# Patient Record
Sex: FEMALE | Race: WHITE | NOT HISPANIC OR LATINO | Employment: OTHER | URBAN - METROPOLITAN AREA
[De-identification: names, ages, dates, MRNs, and addresses within clinical notes are randomized per-mention and may not be internally consistent; named-entity substitution may affect disease eponyms.]

---

## 2017-01-16 ENCOUNTER — TRANSFERRED RECORDS (OUTPATIENT)
Dept: HEALTH INFORMATION MANAGEMENT | Facility: CLINIC | Age: 67
End: 2017-01-16

## 2017-02-10 DIAGNOSIS — N25.81 SECONDARY RENAL HYPERPARATHYROIDISM (H): ICD-10-CM

## 2017-02-10 DIAGNOSIS — E03.9 HYPOTHYROIDISM, UNSPECIFIED TYPE: ICD-10-CM

## 2017-02-10 DIAGNOSIS — D50.9 IRON DEFICIENCY ANEMIA, UNSPECIFIED IRON DEFICIENCY ANEMIA TYPE: ICD-10-CM

## 2017-02-10 LAB
DEPRECATED CALCIDIOL+CALCIFEROL SERPL-MC: 10 UG/L (ref 20–75)
IRON SATN MFR SERPL: 17 % (ref 15–46)
IRON SERPL-MCNC: 75 UG/DL (ref 35–180)
PHOSPHATE SERPL-MCNC: 2.9 MG/DL (ref 2.5–4.5)
TIBC SERPL-MCNC: 444 UG/DL (ref 240–430)
TSH SERPL DL<=0.05 MIU/L-ACNC: 2.55 MU/L (ref 0.4–4)

## 2017-02-10 PROCEDURE — 84100 ASSAY OF PHOSPHORUS: CPT | Performed by: INTERNAL MEDICINE

## 2017-02-10 PROCEDURE — 36415 COLL VENOUS BLD VENIPUNCTURE: CPT | Performed by: INTERNAL MEDICINE

## 2017-02-10 PROCEDURE — 82306 VITAMIN D 25 HYDROXY: CPT | Performed by: INTERNAL MEDICINE

## 2017-02-10 PROCEDURE — 84443 ASSAY THYROID STIM HORMONE: CPT | Performed by: INTERNAL MEDICINE

## 2017-02-10 PROCEDURE — 83550 IRON BINDING TEST: CPT | Performed by: INTERNAL MEDICINE

## 2017-02-10 PROCEDURE — 83540 ASSAY OF IRON: CPT | Performed by: INTERNAL MEDICINE

## 2017-02-16 ENCOUNTER — OFFICE VISIT (OUTPATIENT)
Dept: INTERNAL MEDICINE | Facility: CLINIC | Age: 67
End: 2017-02-16
Payer: COMMERCIAL

## 2017-02-16 VITALS
HEIGHT: 63 IN | OXYGEN SATURATION: 96 % | SYSTOLIC BLOOD PRESSURE: 136 MMHG | BODY MASS INDEX: 41.56 KG/M2 | TEMPERATURE: 98.3 F | HEART RATE: 91 BPM | DIASTOLIC BLOOD PRESSURE: 56 MMHG | WEIGHT: 234.56 LBS

## 2017-02-16 DIAGNOSIS — E55.9 VITAMIN D DEFICIENCY: ICD-10-CM

## 2017-02-16 DIAGNOSIS — E66.01 MORBID OBESITY DUE TO EXCESS CALORIES (H): ICD-10-CM

## 2017-02-16 DIAGNOSIS — N25.81 SECONDARY RENAL HYPERPARATHYROIDISM (H): ICD-10-CM

## 2017-02-16 DIAGNOSIS — E11.40 TYPE 2 DIABETES MELLITUS WITH DIABETIC NEUROPATHY, WITH LONG-TERM CURRENT USE OF INSULIN (H): Primary | ICD-10-CM

## 2017-02-16 DIAGNOSIS — Z79.4 TYPE 2 DIABETES MELLITUS WITH DIABETIC NEUROPATHY, WITH LONG-TERM CURRENT USE OF INSULIN (H): Primary | ICD-10-CM

## 2017-02-16 DIAGNOSIS — N18.30 CKD (CHRONIC KIDNEY DISEASE) STAGE 3, GFR 30-59 ML/MIN (H): ICD-10-CM

## 2017-02-16 DIAGNOSIS — D50.9 IRON DEFICIENCY ANEMIA, UNSPECIFIED IRON DEFICIENCY ANEMIA TYPE: ICD-10-CM

## 2017-02-16 PROCEDURE — 99214 OFFICE O/P EST MOD 30 MIN: CPT | Performed by: INTERNAL MEDICINE

## 2017-02-16 RX ORDER — CHOLECALCIFEROL (VITAMIN D3) 50 MCG
2000 TABLET ORAL DAILY
COMMUNITY
Start: 2017-02-16 | End: 2022-06-06

## 2017-02-16 NOTE — PROGRESS NOTES
SUBJECTIVE:                                                    Ade Wilkins is a 66 year old female who presents to clinic today for the following health issues:      Diabetes Follow-up    Patient is checking blood sugars: three times daily.   Blood sugar testing frequency justification:  Glucoses up and down during and after GI illness (see below)  Results are as follows:         am - 110-125  Lunch - 170-175  Pm - 200    Diabetic concerns: blood sugar frequently over 200     Symptoms of hypoglycemia (low blood sugar): tummy is upset     Paresthesias (numbness or burning in feet) or sores: No     Date of last diabetic eye exam: fall 2016       Amount of exercise or physical activity: None    Problems taking medications regularly: No    Medication side effects: none  Diet: diabetic needs more protein since bariatric surgery    Problem list and histories reviewed & adjusted, as indicated.  Additional history: as documented    Additional issues to address:  1.  Follow-up labs for CKD, secondary renal hyper parathyroidism  2.  Follow-up hypothyroidism.  Weight stable, no hair loss, skin changes, constipation, loose stools, etc.    3.  R 4th finger pain just proximal to MCP, finger flexes and has difficulty extending.    4.  Nasal drying and bleeding past 3 months.  Saline spray doesn't help much.   Allergist has her on flonase, which makes things bleed, so she doesn't take.   Using benadryl at bedtime.  Humidifier didn't help  5.  New ulcer ? L leg.   Very red, whenever really swollen.  - wearing non-compression support hose  - salt intake is low, not zero    ROS:  patient had explosive diarrhea last month, lasted 3 days, was in bed.  Constitutional, HEENT, cardiovascular, pulmonary, gi and gu systems are negative, except as otherwise noted.    OBJECTIVE:                                                    /56 (BP Location: Left arm, Patient Position: Chair, Cuff Size: Adult Large)  Pulse 91  Temp 98.3  F  "(36.8  C) (Oral)  Ht 5' 3.25\" (1.607 m)  Wt 234 lb 9 oz (106.4 kg)  SpO2 96%  BMI 41.22 kg/m2  Body mass index is 41.22 kg/(m^2).   No apparent distress  Mouth normal  Mild tenderness R 4th MCP area, palmar aspect only  Mild frontal tenderness  Mouth normal aside from dry appearance to post oropharynx  R shin with bright erythema, without luda cellulitis, modest edema, pitting          ASSESSMENT:                                                    DIABETES TYPE 2, insulin dependent, not controlled. Associated with the following: neuropathy  HYPOTHYROIDISM, controlled/euthyroid                               CHRONIC KIDNEY DISEASE Stage  3 ; due for recheck.   EPISODE OF BAD GI ILLNESS last month, infectious, resolved.  DRY MOUTH, NASAL SYMPTOMS.   Rule out allergic, from dryness, sinusitis.   Patient declines trial of antibiotics at this time.  VENOUS INSUFFICIENCY, not fully or effectively treated.   Problems tolerating support hose due to itching on legs.  Secondary changes anterior L shin area.   Rule out cellulitis, doubt.  PAIN IN HAND    PLAN:                                                    1.  MEDICATIONS:        - Trial of decreasing lasix to 1/2 tablet, if able to tolerate related to ankle edema       - Start over the counter vitamin D3 2000 units daily       - Increase lantus insulin to 60 units twice daily        - Continue other medications without change  2.  Try to decrease salt intake completely  3.  Resume using knee support hose/Jobst.  4.  Elevate legs daily, if possible  5.  Try using hydrocortisone cream twice daily, as needed.   Put on before support hose.  6.  Notify MD if lesion on left leg opens up.  7.  Update MD in a few weeks, or for changes in leg  8.  Check labs end of May, then see MD.    Dimitry Howard MD  [facility]     "

## 2017-02-16 NOTE — NURSING NOTE
"Chief Complaint   Patient presents with     Diabetes       Initial /56 (BP Location: Left arm, Patient Position: Chair, Cuff Size: Adult Large)  Pulse 91  Temp 98.3  F (36.8  C) (Oral)  Ht 5' 3.25\" (1.607 m)  Wt 234 lb 9 oz (106.4 kg)  SpO2 96%  BMI 41.22 kg/m2 Estimated body mass index is 41.22 kg/(m^2) as calculated from the following:    Height as of this encounter: 5' 3.25\" (1.607 m).    Weight as of this encounter: 234 lb 9 oz (106.4 kg).  Medication Reconciliation: complete   Shasta Burns, KEYSHA      "

## 2017-02-16 NOTE — MR AVS SNAPSHOT
After Visit Summary   2/16/2017    Ade Wilkins    MRN: [id number]           Patient Information     Date Of Birth          1950        Visit Information        Provider Department      2/16/2017 2:30 PM Dimitry Howard MD [facility]        Today's Diagnoses     Type 2 diabetes mellitus with diabetic neuropathy, with long-term current use of insulin (H)    -  1    CKD (chronic kidney disease) stage 3, GFR 30-59 ml/min        Secondary renal hyperparathyroidism (H)        Morbid obesity        Iron deficiency anemia, unspecified iron deficiency anemia type        Vitamin D deficiency          Care Instructions    PLAN:                                                    1.  MEDICATIONS:        - Trial of decreasing lasix to 1/2 tablet, if able to tolerate related to ankle edema       - Start over the counter vitamin D3 2000 units daily       - Increase lantus insulin to 60 units twice daily        - Continue other medications without change  2.  Try to decrease salt intake completely  3.  Resume using knee support hose/Jobst.  4.  Elevate legs daily, if possible  5.  Try using hydrocortisone cream twice daily, as needed.   Put on before support hose.  6.  Notify MD if lesion on left leg opens up.  7.  Update MD in a few weeks, or for changes in leg  8.  Check labs end of May, then see MD.        Follow-ups after your visit        Future tests that were ordered for you today     Open Future Orders        Priority Expected Expires Ordered    Vitamin D Deficiency Routine 5/25/2017 2/16/2018 2/16/2017    Comprehensive metabolic panel Routine 5/25/2017 2/16/2018 2/16/2017    Hemoglobin A1c Routine 5/25/2017 2/16/2018 2/16/2017    Lipid panel reflex to direct LDL Routine 5/25/2017 2/16/2018 2/16/2017    Albumin Random Urine Quantitative Routine 5/25/2017 2/16/2018 2/16/2017            Who to contact     If you have questions or need follow up information about today's clinic  "visit or your schedule please contact [facility] directly at [phone].  Normal or non-critical lab and imaging results will be communicated to you by MyChart, letter or phone within 4 business days after the clinic has received the results. If you do not hear from us within 7 days, please contact the clinic through [employer] or phone. If you have a critical or abnormal lab result, we will notify you by phone as soon as possible.  Submit refill requests through [employer] or call your pharmacy and they will forward the refill request to us. Please allow 3 business days for your refill to be completed.          Additional Information About Your Visit        [employer] Information     [employer] gives you secure access to your electronic health record. If you see a primary care provider, you can also send messages to your care team and make appointments. If you have questions, please call your primary care clinic.  If you do not have a primary care provider, please call [phone] and they will assist you.        Care EveryWhere ID     This is your Care EveryWhere ID. This could be used by other organizations to access your [facility] medical records  [id number]        Your Vitals Were     Pulse Temperature Height Pulse Oximetry BMI (Body Mass Index)       91 98.3  F (36.8  C) (Oral) 5' 3.25\" (1.607 m) 96% 41.22 kg/m2        Blood Pressure from Last 3 Encounters:   02/16/17 136/56   11/04/16 122/60   06/20/16 126/62    Weight from Last 3 Encounters:   02/16/17 234 lb 9 oz (106.4 kg)   11/04/16 236 lb (107 kg)   06/20/16 229 lb 9.6 oz (104.1 kg)                 Today's Medication Changes          These changes are accurate as of: 2/16/17  3:52 PM.  If you have any questions, ask your nurse or doctor.               Start taking these medicines.        Dose/Directions    vitamin D 2000 UNITS tablet   Used for:  Vitamin D deficiency   Started by:  Dimitry Howard MD        Dose:  2000 Units   Take " 2,000 Units by mouth daily   Refills:  0         These medicines have changed or have updated prescriptions.        Dose/Directions    insulin glargine 100 UNIT/ML injection   Commonly known as:  LANTUS   This may have changed:  how much to take   Used for:  Type 2 diabetes mellitus with diabetic neuropathy, with long-term current use of insulin (H)   Changed by:  Dimitry Howard MD        Dose:  60 Units   Inject 60 Units Subcutaneous 2 times daily   Quantity:  100 mL   Refills:  prn            Where to get your medicines      These medications were sent to [id number] - [place], [place] - [address]  [address] [address]     Phone:  [phone]     insulin glargine 100 UNIT/ML injection         Some of these will need a paper prescription and others can be bought over the counter.  Ask your nurse if you have questions.     You don't need a prescription for these medications     vitamin D 2000 UNITS tablet                Primary Care Provider Office Phone # Fax #    Dimitry Howard [phone]       [facility] [address] [facility] [address]        Thank you!     Thank you for choosing [facility]  for your care. Our goal is always to provide you with excellent care. Hearing back from our patients is one way we can continue to improve our services. Please take a few minutes to complete the written survey that you may receive in the mail after your visit with us. Thank you!             Your Updated Medication List - Protect others around you: Learn how to safely use, store and throw away your medicines at www.disposemymeds.org.          This list is accurate as of: 2/16/17  3:52 PM.  Always use your most recent med list.                   Brand Name Dispense Instructions for use    ALLERGY INJECTIONS          aspirin 81 MG tablet      Take  by mouth every other day.       atorvastatin 80 MG tablet    LIPITOR  "   90 tablet    Take 1 tablet (80 mg) by mouth daily       * blood glucose monitoring test strip    no brand specified    1 Box    Reason for four times daily checking is lack of diabetic control.  Patient is on multiple doses of insulin daily. Dispense what patient's plan will cover.  Dispense box of 100 strips at a time       * blood glucose monitoring test strip    MARTITA CONTOUR NEXT    1 Box    Use to test blood sugar 4 times daily or as directed.       calcium carbonate-vitamin D 600-400 MG-UNIT Chew    CALTRATE 600+D     Take 2 chew tab by mouth every evening       cetirizine 10 MG tablet    zyrTEC     Take 1 tablet (10 mg) by mouth daily       cyanocobalamin 1000 MCG tablet    vitamin  B-12     Take 1,000 mcg by mouth every other day       exenatide ER 2 MG recon vial kit susp for weekly inj    BYDUREON    1 kit    Inject 2 mg Subcutaneous every 7 days       fenofibrate 160 MG tablet     90 tablet    Take 1 tablet (160 mg) by mouth daily       ferrous fumarate 65 mg ([place]. FE)-Vitamin C 125 mg  MG Tabs tablet    VITRON-C    360 tablet    Take 2 tablets by mouth 2 times daily       fluticasone 50 MCG/ACT spray    FLONASE    16 g    Spray 2 sprays into both nostrils daily       furosemide 40 MG tablet    LASIX    90 tablet    Take 1 tablet (40 mg) by mouth every morning       hydrocortisone 0.2 % cream    WESTCORT    45 g    Apply topically 2 times daily Apply sparingly to affected area 2 times daily as needed.       insulin glargine 100 UNIT/ML injection    LANTUS    100 mL    Inject 60 Units Subcutaneous 2 times daily       insulin syringe-needle U-100 31G X 5/16\" 1 ML    BD insulin syringe ULTRAFINE    100 each    Use one syringe 3 times daily or as directed.       irbesartan 300 MG tablet    AVAPRO    90 tablet    Take 1 tablet (300 mg) by mouth daily       levothyroxine 25 MCG tablet    SYNTHROID/LEVOTHROID    90 tablet    Take 1 tablet (25 mcg) by mouth daily       Magnesium 500 MG Caps      Take 1 " capsule by mouth daily.       metFORMIN 1000 MG tablet    GLUCOPHAGE    180 tablet    Take 1 tablet (1,000 mg) by mouth 2 times daily (with meals)       MULTIVITAMIN TABS   OR      1 tab QD       NovoLOG FLEXPEN 100 UNIT/ML injection   Generic drug:  insulin aspart     15 mL    0-10 units before breakfast, 12-15 units before lunch, and 15-20 units before supper       ONE TOUCH DELICA LANCETS Misc     100 Stick    1 Device 4 times daily.       OVER-THE-COUNTER          Copper Chelate 2mg  1 daily       potassium chloride 10 MEQ tablet    K-TAB,KLOR-CON    90 tablet    Take 1 tablet (10 mEq) by mouth daily       traZODone 50 MG tablet    DESYREL    90 tablet    Take 1 tablet (50 mg) by mouth nightly as needed for sleep       verapamil 180 MG CR tablet    CALAN-SR    180 tablet    Take 1 tablet (180 mg) by mouth 2 times daily       vitamin D 2000 UNITS tablet      Take 2,000 Units by mouth daily       * Notice:  This list has 2 medication(s) that are the same as other medications prescribed for you. Read the directions carefully, and ask your doctor or other care provider to review them with you.

## 2017-02-16 NOTE — PATIENT INSTRUCTIONS
PLAN:                                                    1.  MEDICATIONS:        - Trial of decreasing lasix to 1/2 tablet, if able to tolerate related to ankle edema       - Start over the counter vitamin D3 2000 units daily       - Increase lantus insulin to 60 units twice daily        - Continue other medications without change  2.  Try to decrease salt intake completely  3.  Resume using knee support hose/Jobst.  4.  Elevate legs daily, if possible  5.  Try using hydrocortisone cream twice daily, as needed.   Put on before support hose.  6.  Notify MD if lesion on left leg opens up.  7.  Update MD in a few weeks, or for changes in leg  8.  Check labs end of May, then see MD.

## 2017-02-17 ENCOUNTER — OFFICE VISIT (OUTPATIENT)
Dept: DERMATOLOGY | Facility: CLINIC | Age: 67
End: 2017-02-17
Payer: COMMERCIAL

## 2017-02-17 VITALS — OXYGEN SATURATION: 97 % | SYSTOLIC BLOOD PRESSURE: 176 MMHG | DIASTOLIC BLOOD PRESSURE: 78 MMHG | HEART RATE: 88 BPM

## 2017-02-17 DIAGNOSIS — I87.2 VENOUS STASIS DERMATITIS OF BOTH LOWER EXTREMITIES: Primary | ICD-10-CM

## 2017-02-17 DIAGNOSIS — L81.4 LENTIGO: ICD-10-CM

## 2017-02-17 DIAGNOSIS — D18.00 ANGIOMA: ICD-10-CM

## 2017-02-17 DIAGNOSIS — D22.9 NEVUS: ICD-10-CM

## 2017-02-17 DIAGNOSIS — L82.1 SEBORRHEIC KERATOSIS: ICD-10-CM

## 2017-02-17 PROCEDURE — 99204 OFFICE O/P NEW MOD 45 MIN: CPT | Performed by: PHYSICIAN ASSISTANT

## 2017-02-17 RX ORDER — BETAMETHASONE DIPROPIONATE 0.5 MG/G
OINTMENT, AUGMENTED TOPICAL
Qty: 45 G | Refills: 0 | Status: SHIPPED | OUTPATIENT
Start: 2017-02-17 | End: 2017-05-12

## 2017-02-17 NOTE — PROGRESS NOTES
HPI:   Ade Wilkins is a 66 year old female who presents for Full skin cancer screening.  chief complaint  Last Skin Exam: years ago      1st Baseline: yes  Personal HX of Skin Cancer: none   Personal HX of Malignant Melanoma: none   Family HX of Skin Cancer / Malignant Melanoma: none  Personal HX of Atypical Moles:   none  Risk factors: sun exsposure  New / Changing lesions:R cheek  Social History: lives close, sees   On review of systems, there are no further skin complaints, patient is feeling otherwise well.  See patient intake sheet.  ROS of the following were done and are negative: Constitutional, Eyes, Ears, Nose,   Mouth, Throat, Cardiovascular, Respiratory, GI, Genitourinary, Musculoskeletal,   Psychiatric, Endocrine, Allergic/Immunologic.    PHYSICAL EXAM:   Weight:  BP:   Skin exam performed as follows: Type 2 skin. Mood appropriate  Alert and Oriented X 3. Well developed, well nourished in no distress.  General appearance: Normal  Head including face: Normal  Eyes: conjunctiva and lids: Normal  Mouth: Lips, teeth, gums: Normal  Neck: Normal  Chest-breast/axillae: Normal  Back: Normal  Spleen and liver: Normal  Cardiovascular: Exam of peripheral vascular system by observation for swelling, varicosities, edema: Normal  Genitalia: groin, buttocks: Normal  Extremities: digits/nails (clubbing): Normal  Eccrine and Apocrine glands: Normal  Right upper extremity: Normal  Left upper extremity: Normal  Right lower extremity: Normal  Left lower extremity: Normal  Skin: Scalp and body hair: See below    Pt deferred exam of breasts, groin, buttocks: No,     Other physical findings:  1. Multiple pigmented macules on extremities and trunk  2. Multiple pigmented macules on face, trunk and extremities  3. Multiple vascular papules on trunk, arms and legs  4. Multiple scattered keratotic plaques  5. Erythema on bilateral lower shins       Except as noted above, no other signs of skin cancer or melanoma.      ASSESSMENT/PLAN:   Benign Full skin cancer screening today. . Patient with history of none  Advised on monthly self exams and 1 year  Patient Education: Appropriate brochures given.    Multiple benign appearing nevi on arms, legs and trunk. Discussed ABCDEs of melanoma and sunscreen.   Multiple lentigos on arms, legs and trunk. Advised benign, no treatment needed.  Multiple scattered angiomas. Advised benign, no treatment needed.   Seborrheic keratosis on arms, legs and trunk. Advised benign, no treatment needed.  Venous stasis dermatitis on bilateral lower legs - advised on diagnosis and treatment options. The knows she needs to wear her compression stockings. Recommend ACE bandages or OTC compression hose at a minimum.  --Start betamethasone ointment BID x 2-3 weeks then PRN      Follow-up: 3-4 weeks - recheck lower legs; yearly FSE/PRN sooner    1.) Patient was asked about new and changing moles. YES  2.) Patient received a complete physical skin examination: YES  3.) Patient was counseled to perform a monthly self skin examination: YES  Scribed By: Yvonne Méndez MS, PA-C

## 2017-02-17 NOTE — MR AVS SNAPSHOT
After Visit Summary   2/17/2017    Ade Wilkins    MRN: [id number]           Patient Information     Date Of Birth          1950        Visit Information        Provider Department      2/17/2017 9:15 AM Yvonne Méndez PA-C [facility]        Today's Diagnoses     Venous stasis dermatitis of both lower extremities    -  1    Nevus        Lentigo        Angioma        Seborrheic keratosis           Follow-ups after your visit        Your next 10 appointments already scheduled     Mar 17, 2017 10:15 AM CDT   Return Visit with Yvonne Méndez PA-C   [facility] ([facility])    [address] [id number] [phone]              Future tests that were ordered for you today     Open Future Orders        Priority Expected Expires Ordered    Vitamin D Deficiency Routine 5/25/2017 2/16/2018 2/16/2017    Comprehensive metabolic panel Routine 5/25/2017 2/16/2018 2/16/2017    Hemoglobin A1c Routine 5/25/2017 2/16/2018 2/16/2017    Lipid panel reflex to direct LDL Routine 5/25/2017 2/16/2018 2/16/2017    Albumin Random Urine Quantitative Routine 5/25/2017 2/16/2018 2/16/2017            Who to contact     If you have questions or need follow up information about today's clinic visit or your schedule please contact [facility] directly at [phone].  Normal or non-critical lab and imaging results will be communicated to you by MyChart, letter or phone within 4 business days after the clinic has received the results. If you do not hear from us within 7 days, please contact the clinic through MyChart or phone. If you have a critical or abnormal lab result, we will notify you by phone as soon as possible.  Submit refill requests through [employer] or call your pharmacy and they will forward the refill request to us. Please allow 3 business days for your refill to be completed.           Additional Information About Your Visit        [employer] Information     [employer] gives you secure access to your electronic health record. If you see a primary care provider, you can also send messages to your care team and make appointments. If you have questions, please call your primary care clinic.  If you do not have a primary care provider, please call [phone] and they will assist you.        Care EveryWhere ID     This is your Care EveryWhere ID. This could be used by other organizations to access your [place] medical records  [id number]        Your Vitals Were     Pulse Pulse Oximetry                88 97%           Blood Pressure from Last 3 Encounters:   02/17/17 176/78   02/16/17 136/56   11/04/16 122/60    Weight from Last 3 Encounters:   02/16/17 106.4 kg (234 lb 9 oz)   11/04/16 107 kg (236 lb)   06/20/16 104.1 kg (229 lb 9.6 oz)              Today, you had the following     No orders found for display         Today's Medication Changes          These changes are accurate as of: 2/17/17 10:12 AM.  If you have any questions, ask your nurse or doctor.               Start taking these medicines.        Dose/Directions    augmented betamethasone dipropionate 0.05 % ointment   Commonly known as:  DIPROLENE-AF   Started by:  Yvonne Méndez, ANYA        Apply to AA BID x 3-4 weeks then PRN   Quantity:  45 g   Refills:  0            Where to get your medicines      These medications were sent to [id number] [address]  [address] [facility] [address]     Phone:  [phone]     augmented betamethasone dipropionate 0.05 % ointment                Primary Care Provider Office Phone # Fax #    Dimitrypeace Howard [phone]       [facility] [address]  [facility] [address]        Thank you!     Thank you for choosing [facility]  for your care. Our goal is always to provide you with excellent  care. Hearing back from our patients is one way we can continue to improve our services. Please take a few minutes to complete the written survey that you may receive in the mail after your visit with us. Thank you!             Your Updated Medication List - Protect others around you: Learn how to safely use, store and throw away your medicines at www.disposemymeds.org.          This list is accurate as of: 2/17/17 10:12 AM.  Always use your most recent med list.                   Brand Name Dispense Instructions for use    ALLERGY INJECTIONS          aspirin 81 MG tablet      Take  by mouth every other day.       atorvastatin 80 MG tablet    LIPITOR    90 tablet    Take 1 tablet (80 mg) by mouth daily       augmented betamethasone dipropionate 0.05 % ointment    DIPROLENE-AF    45 g    Apply to AA BID x 3-4 weeks then PRN       * blood glucose monitoring test strip    no brand specified    1 Box    Reason for four times daily checking is lack of diabetic control.  Patient is on multiple doses of insulin daily. Dispense what patient's plan will cover.  Dispense box of 100 strips at a time       * blood glucose monitoring test strip    MARTITA CONTOUR NEXT    1 Box    Use to test blood sugar 4 times daily or as directed.       calcium carbonate-vitamin D 600-400 MG-UNIT Chew    CALTRATE 600+D     Take 2 chew tab by mouth every evening       cetirizine 10 MG tablet    zyrTEC     Take 1 tablet (10 mg) by mouth daily       cyanocobalamin 1000 MCG tablet    vitamin  B-12     Take 1,000 mcg by mouth every other day       exenatide ER 2 MG recon vial kit susp for weekly inj    BYDUREON    1 kit    Inject 2 mg Subcutaneous every 7 days       fenofibrate 160 MG tablet     90 tablet    Take 1 tablet (160 mg) by mouth daily       ferrous fumarate 65 mg ([place]. FE)-Vitamin C 125 mg  MG Tabs tablet    VITRON-C    360 tablet    Take 2 tablets by mouth 2 times daily       fluticasone 50 MCG/ACT spray    FLONASE    16 g    Spray  "2 sprays into both nostrils daily       furosemide 40 MG tablet    LASIX    90 tablet    Take 1 tablet (40 mg) by mouth every morning       hydrocortisone 0.2 % cream    WESTCORT    45 g    Apply topically 2 times daily Apply sparingly to affected area 2 times daily as needed.       insulin glargine 100 UNIT/ML injection    LANTUS    100 mL    Inject 60 Units Subcutaneous 2 times daily       insulin syringe-needle U-100 31G X 5/16\" 1 ML    BD insulin syringe ULTRAFINE    100 each    Use one syringe 3 times daily or as directed.       irbesartan 300 MG tablet    AVAPRO    90 tablet    Take 1 tablet (300 mg) by mouth daily       levothyroxine 25 MCG tablet    SYNTHROID/LEVOTHROID    90 tablet    Take 1 tablet (25 mcg) by mouth daily       Magnesium 500 MG Caps      Take 1 capsule by mouth daily.       metFORMIN 1000 MG tablet    GLUCOPHAGE    180 tablet    Take 1 tablet (1,000 mg) by mouth 2 times daily (with meals)       MULTIVITAMIN TABS   OR      1 tab QD       NovoLOG FLEXPEN 100 UNIT/ML injection   Generic drug:  insulin aspart     15 mL    0-10 units before breakfast, 12-15 units before lunch, and 15-20 units before supper       ONE TOUCH DELICA LANCETS Misc     100 Stick    1 Device 4 times daily.       OVER-THE-COUNTER          Copper Chelate 2mg  1 daily       potassium chloride 10 MEQ tablet    K-TAB,KLOR-CON    90 tablet    Take 1 tablet (10 mEq) by mouth daily       traZODone 50 MG tablet    DESYREL    90 tablet    Take 1 tablet (50 mg) by mouth nightly as needed for sleep       verapamil 180 MG CR tablet    CALAN-SR    180 tablet    Take 1 tablet (180 mg) by mouth 2 times daily       vitamin D 2000 UNITS tablet      Take 2,000 Units by mouth daily       * Notice:  This list has 2 medication(s) that are the same as other medications prescribed for you. Read the directions carefully, and ask your doctor or other care provider to review them with you.      "

## 2017-02-17 NOTE — NURSING NOTE
"Initial /78  Pulse 88  SpO2 97% Estimated body mass index is 41.22 kg/(m^2) as calculated from the following:    Height as of 2/16/17: 1.607 m (5' 3.25\").    Weight as of 2/16/17: 106.4 kg (234 lb 9 oz). .      "

## 2017-03-17 ENCOUNTER — OFFICE VISIT (OUTPATIENT)
Dept: DERMATOLOGY | Facility: CLINIC | Age: 67
End: 2017-03-17
Payer: COMMERCIAL

## 2017-03-17 VITALS — OXYGEN SATURATION: 96 % | HEART RATE: 101 BPM | DIASTOLIC BLOOD PRESSURE: 86 MMHG | SYSTOLIC BLOOD PRESSURE: 185 MMHG

## 2017-03-17 DIAGNOSIS — I87.2 VENOUS STASIS DERMATITIS OF BOTH LOWER EXTREMITIES: Primary | ICD-10-CM

## 2017-03-17 PROCEDURE — 99212 OFFICE O/P EST SF 10 MIN: CPT | Performed by: PHYSICIAN ASSISTANT

## 2017-03-17 NOTE — PROGRESS NOTES
HPI:   Ade Wilkins is a 66 year old female who presents for recheck of stasis dermatitis on bilateral lower legs  chief complaint  Location: lower legs   Condition present for:  On and off for years.   Previous treatments include: betamethasone ointment and gentle skin care - she has been using this since lov.     Review Of Systems  Eyes: negative  Ears/Nose/Throat: negative  Respiratory: No shortness of breath, dyspnea on exertion, cough, or hemoptysis  Cardiovascular: negative  Gastrointestinal: negative  Genitourinary: negative  Musculoskeletal: negative  Neurologic: negative  Psychiatric: negative        PHYSICAL EXAM:      Skin exam performed as follows: Type 2 skin. Mood appropriate  Alert and Oriented X 3. Well developed, well nourished in no distress.  General appearance: Normal  Head including face: Normal  Eyes: conjunctiva and lids: Normal  Mouth: Lips, teeth, gums: Normal  Neck: Normal  Chest-breast/axillae: Normal  Back: Normal  Spleen and liver: Normal  Cardiovascular: Exam of peripheral vascular system by observation for swelling, varicosities, edema: Normal  Genitalia: groin, buttocks: Normal  Extremities: digits/nails (clubbing): Normal  Eccrine and Apocrine glands: Normal  Right upper extremity: Normal  Left upper extremity: Normal  Right lower extremity: Normal  Left lower extremity: Normal  Skin: Scalp and body hair: See below    1. Minimal erythema on bilateral anterior shins; healing well    ASSESSMENT/PLAN:     1. Venous stasis dermatitis on bilateral lower legs - much improved today. Erythema nearly resolved and she is no longer itchy or uncomfortable. Has been wearing compression hose; found ones that do not irritate her skin. She is pleased with progress. Reviewed gentle skin care and use of steroid ointment when needed.   --Continue betamethasone ointment BID x 1 more week then stop  --Continue compression stockings and gentle skin care every day along with leg elevation when possible          Follow-up: PRN/yearly FSE  CC:   Scribed By: Yvonne Méndez, MS, PA-C

## 2017-03-17 NOTE — NURSING NOTE
"Initial /86  Pulse 101  SpO2 96% Estimated body mass index is 41.22 kg/(m^2) as calculated from the following:    Height as of 2/16/17: 1.607 m (5' 3.25\").    Weight as of 2/16/17: 106.4 kg (234 lb 9 oz). .      "

## 2017-03-26 DIAGNOSIS — E11.40 TYPE 2 DIABETES MELLITUS WITH DIABETIC NEUROPATHY, WITH LONG-TERM CURRENT USE OF INSULIN (H): Primary | ICD-10-CM

## 2017-03-26 DIAGNOSIS — Z79.4 TYPE 2 DIABETES MELLITUS WITH DIABETIC NEUROPATHY, WITH LONG-TERM CURRENT USE OF INSULIN (H): Primary | ICD-10-CM

## 2017-03-27 NOTE — TELEPHONE ENCOUNTER
exenatide (BYDUREON) 2 MG SUSR       Last Written Prescription Date:  5/12/2014  Last Fill Quantity: 3 Kit,   # refills: 3  Last Office Visit with G, P or [facility] prescribing provider: 2/16/2017  Future Office visit:       Routing refill request to provider for review/approval because:  Medication is reported/discontinued

## 2017-03-28 ENCOUNTER — OFFICE VISIT (OUTPATIENT)
Dept: INTERNAL MEDICINE | Facility: CLINIC | Age: 67
End: 2017-03-28
Payer: COMMERCIAL

## 2017-03-28 VITALS
WEIGHT: 237 LBS | SYSTOLIC BLOOD PRESSURE: 146 MMHG | HEART RATE: 94 BPM | OXYGEN SATURATION: 93 % | TEMPERATURE: 97.7 F | BODY MASS INDEX: 41.65 KG/M2 | DIASTOLIC BLOOD PRESSURE: 68 MMHG

## 2017-03-28 DIAGNOSIS — E03.9 HYPOTHYROIDISM, UNSPECIFIED TYPE: ICD-10-CM

## 2017-03-28 DIAGNOSIS — E55.9 VITAMIN D DEFICIENCY: ICD-10-CM

## 2017-03-28 DIAGNOSIS — E78.5 HYPERLIPIDEMIA LDL GOAL <100: ICD-10-CM

## 2017-03-28 DIAGNOSIS — E66.01 MORBID OBESITY DUE TO EXCESS CALORIES (H): ICD-10-CM

## 2017-03-28 DIAGNOSIS — J01.90 ACUTE SINUSITIS WITH SYMPTOMS > 10 DAYS: ICD-10-CM

## 2017-03-28 DIAGNOSIS — Z79.4 TYPE 2 DIABETES MELLITUS WITH DIABETIC NEUROPATHY, WITH LONG-TERM CURRENT USE OF INSULIN (H): ICD-10-CM

## 2017-03-28 DIAGNOSIS — N18.30 CKD (CHRONIC KIDNEY DISEASE) STAGE 3, GFR 30-59 ML/MIN (H): ICD-10-CM

## 2017-03-28 DIAGNOSIS — R60.9 EDEMA, UNSPECIFIED TYPE: ICD-10-CM

## 2017-03-28 DIAGNOSIS — I10 ESSENTIAL HYPERTENSION: Primary | ICD-10-CM

## 2017-03-28 DIAGNOSIS — E11.40 TYPE 2 DIABETES MELLITUS WITH DIABETIC NEUROPATHY, WITH LONG-TERM CURRENT USE OF INSULIN (H): ICD-10-CM

## 2017-03-28 LAB
ALBUMIN SERPL-MCNC: 4.1 G/DL (ref 3.4–5)
ALP SERPL-CCNC: 69 U/L (ref 40–150)
ALT SERPL W P-5'-P-CCNC: 25 U/L (ref 0–50)
ANION GAP SERPL CALCULATED.3IONS-SCNC: 10 MMOL/L (ref 3–14)
AST SERPL W P-5'-P-CCNC: 16 U/L (ref 0–45)
BILIRUB SERPL-MCNC: 0.4 MG/DL (ref 0.2–1.3)
BUN SERPL-MCNC: 27 MG/DL (ref 7–30)
CALCIUM SERPL-MCNC: 9.5 MG/DL (ref 8.5–10.1)
CHLORIDE SERPL-SCNC: 103 MMOL/L (ref 94–109)
CHOLEST SERPL-MCNC: 185 MG/DL
CO2 SERPL-SCNC: 27 MMOL/L (ref 20–32)
CREAT SERPL-MCNC: 1.5 MG/DL (ref 0.52–1.04)
CREAT UR-MCNC: 22 MG/DL
DEPRECATED CALCIDIOL+CALCIFEROL SERPL-MC: 37 UG/L (ref 20–75)
GFR SERPL CREATININE-BSD FRML MDRD: 35 ML/MIN/1.7M2
GLUCOSE SERPL-MCNC: 85 MG/DL (ref 70–99)
HBA1C MFR BLD: 9.3 % (ref 4.3–6)
HDLC SERPL-MCNC: 35 MG/DL
LDLC SERPL CALC-MCNC: 101 MG/DL
MICROALBUMIN UR-MCNC: 46 MG/L
MICROALBUMIN/CREAT UR: 212.04 MG/G CR (ref 0–25)
NONHDLC SERPL-MCNC: 150 MG/DL
POTASSIUM SERPL-SCNC: 4.2 MMOL/L (ref 3.4–5.3)
PROT SERPL-MCNC: 7.5 G/DL (ref 6.8–8.8)
SODIUM SERPL-SCNC: 140 MMOL/L (ref 133–144)
TRIGL SERPL-MCNC: 247 MG/DL
TSH SERPL DL<=0.005 MIU/L-ACNC: 2.84 MU/L (ref 0.4–4)

## 2017-03-28 PROCEDURE — 80061 LIPID PANEL: CPT | Performed by: INTERNAL MEDICINE

## 2017-03-28 PROCEDURE — 82043 UR ALBUMIN QUANTITATIVE: CPT | Performed by: INTERNAL MEDICINE

## 2017-03-28 PROCEDURE — 83036 HEMOGLOBIN GLYCOSYLATED A1C: CPT | Performed by: INTERNAL MEDICINE

## 2017-03-28 PROCEDURE — 80053 COMPREHEN METABOLIC PANEL: CPT | Performed by: INTERNAL MEDICINE

## 2017-03-28 PROCEDURE — 84443 ASSAY THYROID STIM HORMONE: CPT | Performed by: INTERNAL MEDICINE

## 2017-03-28 PROCEDURE — 36415 COLL VENOUS BLD VENIPUNCTURE: CPT | Performed by: INTERNAL MEDICINE

## 2017-03-28 PROCEDURE — 82306 VITAMIN D 25 HYDROXY: CPT | Performed by: INTERNAL MEDICINE

## 2017-03-28 PROCEDURE — 99214 OFFICE O/P EST MOD 30 MIN: CPT | Performed by: INTERNAL MEDICINE

## 2017-03-28 RX ORDER — FUROSEMIDE 20 MG
20 TABLET ORAL DAILY
Qty: 90 TABLET | Refills: 3 | Status: SHIPPED | OUTPATIENT
Start: 2017-03-28 | End: 2018-06-27

## 2017-03-28 RX ORDER — AMOXICILLIN AND CLAVULANATE POTASSIUM 500; 125 MG/1; MG/1
1 TABLET, FILM COATED ORAL 3 TIMES DAILY
Qty: 30 TABLET | Refills: 0 | Status: SHIPPED | OUTPATIENT
Start: 2017-03-28 | End: 2017-08-25

## 2017-03-28 NOTE — PROGRESS NOTES
"  SUBJECTIVE:                                                    Ade Wilkins is a 66 year old female who presents to clinic today for the following health issues:    Chief Complaint   Patient presents with     Hypertension     Sinus Problem     sinus pressure onset x \"all winter\"- worse over past 3 weeks- sinus drip x 3 weeks, bloody mucous with blowing nose.     Hypertension Follow-up      Outpatient blood pressures are being checked at home.  Results are 195/90's.    Low Salt Diet: no added salt       Amount of exercise or physical activity: None    Problems taking medications regularly: No    Medication side effects: lightheadedness    Diet: stays away from white bread, tries to maintain a diabetic diet    Reviewed and updated as needed this visit by clinical staff:  Medications  Allergies  Soc Hx   Additional history: as documented    Additional issues to address:  1.  Sinus symptoms as above.   URI symptoms, then now sinus congestion, pressure bilat, with mild sore throat, post nasal drip, no fever/chills.  Mild cough, no shortness of breath above usual.   Some wheezing, which is not usual.  2.  Follow-up of type II diabetes mellitus.   Patient reports feeling well except for sinusitis.  On average, she checks her blood sugars 3+ times daily.  Didn't bring in meter.  Results are as follows:       am - 97 this am, 125 yest, usually 's       noon - 140       supper - 160-170       bedtime -        postprandial - after lunch ~ 210-220 one hour later  She reports the following symptoms:  No polyuria or polydipsia,  Symptoms of hypoglycemia: none      ROS:  Finger edema a bit more on lower dose lasix.   Wearing support hose.  Constitutional, HEENT, cardiovascular, pulmonary, gi and gu systems are negative, except as otherwise noted.    OBJECTIVE:                                                      /78  Pulse 94  Temp 97.7  F (36.5  C) (Oral)  Wt 237 lb (107.5 kg)  SpO2 93%  Breastfeeding? No  " BMI 41.65 kg/m2  Body mass index is 41.65 kg/(m^2).   No apparent distress  Recheck 146/68  TM normal  Some frontal and maxillary area tenderness bilat.  Throat dry, otherwise normal  No cervical adenopathy, thyroid normal  Lungs clear        ASSESSMENT:                                                      SINUSITIS  DIABETES TYPE 2, insulin dependent, not controlled. Associated with the following: neuropathy, nephropathy  HYPERLIPIDEMIA, follow-up due  HYPERTENSION, slightly worsened. Associated with the following complications: CKD and Diabetes  HYPOTHYROIDISM, follow-up due                                    PLAN:                                                      1.  MEDICATIONS:        - Start taking antibiotics, notify MD if having any problems       - Continue other medications without change  2.  Notify MD if sinus symptoms not resolved.  3.  Continue nasal saline, consider using nasal steroid spray with aqueous solution, such as nasacort AQ if insurance would allow.  4.  Check labs -->  A1c 9.3, creat stable at 1.5, high triglycerides   5.  See endocrinologist soon, as planned.  6.  Monitor blood pressure on own, consider nurse visit appointment or at pharmacy    Dimitry Howard MD  [facility]

## 2017-03-28 NOTE — MR AVS SNAPSHOT
After Visit Summary   3/28/2017    Ade Wilkins    MRN: [id number]           Patient Information     Date Of Birth          1950        Visit Information        Provider Department      3/28/2017 9:00 AM Dimitry Howard MD [facility]        Today's Diagnoses     Essential hypertension    -  1    Type 2 diabetes mellitus with diabetic neuropathy, with long-term current use of insulin (H)        Vitamin D deficiency        BMI > 40        Hypothyroidism, unspecified type        Hyperlipidemia LDL goal <100        CKD (chronic kidney disease) stage 3, GFR 30-59 ml/min        Edema, unspecified type        Acute sinusitis with symptoms > 10 days          Care Instructions    PLAN:                                                      1.  MEDICATIONS:        - Start taking antibiotics, notify MD if having any problems       - Continue other medications without change  2.  Notify MD if sinus symptoms not resolved.  3.  Continue nasal saline, consider using nasal steroid spray with aqueous solution, such as nasacort AQ if insurance would allow.  4.  Check labs  5.  See endocrinologist in May, as planned.  6.  Monitor blood pressure on own, consider nurse visit appointment or at pharmacy        Follow-ups after your visit        Your next 10 appointments already scheduled     May 24, 2017  8:45 AM CDT   (Arrive by 8:30 AM)   NEW DIABETES with Melvi Naik MD   [facility] ([facility])    [address] [place] [address] [phone]              Who to contact     If you have questions or need follow up information about today's clinic visit or your schedule please contact [facility] directly at [phone].  Normal or non-critical lab and imaging results will be communicated to you by MyChart, letter or phone within 4 business days after the clinic has received the  results. If you do not hear from us within 7 days, please contact the clinic through [employer] or phone. If you have a critical or abnormal lab result, we will notify you by phone as soon as possible.  Submit refill requests through [employer] or call your pharmacy and they will forward the refill request to us. Please allow 3 business days for your refill to be completed.          Additional Information About Your Visit        [employer] Information     [employer] gives you secure access to your electronic health record. If you see a primary care provider, you can also send messages to your care team and make appointments. If you have questions, please call your primary care clinic.  If you do not have a primary care provider, please call [phone] and they will assist you.        Care EveryWhere ID     This is your Care EveryWhere ID. This could be used by other organizations to access your [place] medical records  [id number]        Your Vitals Were     Pulse Temperature Pulse Oximetry Breastfeeding? BMI (Body Mass Index)       94 97.7  F (36.5  C) (Oral) 93% No 41.65 kg/m2        Blood Pressure from Last 3 Encounters:   03/28/17 146/68   03/17/17 185/86   02/17/17 176/78    Weight from Last 3 Encounters:   03/28/17 237 lb (107.5 kg)   02/16/17 234 lb 9 oz (106.4 kg)   11/04/16 236 lb (107 kg)              We Performed the Following     Albumin Random Urine Quantitative     Comprehensive metabolic panel     Hemoglobin A1c     Lipid panel reflex to direct LDL     TSH with free T4 reflex     Vitamin D Deficiency          Today's Medication Changes          These changes are accurate as of: 3/28/17 10:04 AM.  If you have any questions, ask your nurse or doctor.               Start taking these medicines.        Dose/Directions    amoxicillin-clavulanate 500-125 MG per tablet   Commonly known as:  AUGMENTIN   Used for:  Acute sinusitis with symptoms > 10 days   Started by:  Dimitry Howard MD        Dose:  1 tablet    Take 1 tablet by mouth 3 times daily for 10 days   Quantity:  30 tablet   Refills:  0         These medicines have changed or have updated prescriptions.        Dose/Directions    furosemide 20 MG tablet   Commonly known as:  LASIX   This may have changed:    - medication strength  - how much to take  - when to take this   Used for:  Essential hypertension   Changed by:  Dimitry Howard MD        Dose:  20 mg   Take 1 tablet (20 mg) by mouth daily   Quantity:  90 tablet   Refills:  3            Where to get your medicines      These medications were sent to [id number] - [facility] [address]  [address] [facility] [address]     Phone:  [phone]     amoxicillin-clavulanate 500-125 MG per tablet    furosemide 20 MG tablet                Primary Care Provider Office Phone # Fax #    Dimitry Howard [phone]       [facility] [address] [facility] [address]        Thank you!     Thank you for choosing [facility]  for your care. Our goal is always to provide you with excellent care. Hearing back from our patients is one way we can continue to improve our services. Please take a few minutes to complete the written survey that you may receive in the mail after your visit with us. Thank you!             Your Updated Medication List - Protect others around you: Learn how to safely use, store and throw away your medicines at www.disposemymeds.org.          This list is accurate as of: 3/28/17 10:04 AM.  Always use your most recent med list.                   Brand Name Dispense Instructions for use    ALLERGY INJECTIONS          amoxicillin-clavulanate 500-125 MG per tablet    AUGMENTIN    30 tablet    Take 1 tablet by mouth 3 times daily for 10 days       aspirin 81 MG tablet      Take  by mouth every other day.       atorvastatin 80 MG tablet    LIPITOR    90 tablet    Take 1 tablet (80 mg) by mouth daily     "   augmented betamethasone dipropionate 0.05 % ointment    DIPROLENE-AF    45 g    Apply to AA BID x 3-4 weeks then PRN       * blood glucose monitoring test strip    no brand specified    1 Box    Reason for four times daily checking is lack of diabetic control.  Patient is on multiple doses of insulin daily. Dispense what patient's plan will cover.  Dispense box of 100 strips at a time       * blood glucose monitoring test strip    MARTITA CONTOUR NEXT    1 Box    Use to test blood sugar 4 times daily or as directed.       calcium carbonate-vitamin D 600-400 MG-UNIT Chew    CALTRATE 600+D     Take 2 chew tab by mouth every evening       cetirizine 10 MG tablet    zyrTEC     Take 1 tablet (10 mg) by mouth daily       cyanocobalamin 1000 MCG tablet    vitamin  B-12     Take 1,000 mcg by mouth every other day       exenatide ER 2 MG recon vial kit susp for weekly inj    BYDUREON    2 kit    Inject 2 mg Subcutaneous every 7 days       fenofibrate 160 MG tablet     90 tablet    Take 1 tablet (160 mg) by mouth daily       ferrous fumarate 65 mg ([place]. FE)-Vitamin C 125 mg  MG Tabs tablet    VITRON-C    360 tablet    Take 2 tablets by mouth 2 times daily       fluticasone 50 MCG/ACT spray    FLONASE    16 g    Spray 2 sprays into both nostrils daily       furosemide 20 MG tablet    LASIX    90 tablet    Take 1 tablet (20 mg) by mouth daily       hydrocortisone 0.2 % cream    WESTCORT    45 g    Apply topically 2 times daily Apply sparingly to affected area 2 times daily as needed.       insulin glargine 100 UNIT/ML injection    LANTUS    100 mL    Inject 60 Units Subcutaneous 2 times daily       insulin syringe-needle U-100 31G X 5/16\" 1 ML    BD insulin syringe ULTRAFINE    100 each    Use one syringe 3 times daily or as directed.       irbesartan 300 MG tablet    AVAPRO    90 tablet    Take 1 tablet (300 mg) by mouth daily       levothyroxine 25 MCG tablet    SYNTHROID/LEVOTHROID    90 tablet    Take 1 tablet (25 " mcg) by mouth daily       Magnesium 500 MG Caps      Take 1 capsule by mouth daily.       metFORMIN 1000 MG tablet    GLUCOPHAGE    180 tablet    Take 1 tablet (1,000 mg) by mouth 2 times daily (with meals)       MULTIVITAMIN TABS   OR      1 tab QD       NovoLOG FLEXPEN 100 UNIT/ML injection   Generic drug:  insulin aspart     15 mL    0-10 units before breakfast, 12-15 units before lunch, and 15-20 units before supper       ONE TOUCH DELICA LANCETS Misc     100 Stick    1 Device 4 times daily.       OVER-THE-COUNTER          Reported on 3/28/2017       potassium chloride 10 MEQ tablet    K-TAB,KLOR-CON    90 tablet    Take 1 tablet (10 mEq) by mouth daily       traZODone 50 MG tablet    DESYREL    90 tablet    Take 1 tablet (50 mg) by mouth nightly as needed for sleep       verapamil 180 MG CR tablet    CALAN-SR    180 tablet    Take 1 tablet (180 mg) by mouth 2 times daily       vitamin D 2000 UNITS tablet      Take 2,000 Units by mouth daily       * Notice:  This list has 2 medication(s) that are the same as other medications prescribed for you. Read the directions carefully, and ask your doctor or other care provider to review them with you.

## 2017-03-28 NOTE — PATIENT INSTRUCTIONS
PLAN:                                                      1.  MEDICATIONS:        - Start taking antibiotics, notify MD if having any problems       - Continue other medications without change  2.  Notify MD if sinus symptoms not resolved.  3.  Continue nasal saline, consider using nasal steroid spray with aqueous solution, such as nasacort AQ if insurance would allow.  4.  Check labs  5.  See endocrinologist in May, as planned.  6.  Monitor blood pressure on own, consider nurse visit appointment or at pharmacy

## 2017-03-28 NOTE — NURSING NOTE
"Chief Complaint   Patient presents with     Hypertension     Sinus Problem     sinus pressure onset x \"all winter\"- worse over past 3 weeks- sinus drip x 3 weeks, bloody mucous with blowing nose.       Initial /78  Pulse 94  Temp 97.7  F (36.5  C) (Oral)  Wt 237 lb (107.5 kg)  SpO2 93%  Breastfeeding? No  BMI 41.65 kg/m2 Estimated body mass index is 41.65 kg/(m^2) as calculated from the following:    Height as of 2/16/17: 5' 3.25\" (1.607 m).    Weight as of this encounter: 237 lb (107.5 kg).  Medication Reconciliation: complete    "

## 2017-05-10 ASSESSMENT — ENCOUNTER SYMPTOMS
DYSPNEA ON EXERTION: 1
SYNCOPE: 0
SPEECH CHANGE: 0
EYE IRRITATION: 1
WEAKNESS: 0
BACK PAIN: 1
SINUS CONGESTION: 1
TINGLING: 0
SMELL DISTURBANCE: 0
LEG PAIN: 0
DOUBLE VISION: 0
LOSS OF CONSCIOUSNESS: 0
SLEEP DISTURBANCES DUE TO BREATHING: 0
ARTHRALGIAS: 1
MUSCLE CRAMPS: 0
NECK PAIN: 0
COUGH DISTURBING SLEEP: 0
POSTURAL DYSPNEA: 0
EYE PAIN: 0
STIFFNESS: 1
WHEEZING: 1
HOARSE VOICE: 0
TROUBLE SWALLOWING: 0
HYPERTENSION: 1
EXERCISE INTOLERANCE: 0
NUMBNESS: 0
EYE REDNESS: 1
DISTURBANCES IN COORDINATION: 0
LEG SWELLING: 1
HEADACHES: 1
NECK MASS: 0
DIZZINESS: 1
ORTHOPNEA: 0
SORE THROAT: 1
SEIZURES: 0
COUGH: 1
PARALYSIS: 0
HYPOTENSION: 0
JOINT SWELLING: 0
MEMORY LOSS: 0
RESPIRATORY PAIN: 0
MUSCLE WEAKNESS: 0
SHORTNESS OF BREATH: 1
TACHYCARDIA: 0
EYE WATERING: 1
MYALGIAS: 0
SPUTUM PRODUCTION: 1
TREMORS: 0
SNORES LOUDLY: 1
SINUS PAIN: 1
TASTE DISTURBANCE: 0
LIGHT-HEADEDNESS: 1
HEMOPTYSIS: 0
CLAUDICATION: 0
PALPITATIONS: 0

## 2017-05-12 ENCOUNTER — OFFICE VISIT (OUTPATIENT)
Dept: DERMATOLOGY | Facility: CLINIC | Age: 67
End: 2017-05-12
Payer: COMMERCIAL

## 2017-05-12 VITALS — HEART RATE: 95 BPM | SYSTOLIC BLOOD PRESSURE: 193 MMHG | OXYGEN SATURATION: 98 % | DIASTOLIC BLOOD PRESSURE: 83 MMHG

## 2017-05-12 DIAGNOSIS — D48.5 NEOPLASM OF UNCERTAIN BEHAVIOR OF SKIN: Primary | ICD-10-CM

## 2017-05-12 DIAGNOSIS — I87.2 VENOUS STASIS DERMATITIS OF BOTH LOWER EXTREMITIES: ICD-10-CM

## 2017-05-12 PROCEDURE — 88305 TISSUE EXAM BY PATHOLOGIST: CPT | Performed by: PHYSICIAN ASSISTANT

## 2017-05-12 PROCEDURE — 00000093 ZZHCL STATISTIC COURTESY CONSULT: Performed by: PHYSICIAN ASSISTANT

## 2017-05-12 PROCEDURE — 99213 OFFICE O/P EST LOW 20 MIN: CPT | Mod: 25 | Performed by: PHYSICIAN ASSISTANT

## 2017-05-12 PROCEDURE — 11100 HC BIOPSY SKIN/SUBQ/MUC MEM, SINGLE LESION: CPT | Performed by: PHYSICIAN ASSISTANT

## 2017-05-12 RX ORDER — BETAMETHASONE DIPROPIONATE 0.5 MG/G
OINTMENT, AUGMENTED TOPICAL
Qty: 45 G | Refills: 5 | Status: SHIPPED | OUTPATIENT
Start: 2017-05-12 | End: 2018-07-03

## 2017-05-12 NOTE — PROGRESS NOTES
HPI:   Ade Wilkins is a 66 year old female who presents for evaluation of a spot on the arm  chief complaint  Location: left forearm   Condition present for:  Awhile. Was flat and brown before; has been raised, itchy, pink for the last 2-3 weeks.   Previous treatments include: none    Review Of Systems  Eyes: negative  Ears/Nose/Throat: negative  Respiratory: No shortness of breath, dyspnea on exertion, cough, or hemoptysis  Cardiovascular: negative  Gastrointestinal: negative  Genitourinary: negative  Musculoskeletal: negative  Neurologic: negative  Psychiatric: negative        PHYSICAL EXAM:      Skin exam performed as follows: Type 2 skin. Mood appropriate  Alert and Oriented X 3. Well developed, well nourished in no distress.  General appearance: Normal  Head including face: Normal  Eyes: conjunctiva and lids: Normal  Mouth: Lips, teeth, gums: Normal  Neck: Normal  Chest-breast/axillae: Normal  Back: Normal  Spleen and liver: Normal  Cardiovascular: Exam of peripheral vascular system by observation for swelling, varicosities, edema: Normal  Genitalia: groin, buttocks: Normal  Extremities: digits/nails (clubbing): Normal  Eccrine and Apocrine glands: Normal  Right upper extremity: Normal  Left upper extremity: Normal  Right lower extremity: Normal  Left lower extremity: Normal  Skin: Scalp and body hair: See below    1. 7 mm pink macule on left forearm    ASSESSMENT/PLAN:     1. ISK r/o BCC on left forearm. Shave bx in typical fashion .  Area cleaned with betadyne and anesthetized with 1% lidocaine with epi .  Dermablade used to remove the lesion and sent to pathology. Bleeding was cauterized. Pt tolerated procedure well.  2. Venous stasis dermatitis much improved. Has been using betamethasone ointment QD-BID. Advised to take breaks from this as able; continue gentle skin care every day. Is wearing compression stockings as much as able.         Follow-up: yearly FSE/pending path/PRN  CC:   Scribed By: Yvonne  Dve MS, PARAÚL

## 2017-05-12 NOTE — MR AVS SNAPSHOT
After Visit Summary   5/12/2017    Ade Wilkins    MRN: [id number]           Patient Information     Date Of Birth          1950        Visit Information        Provider Department      5/12/2017 8:30 AM Yvonne Méndez PA-C [facility]        Today's Diagnoses     Neoplasm of uncertain behavior of skin    -  1    Venous stasis dermatitis of both lower extremities           Follow-ups after your visit        Your next 10 appointments already scheduled     May 24, 2017  8:45 AM CDT   (Arrive by 8:30 AM)   NEW DIABETES with Melvi Naik MD   [facility] Endocrinology ([facility] [place])    [address]  3rd Floor  [facility] [address] [phone]              Who to contact     If you have questions or need follow up information about today's clinic visit or your schedule please contact [facility] directly at [phone].  Normal or non-critical lab and imaging results will be communicated to you by MyChart, letter or phone within 4 business days after the clinic has received the results. If you do not hear from us within 7 days, please contact the clinic through [employer] or phone. If you have a critical or abnormal lab result, we will notify you by phone as soon as possible.  Submit refill requests through [email or web address] or call your pharmacy and they will forward the refill request to us. Please allow 3 business days for your refill to be completed.          Additional Information About Your Visit        MyChart Information     [email or web address] gives you secure access to your electronic health record. If you see a primary care provider, you can also send messages to your care team and make appointments. If you have questions, please call your primary care clinic.  If you do not have a primary care provider, please call [phone] and they will assist you.        Care EveryWhere ID     This is your Care EveryWhere  ID. This could be used by other organizations to access your [place] medical records  [id number]        Your Vitals Were     Pulse Pulse Oximetry                95 98%           Blood Pressure from Last 3 Encounters:   05/12/17 193/83   03/28/17 146/68   03/17/17 185/86    Weight from Last 3 Encounters:   03/28/17 107.5 kg (237 lb)   02/16/17 106.4 kg (234 lb 9 oz)   11/04/16 107 kg (236 lb)              We Performed the Following     BIOPSY SKIN/SUBQ/MUC MEM, SINGLE LESION     Surgical pathology exam          Where to get your medicines      These medications were [facility] to [id number] - [place] [place] [facility] [address] [facility]  [address] [facility] [address]     Phone:  [phone]     augmented betamethasone dipropionate 0.05 % ointment          Primary Care Provider Office Phone # Fax #    Dimitry Dallin Howard [phone]       [facility] [address]  [facility] [address]        Thank you!     Thank you for choosing [facility]  for your care. Our goal is always to provide you with excellent care. Hearing back from our patients is one way we can continue to improve our services. Please take a few minutes to complete the written survey that you may receive in the mail after your visit with us. Thank you!             Your Updated Medication List - Protect others around you: Learn how to safely use, store and throw away your medicines at www.disposemymeds.org.          This list is accurate as of: 5/12/17  9:42 AM.  Always use your most recent med list.                   Brand Name Dispense Instructions for use    ALLERGY INJECTIONS          aspirin 81 MG tablet      Take  by mouth every other day.       atorvastatin 80 MG tablet    LIPITOR    90 tablet    Take 1 tablet (80 mg) by mouth daily       augmented betamethasone dipropionate 0.05 % ointment    DIPROLENE-AF    45 g    Apply to AA BID x 3-4 weeks then PRN       * blood glucose  "monitoring test strip    no brand specified    1 Box    Reason for four times daily checking is lack of diabetic control.  Patient is on multiple doses of insulin daily. Dispense what patient's plan will cover.  Dispense box of 100 strips at a time       * blood glucose monitoring test strip    MARTITA CONTOUR NEXT    1 Box    Use to test blood sugar 4 times daily or as directed.       calcium carbonate-vitamin D 600-400 MG-UNIT Chew    CALTRATE 600+D     Take 2 chew tab by mouth every evening       cetirizine 10 MG tablet    zyrTEC     Take 1 tablet (10 mg) by mouth daily       cyanocobalamin 1000 MCG tablet    vitamin  B-12     Take 1,000 mcg by mouth every other day       exenatide ER 2 MG recon vial kit susp for weekly inj    BYDUREON    2 kit    Inject 2 mg Subcutaneous every 7 days       fenofibrate 160 MG tablet     90 tablet    Take 1 tablet (160 mg) by mouth daily       ferrous fumarate 65 mg ([place]. FE)-Vitamin C 125 mg  MG Tabs tablet    VITRON-C    360 tablet    Take 2 tablets by mouth 2 times daily       fluticasone 50 MCG/ACT spray    FLONASE    16 g    Spray 2 sprays into both nostrils daily       furosemide 20 MG tablet    LASIX    90 tablet    Take 1 tablet (20 mg) by mouth daily       hydrocortisone 0.2 % cream    WESTCORT    45 g    Apply topically 2 times daily Apply sparingly to affected area 2 times daily as needed.       insulin glargine 100 UNIT/ML injection    LANTUS    100 mL    Inject 60 Units Subcutaneous 2 times daily       insulin syringe-needle U-100 31G X 5/16\" 1 ML    BD insulin syringe ULTRAFINE    100 each    Use one syringe 3 times daily or as directed.       irbesartan 300 MG tablet    AVAPRO    90 tablet    Take 1 tablet (300 mg) by mouth daily       levothyroxine 25 MCG tablet    SYNTHROID/LEVOTHROID    90 tablet    Take 1 tablet (25 mcg) by mouth daily       Magnesium 500 MG Caps      Take 1 capsule by mouth daily.       metFORMIN 1000 MG tablet    GLUCOPHAGE    180 tablet "    Take 1 tablet (1,000 mg) by mouth 2 times daily (with meals)       MULTIVITAMIN TABS   OR      1 tab QD       NovoLOG FLEXPEN 100 UNIT/ML injection   Generic drug:  insulin aspart     15 mL    0-10 units before breakfast, 12-15 units before lunch, and 15-20 units before supper       ONE TOUCH DELICA LANCETS Misc     100 Stick    1 Device 4 times daily.       OVER-THE-COUNTER          Reported on 3/28/2017       potassium chloride 10 MEQ tablet    K-TAB,KLOR-CON    90 tablet    Take 1 tablet (10 mEq) by mouth daily       traZODone 50 MG tablet    DESYREL    90 tablet    Take 1 tablet (50 mg) by mouth nightly as needed for sleep       verapamil 180 MG CR tablet    CALAN-SR    180 tablet    Take 1 tablet (180 mg) by mouth 2 times daily       vitamin D 2000 UNITS tablet      Take 2,000 Units by mouth daily       * Notice:  This list has 2 medication(s) that are the same as other medications prescribed for you. Read the directions carefully, and ask your doctor or other care provider to review them with you.

## 2017-05-12 NOTE — NURSING NOTE
"Initial /83  Pulse 95  SpO2 98% Estimated body mass index is 41.65 kg/(m^2) as calculated from the following:    Height as of 2/16/17: 1.607 m (5' 3.25\").    Weight as of 3/28/17: 107.5 kg (237 lb). .      "

## 2017-05-16 ENCOUNTER — HOSPITAL PATHOLOGY (OUTPATIENT)
Dept: OTHER | Facility: CLINIC | Age: 67
End: 2017-05-16

## 2017-05-17 LAB — COPATH REPORT: NORMAL

## 2017-05-19 ENCOUNTER — TRANSFERRED RECORDS (OUTPATIENT)
Dept: HEALTH INFORMATION MANAGEMENT | Facility: CLINIC | Age: 67
End: 2017-05-19

## 2017-05-20 LAB — COPATH REPORT: NORMAL

## 2017-05-24 ENCOUNTER — OFFICE VISIT (OUTPATIENT)
Dept: ENDOCRINOLOGY | Facility: CLINIC | Age: 67
End: 2017-05-24

## 2017-05-24 VITALS
SYSTOLIC BLOOD PRESSURE: 120 MMHG | HEIGHT: 63 IN | HEART RATE: 71 BPM | BODY MASS INDEX: 41.56 KG/M2 | DIASTOLIC BLOOD PRESSURE: 66 MMHG | WEIGHT: 234.57 LBS

## 2017-05-24 DIAGNOSIS — Z79.4 TYPE 2 DIABETES MELLITUS WITH DIABETIC NEUROPATHY, WITH LONG-TERM CURRENT USE OF INSULIN (H): Primary | ICD-10-CM

## 2017-05-24 DIAGNOSIS — E11.40 TYPE 2 DIABETES MELLITUS WITH DIABETIC NEUROPATHY, WITH LONG-TERM CURRENT USE OF INSULIN (H): Primary | ICD-10-CM

## 2017-05-24 ASSESSMENT — PAIN SCALES - GENERAL: PAINLEVEL: NO PAIN (0)

## 2017-05-24 NOTE — PROGRESS NOTES
Endocrinology Clinic Visit      May 24, 2017              Chief Complaint: Consult (type 2 diabetes )       Subjective:         HPI: Ade Wilkins is a 66 year old year old female who history of hepatitis A as a teenager, non-Hodgkin's lymphoma diagnosed in 2008, iron deficiency anemia, morbid obesity, hyperlipidemia, rheumatic fever with systolic murmur, hypothyroidism and hypertension who presents today for a new consultation for type 2 diabetes mellitus.     History of Diabetes  Type 2 diagnosed in late 20s.  In the 1970s she was initially started on oral medications and was transitioned to insulin in the 80s.  She had a gastric bypass surgery done in 2004 after which she lost about 70 pounds.  Her blood sugars were better controlled for the next few years but has gradually worsened over time.  Her weight had been steady around 210-215 pounds but in the recent years as her activity was limited by knee pain, she has gained about 15-20 pounds.    She has a strong family history of type 2 diabetes mellitus with Mother and Sister with type 2 diabetes.    Complications  She has triopathy from diabetes.     1. Retinopathy: Last exam was in May 19 2017, mild NPDR    2.  Nephropathy:  Lab Results   Component Value Date    MICROL 46 03/28/2017     Creatinine   Date Value Ref Range Status   03/28/2017 1.50 (H) 0.52 - 1.04 mg/dL Final   ]    3. Neuropathy   Occasional tingling that improves with pain patch    4. Hypoglycemia   This is rare, but does happen if does not eat after taking insulin.    5. Macrovascular:   No history of cardiac events or stroke    Treatment  Diabetes Medication(s)     Biguanides Sig    metFORMIN (GLUCOPHAGE) 1000 MG tablet Take 1 tablet (1,000 mg) by mouth 2 times daily (with meals)    Insulin Sig    insulin glargine (LANTUS) 100 UNIT/ML injection Inject 60 Units Subcutaneous 2 times daily    NOVOLOG FLEXPEN 100 UNIT/ML soln 0-10 units before breakfast, 12-15 units before lunch, and 15-20 units  before supper    Incretin Mimetic Agents (GLP-1 Receptor Agonists) Sig    exenatide ER (BYDUREON) 2 MG recon vial kit susp for weekly inj Inject 2 mg Subcutaneous every 7 days        3 per carb.     Prevention  Lipid  LDL Cholesterol Calculated   Date Value Ref Range Status   03/28/2017 101 (H) <100 mg/dL Final     Comment:     Above desirable:  100-129 mg/dl   Borderline High:  130-159 mg/dL   High:             160-189 mg/dL   Very high:       >189 mg/dl     06/17/2016 78 <100 mg/dL Final     Comment:     Desirable:       <100 mg/dl       Medications     HMG CoA Reductase Inhibitors    atorvastatin (LIPITOR) 80 MG tablet    Salicylates    aspirin 81 MG tablet          Renal  Medication (Note: This includes the hypertensive combination subclass to make sure to show all ACEI/ARB's.)     Angiotensin II Receptor Antagonists Sig    irbesartan (AVAPRO) 300 MG tablet Take 1 tablet (300 mg) by mouth daily            Weight  Wt Readings from Last 4 Encounters:   03/28/17 107.5 kg (237 lb)   02/16/17 106.4 kg (234 lb 9 oz)   11/04/16 107 kg (236 lb)   06/20/16 104.1 kg (229 lb 9.6 oz)       Meter Download Summary:   She checks her blood sugars about 1-2 times a day.  The morning blood sugars are usually between 151-209.  Afternoon blood sugars are higher and mostly about 200s.  Two hours after her lunch, blood sugars are in the 300 range  She does not check her blood sugars daily.      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    Breakfast : english muffin PB, or eggs,      Snack: BG test 11 am, if low takes a snack.      Lunch : veggies,   Wed: pasta,   Chicken, veggies, potatoes or salad/    Snack : home: popcorn     Dinner : 7 -7.30 : orders out at times     Fluctuates with days in relation to bydureon.     Snack: occ toast.     Alcohol or sugared beverage    Exercise : limited     Weight trend  Wt Readings from Last 4 Encounters:   03/28/17 107.5 kg (237 lb)   02/16/17  106.4 kg (234 lb 9 oz)   11/04/16 107 kg (236 lb)   06/20/16 104.1 kg (229 lb 9.6 oz)       A1c today is   Lab Results   Component Value Date    A1C 9.3 03/28/2017    A1C 9.0 10/17/2016    A1C 8.6 06/17/2016    A1C 8.3 02/16/2016    A1C 8.6 10/05/2015       Barriers to achieve glycemic goals:      Type 2 Diabetes since her early 20s    Barriers to achieving glycemic goals  Lack of testing before meals.  Lack of insulin with snacking  Ineffective bydureon  states that bydureon works about a day after injection and does not work after three or four days.  Recent change in Lantus to vial   High Carb diet  Lack of exercise: limited by pain in the joints.   Morbid obesity       Allergies   Allergen Reactions     Clonidine      headaches     Fexofenadine Hydrochloride      Allegra--headache     Gemfibrozil      lopid--rash ??from med     Lisinopril      edema     Norvasc [Amlodipine Besylate]      Hair loss     Pioglitazone Hydrochloride Swelling     actos--ankle edema     Doxazosin Mesylate Rash     cardura--rash       Current Outpatient Prescriptions   Medication Sig Dispense Refill     augmented betamethasone dipropionate (DIPROLENE-AF) 0.05 % ointment Apply to AA BID x 3-4 weeks then PRN 45 g 5     exenatide ER (BYDUREON) 2 MG recon vial kit susp for weekly inj Inject 2 mg Subcutaneous every 7 days 2 kit 2     furosemide (LASIX) 20 MG tablet Take 1 tablet (20 mg) by mouth daily 90 tablet 3     insulin glargine (LANTUS) 100 UNIT/ML injection Inject 60 Units Subcutaneous 2 times daily 100 mL prn     Cholecalciferol (VITAMIN D) 2000 UNITS tablet Take 2,000 Units by mouth daily       potassium chloride (K-TAB,KLOR-CON) 10 MEQ tablet Take 1 tablet (10 mEq) by mouth daily 90 tablet prn     irbesartan (AVAPRO) 300 MG tablet Take 1 tablet (300 mg) by mouth daily 90 tablet prn     ferrous fumarate 65 mg, [place]. FE,-Vitamin C 125 mg (VITRON-C)  MG TABS Take 2 tablets by mouth 2 times daily 360 tablet prn     levothyroxine  "(SYNTHROID, LEVOTHROID) 25 MCG tablet Take 1 tablet (25 mcg) by mouth daily 90 tablet 3     fenofibrate 160 MG tablet Take 1 tablet (160 mg) by mouth daily 90 tablet prn     metFORMIN (GLUCOPHAGE) 1000 MG tablet Take 1 tablet (1,000 mg) by mouth 2 times daily (with meals) 180 tablet prn     traZODone (DESYREL) 50 MG tablet Take 1 tablet (50 mg) by mouth nightly as needed for sleep 90 tablet prn     NOVOLOG FLEXPEN 100 UNIT/ML soln 0-10 units before breakfast, 12-15 units before lunch, and 15-20 units before supper 15 mL prn     verapamil (CALAN-SR) 180 MG CR tablet Take 1 tablet (180 mg) by mouth 2 times daily 180 tablet prn     atorvastatin (LIPITOR) 80 MG tablet Take 1 tablet (80 mg) by mouth daily 90 tablet prn     insulin syringe-needle U-100 (BD INSULIN SYRINGE ULTRAFINE) 31G X 5/16\" 1 ML Use one syringe 3 times daily or as directed. 100 each prn     fluticasone (FLONASE) 50 MCG/ACT nasal spray Spray 2 sprays into both nostrils daily 16 g 1     blood glucose monitoring (MARTITA CONTOUR NEXT) test strip Use to test blood sugar 4 times daily or as directed. 1 Box 9     blood glucose monitoring (NO BRAND SPECIFIED) test strip Reason for four times daily checking is lack of diabetic control.  Patient is on multiple doses of insulin daily. Dispense what patient's plan will cover.  Dispense box of 100 strips at a time 1 Box 1     ALLERGY INJECTIONS        hydrocortisone (WESTCORT) 0.2 % cream Apply topically 2 times daily Apply sparingly to affected area 2 times daily as needed. 45 g 2     calcium carbonate-vitamin D (CALTRATE 600+D) 600-400 MG-UNIT CHEW Take 2 chew tab by mouth every evening       cetirizine (ZYRTEC) 10 MG tablet Take 1 tablet (10 mg) by mouth daily       aspirin 81 MG tablet Take  by mouth every other day.       Magnesium 500 MG CAPS Take 1 capsule by mouth daily.       cyanocolbalamin (VITAMIN B-12) 1000 MCG tablet Take 1,000 mcg by mouth every other day        ONE TOUCH DELICA LANCETS MISC 1 Device " 4 times daily. 100 Stick prn     OVER-THE-COUNTER Reported on 3/28/2017       MULTIVITAMIN TABS   OR 1 tab QD         Review of Systems     Constitutional: Normal appetite, feels well no polyuria or polydipsia.   Head: no headache   Eye: no vision change/ loss of peripheral vision.   ENT: No throat congestion.   Respiratory: no cough   Cardiovascular: Has no known murmur, no chest pain.  Has some shortness of breath on exertion.  Gastrointestinal: Negative for vomiting, abdominal pain and diarrhea.  Genitourinary: No bladder problems.  Musculoskeletal: Negative for myalgias. No weakness.  Neurological: Negative for seizures and headaches.  Psychiatric/Behavioral: Negative for behavioral problem and dysphoric mood.    Past Medical History:   Diagnosis Date     Allergic rhinitis, cause unspecified      Hepatitis, unspecified 1968     Iron Deficiency Anemia 3/09     Lymphoma (H) 12/08     Nonsenile cataract      Obesity, unspecified      Other and unspecified hyperlipidemia      Rheumatic fever without mention of heart involvement infant     Type II or unspecified type diabetes mellitus without mention of complication, not stated as uncontrolled 1992     Unspecified essential hypertension      Unspecified hypothyroidism      Vitamin B12 deficiency 3/09       Past Surgical History:   Procedure Laterality Date     C APPENDECTOMY  1958     C NONSPECIFIC PROCEDURE  1976    (leg) cystectomy     CATARACT IOL, RT/LT Left 12/21/2000    left     CATARACT IOL, RT/LT Right 01/06/2000    right     GASTRIC BYPASS  2/04    Dr. Hebert     LYMPH NODE BIOPSY  12/08    right groin, Dr. Licona     TONSILLECTOMY & ADENOIDECTOMY  1968       Family History   Problem Relation Age of Onset     Cardiovascular Father      AAA     DIABETES Mother      C.A.D. Mother      52     Eye Disorder Mother      glaucoma     Glaucoma Mother      Cardiovascular Brother      AAA, PVD     CANCER Brother      bladder     Cardiovascular Brother      AAA, valvular  "heart disease     DIABETES Brother      age 53     CANCER Brother      liver cancer     Glaucoma Other      MGGF     Macular Degeneration No family hx of        Social History     Social History     Marital status:      Spouse name: N/A     Number of children: 0     Years of education: N/A     Occupational History      [employer]     Social History Main Topics     Smoking status: Never Smoker     Smokeless tobacco: Never Used     Alcohol use No     Drug use: No     Sexual activity: No     Other Topics Concern     Caffeine Concern Yes     20 oz daily     Exercise No     Seat Belt Yes     Self-Exams Yes     Social History Narrative    Living arrangements - the patient lives alone.       Objective:   /66 (BP Location: Right arm, Patient Position: Chair, Cuff Size: Adult Large)  Pulse 71  Ht 1.588 m (5' 2.5\")  Wt 106.4 kg (234 lb 9.1 oz)  BMI 42.22 kg/m2    Constitutional: Obese female in no acute distress cooperative and comfortable  EYES: anicteric, normal extra-ocular movements, no lid lag or retraction   HEENT: Mouth/Throat: Mucous membrane is moist. Oropharynx is clear. No adenopathy. No large goiters, difficult to palpate due to short neck  Cardiovascular: RRR, S1, S2 normal.  Aortic area systolic murmur  Pulmonary/Chest: CTAB. No wheezing or rales   Abdominal: +BS. Nontender to palpation.  Abdomen is distended, small bruises around her injection site.  No lipo hypertrophy.  White striae.  Neurological: Alert. Normal affect. CNII-XII intact. Muscle strength 5/5. Sensory is intact.  Extremities: No clubbing, cyanosis or inflammation   Bilateral lower extremity edema left worse than the right.  Skin: normal texture, color  Feet: Bilateral loss of sensation in both feet  Lymphatic: no cervical lymphadenopathy.  Psychological: appropriate mood     In House Labs:   Lab Results   Component Value Date    A1C 9.3 03/28/2017    A1C 9.0 10/17/2016    A1C 8.6 06/17/2016    " A1C 8.3 02/16/2016    A1C 8.6 10/05/2015       TSH   Date Value Ref Range Status   03/28/2017 2.84 0.40 - 4.00 mU/L Final   02/10/2017 2.55 0.40 - 4.00 mU/L Final   10/17/2016 4.39 (H) 0.40 - 4.00 mU/L Final   10/05/2015 3.71 0.40 - 4.00 mU/L Final   11/28/2014 3.89 0.40 - 4.00 mU/L Final     Comment:     Effective 7/30/2014, the reference range for this assay has changed to reflect   new instrumentation/methodology.       T4 Free   Date Value Ref Range Status   10/17/2016 1.17 0.76 - 1.46 ng/dL Final   05/05/2010 1.05 0.70 - 1.85 ng/dL Final   09/13/2007 1.00 0.70 - 1.85 ng/dL Final       Creatinine   Date Value Ref Range Status   03/28/2017 1.50 (H) 0.52 - 1.04 mg/dL Final   ]    Recent Labs   Lab Test  03/28/17   1014  06/17/16   0750  05/22/15   0848   05/03/14   1018   CHOL  185  166  175   < >  161   HDL  35*  34*  32*   < >  39*   LDL  101*  78  76   < >  76   TRIG  247*  271*  334*   < >  231*   CHOLHDLRATIO   --    --   5.5*   --   4.2    < > = values in this interval not displayed.       No results found for: [id number], [id number], [id number]      Assessment/Treatment Plan:      Ade Wilkins is a 66 year old year old female with history morbid obesity S/P Gastric bypass surgery, hypothyroidism who presents today for further management of type 2 diabetes.  Her diabetes mellitus is complicated by diabetic neuropathy, mild nonproliferative retinopathy and nephropathy.    1. Type 2 Diabetes since her early 20s    Barriers to achieving glycemic goals  Lack of testing before meals.  She was advised to test before each meal and at bedtime.  Keep a record of meals that she consumes and use correction insulin    Lack of insulin with snacking  Patient was advised to use low carbohydrate foods for snacks    Ineffective bydureon  states that bydureon works about a day after injection and does not work after three or four days.  We will change this to Dulaglutide, if the insurance covers this medication.     Recent  change in Lantus to vial   I have transitioned her to Tresiba, that is covered by insurance.   Start 110 units (she was asked to hold the evening dose of the lantus to be safe and use Tresiba next morning)     High Carb diet  Advised on volumetric eating plan  Foods such as: Bread, Pizza, Pasta, Rice, Flour based products, and potatoes have high carb in them - recommend reducing the portion of these foods and supplementing with vegetables and lean proteins - chicken, turkey, eggs.     Lack of exercise: limited by pain in the joints.   Increasing regular exercise will help your weight loss effort.     Anemia:   Hemoglobin A1c may be falsely lower than her actual value    I will contact the patient with the test results.  Return to clinic in 3 months with labs.     Hypothyroidism on levothyroxine 25  g daily  This has been followed by primary care physician  Last TSH was normal    Morbid obesity  We discussed about diet and exercise  She will probably benefit from weight loss medications.  We will discuss this further in subsequent visits.    Melvi Naik MD  [id number]  Endocrinology [facility]    Orders Placed This Encounter   Procedures     Albumin Random Urine Quantitative     Renal panel     Hemoglobin     ALT     CK total     Patient Instructions   Foods such as: Bread, Pizza, Pasta, Rice, Flour based products, and potatoes have high carb in them - recommend reducing the portion of these foods and supplementing with vegetables and lean proteins - chicken, turkey, eggs.     Increasing regular exercise will help blood sugars and weight.     Novolog for Correction:  (add this to the novolog that you calculate for the carbs)     < 150 : No extra Novolog  150 - 200 -- add 2 units  201-250 -- add 4 units  251- 300 -- add 6 units   301-350 -- add 8 units.   > 350 add 10 units    Test Blood glucose at meals and at bed time.   At bed time   Novolog 2 units for BG  250-300, 4 units for -350 and 6 units for > 350.      Labs a week before  Your next visit.

## 2017-05-24 NOTE — LETTER
5/24/2017     RE: Ade Wilkins  [address] [facility] [address]     Dear Colleague,    Thank you for referring your patient, Ade Wilkins, to the [facility] ENDOCRINOLOGY at [facility]. Please see a copy of my visit note below.    Endocrinology Clinic Visit    May 24, 2017    Chief Complaint: Consult (type 2 diabetes )       Subjective:         HPI: Ade Wilkins is a 66 year old year old female who history of hepatitis A as a teenager, non-Hodgkin's lymphoma diagnosed in 2008, iron deficiency anemia, morbid obesity, hyperlipidemia, rheumatic fever with systolic murmur, hypothyroidism and hypertension who presents today for a new consultation for type 2 diabetes mellitus.     History of Diabetes  Type 2 diagnosed in late 20s.  In the 1970s she was initially started on oral medications and was transitioned to insulin in the 80s.  She had a gastric bypass surgery done in 2004 after which she lost about 70 pounds.  Her blood sugars were better controlled for the next few years but has gradually worsened over time.  Her weight had been steady around 210-215 pounds but in the recent years as her activity was limited by knee pain, she has gained about 15-20 pounds.    She has a strong family history of type 2 diabetes mellitus with Mother and Sister with type 2 diabetes.      Complications  She has triopathy from diabetes.     1. Retinopathy: Last exam was in May 19 2017, mild NPDR    2.  Nephropathy:  Lab Results   Component Value Date    MICROL 46 03/28/2017     Creatinine   Date Value Ref Range Status   03/28/2017 1.50 (H) 0.52 - 1.04 mg/dL Final   ]    3. Neuropathy   Occasional tingling that improves with pain patch    4. Hypoglycemia   This is rare, but does happen if does not eat after taking insulin.    5. Macrovascular:   No history of cardiac events or stroke    Treatment  Diabetes Medication(s)     Biguanides Sig    metFORMIN (GLUCOPHAGE) 1000 MG tablet  Take 1 tablet (1,000 mg) by mouth 2 times daily (with meals)    Insulin Sig    insulin glargine (LANTUS) 100 UNIT/ML injection Inject 60 Units Subcutaneous 2 times daily    NOVOLOG FLEXPEN 100 UNIT/ML soln 0-10 units before breakfast, 12-15 units before lunch, and 15-20 units before supper    Incretin Mimetic Agents (GLP-1 Receptor Agonists) Sig    exenatide ER (BYDUREON) 2 MG recon vial kit susp for weekly inj Inject 2 mg Subcutaneous every 7 days        3 per carb.     Prevention  Lipid  LDL Cholesterol Calculated   Date Value Ref Range Status   03/28/2017 101 (H) <100 mg/dL Final     Comment:     Above desirable:  100-129 mg/dl   Borderline High:  130-159 mg/dL   High:             160-189 mg/dL   Very high:       >189 mg/dl     06/17/2016 78 <100 mg/dL Final     Comment:     Desirable:       <100 mg/dl       Medications     HMG CoA Reductase Inhibitors    atorvastatin (LIPITOR) 80 MG tablet    Salicylates    aspirin 81 MG tablet          Renal  Medication (Note: This includes the hypertensive combination subclass to make sure to show all ACEI/ARB's.)     Angiotensin II Receptor Antagonists Sig    irbesartan (AVAPRO) 300 MG tablet Take 1 tablet (300 mg) by mouth daily            Weight  Wt Readings from Last 4 Encounters:   03/28/17 107.5 kg (237 lb)   02/16/17 106.4 kg (234 lb 9 oz)   11/04/16 107 kg (236 lb)   06/20/16 104.1 kg (229 lb 9.6 oz)       Meter Download Summary:   She checks her blood sugars about 1-2 times a day.  The morning blood sugars are usually between 151-209.  Afternoon blood sugars are higher and mostly about 200s.  Two hours after her lunch, blood sugars are in the 300 range  She does not check her blood sugars daily.      Diet: She works at [employer], 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    Breakfast : english muffin PB, or eggs,      Snack: BG test 11 am, if low takes a snack.      Lunch : veggies,   Wed: pasta,   Chicken, veggies, potatoes or salad/    Snack :  home: popcorn     Dinner : 7 -7.30 : orders out at times     Fluctuates with days in relation to bydureon.     Snack: occ toast.     Alcohol or sugared beverage    Exercise : limited     Weight trend  Wt Readings from Last 4 Encounters:   03/28/17 107.5 kg (237 lb)   02/16/17 106.4 kg (234 lb 9 oz)   11/04/16 107 kg (236 lb)   06/20/16 104.1 kg (229 lb 9.6 oz)       A1c today is   Lab Results   Component Value Date    A1C 9.3 03/28/2017    A1C 9.0 10/17/2016    A1C 8.6 06/17/2016    A1C 8.3 02/16/2016    A1C 8.6 10/05/2015       Barriers to achieve glycemic goals:      Type 2 Diabetes since her early 20s    Barriers to achieving glycemic goals  Lack of testing before meals.  Lack of insulin with snacking  Ineffective bydureon  states that bydureon works about a day after injection and does not work after three or four days.  Recent change in Lantus to vial   High Carb diet  Lack of exercise: limited by pain in the joints.   Morbid obesity       Allergies   Allergen Reactions     Clonidine      headaches     Fexofenadine Hydrochloride      Allegra--headache     Gemfibrozil      lopid--rash ??from med     Lisinopril      edema     Norvasc [Amlodipine Besylate]      Hair loss     Pioglitazone Hydrochloride Swelling     actos--ankle edema     Doxazosin Mesylate Rash     cardura--rash       Current Outpatient Prescriptions   Medication Sig Dispense Refill     augmented betamethasone dipropionate (DIPROLENE-AF) 0.05 % ointment Apply to AA BID x 3-4 weeks then PRN 45 g 5     exenatide ER (BYDUREON) 2 MG recon vial kit susp for weekly inj Inject 2 mg Subcutaneous every 7 days 2 kit 2     furosemide (LASIX) 20 MG tablet Take 1 tablet (20 mg) by mouth daily 90 tablet 3     insulin glargine (LANTUS) 100 UNIT/ML injection Inject 60 Units Subcutaneous 2 times daily 100 mL prn     Cholecalciferol (VITAMIN D) 2000 UNITS tablet Take 2,000 Units by mouth daily       potassium chloride (K-TAB,KLOR-CON) 10 MEQ tablet Take 1 tablet (10  "mEq) by mouth daily 90 tablet prn     irbesartan (AVAPRO) 300 MG tablet Take 1 tablet (300 mg) by mouth daily 90 tablet prn     ferrous fumarate 65 mg, [place]. FE,-Vitamin C 125 mg (VITRON-C)  MG TABS Take 2 tablets by mouth 2 times daily 360 tablet prn     levothyroxine (SYNTHROID, LEVOTHROID) 25 MCG tablet Take 1 tablet (25 mcg) by mouth daily 90 tablet 3     fenofibrate 160 MG tablet Take 1 tablet (160 mg) by mouth daily 90 tablet prn     metFORMIN (GLUCOPHAGE) 1000 MG tablet Take 1 tablet (1,000 mg) by mouth 2 times daily (with meals) 180 tablet prn     traZODone (DESYREL) 50 MG tablet Take 1 tablet (50 mg) by mouth nightly as needed for sleep 90 tablet prn     NOVOLOG FLEXPEN 100 UNIT/ML soln 0-10 units before breakfast, 12-15 units before lunch, and 15-20 units before supper 15 mL prn     verapamil (CALAN-SR) 180 MG CR tablet Take 1 tablet (180 mg) by mouth 2 times daily 180 tablet prn     atorvastatin (LIPITOR) 80 MG tablet Take 1 tablet (80 mg) by mouth daily 90 tablet prn     insulin syringe-needle U-100 (BD INSULIN SYRINGE ULTRAFINE) 31G X 5/16\" 1 ML Use one syringe 3 times daily or as directed. 100 each prn     fluticasone (FLONASE) 50 MCG/ACT nasal spray Spray 2 sprays into both nostrils daily 16 g 1     blood glucose monitoring (MARTITA CONTOUR NEXT) test strip Use to test blood sugar 4 times daily or as directed. 1 Box 9     blood glucose monitoring (NO BRAND SPECIFIED) test strip Reason for four times daily checking is lack of diabetic control.  Patient is on multiple doses of insulin daily. Dispense what patient's plan will cover.  Dispense box of 100 strips at a time 1 Box 1     ALLERGY INJECTIONS        hydrocortisone (WESTCORT) 0.2 % cream Apply topically 2 times daily Apply sparingly to affected area 2 times daily as needed. 45 g 2     calcium carbonate-vitamin D (CALTRATE 600+D) 600-400 MG-UNIT CHEW Take 2 chew tab by mouth every evening       cetirizine (ZYRTEC) 10 MG tablet Take 1 tablet (10 " mg) by mouth daily       aspirin 81 MG tablet Take  by mouth every other day.       Magnesium 500 MG CAPS Take 1 capsule by mouth daily.       cyanocolbalamin (VITAMIN B-12) 1000 MCG tablet Take 1,000 mcg by mouth every other day        ONE TOUCH DELICA LANCETS MISC 1 Device 4 times daily. 100 Stick prn     OVER-THE-COUNTER Reported on 3/28/2017       MULTIVITAMIN TABS   OR 1 tab QD       Review of Systems     Constitutional: Normal appetite, feels well no polyuria or polydipsia.   Head: no headache   Eye: no vision change/ loss of peripheral vision.   ENT: No throat congestion.   Respiratory: no cough   Cardiovascular: Has no known murmur, no chest pain.  Has some shortness of breath on exertion.  Gastrointestinal: Negative for vomiting, abdominal pain and diarrhea.  Genitourinary: No bladder problems.  Musculoskeletal: Negative for myalgias. No weakness.  Neurological: Negative for seizures and headaches.  Psychiatric/Behavioral: Negative for behavioral problem and dysphoric mood.    Past Medical History:   Diagnosis Date     Allergic rhinitis, cause unspecified      Hepatitis, unspecified 1968     Iron Deficiency Anemia 3/09     Lymphoma (H) 12/08     Nonsenile cataract      Obesity, unspecified      Other and unspecified hyperlipidemia      Rheumatic fever without mention of heart involvement infant     Type II or unspecified type diabetes mellitus without mention of complication, not stated as uncontrolled 1992     Unspecified essential hypertension      Unspecified hypothyroidism      Vitamin B12 deficiency 3/09       Past Surgical History:   Procedure Laterality Date     C APPENDECTOMY  1958     C NONSPECIFIC PROCEDURE  1976    (leg) cystectomy     CATARACT IOL, RT/LT Left 12/21/2000    left     CATARACT IOL, RT/LT Right 01/06/2000    right     GASTRIC BYPASS  2/04    Dr. Hebert     LYMPH NODE BIOPSY  12/08    right groin, Dr. Licona     TONSILLECTOMY & ADENOIDECTOMY  1968       Family History   Problem Relation  "Age of Onset     Cardiovascular Father      AAA     DIABETES Mother      C.A.D. Mother      52     Eye Disorder Mother      glaucoma     Glaucoma Mother      Cardiovascular Brother      AAA, PVD     CANCER Brother      bladder     Cardiovascular Brother      AAA, valvular heart disease     DIABETES Brother      age 53     CANCER Brother      liver cancer     Glaucoma Other      MGGF     Macular Degeneration No family hx of      Social History     Social History     Marital status:      Spouse name: N/A     Number of children: 0     Years of education: N/A     Occupational History      [employer] [employer]     Social History Main Topics     Smoking status: Never Smoker     Smokeless tobacco: Never Used     Alcohol use No     Drug use: No     Sexual activity: No     Other Topics Concern     Caffeine Concern Yes     20 oz daily     Exercise No     Seat Belt Yes     Self-Exams Yes     Social History Narrative    Living arrangements - the patient lives alone.     Objective:   /66 (BP Location: Right arm, Patient Position: Chair, Cuff Size: Adult Large)  Pulse 71  Ht 1.588 m (5' 2.5\")  Wt 106.4 kg (234 lb 9.1 oz)  BMI 42.22 kg/m2    Constitutional: Obese female in no acute distress cooperative and comfortable  EYES: anicteric, normal extra-ocular movements, no lid lag or retraction   HEENT: Mouth/Throat: Mucous membrane is moist. Oropharynx is clear. No adenopathy. No large goiters, difficult to palpate due to short neck  Cardiovascular: RRR, S1, S2 normal.  Aortic area systolic murmur  Pulmonary/Chest: CTAB. No wheezing or rales   Abdominal: +BS. Nontender to palpation.  Abdomen is distended, small bruises around her injection site.  No lipo hypertrophy.  White striae.  Neurological: Alert. Normal affect. CNII-XII intact. Muscle strength 5/5. Sensory is intact.  Extremities: No clubbing, cyanosis or inflammation   Bilateral lower extremity edema left worse than the right.  Skin: " normal texture, color  Feet: Bilateral loss of sensation in both feet  Lymphatic: no cervical lymphadenopathy.  Psychological: appropriate mood     In House Labs:   Lab Results   Component Value Date    A1C 9.3 03/28/2017    A1C 9.0 10/17/2016    A1C 8.6 06/17/2016    A1C 8.3 02/16/2016    A1C 8.6 10/05/2015     TSH   Date Value Ref Range Status   03/28/2017 2.84 0.40 - 4.00 mU/L Final   02/10/2017 2.55 0.40 - 4.00 mU/L Final   10/17/2016 4.39 (H) 0.40 - 4.00 mU/L Final   10/05/2015 3.71 0.40 - 4.00 mU/L Final   11/28/2014 3.89 0.40 - 4.00 mU/L Final     Comment:     Effective 7/30/2014, the reference range for this assay has changed to reflect   new instrumentation/methodology.     T4 Free   Date Value Ref Range Status   10/17/2016 1.17 0.76 - 1.46 ng/dL Final   05/05/2010 1.05 0.70 - 1.85 ng/dL Final   09/13/2007 1.00 0.70 - 1.85 ng/dL Final     Creatinine   Date Value Ref Range Status   03/28/2017 1.50 (H) 0.52 - 1.04 mg/dL Final     Recent Labs   Lab Test  03/28/17   1014  06/17/16   0750  05/22/15   0848   05/03/14   1018   CHOL  185  166  175   < >  161   HDL  35*  34*  32*   < >  39*   LDL  101*  78  76   < >  76   TRIG  247*  271*  334*   < >  231*   CHOLHDLRATIO   --    --   5.5*   --   4.2    < > = values in this interval not displayed.     No results found for: [id number], [id number], [id number]    Assessment/Treatment Plan:      Ade Wilkins is a 66 year old year old female with history morbid obesity S/P Gastric bypass surgery, hypothyroidism who presents today for further management of type 2 diabetes.  Her diabetes mellitus is complicated by diabetic neuropathy, mild nonproliferative retinopathy and nephropathy.    1. Type 2 Diabetes since her early 20s    Barriers to achieving glycemic goals  Lack of testing before meals.  She was advised to test before each meal and at bedtime.  Keep a record of meals that she consumes and use correction insulin    Lack of insulin with snacking  Patient was  advised to use low carbohydrate foods for snacks    Ineffective bydureon  states that bydureon works about a day after injection and does not work after three or four days.  We will change this to Dulaglutide, if the insurance covers this medication.     Recent change in Lantus to vial   I have transitioned her to Tresiba, that is covered by insurance.   Start 110 units (she was asked to hold the evening dose of the lantus to be safe and use Tresiba next morning)     High Carb diet  Advised on volumetric eating plan  Foods such as: Bread, Pizza, Pasta, Rice, Flour based products, and potatoes have high carb in them - recommend reducing the portion of these foods and supplementing with vegetables and lean proteins - chicken, turkey, eggs.     Lack of exercise: limited by pain in the joints.   Increasing regular exercise will help your weight loss effort.     Anemia:   Hemoglobin A1c may be falsely lower than her actual value    I will contact the patient with the test results.  Return to clinic in 3 months with labs.     Hypothyroidism on levothyroxine 25  g daily  This has been followed by primary care physician  Last TSH was normal    Morbid obesity  We discussed about diet and exercise  She will probably benefit from weight loss medications.  We will discuss this further in subsequent visits.    Melvi Naik MD  [id number]  Endocrinology Service    Orders Placed This Encounter   Procedures     Albumin Random Urine Quantitative     Renal panel     Hemoglobin     ALT     CK total     Patient Instructions   Foods such as: Bread, Pizza, Pasta, Rice, Flour based products, and potatoes have high carb in them - recommend reducing the portion of these foods and supplementing with vegetables and lean proteins - chicken, turkey, eggs.     Increasing regular exercise will help blood sugars and weight.     Novolog for Correction:  (add this to the novolog that you calculate for the carbs)     < 150 : No extra Novolog  150 -  200 -- add 2 units  201-250 -- add 4 units  251- 300 -- add 6 units   301-350 -- add 8 units.   > 350 add 10 units    Test Blood glucose at meals and at bed time.   At bed time   Novolog 2 units for BG  250-300, 4 units for -350 and 6 units for > 350.     Labs a week before  Your next visit.     Again, thank you for allowing me to participate in the care of your patient.      Sincerely,    Melvi Naik MD

## 2017-05-24 NOTE — NURSING NOTE
"Chief Complaint   Patient presents with     Consult     DIABETES TYPE 2 CONSULTATION        Initial /66 (BP Location: Right arm, Patient Position: Chair, Cuff Size: Adult Large)  Pulse 71  Ht 1.588 m (5' 2.5\")  Wt 106.4 kg (234 lb 9.1 oz)  BMI 42.22 kg/m2 Estimated body mass index is 42.22 kg/(m^2) as calculated from the following:    Height as of this encounter: 1.588 m (5' 2.5\").    Weight as of this encounter: 106.4 kg (234 lb 9.1 oz).  Medication Reconciliation: complete    "

## 2017-05-24 NOTE — MR AVS SNAPSHOT
After Visit Summary   5/24/2017    Ade Wilkins    MRN: [id number]           Patient Information     Date Of Birth          1950        Visit Information        Provider Department      5/24/2017 8:45 AM Melvi Naik MD [facility]        Today's Diagnoses     Type 2 diabetes mellitus with diabetic neuropathy, with long-term current use of insulin (H)    -  1      Care Instructions    Foods such as: Bread, Pizza, Pasta, Rice, Flour based products, and potatoes have high carb in them - recommend reducing the portion of these foods and supplementing with vegetables and lean proteins - chicken, turkey, eggs.     Increasing regular exercise will help blood sugars and weight.     Novolog for Correction:  (add this to the novolog that you calculate for the carbs)     < 150 : No extra Novolog  150 - 200 -- add 2 units  201-250 -- add 4 units  251- 300 -- add 6 units   301-350 -- add 8 units.   > 350 add 10 units    Test Blood glucose at meals and at bed time.   At bed time   Novolog 2 units for BG  250-300, 4 units for -350 and 6 units for > 350.     Labs a week before  Your next visit.                     Follow-ups after your visit        Follow-up notes from your care team     Return in about 3 months (around 8/24/2017).      Your next 10 appointments already scheduled     Aug 30, 2017  3:30 PM CDT   (Arrive by 3:15 PM)   RETURN DIABETES with Melvi Naik MD   [facility] ([facility])    [address] [place] [id number] [phone]              Future tests that were ordered for you today     Open Future Orders        Priority Expected Expires Ordered    Albumin Random Urine Quantitative Routine 8/24/2017 5/24/2018 5/24/2017    Renal panel Routine 8/24/2017 5/24/2018 5/24/2017    Hemoglobin Routine 8/24/2017 5/24/2018 5/24/2017    ALT Routine 8/24/2017 5/24/2018 5/24/2017    CK total  "Routine 8/24/2017 5/24/2018 5/24/2017            Who to contact     Please call your clinic at [phone] to:    Ask questions about your health    Make or cancel appointments    Discuss your medicines    Learn about your test results    Speak to your doctor   If you have compliments or concerns about an experience at your clinic, or if you wish to file a complaint, please contact [facility] Patient Relations at [phone] or email us at [email or web address]         Additional Information About Your Visit        EmgoharLionical Information     GruvIt gives you secure access to your electronic health record. If you see a primary care provider, you can also send messages to your care team and make appointments. If you have questions, please call your primary care clinic.  If you do not have a primary care provider, please call [phone] and they will assist you.      Gru[employer]It is an electronic gateway that provides easy, online access to your medical records. With [employer], you can request a clinic appointment, read your test results, renew a prescription or communicate with your care team.     To access your existing account, please contact your [facility] or call [phone] for assistance.        Care EveryWhere ID     This is your Care EveryWhere ID. This could be used by other organizations to access your [place] medical records  [id number]        Your Vitals Were     Pulse Height BMI (Body Mass Index)             71 1.588 m (5' 2.5\") 42.22 kg/m2          Blood Pressure from Last 3 Encounters:   05/24/17 120/66   05/12/17 193/83   03/28/17 146/68    Weight from Last 3 Encounters:   05/24/17 106.4 kg (234 lb 9.1 oz)   03/28/17 107.5 kg (237 lb)   02/16/17 106.4 kg (234 lb 9 oz)                 Today's Medication Changes          These changes are accurate as of: 5/24/17  9:57 AM.  If you have any questions, ask your nurse or doctor.             "   Start taking these medicines.        Dose/Directions    dulaglutide 1.5 MG/0.5ML pen   Commonly known as:  TRULICITY   Used for:  Type 2 diabetes mellitus with diabetic neuropathy, with long-term current use of insulin (H)        Dose:  1.5 mg   Inject 1.5 mg Subcutaneous every 7 days   Quantity:  2 mL   Refills:  3       insulin degludec 200 UNIT/ML pen   Commonly known as:  TRESIBA   Used for:  Type 2 diabetes mellitus with diabetic neuropathy, with long-term current use of insulin (H)        Dose:  110 Units   Inject 110 Units Subcutaneous daily   Quantity:  21 mL   Refills:  3            Where to get your medicines      These medications were sent to [id number] - [place], [place] - [address]  [address] [address]     Phone:  [phone]     dulaglutide 1.5 MG/0.5ML pen    insulin degludec 200 UNIT/ML pen                Primary Care Provider Office Phone # Fax #    Dimitry Howard [phone]       [facility] [address] [facility] [address]        Thank you!     Thank you for choosing [facility]  for your care. Our goal is always to provide you with excellent care. Hearing back from our patients is one way we can continue to improve our services. Please take a few minutes to complete the written survey that you may receive in the mail after your visit with us. Thank you!             Your Updated Medication List - Protect others around you: Learn how to safely use, store and throw away your medicines at www.disposemymeds.org.          This list is accurate as of: 5/24/17  9:57 AM.  Always use your most recent med list.                   Brand Name Dispense Instructions for use    ALLERGY INJECTIONS          aspirin 81 MG tablet      Take  by mouth every other day.       atorvastatin 80 MG tablet    LIPITOR    90 tablet    Take 1 tablet (80 mg) by mouth daily       augmented betamethasone dipropionate 0.05 % ointment  "   DIPROLENE-AF    45 g    Apply to AA BID x 3-4 weeks then PRN       * blood glucose monitoring test strip    no brand specified    1 Box    Reason for four times daily checking is lack of diabetic control.  Patient is on multiple doses of insulin daily. Dispense what patient's plan will cover.  Dispense box of 100 strips at a time       * blood glucose monitoring test strip    MARTITA CONTOUR NEXT    1 Box    Use to test blood sugar 4 times daily or as directed.       calcium carbonate-vitamin D 600-400 MG-UNIT Chew    CALTRATE 600+D     Take 2 chew tab by mouth every evening       cetirizine 10 MG tablet    zyrTEC     Take 1 tablet (10 mg) by mouth daily       cyanocobalamin 1000 MCG tablet    vitamin  B-12     Take 1,000 mcg by mouth every other day       dulaglutide 1.5 MG/0.5ML pen    TRULICITY    2 mL    Inject 1.5 mg Subcutaneous every 7 days       exenatide ER 2 MG recon vial kit susp for weekly inj    BYDUREON    2 kit    Inject 2 mg Subcutaneous every 7 days       fenofibrate 160 MG tablet     90 tablet    Take 1 tablet (160 mg) by mouth daily       ferrous fumarate 65 mg ([place]. FE)-Vitamin C 125 mg  MG Tabs tablet    VITRON-C    360 tablet    Take 2 tablets by mouth 2 times daily       fluticasone 50 MCG/ACT spray    FLONASE    16 g    Spray 2 sprays into both nostrils daily       furosemide 20 MG tablet    LASIX    90 tablet    Take 1 tablet (20 mg) by mouth daily       hydrocortisone 0.2 % cream    WESTCORT    45 g    Apply topically 2 times daily Apply sparingly to affected area 2 times daily as needed.       insulin degludec 200 UNIT/ML pen    TRESIBA    21 mL    Inject 110 Units Subcutaneous daily       insulin glargine 100 UNIT/ML injection    LANTUS    100 mL    Inject 60 Units Subcutaneous 2 times daily       insulin syringe-needle U-100 31G X 5/16\" 1 ML    BD insulin syringe ULTRAFINE    100 each    Use one syringe 3 times daily or as directed.       irbesartan 300 MG tablet    AVAPRO    90 " tablet    Take 1 tablet (300 mg) by mouth daily       levothyroxine 25 MCG tablet    SYNTHROID/LEVOTHROID    90 tablet    Take 1 tablet (25 mcg) by mouth daily       Magnesium 500 MG Caps      Take 1 capsule by mouth daily.       metFORMIN 1000 MG tablet    GLUCOPHAGE    180 tablet    Take 1 tablet (1,000 mg) by mouth 2 times daily (with meals)       MULTIVITAMIN TABS   OR      1 tab QD       NAPROXEN PO      Take 220 mg by mouth as needed for moderate pain       NovoLOG FLEXPEN 100 UNIT/ML injection   Generic drug:  insulin aspart     15 mL    0-10 units before breakfast, 12-15 units before lunch, and 15-20 units before supper       ONE TOUCH DELICA LANCETS Misc     100 Stick    1 Device 4 times daily.       OVER-THE-COUNTER          Reported on 3/28/2017       potassium chloride 10 MEQ tablet    K-TAB,KLOR-CON    90 tablet    Take 1 tablet (10 mEq) by mouth daily       traZODone 50 MG tablet    DESYREL    90 tablet    Take 1 tablet (50 mg) by mouth nightly as needed for sleep       verapamil 180 MG CR tablet    CALAN-SR    180 tablet    Take 1 tablet (180 mg) by mouth 2 times daily       vitamin D 2000 UNITS tablet      Take 2,000 Units by mouth daily       * Notice:  This list has 2 medication(s) that are the same as other medications prescribed for you. Read the directions carefully, and ask your doctor or other care provider to review them with you.

## 2017-05-24 NOTE — PATIENT INSTRUCTIONS
Foods such as: Bread, Pizza, Pasta, Rice, Flour based products, and potatoes have high carb in them - recommend reducing the portion of these foods and supplementing with vegetables and lean proteins - chicken, turkey, eggs.     Increasing regular exercise will help blood sugars and weight.     Novolog for Correction:  (add this to the novolog that you calculate for the carbs)     < 150 : No extra Novolog  150 - 200 -- add 2 units  201-250 -- add 4 units  251- 300 -- add 6 units   301-350 -- add 8 units.   > 350 add 10 units    Test Blood glucose at meals and at bed time.   At bed time   Novolog 2 units for BG  250-300, 4 units for -350 and 6 units for > 350.     Labs a week before  Your next visit.

## 2017-05-26 DIAGNOSIS — Z79.4 TYPE 2 DIABETES MELLITUS WITH DIABETIC NEUROPATHY, WITH LONG-TERM CURRENT USE OF INSULIN (H): Primary | ICD-10-CM

## 2017-05-26 DIAGNOSIS — E11.40 TYPE 2 DIABETES MELLITUS WITH DIABETIC NEUROPATHY, WITH LONG-TERM CURRENT USE OF INSULIN (H): Primary | ICD-10-CM

## 2017-07-14 ENCOUNTER — TRANSFERRED RECORDS (OUTPATIENT)
Dept: HEALTH INFORMATION MANAGEMENT | Facility: CLINIC | Age: 67
End: 2017-07-14

## 2017-07-17 DIAGNOSIS — I10 ESSENTIAL HYPERTENSION WITH GOAL BLOOD PRESSURE LESS THAN 140/90: ICD-10-CM

## 2017-07-18 RX ORDER — VERAPAMIL HYDROCHLORIDE 180 MG/1
TABLET, EXTENDED RELEASE ORAL
Qty: 180 TABLET | Refills: 2 | Status: SHIPPED | OUTPATIENT
Start: 2017-07-18 | End: 2018-01-18

## 2017-07-18 NOTE — TELEPHONE ENCOUNTER
verapamil (CALAN-SR) 180 MG CR tablet      Last Written Prescription Date: 06/26/2016  Last Fill Quantity: 180, # refills: prn  Last Office Visit with G, P or [facility] prescribing provider: 03/28/2017       Potassium   Date Value Ref Range Status   03/28/2017 4.2 3.4 - 5.3 mmol/L Final     Creatinine   Date Value Ref Range Status   03/28/2017 1.50 (H) 0.52 - 1.04 mg/dL Final     BP Readings from Last 3 Encounters:   05/24/17 120/66   05/12/17 193/83   03/28/17 146/68

## 2017-07-23 DIAGNOSIS — E11.40 TYPE 2 DIABETES MELLITUS WITH DIABETIC NEUROPATHY (H): ICD-10-CM

## 2017-07-24 NOTE — TELEPHONE ENCOUNTER
NOVOLOG FLEXPEN 100 UNIT/ML soln         Last Written Prescription Date: 06/26/2016  Last Fill Quantity: 15ML, # refills: PRN  Last Office Visit with G, P or [facility] prescribing provider:  03/28/2017        BP Readings from Last 3 Encounters:   05/24/17 120/66   05/12/17 193/83   03/28/17 146/68     Lab Results   Component Value Date    MICROL 46 03/28/2017     Lab Results   Component Value Date    UMALCR 212.04 03/28/2017     Creatinine   Date Value Ref Range Status   03/28/2017 1.50 (H) 0.52 - 1.04 mg/dL Final   ]  GFR Estimate   Date Value Ref Range Status   03/28/2017 35 (L) >60 mL/min/1.7m2 Final     Comment:     Non  GFR Calc   06/17/2016 34 (L) >60 mL/min/1.7m2 Final     Comment:     Non  GFR Calc   02/16/2016 39 (L) >60 mL/min/1.7m2 Final     Comment:     Non  GFR Calc     GFR Estimate If Black   Date Value Ref Range Status   03/28/2017 42 (L) >60 mL/min/1.7m2 Final     Comment:      GFR Calc   06/17/2016 41 (L) >60 mL/min/1.7m2 Final     Comment:      GFR Calc   02/16/2016 48 (L) >60 mL/min/1.7m2 Final     Comment:      GFR Calc     Lab Results   Component Value Date    CHOL 185 03/28/2017     Lab Results   Component Value Date    HDL 35 03/28/2017     Lab Results   Component Value Date     03/28/2017     Lab Results   Component Value Date    TRIG 247 03/28/2017     Lab Results   Component Value Date    CHOLHDLRATIO 5.5 05/22/2015     Lab Results   Component Value Date    AST 16 03/28/2017     Lab Results   Component Value Date    ALT 25 03/28/2017     Lab Results   Component Value Date    A1C 9.3 03/28/2017    A1C 9.0 10/17/2016    A1C 8.6 06/17/2016    A1C 8.3 02/16/2016    A1C 8.6 10/05/2015     Potassium   Date Value Ref Range Status   03/28/2017 4.2 3.4 - 5.3 mmol/L Final

## 2017-07-25 RX ORDER — INSULIN ASPART 100 [IU]/ML
INJECTION, SOLUTION INTRAVENOUS; SUBCUTANEOUS
Qty: 15 ML | Refills: 1 | Status: SHIPPED | OUTPATIENT
Start: 2017-07-25 | End: 2018-04-20

## 2017-08-01 ENCOUNTER — MYC MEDICAL ADVICE (OUTPATIENT)
Dept: INTERNAL MEDICINE | Facility: CLINIC | Age: 67
End: 2017-08-01

## 2017-08-01 NOTE — TELEPHONE ENCOUNTER
Called patient, no answer.  Left voice message for patient to call back and sent patient MyChart message.

## 2017-08-02 NOTE — TELEPHONE ENCOUNTER
If headaches and arm tingling are new since running out of Verapamil, then yes - ER evaluation is advisable.

## 2017-08-02 NOTE — TELEPHONE ENCOUNTER
Patient calling back.  Reports she ran out Verapamil last Monday, pharmacy told her it was too early for her next refill.  Patient has been taking an extra dose of Avapro in the morning since being out of Verapamil.  Last known BP Sunday evening reading 200/84.  Has not taken BP since Sunday, patient currently at work and unable to check BP now.  Has been taking experiencing headaches intermittently.  Reports bilateral arm tingling, R) arm tingling present now.  Reports nausea and light-headedness.  Denies vision changes, CP, or breathing difficulties.      Allergies:   Allergies   Allergen Reactions     Clonidine      headaches     Fexofenadine Hydrochloride      Allegra--headache     Gemfibrozil      lopid--rash ??from med     Lisinopril      edema     Norvasc [Amlodipine Besylate]      Hair loss     Pioglitazone Hydrochloride Swelling     actos--ankle edema     Doxazosin Mesylate Rash     cardura--rash       MEDICATIONS:   Taking medication(s) as prescribed? No - ran out of Verapamil   Taking over the counter medication(s?) No  Any medication side effects? No significant side effects    Any barriers to taking medication(s) as prescribed?  No  Medication(s) improving/managing symptoms?  No  Medication reconciliation completed: Yes      NURSING PLAN: Nursing advice to patient to seek emergency care.    RECOMMENDED DISPOSITION:  To ED, another person to drive - Patient stated she can not go to an ER right now.  Informed patient of possible risks, patient continued to decline advisement.  Patient stated she will go into urgent care after work.  Any further recommendations? Please advise.    Will comply with recommendation: No- Barriers to comply with plan of care see note above..  If further questions/concerns or if symptoms do not improve, worsen or new symptoms develop, call your PCP or [facility] as soon as possible.    After speaking with patient writer also called [facility] pharmacy.  The pharmacy at  first stated they did not have a prescription to refill, writer informed we sent a refill over on 7/18/17.  Pharmacy found prescription and is filling prescription for patient now.  Attempted to inform patient via phone, no answer.  Sent Maiyett message to patient.        Guideline used:  Telephone Triage Protocols for Nurses, Fifth Edition, America Gaona RN

## 2017-08-03 NOTE — TELEPHONE ENCOUNTER
Please contact patient and check on status Thursday morning.   If blood pressure still real high or having ongiong symptoms, needs to be seen and evaluated ASAP.

## 2017-08-03 NOTE — TELEPHONE ENCOUNTER
TI-Spoke with patient and she states her blood pressure is back down to normal 140 /70's and she is felling better . Pt will continue to monitor and will call if there are changes .

## 2017-08-11 DIAGNOSIS — Z98.84 BARIATRIC SURGERY STATUS: ICD-10-CM

## 2017-08-14 RX ORDER — TRAZODONE HYDROCHLORIDE 50 MG/1
TABLET, FILM COATED ORAL
Qty: 90 TABLET | Status: SHIPPED | OUTPATIENT
Start: 2017-08-14 | End: 2017-08-21

## 2017-08-14 NOTE — TELEPHONE ENCOUNTER
Trazodone 50mg       Last Written Prescription Date: 6/26/16  Last Fill Quantity: 90; # refills: 0  Last Office Visit with [facility], [facility] or [facility] prescribing provider:  3/28/17        Last PHQ-9 score on record= No flowsheet data found.    Lab Results   Component Value Date    AST 16 03/28/2017     Lab Results   Component Value Date    ALT 25 03/28/03/28/2017

## 2017-08-16 ASSESSMENT — ENCOUNTER SYMPTOMS
PARALYSIS: 0
NUMBNESS: 1
DISTURBANCES IN COORDINATION: 0
MUSCLE CRAMPS: 1
MYALGIAS: 0
MEMORY LOSS: 0
DYSPNEA ON EXERTION: 1
LOSS OF CONSCIOUSNESS: 0
COUGH: 0
MUSCLE WEAKNESS: 0
POSTURAL DYSPNEA: 1
SHORTNESS OF BREATH: 1
SPUTUM PRODUCTION: 1
TREMORS: 0
SNORES LOUDLY: 1
SEIZURES: 0
EYE WATERING: 1
HEADACHES: 1
ARTHRALGIAS: 1
HEMOPTYSIS: 0
BACK PAIN: 1
WHEEZING: 1
RESPIRATORY PAIN: 0
JOINT SWELLING: 0
NECK PAIN: 1
WEAKNESS: 0
SPEECH CHANGE: 0
COUGH DISTURBING SLEEP: 0
STIFFNESS: 1
DIZZINESS: 1
TINGLING: 1

## 2017-08-17 DIAGNOSIS — E78.5 HYPERLIPIDEMIA LDL GOAL <100: ICD-10-CM

## 2017-08-17 DIAGNOSIS — E11.40 TYPE 2 DIABETES MELLITUS WITH DIABETIC NEUROPATHY (H): ICD-10-CM

## 2017-08-17 RX ORDER — ATORVASTATIN CALCIUM 80 MG/1
TABLET, FILM COATED ORAL
Qty: 90 TABLET | Refills: 1 | Status: SHIPPED | OUTPATIENT
Start: 2017-08-17 | End: 2018-08-07

## 2017-08-21 ENCOUNTER — TELEPHONE (OUTPATIENT)
Dept: INTERNAL MEDICINE | Facility: CLINIC | Age: 67
End: 2017-08-21

## 2017-08-21 DIAGNOSIS — Z98.84 BARIATRIC SURGERY STATUS: ICD-10-CM

## 2017-08-21 RX ORDER — TRAZODONE HYDROCHLORIDE 50 MG/1
50-100 TABLET, FILM COATED ORAL
Qty: 180 TABLET | Status: SHIPPED | OUTPATIENT
Start: 2017-08-21 | End: 2018-09-07

## 2017-08-21 NOTE — TELEPHONE ENCOUNTER
Pt is staing you approved to increase her Trazadone to 2- 50 mg pills nightly . Do not have note of this change verbally . Please approve if ok for patient to increase Trazadone .Leeann Emery RN  Then call or fax new rx to [phone].Leeann Emery RN

## 2017-08-22 DIAGNOSIS — E78.5 HYPERLIPIDEMIA LDL GOAL <100: ICD-10-CM

## 2017-08-22 RX ORDER — FENOFIBRATE 160 MG/1
TABLET ORAL
Qty: 90 TABLET | Refills: 1 | Status: SHIPPED | OUTPATIENT
Start: 2017-08-22 | End: 2018-04-25

## 2017-08-25 ENCOUNTER — OFFICE VISIT (OUTPATIENT)
Dept: INTERNAL MEDICINE | Facility: CLINIC | Age: 67
End: 2017-08-25
Payer: COMMERCIAL

## 2017-08-25 VITALS
BODY MASS INDEX: 41.43 KG/M2 | DIASTOLIC BLOOD PRESSURE: 56 MMHG | SYSTOLIC BLOOD PRESSURE: 138 MMHG | TEMPERATURE: 98.2 F | OXYGEN SATURATION: 96 % | WEIGHT: 233.8 LBS | HEART RATE: 69 BPM | HEIGHT: 63 IN

## 2017-08-25 DIAGNOSIS — Z79.4 TYPE 2 DIABETES MELLITUS WITH DIABETIC NEUROPATHY, WITH LONG-TERM CURRENT USE OF INSULIN (H): ICD-10-CM

## 2017-08-25 DIAGNOSIS — E11.40 TYPE 2 DIABETES MELLITUS WITH DIABETIC NEUROPATHY, WITH LONG-TERM CURRENT USE OF INSULIN (H): ICD-10-CM

## 2017-08-25 DIAGNOSIS — Z23 NEED FOR VACCINATION: ICD-10-CM

## 2017-08-25 DIAGNOSIS — J01.90 ACUTE SINUSITIS WITH SYMPTOMS > 10 DAYS: Primary | ICD-10-CM

## 2017-08-25 LAB
ALBUMIN SERPL-MCNC: 4 G/DL (ref 3.4–5)
ALT SERPL W P-5'-P-CCNC: 33 U/L (ref 0–50)
ANION GAP SERPL CALCULATED.3IONS-SCNC: 4 MMOL/L (ref 3–14)
BUN SERPL-MCNC: 24 MG/DL (ref 7–30)
CALCIUM SERPL-MCNC: 9.7 MG/DL (ref 8.5–10.1)
CHLORIDE SERPL-SCNC: 104 MMOL/L (ref 94–109)
CK SERPL-CCNC: 64 U/L (ref 30–225)
CO2 SERPL-SCNC: 32 MMOL/L (ref 20–32)
CREAT SERPL-MCNC: 1.51 MG/DL (ref 0.52–1.04)
CREAT UR-MCNC: 21 MG/DL
GFR SERPL CREATININE-BSD FRML MDRD: 34 ML/MIN/1.7M2
GLUCOSE SERPL-MCNC: 76 MG/DL (ref 70–99)
HGB BLD-MCNC: 11.1 G/DL (ref 11.7–15.7)
MICROALBUMIN UR-MCNC: 37 MG/L
MICROALBUMIN/CREAT UR: 177.14 MG/G CR (ref 0–25)
PHOSPHATE SERPL-MCNC: 3.1 MG/DL (ref 2.5–4.5)
POTASSIUM SERPL-SCNC: 4.1 MMOL/L (ref 3.4–5.3)
SODIUM SERPL-SCNC: 140 MMOL/L (ref 133–144)

## 2017-08-25 PROCEDURE — 82550 ASSAY OF CK (CPK): CPT | Performed by: INTERNAL MEDICINE

## 2017-08-25 PROCEDURE — 90471 IMMUNIZATION ADMIN: CPT | Performed by: INTERNAL MEDICINE

## 2017-08-25 PROCEDURE — 84460 ALANINE AMINO (ALT) (SGPT): CPT | Performed by: INTERNAL MEDICINE

## 2017-08-25 PROCEDURE — 99213 OFFICE O/P EST LOW 20 MIN: CPT | Mod: 25 | Performed by: INTERNAL MEDICINE

## 2017-08-25 PROCEDURE — 85018 HEMOGLOBIN: CPT | Performed by: INTERNAL MEDICINE

## 2017-08-25 PROCEDURE — 82043 UR ALBUMIN QUANTITATIVE: CPT | Performed by: INTERNAL MEDICINE

## 2017-08-25 PROCEDURE — 80069 RENAL FUNCTION PANEL: CPT | Performed by: INTERNAL MEDICINE

## 2017-08-25 PROCEDURE — 90732 PPSV23 VACC 2 YRS+ SUBQ/IM: CPT | Performed by: INTERNAL MEDICINE

## 2017-08-25 PROCEDURE — 36415 COLL VENOUS BLD VENIPUNCTURE: CPT | Performed by: INTERNAL MEDICINE

## 2017-08-25 RX ORDER — AMOXICILLIN AND CLAVULANATE POTASSIUM 500; 125 MG/1; MG/1
1 TABLET, FILM COATED ORAL 3 TIMES DAILY
Qty: 30 TABLET | Refills: 0 | Status: SHIPPED | OUTPATIENT
Start: 2017-08-25 | End: 2018-01-18

## 2017-08-25 NOTE — MR AVS SNAPSHOT
After Visit Summary   8/25/2017    Ade Wilkins    MRN: [id number]           Patient Information     Date Of Birth          1950        Visit Information        Provider Department      8/25/2017 11:00 AM Dimitry Howard MD [facility]        Today's Diagnoses     Type 2 diabetes mellitus with diabetic neuropathy, with long-term current use of insulin (H)    -  1    Acute sinusitis with symptoms > 10 days          Care Instructions    PLAN:                                                      1.  MEDICATIONS:        - Start taking antibiotics and consider nasal steroid spray       - consider switching Tresiba to morning dose, to see if this would help prevent low glucoses in the morning       - Continue other medications without change  2.  Follow-up with endocrinoligist, as planned.  3.  Rest and Fluids           Follow-ups after your visit        Your next 10 appointments already scheduled     Aug 30, 2017  3:30 PM CDT   (Arrive by 3:15 PM)   RETURN DIABETES with Melvi Naik MD   [facility] ([facility])    [address] [place] [id number] [phone]              Who to contact     If you have questions or need follow up information about today's clinic visit or your schedule please contact [facility] directly at [phone].  Normal or non-critical lab and imaging results will be communicated to you by MyChart, letter or phone within 4 business days after the clinic has received the results. If you do not hear from us within 7 days, please contact the clinic through MyChart or phone. If you have a critical or abnormal lab result, we will notify you by phone as soon as possible.  Submit refill requests through [facility] or call your pharmacy and they will forward the refill request to us. Please allow 3 business days for your refill to be completed.           "Additional Information About Your Visit        MyChart Information     [employer] gives you secure access to your electronic health record. If you see a primary care provider, you can also send messages to your care team and make appointments. If you have questions, please call your primary care clinic.  If you do not have a primary care provider, please call [phone] and they will assist you.        Care EveryWhere ID     This is your Care EveryWhere ID. This could be used by other organizations to access your [facility] medical records  [id number]        Your Vitals Were     Pulse Temperature Height Pulse Oximetry BMI (Body Mass Index)       69 98.2  F (36.8  C) (Oral) 5' 2.5\" (1.588 m) 96% 42.08 kg/m2        Blood Pressure from Last 3 Encounters:   08/25/17 138/56   05/24/17 120/66   05/12/17 193/83    Weight from Last 3 Encounters:   08/25/17 233 lb 12.8 oz (106.1 kg)   05/24/17 234 lb 9.1 oz (106.4 kg)   03/28/17 237 lb (107.5 kg)              Today, you had the following     No orders found for display         Today's Medication Changes          These changes are accurate as of: 8/25/17 12:13 PM.  If you have any questions, ask your nurse or doctor.               Start taking these medicines.        Dose/Directions    amoxicillin-clavulanate 500-125 MG per tablet   Commonly known as:  AUGMENTIN   Used for:  Acute sinusitis with symptoms > 10 days   Started by:  Dimitry Howard MD        Dose:  1 tablet   Take 1 tablet by mouth 3 times daily   Quantity:  30 tablet   Refills:  0         Stop taking these medicines if you haven't already. Please contact your care team if you have questions.     insulin glargine 100 UNIT/ML injection   Commonly known as:  LANTUS   Stopped by:  Dimitry Howard MD           insulin syringe-needle U-100 31G X 5/16\" 1 ML   Commonly known as:  BD insulin syringe ULTRAFINE   Stopped by:  Dimitry Howard MD                Where to get your medicines      These medications were " sent to [place], [place] - [address]  [address] [facility] [address]     Phone:  [phone]     amoxicillin-clavulanate 500-125 MG per tablet                Primary Care Provider Office Phone # Fax #    Dimitry Howard [phone]       [address]  [facility] [address]        Equal Access to Services     PAOLO ALANIZ : Hadii aad ku hadasho Soomaali, waaxda luqadaha, qaybta kaalmada adeegyada, waxay idiin hayaan adeeg kharash laamarilys . So [facility] [phone].    ATENCIÓN: Si habla español, tiene a mccormick disposición servicios gratuitos de asistencia lingüística. [facility] [phone].    We comply with applicable federal civil rights laws and [place] laws. We do not discriminate on the basis of race, color, national origin, age, disability sex, sexual orientation or gender identity.            Thank you!     Thank you for choosing [facility]  for your care. Our goal is always to provide you with excellent care. Hearing back from our patients is one way we can continue to improve our services. Please take a few minutes to complete the written survey that you may receive in the mail after your visit with us. Thank you!             Your Updated Medication List - Protect others around you: Learn how to safely use, store and throw away your medicines at www.disposemymeds.org.          This list is accurate as of: 8/25/17 12:13 PM.  Always use your most recent med list.                   Brand Name Dispense Instructions for use Diagnosis    ALLERGY INJECTIONS           amoxicillin-clavulanate 500-125 MG per tablet    AUGMENTIN    30 tablet    Take 1 tablet by mouth 3 times daily    Acute sinusitis with symptoms > 10 days       aspirin 81 MG tablet      Take  by mouth every other day.        atorvastatin 80 MG tablet    LIPITOR    90 tablet    TAKE ONE TABLET BY MOUTH ONE TIME DAILY    Hyperlipidemia LDL goal <100       augmented  betamethasone dipropionate 0.05 % ointment    DIPROLENE-AF    45 g    Apply to AA BID x 3-4 weeks then PRN    Venous stasis dermatitis of both lower extremities       * blood glucose monitoring test strip    no brand specified    1 Box    Reason for four times daily checking is lack of diabetic control.  Patient is on multiple doses of insulin daily. Dispense what patient's plan will cover.  Dispense box of 100 strips at a time    Type 2 diabetes mellitus with diabetic neuropathy (H)       * blood glucose monitoring test strip    MARTITA CONTOUR NEXT    1 Box    Use to test blood sugar 4 times daily or as directed.    Type 2 diabetes mellitus with diabetic neuropathy (H)       * MARTITA CONTOUR NEXT test strip   Generic drug:  blood glucose monitoring     100 each    USE TO TEST BLOOD SUGAR 4 TIMES DAILY OR AS DIRECTED.    Type 2 diabetes mellitus with diabetic neuropathy, with long-term current use of insulin (H)       calcium carbonate-vitamin D 600-400 MG-UNIT Chew    CALTRATE 600+D     Take 2 chew tab by mouth every evening    Cramp of limb       cetirizine 10 MG tablet    zyrTEC     Take 1 tablet (10 mg) by mouth daily    Allergic rhinitis, cause unspecified       cyanocobalamin 1000 MCG tablet    vitamin  B-12     Take 1,000 mcg by mouth every other day        dulaglutide 1.5 MG/0.5ML pen    TRULICITY    2 mL    Inject 1.5 mg Subcutaneous every 7 days    Type 2 diabetes mellitus with diabetic neuropathy, with long-term current use of insulin (H)       exenatide ER 2 MG recon vial kit susp for weekly inj    BYDUREON    2 kit    Inject 2 mg Subcutaneous every 7 days    Type 2 diabetes mellitus with diabetic neuropathy, with long-term current use of insulin (H)       fenofibrate 160 MG tablet     90 tablet    TAKE ONE TABLET BY MOUTH ONE TIME DAILY    Hyperlipidemia LDL goal <100       ferrous fumarate 65 mg ([place]. FE)-Vitamin C 125 mg  MG Tabs tablet    VITRON-C    360 tablet    Take 2 tablets by mouth 2 times  daily    Iron deficiency anemia, unspecified iron deficiency anemia type       fluticasone 50 MCG/ACT spray    FLONASE    16 g    Spray 2 sprays into both nostrils daily    Sinusitis, acute       furosemide 20 MG tablet    LASIX    90 tablet    Take 1 tablet (20 mg) by mouth daily    Essential hypertension       hydrocortisone 0.2 % cream    WESTCORT    45 g    Apply topically 2 times daily Apply sparingly to affected area 2 times daily as needed.    Dermatitis       insulin degludec 200 UNIT/ML pen    TRESIBA    21 mL    Inject 110 Units Subcutaneous daily    Type 2 diabetes mellitus with diabetic neuropathy, with long-term current use of insulin (H)       irbesartan 300 MG tablet    AVAPRO    90 tablet    Take 1 tablet (300 mg) by mouth daily    Essential hypertension with goal blood pressure less than 140/90       levothyroxine 25 MCG tablet    SYNTHROID/LEVOTHROID    90 tablet    Take 1 tablet (25 mcg) by mouth daily    Hypothyroidism, unspecified type       Magnesium 500 MG Caps      Take 1 capsule by mouth daily.        metFORMIN 1000 MG tablet    GLUCOPHAGE    180 tablet    TAKE 1 TABLET BY MOUTH TWICE DAILY WITH MEALS    Type 2 diabetes mellitus with diabetic neuropathy (H)       MULTIVITAMIN TABS   OR      1 tab QD        NAPROXEN PO      Take 220 mg by mouth as needed for moderate pain        NovoLOG FLEXPEN 100 UNIT/ML injection   Generic drug:  insulin aspart     15 mL    INJECT 0-10 UNITS SUBCUTANEOUSLY BEFORE BREAKFAST, 12-15 UNITS BEFORE LUNCH, AND 15-20 UNITS BEFORE SUPPER    Type 2 diabetes mellitus with diabetic neuropathy (H)       ONE TOUCH DELICA LANCETS Misc     100 Stick    1 Device 4 times daily.    Type 2 diabetes, HbA1C goal < 8% (H)       OVER-THE-COUNTER          Reported on 3/28/2017        potassium chloride 10 MEQ tablet    K-TAB,KLOR-CON    90 tablet    Take 1 tablet (10 mEq) by mouth daily    Muscle cramps       traZODone 50 MG tablet    DESYREL    180 tablet    Take 1-2 tablets  ( mg) by mouth nightly as needed    Bariatric surgery status       verapamil 180 MG CR tablet    CALAN-SR    180 tablet    TAKE 1 TABLET BY MOUTH TWICE DAILY    Essential hypertension with goal blood pressure less than 140/90       vitamin D 2000 UNITS tablet      Take 2,000 Units by mouth daily    Vitamin D deficiency       * Notice:  This list has 3 medication(s) that are the same as other medications prescribed for you. Read the directions carefully, and ask your doctor or other care provider to review them with you.

## 2017-08-25 NOTE — PATIENT INSTRUCTIONS
PLAN:                                                      1.  MEDICATIONS:        - Start taking antibiotics and consider nasal steroid spray       - consider switching Tresiba to morning dose, to see if this would help prevent low glucoses in the morning       - Continue other medications without change  2.  Follow-up with endocrinoligist, as planned.  3.  Rest and Fluids

## 2017-08-25 NOTE — PROGRESS NOTES
"  SUBJECTIVE:   Ade Wilkins is a 66 year old female who presents to clinic today for the following health issues:      Acute Illness   Acute illness concerns: sinus infection  Onset: 1 week     Fever: no     Chills/Sweats: no     Headache (location?): bilat temples, below eyes    Sinus Pressure:YES    Conjunctivitis:  no    Ear Pain: YES: right    Rhinorrhea: no     Congestion: YES    Sore Throat: YES     Cough: no    Wheeze: YES    Decreased Appetite: no     Nausea: no     Vomiting: no     Diarrhea:  no     Dysuria/Freq.: no     Fatigue/Achiness: YES    Sick/Strep Exposure: no      Therapies Tried and outcome:  Certrizine       Patient was feeling very good after treatments for a month.   Then symptoms past just recently, similar symptoms as before.   R ear pain is new.   New fatigue.       Reviewed and updated as needed this visit by clinical staff:  Medications  Allergies  Soc Hx   Additional history: as documented    Additional issues to address:  1.  Follow-up endo for DM.  Appointment in May, then next week.  - now on Tresiba Insulin, Trulicity  - morning glucoses 60-80, some reactions, wake her up  - lunch 110-120, also before dinner  - bedtime are 120     ROS:    Constitutional, HEENT, cardiovascular, pulmonary, gi and gu systems are negative, except as otherwise noted.      OBJECTIVE:                                                    /56  Pulse 69  Temp 98.2  F (36.8  C) (Oral)  Ht 5' 2.5\" (1.588 m)  Wt 233 lb 12.8 oz (106.1 kg)  SpO2 96%  BMI 42.08 kg/m2  Body mass index is 42.08 kg/(m^2).   No apparent distress  HENT: ear canals and TM's normal and nose and mouth without ulcers or lesions  Tenderness over frontal and maxillary sinuses  NECK: no adenopathy, no asymmetry, masses, or scars and thyroid normal to palpation  RESP: lungs clear to auscultation - no rales, rhonchi or wheezes        ASSESSMENT:                                                      Acute sinusitis  DM, some morning " hypoglycemia    PLAN:                                                      1.  MEDICATIONS:        - Start taking antibiotics and consider nasal steroid spray       - consider switching Tresiba to morning dose, to see if this would help prevent low glucoses in the morning       - Continue other medications without change  2.  Follow-up with endocrinoligist, as planned.  3.  Rest and Fluids     Dimitry Howard MD  [facility]

## 2017-08-25 NOTE — NURSING NOTE
"Chief Complaint   Patient presents with     Sinus Problem       Initial /56  Pulse 69  Temp 98.2  F (36.8  C) (Oral)  Ht 5' 2.5\" (1.588 m)  Wt 233 lb 12.8 oz (106.1 kg)  SpO2 96%  BMI 42.08 kg/m2 Estimated body mass index is 42.08 kg/(m^2) as calculated from the following:    Height as of this encounter: 5' 2.5\" (1.588 m).    Weight as of this encounter: 233 lb 12.8 oz (106.1 kg).  Medication Reconciliation: complete   Onelia Rubio MA   "

## 2017-08-25 NOTE — NURSING NOTE
Screening Questionnaire for Adult Immunization    Are you sick today?   No   Do you have allergies to medications, food, a vaccine component or latex?   Yes   Have you ever had a serious reaction after receiving a vaccination?   Yes   Do you have a long-term health problem with heart disease, lung disease, asthma, kidney disease, metabolic disease (e.g. diabetes), anemia, or other blood disorder?   No   Do you have cancer, leukemia, HIV/AIDS, or any other immune system problem?   No   In the past 3 months, have you taken medications that affect  your immune system, such as prednisone, other steroids, or anticancer drugs; drugs for the treatment of rheumatoid arthritis, Crohn s disease, or psoriasis; or have you had radiation treatments?   No   Have you had a seizure, or a brain or other nervous system problem?   No   During the past year, have you received a transfusion of blood or blood     products, or been given immune (gamma) globulin or antiviral drug?   No   For women: Are you pregnant or is there a chance you could become        pregnant during the next month?   No   Have you received any vaccinations in the past 4 weeks?   No     Immunization questionnaire answers were all negative.        Per orders of Dr. Howard, injection of Pneumovax given by Onelia Rubio. Patient instructed to remain in clinic for 15 minutes afterwards, and to report any adverse reaction to me immediately.       Screening performed by Onelia Rubio on 8/25/2017 at 12:22 PM.

## 2017-08-30 ENCOUNTER — OFFICE VISIT (OUTPATIENT)
Dept: ENDOCRINOLOGY | Facility: CLINIC | Age: 67
End: 2017-08-30

## 2017-08-30 VITALS
WEIGHT: 232.4 LBS | HEIGHT: 63 IN | BODY MASS INDEX: 41.18 KG/M2 | HEART RATE: 88 BPM | SYSTOLIC BLOOD PRESSURE: 147 MMHG | DIASTOLIC BLOOD PRESSURE: 80 MMHG

## 2017-08-30 DIAGNOSIS — E11.40 TYPE 2 DIABETES MELLITUS WITH DIABETIC NEUROPATHY, WITH LONG-TERM CURRENT USE OF INSULIN (H): Primary | ICD-10-CM

## 2017-08-30 DIAGNOSIS — Z79.4 TYPE 2 DIABETES MELLITUS WITH DIABETIC NEUROPATHY, WITH LONG-TERM CURRENT USE OF INSULIN (H): Primary | ICD-10-CM

## 2017-08-30 LAB — HBA1C MFR BLD: 8.1 % (ref 4.3–6)

## 2017-08-30 ASSESSMENT — PAIN SCALES - GENERAL: PAINLEVEL: NO PAIN (0)

## 2017-08-30 NOTE — LETTER
8/30/2017       RE: Ade Wilkins  [address] [facility] [address]     Dear Colleague,    Thank you for referring your patient, Ade Wilkins, to the [facility] ENDOCRINOLOGY at [facility]. Please see a copy of my visit note below.    Endocrinology Clinic Visit    Chief Complaint: RECHECK (return diabetes )       Subjective:         HPI: Ade Wilkins is a 66 year old year old female who history of hepatitis A as a teenager, non-Hodgkin's lymphoma diagnosed in 2008, iron deficiency anemia, morbid obesity, hyperlipidemia, rheumatic fever with systolic murmur, hypothyroidism and hypertension who presents today for a follow up for type 2 diabetes mellitus.       History of Diabetes  Type 2 diagnosed in late 20s.  In the 1970s she was initially started on oral medications and was transitioned to insulin in the 80s.  She had a gastric bypass surgery done in 2004 after which she lost about 70 pounds.  Her blood sugars were better controlled for the next few years but has gradually worsened over time.  Her weight had been steady around 210-215 pounds but in the recent years as her activity was limited by knee pain, she has gained about 15-20 pounds.    She has a strong family history of type 2 diabetes mellitus with Mother and Sister with type 2 diabetes.    Interval history  During the last visit, she was started on Trulicity.  She had significant improvement in her blood sugars.  Her appetite reduced, she started running low.  She feels happier about her blood sugars.    Major data shows 4-5 episodes of low blood sugars during the day.  Both fasting and prandial hypoglycemia is noted.  Following correction, blood sugars have increased as high as 337.  Almost all values were below 200    Complications  She has triopathy from diabetes.     1. Retinopathy: Last exam was in May 19 2017, mild NPDR    2.  Nephropathy:  Lab Results   Component Value Date    MICROL 46  03/28/2017     Creatinine   Date Value Ref Range Status   08/25/2017 1.51 (H) 0.52 - 1.04 mg/dL Final   ]    3. Neuropathy   Occasional tingling that improves with pain patch    4. Hypoglycemia   This is rare, but does happen if does not eat after taking insulin.    5. Macrovascular:   No history of cardiac events or stroke        Treatment  Diabetes Medication(s)     Biguanides Sig    metFORMIN (GLUCOPHAGE) 1000 MG tablet TAKE 1 TABLET BY MOUTH TWICE DAILY WITH MEALS    Insulin Sig    NOVOLOG FLEXPEN 100 UNIT/ML soln INJECT 0-10 UNITS SUBCUTANEOUSLY BEFORE BREAKFAST, 12-15 UNITS BEFORE LUNCH, AND 15-20 UNITS BEFORE SUPPER    insulin degludec (TRESIBA) 200 UNIT/ML pen Inject 110 Units Subcutaneous daily    Incretin Mimetic Agents (GLP-1 Receptor Agonists) Sig    dulaglutide (TRULICITY) 1.5 MG/0.5ML pen Inject 1.5 mg Subcutaneous every 7 days    exenatide ER (BYDUREON) 2 MG recon vial kit susp for weekly inj Inject 2 mg Subcutaneous every 7 days        3 per carb.     Prevention  Lipid  LDL Cholesterol Calculated   Date Value Ref Range Status   03/28/2017 101 (H) <100 mg/dL Final     Comment:     Above desirable:  100-129 mg/dl   Borderline High:  130-159 mg/dL   High:             160-189 mg/dL   Very high:       >189 mg/dl     06/17/2016 78 <100 mg/dL Final     Comment:     Desirable:       <100 mg/dl       Medications     HMG CoA Reductase Inhibitors    atorvastatin (LIPITOR) 80 MG tablet    Salicylates    aspirin 81 MG tablet          Renal  Medication (Note: This includes the hypertensive combination subclass to make sure to show all ACEI/ARB's.)     Angiotensin II Receptor Antagonists Sig    irbesartan (AVAPRO) 300 MG tablet Take 1 tablet (300 mg) by mouth daily            Weight  Wt Readings from Last 4 Encounters:   08/30/17 105.4 kg (232 lb 6.4 oz)   08/25/17 106.1 kg (233 lb 12.8 oz)   05/24/17 106.4 kg (234 lb 9.1 oz)   03/28/17 107.5 kg (237 lb)         Diet: 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    Breakfast : english muffin PB, or eggs,      Snack: BG test 11 am, if low takes a snack.      Lunch : veggies,   Wed: pasta,   Chicken, veggies, potatoes or salad/    Snack : home: popcorn     Dinner : 7 -7.30 : orders out at times     Fluctuates with days in relation to bydureon.     Snack: occ toast.     Alcohol or sugared beverage    Exercise : limited     Weight trend  Wt Readings from Last 4 Encounters:   08/30/17 105.4 kg (232 lb 6.4 oz)   08/25/17 106.1 kg (233 lb 12.8 oz)   05/24/17 106.4 kg (234 lb 9.1 oz)   03/28/17 107.5 kg (237 lb)       A1c today is 8.1  Lab Results   Component Value Date    A1C 9.3 03/28/2017    A1C 9.0 10/17/2016    A1C 8.6 06/17/2016    A1C 8.3 02/16/2016    A1C 8.6 10/05/2015       Barriers to achieve glycemic goals:      Type 2 Diabetes since her early 20s    Barriers to achieving glycemic goals  Lack of testing before meals: Although she has days when she does not check, she has increased her frequency of testing    Lack of insulin with snacking / Ineffective bydureon  Trulicity has helped with prandial BG    Recent change in Lantus to vial : Tresiba with good effects    High Carb diet: Diet improved with Trulicity.     Lack of exercise: limited by pain in the joints.   Morbid obesity       Allergies   Allergen Reactions     Clonidine      headaches     Fexofenadine Hydrochloride      Allegra--headache     Gemfibrozil      lopid--rash ??from med     Lisinopril      edema     Norvasc [Amlodipine Besylate]      Hair loss     Pioglitazone Hydrochloride Swelling     actos--ankle edema     Doxazosin Mesylate Rash     cardura--rash       Current Outpatient Prescriptions   Medication Sig Dispense Refill     amoxicillin-clavulanate (AUGMENTIN) 500-125 MG per tablet Take 1 tablet by mouth 3 times daily 30 tablet 0     fenofibrate 160 MG tablet TAKE ONE TABLET BY MOUTH ONE TIME DAILY  90 tablet 1     traZODone (DESYREL) 50 MG tablet Take 1-2 tablets  ( mg) by mouth nightly as needed 180 tablet PRN     metFORMIN (GLUCOPHAGE) 1000 MG tablet TAKE 1 TABLET BY MOUTH TWICE DAILY WITH MEALS 180 tablet PRN     atorvastatin (LIPITOR) 80 MG tablet TAKE ONE TABLET BY MOUTH ONE TIME DAILY  90 tablet 1     NOVOLOG FLEXPEN 100 UNIT/ML soln INJECT 0-10 UNITS SUBCUTANEOUSLY BEFORE BREAKFAST, 12-15 UNITS BEFORE LUNCH, AND 15-20 UNITS BEFORE SUPPER 15 mL 1     verapamil (CALAN-SR) 180 MG CR tablet TAKE 1 TABLET BY MOUTH TWICE DAILY  180 tablet 2     MARTITA CONTOUR NEXT test strip USE TO TEST BLOOD SUGAR 4 TIMES DAILY OR AS DIRECTED. 100 each 3     NAPROXEN PO Take 220 mg by mouth as needed for moderate pain       insulin degludec (TRESIBA) 200 UNIT/ML pen Inject 110 Units Subcutaneous daily 21 mL 3     dulaglutide (TRULICITY) 1.5 MG/0.5ML pen Inject 1.5 mg Subcutaneous every 7 days 2 mL 3     augmented betamethasone dipropionate (DIPROLENE-AF) 0.05 % ointment Apply to AA BID x 3-4 weeks then PRN 45 g 5     exenatide ER (BYDUREON) 2 MG recon vial kit susp for weekly inj Inject 2 mg Subcutaneous every 7 days 2 kit 2     furosemide (LASIX) 20 MG tablet Take 1 tablet (20 mg) by mouth daily 90 tablet 3     Cholecalciferol (VITAMIN D) 2000 UNITS tablet Take 2,000 Units by mouth daily       potassium chloride (K-TAB,KLOR-CON) 10 MEQ tablet Take 1 tablet (10 mEq) by mouth daily 90 tablet prn     irbesartan (AVAPRO) 300 MG tablet Take 1 tablet (300 mg) by mouth daily 90 tablet prn     ferrous fumarate 65 mg, [place]. FE,-Vitamin C 125 mg (VITRON-C)  MG TABS Take 2 tablets by mouth 2 times daily 360 tablet prn     levothyroxine (SYNTHROID, LEVOTHROID) 25 MCG tablet Take 1 tablet (25 mcg) by mouth daily 90 tablet 3     fluticasone (FLONASE) 50 MCG/ACT nasal spray Spray 2 sprays into both nostrils daily 16 g 1     blood glucose monitoring (MARTITA CONTOUR NEXT) test strip Use to test blood sugar 4 times daily or as directed. 1 Box 9     blood glucose monitoring (NO BRAND SPECIFIED)  test strip Reason for four times daily checking is lack of diabetic control.  Patient is on multiple doses of insulin daily. Dispense what patient's plan will cover.  Dispense box of 100 strips at a time 1 Box 1     ALLERGY INJECTIONS        hydrocortisone (WESTCORT) 0.2 % cream Apply topically 2 times daily Apply sparingly to affected area 2 times daily as needed. 45 g 2     calcium carbonate-vitamin D (CALTRATE 600+D) 600-400 MG-UNIT CHEW Take 2 chew tab by mouth every evening       cetirizine (ZYRTEC) 10 MG tablet Take 1 tablet (10 mg) by mouth daily       aspirin 81 MG tablet Take  by mouth every other day.       Magnesium 500 MG CAPS Take 1 capsule by mouth daily.       cyanocolbalamin (VITAMIN B-12) 1000 MCG tablet Take 1,000 mcg by mouth every other day        ONE TOUCH DELICA LANCETS MISC 1 Device 4 times daily. 100 Stick prn     OVER-THE-COUNTER Reported on 3/28/2017       MULTIVITAMIN TABS   OR 1 tab QD         Review of Systems     Constitutional: Normal appetite, feels well no polyuria or polydipsia.   Head: no headache   Eye: no vision change/ loss of peripheral vision.   ENT: No throat congestion.   Respiratory: no cough   Cardiovascular: Has no known murmur, no chest pain.  Has some shortness of breath on exertion.  Gastrointestinal: Negative for vomiting, abdominal pain and diarrhea.  Genitourinary: No bladder problems.  Musculoskeletal: Negative for myalgias. No weakness.  Neurological: Negative for seizures and headaches.  Psychiatric/Behavioral: Negative for behavioral problem and dysphoric mood.    Past Medical History:   Diagnosis Date     Allergic rhinitis, cause unspecified      Hepatitis, unspecified 1968     Iron Deficiency Anemia 3/09     Lymphoma (H) 12/08     Nonsenile cataract      Obesity, unspecified      Other and unspecified hyperlipidemia      Rheumatic fever without mention of heart involvement infant     Type II or unspecified type diabetes mellitus without mention of  "complication, not stated as uncontrolled 1992     Unspecified essential hypertension      Unspecified hypothyroidism      Vitamin B12 deficiency 3/09       Past Surgical History:   Procedure Laterality Date     C APPENDECTOMY  1958     C NONSPECIFIC PROCEDURE  1976    (leg) cystectomy     CATARACT IOL, RT/LT Left 12/21/2000    left     CATARACT IOL, RT/LT Right 01/06/2000    right     GASTRIC BYPASS  2/04    Dr. Hebert     LYMPH NODE BIOPSY  12/08    right groin, Dr. Licona     TONSILLECTOMY & ADENOIDECTOMY  1968       Family History   Problem Relation Age of Onset     Cardiovascular Father      AAA     DIABETES Mother      C.A.D. Mother      52     Eye Disorder Mother      glaucoma     Glaucoma Mother      Cardiovascular Brother      AAA, PVD     CANCER Brother      bladder     Cardiovascular Brother      AAA, valvular heart disease     DIABETES Brother      age 53     CANCER Brother      liver cancer     Glaucoma Other      MGGF     Macular Degeneration No family hx of        Social History     Social History     Marital status:      Spouse name: N/A     Number of children: 0     Years of education: N/A     Occupational History      [employer] [employer]     Social History Main Topics     Smoking status: Never Smoker     Smokeless tobacco: Never Used     Alcohol use No     Drug use: No     Sexual activity: No     Other Topics Concern     Caffeine Concern Yes     20 oz daily     Exercise No     Seat Belt Yes     Self-Exams Yes     Social History Narrative    Living arrangements - the patient lives alone.       Objective:   /80  Pulse 88  Ht 1.588 m (5' 2.5\")  Wt 105.4 kg (232 lb 6.4 oz)  BMI 41.83 kg/m2    Constitutional: Obese female in no acute distress cooperative and comfortable  EYES: anicteric, normal extra-ocular movements, no lid lag or retraction   HEENT: Mouth/Throat: Mucous membrane is moist. Oropharynx is clear. No adenopathy. No large goiters, difficult to palpate " due to short neck  Cardiovascular: RRR, S1, S2 normal.  Aortic area systolic murmur  Pulmonary/Chest: CTAB. No wheezing or rales   Abdominal: +BS. Nontender to palpation.  Abdomen is distended, small bruises around her injection site.  No lipo hypertrophy.  White striae.  Neurological: Alert. Normal affect. CNII-XII intact. Muscle strength 5/5. Sensory is intact.  Extremities: No clubbing, cyanosis or inflammation   Bilateral lower extremity edema left worse than the right.  Skin: normal texture, color  Feet: Bilateral loss of sensation in both feet  Lymphatic: no cervical lymphadenopathy.  Psychological: appropriate mood     In House Labs:   Lab Results   Component Value Date    A1C 9.3 03/28/2017    A1C 9.0 10/17/2016    A1C 8.6 06/17/2016    A1C 8.3 02/16/2016    A1C 8.6 10/05/2015       TSH   Date Value Ref Range Status   03/28/2017 2.84 0.40 - 4.00 mU/L Final   02/10/2017 2.55 0.40 - 4.00 mU/L Final   10/17/2016 4.39 (H) 0.40 - 4.00 mU/L Final   10/05/2015 3.71 0.40 - 4.00 mU/L Final   11/28/2014 3.89 0.40 - 4.00 mU/L Final     Comment:     Effective 7/30/2014, the reference range for this assay has changed to reflect   new instrumentation/methodology.       T4 Free   Date Value Ref Range Status   10/17/2016 1.17 0.76 - 1.46 ng/dL Final   05/05/2010 1.05 0.70 - 1.85 ng/dL Final   09/13/2007 1.00 0.70 - 1.85 ng/dL Final       Creatinine   Date Value Ref Range Status   08/25/2017 1.51 (H) 0.52 - 1.04 mg/dL Final   ]    Recent Labs   Lab Test  03/28/17   1014  06/17/16   0750  05/22/15   0848   05/03/14   1018   CHOL  185  166  175   < >  161   HDL  35*  34*  32*   < >  39*   LDL  101*  78  76   < >  76   TRIG  247*  271*  334*   < >  231*   CHOLHDLRATIO   --    --   5.5*   --   4.2    < > = values in this interval not displayed.       No results found for: [id number], [id number], [id number]      Assessment/Treatment Plan:      Ade STONE Claudette is a 66 year old year old female with history morbid obesity S/P  Gastric bypass surgery, hypothyroidism who presents today for FU management of type 2 diabetes.  Her diabetes mellitus is complicated by diabetic neuropathy, mild nonproliferative retinopathy and nephropathy.    1. Type 2 Diabetes since her early 20s    Barriers to achieving glycemic goals  Lack of testing before meals.  She was advised to test before each meal and at bedtime.  Keep a record of meals that she consumes and use correction insulin    Lack of insulin with snacking  Patient was advised to use low carbohydrate foods for snacks  Continue  Dulaglutide    Hypoglycemia fasting and prandial  Reduce Tresiba to 104 units with goal of keeping AM Bg > 100    High Carb diet  Diet has improved per patient report.     Lack of exercise: limited by pain in the joints.       Anemia:   Hemoglobin A1c may be falsely lower than her actual value    I will contact the patient with the test results.  Return to clinic in 3 months with labs.     Hypothyroidism on levothyroxine 25  g daily  This has been followed by primary care physician  Last TSH was normal    Morbid obesity  We discussed about diet and exercise  She will probably benefit from weight loss medications.  We will discuss this further in subsequent visits.  Weight stable on Dulaglutide.     Melvi Naik MD  [id number]  Endocrinology Service

## 2017-08-30 NOTE — PROGRESS NOTES
Endocrinology Clinic Visit    Chief Complaint: RECHECK (return diabetes )       Subjective:         HPI: Ade Wilkins is a 66 year old year old female who history of hepatitis A as a teenager, non-Hodgkin's lymphoma diagnosed in 2008, iron deficiency anemia, morbid obesity, hyperlipidemia, rheumatic fever with systolic murmur, hypothyroidism and hypertension who presents today for a follow up for type 2 diabetes mellitus.       History of Diabetes  Type 2 diagnosed in late 20s.  In the 1970s she was initially started on oral medications and was transitioned to insulin in the 80s.  She had a gastric bypass surgery done in 2004 after which she lost about 70 pounds.  Her blood sugars were better controlled for the next few years but has gradually worsened over time.  Her weight had been steady around 210-215 pounds but in the recent years as her activity was limited by knee pain, she has gained about 15-20 pounds.    She has a strong family history of type 2 diabetes mellitus with Mother and Sister with type 2 diabetes.    Interval history  During the last visit, she was started on Trulicity.  She had significant improvement in her blood sugars.  Her appetite reduced, she started running low.  She feels happier about her blood sugars.    Major data shows 4-5 episodes of low blood sugars during the day.  Both fasting and prandial hypoglycemia is noted.  Following correction, blood sugars have increased as high as 337.  Almost all values were below 200    Complications  She has triopathy from diabetes.     1. Retinopathy: Last exam was in May 19 2017, mild NPDR    2.  Nephropathy:  Lab Results   Component Value Date    MICROL 46 03/28/2017     Creatinine   Date Value Ref Range Status   08/25/2017 1.51 (H) 0.52 - 1.04 mg/dL Final   ]    3. Neuropathy   Occasional tingling that improves with pain patch    4. Hypoglycemia   This is rare, but does happen if does not eat after taking insulin.    5. Macrovascular:   No  history of cardiac events or stroke        Treatment  Diabetes Medication(s)     Biguanides Sig    metFORMIN (GLUCOPHAGE) 1000 MG tablet TAKE 1 TABLET BY MOUTH TWICE DAILY WITH MEALS    Insulin Sig    NOVOLOG FLEXPEN 100 UNIT/ML soln INJECT 0-10 UNITS SUBCUTANEOUSLY BEFORE BREAKFAST, 12-15 UNITS BEFORE LUNCH, AND 15-20 UNITS BEFORE SUPPER    insulin degludec (TRESIBA) 200 UNIT/ML pen Inject 110 Units Subcutaneous daily    Incretin Mimetic Agents (GLP-1 Receptor Agonists) Sig    dulaglutide (TRULICITY) 1.5 MG/0.5ML pen Inject 1.5 mg Subcutaneous every 7 days    exenatide ER (BYDUREON) 2 MG recon vial kit susp for weekly inj Inject 2 mg Subcutaneous every 7 days        3 per carb.     Prevention  Lipid  LDL Cholesterol Calculated   Date Value Ref Range Status   03/28/2017 101 (H) <100 mg/dL Final     Comment:     Above desirable:  100-129 mg/dl   Borderline High:  130-159 mg/dL   High:             160-189 mg/dL   Very high:       >189 mg/dl     06/17/2016 78 <100 mg/dL Final     Comment:     Desirable:       <100 mg/dl       Medications     HMG CoA Reductase Inhibitors    atorvastatin (LIPITOR) 80 MG tablet    Salicylates    aspirin 81 MG tablet          Renal  Medication (Note: This includes the hypertensive combination subclass to make sure to show all ACEI/ARB's.)     Angiotensin II Receptor Antagonists Sig    irbesartan (AVAPRO) 300 MG tablet Take 1 tablet (300 mg) by mouth daily            Weight  Wt Readings from Last 4 Encounters:   08/30/17 105.4 kg (232 lb 6.4 oz)   08/25/17 106.1 kg (233 lb 12.8 oz)   05/24/17 106.4 kg (234 lb 9.1 oz)   03/28/17 107.5 kg (237 lb)         Diet: 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    Breakfast : english muffin PB, or eggs,      Snack: BG test 11 am, if low takes a snack.      Lunch : veggies,   Wed: pasta,   Chicken, veggies, potatoes or salad/    Snack : home: popcorn     Dinner : 7 -7.30 : orders out at times     Fluctuates  with days in relation to bydureon.     Snack: occ toast.     Alcohol or sugared beverage    Exercise : limited     Weight trend  Wt Readings from Last 4 Encounters:   08/30/17 105.4 kg (232 lb 6.4 oz)   08/25/17 106.1 kg (233 lb 12.8 oz)   05/24/17 106.4 kg (234 lb 9.1 oz)   03/28/17 107.5 kg (237 lb)       A1c today is 8.1  Lab Results   Component Value Date    A1C 9.3 03/28/2017    A1C 9.0 10/17/2016    A1C 8.6 06/17/2016    A1C 8.3 02/16/2016    A1C 8.6 10/05/2015       Barriers to achieve glycemic goals:      Type 2 Diabetes since her early 20s    Barriers to achieving glycemic goals  Lack of testing before meals: Although she has days when she does not check, she has increased her frequency of testing    Lack of insulin with snacking / Ineffective bydureon  Trulicity has helped with prandial BG    Recent change in Lantus to vial : Tresiba with good effects    High Carb diet: Diet improved with Trulicity.     Lack of exercise: limited by pain in the joints.   Morbid obesity       Allergies   Allergen Reactions     Clonidine      headaches     Fexofenadine Hydrochloride      Allegra--headache     Gemfibrozil      lopid--rash ??from med     Lisinopril      edema     Norvasc [Amlodipine Besylate]      Hair loss     Pioglitazone Hydrochloride Swelling     actos--ankle edema     Doxazosin Mesylate Rash     cardura--rash       Current Outpatient Prescriptions   Medication Sig Dispense Refill     amoxicillin-clavulanate (AUGMENTIN) 500-125 MG per tablet Take 1 tablet by mouth 3 times daily 30 tablet 0     fenofibrate 160 MG tablet TAKE ONE TABLET BY MOUTH ONE TIME DAILY  90 tablet 1     traZODone (DESYREL) 50 MG tablet Take 1-2 tablets ( mg) by mouth nightly as needed 180 tablet PRN     metFORMIN (GLUCOPHAGE) 1000 MG tablet TAKE 1 TABLET BY MOUTH TWICE DAILY WITH MEALS 180 tablet PRN     atorvastatin (LIPITOR) 80 MG tablet TAKE ONE TABLET BY MOUTH ONE TIME DAILY  90 tablet 1     NOVOLOG FLEXPEN 100 UNIT/ML soln  INJECT 0-10 UNITS SUBCUTANEOUSLY BEFORE BREAKFAST, 12-15 UNITS BEFORE LUNCH, AND 15-20 UNITS BEFORE SUPPER 15 mL 1     verapamil (CALAN-SR) 180 MG CR tablet TAKE 1 TABLET BY MOUTH TWICE DAILY  180 tablet 2     MARTITA CONTOUR NEXT test strip USE TO TEST BLOOD SUGAR 4 TIMES DAILY OR AS DIRECTED. 100 each 3     NAPROXEN PO Take 220 mg by mouth as needed for moderate pain       insulin degludec (TRESIBA) 200 UNIT/ML pen Inject 110 Units Subcutaneous daily 21 mL 3     dulaglutide (TRULICITY) 1.5 MG/0.5ML pen Inject 1.5 mg Subcutaneous every 7 days 2 mL 3     augmented betamethasone dipropionate (DIPROLENE-AF) 0.05 % ointment Apply to AA BID x 3-4 weeks then PRN 45 g 5     exenatide ER (BYDUREON) 2 MG recon vial kit susp for weekly inj Inject 2 mg Subcutaneous every 7 days 2 kit 2     furosemide (LASIX) 20 MG tablet Take 1 tablet (20 mg) by mouth daily 90 tablet 3     Cholecalciferol (VITAMIN D) 2000 UNITS tablet Take 2,000 Units by mouth daily       potassium chloride (K-TAB,KLOR-CON) 10 MEQ tablet Take 1 tablet (10 mEq) by mouth daily 90 tablet prn     irbesartan (AVAPRO) 300 MG tablet Take 1 tablet (300 mg) by mouth daily 90 tablet prn     ferrous fumarate 65 mg, [place]. FE,-Vitamin C 125 mg (VITRON-C)  MG TABS Take 2 tablets by mouth 2 times daily 360 tablet prn     levothyroxine (SYNTHROID, LEVOTHROID) 25 MCG tablet Take 1 tablet (25 mcg) by mouth daily 90 tablet 3     fluticasone (FLONASE) 50 MCG/ACT nasal spray Spray 2 sprays into both nostrils daily 16 g 1     blood glucose monitoring (MARTITA CONTOUR NEXT) test strip Use to test blood sugar 4 times daily or as directed. 1 Box 9     blood glucose monitoring (NO BRAND SPECIFIED) test strip Reason for four times daily checking is lack of diabetic control.  Patient is on multiple doses of insulin daily. Dispense what patient's plan will cover.  Dispense box of 100 strips at a time 1 Box 1     ALLERGY INJECTIONS        hydrocortisone (WESTCORT) 0.2 % cream Apply  topically 2 times daily Apply sparingly to affected area 2 times daily as needed. 45 g 2     calcium carbonate-vitamin D (CALTRATE 600+D) 600-400 MG-UNIT CHEW Take 2 chew tab by mouth every evening       cetirizine (ZYRTEC) 10 MG tablet Take 1 tablet (10 mg) by mouth daily       aspirin 81 MG tablet Take  by mouth every other day.       Magnesium 500 MG CAPS Take 1 capsule by mouth daily.       cyanocolbalamin (VITAMIN B-12) 1000 MCG tablet Take 1,000 mcg by mouth every other day        ONE TOUCH DELICA LANCETS MISC 1 Device 4 times daily. 100 Stick prn     OVER-THE-COUNTER Reported on 3/28/2017       MULTIVITAMIN TABS   OR 1 tab QD         Review of Systems     Constitutional: Normal appetite, feels well no polyuria or polydipsia.   Head: no headache   Eye: no vision change/ loss of peripheral vision.   ENT: No throat congestion.   Respiratory: no cough   Cardiovascular: Has no known murmur, no chest pain.  Has some shortness of breath on exertion.  Gastrointestinal: Negative for vomiting, abdominal pain and diarrhea.  Genitourinary: No bladder problems.  Musculoskeletal: Negative for myalgias. No weakness.  Neurological: Negative for seizures and headaches.  Psychiatric/Behavioral: Negative for behavioral problem and dysphoric mood.    Past Medical History:   Diagnosis Date     Allergic rhinitis, cause unspecified      Hepatitis, unspecified 1968     Iron Deficiency Anemia 3/09     Lymphoma (H) 12/08     Nonsenile cataract      Obesity, unspecified      Other and unspecified hyperlipidemia      Rheumatic fever without mention of heart involvement infant     Type II or unspecified type diabetes mellitus without mention of complication, not stated as uncontrolled 1992     Unspecified essential hypertension      Unspecified hypothyroidism      Vitamin B12 deficiency 3/09       Past Surgical History:   Procedure Laterality Date     C APPENDECTOMY  1958     C NONSPECIFIC PROCEDURE  1976    (leg) cystectomy     CATARACT  "IOL, RT/LT Left 12/21/2000    left     CATARACT IOL, RT/LT Right 01/06/2000    right     GASTRIC BYPASS  2/04    Dr. Hebert     LYMPH NODE BIOPSY  12/08    right groin, Dr. Licona     TONSILLECTOMY & ADENOIDECTOMY  1968       Family History   Problem Relation Age of Onset     Cardiovascular Father      AAA     DIABETES Mother      C.A.D. Mother      52     Eye Disorder Mother      glaucoma     Glaucoma Mother      Cardiovascular Brother      AAA, PVD     CANCER Brother      bladder     Cardiovascular Brother      AAA, valvular heart disease     DIABETES Brother      age 53     CANCER Brother      liver cancer     Glaucoma Other      MGGF     Macular Degeneration No family hx of        Social History     Social History     Marital status:      Spouse name: N/A     Number of children: 0     Years of education: N/A     Occupational History      "[employer]     Social History Main Topics     Smoking status: Never Smoker     Smokeless tobacco: Never Used     Alcohol use No     Drug use: No     Sexual activity: No     Other Topics Concern     Caffeine Concern Yes     20 oz daily     Exercise No     Seat Belt Yes     Self-Exams Yes     Social History Narrative    Living arrangements - the patient lives alone.       Objective:   /80  Pulse 88  Ht 1.588 m (5' 2.5\")  Wt 105.4 kg (232 lb 6.4 oz)  BMI 41.83 kg/m2    Constitutional: Obese female in no acute distress cooperative and comfortable  EYES: anicteric, normal extra-ocular movements, no lid lag or retraction   HEENT: Mouth/Throat: Mucous membrane is moist. Oropharynx is clear. No adenopathy. No large goiters, difficult to palpate due to short neck  Cardiovascular: RRR, S1, S2 normal.  Aortic area systolic murmur  Pulmonary/Chest: CTAB. No wheezing or rales   Abdominal: +BS. Nontender to palpation.  Abdomen is distended, small bruises around her injection site.  No lipo hypertrophy.  White striae.  Neurological: Alert. Normal " affect. CNII-XII intact. Muscle strength 5/5. Sensory is intact.  Extremities: No clubbing, cyanosis or inflammation   Bilateral lower extremity edema left worse than the right.  Skin: normal texture, color  Feet: Bilateral loss of sensation in both feet  Lymphatic: no cervical lymphadenopathy.  Psychological: appropriate mood     In House Labs:   Lab Results   Component Value Date    A1C 9.3 03/28/2017    A1C 9.0 10/17/2016    A1C 8.6 06/17/2016    A1C 8.3 02/16/2016    A1C 8.6 10/05/2015       TSH   Date Value Ref Range Status   03/28/2017 2.84 0.40 - 4.00 mU/L Final   02/10/2017 2.55 0.40 - 4.00 mU/L Final   10/17/2016 4.39 (H) 0.40 - 4.00 mU/L Final   10/05/2015 3.71 0.40 - 4.00 mU/L Final   11/28/2014 3.89 0.40 - 4.00 mU/L Final     Comment:     Effective 7/30/2014, the reference range for this assay has changed to reflect   new instrumentation/methodology.       T4 Free   Date Value Ref Range Status   10/17/2016 1.17 0.76 - 1.46 ng/dL Final   05/05/2010 1.05 0.70 - 1.85 ng/dL Final   09/13/2007 1.00 0.70 - 1.85 ng/dL Final       Creatinine   Date Value Ref Range Status   08/25/2017 1.51 (H) 0.52 - 1.04 mg/dL Final   ]    Recent Labs   Lab Test  03/28/17   1014  06/17/16   0750  05/22/15   0848   05/03/14   1018   CHOL  185  166  175   < >  161   HDL  35*  34*  32*   < >  39*   LDL  101*  78  76   < >  76   TRIG  247*  271*  334*   < >  231*   CHOLHDLRATIO   --    --   5.5*   --   4.2    < > = values in this interval not displayed.       No results found for: [id number], [id number], [id number]      Assessment/Treatment Plan:      Ade Wilkins is a 66 year old year old female with history morbid obesity S/P Gastric bypass surgery, hypothyroidism who presents today for FU management of type 2 diabetes.  Her diabetes mellitus is complicated by diabetic neuropathy, mild nonproliferative retinopathy and nephropathy.    1. Type 2 Diabetes since her early 20s    Barriers to achieving glycemic goals  Lack of testing  before meals.  She was advised to test before each meal and at bedtime.  Keep a record of meals that she consumes and use correction insulin    Lack of insulin with snacking  Patient was advised to use low carbohydrate foods for snacks  Continue  Dulaglutide    Hypoglycemia fasting and prandial  Reduce Tresiba to 104 units with goal of keeping AM Bg > 100    High Carb diet  Diet has improved per patient report.     Lack of exercise: limited by pain in the joints.       Anemia:   Hemoglobin A1c may be falsely lower than her actual value    I will contact the patient with the test results.  Return to clinic in 3 months with labs.     Hypothyroidism on levothyroxine 25  g daily  This has been followed by primary care physician  Last TSH was normal    Morbid obesity  We discussed about diet and exercise  She will probably benefit from weight loss medications.  We will discuss this further in subsequent visits.  Weight stable on Dulaglutide.     Melvi Naik MD  [id number]  Endocrinology Service

## 2017-08-30 NOTE — PATIENT INSTRUCTIONS
Reduce Tresiba to 104 units daily. If fasting blood sugars are less than 100 for next few days, reduce it further to 100 units daily.    Goal is to keep fasting blood sugars > 100.

## 2017-09-01 DIAGNOSIS — Z79.4 TYPE 2 DIABETES MELLITUS WITH DIABETIC NEUROPATHY, WITH LONG-TERM CURRENT USE OF INSULIN (H): ICD-10-CM

## 2017-09-01 DIAGNOSIS — E11.40 TYPE 2 DIABETES MELLITUS WITH DIABETIC NEUROPATHY, WITH LONG-TERM CURRENT USE OF INSULIN (H): ICD-10-CM

## 2017-09-05 NOTE — PROGRESS NOTES
Dear Ade,     Kidney function is stable, mild decrease in albumin in urine , overall stable.    Electrolytes, liver enzyme ALT, muscle enzyme CPK are normal.     Anemia - stable.     Best Regards    Melvi Naik MD  [id number]  Endocrinology Service

## 2017-09-07 ENCOUNTER — TRANSFERRED RECORDS (OUTPATIENT)
Dept: HEALTH INFORMATION MANAGEMENT | Facility: CLINIC | Age: 67
End: 2017-09-07

## 2017-09-18 DIAGNOSIS — E11.40 TYPE 2 DIABETES MELLITUS WITH DIABETIC NEUROPATHY, WITH LONG-TERM CURRENT USE OF INSULIN (H): ICD-10-CM

## 2017-09-18 DIAGNOSIS — Z79.4 TYPE 2 DIABETES MELLITUS WITH DIABETIC NEUROPATHY, WITH LONG-TERM CURRENT USE OF INSULIN (H): ICD-10-CM

## 2017-09-18 NOTE — TELEPHONE ENCOUNTER
dulaglutide (TRULICITY) 1.5 MG/0.5ML pen      Last Written Prescription Date:  5/24/2017  Last Fill Quantity: 2 ml,   # refills: 3  Last Office Visit : 8/30/2017  Future Office visit:  11/1/2017

## 2017-10-31 DIAGNOSIS — E11.40 TYPE 2 DIABETES MELLITUS WITH DIABETIC NEUROPATHY, WITH LONG-TERM CURRENT USE OF INSULIN (H): Primary | ICD-10-CM

## 2017-10-31 DIAGNOSIS — Z79.4 TYPE 2 DIABETES MELLITUS WITH DIABETIC NEUROPATHY, WITH LONG-TERM CURRENT USE OF INSULIN (H): Primary | ICD-10-CM

## 2017-10-31 RX ORDER — PEN NEEDLE, DIABETIC 32GX 5/32"
NEEDLE, DISPOSABLE MISCELLANEOUS
Qty: 400 EACH | Refills: 1 | Status: SHIPPED | OUTPATIENT
Start: 2017-10-31 | End: 2020-02-19

## 2017-11-24 DIAGNOSIS — R25.2 MUSCLE CRAMPS: ICD-10-CM

## 2017-11-24 DIAGNOSIS — I10 ESSENTIAL HYPERTENSION WITH GOAL BLOOD PRESSURE LESS THAN 140/90: ICD-10-CM

## 2017-11-24 RX ORDER — POTASSIUM CHLORIDE 750 MG/1
TABLET, EXTENDED RELEASE ORAL
Qty: 90 TABLET | Refills: 2 | Status: SHIPPED | OUTPATIENT
Start: 2017-11-24 | End: 2018-11-22

## 2017-11-24 RX ORDER — IRBESARTAN 300 MG/1
TABLET ORAL
Qty: 30 TABLET | Refills: 0 | Status: SHIPPED | OUTPATIENT
Start: 2017-11-24 | End: 2018-01-18

## 2017-11-24 NOTE — LETTER
[facility]  [address] [id number] [phone]            Ade Wilkins  [address] [place] [facility] [id number]        November 24, 2017    Dear Ade,    While refilling your prescription today, we noticed that you are due for an appointment with your provider.  We will refill your prescription for 30 days, but a follow-up appointment must be made before any additional refills can be approved.     Taking care of your health is important to us and we look forward to seeing you in the near future.  Please call us at [phone] or [phone] (or use [employer]) to schedule an appointment.     Please disregard this notice if you have already made an appointment.    Sincerely,        [facility]

## 2017-11-24 NOTE — TELEPHONE ENCOUNTER
Avapro - Routing refill request to provider for review/approval because:  Labs out of range:  Cr  BP out of range     Potassium Chloride - Prescription approved per [facility] Refill Protocol.

## 2017-12-06 ENCOUNTER — OFFICE VISIT (OUTPATIENT)
Dept: ENDOCRINOLOGY | Facility: CLINIC | Age: 67
End: 2017-12-06

## 2017-12-06 VITALS — DIASTOLIC BLOOD PRESSURE: 79 MMHG | SYSTOLIC BLOOD PRESSURE: 165 MMHG | HEART RATE: 79 BPM

## 2017-12-06 DIAGNOSIS — Z79.4 TYPE 2 DIABETES MELLITUS WITH DIABETIC NEUROPATHY, WITH LONG-TERM CURRENT USE OF INSULIN (H): Primary | ICD-10-CM

## 2017-12-06 DIAGNOSIS — E66.01 MORBID OBESITY (H): ICD-10-CM

## 2017-12-06 DIAGNOSIS — E11.40 TYPE 2 DIABETES MELLITUS WITH DIABETIC NEUROPATHY, WITH LONG-TERM CURRENT USE OF INSULIN (H): Primary | ICD-10-CM

## 2017-12-06 NOTE — LETTER
12/6/2017       RE: Ade Wilkins  [address], [place]. \[facility] Outlook.\"" [id number]     Dear Colleague,    Thank you for referring your patient, Ade Wilkins, to the [facility] ENDOCRINOLOGY at [facility]. Please see a copy of my visit note below.    Endocrinology Clinic Visit    Chief Complaint: RECHECK (DIABETES TYPE 2 F/U )       Subjective:         HPI: Ade Wilkins is a 67-year-old female who history of hepatitis A as a teenager, non-Hodgkin's lymphoma diagnosed in 2008, iron deficiency anemia, morbid obesity, hyperlipidemia, rheumatic fever with systolic murmur, hypothyroidism and hypertension who presents today for a follow up for type 2 diabetes mellitus.       History of Diabetes  Type 2 diagnosed in late 20s.  In the 1970s she was initially started on oral medications and was transitioned to insulin in the 80s.  She had a gastric bypass surgery done in 2004 after which she lost about 70 pounds.  Her blood sugars were better controlled for the next few years but has gradually worsened over time.  Her weight had been steady around 210-215 pounds but in the recent years as her activity was limited by knee pain, she has gained significant weight     She has a strong family history of type 2 diabetes mellitus with Mother and Sister with type 2 diabetes.    Interval history  During the last visit, she was started on Trulicity.  She had significant improvement in her blood sugars.  Her appetite reduced, she started running low.  She feels happier about her blood sugars.    Major data shows 4-5 episodes of low blood sugars during the day.  Both fasting and prandial hypoglycemia is noted.  Following correction, blood sugars have increased as high as 337.  Almost all values were below 200    Complications  She has triopathy from diabetes.     1. Retinopathy: Last exam was in May 19 2017, mild NPDR    2.  Nephropathy:  Lab Results   Component Value Date    MICROL 46 03/28/2017      Creatinine   Date Value Ref Range Status   08/25/2017 1.51 (H) 0.52 - 1.04 mg/dL Final   ]    3. Neuropathy   Occasional tingling that improves with pain patch    4. Hypoglycemia   This is rare, but does happen if does not eat after taking insulin.    5. Macrovascular:   No history of cardiac events or stroke        Treatment  Diabetes Medication(s)     Biguanides Sig    metFORMIN (GLUCOPHAGE) 1000 MG tablet TAKE 1 TABLET BY MOUTH TWICE DAILY WITH MEALS    Insulin Sig    NOVOLOG FLEXPEN 100 UNIT/ML soln INJECT 0-10 UNITS SUBCUTANEOUSLY BEFORE BREAKFAST, 12-15 UNITS BEFORE LUNCH, AND 15-20 UNITS BEFORE SUPPER    insulin degludec (TRESIBA) 200 UNIT/ML pen Inject 110 Units Subcutaneous daily    Incretin Mimetic Agents (GLP-1 Receptor Agonists) Sig    dulaglutide (TRULICITY) 1.5 MG/0.5ML pen Inject 1.5 mg Subcutaneous every 7 days DISPENSE = 3 MONTH SUPPLY        2 units per carb.     Prevention  Lipid  LDL Cholesterol Calculated   Date Value Ref Range Status   03/28/2017 101 (H) <100 mg/dL Final     Comment:     Above desirable:  100-129 mg/dl   Borderline High:  130-159 mg/dL   High:             160-189 mg/dL   Very high:       >189 mg/dl     06/17/2016 78 <100 mg/dL Final     Comment:     Desirable:       <100 mg/dl       Medications     HMG CoA Reductase Inhibitors    atorvastatin (LIPITOR) 80 MG tablet    Salicylates    aspirin 81 MG tablet          Renal  Medication (Note: This includes the hypertensive combination subclass to make sure to show all ACEI/ARB's.)     Angiotensin II Receptor Antagonists Sig    irbesartan (AVAPRO) 300 MG tablet TAKE ONE TABLET BY MOUTH ONE TIME DAILY             Weight  Wt Readings from Last 4 Encounters:   12/06/17 (P) 106.2 kg (234 lb 3.2 oz)   08/30/17 105.4 kg (232 lb 6.4 oz)   08/25/17 106.1 kg (233 lb 12.8 oz)   05/24/17 106.4 kg (234 lb 9.1 oz)         Diet: 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    Breakfast : english marcelino PB,  or eggs,      Snack: BG test 11 am, if low takes a snack.      Lunch : veggies,   Wed: pasta,   Chicken, veggies, potatoes or salad/    Snack : home: popcorn     Dinner : 7 -7.30 : orders out at times     Fluctuates with days in relation to bydureon.     Snack: occ toast.     Alcohol or sugared beverage    Exercise : limited     Weight trend  Wt Readings from Last 4 Encounters:   12/06/17 (P) 106.2 kg (234 lb 3.2 oz)   08/30/17 105.4 kg (232 lb 6.4 oz)   08/25/17 106.1 kg (233 lb 12.8 oz)   05/24/17 106.4 kg (234 lb 9.1 oz)       A1c today is 8.1  Lab Results   Component Value Date    A1C 9.3 03/28/2017    A1C 9.0 10/17/2016    A1C 8.6 06/17/2016    A1C 8.3 02/16/2016    A1C 8.6 10/05/2015       Barriers to achieve glycemic goals:      Type 2 Diabetes since her early 20s    Barriers to achieving glycemic goals  Lack of testing before meals: Although she has days when she does not check, she has increased her frequency of testing    Lack of insulin with snacking / Ineffective bydureon  Trulicity has helped with prandial BG    Recent change in Lantus to vial : Tresiba with good effects    High Carb diet: Diet improved with Trulicity.     Lack of exercise: limited by pain in the joints.   Morbid obesity       Allergies   Allergen Reactions     Clonidine      headaches     Fexofenadine Hydrochloride      Allegra--headache     Gemfibrozil      lopid--rash ??from med     Lisinopril      edema     Norvasc [Amlodipine Besylate]      Hair loss     Pioglitazone Hydrochloride Swelling     actos--ankle edema     Doxazosin Mesylate Rash     cardura--rash       Current Outpatient Prescriptions   Medication Sig Dispense Refill     irbesartan (AVAPRO) 300 MG tablet TAKE ONE TABLET BY MOUTH ONE TIME DAILY  30 tablet 0     potassium chloride (K-TAB,KLOR-CON) 10 MEQ tablet TAKE ONE TABLET BY MOUTH ONE TIME DAILY  90 tablet 2     BD FCO U/F 32G X 4 MM insulin pen needle USE 4 TIMES A DAY WITH INSULIN AND VICTOZA INJECTIONS 400 each 1      dulaglutide (TRULICITY) 1.5 MG/0.5ML pen Inject 1.5 mg Subcutaneous every 7 days DISPENSE = 3 MONTH SUPPLY 6 mL 3     MARTITA CONTOUR NEXT test strip use to test blood sugar 4 times a day or as directed 100 each 5     amoxicillin-clavulanate (AUGMENTIN) 500-125 MG per tablet Take 1 tablet by mouth 3 times daily 30 tablet 0     fenofibrate 160 MG tablet TAKE ONE TABLET BY MOUTH ONE TIME DAILY  90 tablet 1     traZODone (DESYREL) 50 MG tablet Take 1-2 tablets ( mg) by mouth nightly as needed 180 tablet PRN     metFORMIN (GLUCOPHAGE) 1000 MG tablet TAKE 1 TABLET BY MOUTH TWICE DAILY WITH MEALS 180 tablet PRN     atorvastatin (LIPITOR) 80 MG tablet TAKE ONE TABLET BY MOUTH ONE TIME DAILY  90 tablet 1     NOVOLOG FLEXPEN 100 UNIT/ML soln INJECT 0-10 UNITS SUBCUTANEOUSLY BEFORE BREAKFAST, 12-15 UNITS BEFORE LUNCH, AND 15-20 UNITS BEFORE SUPPER 15 mL 1     verapamil (CALAN-SR) 180 MG CR tablet TAKE 1 TABLET BY MOUTH TWICE DAILY  180 tablet 2     NAPROXEN PO Take 220 mg by mouth as needed for moderate pain       insulin degludec (TRESIBA) 200 UNIT/ML pen Inject 110 Units Subcutaneous daily 21 mL 3     augmented betamethasone dipropionate (DIPROLENE-AF) 0.05 % ointment Apply to AA BID x 3-4 weeks then PRN 45 g 5     furosemide (LASIX) 20 MG tablet Take 1 tablet (20 mg) by mouth daily 90 tablet 3     Cholecalciferol (VITAMIN D) 2000 UNITS tablet Take 2,000 Units by mouth daily       ferrous fumarate 65 mg, [place]. FE,-Vitamin C 125 mg (VITRON-C)  MG TABS Take 2 tablets by mouth 2 times daily 360 tablet prn     levothyroxine (SYNTHROID, LEVOTHROID) 25 MCG tablet Take 1 tablet (25 mcg) by mouth daily 90 tablet 3     fluticasone (FLONASE) 50 MCG/ACT nasal spray Spray 2 sprays into both nostrils daily 16 g 1     blood glucose monitoring (GeeYee CONTOUR NEXT) test strip Use to test blood sugar 4 times daily or as directed. 1 Box 9     blood glucose monitoring (NO BRAND SPECIFIED) test strip Reason for four times daily  checking is lack of diabetic control.  Patient is on multiple doses of insulin daily. Dispense what patient's plan will cover.  Dispense box of 100 strips at a time 1 Box 1     ALLERGY INJECTIONS        hydrocortisone (WESTCORT) 0.2 % cream Apply topically 2 times daily Apply sparingly to affected area 2 times daily as needed. 45 g 2     calcium carbonate-vitamin D (CALTRATE 600+D) 600-400 MG-UNIT CHEW Take 2 chew tab by mouth every evening       cetirizine (ZYRTEC) 10 MG tablet Take 1 tablet (10 mg) by mouth daily       aspirin 81 MG tablet Take  by mouth every other day.       Magnesium 500 MG CAPS Take 1 capsule by mouth daily.       cyanocolbalamin (VITAMIN B-12) 1000 MCG tablet Take 1,000 mcg by mouth every other day        ONE TOUCH DELICA LANCETS MISC 1 Device 4 times daily. 100 Stick prn     OVER-THE-COUNTER Reported on 3/28/2017       MULTIVITAMIN TABS   OR 1 tab QD         Review of Systems     Constitutional: Normal appetite, feels well no polyuria or polydipsia.   Head: no headache   Eye: no vision change/ loss of peripheral vision.   ENT: No throat congestion.   Respiratory: no cough   Cardiovascular: Has no known murmur, no chest pain.  Has some shortness of breath on exertion.  Gastrointestinal: Negative for vomiting, abdominal pain and diarrhea.  Genitourinary: No bladder problems.  Musculoskeletal: Negative for myalgias. No weakness.  Neurological: Negative for seizures and headaches.  Psychiatric/Behavioral: Negative for behavioral problem and dysphoric mood.    Past Medical History:   Diagnosis Date     Allergic rhinitis, cause unspecified      Hepatitis, unspecified 1968     Iron Deficiency Anemia 3/09     Lymphoma (H) 12/08     Nonsenile cataract      Obesity, unspecified      Other and unspecified hyperlipidemia      Rheumatic fever without mention of heart involvement infant     Type II or unspecified type diabetes mellitus without mention of complication, not stated as uncontrolled 1992      "Unspecified essential hypertension      Unspecified hypothyroidism      Vitamin B12 deficiency 3/09       Past Surgical History:   Procedure Laterality Date     C APPENDECTOMY  1958     C NONSPECIFIC PROCEDURE  1976    (leg) cystectomy     CATARACT IOL, RT/LT Left 12/21/2000    left     CATARACT IOL, RT/LT Right 01/06/2000    right     GASTRIC BYPASS  2/04    Dr. Hebert     LYMPH NODE BIOPSY  12/08    right groin, Dr. Licona     TONSILLECTOMY & ADENOIDECTOMY  1968       Family History   Problem Relation Age of Onset     Cardiovascular Father      AAA     DIABETES Mother      C.A.D. Mother      52     Eye Disorder Mother      glaucoma     Glaucoma Mother      Cardiovascular Brother      AAA, PVD     CANCER Brother      bladder     Cardiovascular Brother      AAA, valvular heart disease     DIABETES Brother      age 53     CANCER Brother      liver cancer     Glaucoma Other      MGGF     Macular Degeneration No family hx of        Social History     Social History     Marital status:      Spouse name: N/A     Number of children: 0     Years of education: N/A     Occupational History      [employer] [facility] [employer]     Social History Main Topics     Smoking status: Never Smoker     Smokeless tobacco: Never Used     Alcohol use No     Drug use: No     Sexual activity: No     Other Topics Concern     Caffeine Concern Yes     20 oz daily     Exercise No     Seat Belt Yes     Self-Exams Yes     Social History Narrative    Living arrangements - the patient lives alone.       Objective:   /79 (BP Location: Left arm, Patient Position: Sitting, Cuff Size: Adult Large)  Pulse 79  Ht (P) 1.588 m (5' 2.5\")  Wt (P) 106.2 kg (234 lb 3.2 oz)  BMI (P) 42.15 kg/m2  0 lbs 0 oz  Wt Readings from Last 4 Encounters:   12/06/17 (P) 106.2 kg (234 lb 3.2 oz)   08/30/17 105.4 kg (232 lb 6.4 oz)   08/25/17 106.1 kg (233 lb 12.8 oz)   05/24/17 106.4 kg (234 lb 9.1 oz)       Constitutional: Obese female in no " acute distress cooperative and comfortable  EYES: anicteric, normal extra-ocular movements, no lid lag or retraction   HEENT: Mouth/Throat: Mucous membrane is moist. Oropharynx is clear. No adenopathy. No large goiters, difficult to palpate due to short neck  Cardiovascular: RRR, S1, S2 normal.  Aortic area systolic murmur  Pulmonary/Chest: CTAB. No wheezing or rales   Abdominal: +BS. Nontender to palpation.  Abdomen is distended, small bruises around her injection site.  No lipo hypertrophy.  White striae.  Neurological: Alert. Normal affect. CNII-XII intact. Muscle strength 5/5. Sensory is intact.  Extremities: No clubbing, cyanosis or inflammation   Bilateral lower extremity edema left worse than the right.  Skin: normal texture, color  Feet: Bilateral loss of sensation in both feet, significant edema.  Lymphatic: no cervical lymphadenopathy.  Psychological: appropriate mood     In House Labs:   Lab Results   Component Value Date    A1C 9.3 03/28/2017    A1C 9.0 10/17/2016    A1C 8.6 06/17/2016    A1C 8.3 02/16/2016    A1C 8.6 10/05/2015       TSH   Date Value Ref Range Status   03/28/2017 2.84 0.40 - 4.00 mU/L Final   02/10/2017 2.55 0.40 - 4.00 mU/L Final   10/17/2016 4.39 (H) 0.40 - 4.00 mU/L Final   10/05/2015 3.71 0.40 - 4.00 mU/L Final   11/28/2014 3.89 0.40 - 4.00 mU/L Final     Comment:     Effective 7/30/2014, the reference range for this assay has changed to reflect   new instrumentation/methodology.       T4 Free   Date Value Ref Range Status   10/17/2016 1.17 0.76 - 1.46 ng/dL Final   05/05/2010 1.05 0.70 - 1.85 ng/dL Final   09/13/2007 1.00 0.70 - 1.85 ng/dL Final       Creatinine   Date Value Ref Range Status   08/25/2017 1.51 (H) 0.52 - 1.04 mg/dL Final   ]    Recent Labs   Lab Test  03/28/17   1014  06/17/16   0750  05/22/15   0848   05/03/14   1018   CHOL  185  166  175   < >  161   HDL  35*  34*  32*   < >  39*   LDL  101*  78  76   < >  76   TRIG  247*  271*  334*   < >  231*   CHOLHDLRATIO    --    --   5.5*   --   4.2    < > = values in this interval not displayed.       No results found for: [id number], [id number], [id number]      Assessment/Treatment Plan:      Ade Wilkins is a 67 year old year old female with history morbid obesity S/P Gastric bypass surgery, hypothyroidism who presents today for FU management of type 2 diabetes.  Her diabetes mellitus is complicated by diabetic neuropathy, mild nonproliferative retinopathy and nephropathy.    Type 2 Diabetes since her early 20s    Barriers to achieving glycemic goals  Lack of testing before meals.  She has started testing before meals and usually is about low 100s per patient report.  However A1c is 8.3 and this does not correlate the patient reported blood sugar values.  She is considering going back to food journaling.    Tresiba 100 units daily  NovoLog 2 units per carb   Dulaglutide 1.5 mg weekly.     Lack of insulin with snacking  Patient was advised to use low carbohydrate foods for snacks    Hypoglycemia fasting and prandial  Tresiba 100 units with goal of keeping AM Bg > 100    High Carb diet  Diet has improved per patient report.     Lack of exercise: limited by pain in the joints.     We will consider using continuous glucose monitoring in future visits.  She had used this in the past, nearly 7 years ago.    Anemia:   Hemoglobin A1c may be falsely lower than her actual value    Hypothyroidism on levothyroxine 25  g daily  This has been followed by primary care physician  Last TSH was normal in March 2017    Morbid obesity  We discussed about diet and exercise  She will probably benefit from weight loss medications.  We will discuss this further in subsequent visits.  She will be here in spring, and will think about weight loss clinic referral. She may benefit from agents such as Phentermine or Topiramate.   Weight stable on Dulaglutide.    Diabetic neuropathy: Occasional tingling over the toes and fingers.  This has not been bothersome  to the patient.  Start alpha lipoic acid 600 mg daily if symptoms are worse    Melvi Naik MD  [id number]  Endocrinology Service

## 2017-12-06 NOTE — PATIENT INSTRUCTIONS
Continue Tresiba, Trulicity and Novolog    Diet and exercise as discussed.     Alpha lipoic acid 600 mg daily.

## 2017-12-06 NOTE — NURSING NOTE
"Chief Complaint   Patient presents with     RECHECK     DIABETES TYPE 2 F/U        Initial /79 (BP Location: Left arm, Patient Position: Sitting, Cuff Size: Adult Large)  Pulse 79  Ht (P) 1.588 m (5' 2.5\")  Wt (P) 106.2 kg (234 lb 3.2 oz)  BMI (P) 42.15 kg/m2 Estimated body mass index is 42.15 kg/(m^2) (pended) as calculated from the following:    Height as of this encounter: (P) 1.588 m (5' 2.5\").    Weight as of this encounter: (P) 106.2 kg (234 lb 3.2 oz).  Medication Reconciliation: complete    "

## 2017-12-06 NOTE — PROGRESS NOTES
Endocrinology Clinic Visit    Chief Complaint: RECHECK (DIABETES TYPE 2 F/U )       Subjective:         HPI: Ade Wilkins is a 67-year-old female who history of hepatitis A as a teenager, non-Hodgkin's lymphoma diagnosed in 2008, iron deficiency anemia, morbid obesity, hyperlipidemia, rheumatic fever with systolic murmur, hypothyroidism and hypertension who presents today for a follow up for type 2 diabetes mellitus.       History of Diabetes  Type 2 diagnosed in late 20s.  In the 1970s she was initially started on oral medications and was transitioned to insulin in the 80s.  She had a gastric bypass surgery done in 2004 after which she lost about 70 pounds.  Her blood sugars were better controlled for the next few years but has gradually worsened over time.  Her weight had been steady around 210-215 pounds but in the recent years as her activity was limited by knee pain, she has gained significant weight     She has a strong family history of type 2 diabetes mellitus with Mother and Sister with type 2 diabetes.    Interval history  During the last visit, she was started on Trulicity.  She had significant improvement in her blood sugars.  Her appetite reduced, she started running low.  She feels happier about her blood sugars.    Major data shows 4-5 episodes of low blood sugars during the day.  Both fasting and prandial hypoglycemia is noted.  Following correction, blood sugars have increased as high as 337.  Almost all values were below 200    Complications  She has triopathy from diabetes.     1. Retinopathy: Last exam was in May 19 2017, mild NPDR    2.  Nephropathy:  Lab Results   Component Value Date    MICROL 46 03/28/2017     Creatinine   Date Value Ref Range Status   08/25/2017 1.51 (H) 0.52 - 1.04 mg/dL Final   ]    3. Neuropathy   Occasional tingling that improves with pain patch    4. Hypoglycemia   This is rare, but does happen if does not eat after taking insulin.    5. Macrovascular:   No history of  cardiac events or stroke        Treatment  Diabetes Medication(s)     Biguanides Sig    metFORMIN (GLUCOPHAGE) 1000 MG tablet TAKE 1 TABLET BY MOUTH TWICE DAILY WITH MEALS    Insulin Sig    NOVOLOG FLEXPEN 100 UNIT/ML soln INJECT 0-10 UNITS SUBCUTANEOUSLY BEFORE BREAKFAST, 12-15 UNITS BEFORE LUNCH, AND 15-20 UNITS BEFORE SUPPER    insulin degludec (TRESIBA) 200 UNIT/ML pen Inject 110 Units Subcutaneous daily    Incretin Mimetic Agents (GLP-1 Receptor Agonists) Sig    dulaglutide (TRULICITY) 1.5 MG/0.5ML pen Inject 1.5 mg Subcutaneous every 7 days DISPENSE = 3 MONTH SUPPLY        2 units per carb.     Prevention  Lipid  LDL Cholesterol Calculated   Date Value Ref Range Status   03/28/2017 101 (H) <100 mg/dL Final     Comment:     Above desirable:  100-129 mg/dl   Borderline High:  130-159 mg/dL   High:             160-189 mg/dL   Very high:       >189 mg/dl     06/17/2016 78 <100 mg/dL Final     Comment:     Desirable:       <100 mg/dl       Medications     HMG CoA Reductase Inhibitors    atorvastatin (LIPITOR) 80 MG tablet    Salicylates    aspirin 81 MG tablet          Renal  Medication (Note: This includes the hypertensive combination subclass to make sure to show all ACEI/ARB's.)     Angiotensin II Receptor Antagonists Sig    irbesartan (AVAPRO) 300 MG tablet TAKE ONE TABLET BY MOUTH ONE TIME DAILY             Weight  Wt Readings from Last 4 Encounters:   12/06/17 (P) 106.2 kg (234 lb 3.2 oz)   08/30/17 105.4 kg (232 lb 6.4 oz)   08/25/17 106.1 kg (233 lb 12.8 oz)   05/24/17 106.4 kg (234 lb 9.1 oz)         Diet: She works at [employer] -- [employer], 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    Breakfast : english muffin PB, or eggs,      Snack: BG test 11 am, if low takes a snack.      Lunch : veggies,   Wed: pasta,   Chicken, veggies, potatoes or salad/    Snack : home: popcorn     Dinner : 7 -7.30 : orders out at times     Fluctuates with days in relation to bydureon.     Snack: occ toast.     Alcohol or  sugared beverage    Exercise : limited     Weight trend  Wt Readings from Last 4 Encounters:   12/06/17 (P) 106.2 kg (234 lb 3.2 oz)   08/30/17 105.4 kg (232 lb 6.4 oz)   08/25/17 106.1 kg (233 lb 12.8 oz)   05/24/17 106.4 kg (234 lb 9.1 oz)       A1c today is 8.1  Lab Results   Component Value Date    A1C 9.3 03/28/2017    A1C 9.0 10/17/2016    A1C 8.6 06/17/2016    A1C 8.3 02/16/2016    A1C 8.6 10/05/2015       Barriers to achieve glycemic goals:      Type 2 Diabetes since her early 20s    Barriers to achieving glycemic goals  Lack of testing before meals: Although she has days when she does not check, she has increased her frequency of testing    Lack of insulin with snacking / Ineffective bydureon  Trulicity has helped with prandial BG    Recent change in Lantus to vial : Tresiba with good effects    High Carb diet: Diet improved with Trulicity.     Lack of exercise: limited by pain in the joints.   Morbid obesity       Allergies   Allergen Reactions     Clonidine      headaches     Fexofenadine Hydrochloride      Allegra--headache     Gemfibrozil      lopid--rash ??from med     Lisinopril      edema     Norvasc [Amlodipine Besylate]      Hair loss     Pioglitazone Hydrochloride Swelling     actos--ankle edema     Doxazosin Mesylate Rash     cardura--rash       Current Outpatient Prescriptions   Medication Sig Dispense Refill     irbesartan (AVAPRO) 300 MG tablet TAKE ONE TABLET BY MOUTH ONE TIME DAILY  30 tablet 0     potassium chloride (K-TAB,KLOR-CON) 10 MEQ tablet TAKE ONE TABLET BY MOUTH ONE TIME DAILY  90 tablet 2     BD FCO U/F 32G X 4 MM insulin pen needle USE 4 TIMES A DAY WITH INSULIN AND VICTOZA INJECTIONS 400 each 1     dulaglutide (TRULICITY) 1.5 MG/0.5ML pen Inject 1.5 mg Subcutaneous every 7 days DISPENSE = 3 MONTH SUPPLY 6 mL 3     MARTITA CONTOUR NEXT test strip use to test blood sugar 4 times a day or as directed 100 each 5     amoxicillin-clavulanate (AUGMENTIN) 500-125 MG per tablet Take 1  tablet by mouth 3 times daily 30 tablet 0     fenofibrate 160 MG tablet TAKE ONE TABLET BY MOUTH ONE TIME DAILY  90 tablet 1     traZODone (DESYREL) 50 MG tablet Take 1-2 tablets ( mg) by mouth nightly as needed 180 tablet PRN     metFORMIN (GLUCOPHAGE) 1000 MG tablet TAKE 1 TABLET BY MOUTH TWICE DAILY WITH MEALS 180 tablet PRN     atorvastatin (LIPITOR) 80 MG tablet TAKE ONE TABLET BY MOUTH ONE TIME DAILY  90 tablet 1     NOVOLOG FLEXPEN 100 UNIT/ML soln INJECT 0-10 UNITS SUBCUTANEOUSLY BEFORE BREAKFAST, 12-15 UNITS BEFORE LUNCH, AND 15-20 UNITS BEFORE SUPPER 15 mL 1     verapamil (CALAN-SR) 180 MG CR tablet TAKE 1 TABLET BY MOUTH TWICE DAILY  180 tablet 2     NAPROXEN PO Take 220 mg by mouth as needed for moderate pain       insulin degludec (TRESIBA) 200 UNIT/ML pen Inject 110 Units Subcutaneous daily 21 mL 3     augmented betamethasone dipropionate (DIPROLENE-AF) 0.05 % ointment Apply to AA BID x 3-4 weeks then PRN 45 g 5     furosemide (LASIX) 20 MG tablet Take 1 tablet (20 mg) by mouth daily 90 tablet 3     Cholecalciferol (VITAMIN D) 2000 UNITS tablet Take 2,000 Units by mouth daily       ferrous fumarate 65 mg, [place]. FE,-Vitamin C 125 mg (VITRON-C)  MG TABS Take 2 tablets by mouth 2 times daily 360 tablet prn     levothyroxine (SYNTHROID, LEVOTHROID) 25 MCG tablet Take 1 tablet (25 mcg) by mouth daily 90 tablet 3     fluticasone (FLONASE) 50 MCG/ACT nasal spray Spray 2 sprays into both nostrils daily 16 g 1     blood glucose monitoring (MARTITA CONTOUR NEXT) test strip Use to test blood sugar 4 times daily or as directed. 1 Box 9     blood glucose monitoring (NO BRAND SPECIFIED) test strip Reason for four times daily checking is lack of diabetic control.  Patient is on multiple doses of insulin daily. Dispense what patient's plan will cover.  Dispense box of 100 strips at a time 1 Box 1     ALLERGY INJECTIONS        hydrocortisone (WESTCORT) 0.2 % cream Apply topically 2 times daily Apply sparingly  to affected area 2 times daily as needed. 45 g 2     calcium carbonate-vitamin D (CALTRATE 600+D) 600-400 MG-UNIT CHEW Take 2 chew tab by mouth every evening       cetirizine (ZYRTEC) 10 MG tablet Take 1 tablet (10 mg) by mouth daily       aspirin 81 MG tablet Take  by mouth every other day.       Magnesium 500 MG CAPS Take 1 capsule by mouth daily.       cyanocolbalamin (VITAMIN B-12) 1000 MCG tablet Take 1,000 mcg by mouth every other day        ONE TOUCH DELICA LANCETS MISC 1 Device 4 times daily. 100 Stick prn     OVER-THE-COUNTER Reported on 3/28/2017       MULTIVITAMIN TABS   OR 1 tab QD         Review of Systems     Constitutional: Normal appetite, feels well no polyuria or polydipsia.   Head: no headache   Eye: no vision change/ loss of peripheral vision.   ENT: No throat congestion.   Respiratory: no cough   Cardiovascular: Has no known murmur, no chest pain.  Has some shortness of breath on exertion.  Gastrointestinal: Negative for vomiting, abdominal pain and diarrhea.  Genitourinary: No bladder problems.  Musculoskeletal: Negative for myalgias. No weakness.  Neurological: Negative for seizures and headaches.  Psychiatric/Behavioral: Negative for behavioral problem and dysphoric mood.    Past Medical History:   Diagnosis Date     Allergic rhinitis, cause unspecified      Hepatitis, unspecified 1968     Iron Deficiency Anemia 3/09     Lymphoma (H) 12/08     Nonsenile cataract      Obesity, unspecified      Other and unspecified hyperlipidemia      Rheumatic fever without mention of heart involvement infant     Type II or unspecified type diabetes mellitus without mention of complication, not stated as uncontrolled 1992     Unspecified essential hypertension      Unspecified hypothyroidism      Vitamin B12 deficiency 3/09       Past Surgical History:   Procedure Laterality Date     C APPENDECTOMY  1958     C NONSPECIFIC PROCEDURE  1976    (leg) cystectomy     CATARACT IOL, RT/LT Left 12/21/2000    left      "CATARACT IOL, RT/LT Right 01/06/2000    right     GASTRIC BYPASS  2/04    Dr. Hebert     LYMPH NODE BIOPSY  12/08    right groin, Dr. Licona     TONSILLECTOMY & ADENOIDECTOMY  1968       Family History   Problem Relation Age of Onset     Cardiovascular Father      AAA     DIABETES Mother      C.A.D. Mother      52     Eye Disorder Mother      glaucoma     Glaucoma Mother      Cardiovascular Brother      AAA, PVD     CANCER Brother      bladder     Cardiovascular Brother      AAA, valvular heart disease     DIABETES Brother      age 53     CANCER Brother      liver cancer     Glaucoma Other      MGGF     Macular Degeneration No family hx of        Social History     Social History     Marital status:      Spouse name: N/A     Number of children: 0     Years of education: N/A     Occupational History      [employer] [facility] [employer] [facility]     Social History Main Topics     Smoking status: Never Smoker     Smokeless tobacco: Never Used     Alcohol use No     Drug use: No     Sexual activity: No     Other Topics Concern     Caffeine Concern Yes     20 oz daily     Exercise No     Seat Belt Yes     Self-Exams Yes     Social History Narrative    Living arrangements - the patient lives alone.       Objective:   /79 (BP Location: Left arm, Patient Position: Sitting, Cuff Size: Adult Large)  Pulse 79  Ht (P) 1.588 m (5' 2.5\")  Wt (P) 106.2 kg (234 lb 3.2 oz)  BMI (P) 42.15 kg/m2  0 lbs 0 oz  Wt Readings from Last 4 Encounters:   12/06/17 (P) 106.2 kg (234 lb 3.2 oz)   08/30/17 105.4 kg (232 lb 6.4 oz)   08/25/17 106.1 kg (233 lb 12.8 oz)   05/24/17 106.4 kg (234 lb 9.1 oz)       Constitutional: Obese female in no acute distress cooperative and comfortable  EYES: anicteric, normal extra-ocular movements, no lid lag or retraction   HEENT: Mouth/Throat: Mucous membrane is moist. Oropharynx is clear. No adenopathy. No large goiters, difficult to palpate due to short neck  Cardiovascular: RRR, S1, S2 " normal.  Aortic area systolic murmur  Pulmonary/Chest: CTAB. No wheezing or rales   Abdominal: +BS. Nontender to palpation.  Abdomen is distended, small bruises around her injection site.  No lipo hypertrophy.  White striae.  Neurological: Alert. Normal affect. CNII-XII intact. Muscle strength 5/5. Sensory is intact.  Extremities: No clubbing, cyanosis or inflammation   Bilateral lower extremity edema left worse than the right.  Skin: normal texture, color  Feet: Bilateral loss of sensation in both feet, significant edema.  Lymphatic: no cervical lymphadenopathy.  Psychological: appropriate mood     In House Labs:   Lab Results   Component Value Date    A1C 9.3 03/28/2017    A1C 9.0 10/17/2016    A1C 8.6 06/17/2016    A1C 8.3 02/16/2016    A1C 8.6 10/05/2015       TSH   Date Value Ref Range Status   03/28/2017 2.84 0.40 - 4.00 mU/L Final   02/10/2017 2.55 0.40 - 4.00 mU/L Final   10/17/2016 4.39 (H) 0.40 - 4.00 mU/L Final   10/05/2015 3.71 0.40 - 4.00 mU/L Final   11/28/2014 3.89 0.40 - 4.00 mU/L Final     Comment:     Effective 7/30/2014, the reference range for this assay has changed to reflect   new instrumentation/methodology.       T4 Free   Date Value Ref Range Status   10/17/2016 1.17 0.76 - 1.46 ng/dL Final   05/05/2010 1.05 0.70 - 1.85 ng/dL Final   09/13/2007 1.00 0.70 - 1.85 ng/dL Final       Creatinine   Date Value Ref Range Status   08/25/2017 1.51 (H) 0.52 - 1.04 mg/dL Final   ]    Recent Labs   Lab Test  03/28/17   1014  06/17/16   0750  05/22/15   0848   05/03/14   1018   CHOL  185  166  175   < >  161   HDL  35*  34*  32*   < >  39*   LDL  101*  78  76   < >  76   TRIG  247*  271*  334*   < >  231*   CHOLHDLRATIO   --    --   5.5*   --   4.2    < > = values in this interval not displayed.       No results found for: [id number], [id number], [id number]      Assessment/Treatment Plan:      Ade Wilkins is a 67 year old year old female with history morbid obesity S/P Gastric bypass surgery,  hypothyroidism who presents today for FU management of type 2 diabetes.  Her diabetes mellitus is complicated by diabetic neuropathy, mild nonproliferative retinopathy and nephropathy.    Type 2 Diabetes since her early 20s    Barriers to achieving glycemic goals  Lack of testing before meals.  She has started testing before meals and usually is about low 100s per patient report.  However A1c is 8.3 and this does not correlate the patient reported blood sugar values.  She is considering going back to food journaling.    Tresiba 100 units daily  NovoLog 2 units per carb   Dulaglutide 1.5 mg weekly.     Lack of insulin with snacking  Patient was advised to use low carbohydrate foods for snacks    Hypoglycemia fasting and prandial  Tresiba 100 units with goal of keeping AM Bg > 100    High Carb diet  Diet has improved per patient report.     Lack of exercise: limited by pain in the joints.     We will consider using continuous glucose monitoring in future visits.  She had used this in the past, nearly 7 years ago.    Anemia:   Hemoglobin A1c may be falsely lower than her actual value    Hypothyroidism on levothyroxine 25  g daily  This has been followed by primary care physician  Last TSH was normal in March 2017    Morbid obesity  We discussed about diet and exercise  She will probably benefit from weight loss medications.  We will discuss this further in subsequent visits.  She will be here in spring, and will think about weight loss clinic referral. She may benefit from agents such as Phentermine or Topiramate.   Weight stable on Dulaglutide.    Diabetic neuropathy: Occasional tingling over the toes and fingers.  This has not been bothersome to the patient.  Start alpha lipoic acid 600 mg daily if symptoms are worse    Melvi Naik MD  [id number]  Endocrinology Service

## 2017-12-06 NOTE — MR AVS SNAPSHOT
After Visit Summary   12/6/2017    Ade Wilkins    MRN: [id number]           Patient Information     Date Of Birth          1950        Visit Information        Provider Department      12/6/2017 1:30 PM Melvi Naki MD [facility]        Care Instructions    Continue Tresiba, Trulicity and Novolog    Diet and exercise as discussed.     Alpha lipoic acid 600 mg daily.           Follow-ups after your visit        Follow-up notes from your care team     Return in about 6 months (around 6/6/2018).      Your next 10 appointments already scheduled     Jun 06, 2018  1:30 PM CDT   (Arrive by 1:15 PM)   RETURN DIABETES with MD GIANLUCA [facility] ([facility] [place] [facility])    [address] [place] [address] [phone]              Who to contact     Please call your clinic at [phone] to:    Ask questions about your health    Make or cancel appointments    Discuss your medicines    Learn about your test results    Speak to your doctor   If you have compliments or concerns about an experience at your clinic, or if you wish to file a complaint, please contact [facility] Patient Relations at [phone] or email us at [email or web address]         Additional Information About Your Visit        MyChart Information     [employer] gives you secure access to your electronic health record. If you see a primary care provider, you can also send messages to your care team and make appointments. If you have questions, please call your primary care clinic.  If you do not have a primary care provider, please call [phone] and they will assist you.      [employer] is an electronic gateway that provides easy, online access to your medical records. With [employer], you can request a clinic appointment, read your test results, renew a prescription or communicate with your care  team.     To access your existing account, please contact your [facility] or call [phone] for assistance.        Care EveryWhere ID     This is your Care EveryWhere ID. This could be used by other organizations to access your [place] medical records  [id number]        Your Vitals Were     Pulse                   79            Blood Pressure from Last 3 Encounters:   12/06/17 165/79   08/30/17 147/80   08/25/17 138/56    Weight from Last 3 Encounters:   12/06/17 (P) 106.2 kg (234 lb 3.2 oz)   08/30/17 105.4 kg (232 lb 6.4 oz)   08/25/17 106.1 kg (233 lb 12.8 oz)              Today, you had the following     No orders found for display       Primary Care Provider Office Phone # Fax #    Dimitry Dallin Howard [phone]       [address] [facility] [address]        Equal Access to Services     PAOLO ALANIZ : Hadii aad ku hadasho Soomaali, waaxda luqadaha, qaybta kaalmada adeegyada, waxay angelinain haysuleiman brar . So [facility] [phone].    ATENCIÓN: Si thiagola español, tiene a mccormick disposición servicios gratuitos de asistencia lingüística. Llame al [phone].    We comply with applicable federal civil rights laws and [place] laws. We do not discriminate on the basis of race, color, national origin, age, disability, sex, sexual orientation, or gender identity.            Thank you!     Thank you for choosing [facility]  for your care. Our goal is always to provide you with excellent care. Hearing back from our patients is one way we can continue to improve our services. Please take a few minutes to complete the written survey that you may receive in the mail after your visit with us. Thank you!             Your Updated Medication List - Protect others around you: Learn how to safely use, store and throw away your medicines at www.disposemymeds.org.          This list is accurate as of: 12/6/17  2:14 PM.  Always use your most recent med  list.                   Brand Name Dispense Instructions for use Diagnosis    ALLERGY INJECTIONS           amoxicillin-clavulanate 500-125 MG per tablet    AUGMENTIN    30 tablet    Take 1 tablet by mouth 3 times daily    Acute sinusitis with symptoms > 10 days       aspirin 81 MG tablet      Take  by mouth every other day.        atorvastatin 80 MG tablet    LIPITOR    90 tablet    TAKE ONE TABLET BY MOUTH ONE TIME DAILY    Hyperlipidemia LDL goal <100       augmented betamethasone dipropionate 0.05 % ointment    DIPROLENE-AF    45 g    Apply to AA BID x 3-4 weeks then PRN    Venous stasis dermatitis of both lower extremities       BD FCO U/F 32G X 4 MM   Generic drug:  insulin pen needle     400 each    USE 4 TIMES A DAY WITH INSULIN AND VICTOZA INJECTIONS    Type 2 diabetes mellitus with diabetic neuropathy, with long-term current use of insulin (H)       * blood glucose monitoring test strip    no brand specified    1 Box    Reason for four times daily checking is lack of diabetic control.  Patient is on multiple doses of insulin daily. Dispense what patient's plan will cover.  Dispense box of 100 strips at a time    Type 2 diabetes mellitus with diabetic neuropathy (H)       * blood glucose monitoring test strip    MARTITA CONTOUR NEXT    1 Box    Use to test blood sugar 4 times daily or as directed.    Type 2 diabetes mellitus with diabetic neuropathy (H)       * MARTITA CONTOUR NEXT test strip   Generic drug:  blood glucose monitoring     100 each    use to test blood sugar 4 times a day or as directed    Type 2 diabetes mellitus with diabetic neuropathy, with long-term current use of insulin (H)       calcium carbonate-vitamin D 600-400 MG-UNIT Chew    CALTRATE 600+D     Take 2 chew tab by mouth every evening    Cramp of limb       cetirizine 10 MG tablet    zyrTEC     Take 1 tablet (10 mg) by mouth daily    Allergic rhinitis, cause unspecified       cyanocobalamin 1000 MCG tablet    vitamin  B-12     Take  1,000 mcg by mouth every other day        dulaglutide 1.5 MG/0.5ML pen    TRULICITY    6 mL    Inject 1.5 mg Subcutaneous every 7 days DISPENSE = 3 MONTH SUPPLY    Type 2 diabetes mellitus with diabetic neuropathy, with long-term current use of insulin (H)       fenofibrate 160 MG tablet     90 tablet    TAKE ONE TABLET BY MOUTH ONE TIME DAILY    Hyperlipidemia LDL goal <100       ferrous fumarate 65 mg ([place]. FE)-Vitamin C 125 mg  MG Tabs tablet    VITRON-C    360 tablet    Take 2 tablets by mouth 2 times daily    Iron deficiency anemia, unspecified iron deficiency anemia type       fluticasone 50 MCG/ACT spray    FLONASE    16 g    Spray 2 sprays into both nostrils daily    Sinusitis, acute       furosemide 20 MG tablet    LASIX    90 tablet    Take 1 tablet (20 mg) by mouth daily    Essential hypertension       hydrocortisone 0.2 % cream    WESTCORT    45 g    Apply topically 2 times daily Apply sparingly to affected area 2 times daily as needed.    Dermatitis       insulin degludec 200 UNIT/ML pen    TRESIBA    21 mL    Inject 110 Units Subcutaneous daily    Type 2 diabetes mellitus with diabetic neuropathy, with long-term current use of insulin (H)       irbesartan 300 MG tablet    AVAPRO    30 tablet    TAKE ONE TABLET BY MOUTH ONE TIME DAILY    Essential hypertension with goal blood pressure less than 140/90       levothyroxine 25 MCG tablet    SYNTHROID/LEVOTHROID    90 tablet    Take 1 tablet (25 mcg) by mouth daily    Hypothyroidism, unspecified type       Magnesium 500 MG Caps      Take 1 capsule by mouth daily.        metFORMIN 1000 MG tablet    GLUCOPHAGE    180 tablet    TAKE 1 TABLET BY MOUTH TWICE DAILY WITH MEALS    Type 2 diabetes mellitus with diabetic neuropathy (H)       MULTIVITAMIN TABS   OR      1 tab QD        NAPROXEN PO      Take 220 mg by mouth as needed for moderate pain        NovoLOG FLEXPEN 100 UNIT/ML injection   Generic drug:  insulin aspart     15 mL    INJECT 0-10 UNITS  SUBCUTANEOUSLY BEFORE BREAKFAST, 12-15 UNITS BEFORE LUNCH, AND 15-20 UNITS BEFORE SUPPER    Type 2 diabetes mellitus with diabetic neuropathy (H)       ONE TOUCH DELICA LANCETS Misc     100 Stick    1 Device 4 times daily.    Type 2 diabetes, HbA1C goal < 8% (H)       OVER-THE-COUNTER          Reported on 3/28/2017        potassium chloride 10 MEQ tablet    K-TAB,KLOR-CON    90 tablet    TAKE ONE TABLET BY MOUTH ONE TIME DAILY    Muscle cramps       traZODone 50 MG tablet    DESYREL    180 tablet    Take 1-2 tablets ( mg) by mouth nightly as needed    Bariatric surgery status       verapamil 180 MG CR tablet    CALAN-SR    180 tablet    TAKE 1 TABLET BY MOUTH TWICE DAILY    Essential hypertension with goal blood pressure less than 140/90       vitamin D 2000 UNITS tablet      Take 2,000 Units by mouth daily    Vitamin D deficiency       * Notice:  This list has 3 medication(s) that are the same as other medications prescribed for you. Read the directions carefully, and ask your doctor or other care provider to review them with you.

## 2017-12-08 DIAGNOSIS — D50.9 IRON DEFICIENCY ANEMIA, UNSPECIFIED IRON DEFICIENCY ANEMIA TYPE: ICD-10-CM

## 2017-12-08 DIAGNOSIS — E03.9 HYPOTHYROIDISM, UNSPECIFIED TYPE: ICD-10-CM

## 2017-12-08 RX ORDER — LEVOTHYROXINE SODIUM 25 UG/1
TABLET ORAL
Qty: 90 TABLET | Refills: 0 | Status: SHIPPED | OUTPATIENT
Start: 2017-12-08 | End: 2018-03-19

## 2017-12-08 RX ORDER — BACILLUS COAGULANS 1B CELL
CAPSULE ORAL
Qty: 360 TABLET | Refills: 1 | Status: SHIPPED | OUTPATIENT
Start: 2017-12-08

## 2017-12-13 DIAGNOSIS — Z79.4 TYPE 2 DIABETES MELLITUS WITH DIABETIC NEUROPATHY, WITH LONG-TERM CURRENT USE OF INSULIN (H): ICD-10-CM

## 2017-12-13 DIAGNOSIS — E11.40 TYPE 2 DIABETES MELLITUS WITH DIABETIC NEUROPATHY, WITH LONG-TERM CURRENT USE OF INSULIN (H): ICD-10-CM

## 2018-01-15 ENCOUNTER — TRANSFERRED RECORDS (OUTPATIENT)
Dept: HEALTH INFORMATION MANAGEMENT | Facility: CLINIC | Age: 68
End: 2018-01-15

## 2018-01-18 ENCOUNTER — OFFICE VISIT (OUTPATIENT)
Dept: INTERNAL MEDICINE | Facility: CLINIC | Age: 68
End: 2018-01-18
Payer: COMMERCIAL

## 2018-01-18 VITALS
BODY MASS INDEX: 41.75 KG/M2 | HEIGHT: 63 IN | OXYGEN SATURATION: 95 % | SYSTOLIC BLOOD PRESSURE: 152 MMHG | TEMPERATURE: 97.2 F | WEIGHT: 235.6 LBS | HEART RATE: 80 BPM | DIASTOLIC BLOOD PRESSURE: 70 MMHG

## 2018-01-18 DIAGNOSIS — Z79.4 TYPE 2 DIABETES MELLITUS WITH DIABETIC NEUROPATHY, WITH LONG-TERM CURRENT USE OF INSULIN (H): ICD-10-CM

## 2018-01-18 DIAGNOSIS — R25.2 MUSCLE CRAMPS: ICD-10-CM

## 2018-01-18 DIAGNOSIS — I10 ESSENTIAL HYPERTENSION: ICD-10-CM

## 2018-01-18 DIAGNOSIS — E78.5 HYPERLIPIDEMIA LDL GOAL <100: ICD-10-CM

## 2018-01-18 DIAGNOSIS — Z00.00 MEDICARE ANNUAL WELLNESS VISIT, INITIAL: Primary | ICD-10-CM

## 2018-01-18 DIAGNOSIS — E11.40 TYPE 2 DIABETES MELLITUS WITH DIABETIC NEUROPATHY, WITH LONG-TERM CURRENT USE OF INSULIN (H): ICD-10-CM

## 2018-01-18 DIAGNOSIS — E03.9 HYPOTHYROIDISM, UNSPECIFIED TYPE: ICD-10-CM

## 2018-01-18 DIAGNOSIS — B36.9 FUNGAL DERMATITIS: ICD-10-CM

## 2018-01-18 PROCEDURE — G0402 INITIAL PREVENTIVE EXAM: HCPCS | Performed by: INTERNAL MEDICINE

## 2018-01-18 PROCEDURE — 99213 OFFICE O/P EST LOW 20 MIN: CPT | Mod: 25 | Performed by: INTERNAL MEDICINE

## 2018-01-18 RX ORDER — IRBESARTAN 300 MG/1
300 TABLET ORAL DAILY
Qty: 90 TABLET | Refills: 3 | Status: SHIPPED | OUTPATIENT
Start: 2018-01-18 | End: 2019-01-26

## 2018-01-18 RX ORDER — FUROSEMIDE 20 MG
20 TABLET ORAL DAILY
Qty: 90 TABLET | Refills: 3 | Status: CANCELLED | OUTPATIENT
Start: 2018-01-18

## 2018-01-18 RX ORDER — VERAPAMIL HYDROCHLORIDE 180 MG/1
180 TABLET, EXTENDED RELEASE ORAL 2 TIMES DAILY
Qty: 180 TABLET | Refills: 3 | Status: SHIPPED | OUTPATIENT
Start: 2018-01-18 | End: 2019-01-21

## 2018-01-18 RX ORDER — LEVOTHYROXINE SODIUM 25 UG/1
25 TABLET ORAL DAILY
Qty: 90 TABLET | Refills: 0 | Status: CANCELLED | OUTPATIENT
Start: 2018-01-18

## 2018-01-18 RX ORDER — ATORVASTATIN CALCIUM 80 MG/1
80 TABLET, FILM COATED ORAL DAILY
Qty: 90 TABLET | Refills: 1 | Status: CANCELLED | OUTPATIENT
Start: 2018-01-18

## 2018-01-18 RX ORDER — POTASSIUM CHLORIDE 750 MG/1
10 TABLET, EXTENDED RELEASE ORAL DAILY
Qty: 90 TABLET | Refills: 2 | Status: CANCELLED | OUTPATIENT
Start: 2018-01-18

## 2018-01-18 RX ORDER — IRBESARTAN 300 MG/1
300 TABLET ORAL DAILY
Qty: 30 TABLET | Refills: 0 | Status: CANCELLED | OUTPATIENT
Start: 2018-01-18

## 2018-01-18 NOTE — PATIENT INSTRUCTIONS

## 2018-01-18 NOTE — MR AVS SNAPSHOT
After Visit Summary   1/18/2018    Ade Wilkins    MRN: [id number]           Patient Information     Date Of Birth          1950        Visit Information        Provider Department      1/18/2018 1:30 PM Dimitry Howard MD [facility]        Today's Diagnoses     Medicare annual wellness visit, initial    -  1    Essential hypertension        Hypothyroidism, unspecified type        Muscle cramps        Hyperlipidemia LDL goal <100        Type 2 diabetes mellitus with diabetic neuropathy, with long-term current use of insulin (H)          Care Instructions      Preventive Health Recommendations    Female Ages 65 +    Yearly exam:     See your health care provider every year in order to  o Review health changes.   o Discuss preventive care.    o Review your medicines if your doctor has prescribed any.      You no longer need a yearly Pap test unless you've had an abnormal Pap test in the past 10 years. If you have vaginal symptoms, such as bleeding or discharge, be sure to talk with your provider about a Pap test.      Every 1 to 2 years, have a mammogram.  If you are over 69, talk with your health care provider about whether or not you want to continue having screening mammograms.      Every 10 years, have a colonoscopy. Or, have a yearly FIT test (stool test). These exams will check for colon cancer.       Have a cholesterol test every 5 years, or more often if your doctor advises it.       Have a diabetes test (fasting glucose) every three years. If you are at risk for diabetes, you should have this test more often.       At age 65, have a bone density scan (DEXA) to check for osteoporosis (brittle bone disease).    Shots:    Get a flu shot each year.    Get a tetanus shot every 10 years.    Talk to your doctor about your pneumonia vaccines. There are now two you should receive - Pneumovax (PPSV 23) and Prevnar (PCV 13).    Talk to your doctor about the shingles  vaccine.    Talk to your doctor about the hepatitis B vaccine.    Nutrition:     Eat at least 5 servings of fruits and vegetables each day.      Eat whole-grain bread, whole-wheat pasta and brown rice instead of white grains and rice.      Talk to your provider about Calcium and Vitamin D.     Lifestyle    Exercise at least 150 minutes a week (30 minutes a day, 5 days a week). This will help you control your weight and prevent disease.      Limit alcohol to one drink per day.      No smoking.       Wear sunscreen to prevent skin cancer.       See your dentist twice a year for an exam and cleaning.      See your eye doctor every 1 to 2 years to screen for conditions such as glaucoma, macular degeneration and cataracts.    PLAN:  1.  Recheck fasting labs in a month, then follow-up   2.  Trial of over the counter Butenafine or Miconazole twice daily for rash under breast  3.  Continue to work on weight, increase activity, and monitor blood pressure             Follow-ups after your visit        Your next 10 appointments already scheduled     Jun 06, 2018  1:30 PM CDT   (Arrive by 1:15 PM)   RETURN DIABETES with Melvi Naik MD   [facility] ([facility])    [address] [place] [address] [phone]              Future tests that were ordered for you today     Open Future Orders        Priority Expected Expires Ordered    TSH with free T4 reflex Routine 3/1/2018 1/18/2019 1/18/2018    Lipid panel reflex to direct LDL Fasting Routine 3/1/2018 1/18/2019 1/18/2018    Basic metabolic panel Routine 3/1/2018 1/18/2019 1/18/2018            Who to contact     If you have questions or need follow up information about today's clinic visit or your schedule please contact [facility] directly at [phone].  Normal or non-critical lab and imaging results will be communicated to you by MyChart, letter or phone within 4  "business days after the clinic has received the results. If you do not hear from us within 7 days, please contact the clinic through [employer] or phone. If you have a critical or abnormal lab result, we will notify you by phone as soon as possible.  Submit refill requests through [employer] or call your pharmacy and they will forward the refill request to us. Please allow 3 business days for your refill to be completed.          Additional Information About Your Visit        [employer] Information     [employer] gives you secure access to your electronic health record. If you see a primary care provider, you can also send messages to your care team and make appointments. If you have questions, please call your primary care clinic.  If you do not have a primary care provider, please call [phone] and they will assist you.        Care EveryWhere ID     This is your Care EveryWhere ID. This could be used by other organizations to access your [place] medical records  [id number]        Your Vitals Were     Pulse Temperature Height Pulse Oximetry BMI (Body Mass Index)       80 97.2  F (36.2  C) (Oral) 5' 2.5\" (1.588 m) 95% 42.41 kg/m2        Blood Pressure from Last 3 Encounters:   01/18/18 152/70   12/06/17 165/79   08/30/17 147/80    Weight from Last 3 Encounters:   01/18/18 235 lb 9.6 oz (106.9 kg)   12/06/17 (P) 234 lb 3.2 oz (106.2 kg)   08/30/17 232 lb 6.4 oz (105.4 kg)                 Today's Medication Changes          These changes are accurate as of: 1/18/18  2:52 PM.  If you have any questions, ask your nurse or doctor.               These medicines have changed or have updated prescriptions.        Dose/Directions    insulin degludec 200 UNIT/ML pen   Commonly known as:  TRESIBA   This may have changed:  how much to take   Used for:  Type 2 diabetes mellitus with diabetic neuropathy, with long-term current use of insulin (H)   Changed by:  Dimitry Howard MD        Dose:  96 Units   Inject 96 Units Subcutaneous " daily   Refills:  0       irbesartan 300 MG tablet   Commonly known as:  AVAPRO   This may have changed:  See the new instructions.   Used for:  Essential hypertension   Changed by:  Dimitry Howard MD        Dose:  300 mg   Take 1 tablet (300 mg) by mouth daily   Quantity:  90 tablet   Refills:  3       verapamil 180 MG CR tablet   Commonly known as:  CALAN-SR   This may have changed:  See the new instructions.   Used for:  Essential hypertension   Changed by:  Dimitry Howard MD        Dose:  180 mg   Take 1 tablet (180 mg) by mouth 2 times daily   Quantity:  180 tablet   Refills:  3         Stop taking these medicines if you haven't already. Please contact your care team if you have questions.     ALLERGY INJECTIONS   Stopped by:  Dimitry Howard MD           MULTIVITAMIN TABS   OR   Stopped by:  Dimitry Howard MD                Where to get your medicines      These medications were sent to [id number] - [facility] [address] [facility]  [address] [facility] [address]     Phone:  [phone]     irbesartan 300 MG tablet    verapamil 180 MG CR tablet                Primary Care Provider Office Phone # Fax #    Dimitry Howard [phone]       [address] [facility] [address]        Equal Access to Services     PAOLO ALANIZ : Hadii dk ku hadasho Soomaali, waaxda luqadaha, qaybta kaalmada adeegyada, waxay idiin haysuleiman eastman. So [facility] [phone].    ATENCIÓN: Si habla español, tiene a mccormick disposición servicios gratuitos de asistencia lingüística. Cristhian al [phone].    We comply with applicable federal civil rights laws and [place] laws. We do not discriminate on the basis of race, color, national origin, age, disability, sex, sexual orientation, or gender identity.            Thank you!     Thank you for choosing [facility]  for your care. Our goal is always to provide you with excellent care.  Hearing back from our patients is one way we can continue to improve our services. Please take a few minutes to complete the written survey that you may receive in the mail after your visit with us. Thank you!             Your Updated Medication List - Protect others around you: Learn how to safely use, store and throw away your medicines at www.disposemymeds.org.          This list is accurate as of: 1/18/18  2:52 PM.  Always use your most recent med list.                   Brand Name Dispense Instructions for use Diagnosis    aspirin 81 MG tablet      Take  by mouth every other day.        atorvastatin 80 MG tablet    LIPITOR    90 tablet    TAKE ONE TABLET BY MOUTH ONE TIME DAILY    Hyperlipidemia LDL goal <100       augmented betamethasone dipropionate 0.05 % ointment    DIPROLENE-AF    45 g    Apply to AA BID x 3-4 weeks then PRN    Venous stasis dermatitis of both lower extremities       BD FCO U/F 32G X 4 MM   Generic drug:  insulin pen needle     400 each    USE 4 TIMES A DAY WITH INSULIN AND VICTOZA INJECTIONS    Type 2 diabetes mellitus with diabetic neuropathy, with long-term current use of insulin (H)       * blood glucose monitoring test strip    no brand specified    1 Box    Reason for four times daily checking is lack of diabetic control.  Patient is on multiple doses of insulin daily. Dispense what patient's plan will cover.  Dispense box of 100 strips at a time    Type 2 diabetes mellitus with diabetic neuropathy (H)       * blood glucose monitoring test strip    MARTITA CONTOUR NEXT    1 Box    Use to test blood sugar 4 times daily or as directed.    Type 2 diabetes mellitus with diabetic neuropathy (H)       * MARTITA CONTOUR NEXT test strip   Generic drug:  blood glucose monitoring     100 each    use to test blood sugar 4 times a day or as directed    Type 2 diabetes mellitus with diabetic neuropathy, with long-term current use of insulin (H)       calcium carbonate-vitamin D 600-400 MG-UNIT Chew     CALTRATE 600+D     Take 2 chew tab by mouth every evening    Cramp of limb       cetirizine 10 MG tablet    zyrTEC     Take 1 tablet (10 mg) by mouth daily    Allergic rhinitis, cause unspecified       cyanocobalamin 1000 MCG tablet    vitamin  B-12     Take 1,000 mcg by mouth every other day        dulaglutide 1.5 MG/0.5ML pen    TRULICITY    6 mL    Inject 1.5 mg Subcutaneous every 7 days DISPENSE = 3 MONTH SUPPLY    Type 2 diabetes mellitus with diabetic neuropathy, with long-term current use of insulin (H)       fenofibrate 160 MG tablet     90 tablet    TAKE ONE TABLET BY MOUTH ONE TIME DAILY    Hyperlipidemia LDL goal <100       fluticasone 50 MCG/ACT spray    FLONASE    16 g    Spray 2 sprays into both nostrils daily    Sinusitis, acute       furosemide 20 MG tablet    LASIX    90 tablet    Take 1 tablet (20 mg) by mouth daily    Essential hypertension       hydrocortisone 0.2 % cream    WESTCORT    45 g    Apply topically 2 times daily Apply sparingly to affected area 2 times daily as needed.    Dermatitis       insulin degludec 200 UNIT/ML pen    TRESIBA     Inject 96 Units Subcutaneous daily    Type 2 diabetes mellitus with diabetic neuropathy, with long-term current use of insulin (H)       irbesartan 300 MG tablet    AVAPRO    90 tablet    Take 1 tablet (300 mg) by mouth daily    Essential hypertension       levothyroxine 25 MCG tablet    SYNTHROID/LEVOTHROID    90 tablet    TAKE ONE TABLET BY MOUTH ONE TIME DAILY    Hypothyroidism, unspecified type       Magnesium 500 MG Caps      Take 1 capsule by mouth daily.        metFORMIN 1000 MG tablet    GLUCOPHAGE    180 tablet    TAKE 1 TABLET BY MOUTH TWICE DAILY WITH MEALS    Type 2 diabetes mellitus with diabetic neuropathy (H)       NAPROXEN PO      Take 220 mg by mouth as needed for moderate pain        NovoLOG FLEXPEN 100 UNIT/ML injection   Generic drug:  insulin aspart     15 mL    INJECT 0-10 UNITS SUBCUTANEOUSLY BEFORE BREAKFAST, 12-15 UNITS  BEFORE LUNCH, AND 15-20 UNITS BEFORE SUPPER    Type 2 diabetes mellitus with diabetic neuropathy (H)       ONE TOUCH DELICA LANCETS Misc     100 Stick    1 Device 4 times daily.    Type 2 diabetes, HbA1C goal < 8% (H)       potassium chloride 10 MEQ tablet    K-TAB,KLOR-CON    90 tablet    TAKE ONE TABLET BY MOUTH ONE TIME DAILY    Muscle cramps       traZODone 50 MG tablet    DESYREL    180 tablet    Take 1-2 tablets ( mg) by mouth nightly as needed    Bariatric surgery status       verapamil 180 MG CR tablet    CALAN-SR    180 tablet    Take 1 tablet (180 mg) by mouth 2 times daily    Essential hypertension       vitamin D 2000 UNITS tablet      Take 2,000 Units by mouth daily    Vitamin D deficiency       VITRON-C  MG Tabs tablet   Generic drug:  ferrous fumarate 65 mg ([place]. FE)-Vitamin C 125 mg     360 tablet    TAKE TWO TABLETS BY MOUTH TWICE DAILY    Iron deficiency anemia, unspecified iron deficiency anemia type       * Notice:  This list has 3 medication(s) that are the same as other medications prescribed for you. Read the directions carefully, and ask your doctor or other care provider to review them with you.

## 2018-03-09 ENCOUNTER — DOCUMENTATION ONLY (OUTPATIENT)
Dept: LAB | Facility: CLINIC | Age: 68
End: 2018-03-09

## 2018-03-09 DIAGNOSIS — E11.40 TYPE 2 DIABETES MELLITUS WITH DIABETIC NEUROPATHY, WITH LONG-TERM CURRENT USE OF INSULIN (H): Primary | ICD-10-CM

## 2018-03-09 DIAGNOSIS — Z79.4 TYPE 2 DIABETES MELLITUS WITH DIABETIC NEUROPATHY, WITH LONG-TERM CURRENT USE OF INSULIN (H): Primary | ICD-10-CM

## 2018-03-12 DIAGNOSIS — E03.9 HYPOTHYROIDISM, UNSPECIFIED TYPE: ICD-10-CM

## 2018-03-12 DIAGNOSIS — I10 ESSENTIAL HYPERTENSION: ICD-10-CM

## 2018-03-12 DIAGNOSIS — E78.5 HYPERLIPIDEMIA LDL GOAL <100: ICD-10-CM

## 2018-03-12 DIAGNOSIS — E11.40 TYPE 2 DIABETES MELLITUS WITH DIABETIC NEUROPATHY, WITH LONG-TERM CURRENT USE OF INSULIN (H): ICD-10-CM

## 2018-03-12 DIAGNOSIS — Z79.4 TYPE 2 DIABETES MELLITUS WITH DIABETIC NEUROPATHY, WITH LONG-TERM CURRENT USE OF INSULIN (H): ICD-10-CM

## 2018-03-12 LAB
ANION GAP SERPL CALCULATED.3IONS-SCNC: 5 MMOL/L (ref 3–14)
BUN SERPL-MCNC: 19 MG/DL (ref 7–30)
CALCIUM SERPL-MCNC: 9.2 MG/DL (ref 8.5–10.1)
CHLORIDE SERPL-SCNC: 105 MMOL/L (ref 94–109)
CHOLEST SERPL-MCNC: 172 MG/DL
CO2 SERPL-SCNC: 30 MMOL/L (ref 20–32)
CREAT SERPL-MCNC: 1.53 MG/DL (ref 0.52–1.04)
GFR SERPL CREATININE-BSD FRML MDRD: 34 ML/MIN/1.7M2
GLUCOSE SERPL-MCNC: 83 MG/DL (ref 70–99)
HBA1C MFR BLD: 8.1 % (ref 4.3–6)
HDLC SERPL-MCNC: 35 MG/DL
LDLC SERPL CALC-MCNC: 94 MG/DL
NONHDLC SERPL-MCNC: 137 MG/DL
POTASSIUM SERPL-SCNC: 4.1 MMOL/L (ref 3.4–5.3)
SODIUM SERPL-SCNC: 140 MMOL/L (ref 133–144)
T4 FREE SERPL-MCNC: 1.17 NG/DL (ref 0.76–1.46)
TRIGL SERPL-MCNC: 213 MG/DL
TSH SERPL DL<=0.005 MIU/L-ACNC: 4.78 MU/L (ref 0.4–4)

## 2018-03-12 PROCEDURE — 84439 ASSAY OF FREE THYROXINE: CPT | Performed by: INTERNAL MEDICINE

## 2018-03-12 PROCEDURE — 84443 ASSAY THYROID STIM HORMONE: CPT | Performed by: INTERNAL MEDICINE

## 2018-03-12 PROCEDURE — 80061 LIPID PANEL: CPT | Performed by: INTERNAL MEDICINE

## 2018-03-12 PROCEDURE — 36415 COLL VENOUS BLD VENIPUNCTURE: CPT | Performed by: INTERNAL MEDICINE

## 2018-03-12 PROCEDURE — 83036 HEMOGLOBIN GLYCOSYLATED A1C: CPT | Performed by: INTERNAL MEDICINE

## 2018-03-12 PROCEDURE — 80048 BASIC METABOLIC PNL TOTAL CA: CPT | Performed by: INTERNAL MEDICINE

## 2018-03-19 DIAGNOSIS — E03.9 HYPOTHYROIDISM, UNSPECIFIED TYPE: ICD-10-CM

## 2018-03-20 NOTE — TELEPHONE ENCOUNTER
Please contact patient and find out if there are any new meds, or compliance reasons why her TSH might be off.  If so, we will address that issue.   If not, then would increase dose slightly and recheck thyroid function tests in 2 months.

## 2018-03-22 RX ORDER — LEVOTHYROXINE SODIUM 50 UG/1
50 TABLET ORAL DAILY
Qty: 90 TABLET | Refills: 3 | Status: SHIPPED | OUTPATIENT
Start: 2018-03-22 | End: 2019-02-23

## 2018-04-18 DIAGNOSIS — E11.40 TYPE 2 DIABETES MELLITUS WITH DIABETIC NEUROPATHY, WITH LONG-TERM CURRENT USE OF INSULIN (H): Primary | ICD-10-CM

## 2018-04-18 DIAGNOSIS — Z79.4 TYPE 2 DIABETES MELLITUS WITH DIABETIC NEUROPATHY, WITH LONG-TERM CURRENT USE OF INSULIN (H): Primary | ICD-10-CM

## 2018-04-18 RX ORDER — INSULIN ASPART 100 [IU]/ML
INJECTION, SOLUTION INTRAVENOUS; SUBCUTANEOUS
Qty: 15 ML | Refills: 0 | OUTPATIENT
Start: 2018-04-18

## 2018-04-18 NOTE — TELEPHONE ENCOUNTER
"Requested Prescriptions   Pending Prescriptions Disp Refills     NOVOLOG FLEXPEN 100 UNIT/ML soln [Pharmacy Med Name: NovoLOG FlexPen Subcutaneous Solution Pen-injector 100 UNIT/ML] 15 mL 0     Sig: inject 0-10 units subcutaneously before breakfast, 12-15 units before lunch, and 15-20 units before supper.    Short Acting Insulin Protocol Failed    4/18/2018  1:15 AM       Failed - Blood pressure less than 140/90 in past 6 months    BP Readings from Last 3 Encounters:   01/18/18 152/70   12/06/17 165/79   08/30/17 147/80                Passed - LDL on file in past 12 months    Recent Labs   Lab Test  03/12/18   1334   LDL  94            Passed - Microalbumin on file in past 12 months    Recent Labs   Lab Test  08/25/17   1014   MICROL  37   UMALCR  177.14*            Passed - Serum creatinine on file in past 12 months    Recent Labs   Lab Test  03/12/18   1334   CR  1.53*            Passed - HgbA1C in past 3 or 6 months    Recent Labs   Lab Test  03/12/18   1334   A1C  8.1*            Passed - Patient is age 18 or older       Passed - Recent (6 mo) or future (30 days) visit within the authorizing provider's specialty    Patient had office visit in the last 6 months or has a visit in the next 30 days with authorizing provider or within the authorizing provider's specialty.  See \"Patient Info\" tab in inbasket, or \"Choose Columns\" in Meds & Orders section of the refill encounter.            Routing refill request to provider for review/approval because:   out of range:  Blood pressure        "

## 2018-04-20 DIAGNOSIS — E11.40 TYPE 2 DIABETES MELLITUS WITH DIABETIC NEUROPATHY (H): ICD-10-CM

## 2018-04-25 DIAGNOSIS — E78.5 HYPERLIPIDEMIA LDL GOAL <100: ICD-10-CM

## 2018-04-25 RX ORDER — FENOFIBRATE 160 MG/1
TABLET ORAL
Qty: 90 TABLET | Refills: 2 | Status: SHIPPED | OUTPATIENT
Start: 2018-04-25 | End: 2019-01-21

## 2018-05-23 ENCOUNTER — DOCUMENTATION ONLY (OUTPATIENT)
Dept: LAB | Facility: CLINIC | Age: 68
End: 2018-05-23

## 2018-05-23 DIAGNOSIS — Z79.4 TYPE 2 DIABETES MELLITUS WITH DIABETIC NEUROPATHY, WITH LONG-TERM CURRENT USE OF INSULIN (H): Primary | ICD-10-CM

## 2018-05-23 DIAGNOSIS — E11.40 TYPE 2 DIABETES MELLITUS WITH DIABETIC NEUROPATHY, WITH LONG-TERM CURRENT USE OF INSULIN (H): Primary | ICD-10-CM

## 2018-05-23 DIAGNOSIS — N18.30 CKD (CHRONIC KIDNEY DISEASE) STAGE 3, GFR 30-59 ML/MIN (H): ICD-10-CM

## 2018-05-24 DIAGNOSIS — E11.40 TYPE 2 DIABETES MELLITUS WITH DIABETIC NEUROPATHY, WITH LONG-TERM CURRENT USE OF INSULIN (H): ICD-10-CM

## 2018-05-24 DIAGNOSIS — Z79.4 TYPE 2 DIABETES MELLITUS WITH DIABETIC NEUROPATHY, WITH LONG-TERM CURRENT USE OF INSULIN (H): ICD-10-CM

## 2018-05-24 DIAGNOSIS — Z12.31 VISIT FOR SCREENING MAMMOGRAM: ICD-10-CM

## 2018-05-24 DIAGNOSIS — E03.9 HYPOTHYROIDISM, UNSPECIFIED TYPE: ICD-10-CM

## 2018-05-24 DIAGNOSIS — N18.30 CKD (CHRONIC KIDNEY DISEASE) STAGE 3, GFR 30-59 ML/MIN (H): ICD-10-CM

## 2018-05-24 LAB
ANION GAP SERPL CALCULATED.3IONS-SCNC: 10 MMOL/L (ref 3–14)
BUN SERPL-MCNC: 25 MG/DL (ref 7–30)
CALCIUM SERPL-MCNC: 9.6 MG/DL (ref 8.5–10.1)
CHLORIDE SERPL-SCNC: 105 MMOL/L (ref 94–109)
CO2 SERPL-SCNC: 26 MMOL/L (ref 20–32)
CREAT SERPL-MCNC: 1.53 MG/DL (ref 0.52–1.04)
GFR SERPL CREATININE-BSD FRML MDRD: 34 ML/MIN/1.7M2
GLUCOSE SERPL-MCNC: 94 MG/DL (ref 70–99)
HBA1C MFR BLD: 8.2 % (ref 0–5.6)
POTASSIUM SERPL-SCNC: 4.2 MMOL/L (ref 3.4–5.3)
SODIUM SERPL-SCNC: 141 MMOL/L (ref 133–144)
TSH SERPL DL<=0.005 MIU/L-ACNC: 2.54 MU/L (ref 0.4–4)

## 2018-05-24 PROCEDURE — 84443 ASSAY THYROID STIM HORMONE: CPT | Performed by: INTERNAL MEDICINE

## 2018-05-24 PROCEDURE — 80048 BASIC METABOLIC PNL TOTAL CA: CPT | Performed by: INTERNAL MEDICINE

## 2018-05-24 PROCEDURE — 77067 SCR MAMMO BI INCL CAD: CPT | Mod: TC

## 2018-05-24 PROCEDURE — 36415 COLL VENOUS BLD VENIPUNCTURE: CPT | Performed by: INTERNAL MEDICINE

## 2018-05-24 PROCEDURE — 83036 HEMOGLOBIN GLYCOSYLATED A1C: CPT | Performed by: INTERNAL MEDICINE

## 2018-05-25 DIAGNOSIS — Z79.4 TYPE 2 DIABETES MELLITUS WITH DIABETIC NEUROPATHY, WITH LONG-TERM CURRENT USE OF INSULIN (H): ICD-10-CM

## 2018-05-25 DIAGNOSIS — E11.40 TYPE 2 DIABETES MELLITUS WITH DIABETIC NEUROPATHY, WITH LONG-TERM CURRENT USE OF INSULIN (H): ICD-10-CM

## 2018-06-06 ENCOUNTER — OFFICE VISIT (OUTPATIENT)
Dept: ENDOCRINOLOGY | Facility: CLINIC | Age: 68
End: 2018-06-06
Payer: COMMERCIAL

## 2018-06-06 VITALS
HEART RATE: 64 BPM | WEIGHT: 234.8 LBS | HEIGHT: 63 IN | DIASTOLIC BLOOD PRESSURE: 74 MMHG | BODY MASS INDEX: 41.6 KG/M2 | SYSTOLIC BLOOD PRESSURE: 124 MMHG

## 2018-06-06 DIAGNOSIS — E11.40 TYPE 2 DIABETES MELLITUS WITH DIABETIC NEUROPATHY, WITH LONG-TERM CURRENT USE OF INSULIN (H): Primary | ICD-10-CM

## 2018-06-06 DIAGNOSIS — Z79.4 TYPE 2 DIABETES MELLITUS WITH DIABETIC NEUROPATHY, WITH LONG-TERM CURRENT USE OF INSULIN (H): Primary | ICD-10-CM

## 2018-06-06 ASSESSMENT — PAIN SCALES - GENERAL: PAINLEVEL: NO PAIN (0)

## 2018-06-06 NOTE — MR AVS SNAPSHOT
After Visit Summary   6/6/2018    Ade Wilkins    MRN: [id number]           Patient Information     Date Of Birth          1950        Visit Information        Provider Department      6/6/2018 1:30 PM Melvi Naik MD [facility]        Care Instructions    BG testing FAsting pre meal, and bed time for 1 week.   We will report with the results and any adjustment needed based on this .          Follow-ups after your visit        Follow-up notes from your care team     Return in about 6 months (around 12/6/2018).      Your next 10 appointments already scheduled     Dec 10, 2018  2:00 PM CST   (Arrive by 1:45 PM)   RETURN DIABETES with MD GIANLU[facility] ([facility])    [address] [place] [id number] [phone]              Who to contact     Please call your clinic at [phone] to:    Ask questions about your health    Make or cancel appointments    Discuss your medicines    Learn about your test results    Speak to your doctor            Additional Information About Your Visit        wumohart Information     [facility] gives you secure access to your electronic health record. If you see a primary care provider, you can also send messages to your care team and make appointments. If you have questions, please call your primary care clinic.  If you do not have a primary care provider, please call [phone] and they will assist you.      [facility] is an electronic gateway that provides easy, online access to your medical records. With [employer], you can request a clinic appointment, read your test results, renew a prescription or communicate with your care team.     To access your existing account, please contact your [facility] or call [phone] for assistance.        Care EveryWhere ID     This is your Care EveryWhere ID. This could be used by  "other organizations to access your [place] medical records  [id number]        Your Vitals Were     Pulse Height BMI (Body Mass Index)             64 1.588 m (5' 2.52\") 42.23 kg/m2          Blood Pressure from Last 3 Encounters:   06/06/18 124/74   01/18/18 152/70   12/06/17 165/79    Weight from Last 3 Encounters:   06/06/18 106.5 kg (234 lb 12.8 oz)   01/18/18 106.9 kg (235 lb 9.6 oz)   12/06/17 (P) 106.2 kg (234 lb 3.2 oz)              Today, you had the following     No orders found for display       Primary Care Provider Office Phone # Fax #    Dimitry Howard [phone]       [address] [place] [id number]        Equal Access to Services     [facility]: Hadii aad ku hadasho Soruchi, waaxda luqadaha, qaybta kaalmada adeegyada, kyrie brar . So [facility] [phone].    ATENCIÓN: Si habla español, tiene a mccormick disposición servicios gratuitos de asistencia lingüística. Llame al [phone].    We comply with applicable federal civil rights laws and [place] laws. We do not discriminate on the basis of race, color, national origin, age, disability, sex, sexual orientation, or gender identity.            Thank you!     Thank you for choosing [facility]  for your care. Our goal is always to provide you with excellent care. Hearing back from our patients is one way we can continue to improve our services. Please take a few minutes to complete the written survey that you may receive in the mail after your visit with us. Thank you!             Your Updated Medication List - Protect others around you: Learn how to safely use, store and throw away your medicines at www.disposemymeds.org.          This list is accurate as of 6/6/18  1:54 PM.  Always use your most recent med list.                   Brand Name Dispense Instructions for use Diagnosis    aspirin 81 MG tablet      Take  by mouth every other day.        atorvastatin 80 MG tablet    LIPITOR "    90 tablet    TAKE ONE TABLET BY MOUTH ONE TIME DAILY    Hyperlipidemia LDL goal <100       augmented betamethasone dipropionate 0.05 % ointment    DIPROLENE-AF    45 g    Apply to AA BID x 3-4 weeks then PRN    Venous stasis dermatitis of both lower extremities       BD FCO U/F 32G X 4 MM   Generic drug:  insulin pen needle     400 each    USE 4 TIMES A DAY WITH INSULIN AND VICTOZA INJECTIONS    Type 2 diabetes mellitus with diabetic neuropathy, with long-term current use of insulin (H)       * blood glucose monitoring test strip    no brand specified    1 Box    Reason for four times daily checking is lack of diabetic control.  Patient is on multiple doses of insulin daily. Dispense what patient's plan will cover.  Dispense box of 100 strips at a time    Type 2 diabetes mellitus with diabetic neuropathy (H)       * blood glucose monitoring test strip    MARTITA CONTOUR NEXT    1 Box    Use to test blood sugar 4 times daily or as directed.    Type 2 diabetes mellitus with diabetic neuropathy (H)       * MARTITA CONTOUR NEXT test strip   Generic drug:  blood glucose monitoring     100 each    use to test blood sugar 4 times a day or as directed    Type 2 diabetes mellitus with diabetic neuropathy, with long-term current use of insulin (H)       calcium carbonate-vitamin D 600-400 MG-UNIT Chew    CALTRATE 600+D     Take 2 chew tab by mouth every evening    Cramp of limb       cetirizine 10 MG tablet    zyrTEC     Take 1 tablet (10 mg) by mouth daily    Allergic rhinitis, cause unspecified       cyanocobalamin 1000 MCG tablet    vitamin  B-12     Take 1,000 mcg by mouth every other day        dulaglutide 1.5 MG/0.5ML pen    TRULICITY    6 mL    Inject 1.5 mg Subcutaneous every 7 days DISPENSE = 3 MONTH SUPPLY    Type 2 diabetes mellitus with diabetic neuropathy, with long-term current use of insulin (H)       fenofibrate 160 MG tablet     90 tablet    TAKE ONE TABLET BY MOUTH ONE TIME DAILY    Hyperlipidemia LDL goal  <100       fluticasone 50 MCG/ACT spray    FLONASE    16 g    Spray 2 sprays into both nostrils daily    Sinusitis, acute       furosemide 20 MG tablet    LASIX    90 tablet    Take 1 tablet (20 mg) by mouth daily    Essential hypertension       hydrocortisone 0.2 % cream    WESTCORT    45 g    Apply topically 2 times daily Apply sparingly to affected area 2 times daily as needed.    Dermatitis       insulin aspart 100 UNIT/ML injection    NovoLOG FLEXPEN    45 mL    INJECT 0-10 UNITS SUBCUTANEOUSLY BEFORE BREAKFAST, 12-15 UNITS BEFORE LUNCH, AND 15-20 UNITS BEFORE SUPPER    Type 2 diabetes mellitus with diabetic neuropathy (H)       insulin degludec 200 UNIT/ML pen    TRESIBA    45 mL    Inject 100units subcu daily    Type 2 diabetes mellitus with diabetic neuropathy, with long-term current use of insulin (H)       irbesartan 300 MG tablet    AVAPRO    90 tablet    Take 1 tablet (300 mg) by mouth daily    Essential hypertension       levothyroxine 50 MCG tablet    SYNTHROID/LEVOTHROID    90 tablet    Take 1 tablet (50 mcg) by mouth daily    Hypothyroidism, unspecified type       Magnesium 500 MG Caps      Take 1 capsule by mouth daily.        metFORMIN 1000 MG tablet    GLUCOPHAGE    180 tablet    TAKE 1 TABLET BY MOUTH TWICE DAILY WITH MEALS    Type 2 diabetes mellitus with diabetic neuropathy (H)       NAPROXEN PO      Take 220 mg by mouth as needed for moderate pain        ONE TOUCH DELICA LANCETS Misc     100 Stick    1 Device 4 times daily.    Type 2 diabetes, HbA1C goal < 8% (H)       potassium chloride 10 MEQ tablet    K-TAB,KLOR-CON    90 tablet    TAKE ONE TABLET BY MOUTH ONE TIME DAILY    Muscle cramps       traZODone 50 MG tablet    DESYREL    180 tablet    Take 1-2 tablets ( mg) by mouth nightly as needed    Bariatric surgery status       verapamil 180 MG CR tablet    CALAN-SR    180 tablet    Take 1 tablet (180 mg) by mouth 2 times daily    Essential hypertension       vitamin D 2000 units tablet       Take 2,000 Units by mouth daily    Vitamin D deficiency       VITRON-C  MG Tabs tablet   Generic drug:  ferrous fumarate 65 mg ([place]. FE)-Vitamin C 125 mg     360 tablet    TAKE TWO TABLETS BY MOUTH TWICE DAILY    Iron deficiency anemia, unspecified iron deficiency anemia type       * Notice:  This list has 3 medication(s) that are the same as other medications prescribed for you. Read the directions carefully, and ask your doctor or other care provider to review them with you.

## 2018-06-06 NOTE — PATIENT INSTRUCTIONS
BG testing FAsting pre meal, and bed time for 1 week.   We will report with the results and any adjustment needed based on this .

## 2018-06-06 NOTE — LETTER
6/6/2018       RE: Ade Wilkins  [address] [place] [facility] [address]     Dear Colleague,    Thank you for referring your patient, Ade Wilkins, to the [facility] ENDO[facility] at [facility]. Please see a copy of my visit note below.    Endocrinology Clinic Visit    Chief Complaint: RECHECK (DIABETES TYPE 2)       Interval history  Started [place] diet.  In the past 6 months, no significant weight changes.  This however did not lead to significant weight changes.  She however feels better, states that her clothes feel better.    No low blood sugars.  According to patient, fasting blood sugars range from 70-80 and gradually increases during the day.  She eats small portion every 3 hours or so.      Assessment/Treatment Plan:      67 year old female with history morbid obesity S/P Gastric bypass surgery, hypothyroidism who presents today for FU management of type 2 diabetes.  Her diabetes mellitus is complicated by diabetic neuropathy, mild nonproliferative retinopathy and nephropathy.    Type 2 Diabetes since her early 20s    Barriers to achieving glycemic goals  Lack of testing before meals. CGM placement ~ $ 300, hence not done.   A1c is 8.2 and this does not correlate the patient reported blood sugar values.    Tresiba 96 units daily  NovoLog 2 units per carb   Dulaglutide 1.5 mg weekly.   Metformin 2 gm daily.     Lack of insulin with snacking: Now on [place] diet, without snacking.   Exercise: Planning to do some work out with her sister.   Lack of exercise: limited by pain in the joints.     Anemia:   Hemoglobin A1c may be falsely lower than her actual value    Hypothyroidism on levothyroxine 50  g daily. Dose recently increased due to high TSH  This has been followed by primary care physician  Normal TSH in May.     Morbid obesity  We discussed about diet and exercise  She will probably benefit from weight loss medications.  We will discuss this  further in subsequent visits.  She plans to exercise regularly, no weight loss on [place] for 6 months.     Weight stable on Dulaglutide.    Diabetic neuropathy:   No significant symptoms at this time.   Start alpha lipoic acid 600 mg daily if symptoms are worse    Melvi Naik MD  [id number]  Endocrinology Service      =====================  HPI:   67-year-old female who history of hepatitis A as a teenager, non-Hodgkin's lymphoma diagnosed in 2008, iron deficiency anemia, morbid obesity, hyperlipidemia, rheumatic fever with systolic murmur, hypothyroidism and hypertension who presents today for a follow up for type 2 diabetes mellitus.       History of Diabetes  Type 2 diagnosed in late 20s.  In the 1970s she was initially started on oral medications and was transitioned to insulin in the 80s.  She had a gastric bypass surgery done in 2004 after which she lost about 70 pounds.  Her blood sugars were better controlled for the next few years but has gradually worsened over time.  Her weight had been steady around 210-215 pounds but in the recent years as her activity was limited by knee pain, she has gained significant weight.   She has a strong family history of type 2 diabetes mellitus with Mother and Sister with type 2 diabetes.      Complications  She has triopathy from diabetes.     1. Retinopathy: Last exam was in May 19 2017, mild NPDR    2.  Nephropathy:  Lab Results   Component Value Date    MICROL 46 03/28/2017     Creatinine   Date Value Ref Range Status   05/24/2018 1.53 (H) 0.52 - 1.04 mg/dL Final   ]    3. Neuropathy   Occasional tingling that improves with pain patch    4. Hypoglycemia   This is rare, but does happen if does not eat after taking insulin.    5. Macrovascular:   No history of cardiac events or stroke        Treatment  Diabetes Medication(s)     Biguanides Sig    metFORMIN (GLUCOPHAGE) 1000 MG tablet TAKE 1 TABLET BY MOUTH TWICE DAILY WITH MEALS    Insulin Sig    insulin aspart  (NOVOLOG FLEXPEN) 100 UNIT/ML injection INJECT 0-10 UNITS SUBCUTANEOUSLY BEFORE BREAKFAST, 12-15 UNITS BEFORE LUNCH, AND 15-20 UNITS BEFORE SUPPER    insulin degludec (TRESIBA) 200 UNIT/ML pen Inject 100units subcu daily    Incretin Mimetic Agents (GLP-1 Receptor Agonists) Sig    dulaglutide (TRULICITY) 1.5 MG/0.5ML pen Inject 1.5 mg Subcutaneous every 7 days DISPENSE = 3 MONTH SUPPLY        2 units per carb.     Prevention  Lipid  LDL Cholesterol Calculated   Date Value Ref Range Status   03/12/2018 94 <100 mg/dL Final     Comment:     Desirable:       <100 mg/dl   03/28/2017 101 (H) <100 mg/dL Final     Comment:     Above desirable:  100-129 mg/dl   Borderline High:  130-159 mg/dL   High:             160-189 mg/dL   Very high:       >189 mg/dl         Medications     HMG CoA Reductase Inhibitors    atorvastatin (LIPITOR) 80 MG tablet    Salicylates    aspirin 81 MG tablet          Renal  Medication (Note: This includes the hypertensive combination subclass to make sure to show all ACEI/ARB's.)     Angiotensin II Receptor Antagonists Sig    irbesartan (AVAPRO) 300 MG tablet Take 1 tablet (300 mg) by mouth daily            Weight  Wt Readings from Last 4 Encounters:   06/06/18 106.5 kg (234 lb 12.8 oz)   01/18/18 106.9 kg (235 lb 9.6 oz)   12/06/17 (P) 106.2 kg (234 lb 3.2 oz)   08/30/17 105.4 kg (232 lb 6.4 oz)         Diet: She works at [employer]   On [employer].  She now has retired.    Exercise : limited     Weight trend  Wt Readings from Last 4 Encounters:   06/06/18 106.5 kg (234 lb 12.8 oz)   01/18/18 106.9 kg (235 lb 9.6 oz)   12/06/17 (P) 106.2 kg (234 lb 3.2 oz)   08/30/17 105.4 kg (232 lb 6.4 oz)       A1c today is 8.1  Lab Results   Component Value Date    A1C 9.3 03/28/2017    A1C 9.0 10/17/2016    A1C 8.6 06/17/2016    A1C 8.3 02/16/2016    A1C 8.6 10/05/2015       Barriers to achieve glycemic goals:      Type 2 Diabetes since her early 20s    Barriers to achieving  glycemic goals  Lack of testing before meals: Although she has days when she does not check, she has increased her frequency of testing    Lack of insulin with snacking / Ineffective bydureon  Trulicity has helped with prandial BG    Recent change in Lantus to vial : Tresiba with good effects    High Carb diet: Diet improved with Trulicity.     Lack of exercise: limited by pain in the joints.   Morbid obesity       Allergies   Allergen Reactions     Clonidine      headaches     Fexofenadine Hydrochloride      Allegra--headache     Gemfibrozil      lopid--rash ??from med     Lisinopril      edema     Norvasc [Amlodipine Besylate]      Hair loss     Pioglitazone Hydrochloride Swelling     actos--ankle edema     Doxazosin Mesylate Rash     cardura--rash       Current Outpatient Prescriptions   Medication Sig Dispense Refill     aspirin 81 MG tablet Take  by mouth every other day.       atorvastatin (LIPITOR) 80 MG tablet TAKE ONE TABLET BY MOUTH ONE TIME DAILY  90 tablet 1     augmented betamethasone dipropionate (DIPROLENE-AF) 0.05 % ointment Apply to AA BID x 3-4 weeks then PRN 45 g 5     MARTITA CONTOUR NEXT test strip use to test blood sugar 4 times a day or as directed 100 each 5     BD FCO U/F 32G X 4 MM insulin pen needle USE 4 TIMES A DAY WITH INSULIN AND VICTOZA INJECTIONS 400 each 1     blood glucose monitoring (MARTITA CONTOUR NEXT) test strip Use to test blood sugar 4 times daily or as directed. 1 Box 9     calcium carbonate-vitamin D (CALTRATE 600+D) 600-400 MG-UNIT CHEW Take 2 chew tab by mouth every evening       cetirizine (ZYRTEC) 10 MG tablet Take 1 tablet (10 mg) by mouth daily       Cholecalciferol (VITAMIN D) 2000 UNITS tablet Take 2,000 Units by mouth daily       cyanocolbalamin (VITAMIN B-12) 1000 MCG tablet Take 1,000 mcg by mouth every other day        dulaglutide (TRULICITY) 1.5 MG/0.5ML pen Inject 1.5 mg Subcutaneous every 7 days DISPENSE = 3 MONTH SUPPLY 6 mL 3     fenofibrate 160 MG tablet  TAKE ONE TABLET BY MOUTH ONE TIME DAILY  90 tablet 2     furosemide (LASIX) 20 MG tablet Take 1 tablet (20 mg) by mouth daily 90 tablet 3     hydrocortisone (WESTCORT) 0.2 % cream Apply topically 2 times daily Apply sparingly to affected area 2 times daily as needed. 45 g 2     insulin aspart (NOVOLOG FLEXPEN) 100 UNIT/ML injection INJECT 0-10 UNITS SUBCUTANEOUSLY BEFORE BREAKFAST, 12-15 UNITS BEFORE LUNCH, AND 15-20 UNITS BEFORE SUPPER 45 mL 1     insulin degludec (TRESIBA) 200 UNIT/ML pen Inject 100units subcu daily 45 mL 11     irbesartan (AVAPRO) 300 MG tablet Take 1 tablet (300 mg) by mouth daily 90 tablet 3     levothyroxine (SYNTHROID/LEVOTHROID) 50 MCG tablet Take 1 tablet (50 mcg) by mouth daily 90 tablet 3     Magnesium 500 MG CAPS Take 1 capsule by mouth daily.       metFORMIN (GLUCOPHAGE) 1000 MG tablet TAKE 1 TABLET BY MOUTH TWICE DAILY WITH MEALS 180 tablet PRN     NAPROXEN PO Take 220 mg by mouth as needed for moderate pain       ONE TOUCH DELICA LANCETS MISC 1 Device 4 times daily. 100 Stick prn     potassium chloride (K-TAB,KLOR-CON) 10 MEQ tablet TAKE ONE TABLET BY MOUTH ONE TIME DAILY  90 tablet 2     traZODone (DESYREL) 50 MG tablet Take 1-2 tablets ( mg) by mouth nightly as needed 180 tablet PRN     verapamil (CALAN-SR) 180 MG CR tablet Take 1 tablet (180 mg) by mouth 2 times daily 180 tablet 3     VITRON-C  MG TABS tablet TAKE TWO TABLETS BY MOUTH TWICE DAILY  360 tablet 1     blood glucose monitoring (NO BRAND SPECIFIED) test strip Reason for four times daily checking is lack of diabetic control.  Patient is on multiple doses of insulin daily. Dispense what patient's plan will cover.  Dispense box of 100 strips at a time (Patient not taking: Reported on 1/18/2018) 1 Box 1     fluticasone (FLONASE) 50 MCG/ACT nasal spray Spray 2 sprays into both nostrils daily (Patient not taking: Reported on 6/6/2018) 16 g 1       Review of Systems     Constitutional: Normal appetite, feels well no  polyuria or polydipsia.   Head: no headache   Eye: no vision change/ loss of peripheral vision.   ENT: No throat congestion.   Respiratory: no cough   Cardiovascular: Has no known murmur, no chest pain.  Has some shortness of breath on exertion.  Gastrointestinal: Negative for vomiting, abdominal pain and diarrhea.  Genitourinary: No bladder problems.  Musculoskeletal: Negative for myalgias. No weakness.  Neurological: Negative for seizures and headaches.  Psychiatric/Behavioral: Negative for behavioral problem and dysphoric mood.    Past Medical History:   Diagnosis Date     Allergic rhinitis, cause unspecified      Hepatitis, unspecified 1968     Iron Deficiency Anemia 3/09     Lymphoma (H) 12/08     Nonsenile cataract      Obesity, unspecified      Other and unspecified hyperlipidemia      Rheumatic fever without mention of heart involvement infant     Type II or unspecified type diabetes mellitus without mention of complication, not stated as uncontrolled 1992     Unspecified essential hypertension      Unspecified hypothyroidism      Vitamin B12 deficiency 3/09       Past Surgical History:   Procedure Laterality Date     C APPENDECTOMY  1958     C NONSPECIFIC PROCEDURE  1976    (leg) cystectomy     CATARACT IOL, RT/LT Left 12/21/2000    left     CATARACT IOL, RT/LT Right 01/06/2000    right     GASTRIC BYPASS  2/04    Dr. Hebert     LYMPH NODE BIOPSY  12/08    right groin, Dr. Licona     TONSILLECTOMY & ADENOIDECTOMY  1968       Family History   Problem Relation Age of Onset     Cardiovascular Father      AAA     DIABETES Mother      C.A.D. Mother      52     Eye Disorder Mother      glaucoma     Glaucoma Mother      Cardiovascular Brother      AAA, PVD     CANCER Brother      bladder     Cardiovascular Brother      AAA, valvular heart disease     DIABETES Brother      age 53     CANCER Brother      liver cancer     Glaucoma Other      MGGF     Macular Degeneration No family hx of        Social History     Social  "History     Marital status:      Spouse name: N/A     Number of children: 0     Years of education: N/A     Occupational History      [employer] [facility] [facility]     Social History Main Topics     Smoking status: Never Smoker     Smokeless tobacco: Never Used     Alcohol use No     Drug use: No     Sexual activity: No     Other Topics Concern     Caffeine Concern Yes     20 oz daily     Exercise No     Seat Belt Yes     Self-Exams Yes     Social History Narrative    Living arrangements - the patient lives alone.       Objective:   /74  Pulse 64  Ht 1.588 m (5' 2.52\")  Wt 106.5 kg (234 lb 12.8 oz)  BMI 42.23 kg/m2  234 lbs 12.8 oz  Wt Readings from Last 4 Encounters:   06/06/18 106.5 kg (234 lb 12.8 oz)   01/18/18 106.9 kg (235 lb 9.6 oz)   12/06/17 (P) 106.2 kg (234 lb 3.2 oz)   08/30/17 105.4 kg (232 lb 6.4 oz)       Constitutional: Obese female in no acute distress cooperative and comfortable  EYES: anicteric  Some bruits from HEENT: Mouth/Throat: Mucous membrane is moist. Oropharynx is clear. No adenopathy. No large goiters, difficult to palpate due to short neck  Cardiovascular: RRR, S1, S2 normal.  Aortic area systolic murmur  Pulmonary/Chest: CTAB. No wheezing or rales   Abdominal: +BS. Nontender to palpation.  Abdomen is distended,  small bruises around her injection site.   No lipo hypertrophy.  White striae.  Neurological: Alert. Normal affect. Normal speech   Extremities: No clubbing, cyanosis or inflammation   Bilateral lower extremity edema left worse than the right.  Skin: normal texture, color  Feet: Bilateral loss of sensation in both feet, significant edema.  Lymphatic: no cervical lymphadenopathy.  Psychological: appropriate mood   Labs\"   Normal electrolytes, stable renal function.  Microalbuminuria noted in August 2017  Normal bicarb    In House Labs:   Lab Results   Component Value Date    A1C 9.3 03/28/2017    A1C 9.0 10/17/2016    A1C 8.6 06/17/2016    A1C 8.3 " 02/16/2016    A1C 8.6 10/05/2015       TSH   Date Value Ref Range Status   05/24/2018 2.54 0.40 - 4.00 mU/L Final   03/12/2018 4.78 (H) 0.40 - 4.00 mU/L Final   03/28/2017 2.84 0.40 - 4.00 mU/L Final   02/10/2017 2.55 0.40 - 4.00 mU/L Final   10/17/2016 4.39 (H) 0.40 - 4.00 mU/L Final     T4 Free   Date Value Ref Range Status   03/12/2018 1.17 0.76 - 1.46 ng/dL Final   10/17/2016 1.17 0.76 - 1.46 ng/dL Final   05/05/2010 1.05 0.70 - 1.85 ng/dL Final   09/13/2007 1.00 0.70 - 1.85 ng/dL Final       Creatinine   Date Value Ref Range Status   05/24/2018 1.53 (H) 0.52 - 1.04 mg/dL Final   ]    Recent Labs   Lab Test  03/28/17   1014  06/17/16   0750  05/22/15   0848   05/03/14   1018   CHOL  185  166  175   < >  161   HDL  35*  34*  32*   < >  39*   LDL  101*  78  76   < >  76   TRIG  247*  271*  334*   < >  231*   CHOLHDLRATIO   --    --   5.5*   --   4.2    < > = values in this interval not displayed.       No results found for: [id number], [id number], [id number]        Again, thank you for allowing me to participate in the care of your patient.      Sincerely,    Melvi Naik MD

## 2018-06-06 NOTE — PROGRESS NOTES
Endocrinology Clinic Visit    Chief Complaint: RECHECK (DIABETES TYPE 2)       Interval history  Started Nesbit Hernandez diet.  In the past 6 months, no significant weight changes.  This however did not lead to significant weight changes.  She however feels better, states that her clothes feel better.    No low blood sugars.  According to patient, fasting blood sugars range from 70-80 and gradually increases during the day.  She eats small portion every 3 hours or so.      Assessment/Treatment Plan:      67 year old female with history morbid obesity S/P Gastric bypass surgery, hypothyroidism who presents today for FU management of type 2 diabetes.  Her diabetes mellitus is complicated by diabetic neuropathy, mild nonproliferative retinopathy and nephropathy.    Type 2 Diabetes since her early 20s    Barriers to achieving glycemic goals  Lack of testing before meals. CGM placement ~ $ 300, hence not done.   A1c is 8.2 and this does not correlate the patient reported blood sugar values.    Tresiba 96 units daily  NovoLog 2 units per carb   Dulaglutide 1.5 mg weekly.   Metformin 2 gm daily.     Lack of insulin with snacking: Now on Nesbit Hernandez diet, without snacking.   Exercise: Planning to do some work out with her sister.   Lack of exercise: limited by pain in the joints.     Anemia:   Hemoglobin A1c may be falsely lower than her actual value    Hypothyroidism on levothyroxine 50  g daily. Dose recently increased due to high TSH  This has been followed by primary care physician  Normal TSH in May.     Morbid obesity  We discussed about diet and exercise  She will probably benefit from weight loss medications.  We will discuss this further in subsequent visits.  She plans to exercise regularly, no weight loss on Nesbit Hernandez for 6 months.     Weight stable on Dulaglutide.    Diabetic neuropathy:   No significant symptoms at this time.   Start alpha lipoic acid 600 mg daily if symptoms are worse    Rupendra  MD [facility]  [id number]  Endocrinology Service      =====================  HPI:   67-year-old female who history of hepatitis A as a teenager, non-Hodgkin's lymphoma diagnosed in 2008, iron deficiency anemia, morbid obesity, hyperlipidemia, rheumatic fever with systolic murmur, hypothyroidism and hypertension who presents today for a follow up for type 2 diabetes mellitus.       History of Diabetes  Type 2 diagnosed in late 20s.  In the 1970s she was initially started on oral medications and was transitioned to insulin in the 80s.  She had a gastric bypass surgery done in 2004 after which she lost about 70 pounds.  Her blood sugars were better controlled for the next few years but has gradually worsened over time.  Her weight had been steady around 210-215 pounds but in the recent years as her activity was limited by knee pain, she has gained significant weight.   She has a strong family history of type 2 diabetes mellitus with Mother and Sister with type 2 diabetes.      Complications  She has triopathy from diabetes.     1. Retinopathy: Last exam was in May 19 2017, mild NPDR    2.  Nephropathy:  Lab Results   Component Value Date    MICROL 46 03/28/2017     Creatinine   Date Value Ref Range Status   05/24/2018 1.53 (H) 0.52 - 1.04 mg/dL Final   ]    3. Neuropathy   Occasional tingling that improves with pain patch    4. Hypoglycemia   This is rare, but does happen if does not eat after taking insulin.    5. Macrovascular:   No history of cardiac events or stroke        Treatment  Diabetes Medication(s)     Biguanides Sig    metFORMIN (GLUCOPHAGE) 1000 MG tablet TAKE 1 TABLET BY MOUTH TWICE DAILY WITH MEALS    Insulin Sig    insulin aspart (NOVOLOG FLEXPEN) 100 UNIT/ML injection INJECT 0-10 UNITS SUBCUTANEOUSLY BEFORE BREAKFAST, 12-15 UNITS BEFORE LUNCH, AND 15-20 UNITS BEFORE SUPPER    insulin degludec (TRESIBA) 200 UNIT/ML pen Inject 100units subcu daily    Incretin Mimetic Agents (GLP-1 Receptor Agonists) Sig     dulaglutide (TRULICITY) 1.5 MG/0.5ML pen Inject 1.5 mg Subcutaneous every 7 days DISPENSE = 3 MONTH SUPPLY        2 units per carb.     Prevention  Lipid  LDL Cholesterol Calculated   Date Value Ref Range Status   03/12/2018 94 <100 mg/dL Final     Comment:     Desirable:       <100 mg/dl   03/28/2017 101 (H) <100 mg/dL Final     Comment:     Above desirable:  100-129 mg/dl   Borderline High:  130-159 mg/dL   High:             160-189 mg/dL   Very high:       >189 mg/dl         Medications     HMG CoA Reductase Inhibitors    atorvastatin (LIPITOR) 80 MG tablet    Salicylates    aspirin 81 MG tablet          Renal  Medication (Note: This includes the hypertensive combination subclass to make sure to show all ACEI/ARB's.)     Angiotensin II Receptor Antagonists Sig    irbesartan (AVAPRO) 300 MG tablet Take 1 tablet (300 mg) by mouth daily            Weight  Wt Readings from Last 4 Encounters:   06/06/18 106.5 kg (234 lb 12.8 oz)   01/18/18 106.9 kg (235 lb 9.6 oz)   12/06/17 (P) 106.2 kg (234 lb 3.2 oz)   08/30/17 105.4 kg (232 lb 6.4 oz)         Diet: She works at [employer]   On [employer].  She now has retired.    Exercise : limited     Weight trend  Wt Readings from Last 4 Encounters:   06/06/18 106.5 kg (234 lb 12.8 oz)   01/18/18 106.9 kg (235 lb 9.6 oz)   12/06/17 (P) 106.2 kg (234 lb 3.2 oz)   08/30/17 105.4 kg (232 lb 6.4 oz)       A1c today is 8.1  Lab Results   Component Value Date    A1C 9.3 03/28/2017    A1C 9.0 10/17/2016    A1C 8.6 06/17/2016    A1C 8.3 02/16/2016    A1C 8.6 10/05/2015       Barriers to achieve glycemic goals:      Type 2 Diabetes since her early 20s    Barriers to achieving glycemic goals  Lack of testing before meals: Although she has days when she does not check, she has increased her frequency of testing    Lack of insulin with snacking / Ineffective bydureon  Trulicity has helped with prandial BG    Recent change in Lantus to vial : Tresiba with good  effects    High Carb diet: Diet improved with Trulicity.     Lack of exercise: limited by pain in the joints.   Morbid obesity       Allergies   Allergen Reactions     Clonidine      headaches     Fexofenadine Hydrochloride      Allegra--headache     Gemfibrozil      lopid--rash ??from med     Lisinopril      edema     Norvasc [Amlodipine Besylate]      Hair loss     Pioglitazone Hydrochloride Swelling     actos--ankle edema     Doxazosin Mesylate Rash     cardura--rash       Current Outpatient Prescriptions   Medication Sig Dispense Refill     aspirin 81 MG tablet Take  by mouth every other day.       atorvastatin (LIPITOR) 80 MG tablet TAKE ONE TABLET BY MOUTH ONE TIME DAILY  90 tablet 1     augmented betamethasone dipropionate (DIPROLENE-AF) 0.05 % ointment Apply to AA BID x 3-4 weeks then PRN 45 g 5     MARTITA CONTOUR NEXT test strip use to test blood sugar 4 times a day or as directed 100 each 5     BD FCO U/F 32G X 4 MM insulin pen needle USE 4 TIMES A DAY WITH INSULIN AND VICTOZA INJECTIONS 400 each 1     blood glucose monitoring (MARTITA CONTOUR NEXT) test strip Use to test blood sugar 4 times daily or as directed. 1 Box 9     calcium carbonate-vitamin D (CALTRATE 600+D) 600-400 MG-UNIT CHEW Take 2 chew tab by mouth every evening       cetirizine (ZYRTEC) 10 MG tablet Take 1 tablet (10 mg) by mouth daily       Cholecalciferol (VITAMIN D) 2000 UNITS tablet Take 2,000 Units by mouth daily       cyanocolbalamin (VITAMIN B-12) 1000 MCG tablet Take 1,000 mcg by mouth every other day        dulaglutide (TRULICITY) 1.5 MG/0.5ML pen Inject 1.5 mg Subcutaneous every 7 days DISPENSE = 3 MONTH SUPPLY 6 mL 3     fenofibrate 160 MG tablet TAKE ONE TABLET BY MOUTH ONE TIME DAILY  90 tablet 2     furosemide (LASIX) 20 MG tablet Take 1 tablet (20 mg) by mouth daily 90 tablet 3     hydrocortisone (WESTCORT) 0.2 % cream Apply topically 2 times daily Apply sparingly to affected area 2 times daily as needed. 45 g 2     insulin  aspart (NOVOLOG FLEXPEN) 100 UNIT/ML injection INJECT 0-10 UNITS SUBCUTANEOUSLY BEFORE BREAKFAST, 12-15 UNITS BEFORE LUNCH, AND 15-20 UNITS BEFORE SUPPER 45 mL 1     insulin degludec (TRESIBA) 200 UNIT/ML pen Inject 100units subcu daily 45 mL 11     irbesartan (AVAPRO) 300 MG tablet Take 1 tablet (300 mg) by mouth daily 90 tablet 3     levothyroxine (SYNTHROID/LEVOTHROID) 50 MCG tablet Take 1 tablet (50 mcg) by mouth daily 90 tablet 3     Magnesium 500 MG CAPS Take 1 capsule by mouth daily.       metFORMIN (GLUCOPHAGE) 1000 MG tablet TAKE 1 TABLET BY MOUTH TWICE DAILY WITH MEALS 180 tablet PRN     NAPROXEN PO Take 220 mg by mouth as needed for moderate pain       ONE TOUCH DELICA LANCETS MISC 1 Device 4 times daily. 100 Stick prn     potassium chloride (K-TAB,KLOR-CON) 10 MEQ tablet TAKE ONE TABLET BY MOUTH ONE TIME DAILY  90 tablet 2     traZODone (DESYREL) 50 MG tablet Take 1-2 tablets ( mg) by mouth nightly as needed 180 tablet PRN     verapamil (CALAN-SR) 180 MG CR tablet Take 1 tablet (180 mg) by mouth 2 times daily 180 tablet 3     VITRON-C  MG TABS tablet TAKE TWO TABLETS BY MOUTH TWICE DAILY  360 tablet 1     blood glucose monitoring (NO BRAND SPECIFIED) test strip Reason for four times daily checking is lack of diabetic control.  Patient is on multiple doses of insulin daily. Dispense what patient's plan will cover.  Dispense box of 100 strips at a time (Patient not taking: Reported on 1/18/2018) 1 Box 1     fluticasone (FLONASE) 50 MCG/ACT nasal spray Spray 2 sprays into both nostrils daily (Patient not taking: Reported on 6/6/2018) 16 g 1       Review of Systems     Constitutional: Normal appetite, feels well no polyuria or polydipsia.   Head: no headache   Eye: no vision change/ loss of peripheral vision.   ENT: No throat congestion.   Respiratory: no cough   Cardiovascular: Has no known murmur, no chest pain.  Has some shortness of breath on exertion.  Gastrointestinal: Negative for  vomiting, abdominal pain and diarrhea.  Genitourinary: No bladder problems.  Musculoskeletal: Negative for myalgias. No weakness.  Neurological: Negative for seizures and headaches.  Psychiatric/Behavioral: Negative for behavioral problem and dysphoric mood.    Past Medical History:   Diagnosis Date     Allergic rhinitis, cause unspecified      Hepatitis, unspecified 1968     Iron Deficiency Anemia 3/09     Lymphoma (H) 12/08     Nonsenile cataract      Obesity, unspecified      Other and unspecified hyperlipidemia      Rheumatic fever without mention of heart involvement infant     Type II or unspecified type diabetes mellitus without mention of complication, not stated as uncontrolled 1992     Unspecified essential hypertension      Unspecified hypothyroidism      Vitamin B12 deficiency 3/09       Past Surgical History:   Procedure Laterality Date     C APPENDECTOMY  1958     C NONSPECIFIC PROCEDURE  1976    (leg) cystectomy     CATARACT IOL, RT/LT Left 12/21/2000    left     CATARACT IOL, RT/LT Right 01/06/2000    right     GASTRIC BYPASS  2/04    Dr. Hebert     LYMPH NODE BIOPSY  12/08    right groin, Dr. Licona     TONSILLECTOMY & ADENOIDECTOMY  1968       Family History   Problem Relation Age of Onset     Cardiovascular Father      AAA     DIABETES Mother      C.A.D. Mother      52     Eye Disorder Mother      glaucoma     Glaucoma Mother      Cardiovascular Brother      AAA, PVD     CANCER Brother      bladder     Cardiovascular Brother      AAA, valvular heart disease     DIABETES Brother      age 53     CANCER Brother      liver cancer     Glaucoma Other      MGGF     Macular Degeneration No family hx of        Social History     Social History     Marital status:      Spouse name: N/A     Number of children: 0     Years of education: N/A     Occupational History      [employer] [employer]     Social History Main Topics     Smoking status: Never Smoker     Smokeless tobacco: Never  "Used     Alcohol use No     Drug use: No     Sexual activity: No     Other Topics Concern     Caffeine Concern Yes     20 oz daily     Exercise No     Seat Belt Yes     Self-Exams Yes     Social History Narrative    Living arrangements - the patient lives alone.       Objective:   /74  Pulse 64  Ht 1.588 m (5' 2.52\")  Wt 106.5 kg (234 lb 12.8 oz)  BMI 42.23 kg/m2  234 lbs 12.8 oz  Wt Readings from Last 4 Encounters:   06/06/18 106.5 kg (234 lb 12.8 oz)   01/18/18 106.9 kg (235 lb 9.6 oz)   12/06/17 (P) 106.2 kg (234 lb 3.2 oz)   08/30/17 105.4 kg (232 lb 6.4 oz)       Constitutional: Obese female in no acute distress cooperative and comfortable  EYES: anicteric  Some bruits from HEENT: Mouth/Throat: Mucous membrane is moist. Oropharynx is clear. No adenopathy. No large goiters, difficult to palpate due to short neck  Cardiovascular: RRR, S1, S2 normal.  Aortic area systolic murmur  Pulmonary/Chest: CTAB. No wheezing or rales   Abdominal: +BS. Nontender to palpation.  Abdomen is distended,  small bruises around her injection site.   No lipo hypertrophy.  White striae.  Neurological: Alert. Normal affect. Normal speech   Extremities: No clubbing, cyanosis or inflammation   Bilateral lower extremity edema left worse than the right.  Skin: normal texture, color  Feet: Bilateral loss of sensation in both feet, significant edema.  Lymphatic: no cervical lymphadenopathy.  Psychological: appropriate mood   Labs\"   Normal electrolytes, stable renal function.  Microalbuminuria noted in August 2017  Normal bicarb    In House Labs:   Lab Results   Component Value Date    A1C 9.3 03/28/2017    A1C 9.0 10/17/2016    A1C 8.6 06/17/2016    A1C 8.3 02/16/2016    A1C 8.6 10/05/2015       TSH   Date Value Ref Range Status   05/24/2018 2.54 0.40 - 4.00 mU/L Final   03/12/2018 4.78 (H) 0.40 - 4.00 mU/L Final   03/28/2017 2.84 0.40 - 4.00 mU/L Final   02/10/2017 2.55 0.40 - 4.00 mU/L Final   10/17/2016 4.39 (H) 0.40 - 4.00 mU/L " Final     T4 Free   Date Value Ref Range Status   03/12/2018 1.17 0.76 - 1.46 ng/dL Final   10/17/2016 1.17 0.76 - 1.46 ng/dL Final   05/05/2010 1.05 0.70 - 1.85 ng/dL Final   09/13/2007 1.00 0.70 - 1.85 ng/dL Final       Creatinine   Date Value Ref Range Status   05/24/2018 1.53 (H) 0.52 - 1.04 mg/dL Final   ]    Recent Labs   Lab Test  03/28/17   1014  06/17/16   0750  05/22/15   0848   05/03/14   1018   CHOL  185  166  175   < >  161   HDL  35*  34*  32*   < >  39*   LDL  101*  78  76   < >  76   TRIG  247*  271*  334*   < >  231*   CHOLHDLRATIO   --    --   5.5*   --   4.2    < > = values in this interval not displayed.       No results found for: [id number], [id number], [id number]

## 2018-06-07 DIAGNOSIS — Z79.4 TYPE 2 DIABETES MELLITUS WITH DIABETIC NEUROPATHY, WITH LONG-TERM CURRENT USE OF INSULIN (H): Primary | ICD-10-CM

## 2018-06-07 DIAGNOSIS — E11.40 TYPE 2 DIABETES MELLITUS WITH DIABETIC NEUROPATHY, WITH LONG-TERM CURRENT USE OF INSULIN (H): Primary | ICD-10-CM

## 2018-06-07 NOTE — TELEPHONE ENCOUNTER
"Requested Prescriptions   Pending Prescriptions Disp Refills     CONTOUR NEXT TEST test strip [Pharmacy Med Name: Contour Next Test In Vitro Strip] 100 each 8     Sig: USE TO TEST BLOOD SUGAR 4 TIMES DAILY OR AS DIRECTED.    Diabetic Supplies Protocol Passed    6/7/2018  9:27 AM       Passed - Patient is 18 years of age or older       Passed - Recent (6 mo) or future (30 days) visit within the authorizing provider's specialty    Patient had office visit in the last 6 months or has a visit in the next 30 days with authorizing provider.  See \"Patient Info\" tab in inbasket, or \"Choose Columns\" in Meds & Orders section of the refill encounter.              "

## 2018-06-27 DIAGNOSIS — I10 ESSENTIAL HYPERTENSION: ICD-10-CM

## 2018-06-27 NOTE — TELEPHONE ENCOUNTER
"Requested Prescriptions   Pending Prescriptions Disp Refills     furosemide (LASIX) 20 MG tablet  Last Written Prescription Date:  3/28/17  Last Fill Quantity: 90,  # refills: 3   Last office visit: 1/18/2018   Future Office Visit:     90 tablet 3     Sig: Take 1 tablet (20 mg) by mouth daily    Diuretics (Including Combos) Protocol Failed    6/27/2018 11:21 AM       Failed - Normal serum creatinine on file in past 12 months    Recent Labs   Lab Test  05/24/18   1209   CR  1.53*             Passed - Blood pressure under 140/90 in past 12 months    BP Readings from Last 3 Encounters:   06/06/18 124/74   01/18/18 152/70   12/06/17 165/79                Passed - Recent (12 mo) or future (30 days) visit within the authorizing provider's specialty    Patient had office visit in the last 12 months or has a visit in the next 30 days with authorizing provider or within the authorizing provider's specialty.  See \"Patient Info\" tab in inbasket, or \"Choose Columns\" in Meds & Orders section of the refill encounter.           Passed - Patient is age 18 or older       Passed - No active pregancy on record       Passed - Normal serum potassium on file in past 12 months    Recent Labs   Lab Test  05/24/18   1209   POTASSIUM  4.2                   Passed - Normal serum sodium on file in past 12 months    Recent Labs   Lab Test  05/24/18   1209   NA  141             Passed - No positive pregnancy test in past 12 months          "

## 2018-06-28 RX ORDER — FUROSEMIDE 20 MG
20 TABLET ORAL DAILY
Qty: 90 TABLET | Refills: 3 | Status: SHIPPED | OUTPATIENT
Start: 2018-06-28 | End: 2019-07-24

## 2018-07-03 DIAGNOSIS — I87.2 VENOUS STASIS DERMATITIS OF BOTH LOWER EXTREMITIES: ICD-10-CM

## 2018-07-03 RX ORDER — BETAMETHASONE DIPROPIONATE 0.5 MG/G
OINTMENT, AUGMENTED TOPICAL
Qty: 45 G | Refills: 0 | Status: SHIPPED | OUTPATIENT
Start: 2018-07-03 | End: 2022-04-12

## 2018-07-03 NOTE — TELEPHONE ENCOUNTER
1 refill given per [facility] protocol. Patient needs to see provider for further refills.    Carlos RN-BSN  [facility]  [phone]

## 2018-07-09 ENCOUNTER — TRANSFERRED RECORDS (OUTPATIENT)
Dept: HEALTH INFORMATION MANAGEMENT | Facility: CLINIC | Age: 68
End: 2018-07-09

## 2018-07-17 ENCOUNTER — TRANSFERRED RECORDS (OUTPATIENT)
Dept: HEALTH INFORMATION MANAGEMENT | Facility: CLINIC | Age: 68
End: 2018-07-17

## 2018-07-19 ENCOUNTER — TRANSFERRED RECORDS (OUTPATIENT)
Dept: HEALTH INFORMATION MANAGEMENT | Facility: CLINIC | Age: 68
End: 2018-07-19

## 2018-08-07 DIAGNOSIS — E78.5 HYPERLIPIDEMIA LDL GOAL <100: ICD-10-CM

## 2018-08-07 NOTE — TELEPHONE ENCOUNTER
"Requested Prescriptions   Pending Prescriptions Disp Refills     atorvastatin (LIPITOR) 80 MG tablet [Pharmacy Med Name: Atorvastatin Calcium Oral Tablet 80 MG] 90 tablet 0    Last Written Prescription Date:  8/17/17  Last Fill Quantity: 90,  # refills: 1   Last office visit: 1/18/2018 with prescribing provider:  1/18/18   Future Office Visit:     Sig: TAKE ONE TABLET BY MOUTH ONE TIME DAILY    Statins Protocol Passed    8/7/2018  7:01 AM       Passed - LDL on file in past 12 months    Recent Labs   Lab Test  03/12/18   1334   LDL  94            Passed - No abnormal creatine kinase in past 12 months    Recent Labs   Lab Test  08/25/17   0837   CKT  64               Passed - Recent (12 mo) or future (30 days) visit within the authorizing provider's specialty    Patient had office visit in the last 12 months or has a visit in the next 30 days with authorizing provider or within the authorizing provider's specialty.  See \"Patient Info\" tab in inbasket, or \"Choose Columns\" in Meds & Orders section of the refill encounter.           Passed - Patient is age 18 or older       Passed - No active pregnancy on record       Passed - No positive pregnancy test in past 12 months          "

## 2018-08-08 ENCOUNTER — MYC MEDICAL ADVICE (OUTPATIENT)
Dept: INTERNAL MEDICINE | Facility: CLINIC | Age: 68
End: 2018-08-08

## 2018-08-08 DIAGNOSIS — H34.219 HOLLENHORST PLAQUE: Primary | ICD-10-CM

## 2018-08-08 RX ORDER — ATORVASTATIN CALCIUM 80 MG/1
TABLET, FILM COATED ORAL
Qty: 90 TABLET | Refills: 0 | Status: SHIPPED | OUTPATIENT
Start: 2018-08-08 | End: 2018-10-27

## 2018-08-09 ENCOUNTER — TRANSFERRED RECORDS (OUTPATIENT)
Dept: HEALTH INFORMATION MANAGEMENT | Facility: CLINIC | Age: 68
End: 2018-08-09

## 2018-08-09 NOTE — TELEPHONE ENCOUNTER
Note mentions the following:      This is considered an atheroemboli.  - Last lipids in March.  Patient remains on high-dose atorvastatin and fenofibrate  - Antiplatelet treatment is baby aspirin every other day  - Last blood pressure control was good  - Diabetic control has not been ideal, managed by endocrinology at this time    I will order carotid ultrasound, and ask patient to schedule follow-up appointment with me

## 2018-08-23 DIAGNOSIS — E11.40 TYPE 2 DIABETES MELLITUS WITH DIABETIC NEUROPATHY, WITH LONG-TERM CURRENT USE OF INSULIN (H): ICD-10-CM

## 2018-08-23 DIAGNOSIS — Z79.4 TYPE 2 DIABETES MELLITUS WITH DIABETIC NEUROPATHY, WITH LONG-TERM CURRENT USE OF INSULIN (H): ICD-10-CM

## 2018-08-23 NOTE — TELEPHONE ENCOUNTER
trulicity    Last Written Prescription Date:  9/18/17  Last Fill Quantity: 6 ml   # refills: 3  Last Office Visit : 6/6/18  Future Office visit:  12/10/18    Routing refill request to provider for review/approval because:  Drug failed the [facility], [facility] or [facility] refill protocol due to creatinine  Pt of Dr. Naik

## 2018-08-24 ENCOUNTER — HOSPITAL ENCOUNTER (OUTPATIENT)
Dept: ULTRASOUND IMAGING | Facility: CLINIC | Age: 68
Discharge: HOME | End: 2018-08-24
Attending: INTERNAL MEDICINE | Admitting: INTERNAL MEDICINE
Payer: MEDICARE

## 2018-08-24 DIAGNOSIS — H34.219 HOLLENHORST PLAQUE: ICD-10-CM

## 2018-08-24 PROCEDURE — 93880 EXTRACRANIAL BILAT STUDY: CPT

## 2018-09-07 DIAGNOSIS — Z98.84 BARIATRIC SURGERY STATUS: ICD-10-CM

## 2018-09-11 RX ORDER — TRAZODONE HYDROCHLORIDE 50 MG/1
TABLET, FILM COATED ORAL
Qty: 180 TABLET | Refills: 0 | Status: SHIPPED | OUTPATIENT
Start: 2018-09-11 | End: 2020-07-13

## 2018-09-21 ENCOUNTER — APPOINTMENT (OUTPATIENT)
Dept: LAB | Facility: CLINIC | Age: 68
End: 2018-09-21
Payer: COMMERCIAL

## 2018-10-18 ENCOUNTER — TRANSFERRED RECORDS (OUTPATIENT)
Dept: HEALTH INFORMATION MANAGEMENT | Facility: CLINIC | Age: 68
End: 2018-10-18

## 2018-10-27 DIAGNOSIS — E78.5 HYPERLIPIDEMIA LDL GOAL <100: ICD-10-CM

## 2018-10-27 NOTE — TELEPHONE ENCOUNTER
"Requested Prescriptions   Pending Prescriptions Disp Refills     atorvastatin (LIPITOR) 80 MG tablet [Pharmacy Med Name: Atorvastatin Calcium Oral Tablet 80 MG] 90 tablet 0    Last Written Prescription Date:  8/8/2018  Last Fill Quantity: 90,  # refills: 0   Last office visit: 1/18/2018 with prescribing provider:  1/18/2018   Future Office Visit:     Sig: TAKE ONE TABLET BY MOUTH ONE TIME DAILY    Statins Protocol Passed    10/27/2018 12:58 PM       Passed - LDL on file in past 12 months    Recent Labs   Lab Test  03/12/18   1334   LDL  94            Passed - No abnormal creatine kinase in past 12 months    Recent Labs   Lab Test  08/25/17   0837   CKT  64               Passed - Recent (12 mo) or future (30 days) visit within the authorizing provider's specialty    Patient had office visit in the last 12 months or has a visit in the next 30 days with authorizing provider or within the authorizing provider's specialty.  See \"Patient Info\" tab in inbasket, or \"Choose Columns\" in Meds & Orders section of the refill encounter.             Passed - Patient is age 18 or older       Passed - No active pregnancy on record       Passed - No positive pregnancy test in past 12 months          "

## 2018-10-30 RX ORDER — ATORVASTATIN CALCIUM 80 MG/1
TABLET, FILM COATED ORAL
Qty: 90 TABLET | Refills: 1 | Status: SHIPPED | OUTPATIENT
Start: 2018-10-30 | End: 2019-04-01

## 2018-11-22 DIAGNOSIS — R25.2 MUSCLE CRAMPS: ICD-10-CM

## 2018-11-22 NOTE — TELEPHONE ENCOUNTER
"Requested Prescriptions   Pending Prescriptions Disp Refills     potassium chloride (K-TAB,KLOR-CON) 10 MEQ tablet [Pharmacy Med Name: Potassium Chloride ER Oral Tablet Extended Release 10 MEQ]  Last Written Prescription Date:  11/24/2017  Last Fill Quantity: 90,  # refills: 2   Last office visit: 1/18/2018 with prescribing provider:  EDITA   Future Office Visit:     90 tablet 1     Sig: TAKE ONE TABLET BY MOUTH ONE TIME DAILY    Potassium Supplements Protocol Passed    11/22/2018  7:01 AM       Passed - Recent (12 mo) or future (30 days) visit within the authorizing provider's specialty    Patient had office visit in the last 12 months or has a visit in the next 30 days with authorizing provider or within the authorizing provider's specialty.  See \"Patient Info\" tab in inbasket, or \"Choose Columns\" in Meds & Orders section of the refill encounter.             Passed - Patient is age 18 or older       Passed - Normal serum potassium in past 12 months    Recent Labs   Lab Test  05/24/18   1209   POTASSIUM  4.2                      "

## 2018-11-23 DIAGNOSIS — E11.40 TYPE 2 DIABETES MELLITUS WITH DIABETIC NEUROPATHY (H): ICD-10-CM

## 2018-11-23 RX ORDER — POTASSIUM CHLORIDE 750 MG/1
TABLET, EXTENDED RELEASE ORAL
Qty: 90 TABLET | Refills: 0 | Status: SHIPPED | OUTPATIENT
Start: 2018-11-23 | End: 2019-02-23

## 2018-11-24 NOTE — TELEPHONE ENCOUNTER
"Requested Prescriptions   Pending Prescriptions Disp Refills     metFORMIN (GLUCOPHAGE) 1000 MG tablet  Last Written Prescription Date:  08/17/2017  Last Fill Quantity: 180,  # refills: prn   Last Office Visit: 1/18/2018   Future Office Visit:      180 tablet PRN    Biguanide Agents Failed    11/23/2018  8:50 PM       Failed - Patient has had a Microalbumin in the past 15 mos.    Recent Labs   Lab Test  08/25/17   1014   MICROL  37   UMALCR  177.14*            Failed - Patient has documented A1c within the specified period of time.    If HgbA1C is 8 or greater, it needs to be on file within the past 3 months.  If less than 8, must be on file within the past 6 months.     Recent Labs   Lab Test  05/24/18   1209  08/30/17   A1C  8.2*   < >   --    HEMOGLOBINA1   --    --   8.1*    < > = values in this interval not displayed.            Failed - Patient's CR is NOT>1.4 OR Patient's EGFR is NOT<45 within past 12 mos.    Recent Labs   Lab Test  05/24/18   1209   GFRESTIMATED  34*   GFRESTBLACK  41*       Recent Labs   Lab Test  05/24/18   1209   CR  1.53*            Passed - Blood pressure less than 140/90 in past 6 months    BP Readings from Last 3 Encounters:   06/06/18 124/74   01/18/18 152/70   12/06/17 165/79                Passed - Patient has documented LDL within the past 12 mos.    Recent Labs   Lab Test  03/12/18   1334   LDL  94            Passed - Patient is age 10 or older       Passed - Patient does NOT have a diagnosis of CHF.       Passed - Patient is not pregnant       Passed - Patient has not had a positive pregnancy test within the past 12 mos.        Passed - Recent (6 mo) or future (30 days) visit within the authorizing provider's specialty    Patient had office visit in the last 6 months or has a visit in the next 30 days with authorizing provider or within the authorizing provider's specialty.  See \"Patient Info\" tab in inbasket, or \"Choose Columns\" in Meds & Orders section of the refill encounter. "

## 2018-12-10 ENCOUNTER — OFFICE VISIT (OUTPATIENT)
Dept: ENDOCRINOLOGY | Facility: CLINIC | Age: 68
End: 2018-12-10
Payer: COMMERCIAL

## 2018-12-10 VITALS — HEIGHT: 63 IN | BODY MASS INDEX: 41.41 KG/M2 | WEIGHT: 233.7 LBS

## 2018-12-10 DIAGNOSIS — E11.40 TYPE 2 DIABETES MELLITUS WITH DIABETIC NEUROPATHY, WITH LONG-TERM CURRENT USE OF INSULIN (H): ICD-10-CM

## 2018-12-10 DIAGNOSIS — E11.40 TYPE 2 DIABETES MELLITUS WITH DIABETIC NEUROPATHY, WITH LONG-TERM CURRENT USE OF INSULIN (H): Primary | ICD-10-CM

## 2018-12-10 DIAGNOSIS — E11.40 TYPE 2 DIABETES MELLITUS WITH DIABETIC NEUROPATHY (H): ICD-10-CM

## 2018-12-10 DIAGNOSIS — E66.01 MORBID OBESITY (H): ICD-10-CM

## 2018-12-10 DIAGNOSIS — Z79.4 TYPE 2 DIABETES MELLITUS WITH DIABETIC NEUROPATHY, WITH LONG-TERM CURRENT USE OF INSULIN (H): Primary | ICD-10-CM

## 2018-12-10 DIAGNOSIS — Z79.4 TYPE 2 DIABETES MELLITUS WITH DIABETIC NEUROPATHY, WITH LONG-TERM CURRENT USE OF INSULIN (H): ICD-10-CM

## 2018-12-10 LAB
ANION GAP SERPL CALCULATED.3IONS-SCNC: 10 MMOL/L (ref 3–14)
BUN SERPL-MCNC: 33 MG/DL (ref 7–30)
CALCIUM SERPL-MCNC: 8.9 MG/DL (ref 8.5–10.1)
CHLORIDE SERPL-SCNC: 103 MMOL/L (ref 94–109)
CO2 SERPL-SCNC: 26 MMOL/L (ref 20–32)
CORTIS SERPL-MCNC: 13.4 UG/DL (ref 4–22)
CREAT SERPL-MCNC: 1.63 MG/DL (ref 0.52–1.04)
CREAT UR-MCNC: 24 MG/DL
GFR SERPL CREATININE-BSD FRML MDRD: 31 ML/MIN/1.7M2
GLUCOSE SERPL-MCNC: 143 MG/DL (ref 70–99)
HBA1C MFR BLD: 8.1 % (ref 4.3–6)
HBA1C MFR BLD: 9 % (ref 0–5.6)
LDLC SERPL DIRECT ASSAY-MCNC: 115 MG/DL
MICROALBUMIN UR-MCNC: 13 MG/L
MICROALBUMIN/CREAT UR: 51.84 MG/G CR (ref 0–25)
POTASSIUM SERPL-SCNC: 4.6 MMOL/L (ref 3.4–5.3)
SODIUM SERPL-SCNC: 139 MMOL/L (ref 133–144)

## 2018-12-10 RX ORDER — TOPIRAMATE 25 MG/1
TABLET, FILM COATED ORAL
Qty: 90 TABLET | Refills: 3 | Status: SHIPPED | OUTPATIENT
Start: 2018-12-10 | End: 2019-05-04

## 2018-12-10 ASSESSMENT — MIFFLIN-ST. JEOR: SCORE: 1551.57

## 2018-12-10 NOTE — PATIENT INSTRUCTIONS
MEDICATION STARTED AT THIS APPOINTMENT  We are starting topiramate at bedtime.  Start one tab, 25 mg, for a week. Go up to 50 mg (2 tabs) from the second week.  Stay at 2 tabs until you are seen again.     For some people it works even though they do not feel much different.)    Topiramate (Topamax) is a medication that is used most often to treat migraine headaches or for seizures. It has also been found to help with weight loss. Although it's not currently FDA approved for weight loss, it has been used safely for a number of years to help people who are carrying extra weight.     Just how topiramate helps with weight loss has not been exactly determined. However it seems to work on areas of the brain to quiet down signals related to eating.      Topiramate may make you:    >feel less interest in eating in between meals   >think less about food and eating   >find it easier to push the plate away   >find giving up pop easier    >have an easier time eating less    For some of our patients, the pills work right away. They feel and think quite differently about food. Other patients don't feel much of a change but find in fact they have lost weight! Like all weight loss medications, topiramate works best when you help it work.  This means:    1) Have less tempting high calorie (fattening) food around the house or office    2) Have lower calorie food (fruits, vegetables,low fat meats and dairy) for snacks    3) Eat out only one time or less each week.   4) Eat your meals at a table with the TV or computer off.    Side-effects. Topiramate is generally well tolerated. The main side-effects we see are:   Tingling in hands,feet, or face (usually not very troublesome)   Mental confusion and word finding trouble (about 10% of patients have this.)     Feeling sleepy or a bit dopey- this goes away very soon after starting.    One of the dangers of topiramate is the possibility of birth defects--if you get pregnant when you are  on it, there is the risk that your baby will be born with a cleft lip or palate.  If you are on topiramate and of child bearing age, you need to be on a reliable form of birth control or refrain from sexual intercourse.     Please refer to the pharmacy insert for more information on side-effects. Since many pharmacists are not familiar with the use of topiramate in weight loss, calling the clinic will get you the most accurate information on the use of this medication for weight loss.    ====================    Reduce Metformin to 1000 mg daily.     ====================

## 2018-12-10 NOTE — LETTER
12/10/2018       RE: Ade Wilkins  [address] [facility] [address]     Dear Colleague,    Thank you for referring your patient, Ade Wilkins, to the [facility] ENDOCRINOLOGY at [facility]. Please see a copy of my visit note below.        Interval history:     Stopped [place] diet.   Tresiba reduced to 80 units due to lows in AM  Severe NPDR and macular edema now needing monthly injection. Seen for hollenhorst plaque.   A1c 8.1   Did not bring meter.       Endocrinology Clinic Visit    Chief Complaint: RECHECK (DM TYPE 2)       Interval history  Started [place] diet.  In the past 6 months, no significant weight changes.  This however did not lead to significant weight changes.  She however feels better, states that her clothes feel better.    No low blood sugars.  According to patient, fasting blood sugars range from 70-80 and gradually increases during the day.  She eats small portion every 3 hours or so.      Assessment/Treatment Plan:      67 year old female with history morbid obesity S/P Gastric bypass surgery, hypothyroidism who presents today for FU management of type 2 diabetes.  Her diabetes mellitus is complicated by diabetic neuropathy, mild nonproliferative retinopathy and nephropathy.    Type 2 Diabetes since her early 20s, suboptimal control.     Barriers to achieving glycemic goals  Lack of testing before meals.   A1c is 8.1     Tresiba 80 units daily  NovoLog 2 units per carb   Dulaglutide 1.5 mg weekly.   Metformin reduced to 1 g today for cr cl of 34   If A1c does not improve, possibly start Jardiance at low dose.     Lack of insulin with snacking:    Exercise: Planning to do some work out with her sister.   Lack of exercise: limited by pain in the joints.     Anemia:   Hemoglobin A1c may be falsely lower than her actual value    Hypothyroidism on levothyroxine 50  g daily. Dose recently increased due to high TSH  This has been  followed by primary care physician  Normal TSH in May.     Morbid obesity  We discussed about diet and exercise  Weight stable on Dulaglutide.  Started Topiramate, labs for cushings  Diabetic neuropathy:   No significant symptoms at this time.   Start alpha lipoic acid 600 mg daily if symptoms are worse    Melvi Naik MD  [id number]  Endocrinology Service      =====================  HPI:   67-year-old female who history of hepatitis A as a teenager, non-Hodgkin's lymphoma diagnosed in 2008, iron deficiency anemia, morbid obesity, hyperlipidemia, rheumatic fever with systolic murmur, hypothyroidism and hypertension who presents today for a follow up for type 2 diabetes mellitus.       History of Diabetes  Type 2 diagnosed in late 20s.  In the 1970s she was initially started on oral medications and was transitioned to insulin in the 80s.  She had a gastric bypass surgery done in 2004 after which she lost about 70 pounds.  Her blood sugars were better controlled for the next few years but has gradually worsened over time.  Her weight had been steady around 210-215 pounds but in the recent years as her activity was limited by knee pain, she has gained significant weight.   She has a strong family history of type 2 diabetes mellitus with Mother and Sister with type 2 diabetes.      Complications  She has triopathy from diabetes.     1. Retinopathy: Last exam was in May 19 2017, mild NPDR    2.  Nephropathy:  Lab Results   Component Value Date    MICROL 46 03/28/2017     Creatinine   Date Value Ref Range Status   12/10/2018 1.63 (H) 0.52 - 1.04 mg/dL Final   ]    3. Neuropathy   Occasional tingling that improves with pain patch    4. Hypoglycemia   This is rare, but does happen if does not eat after taking insulin.    5. Macrovascular:   No history of cardiac events or stroke        Treatment  Diabetes Medication(s)     Biguanides Sig    metFORMIN (GLUCOPHAGE) 1000 MG tablet 1 tab twice daily    Insulin Sig    insulin  degludec (TRESIBA) 100 UNIT/ML pen Inject Subcutaneous daily    insulin aspart (NOVOLOG FLEXPEN) 100 UNIT/ML injection INJECT 0-10 UNITS SUBCUTANEOUSLY BEFORE BREAKFAST, 12-15 UNITS BEFORE LUNCH, AND 15-20 UNITS BEFORE SUPPER    insulin degludec (TRESIBA) 200 UNIT/ML pen Inject 100units subcu daily    Incretin Mimetic Agents (GLP-1 Receptor Agonists) Sig    dulaglutide (TRULICITY) 1.5 MG/0.5ML pen Inject 1.5 mg Subcutaneous every 7 days DISPENSE = 3 MONTH SUPPLY        2 units per carb.     Prevention  Lipid  LDL Cholesterol Calculated   Date Value Ref Range Status   03/12/2018 94 <100 mg/dL Final     Comment:     Desirable:       <100 mg/dl   03/28/2017 101 (H) <100 mg/dL Final     Comment:     Above desirable:  100-129 mg/dl   Borderline High:  130-159 mg/dL   High:             160-189 mg/dL   Very high:       >189 mg/dl       LDL Cholesterol Direct   Date Value Ref Range Status   12/10/2018 115 (H) <100 mg/dL Final     Comment:     Desirable:       <100 mg/dl       Medications     HMG CoA Reductase Inhibitors    atorvastatin (LIPITOR) 80 MG tablet    Salicylates    aspirin 81 MG tablet          Renal  Medication (Note: This includes the hypertensive combination subclass to make sure to show all ACEI/ARB's.)     Angiotensin II Receptor Antagonists Sig    irbesartan (AVAPRO) 300 MG tablet Take 1 tablet (300 mg) by mouth daily            Weight  Wt Readings from Last 4 Encounters:   12/10/18 106 kg (233 lb 11.2 oz)   06/06/18 106.5 kg (234 lb 12.8 oz)   01/18/18 106.9 kg (235 lb 9.6 oz)   12/06/17 (P) 106.2 kg (234 lb 3.2 oz)         Diet: She works at [employer].  She now has retired.    Exercise : limited     Weight trend  Wt Readings from Last 4 Encounters:   12/10/18 106 kg (233 lb 11.2 oz)   06/06/18 106.5 kg (234 lb 12.8 oz)   01/18/18 106.9 kg (235 lb 9.6 oz)   12/06/17 (P) 106.2 kg (234 lb 3.2 oz)       A1c today is 8.1  Lab Results   Component Value Date    A1C  9.3 03/28/2017    A1C 9.0 10/17/2016    A1C 8.6 06/17/2016    A1C 8.3 02/16/2016    A1C 8.6 10/05/2015       Barriers to achieve glycemic goals:      Type 2 Diabetes since her early 20s    Barriers to achieving glycemic goals  Lack of testing before meals: Although she has days when she does not check, she has increased her frequency of testing    Lack of insulin with snacking / Ineffective bydureon  Trulicity has helped with prandial BG    Recent change in Lantus to vial : Tresiba with good effects    High Carb diet: Diet improved with Trulicity.     Lack of exercise: limited by pain in the joints.   Morbid obesity       Allergies   Allergen Reactions     Clonidine      headaches     Fexofenadine Hydrochloride      Allegra--headache     Gemfibrozil      lopid--rash ??from med     Lisinopril      edema     Norvasc [Amlodipine Besylate]      Hair loss     Pioglitazone Hydrochloride Swelling     actos--ankle edema     Doxazosin Mesylate Rash     cardura--rash       Current Outpatient Medications   Medication Sig Dispense Refill     insulin degludec (TRESIBA) 100 UNIT/ML pen Inject Subcutaneous daily       topiramate (TOPAMAX) 25 MG tablet 25 mg at bedtime for 1 week, 50 mg at bedtime for 1 week and 75 mg daily at bedtime thereafter 90 tablet 3     aspirin 81 MG tablet Take  by mouth every other day.       atorvastatin (LIPITOR) 80 MG tablet TAKE ONE TABLET BY MOUTH ONE TIME DAILY  90 tablet 1     augmented betamethasone dipropionate (DIPROLENE-AF) 0.05 % ointment Apply to AA BID x 3-4 weeks then PRN 45 g 0     MARTITA CONTOUR NEXT test strip use to test blood sugar 4 times a day or as directed 100 each 5     BD FCO U/F 32G X 4 MM insulin pen needle USE 4 TIMES A DAY WITH INSULIN AND VICTOZA INJECTIONS 400 each 1     blood glucose monitoring (MARTITA CONTOUR NEXT) test strip Use to test blood sugar 4 times daily or as directed. 1 Box 9     blood glucose monitoring (NO BRAND SPECIFIED) test strip Reason for four times  daily checking is lack of diabetic control.  Patient is on multiple doses of insulin daily. Dispense what patient's plan will cover.  Dispense box of 100 strips at a time (Patient not taking: Reported on 1/18/2018) 1 Box 1     calcium carbonate-vitamin D (CALTRATE 600+D) 600-400 MG-UNIT CHEW Take 2 chew tab by mouth every evening       cetirizine (ZYRTEC) 10 MG tablet Take 1 tablet (10 mg) by mouth daily       Cholecalciferol (VITAMIN D) 2000 UNITS tablet Take 2,000 Units by mouth daily       CONTOUR NEXT TEST test strip USE TO TEST BLOOD SUGAR 4 TIMES DAILY OR AS DIRECTED. 100 each 8     cyanocolbalamin (VITAMIN B-12) 1000 MCG tablet Take 1,000 mcg by mouth every other day        dulaglutide (TRULICITY) 1.5 MG/0.5ML pen Inject 1.5 mg Subcutaneous every 7 days DISPENSE = 3 MONTH SUPPLY 6 mL 3     fenofibrate 160 MG tablet TAKE ONE TABLET BY MOUTH ONE TIME DAILY  90 tablet 2     fluticasone (FLONASE) 50 MCG/ACT nasal spray Spray 2 sprays into both nostrils daily (Patient not taking: Reported on 6/6/2018) 16 g 1     furosemide (LASIX) 20 MG tablet Take 1 tablet (20 mg) by mouth daily 90 tablet 3     hydrocortisone (WESTCORT) 0.2 % cream Apply topically 2 times daily Apply sparingly to affected area 2 times daily as needed. 45 g 2     insulin aspart (NOVOLOG FLEXPEN) 100 UNIT/ML injection INJECT 0-10 UNITS SUBCUTANEOUSLY BEFORE BREAKFAST, 12-15 UNITS BEFORE LUNCH, AND 15-20 UNITS BEFORE SUPPER 45 mL 1     insulin degludec (TRESIBA) 200 UNIT/ML pen Inject 100units subcu daily 45 mL 11     irbesartan (AVAPRO) 300 MG tablet Take 1 tablet (300 mg) by mouth daily 90 tablet 3     levothyroxine (SYNTHROID/LEVOTHROID) 50 MCG tablet Take 1 tablet (50 mcg) by mouth daily 90 tablet 3     Magnesium 500 MG CAPS Take 1 capsule by mouth daily.       metFORMIN (GLUCOPHAGE) 1000 MG tablet 1 tab twice daily 180 tablet 1     NAPROXEN PO Take 220 mg by mouth as needed for moderate pain       ONE TOUCH DELICA LANCETS MISC 1 Device 4 times  daily. 100 Stick prn     potassium chloride (K-TAB,KLOR-CON) 10 MEQ tablet TAKE ONE TABLET BY MOUTH ONE TIME DAILY 90 tablet 0     traZODone (DESYREL) 50 MG tablet take 1 to 2 tablets (50 to 100mg) by mouth nightly as needed 180 tablet 0     verapamil (CALAN-SR) 180 MG CR tablet Take 1 tablet (180 mg) by mouth 2 times daily 180 tablet 3     VITRON-C  MG TABS tablet TAKE TWO TABLETS BY MOUTH TWICE DAILY  360 tablet 1       Review of Systems     Constitutional: Normal appetite, feels well no polyuria or polydipsia.   Head: no headache   Eye: vision has declined   ENT: No throat congestion.   Respiratory: no cough   Cardiovascular: Has known murmur,   Gastrointestinal: Negative for vomiting, abdominal pain and diarrhea.  Genitourinary: No bladder problems.  Musculoskeletal: Negative for myalgias. No weakness.  Neurological: Negative for seizures and headaches.  Psychiatric/Behavioral: Negative for behavioral problem and dysphoric mood.    Past Medical History:   Diagnosis Date     Allergic rhinitis, cause unspecified      Hepatitis, unspecified 1968     Iron Deficiency Anemia 3/09     Lymphoma (H) 12/08     Nonsenile cataract      Obesity, unspecified      Other and unspecified hyperlipidemia      Rheumatic fever without mention of heart involvement infant     Type II or unspecified type diabetes mellitus without mention of complication, not stated as uncontrolled 1992     Unspecified essential hypertension      Unspecified hypothyroidism      Vitamin B12 deficiency 3/09       Past Surgical History:   Procedure Laterality Date     C APPENDECTOMY  1958     C NONSPECIFIC PROCEDURE  1976    (leg) cystectomy     CATARACT IOL, RT/LT Left 12/21/2000    left     CATARACT IOL, RT/LT Right 01/06/2000    right     GASTRIC BYPASS  2/04    Dr. Hebert     LYMPH NODE BIOPSY  12/08    right groin, Dr. Licona     TONSILLECTOMY & ADENOIDECTOMY  1968       Family History   Problem Relation Age of Onset     Cardiovascular Father        "  AAA     Diabetes Mother      C.A.D. Mother         52     Eye Disorder Mother         glaucoma     Glaucoma Mother      Cardiovascular Brother         AAA, PVD     Cancer Brother         bladder     Cardiovascular Brother         AAA, valvular heart disease     Diabetes Brother         age 53     Cancer Brother         liver cancer     Glaucoma Other         MGGF     Macular Degeneration No family hx of        Social History     Social History     Marital status:      Spouse name: N/A     Number of children: 0     Years of education: N/A     Occupational History      [employer] [employer]     Social History Main Topics     Smoking status: Never Smoker     Smokeless tobacco: Never Used     Alcohol use No     Drug use: No     Sexual activity: No     Other Topics Concern     Caffeine Concern Yes     20 oz daily     Exercise No     Seat Belt Yes     Self-Exams Yes     Social History Narrative    Living arrangements - the patient lives alone.       Objective:   Ht 1.588 m (5' 2.52\")   Wt 106 kg (233 lb 11.2 oz)   BMI 42.04 kg/m     233 lbs 11.2 oz  Wt Readings from Last 4 Encounters:   12/10/18 106 kg (233 lb 11.2 oz)   06/06/18 106.5 kg (234 lb 12.8 oz)   01/18/18 106.9 kg (235 lb 9.6 oz)   12/06/17 (P) 106.2 kg (234 lb 3.2 oz)       Constitutional: Obese female in no acute distress cooperative and comfortable  EYES: anicteric  Some bruits from HEENT: Mouth/Throat: Mucous membrane is moist. Oropharynx is clear. No adenopathy. No large goiters, difficult to palpate due to short neck  Cardiovascular: RRR, S1, S2 normal.  Aortic area systolic murmur  Pulmonary/Chest: CTAB. No wheezing or rales   Abdominal: +BS. Nontender to palpation.  Abdomen is distended,  small bruises around her injection site.   No lipo hypertrophy.  White striae.  Neurological: Alert. Normal affect. Normal speech   Extremities: No clubbing, cyanosis or inflammation   Bilateral lower extremity edema left worse than " "the right.  Skin: normal texture, color  Feet: Bilateral loss of sensation in both feet, significant edema.  Lymphatic: no cervical lymphadenopathy.  Psychological: appropriate mood   Labs\"   Normal electrolytes, stable renal function.  Microalbuminuria noted in August 2017  Normal bicarb    In House Labs:   Lab Results   Component Value Date    A1C 9.3 03/28/2017    A1C 9.0 10/17/2016    A1C 8.6 06/17/2016    A1C 8.3 02/16/2016    A1C 8.6 10/05/2015       TSH   Date Value Ref Range Status   05/24/2018 2.54 0.40 - 4.00 mU/L Final   03/12/2018 4.78 (H) 0.40 - 4.00 mU/L Final   03/28/2017 2.84 0.40 - 4.00 mU/L Final   02/10/2017 2.55 0.40 - 4.00 mU/L Final   10/17/2016 4.39 (H) 0.40 - 4.00 mU/L Final     T4 Free   Date Value Ref Range Status   03/12/2018 1.17 0.76 - 1.46 ng/dL Final   10/17/2016 1.17 0.76 - 1.46 ng/dL Final   05/05/2010 1.05 0.70 - 1.85 ng/dL Final   09/13/2007 1.00 0.70 - 1.85 ng/dL Final       Creatinine   Date Value Ref Range Status   12/10/2018 1.63 (H) 0.52 - 1.04 mg/dL Final   ]    Recent Labs   Lab Test  03/28/17   1014  06/17/16   0750  05/22/15   0848   05/03/14   1018   CHOL  185  166  175   < >  161   HDL  35*  34*  32*   < >  39*   LDL  101*  78  76   < >  76   TRIG  247*  271*  334*   < >  231*   CHOLHDLRATIO   --    --   5.5*   --   4.2    < > = values in this interval not displayed.       No results found for: [id number], [id number], [id number]    Again, thank you for allowing me to participate in the care of your patient.      Sincerely,    Melvi Naik MD      "

## 2018-12-10 NOTE — PROGRESS NOTES
Interval history:     Stopped Beasley Hernandez diet - felt that portio size was larger.   Tresiba reduced to 80 units due to lows in AM  Severe NPDR and macular edema now needing monthly injection. Seen for hollenhorst plaque.   A1c 8.1   Did not bring meter.       Assessment/Treatment Plan:      68 year old  female with history morbid obesity S/P Gastric bypass surgery, hypothyroidism who presents today for FU management of type 2 diabetes.  Her diabetes mellitus is complicated by diabetic neuropathy, mild nonproliferative retinopathy and nephropathy.    Type 2 Diabetes since her early 20s, suboptimal control.     Barriers to achieving glycemic goals  Lack of testing before meals.   A1c is 8.1     Tresiba 80 units daily  NovoLog 2 units per carb   Dulaglutide 1.5 mg weekly.   Metformin reduced to 1 g today for cr cl of 34   If A1c does not improve, possibly start Jardiance at low dose.     Lack of insulin with snacking:    Exercise: Planning to do some work out with her sister.   Lack of exercise: limited by pain in the joints.     Anemia:   Hemoglobin A1c may be falsely lower than her actual value    Hypothyroidism on levothyroxine 50  g daily. Dose recently increased due to high TSH  This has been followed by primary care physician  Normal TSH in May.     Morbid obesity  We discussed about diet and exercise  Weight stable on Dulaglutide.  Started Topiramate, labs for cushings  Diabetic neuropathy:   No significant symptoms at this time.   Start alpha lipoic acid 600 mg daily if symptoms are worse    Melvi Naik MD  [id number]  Endocrinology Service      =====================  HPI:      History of Diabetes  Type 2 diagnosed in late 20s.  In the 1970s she was initially started on oral medications and was transitioned to insulin in the 80s.  She had a gastric bypass surgery done in 2004 after which she lost about 70 pounds.  Her blood sugars were better controlled for the next few years but has gradually worsened  over time.  Her weight had been steady around 210-215 pounds but in the recent years as her activity was limited by knee pain, she has gained significant weight.   She has a strong family history of type 2 diabetes mellitus with Mother and Sister with type 2 diabetes.      Complications  She has triopathy from diabetes.     1. Retinopathy: Last exam was in May 19 2017, mild NPDR    2.  Nephropathy:  Lab Results   Component Value Date    MICROL 46 03/28/2017     Creatinine   Date Value Ref Range Status   12/10/2018 1.63 (H) 0.52 - 1.04 mg/dL Final   ]    3. Neuropathy   Occasional tingling that improves with pain patch    4. Hypoglycemia   This is rare, but does happen if does not eat after taking insulin.    5. Macrovascular:   No history of cardiac events or stroke        Treatment  Diabetes Medication(s)     Biguanides Sig    metFORMIN (GLUCOPHAGE) 1000 MG tablet 1 tab twice daily    Insulin Sig    insulin degludec (TRESIBA) 100 UNIT/ML pen Inject Subcutaneous daily    insulin aspart (NOVOLOG FLEXPEN) 100 UNIT/ML injection INJECT 0-10 UNITS SUBCUTANEOUSLY BEFORE BREAKFAST, 12-15 UNITS BEFORE LUNCH, AND 15-20 UNITS BEFORE SUPPER    insulin degludec (TRESIBA) 200 UNIT/ML pen Inject 100units subcu daily    Incretin Mimetic Agents (GLP-1 Receptor Agonists) Sig    dulaglutide (TRULICITY) 1.5 MG/0.5ML pen Inject 1.5 mg Subcutaneous every 7 days DISPENSE = 3 MONTH SUPPLY        2 units per carb.     Prevention  Lipid  LDL Cholesterol Calculated   Date Value Ref Range Status   03/12/2018 94 <100 mg/dL Final     Comment:     Desirable:       <100 mg/dl   03/28/2017 101 (H) <100 mg/dL Final     Comment:     Above desirable:  100-129 mg/dl   Borderline High:  130-159 mg/dL   High:             160-189 mg/dL   Very high:       >189 mg/dl       LDL Cholesterol Direct   Date Value Ref Range Status   12/10/2018 115 (H) <100 mg/dL Final     Comment:     Desirable:       <100 mg/dl       Medications     HMG CoA Reductase Inhibitors     atorvastatin (LIPITOR) 80 MG tablet    Salicylates    aspirin 81 MG tablet          Renal  Medication (Note: This includes the hypertensive combination subclass to make sure to show all ACEI/ARB's.)     Angiotensin II Receptor Antagonists Sig    irbesartan (AVAPRO) 300 MG tablet Take 1 tablet (300 mg) by mouth daily            Weight  Wt Readings from Last 4 Encounters:   12/10/18 106 kg (233 lb 11.2 oz)   06/06/18 106.5 kg (234 lb 12.8 oz)   01/18/18 106.9 kg (235 lb 9.6 oz)   12/06/17 (P) 106.2 kg (234 lb 3.2 oz)         Diet: She works at [employer],   On [employer].  She now has retired.    Exercise : limited     Weight trend  Wt Readings from Last 4 Encounters:   12/10/18 106 kg (233 lb 11.2 oz)   06/06/18 106.5 kg (234 lb 12.8 oz)   01/18/18 106.9 kg (235 lb 9.6 oz)   12/06/17 (P) 106.2 kg (234 lb 3.2 oz)       A1c today is 8.1  Lab Results   Component Value Date    A1C 9.3 03/28/2017    A1C 9.0 10/17/2016    A1C 8.6 06/17/2016    A1C 8.3 02/16/2016    A1C 8.6 10/05/2015       Barriers to achieve glycemic goals:      Type 2 Diabetes since her early 20s    Barriers to achieving glycemic goals  Lack of testing before meals: Although she has days when she does not check, she has increased her frequency of testing    Lack of insulin with snacking / Ineffective bydureon  Trulicity has helped with prandial BG    Recent change in Lantus to vial : Tresiba with good effects    High Carb diet: Diet improved with Trulicity.     Lack of exercise: limited by pain in the joints.   Morbid obesity       Allergies   Allergen Reactions     Clonidine      headaches     Fexofenadine Hydrochloride      Allegra--headache     Gemfibrozil      lopid--rash ??from med     Lisinopril      edema     Norvasc [Amlodipine Besylate]      Hair loss     Pioglitazone Hydrochloride Swelling     actos--ankle edema     Doxazosin Mesylate Rash     cardura--rash       Current Outpatient Medications   Medication Sig  Dispense Refill     insulin degludec (TRESIBA) 100 UNIT/ML pen Inject Subcutaneous daily       topiramate (TOPAMAX) 25 MG tablet 25 mg at bedtime for 1 week, 50 mg at bedtime for 1 week and 75 mg daily at bedtime thereafter 90 tablet 3     aspirin 81 MG tablet Take  by mouth every other day.       atorvastatin (LIPITOR) 80 MG tablet TAKE ONE TABLET BY MOUTH ONE TIME DAILY  90 tablet 1     augmented betamethasone dipropionate (DIPROLENE-AF) 0.05 % ointment Apply to AA BID x 3-4 weeks then PRN 45 g 0     MARTITA CONTOUR NEXT test strip use to test blood sugar 4 times a day or as directed 100 each 5     BD FCO U/F 32G X 4 MM insulin pen needle USE 4 TIMES A DAY WITH INSULIN AND VICTOZA INJECTIONS 400 each 1     blood glucose monitoring (MARTITA CONTOUR NEXT) test strip Use to test blood sugar 4 times daily or as directed. 1 Box 9     blood glucose monitoring (NO BRAND SPECIFIED) test strip Reason for four times daily checking is lack of diabetic control.  Patient is on multiple doses of insulin daily. Dispense what patient's plan will cover.  Dispense box of 100 strips at a time (Patient not taking: Reported on 1/18/2018) 1 Box 1     calcium carbonate-vitamin D (CALTRATE 600+D) 600-400 MG-UNIT CHEW Take 2 chew tab by mouth every evening       cetirizine (ZYRTEC) 10 MG tablet Take 1 tablet (10 mg) by mouth daily       Cholecalciferol (VITAMIN D) 2000 UNITS tablet Take 2,000 Units by mouth daily       CONTOUR NEXT TEST test strip USE TO TEST BLOOD SUGAR 4 TIMES DAILY OR AS DIRECTED. 100 each 8     cyanocolbalamin (VITAMIN B-12) 1000 MCG tablet Take 1,000 mcg by mouth every other day        dulaglutide (TRULICITY) 1.5 MG/0.5ML pen Inject 1.5 mg Subcutaneous every 7 days DISPENSE = 3 MONTH SUPPLY 6 mL 3     fenofibrate 160 MG tablet TAKE ONE TABLET BY MOUTH ONE TIME DAILY  90 tablet 2     fluticasone (FLONASE) 50 MCG/ACT nasal spray Spray 2 sprays into both nostrils daily (Patient not taking: Reported on 6/6/2018) 16 g 1      furosemide (LASIX) 20 MG tablet Take 1 tablet (20 mg) by mouth daily 90 tablet 3     hydrocortisone (WESTCORT) 0.2 % cream Apply topically 2 times daily Apply sparingly to affected area 2 times daily as needed. 45 g 2     insulin aspart (NOVOLOG FLEXPEN) 100 UNIT/ML injection INJECT 0-10 UNITS SUBCUTANEOUSLY BEFORE BREAKFAST, 12-15 UNITS BEFORE LUNCH, AND 15-20 UNITS BEFORE SUPPER 45 mL 1     insulin degludec (TRESIBA) 200 UNIT/ML pen Inject 100units subcu daily 45 mL 11     irbesartan (AVAPRO) 300 MG tablet Take 1 tablet (300 mg) by mouth daily 90 tablet 3     levothyroxine (SYNTHROID/LEVOTHROID) 50 MCG tablet Take 1 tablet (50 mcg) by mouth daily 90 tablet 3     Magnesium 500 MG CAPS Take 1 capsule by mouth daily.       metFORMIN (GLUCOPHAGE) 1000 MG tablet 1 tab twice daily 180 tablet 1     NAPROXEN PO Take 220 mg by mouth as needed for moderate pain       ONE TOUCH DELICA LANCETS MISC 1 Device 4 times daily. 100 Stick prn     potassium chloride (K-TAB,KLOR-CON) 10 MEQ tablet TAKE ONE TABLET BY MOUTH ONE TIME DAILY 90 tablet 0     traZODone (DESYREL) 50 MG tablet take 1 to 2 tablets (50 to 100mg) by mouth nightly as needed 180 tablet 0     verapamil (CALAN-SR) 180 MG CR tablet Take 1 tablet (180 mg) by mouth 2 times daily 180 tablet 3     VITRON-C  MG TABS tablet TAKE TWO TABLETS BY MOUTH TWICE DAILY  360 tablet 1       Review of Systems     Constitutional: Normal appetite, feels well no polyuria or polydipsia.   Head: no headache   Eye: vision has declined   ENT: No throat congestion.   Respiratory: no cough   Cardiovascular: Has known murmur,   Gastrointestinal: Negative for vomiting, abdominal pain and diarrhea.  Genitourinary: No bladder problems.  Musculoskeletal: Negative for myalgias. No weakness.  Neurological: Negative for seizures and headaches.  Psychiatric/Behavioral: Negative for behavioral problem and dysphoric mood.    Past Medical History:   Diagnosis Date     Allergic rhinitis, cause  unspecified      Hepatitis, unspecified 1968     Iron Deficiency Anemia 3/09     Lymphoma (H) 12/08     Nonsenile cataract      Obesity, unspecified      Other and unspecified hyperlipidemia      Rheumatic fever without mention of heart involvement infant     Type II or unspecified type diabetes mellitus without mention of complication, not stated as uncontrolled 1992     Unspecified essential hypertension      Unspecified hypothyroidism      Vitamin B12 deficiency 3/09       Past Surgical History:   Procedure Laterality Date     C APPENDECTOMY  1958     C NONSPECIFIC PROCEDURE  1976    (leg) cystectomy     CATARACT IOL, RT/LT Left 12/21/2000    left     CATARACT IOL, RT/LT Right 01/06/2000    right     GASTRIC BYPASS  2/04    Dr. Hebert     LYMPH NODE BIOPSY  12/08    right groin, Dr. Licona     TONSILLECTOMY & ADENOIDECTOMY  1968       Family History   Problem Relation Age of Onset     Cardiovascular Father         AAA     Diabetes Mother      C.A.D. Mother         52     Eye Disorder Mother         glaucoma     Glaucoma Mother      Cardiovascular Brother         AAA, PVD     Cancer Brother         bladder     Cardiovascular Brother         AAA, valvular heart disease     Diabetes Brother         age 53     Cancer Brother         liver cancer     Glaucoma Other         MGGF     Macular Degeneration No family hx of        Social History     Social History     Marital status:      Spouse name: N/A     Number of children: 0     Years of education: N/A     Occupational History      [employer] [employer]     Social History Main Topics     Smoking status: Never Smoker     Smokeless tobacco: Never Used     Alcohol use No     Drug use: No     Sexual activity: No     Other Topics Concern     Caffeine Concern Yes     20 oz daily     Exercise No     Seat Belt Yes     Self-Exams Yes     Social History Narrative    Living arrangements - the patient lives alone.       Objective:   Ht 1.588 m (5'  "2.52\")   Wt 106 kg (233 lb 11.2 oz)   BMI 42.04 kg/m    233 lbs 11.2 oz  Wt Readings from Last 4 Encounters:   12/10/18 106 kg (233 lb 11.2 oz)   06/06/18 106.5 kg (234 lb 12.8 oz)   01/18/18 106.9 kg (235 lb 9.6 oz)   12/06/17 (P) 106.2 kg (234 lb 3.2 oz)       Constitutional: Obese female in no acute distress cooperative and comfortable  EYES: anicteric  Some bruits from HEENT: Mouth/Throat: Mucous membrane is moist. Oropharynx is clear. No adenopathy. No large goiters, difficult to palpate due to short neck  Cardiovascular: RRR, S1, S2 normal.  Aortic area systolic murmur  Pulmonary/Chest: CTAB. No wheezing or rales   Abdominal: +BS. Nontender to palpation.  Abdomen is distended,  small bruises around her injection site.   No lipo hypertrophy.  White striae.  Neurological: Alert. Normal affect. Normal speech   Extremities: No clubbing, cyanosis or inflammation   Bilateral lower extremity edema left worse than the right.  Skin: normal texture, color  Feet: Bilateral loss of sensation in both feet, significant edema.  Lymphatic: no cervical lymphadenopathy.  Psychological: appropriate mood   Labs\"   Normal electrolytes, stable renal function.  Microalbuminuria noted in August 2017  Normal bicarb    In House Labs:   Lab Results   Component Value Date    A1C 9.3 03/28/2017    A1C 9.0 10/17/2016    A1C 8.6 06/17/2016    A1C 8.3 02/16/2016    A1C 8.6 10/05/2015       TSH   Date Value Ref Range Status   05/24/2018 2.54 0.40 - 4.00 mU/L Final   03/12/2018 4.78 (H) 0.40 - 4.00 mU/L Final   03/28/2017 2.84 0.40 - 4.00 mU/L Final   02/10/2017 2.55 0.40 - 4.00 mU/L Final   10/17/2016 4.39 (H) 0.40 - 4.00 mU/L Final     T4 Free   Date Value Ref Range Status   03/12/2018 1.17 0.76 - 1.46 ng/dL Final   10/17/2016 1.17 0.76 - 1.46 ng/dL Final   05/05/2010 1.05 0.70 - 1.85 ng/dL Final   09/13/2007 1.00 0.70 - 1.85 ng/dL Final       Creatinine   Date Value Ref Range Status   12/10/2018 1.63 (H) 0.52 - 1.04 mg/dL Final "   ]    Recent Labs   Lab Test  03/28/17   1014  06/17/16   0750  05/22/15   0848   05/03/14   1018   CHOL  185  166  175   < >  161   HDL  35*  34*  32*   < >  39*   LDL  101*  78  76   < >  76   TRIG  247*  271*  334*   < >  231*   CHOLHDLRATIO   --    --   5.5*   --   4.2    < > = values in this interval not displayed.       No results found for: [id number], [id number], [id number]    Labs reviewed: Plan for dex suppression test, myc sent.

## 2018-12-11 LAB — IGF-I BLD-MCNC: 86 NG/ML (ref 28–231)

## 2018-12-12 RX ORDER — DEXAMETHASONE 1 MG
TABLET ORAL
Qty: 1 TABLET | Refills: 0 | Status: SHIPPED | OUTPATIENT
Start: 2018-12-12 | End: 2019-04-22

## 2018-12-13 LAB — ACTH PLAS-MCNC: 10 PG/ML

## 2018-12-20 DIAGNOSIS — E11.40 TYPE 2 DIABETES MELLITUS WITH DIABETIC NEUROPATHY, WITH LONG-TERM CURRENT USE OF INSULIN (H): ICD-10-CM

## 2018-12-20 DIAGNOSIS — E66.01 MORBID OBESITY (H): ICD-10-CM

## 2018-12-20 DIAGNOSIS — Z79.4 TYPE 2 DIABETES MELLITUS WITH DIABETIC NEUROPATHY, WITH LONG-TERM CURRENT USE OF INSULIN (H): ICD-10-CM

## 2018-12-20 LAB — CORTIS SERPL-MCNC: 1.8 UG/DL (ref 4–22)

## 2018-12-20 PROCEDURE — 36415 COLL VENOUS BLD VENIPUNCTURE: CPT | Performed by: INTERNAL MEDICINE

## 2018-12-20 PROCEDURE — 82533 TOTAL CORTISOL: CPT | Performed by: INTERNAL MEDICINE

## 2018-12-21 NOTE — RESULT ENCOUNTER NOTE
The dexamethasone suppression test was normal indicating no excess cortisol leading to weight gain.   Continue taking Topiramate  Melvi Naik MD  [id number]  Endocrinology Service

## 2019-01-09 ENCOUNTER — TRANSFERRED RECORDS (OUTPATIENT)
Dept: HEALTH INFORMATION MANAGEMENT | Facility: CLINIC | Age: 69
End: 2019-01-09

## 2019-01-21 DIAGNOSIS — I10 ESSENTIAL HYPERTENSION: ICD-10-CM

## 2019-01-21 DIAGNOSIS — E78.5 HYPERLIPIDEMIA LDL GOAL <100: ICD-10-CM

## 2019-01-21 NOTE — TELEPHONE ENCOUNTER
"Requested Prescriptions   Pending Prescriptions Disp Refills     fenofibrate (TRIGLIDE/LOFIBRA) 160 MG tablet [Pharmacy Med Name: Fenofibrate Oral Tablet 160 MG]  Last Written Prescription Date:  04/25/2018  Last Fill Quantity: 90,  # refills: 02   Last Office Visit: 1/18/2018   Future Office Visit:      90 tablet 1     Sig: TAKE ONE TABLET BY MOUTH ONE TIME DAILY    Fibrates Failed - 1/21/2019  7:01 AM       Failed - Recent (12 mo) or future (30 days) visit within the authorizing provider's specialty    Patient had office visit in the last 12 months or has a visit in the next 30 days with authorizing provider or within the authorizing provider's specialty.  See \"Patient Info\" tab in inbasket, or \"Choose Columns\" in Meds & Orders section of the refill encounter.             Passed - Lipid panel on file in past 12 months    Recent Labs   Lab Test 12/10/18  1448 03/12/18  1334  05/22/15  0848   CHOL  --  172   < > 175   TRIG  --  213*   < > 334*   HDL  --  35*   < > 32*   * 94   < > 76   NHDL  --  137*   < >  --    VLDL  --   --   --  67*   CHOLHDLRATIO  --   --   --  5.5*    < > = values in this interval not displayed.              Passed - No abnormal creatine kinase in past 12 months    Recent Labs   Lab Test 08/25/17  0837   CKT 64               Passed - Medication is active on med list       Passed - Patient is age 18 or older       Passed - No active pregnancy on record       Passed - No positive pregnancy test in past 12 months        verapamil ER (CALAN-SR) 180 MG CR tablet [Pharmacy Med Name: Verapamil HCl ER Oral Tablet Extended Release 180 MG]  Last Written Prescription Date:  01/18/2018  Last Fill Quantity: 180,  # refills: 03   Last Office Visit: 1/18/2018   Future Office Visit:      180 tablet 2     Sig: TAKE ONE TABLET BY MOUTH TWICE DAILY    Calcium Channel Blockers Protocol  Failed - 1/21/2019  7:01 AM       Failed - Normal ALT in past 12 months    Recent Labs   Lab Test 08/25/17  0837   ALT 33 " "           Failed - Recent (12 mo) or future (30 days) visit within the authorizing provider's specialty    Patient had office visit in the last 12 months or has a visit in the next 30 days with authorizing provider or within the authorizing provider's specialty.  See \"Patient Info\" tab in inbasket, or \"Choose Columns\" in Meds & Orders section of the refill encounter.             Failed - Normal serum creatinine on file in past 12 months    Recent Labs   Lab Test 12/10/18  1448   CR 1.63*            Passed - Blood pressure under 140/90 in past 12 months    BP Readings from Last 3 Encounters:   06/06/18 124/74   01/18/18 152/70   12/06/17 165/79                Passed - Medication is active on med list       Passed - Patient is age 18 or older       Passed - No active pregnancy on record       Passed - No positive pregnancy test in past 12 months          "

## 2019-01-22 RX ORDER — FENOFIBRATE 160 MG/1
TABLET ORAL
Qty: 90 TABLET | Refills: 3 | Status: SHIPPED | OUTPATIENT
Start: 2019-01-22 | End: 2020-01-27

## 2019-01-22 RX ORDER — VERAPAMIL HYDROCHLORIDE 180 MG/1
TABLET, EXTENDED RELEASE ORAL
Qty: 180 TABLET | Refills: 3 | Status: SHIPPED | OUTPATIENT
Start: 2019-01-22 | End: 2020-01-28

## 2019-01-22 NOTE — TELEPHONE ENCOUNTER
Routing refill request to provider for review/approval because:  Drug interaction warning    Patient is also due for an office visit

## 2019-01-25 ENCOUNTER — E-VISIT (OUTPATIENT)
Dept: INTERNAL MEDICINE | Facility: CLINIC | Age: 69
End: 2019-01-25
Payer: COMMERCIAL

## 2019-01-25 DIAGNOSIS — J06.9 UPPER RESPIRATORY TRACT INFECTION, UNSPECIFIED TYPE: Primary | ICD-10-CM

## 2019-01-25 PROCEDURE — 99444 ZZC PHYSICIAN ONLINE EVALUATION & MANAGEMENT SERVICE: CPT | Performed by: INTERNAL MEDICINE

## 2019-01-25 RX ORDER — CEFPROZIL 500 MG/1
500 TABLET, FILM COATED ORAL 2 TIMES DAILY
Qty: 20 TABLET | Refills: 0 | Status: SHIPPED | OUTPATIENT
Start: 2019-01-25 | End: 2019-04-04

## 2019-01-26 DIAGNOSIS — I10 ESSENTIAL HYPERTENSION: ICD-10-CM

## 2019-01-26 NOTE — TELEPHONE ENCOUNTER
"Requested Prescriptions   Pending Prescriptions Disp Refills     irbesartan (AVAPRO) 300 MG tablet [Pharmacy Med Name: Irbesartan Oral Tablet 300 MG] 90 tablet 2    Last Written Prescription Date:  1/18/2018  Last Fill Quantity: 90,  # refills: 3   Last office visit: 1/18/2018 with prescribing provider:  1/18/2018   Future Office Visit:     Sig: TAKE ONE TABLET BY MOUTH ONE TIME DAILY    Angiotensin-II Receptors Failed - 1/26/2019  3:59 AM       Failed - Recent (12 mo) or future (30 days) visit within the authorizing provider's specialty    Patient had office visit in the last 12 months or has a visit in the next 30 days with authorizing provider or within the authorizing provider's specialty.  See \"Patient Info\" tab in inbasket, or \"Choose Columns\" in Meds & Orders section of the refill encounter.             Failed - Normal serum creatinine on file in past 12 months    Recent Labs   Lab Test 12/10/18  1448   CR 1.63*            Passed - Blood pressure under 140/90 in past 12 months    BP Readings from Last 3 Encounters:   06/06/18 124/74   01/18/18 152/70   12/06/17 165/79                Passed - Medication is active on med list       Passed - Patient is age 18 or older       Passed - No active pregnancy on record       Passed - Normal serum potassium on file in past 12 months    Recent Labs   Lab Test 12/10/18  1448   POTASSIUM 4.6                   Passed - No positive pregnancy test in past 12 months          "

## 2019-01-29 RX ORDER — IRBESARTAN 300 MG/1
TABLET ORAL
Qty: 90 TABLET | Refills: 1 | Status: SHIPPED | OUTPATIENT
Start: 2019-01-29 | End: 2019-07-24

## 2019-01-29 NOTE — TELEPHONE ENCOUNTER
Routing refill request to provider for review/approval because:  Labs out of range:  Creatinine   Patient needs to be seen because it has been more than 1 year since last office visit.

## 2019-01-30 DIAGNOSIS — E11.40 TYPE 2 DIABETES MELLITUS WITH DIABETIC NEUROPATHY (H): ICD-10-CM

## 2019-02-18 ENCOUNTER — TRANSFERRED RECORDS (OUTPATIENT)
Dept: HEALTH INFORMATION MANAGEMENT | Facility: CLINIC | Age: 69
End: 2019-02-18

## 2019-02-23 DIAGNOSIS — E03.9 HYPOTHYROIDISM, UNSPECIFIED TYPE: ICD-10-CM

## 2019-02-23 DIAGNOSIS — R25.2 MUSCLE CRAMPS: ICD-10-CM

## 2019-02-23 NOTE — TELEPHONE ENCOUNTER
"Requested Prescriptions   Pending Prescriptions Disp Refills     levothyroxine (SYNTHROID/LEVOTHROID) 50 MCG tablet [Pharmacy Med Name: Levothyroxine Sodium Oral Tablet 50 MCG] 90 tablet 2    Last Written Prescription Date:  3/22/2018  Last Fill Quantity: 90,  # refills: 3   Last office visit: 1/18/2018 with prescribing provider:  1/18/2018   Future Office Visit:     Sig: TAKE 1 TABLET BY MOUTH DAILY.    Thyroid Protocol Failed - 2/23/2019  7:00 AM       Failed - Recent (12 mo) or future (30 days) visit within the authorizing provider's specialty    Patient had office visit in the last 12 months or has a visit in the next 30 days with authorizing provider or within the authorizing provider's specialty.  See \"Patient Info\" tab in inbasket, or \"Choose Columns\" in Meds & Orders section of the refill encounter.             Passed - Patient is 12 years or older       Passed - Medication is active on med list       Passed - Normal TSH on file in past 12 months    Recent Labs   Lab Test 05/24/18  1209   TSH 2.54             Passed - No active pregnancy on record    If patient is pregnant or has had a positive pregnancy test, please check TSH.         Passed - No positive pregnancy test in past 12 months    If patient is pregnant or has had a positive pregnancy test, please check TSH.          KLOR-CON 10 MEQ CR tablet [Pharmacy Med Name: Klor-Con 10 Oral Tablet Extended Release 10 MEQ] 90 tablet 0    Last Written Prescription Date:  11/23/2018  Last Fill Quantity: 90,  # refills: 0   Last office visit: 1/18/2018 with prescribing provider:  1/18/2018   Future Office Visit:     Sig: TAKE ONE TABLET BY MOUTH ONE TIME DAILY    Potassium Supplements Protocol Failed - 2/23/2019  7:00 AM       Failed - Recent (12 mo) or future (30 days) visit within the authorizing provider's specialty    Patient had office visit in the last 12 months or has a visit in the next 30 days with authorizing provider or within the authorizing " "provider's specialty.  See \"Patient Info\" tab in inbasket, or \"Choose Columns\" in Meds & Orders section of the refill encounter.             Passed - Medication is active on med list       Passed - Patient is age 18 or older       Passed - Normal serum potassium in past 12 months    Recent Labs   Lab Test 12/10/18  1448   POTASSIUM 4.6                      "

## 2019-02-26 RX ORDER — LEVOTHYROXINE SODIUM 50 UG/1
TABLET ORAL
Qty: 90 TABLET | Refills: 2 | Status: SHIPPED | OUTPATIENT
Start: 2019-02-26 | End: 2020-01-10

## 2019-02-26 RX ORDER — POTASSIUM CHLORIDE 750 MG/1
TABLET, FILM COATED, EXTENDED RELEASE ORAL
Qty: 90 TABLET | Refills: 0 | Status: SHIPPED | OUTPATIENT
Start: 2019-02-26 | End: 2019-07-29

## 2019-03-12 ENCOUNTER — DOCUMENTATION ONLY (OUTPATIENT)
Dept: CARE COORDINATION | Facility: CLINIC | Age: 69
End: 2019-03-12

## 2019-03-31 ENCOUNTER — MYC MEDICAL ADVICE (OUTPATIENT)
Dept: INTERNAL MEDICINE | Facility: CLINIC | Age: 69
End: 2019-03-31

## 2019-03-31 DIAGNOSIS — E78.5 HYPERLIPIDEMIA LDL GOAL <100: ICD-10-CM

## 2019-04-01 RX ORDER — ATORVASTATIN CALCIUM 80 MG/1
80 TABLET, FILM COATED ORAL DAILY
Qty: 30 TABLET | Refills: 1 | Status: SHIPPED | OUTPATIENT
Start: 2019-04-01 | End: 2020-07-13

## 2019-04-01 NOTE — TELEPHONE ENCOUNTER
"Requested Prescriptions   Pending Prescriptions Disp Refills     atorvastatin (LIPITOR) 80 MG tablet 30 tablet 1     Sig: Take 1 tablet (80 mg) by mouth daily    Statins Protocol Failed - 4/1/2019 12:35 PM       Failed - Recent (12 mo) or future (30 days) visit within the authorizing provider's specialty    Patient had office visit in the last 12 months or has a visit in the next 30 days with authorizing provider or within the authorizing provider's specialty.  See \"Patient Info\" tab in inbasket, or \"Choose Columns\" in Meds & Orders section of the refill encounter.    Last Written Prescription Date:  10/30/2018  Last Fill Quantity: 90,  # refills: 1   Last office visit: 1/18/2018 with prescribing provider:  Dimitry Howard     Future Office Visit:           Passed - LDL on file in past 12 months    Recent Labs   Lab Test 12/10/18  1448   *            Passed - No abnormal creatine kinase in past 12 months    Recent Labs   Lab Test 08/25/17  0837   CKT 64               Passed - Medication is active on med list       Passed - Patient is age 18 or older       Passed - No active pregnancy on record       Passed - No positive pregnancy test in past 12 months          Medication is being filled for 1 time refill only due to:  Patient needs to be seen because it has been more than one year since last visit.     Prescription approved per [facility] Refill Protocol.    Floresita TUCKER RN, BSN, PHN      "

## 2019-04-02 ASSESSMENT — ACTIVITIES OF DAILY LIVING (ADL): CURRENT_FUNCTION: NO ASSISTANCE NEEDED

## 2019-04-04 ENCOUNTER — OFFICE VISIT (OUTPATIENT)
Dept: INTERNAL MEDICINE | Facility: CLINIC | Age: 69
End: 2019-04-04
Payer: COMMERCIAL

## 2019-04-04 VITALS
RESPIRATION RATE: 16 BRPM | TEMPERATURE: 98 F | WEIGHT: 228.1 LBS | DIASTOLIC BLOOD PRESSURE: 60 MMHG | HEIGHT: 62 IN | BODY MASS INDEX: 41.97 KG/M2 | SYSTOLIC BLOOD PRESSURE: 122 MMHG | HEART RATE: 83 BPM | OXYGEN SATURATION: 97 %

## 2019-04-04 DIAGNOSIS — N25.81 SECONDARY RENAL HYPERPARATHYROIDISM (H): ICD-10-CM

## 2019-04-04 DIAGNOSIS — Z12.11 COLON CANCER SCREENING: ICD-10-CM

## 2019-04-04 DIAGNOSIS — I10 ESSENTIAL HYPERTENSION: ICD-10-CM

## 2019-04-04 DIAGNOSIS — Z13.820 SCREENING FOR OSTEOPOROSIS: ICD-10-CM

## 2019-04-04 DIAGNOSIS — Z79.4 TYPE 2 DIABETES MELLITUS WITH DIABETIC NEUROPATHY, WITH LONG-TERM CURRENT USE OF INSULIN (H): ICD-10-CM

## 2019-04-04 DIAGNOSIS — Z78.0 ASYMPTOMATIC MENOPAUSAL STATE: ICD-10-CM

## 2019-04-04 DIAGNOSIS — J06.9 UPPER RESPIRATORY TRACT INFECTION, UNSPECIFIED TYPE: ICD-10-CM

## 2019-04-04 DIAGNOSIS — E66.01 MORBID OBESITY (H): ICD-10-CM

## 2019-04-04 DIAGNOSIS — E11.40 TYPE 2 DIABETES MELLITUS WITH DIABETIC NEUROPATHY, WITH LONG-TERM CURRENT USE OF INSULIN (H): ICD-10-CM

## 2019-04-04 DIAGNOSIS — E78.5 HYPERLIPIDEMIA LDL GOAL <100: ICD-10-CM

## 2019-04-04 DIAGNOSIS — Z00.00 MEDICARE ANNUAL WELLNESS VISIT, SUBSEQUENT: Primary | ICD-10-CM

## 2019-04-04 DIAGNOSIS — E03.9 HYPOTHYROIDISM, UNSPECIFIED TYPE: ICD-10-CM

## 2019-04-04 DIAGNOSIS — N18.30 CKD (CHRONIC KIDNEY DISEASE) STAGE 3, GFR 30-59 ML/MIN (H): ICD-10-CM

## 2019-04-04 PROCEDURE — 99214 OFFICE O/P EST MOD 30 MIN: CPT | Mod: 25 | Performed by: INTERNAL MEDICINE

## 2019-04-04 PROCEDURE — 99397 PER PM REEVAL EST PAT 65+ YR: CPT | Performed by: INTERNAL MEDICINE

## 2019-04-04 RX ORDER — CEFPROZIL 500 MG/1
500 TABLET, FILM COATED ORAL 2 TIMES DAILY
Qty: 20 TABLET | Refills: 0 | Status: SHIPPED | OUTPATIENT
Start: 2019-04-04 | End: 2019-04-22

## 2019-04-04 ASSESSMENT — ACTIVITIES OF DAILY LIVING (ADL)
AMBULATION: 0-->INDEPENDENT
BATHING: 0-->INDEPENDENT
RETIRED_COMMUNICATION: 0-->UNDERSTANDS/COMMUNICATES WITHOUT DIFFICULTY
RETIRED_EATING: 0-->INDEPENDENT
COGNITION: 0 - NO COGNITION ISSUES REPORTED
DRESS: 0-->INDEPENDENT
CURRENT_FUNCTION: NO ASSISTANCE NEEDED
TOILETING: 0-->INDEPENDENT
SWALLOWING: 0-->SWALLOWS FOODS/LIQUIDS WITHOUT DIFFICULTY
TRANSFERRING: 0-->INDEPENDENT
FALL_HISTORY_WITHIN_LAST_SIX_MONTHS: NO

## 2019-04-04 ASSESSMENT — MIFFLIN-ST. JEOR: SCORE: 1517.9

## 2019-04-04 NOTE — PATIENT INSTRUCTIONS
PLAN:  1.  Schedule colonoscopy  2.  Schedule DXA   3.  Schedule fasting labs for lipids, thyroid function    4.  Course of antibiotics for nasal symptoms.   Consider using flonase as needed.

## 2019-04-04 NOTE — PROGRESS NOTES
"   SUBJECTIVE:   CC: Ade Wilkins is an 68 year old woman who presents for preventive health visit.     Annual Wellness Visit     In general, how would you rate your overall health?  Good    Frequency of exercise:  None    Do you usually eat at least 4 servings of fruit and vegetables a day, include whole grains    & fiber and avoid regularly eating high fat or \"junk\" foods?  No    Taking medications regularly:  Yes    Medication side effects:  None    Ability to successfully perform activities of daily living:  No assistance needed    Home Safety:  No safety concerns identified    Hearing Impairment:  Difficulty following a conversation in a noisy restaurant or crowded room    In the past 6 months, have you been bothered by leaking of urine?  No    In general, how would you rate your overall mental or emotional health?  Good    PHQ-2 Total Score: 0    Additional concerns today:  Yes    Ability to successfully perform activities of daily living: Yes, no assistance needed  Home safety:  none identified   Hearing impairment: Yes, Difficulty following a conversation in a noisy restaurant or crowded room.    Additional issues to address:  1.  R ear plugged, no pain, not hearing well.   Having some mucus and nasal discharge, as well as some blood.   Takes 10 minutes to start draining.   Using zyrtec and benadryl at different times of the day.    2.  Follow-up UMP for DM, changes last December due to high A1c.   CKD a bit worse, as well  3.  CKD.  Using very little NSAID.     4.  Follow-up hypothyroidism.  Weight stable, no hair loss, skin changes, constipation, loose stools, etc.     Today's PHQ-2 Score:   PHQ-2 ( 1999 [employer]) 4/2/2019   Q1: Little interest or pleasure in doing things 0   Q2: Feeling down, depressed or hopeless 0   PHQ-2 Score 0   Q1: Little interest or pleasure in doing things Not at all   Q2: Feeling down, depressed or hopeless Not at all   PHQ-2 Score 0       Abuse: Current or Past(Physical, Sexual " "or Emotional)- No  Do you feel safe in your environment? Yes    Social History     Tobacco Use     Smoking status: Never Smoker     Smokeless tobacco: Never Used   Substance Use Topics     Alcohol use: No     Alcohol Use 4/2/2019   If you drink alcohol do you typically have greater than 3 drinks per day OR greater than 7 drinks per week? No       Reviewed orders with patient.  Reviewed health maintenance and updated orders accordingly - Yes        Pertinent mammograms are reviewed under the imaging tab.  History of abnormal Pap smear: NO - age 65 - see link Cervical Cytology Screening Guidelines  PAP / HPV 2/11/2013 8/14/2009 12/7/2007   PAP NIL NIL NIL     Reviewed and updated as needed this visit by clinical staff         Reviewed and updated as needed this visit by Provider          Review of Systems  CONSTITUTIONAL: NEGATIVE for fever, chills, change in weight  INTEGUMENTARY/SKIN: NEGATIVE for worrisome rashes, moles or lesions  EYES: NEGATIVE for vision changes or irritation  ENT: See above  RESP: NEGATIVE for significant cough or SOB  BREAST: NEGATIVE for masses, tenderness or discharge  CV: NEGATIVE for chest pain, palpitations or peripheral edema  GI: NEGATIVE for nausea, abdominal pain, heartburn, or change in bowel habits  : NEGATIVE for unusual urinary or vaginal symptoms. No vaginal bleeding.  MUSCULOSKELETAL: NEGATIVE for significant arthralgias or myalgia  NEURO: NEGATIVE for weakness, dizziness or paresthesias  PSYCHIATRIC: NEGATIVE for changes in mood or affect      OBJECTIVE:   /60   Pulse 83   Temp 98  F (36.7  C) (Oral)   Resp 16   Ht 1.575 m (5' 2\")   Wt 103.5 kg (228 lb 1.6 oz)   SpO2 97%   BMI 41.72 kg/m    Physical Exam  GENERAL: healthy, alert and no distress  HENT: ear canals and TM's normal, nose and mouth without ulcers or lesions  NECK: no adenopathy, no asymmetry, masses, or scars and thyroid normal to palpation  RESP: lungs clear to auscultation - no rales, rhonchi or " "wheezes  CV: regular rate and rhythm, normal S1 S2, no S3 or S4, no murmur, click or rub, no peripheral edema and peripheral pulses strong  MS: no gross musculoskeletal defects noted, no edema  Patient has approximately 1 cm patch on the left anterior shin where there is some very thin dermis, suggesting previous ulceration    ASSESSMENT/PLAN:     ASSESSMENT:   1.  Annual Wellness Visit  2.  URI symptoms with R eustachian tube dysfunction.  Now present for 7 weeks, will treat.   Rule out low-grade rhinosinusitis  3.  DM, uncontrolled, followed by endo.  Associated with neuropathy, on insulin.  4.  Lesion L shin, not ulcerated  5.  Follow-up CKD3, slightly worse  6.  Follow-up hypothyroidism    PLAN:  1.  Schedule colonoscopy  2.  Schedule DXA   3.  Schedule fasting labs for lipids, thyroid function, recheck renal function  soon  4.  Course of antibiotics for nasal symptoms.   Consider using flonase as needed.      COUNSELING:  Reviewed preventive health counseling, as reflected in patient instructions    BP Readings from Last 1 Encounters:   06/06/18 124/74     Estimated body mass index is 42.04 kg/m  as calculated from the following:    Height as of 12/10/18: 1.588 m (5' 2.52\").    Weight as of 12/10/18: 106 kg (233 lb 11.2 oz).      Weight management plan: Discussed healthy diet and exercise guidelines     reports that  has never smoked. she has never used smokeless tobacco.      Counseling Resources:  ATP IV Guidelines  Pooled Cohorts Equation Calculator  Breast Cancer Risk Calculator  FRAX Risk Assessment  ICSI Preventive Guidelines  Dietary Guidelines for Americans, 2010  USDA's MyPlate  ASA Prophylaxis  Lung CA Screening    Dimitry Howard MD  [facility]  "

## 2019-04-08 ASSESSMENT — ENCOUNTER SYMPTOMS
DOUBLE VISION: 0
HOARSE VOICE: 0
TASTE DISTURBANCE: 0
SINUS CONGESTION: 1
TROUBLE SWALLOWING: 0
SORE THROAT: 0
DECREASED LIBIDO: 0
EYE REDNESS: 0
EYE IRRITATION: 1
POOR WOUND HEALING: 1
SMELL DISTURBANCE: 0
NAIL CHANGES: 0
SKIN CHANGES: 0
SINUS PAIN: 1
HOT FLASHES: 0
EYE PAIN: 0
NECK MASS: 0
EYE WATERING: 0

## 2019-04-17 ENCOUNTER — DOCUMENTATION ONLY (OUTPATIENT)
Dept: LAB | Facility: CLINIC | Age: 69
End: 2019-04-17

## 2019-04-17 DIAGNOSIS — E11.40 TYPE 2 DIABETES MELLITUS WITH DIABETIC NEUROPATHY, WITH LONG-TERM CURRENT USE OF INSULIN (H): Primary | ICD-10-CM

## 2019-04-17 DIAGNOSIS — D50.9 IRON DEFICIENCY ANEMIA, UNSPECIFIED IRON DEFICIENCY ANEMIA TYPE: ICD-10-CM

## 2019-04-17 DIAGNOSIS — Z79.4 TYPE 2 DIABETES MELLITUS WITH DIABETIC NEUROPATHY, WITH LONG-TERM CURRENT USE OF INSULIN (H): Primary | ICD-10-CM

## 2019-04-22 ENCOUNTER — OFFICE VISIT (OUTPATIENT)
Dept: ENDOCRINOLOGY | Facility: CLINIC | Age: 69
End: 2019-04-22
Payer: COMMERCIAL

## 2019-04-22 ENCOUNTER — TELEPHONE (OUTPATIENT)
Dept: ENDOCRINOLOGY | Facility: CLINIC | Age: 69
End: 2019-04-22

## 2019-04-22 VITALS
WEIGHT: 230.9 LBS | BODY MASS INDEX: 42.49 KG/M2 | DIASTOLIC BLOOD PRESSURE: 68 MMHG | HEIGHT: 62 IN | HEART RATE: 60 BPM | SYSTOLIC BLOOD PRESSURE: 125 MMHG

## 2019-04-22 DIAGNOSIS — E11.65 POORLY CONTROLLED TYPE 2 DIABETES MELLITUS WITH NEUROPATHY (H): Primary | ICD-10-CM

## 2019-04-22 DIAGNOSIS — E11.40 TYPE 2 DIABETES MELLITUS WITH DIABETIC NEUROPATHY (H): ICD-10-CM

## 2019-04-22 DIAGNOSIS — E11.40 POORLY CONTROLLED TYPE 2 DIABETES MELLITUS WITH NEUROPATHY (H): Primary | ICD-10-CM

## 2019-04-22 LAB — HBA1C MFR BLD: 8.7 % (ref 4.3–6)

## 2019-04-22 RX ORDER — FLASH GLUCOSE SCANNING READER
1 EACH MISCELLANEOUS 3 TIMES DAILY
Qty: 1 DEVICE | Refills: 1 | Status: SHIPPED | OUTPATIENT
Start: 2019-04-22 | End: 2021-11-22

## 2019-04-22 RX ORDER — INSULIN LISPRO 100 [IU]/ML
INJECTION, SOLUTION INTRAVENOUS; SUBCUTANEOUS
Qty: 15 ML | Refills: 11 | Status: SHIPPED | OUTPATIENT
Start: 2019-04-22 | End: 2020-03-19

## 2019-04-22 RX ORDER — FLASH GLUCOSE SCANNING READER
1 EACH MISCELLANEOUS 3 TIMES DAILY
Qty: 1 DEVICE | Refills: 1 | Status: SHIPPED | OUTPATIENT
Start: 2019-04-22 | End: 2019-04-22

## 2019-04-22 RX ORDER — FLASH GLUCOSE SENSOR
1 KIT MISCELLANEOUS
Qty: 2 EACH | Refills: 11 | Status: SHIPPED | OUTPATIENT
Start: 2019-04-22 | End: 2019-04-22

## 2019-04-22 RX ORDER — FLASH GLUCOSE SENSOR
1 KIT MISCELLANEOUS
Qty: 2 EACH | Refills: 11 | Status: SHIPPED | OUTPATIENT
Start: 2019-04-22 | End: 2019-10-14

## 2019-04-22 ASSESSMENT — MIFFLIN-ST. JEOR: SCORE: 1530.61

## 2019-04-22 NOTE — NURSING NOTE
Chief Complaint   Patient presents with     RECHECK     Type 2 Diabetes     Capillary puncture performed for Hemoglobin A1C test. Patient tolerated well.    Brianne Lisa MA

## 2019-04-22 NOTE — LETTER
4/22/2019       RE: Ade Wilkins  [address] [id number]     Dear Colleague,    Thank you for referring your patient, Ade Wilkins, to the [facility] ENDOCRINOLOGY at [facility]. Please see a copy of my visit note below.    HPI:   Rachael is a 68 year old female with history morbid obesity S/P Gastric bypass surgery, hypothyroidism here for follow up of type 2 diabetes.  Her diabetes mellitus is complicated by diabetic neuropathy, mild nonproliferative retinopathy and nephropathy.  She currently has CKD stage 3.  She usually follows with Dr. Naik. She has had type 2 Diabetes since her early 20s. She struggles to test her blood sugars.  She has never worn a sensor, but it has been discussed in the past.      Rachael is currently taking the following for her diabetes:     Tresiba 80 units daily  NovoLog 2 units per carb- insurance is requiring she change to humalog. She is taking it about half the time. She thinks she doesn't always need it.   Dulaglutide 1.5 mg weekly.   Metformin 2 gm daily.     She did not bring her meter today, but she reports it is usually between . She tests twice a day.  It climbs throughout the day.     She is eating a lot of fish. She is trying to eat more fruits and vegetables. She wants to lose 15#.     She is now up at night and sleeping in the day since she retired. She is generally feeling well and has no other concerns.       Past Medical History:   Diagnosis Date     Allergic rhinitis, cause unspecified      Hepatitis, unspecified 1968     Iron Deficiency Anemia 3/09     Lymphoma (H) 12/08     Nonsenile cataract      Obesity, unspecified      Other and unspecified hyperlipidemia      Rheumatic fever without mention of heart involvement infant     Type II or unspecified type diabetes mellitus without mention of complication, not stated as uncontrolled 1992     Unspecified essential hypertension      Unspecified  hypothyroidism      Vitamin B12 deficiency 3/09       Past Surgical History:   Procedure Laterality Date     C APPENDECTOMY  1958     C NONSPECIFIC PROCEDURE  1976    (leg) cystectomy     CATARACT IOL, RT/LT Left 12/21/2000    left     CATARACT IOL, RT/LT Right 01/06/2000    right     GASTRIC BYPASS  2/04    Dr. Hebert     LYMPH NODE BIOPSY  12/08    right groin, Dr. Licona     TONSILLECTOMY & ADENOIDECTOMY  1968       Family History   Problem Relation Age of Onset     Cardiovascular Father         AAA     Diabetes Mother      C.A.D. Mother         52     Eye Disorder Mother         glaucoma     Glaucoma Mother      Cardiovascular Brother         AAA, PVD     Cancer Brother         bladder     Cardiovascular Brother         AAA, valvular heart disease     Diabetes Brother         age 53     Cancer Brother         liver cancer     Glaucoma Other         MGGF     Macular Degeneration No family hx of        Social History     Socioeconomic History     Marital status:      Spouse name: Not on file     Number of children: 0     Years of education: Not on file     Highest education level: Not on file   Occupational History     Employer: [employer],[address] [place] [address]   Social Needs     Financial resource strain: Not on file     Food insecurity:     Worry: Not on file     Inability: Not on file     Transportation needs:     Medical: Not on file     Non-medical: Not on file   Tobacco Use     Smoking status: Never Smoker     Smokeless tobacco: Never Used   Substance and Sexual Activity     Alcohol use: No     Drug use: No     Sexual activity: Never     Partners: Male   Lifestyle     Physical activity:     Days per week: Not on file     Minutes per session: Not on file     Stress: Not on file   Relationships     Social connections:     Talks on phone: Not on file     Gets together: Not on file     Attends Jainism service: Not on file     Active member of club or organization: Not on file     Attends  meetings of clubs or organizations: Not on file     Relationship status: Not on file     Intimate partner violence:     Fear of current or ex partner: Not on file     Emotionally abused: Not on file     Physically abused: Not on file     Forced sexual activity: Not on file   Other Topics Concern      Service Not Asked     Blood Transfusions Not Asked     Caffeine Concern Yes     Comment: 20 oz daily     Occupational Exposure Not Asked     Hobby Hazards Not Asked     Sleep Concern Not Asked     Stress Concern Not Asked     Weight Concern Not Asked     Special Diet Not Asked     Back Care Not Asked     Exercise No     Bike Helmet Not Asked     Seat Belt Yes     Self-Exams Yes     Parent/sibling w/ CABG, MI or angioplasty before 65F 55M? Not Asked   Social History Narrative    Living arrangements - the patient lives alone.   Social Hx: Lives with her sister and her sister's boyfriend.  Retired in 2017.     Current Outpatient Medications   Medication     aspirin 81 MG tablet     atorvastatin (LIPITOR) 80 MG tablet     augmented betamethasone dipropionate (DIPROLENE-AF) 0.05 % ointment     MARTITA CONTOUR NEXT test strip     BD FCO U/F 32G X 4 MM insulin pen needle     blood glucose monitoring (MARTITA CONTOUR NEXT) test strip     blood glucose monitoring (NO BRAND SPECIFIED) test strip     calcium carbonate-vitamin D (CALTRATE 600+D) 600-400 MG-UNIT CHEW     cefPROZIL (CEFZIL) 500 MG tablet     cetirizine (ZYRTEC) 10 MG tablet     Cholecalciferol (VITAMIN D) 2000 UNITS tablet     CONTOUR NEXT TEST test strip     cyanocolbalamin (VITAMIN B-12) 1000 MCG tablet     dexamethasone (DECADRON) 1 MG tablet     dulaglutide (TRULICITY) 1.5 MG/0.5ML pen     fenofibrate (TRIGLIDE/LOFIBRA) 160 MG tablet     fluticasone (FLONASE) 50 MCG/ACT nasal spray     furosemide (LASIX) 20 MG tablet     hydrocortisone (WESTCORT) 0.2 % cream     insulin aspart (NOVOLOG FLEXPEN) 100 UNIT/ML pen     insulin degludec (TRESIBA) 200 UNIT/ML pen  "    irbesartan (AVAPRO) 300 MG tablet     KLOR-CON 10 MEQ CR tablet     levothyroxine (SYNTHROID/LEVOTHROID) 50 MCG tablet     Magnesium 500 MG CAPS     metFORMIN (GLUCOPHAGE) 1000 MG tablet     ONE TOUCH DELICA LANCETS MISC     topiramate (TOPAMAX) 25 MG tablet     traZODone (DESYREL) 50 MG tablet     verapamil ER (CALAN-SR) 180 MG CR tablet     VITRON-C  MG TABS tablet     No current facility-administered medications for this visit.           Allergies   Allergen Reactions     Clonidine      headaches     Fexofenadine Hydrochloride      Allegra--headache     Gemfibrozil      lopid--rash ??from med     Lisinopril      edema     Norvasc [Amlodipine Besylate]      Hair loss     Pioglitazone Hydrochloride Swelling     actos--ankle edema     Doxazosin Mesylate Rash     cardura--rash     Physical Exam  /68   Pulse 60   Ht 1.575 m (5' 2\")   Wt 104.7 kg (230 lb 14.4 oz)   BMI 42.23 kg/m     GENERAL:  Alert and oriented X3, NAD, well dressed, answering questions appropriately, appears stated age.  EXTREMITIES: 1+ edema bilaterally, +pulses, no rashes, no lesions    RESULTS  Lab Results   Component Value Date    A1C 9.0 (H) 12/10/2018    A1C 8.2 (H) 05/24/2018    A1C 8.1 (H) 03/12/2018    A1C 9.3 (H) 03/28/2017    A1C 9.0 (H) 10/17/2016    HEMOGLOBINA1 8.7 (A) 04/22/2019    HEMOGLOBINA1 8.1 (A) 12/10/2018    HEMOGLOBINA1 8.1 (A) 08/30/2017       TSH   Date Value Ref Range Status   05/24/2018 2.54 0.40 - 4.00 mU/L Final   03/12/2018 4.78 (H) 0.40 - 4.00 mU/L Final   03/28/2017 2.84 0.40 - 4.00 mU/L Final   02/10/2017 2.55 0.40 - 4.00 mU/L Final   10/17/2016 4.39 (H) 0.40 - 4.00 mU/L Final     T4 Free   Date Value Ref Range Status   03/12/2018 1.17 0.76 - 1.46 ng/dL Final   10/17/2016 1.17 0.76 - 1.46 ng/dL Final   05/05/2010 1.05 0.70 - 1.85 ng/dL Final   09/13/2007 1.00 0.70 - 1.85 ng/dL Final       ALT   Date Value Ref Range Status   08/25/2017 33 0 - 50 U/L Final   03/28/2017 25 0 - 50 U/L Final "   ]    Recent Labs   Lab Test 12/10/18  1448 03/12/18  1334 03/28/17  1014  05/22/15  0848  05/03/14  1018   CHOL  --  172 185   < > 175   < > 161   HDL  --  35* 35*   < > 32*   < > 39*   * 94 101*   < > 76   < > 76   TRIG  --  213* 247*   < > 334*   < > 231*   CHOLHDLRATIO  --   --   --   --  5.5*  --  4.2    < > = values in this interval not displayed.       Lab Results   Component Value Date     12/10/2018      Lab Results   Component Value Date    POTASSIUM 4.6 12/10/2018     Lab Results   Component Value Date    CHLORIDE 103 12/10/2018     Lab Results   Component Value Date    RASHEEDA 8.9 12/10/2018     Lab Results   Component Value Date    CO2 26 12/10/2018     Lab Results   Component Value Date    BUN 33 12/10/2018     Lab Results   Component Value Date    CR 1.63 12/10/2018       GFR Estimate   Date Value Ref Range Status   12/10/2018 31 (L) >60 mL/min/1.7m2 Final     Comment:     Non  GFR Calc   05/24/2018 34 (L) >60 mL/min/1.7m2 Final     Comment:     Non  GFR Calc   03/12/2018 34 (L) >60 mL/min/1.7m2 Final     Comment:     Non  GFR Calc     GFR Estimate If Black   Date Value Ref Range Status   12/10/2018 38 (L) >60 mL/min/1.7m2 Final     Comment:      GFR Calc   05/24/2018 41 (L) >60 mL/min/1.7m2 Final     Comment:      GFR Calc   03/12/2018 41 (L) >60 mL/min/1.7m2 Final     Comment:      GFR Calc       Lab Results   Component Value Date    MICROL 13 12/10/2018     No results found for: MICROALBUMIN  No results found for: CPEPT, GADAB, ISCAB    Vitamin B12   Date Value Ref Range Status   10/17/2016 2,075 (H) 193 - 986 pg/mL Final     Comment:     Interp: 247-911 = Normal   05/12/2012 742 >210 pg/mL Final     Comment:     Interp: 247-911 = Normal   ]    Most recent eye exam date: : 07/17/2018     Assessment/Plan:     1.  Type 2 diabetes- Overall, doing better, but a1c is too high 8.7%.  She acknowledges  she is taking her short acting insulin less than half the time, so focusing on taking that regularly will help.  I did explain that it is clear that she climbs quite high after eating, so taking the insulin at the start of the meal will help prevent the spike.  I also prescribed her a laurie sensor so she is able to test more frequently and we can adjust therapy appropriately based on the readings.  Will switch to humalog from novolog per insurance. Will make the following changes (instructions given to patient):   Focus on taking the humalog at the start of a meal.      Start using the laurie sensor and swipe it at least 3 times a day.      Let me know if you start having readings less than 70     Focus on walking. Bring your cane.      Consider using gym/pool    Break up your sedentary time every 30-60 minutes.       2. Risk factors:     Retinopathy:  No.  Had eye exam within last year.   Nephropathy:  BP well controlled. No microalbuminuria.  She does have CKD  Neuropathy: Some tingling, numbness.  Tolerable.     Feet: OK, no ulcers.   Lipids:  LDL at target.  Patient taking high dose statin    3.  F/U in 1 month, as planned with Dr. Naik. I am also happy to see her as needed.     I spent 35 minutes with this patient face to face and explained the conditions and plans (more than 50% of time was counseling/coordination of care, diabetes management, follow up plan for worsening hyper and hypoglycemia) . The patient understood and is satisfied with today's visit.      Roxanne Masterson PA-C, MPAS   [facility]  Department of Medicine  Division of Endocrinology and Diabetes

## 2019-04-22 NOTE — TELEPHONE ENCOUNTER
[facility] Call Center    Phone Message    May a detailed message be left on voicemail: yes    Reason for Call: Medication Question or concern regarding medication   Prescription Clarification  Name of Medication: Continuous Blood Gluc Sensor (FREESTYLE BALWINDER 14 DAY SENSOR) [facility] [place]cribing Provider: [place]   Pharmacy: [id number] - [place], [place] - [address]     What on the order needs clarification? Lynn, pharmacist from [facility] called and stated that the pharmacy cannot provide these prescription to pt. Please send prescription to another pharmacy. Thanks.          Action Taken: Message routed to:  [facility] ([facility] Deloris

## 2019-04-22 NOTE — PROGRESS NOTES
HPI:   Rachael is a 68 year old female with history morbid obesity S/P Gastric bypass surgery, hypothyroidism here for follow up of type 2 diabetes.  Her diabetes mellitus is complicated by diabetic neuropathy, mild nonproliferative retinopathy and nephropathy.  She currently has CKD stage 3.  She usually follows with Dr. Naik. She has had type 2 Diabetes since her early 20s. She struggles to test her blood sugars.  She has never worn a sensor, but it has been discussed in the past.      Rachael is currently taking the following for her diabetes:     Tresiba 80 units daily  NovoLog 2 units per carb- insurance is requiring she change to humalog. She is taking it about half the time. She thinks she doesn't always need it.   Dulaglutide 1.5 mg weekly.   Metformin 2 gm daily.     She did not bring her meter today, but she reports it is usually between . She tests twice a day.  It climbs throughout the day.     She is eating a lot of fish. She is trying to eat more fruits and vegetables. She wants to lose 15#.     She is now up at night and sleeping in the day since she retired. She is generally feeling well and has no other concerns.       Past Medical History:   Diagnosis Date     Allergic rhinitis, cause unspecified      Hepatitis, unspecified 1968     Iron Deficiency Anemia 3/09     Lymphoma (H) 12/08     Nonsenile cataract      Obesity, unspecified      Other and unspecified hyperlipidemia      Rheumatic fever without mention of heart involvement infant     Type II or unspecified type diabetes mellitus without mention of complication, not stated as uncontrolled 1992     Unspecified essential hypertension      Unspecified hypothyroidism      Vitamin B12 deficiency 3/09       Past Surgical History:   Procedure Laterality Date     C APPENDECTOMY  1958     C NONSPECIFIC PROCEDURE  1976    (leg) cystectomy     CATARACT IOL, RT/LT Left 12/21/2000    left     CATARACT IOL, RT/LT Right 01/06/2000    right     GASTRIC  BYPASS  2/04    Dr. Hebert     LYMPH NODE BIOPSY  12/08    right groin, Dr. Licona     TONSILLECTOMY & ADENOIDECTOMY  1968       Family History   Problem Relation Age of Onset     Cardiovascular Father         AAA     Diabetes Mother      C.A.D. Mother         52     Eye Disorder Mother         glaucoma     Glaucoma Mother      Cardiovascular Brother         AAA, PVD     Cancer Brother         bladder     Cardiovascular Brother         AAA, valvular heart disease     Diabetes Brother         age 53     Cancer Brother         liver cancer     Glaucoma Other         MGGF     Macular Degeneration No family hx of        Social History     Socioeconomic History     Marital status:      Spouse name: Not on file     Number of children: 0     Years of education: Not on file     Highest education level: Not on file   Occupational History     Employer: [employer],[address] [place] [address]   Social Needs     Financial resource strain: Not on file     Food insecurity:     Worry: Not on file     Inability: Not on file     Transportation needs:     Medical: Not on file     Non-medical: Not on file   Tobacco Use     Smoking status: Never Smoker     Smokeless tobacco: Never Used   Substance and Sexual Activity     Alcohol use: No     Drug use: No     Sexual activity: Never     Partners: Male   Lifestyle     Physical activity:     Days per week: Not on file     Minutes per session: Not on file     Stress: Not on file   Relationships     Social connections:     Talks on phone: Not on file     Gets together: Not on file     Attends Protestant service: Not on file     Active member of club or organization: Not on file     Attends meetings of clubs or organizations: Not on file     Relationship status: Not on file     Intimate partner violence:     Fear of current or ex partner: Not on file     Emotionally abused: Not on file     Physically abused: Not on file     Forced sexual activity: Not on file   Other Topics Concern       Service Not Asked     Blood Transfusions Not Asked     Caffeine Concern Yes     Comment: 20 oz daily     Occupational Exposure Not Asked     Hobby Hazards Not Asked     Sleep Concern Not Asked     Stress Concern Not Asked     Weight Concern Not Asked     Special Diet Not Asked     Back Care Not Asked     Exercise No     Bike Helmet Not Asked     Seat Belt Yes     Self-Exams Yes     Parent/sibling w/ CABG, MI or angioplasty before 65F 55M? Not Asked   Social History Narrative    Living arrangements - the patient lives alone.   Social Hx: Lives with her sister and her sister's boyfriend.  Retired in 2017.     Current Outpatient Medications   Medication     aspirin 81 MG tablet     atorvastatin (LIPITOR) 80 MG tablet     augmented betamethasone dipropionate (DIPROLENE-AF) 0.05 % ointment     MARTITA CONTOUR NEXT test strip     BD FCO U/F 32G X 4 MM insulin pen needle     blood glucose monitoring (MARTITA CONTOUR NEXT) test strip     blood glucose monitoring (NO BRAND SPECIFIED) test strip     calcium carbonate-vitamin D (CALTRATE 600+D) 600-400 MG-UNIT CHEW     cefPROZIL (CEFZIL) 500 MG tablet     cetirizine (ZYRTEC) 10 MG tablet     Cholecalciferol (VITAMIN D) 2000 UNITS tablet     CONTOUR NEXT TEST test strip     cyanocolbalamin (VITAMIN B-12) 1000 MCG tablet     dexamethasone (DECADRON) 1 MG tablet     dulaglutide (TRULICITY) 1.5 MG/0.5ML pen     fenofibrate (TRIGLIDE/LOFIBRA) 160 MG tablet     fluticasone (FLONASE) 50 MCG/ACT nasal spray     furosemide (LASIX) 20 MG tablet     hydrocortisone (WESTCORT) 0.2 % cream     insulin aspart (NOVOLOG FLEXPEN) 100 UNIT/ML pen     insulin degludec (TRESIBA) 200 UNIT/ML pen     irbesartan (AVAPRO) 300 MG tablet     KLOR-CON 10 MEQ CR tablet     levothyroxine (SYNTHROID/LEVOTHROID) 50 MCG tablet     Magnesium 500 MG CAPS     metFORMIN (GLUCOPHAGE) 1000 MG tablet     ONE TOUCH DELICA LANCETS MISC     topiramate (TOPAMAX) 25 MG tablet     traZODone (DESYREL) 50 MG tablet  "    verapamil ER (CALAN-SR) 180 MG CR tablet     VITRON-C  MG TABS tablet     No current facility-administered medications for this visit.           Allergies   Allergen Reactions     Clonidine      headaches     Fexofenadine Hydrochloride      Allegra--headache     Gemfibrozil      lopid--rash ??from med     Lisinopril      edema     Norvasc [Amlodipine Besylate]      Hair loss     Pioglitazone Hydrochloride Swelling     actos--ankle edema     Doxazosin Mesylate Rash     cardura--rash     Physical Exam  /68   Pulse 60   Ht 1.575 m (5' 2\")   Wt 104.7 kg (230 lb 14.4 oz)   BMI 42.23 kg/m    GENERAL:  Alert and oriented X3, NAD, well dressed, answering questions appropriately, appears stated age.  EXTREMITIES: 1+ edema bilaterally, +pulses, no rashes, no lesions    RESULTS  Lab Results   Component Value Date    A1C 9.0 (H) 12/10/2018    A1C 8.2 (H) 05/24/2018    A1C 8.1 (H) 03/12/2018    A1C 9.3 (H) 03/28/2017    A1C 9.0 (H) 10/17/2016    HEMOGLOBINA1 8.7 (A) 04/22/2019    HEMOGLOBINA1 8.1 (A) 12/10/2018    HEMOGLOBINA1 8.1 (A) 08/30/2017       TSH   Date Value Ref Range Status   05/24/2018 2.54 0.40 - 4.00 mU/L Final   03/12/2018 4.78 (H) 0.40 - 4.00 mU/L Final   03/28/2017 2.84 0.40 - 4.00 mU/L Final   02/10/2017 2.55 0.40 - 4.00 mU/L Final   10/17/2016 4.39 (H) 0.40 - 4.00 mU/L Final     T4 Free   Date Value Ref Range Status   03/12/2018 1.17 0.76 - 1.46 ng/dL Final   10/17/2016 1.17 0.76 - 1.46 ng/dL Final   05/05/2010 1.05 0.70 - 1.85 ng/dL Final   09/13/2007 1.00 0.70 - 1.85 ng/dL Final       ALT   Date Value Ref Range Status   08/25/2017 33 0 - 50 U/L Final   03/28/2017 25 0 - 50 U/L Final   ]    Recent Labs   Lab Test 12/10/18  1448 03/12/18  1334 03/28/17  1014  05/22/15  0848  05/03/14  1018   CHOL  --  172 185   < > 175   < > 161   HDL  --  35* 35*   < > 32*   < > 39*   * 94 101*   < > 76   < > 76   TRIG  --  213* 247*   < > 334*   < > 231*   CHOLHDLRATIO  --   --   --   --  5.5*  --  " 4.2    < > = values in this interval not displayed.       Lab Results   Component Value Date     12/10/2018      Lab Results   Component Value Date    POTASSIUM 4.6 12/10/2018     Lab Results   Component Value Date    CHLORIDE 103 12/10/2018     Lab Results   Component Value Date    RASHEEDA 8.9 12/10/2018     Lab Results   Component Value Date    CO2 26 12/10/2018     Lab Results   Component Value Date    BUN 33 12/10/2018     Lab Results   Component Value Date    CR 1.63 12/10/2018       GFR Estimate   Date Value Ref Range Status   12/10/2018 31 (L) >60 mL/min/1.7m2 Final     Comment:     Non  GFR Calc   05/24/2018 34 (L) >60 mL/min/1.7m2 Final     Comment:     Non  GFR Calc   03/12/2018 34 (L) >60 mL/min/1.7m2 Final     Comment:     Non  GFR Calc     GFR Estimate If Black   Date Value Ref Range Status   12/10/2018 38 (L) >60 mL/min/1.7m2 Final     Comment:      GFR Calc   05/24/2018 41 (L) >60 mL/min/1.7m2 Final     Comment:      GFR Calc   03/12/2018 41 (L) >60 mL/min/1.7m2 Final     Comment:      GFR Calc       Lab Results   Component Value Date    MICROL 13 12/10/2018     No results found for: MICROALBUMIN  No results found for: CPEPT, GADAB, ISCAB    Vitamin B12   Date Value Ref Range Status   10/17/2016 2,075 (H) 193 - 986 pg/mL Final     Comment:     Interp: 247-911 = Normal   05/12/2012 742 >210 pg/mL Final     Comment:     Interp: 247-911 = Normal   ]    Most recent eye exam date: : 07/17/2018     Assessment/Plan:     1.  Type 2 diabetes- Overall, doing better, but a1c is too high 8.7%.  She acknowledges she is taking her short acting insulin less than half the time, so focusing on taking that regularly will help.  I did explain that it is clear that she climbs quite high after eating, so taking the insulin at the start of the meal will help prevent the spike.  I also prescribed her a laurie sensor so she is  able to test more frequently and we can adjust therapy appropriately based on the readings.  Will switch to humalog from novolog per insurance. Will make the following changes (instructions given to patient):   Focus on taking the humalog at the start of a meal.      Start using the laurie sensor and swipe it at least 3 times a day.      Let me know if you start having readings less than 70     Focus on walking. Bring your cane.      Consider using gym/pool    Break up your sedentary time every 30-60 minutes.       2. Risk factors:     Retinopathy:  No.  Had eye exam within last year.   Nephropathy:  BP well controlled. No microalbuminuria.  She does have CKD  Neuropathy: Some tingling, numbness.  Tolerable.     Feet: OK, no ulcers.   Lipids:  LDL at target.  Patient taking high dose statin    3.  F/U in 1 month, as planned with Dr. Naik. I am also happy to see her as needed.     I spent 35 minutes with this patient face to face and explained the conditions and plans (more than 50% of time was counseling/coordination of care, diabetes management, follow up plan for worsening hyper and hypoglycemia) . The patient understood and is satisfied with today's visit.      Roxanne Masterson PA-C, MPAS   [facility]  Department of Medicine  Division of Endocrinology and Diabetes

## 2019-04-23 ENCOUNTER — ANCILLARY PROCEDURE (OUTPATIENT)
Dept: BONE DENSITY | Facility: CLINIC | Age: 69
End: 2019-04-23
Attending: INTERNAL MEDICINE
Payer: COMMERCIAL

## 2019-04-23 DIAGNOSIS — E78.5 HYPERLIPIDEMIA LDL GOAL <100: ICD-10-CM

## 2019-04-23 DIAGNOSIS — E03.9 HYPOTHYROIDISM, UNSPECIFIED TYPE: ICD-10-CM

## 2019-04-23 DIAGNOSIS — N25.81 SECONDARY RENAL HYPERPARATHYROIDISM (H): ICD-10-CM

## 2019-04-23 DIAGNOSIS — Z78.0 ASYMPTOMATIC MENOPAUSAL STATE: ICD-10-CM

## 2019-04-23 DIAGNOSIS — E11.40 TYPE 2 DIABETES MELLITUS WITH DIABETIC NEUROPATHY, WITH LONG-TERM CURRENT USE OF INSULIN (H): ICD-10-CM

## 2019-04-23 DIAGNOSIS — Z79.4 TYPE 2 DIABETES MELLITUS WITH DIABETIC NEUROPATHY, WITH LONG-TERM CURRENT USE OF INSULIN (H): ICD-10-CM

## 2019-04-23 DIAGNOSIS — N18.30 CKD (CHRONIC KIDNEY DISEASE) STAGE 3, GFR 30-59 ML/MIN (H): ICD-10-CM

## 2019-04-23 DIAGNOSIS — D50.9 IRON DEFICIENCY ANEMIA, UNSPECIFIED IRON DEFICIENCY ANEMIA TYPE: ICD-10-CM

## 2019-04-23 DIAGNOSIS — Z13.820 SCREENING FOR OSTEOPOROSIS: ICD-10-CM

## 2019-04-23 LAB
ALT SERPL W P-5'-P-CCNC: 27 U/L (ref 0–50)
ANION GAP SERPL CALCULATED.3IONS-SCNC: 4 MMOL/L (ref 3–14)
BUN SERPL-MCNC: 31 MG/DL (ref 7–30)
CALCIUM SERPL-MCNC: 9.3 MG/DL (ref 8.5–10.1)
CHLORIDE SERPL-SCNC: 108 MMOL/L (ref 94–109)
CHOLEST SERPL-MCNC: 210 MG/DL
CO2 SERPL-SCNC: 29 MMOL/L (ref 20–32)
CREAT SERPL-MCNC: 1.61 MG/DL (ref 0.52–1.04)
GFR SERPL CREATININE-BSD FRML MDRD: 32 ML/MIN/{1.73_M2}
GLUCOSE SERPL-MCNC: 135 MG/DL (ref 70–99)
HBA1C MFR BLD: 8.7 % (ref 0–5.6)
HDLC SERPL-MCNC: 35 MG/DL
HGB BLD-MCNC: 11 G/DL (ref 11.7–15.7)
LDLC SERPL CALC-MCNC: 125 MG/DL
NONHDLC SERPL-MCNC: 175 MG/DL
POTASSIUM SERPL-SCNC: 4.1 MMOL/L (ref 3.4–5.3)
SODIUM SERPL-SCNC: 141 MMOL/L (ref 133–144)
T4 FREE SERPL-MCNC: 1.1 NG/DL (ref 0.76–1.46)
TRIGL SERPL-MCNC: 249 MG/DL
TSH SERPL DL<=0.005 MIU/L-ACNC: 4.06 MU/L (ref 0.4–4)

## 2019-04-23 PROCEDURE — 80048 BASIC METABOLIC PNL TOTAL CA: CPT | Performed by: INTERNAL MEDICINE

## 2019-04-23 PROCEDURE — 77080 DXA BONE DENSITY AXIAL: CPT | Performed by: INTERNAL MEDICINE

## 2019-04-23 PROCEDURE — 84439 ASSAY OF FREE THYROXINE: CPT | Performed by: INTERNAL MEDICINE

## 2019-04-23 PROCEDURE — 84460 ALANINE AMINO (ALT) (SGPT): CPT | Performed by: INTERNAL MEDICINE

## 2019-04-23 PROCEDURE — 84443 ASSAY THYROID STIM HORMONE: CPT | Performed by: INTERNAL MEDICINE

## 2019-04-23 PROCEDURE — 36415 COLL VENOUS BLD VENIPUNCTURE: CPT | Performed by: INTERNAL MEDICINE

## 2019-04-23 PROCEDURE — 83036 HEMOGLOBIN GLYCOSYLATED A1C: CPT | Performed by: INTERNAL MEDICINE

## 2019-04-23 PROCEDURE — 85018 HEMOGLOBIN: CPT | Performed by: INTERNAL MEDICINE

## 2019-04-23 PROCEDURE — 80061 LIPID PANEL: CPT | Performed by: INTERNAL MEDICINE

## 2019-05-01 DIAGNOSIS — E66.01 MORBID OBESITY (H): ICD-10-CM

## 2019-05-01 DIAGNOSIS — E78.5 HYPERLIPIDEMIA LDL GOAL <100: ICD-10-CM

## 2019-05-01 RX ORDER — ATORVASTATIN CALCIUM 80 MG/1
TABLET, FILM COATED ORAL
Qty: 90 TABLET | Refills: 3 | Status: SHIPPED | OUTPATIENT
Start: 2019-05-01 | End: 2020-07-13

## 2019-05-01 NOTE — TELEPHONE ENCOUNTER
"Requested Prescriptions   Pending Prescriptions Disp Refills     atorvastatin (LIPITOR) 80 MG tablet [Pharmacy Med Name: Atorvastatin Calcium Oral Tablet 80 MG] 90 tablet 0     Sig: TAKE ONE TABLET BY MOUTH ONE TIME DAILY       Statins Protocol Passed - 5/1/2019  7:01 AM        Passed - LDL on file in past 12 months     Recent Labs   Lab Test 04/23/19  1029   *             Passed - No abnormal creatine kinase in past 12 months     Recent Labs   Lab Test 08/25/17  0837   CKT 64                Passed - Recent (12 mo) or future (30 days) visit within the authorizing provider's specialty     Patient had office visit in the last 12 months or has a visit in the next 30 days with authorizing provider or within the authorizing provider's specialty.  See \"Patient Info\" tab in inbasket, or \"Choose Columns\" in Meds & Orders section of the refill encounter.              Passed - Medication is active on med list        Passed - Patient is age 18 or older        Passed - No active pregnancy on record        Passed - No positive pregnancy test in past 12 months        Prescription approved per [facility] [place].    "

## 2019-05-04 NOTE — TELEPHONE ENCOUNTER
topiramate (TOPAMAX) 25 MG tablet  Last Written Prescription Date:  12/10/18  Last Fill Quantity: 90,   # refills: 3  Last Office Visit : 4/22/19  Future Office visit: 6/10/19    Routing refill request to provider for review/approval because: creat H  hgb L  platelet past due

## 2019-05-06 RX ORDER — TOPIRAMATE 25 MG/1
TABLET, FILM COATED ORAL
Qty: 90 TABLET | Refills: 3 | Status: SHIPPED | OUTPATIENT
Start: 2019-05-06 | End: 2019-10-07

## 2019-05-07 ENCOUNTER — HOSPITAL ENCOUNTER (OUTPATIENT)
Facility: CLINIC | Age: 69
Discharge: HOME | End: 2019-05-07
Attending: SPECIALIST | Admitting: SPECIALIST
Payer: COMMERCIAL

## 2019-05-07 VITALS
OXYGEN SATURATION: 97 % | RESPIRATION RATE: 7 BRPM | HEART RATE: 70 BPM | SYSTOLIC BLOOD PRESSURE: 147 MMHG | DIASTOLIC BLOOD PRESSURE: 67 MMHG

## 2019-05-07 LAB — COLONOSCOPY: NORMAL

## 2019-05-07 PROCEDURE — 25000128 H RX IP 250 OP 636: Performed by: SPECIALIST

## 2019-05-07 PROCEDURE — 45385 COLONOSCOPY W/LESION REMOVAL: CPT | Mod: PT | Performed by: SPECIALIST

## 2019-05-07 PROCEDURE — 99153 MOD SED SAME PHYS/QHP EA: CPT | Performed by: SPECIALIST

## 2019-05-07 PROCEDURE — 88305 TISSUE EXAM BY PATHOLOGIST: CPT | Performed by: SPECIALIST

## 2019-05-07 PROCEDURE — G0500 MOD SEDAT ENDO SERVICE >5YRS: HCPCS | Performed by: SPECIALIST

## 2019-05-07 RX ORDER — FENTANYL CITRATE 50 UG/ML
INJECTION, SOLUTION INTRAMUSCULAR; INTRAVENOUS PRN
Status: DISCONTINUED | OUTPATIENT
Start: 2019-05-07 | End: 2019-05-07 | Stop reason: HOSPADM

## 2019-05-07 RX ORDER — ONDANSETRON 2 MG/ML
4 INJECTION INTRAMUSCULAR; INTRAVENOUS
Status: DISCONTINUED | OUTPATIENT
Start: 2019-05-07 | End: 2019-05-07 | Stop reason: HOSPADM

## 2019-05-07 RX ORDER — LIDOCAINE 40 MG/G
CREAM TOPICAL
Status: DISCONTINUED | OUTPATIENT
Start: 2019-05-07 | End: 2019-05-07 | Stop reason: HOSPADM

## 2019-05-08 LAB — COPATH REPORT: NORMAL

## 2019-05-16 PROBLEM — D12.6 COLONIC ADENOMA: Status: ACTIVE | Noted: 2019-05-07

## 2019-05-23 ENCOUNTER — TELEPHONE (OUTPATIENT)
Dept: ENDOCRINOLOGY | Facility: CLINIC | Age: 69
End: 2019-05-23

## 2019-05-23 NOTE — TELEPHONE ENCOUNTER
Forms received from: [facility]     Forms were for [facility] for  CGM and supplies     Faxed Date:5/29/19

## 2019-05-28 DIAGNOSIS — Z12.31 VISIT FOR SCREENING MAMMOGRAM: ICD-10-CM

## 2019-05-28 PROCEDURE — 77063 BREAST TOMOSYNTHESIS BI: CPT | Mod: TC

## 2019-05-28 PROCEDURE — 77067 SCR MAMMO BI INCL CAD: CPT | Mod: TC

## 2019-05-29 ENCOUNTER — MEDICAL CORRESPONDENCE (OUTPATIENT)
Dept: HEALTH INFORMATION MANAGEMENT | Facility: CLINIC | Age: 69
End: 2019-05-29

## 2019-06-06 DIAGNOSIS — E11.40 TYPE 2 DIABETES MELLITUS WITH DIABETIC NEUROPATHY, WITH LONG-TERM CURRENT USE OF INSULIN (H): ICD-10-CM

## 2019-06-06 DIAGNOSIS — Z79.4 TYPE 2 DIABETES MELLITUS WITH DIABETIC NEUROPATHY, WITH LONG-TERM CURRENT USE OF INSULIN (H): ICD-10-CM

## 2019-06-06 NOTE — TELEPHONE ENCOUNTER
insulin degludec (TRESIBA) 200 UNIT/ML pen        Last Written Prescription Date:  05/25/18  Last Fill Quantity: 45mL,   # refills: 11  Last Office Visit : 04/22/19  Future Office visit:  06/10/19    Routing refill request to provider for review/approval because:  Insulin - refilled per clinic

## 2019-06-10 ENCOUNTER — OFFICE VISIT (OUTPATIENT)
Dept: ENDOCRINOLOGY | Facility: CLINIC | Age: 69
End: 2019-06-10
Payer: COMMERCIAL

## 2019-06-10 VITALS
WEIGHT: 221.3 LBS | HEART RATE: 75 BPM | SYSTOLIC BLOOD PRESSURE: 167 MMHG | HEIGHT: 62 IN | DIASTOLIC BLOOD PRESSURE: 70 MMHG | BODY MASS INDEX: 40.72 KG/M2

## 2019-06-10 DIAGNOSIS — Z79.4 TYPE 2 DIABETES MELLITUS WITH DIABETIC NEUROPATHY, WITH LONG-TERM CURRENT USE OF INSULIN (H): ICD-10-CM

## 2019-06-10 DIAGNOSIS — E11.40 TYPE 2 DIABETES MELLITUS WITH DIABETIC NEUROPATHY, WITH LONG-TERM CURRENT USE OF INSULIN (H): ICD-10-CM

## 2019-06-10 ASSESSMENT — MIFFLIN-ST. JEOR: SCORE: 1487.06

## 2019-06-10 ASSESSMENT — PAIN SCALES - GENERAL: PAINLEVEL: NO PAIN (0)

## 2019-06-10 NOTE — PROGRESS NOTES
Interval history: 06/10/2019    BG improved with use of CGM and use of Humalog, however, humalog caused lows with few hours after meals. Fasting hypoglycemia ++, she reduced her Tresiba to 70 units and does not have fasting hypoglycemia.   Severe NPDR and macular edema now needing monthly injection. Seen for dannaorst plaque.     CGM physician interpretation:  [place]att download:    on average, 67 SD  TIR: 57%  Time above range 38%  Time below range 5%    Interpretation:  Prandial hyperglycemia and occasional fasting hypoglycemia that is corrected after reduction in Tresiba.     Assessment/Treatment Plan:      68 year old  female with history morbid obesity S/P Gastric bypass surgery, hypothyroidism who presents today for FU management of type 2 diabetes.  Her diabetes mellitus is complicated by diabetic neuropathy, mild nonproliferative retinopathy and nephropathy.    Type 2 Diabetes since her early 20s, suboptimal control.   Blood glucose data: Glucometer reveals patient checks blood sugar 4 times per day. I recommend for patient to check blood sugar 4 times per day due to uncontrolled blood sugars, hypoglycemic episodes, nocturnal hypoglycemia and hypoglycemia unawareness  Patient injects insulin 5 times per day.    Barriers to achieving glycemic goals:   Lack of testing before meals.   Hypoglycemia with fasting.     A1c is 8.7  Tresiba 70 units daily  NovoLog 1.5 units per carb   Dulaglutide 1.5 mg weekly.   Metformin reduced to 1 g for cr cl of 34   If A1c does not improve, possibly start Jardiance at low dose.     Lack of insulin with snacking:    Exercise: Planning to do some work out with her sister.   Lack of exercise: limited by pain in the joints.     Anemia:   Hemoglobin A1c may be falsely lower than her actual value    Hypothyroidism on levothyroxine 50  g daily. Dose recently increased due to high TSH  This has been followed by primary care physician  Normal TSH in May.     Morbid obesity  We  discussed about diet and exercise  Weight stable on Dulaglutide.  Started Topiramate, labs for cushings negative.     Diabetic neuropathy:   Alpha lipoic acid 600 mg daily if symptoms are worse    Melvi Naik MD  [id number]  [facility]      =====================  HPI:      History of Diabetes  Type 2 diagnosed in late 20s.    In the 1970s she was initially started on oral medications and was transitioned to insulin in the 80s.    She had a gastric bypass surgery done in 2004 after which she lost about 70 pounds.  Her blood sugars were better controlled for the next few years but has gradually worsened over time.    Her weight had been steady around 210-215 pounds but in the recent years as her activity was limited by knee pain, she has gained significant weight.   She has a strong family history of type 2 diabetes mellitus with Mother and Sister with type 2 diabetes.      Complications  She has triopathy from diabetes.   1. Retinopathy: Last exam was in May 19 2017, mild NPDR  2.  Nephropathy:  Lab Results   Component Value Date    MICROL 46 03/28/2017     Creatinine   Date Value Ref Range Status   04/23/2019 1.61 (H) 0.52 - 1.04 mg/dL Final   ]    3. Neuropathy   Occasional tingling that improves with pain patch  4. Hypoglycemia   This is rare, but does happen if does not eat after taking insulin.  5. Macrovascular:   No history of cardiac events or stroke    Treatment  Diabetes Medication(s)     Biguanides       metFORMIN (GLUCOPHAGE) 1000 MG tablet    1 tab twice daily    Insulin       HUMALOG KWIKPEN 100 UNIT/ML soln    Use as directed up to 60 units per day.     insulin degludec (TRESIBA) 200 UNIT/ML pen    Inject 80units subcu daily    Incretin Mimetic Agents (GLP-1 Receptor Agonists)       dulaglutide (TRULICITY) 1.5 MG/0.5ML pen    Inject 1.5 mg Subcutaneous every 7 days DISPENSE = 3 MONTH SUPPLY        2 units per carb.     Prevention  Lipid  LDL Cholesterol Calculated   Date Value Ref Range  Status   04/23/2019 125 (H) <100 mg/dL Final     Comment:     Above desirable:  100-129 mg/dl  Borderline High:  130-159 mg/dL  High:             160-189 mg/dL  Very high:       >189 mg/dl     03/12/2018 94 <100 mg/dL Final     Comment:     Desirable:       <100 mg/dl     LDL Cholesterol Direct   Date Value Ref Range Status   12/10/2018 115 (H) <100 mg/dL Final     Comment:     Desirable:       <100 mg/dl       Medications     HMG CoA Reductase Inhibitors     atorvastatin (LIPITOR) 80 MG tablet        atorvastatin (LIPITOR) 80 MG tablet             Renal  Medication (Note: This includes the hypertensive combination subclass to make sure to show all ACEI/ARB's.)     Angiotensin II Receptor Antagonists       irbesartan (AVAPRO) 300 MG tablet    TAKE ONE TABLET BY MOUTH ONE TIME DAILY         Weight  Wt Readings from Last 4 Encounters:   06/10/19 100.4 kg (221 lb 4.8 oz)   04/22/19 104.7 kg (230 lb 14.4 oz)   04/04/19 103.5 kg (228 lb 1.6 oz)   12/10/18 106 kg (233 lb 11.2 oz)     Diet: She works at [employer]   [employer].  She now has retired.    Exercise : limited   Weight trend  Wt Readings from Last 4 Encounters:   06/10/19 100.4 kg (221 lb 4.8 oz)   04/22/19 104.7 kg (230 lb 14.4 oz)   04/04/19 103.5 kg (228 lb 1.6 oz)   12/10/18 106 kg (233 lb 11.2 oz)       A1c today is 8.1  Lab Results   Component Value Date    A1C 9.3 03/28/2017    A1C 9.0 10/17/2016    A1C 8.6 06/17/2016    A1C 8.3 02/16/2016    A1C 8.6 10/05/2015       Barriers to achieve glycemic goals:      Type 2 Diabetes since her early 20s    Barriers to achieving glycemic goals  Lack of testing before meals: Although she has days when she does not check, she has increased her frequency of testing    Lack of insulin with snacking / Ineffective bydureon  Trulicity has helped with prandial BG    Recent change in Lantus to vial : Tresiba with good effects    High Carb diet: Diet improved with Trulicity.     Lack of  exercise: limited by pain in the joints.   Morbid obesity       Allergies   Allergen Reactions     Clonidine      headaches     Fexofenadine Hydrochloride      Allegra--headache     Gemfibrozil      lopid--rash ??from med     Lisinopril      edema     Norvasc [Amlodipine Besylate]      Hair loss     Pioglitazone Hydrochloride Swelling     actos--ankle edema     Doxazosin Mesylate Rash     cardura--rash       Current Outpatient Medications   Medication Sig Dispense Refill     atorvastatin (LIPITOR) 80 MG tablet TAKE ONE TABLET BY MOUTH ONE TIME DAILY 90 tablet 3     atorvastatin (LIPITOR) 80 MG tablet Take 1 tablet (80 mg) by mouth daily 30 tablet 1     augmented betamethasone dipropionate (DIPROLENE-AF) 0.05 % ointment Apply to AA BID x 3-4 weeks then PRN 45 g 0     MARTITA CONTOUR NEXT test strip use to test blood sugar 4 times a day or as directed 100 each 5     BD FCO U/F 32G X 4 MM insulin pen needle USE 4 TIMES A DAY WITH INSULIN AND VICTOZA INJECTIONS 400 each 1     blood glucose monitoring (MARTITA CONTOUR NEXT) test strip Use to test blood sugar 4 times daily or as directed. 1 Box 9     blood glucose monitoring (NO BRAND SPECIFIED) test strip Reason for four times daily checking is lack of diabetic control.  Patient is on multiple doses of insulin daily. Dispense what patient's plan will cover.  Dispense box of 100 strips at a time 1 Box 1     calcium carbonate-vitamin D (CALTRATE 600+D) 600-400 MG-UNIT CHEW Take 2 chew tab by mouth every evening       cetirizine (ZYRTEC) 10 MG tablet Take 1 tablet (10 mg) by mouth daily       Cholecalciferol (VITAMIN D) 2000 UNITS tablet Take 2,000 Units by mouth daily       Continuous Blood Gluc  (FREESTYLE BALWINDER 14 DAY READER) HENNA 1 each 3 times daily 1 Device 1     Continuous Blood Gluc Sensor (FREESTYLE BALWINDER 14 DAY SENSOR) MISC 1 each every 14 days 2 each 11     CONTOUR NEXT TEST test strip USE TO TEST BLOOD SUGAR 4 TIMES DAILY OR AS DIRECTED. 100 each 8      cyanocolbalamin (VITAMIN B-12) 1000 MCG tablet Take 1,000 mcg by mouth every other day        dulaglutide (TRULICITY) 1.5 MG/0.5ML pen Inject 1.5 mg Subcutaneous every 7 days DISPENSE = 3 MONTH SUPPLY 6 mL 3     fenofibrate (TRIGLIDE/LOFIBRA) 160 MG tablet TAKE ONE TABLET BY MOUTH ONE TIME DAILY 90 tablet 3     fluticasone (FLONASE) 50 MCG/ACT nasal spray Spray 2 sprays into both nostrils daily 16 g 1     furosemide (LASIX) 20 MG tablet Take 1 tablet (20 mg) by mouth daily 90 tablet 3     HUMALOG KWIKPEN 100 UNIT/ML soln Use as directed up to 60 units per day. 15 mL 11     hydrocortisone (WESTCORT) 0.2 % cream Apply topically 2 times daily Apply sparingly to affected area 2 times daily as needed. 45 g 2     insulin degludec (TRESIBA) 200 UNIT/ML pen Inject 80units subcu daily 80 mL 3     irbesartan (AVAPRO) 300 MG tablet TAKE ONE TABLET BY MOUTH ONE TIME DAILY  90 tablet 1     KLOR-CON 10 MEQ CR tablet TAKE ONE TABLET BY MOUTH ONE TIME DAILY  90 tablet 0     levothyroxine (SYNTHROID/LEVOTHROID) 50 MCG tablet TAKE 1 TABLET BY MOUTH DAILY. 90 tablet 2     Magnesium 500 MG CAPS Take 1 capsule by mouth daily.       metFORMIN (GLUCOPHAGE) 1000 MG tablet 1 tab twice daily 180 tablet 1     ONE TOUCH DELICA LANCETS MISC 1 Device 4 times daily. 100 Stick prn     topiramate (TOPAMAX) 25 MG tablet 25 mg at bedtime for 1 week, 50 mg at bedtime for 1 week and 75 mg daily at bedtime thereafter 90 tablet 3     traZODone (DESYREL) 50 MG tablet take 1 to 2 tablets (50 to 100mg) by mouth nightly as needed 180 tablet 0     verapamil ER (CALAN-SR) 180 MG CR tablet TAKE ONE TABLET BY MOUTH TWICE DAILY  180 tablet 3     VITRON-C  MG TABS tablet TAKE TWO TABLETS BY MOUTH TWICE DAILY  360 tablet 1       Review of Systems     Constitutional: Normal appetite, feels well no polyuria or polydipsia.   Head: no headache   Eye: vision has declined   ENT: No throat congestion.   Respiratory: no cough   Cardiovascular: Has known murmur,    Gastrointestinal: Negative for vomiting, abdominal pain and diarrhea.  Genitourinary: No bladder problems.  Musculoskeletal: Negative for myalgias. No weakness.  Neurological: Negative for seizures and headaches.  Psychiatric/Behavioral: Negative for behavioral problem and dysphoric mood.    Past Medical History:   Diagnosis Date     Allergic rhinitis, cause unspecified      Hepatitis, unspecified 1968     Iron Deficiency Anemia 3/09     Lymphoma (H) 12/08     Nonsenile cataract      Obesity, unspecified      Other and unspecified hyperlipidemia      Rheumatic fever without mention of heart involvement infant     Type II or unspecified type diabetes mellitus without mention of complication, not stated as uncontrolled 1992     Unspecified essential hypertension      Unspecified hypothyroidism      Vitamin B12 deficiency 3/09       Past Surgical History:   Procedure Laterality Date     C APPENDECTOMY  1958     C NONSPECIFIC PROCEDURE  1976    (leg) cystectomy     CATARACT IOL, RT/LT Left 12/21/2000    left     CATARACT IOL, RT/LT Right 01/06/2000    right     GASTRIC BYPASS  2/04    Dr. Hebert     LYMPH NODE BIOPSY  12/08    right groin, Dr. Licona     TONSILLECTOMY & ADENOIDECTOMY  1968       Family History   Problem Relation Age of Onset     Cardiovascular Father         AAA     Diabetes Mother      C.A.D. Mother         52     Eye Disorder Mother         glaucoma     Glaucoma Mother      Cardiovascular Brother         AAA, PVD     Cancer Brother         bladder     Cardiovascular Brother         AAA, valvular heart disease     Diabetes Brother         age 53     Cancer Brother         liver cancer     Glaucoma Other         MGGF     Macular Degeneration No family hx of        Social History     Social History     Marital status:      Spouse name: N/A     Number of children: 0     Years of education: N/A     Occupational History      [employer],[address]     Social History Main Topics  "    Smoking status: Never Smoker     Smokeless tobacco: Never Used     Alcohol use No     Drug use: No     Sexual activity: No     Other Topics Concern     Caffeine Concern Yes     20 oz daily     Exercise No     Seat Belt Yes     Self-Exams Yes     Social History Narrative    Living arrangements - the patient lives alone.       Objective:   /70   Pulse 75   Ht 1.575 m (5' 2\")   Wt 100.4 kg (221 lb 4.8 oz)   BMI 40.48 kg/m    221 lbs 4.8 oz  Wt Readings from Last 4 Encounters:   06/10/19 100.4 kg (221 lb 4.8 oz)   04/22/19 104.7 kg (230 lb 14.4 oz)   04/04/19 103.5 kg (228 lb 1.6 oz)   12/10/18 106 kg (233 lb 11.2 oz)     Constitutional: Obese female in no acute distress cooperative and comfortable  EYES: anicteric  Some bruits from HEENT: Mouth/Throat: Mucous membrane is moist. Oropharynx is clear. No adenopathy. No large goiters, difficult to palpate due to short neck  Cardiovascular: RRR, S1, S2 normal.  Aortic area systolic murmur  Pulmonary/Chest: CTAB. No wheezing or rales   Abdominal: +BS. Nontender to palpation.  Abdomen is distended,  small bruises around her injection site.   No lipo hypertrophy.  White striae.  Neurological: Alert. Normal affect. Normal speech   Extremities: No clubbing, cyanosis or inflammation   Bilateral lower extremity edema left worse than the right.  Skin: normal texture, color  Feet: Bilateral loss of sensation in both feet, significant edema.  Lymphatic: no cervical lymphadenopathy.  Psychological: appropriate mood     Labs:  Normal electrolytes, stable renal function.  Microalbuminuria noted in August 2017  Normal bicarb    In House Labs:   Lab Results   Component Value Date    A1C 9.3 03/28/2017    A1C 9.0 10/17/2016    A1C 8.6 06/17/2016    A1C 8.3 02/16/2016    A1C 8.6 10/05/2015       TSH   Date Value Ref Range Status   04/23/2019 4.06 (H) 0.40 - 4.00 mU/L Final   05/24/2018 2.54 0.40 - 4.00 mU/L Final   03/12/2018 4.78 (H) 0.40 - 4.00 mU/L Final   03/28/2017 2.84 " 0.40 - 4.00 mU/L Final   02/10/2017 2.55 0.40 - 4.00 mU/L Final     T4 Free   Date Value Ref Range Status   04/23/2019 1.10 0.76 - 1.46 ng/dL Final   03/12/2018 1.17 0.76 - 1.46 ng/dL Final   10/17/2016 1.17 0.76 - 1.46 ng/dL Final   05/05/2010 1.05 0.70 - 1.85 ng/dL Final   09/13/2007 1.00 0.70 - 1.85 ng/dL Final       Creatinine   Date Value Ref Range Status   04/23/2019 1.61 (H) 0.52 - 1.04 mg/dL Final   ]    Recent Labs   Lab Test  03/28/17   1014  06/17/16   0750  05/22/15   0848   05/03/14   1018   CHOL  185  166  175   < >  161   HDL  35*  34*  32*   < >  39*   LDL  101*  78  76   < >  76   TRIG  247*  271*  334*   < >  231*   CHOLHDLRATIO   --    --   5.5*   --   4.2    < > = values in this interval not displayed.       No results found for: [id number], [id number], [id number]

## 2019-06-10 NOTE — LETTER
6/10/2019       RE: Ade Wilkins  [address] [facility] [address]     Dear Colleague,    Thank you for referring your patient, Ade Wilkins, to the [facility] ENDOCRINOLOGY at [facility]. Please see a copy of my visit note below.    Interval history: 06/10/2019    BG improved with use of CGM and use of Humalog, however, humalog caused lows with few hours after meals. Fasting hypoglycemia ++, she reduced her Tresiba to 70 units and does not have fasting hypoglycemia.   Severe NPDR and macular edema now needing monthly injection. Seen for hollenhorst plaque.     CGM physician interpretation:  [place]yatt download:    on average, 67 SD  TIR: 57%  Time above range 38%  Time below range 5%    Interpretation:  Prandial hyperglycemia and occasional fasting hypoglycemia that is corrected after reduction in Tresiba.     Assessment/Treatment Plan:      68 year old  female with history morbid obesity S/P Gastric bypass surgery, hypothyroidism who presents today for FU management of type 2 diabetes.  Her diabetes mellitus is complicated by diabetic neuropathy, mild nonproliferative retinopathy and nephropathy.    Type 2 Diabetes since her early 20s, suboptimal control.   Blood glucose data: Glucometer reveals patient checks blood sugar 4 times per day. I recommend for patient to check blood sugar 4 times per day due to uncontrolled blood sugars, hypoglycemic episodes, nocturnal hypoglycemia and hypoglycemia unawareness  Patient injects insulin 5 times per day.    Barriers to achieving glycemic goals:   Lack of testing before meals.   Hypoglycemia with fasting.     A1c is 8.7  Tresiba 70 units daily  NovoLog 1.5 units per carb   Dulaglutide 1.5 mg weekly.   Metformin reduced to 1 g for cr cl of 34   If A1c does not improve, possibly start Jardiance at low dose.     Lack of insulin with snacking:    Exercise: Planning to do some work out with her sister.   Lack of exercise:  limited by pain in the joints.     Anemia:   Hemoglobin A1c may be falsely lower than her actual value    Hypothyroidism on levothyroxine 50  g daily. Dose recently increased due to high TSH  This has been followed by primary care physician  Normal TSH in May.     Morbid obesity  We discussed about diet and exercise  Weight stable on Dulaglutide.  Started Topiramate, labs for cushings negative.     Diabetic neuropathy:   Alpha lipoic acid 600 mg daily if symptoms are worse    Melvi Naik MD  [id number]  Endocrinology [facility]      =====================  HPI:      History of Diabetes  Type 2 diagnosed in late 20s.    In the 1970s she was initially started on oral medications and was transitioned to insulin in the 80s.    She had a gastric bypass surgery done in 2004 after which she lost about 70 pounds.  Her blood sugars were better controlled for the next few years but has gradually worsened over time.    Her weight had been steady around 210-215 pounds but in the recent years as her activity was limited by knee pain, she has gained significant weight.   She has a strong family history of type 2 diabetes mellitus with Mother and Sister with type 2 diabetes.      Complications  She has triopathy from diabetes.   1. Retinopathy: Last exam was in May 19 2017, mild NPDR  2.  Nephropathy:  Lab Results   Component Value Date    MICROL 46 03/28/2017     Creatinine   Date Value Ref Range Status   04/23/2019 1.61 (H) 0.52 - 1.04 mg/dL Final   ]    3. Neuropathy   Occasional tingling that improves with pain patch  4. Hypoglycemia   This is rare, but does happen if does not eat after taking insulin.  5. Macrovascular:   No history of cardiac events or stroke    Treatment  Diabetes Medication(s)     Biguanides       metFORMIN (GLUCOPHAGE) 1000 MG tablet    1 tab twice daily    Insulin       HUMALOG KWIKPEN 100 UNIT/ML soln    Use as directed up to 60 units per day.     insulin degludec (TRESIBA) 200 UNIT/ML pen    Inject  80units subcu daily    Incretin Mimetic Agents (GLP-1 Receptor Agonists)       dulaglutide (TRULICITY) 1.5 MG/0.5ML pen    Inject 1.5 mg Subcutaneous every 7 days DISPENSE = 3 MONTH SUPPLY        2 units per carb.     Prevention  Lipid  LDL Cholesterol Calculated   Date Value Ref Range Status   04/23/2019 125 (H) <100 mg/dL Final     Comment:     Above desirable:  100-129 mg/dl  Borderline High:  130-159 mg/dL  High:             160-189 mg/dL  Very high:       >189 mg/dl     03/12/2018 94 <100 mg/dL Final     Comment:     Desirable:       <100 mg/dl     LDL Cholesterol Direct   Date Value Ref Range Status   12/10/2018 115 (H) <100 mg/dL Final     Comment:     Desirable:       <100 mg/dl       Medications     HMG CoA Reductase Inhibitors     atorvastatin (LIPITOR) 80 MG tablet        atorvastatin (LIPITOR) 80 MG tablet             Renal  Medication (Note: This includes the hypertensive combination subclass to make sure to show all ACEI/ARB's.)     Angiotensin II Receptor Antagonists       irbesartan (AVAPRO) 300 MG tablet    TAKE ONE TABLET BY MOUTH ONE TIME DAILY         Weight  Wt Readings from Last 4 Encounters:   06/10/19 100.4 kg (221 lb 4.8 oz)   04/22/19 104.7 kg (230 lb 14.4 oz)   04/04/19 103.5 kg (228 lb 1.6 oz)   12/10/18 106 kg (233 lb 11.2 oz)     Diet: She works at [employer],   On [employer].  She now has retired.    Exercise : limited   Weight trend  Wt Readings from Last 4 Encounters:   06/10/19 100.4 kg (221 lb 4.8 oz)   04/22/19 104.7 kg (230 lb 14.4 oz)   04/04/19 103.5 kg (228 lb 1.6 oz)   12/10/18 106 kg (233 lb 11.2 oz)       A1c today is 8.1  Lab Results   Component Value Date    A1C 9.3 03/28/2017    A1C 9.0 10/17/2016    A1C 8.6 06/17/2016    A1C 8.3 02/16/2016    A1C 8.6 10/05/2015       Barriers to achieve glycemic goals:      Type 2 Diabetes since her early 20s    Barriers to achieving glycemic goals  Lack of testing before meals: Although she has days when  she does not check, she has increased her frequency of testing    Lack of insulin with snacking / Ineffective bydureon  Trulicity has helped with prandial BG    Recent change in Lantus to vial : Tresiba with good effects    High Carb diet: Diet improved with Trulicity.     Lack of exercise: limited by pain in the joints.   Morbid obesity       Allergies   Allergen Reactions     Clonidine      headaches     Fexofenadine Hydrochloride      Allegra--headache     Gemfibrozil      lopid--rash ??from med     Lisinopril      edema     Norvasc [Amlodipine Besylate]      Hair loss     Pioglitazone Hydrochloride Swelling     actos--ankle edema     Doxazosin Mesylate Rash     cardura--rash       Current Outpatient Medications   Medication Sig Dispense Refill     atorvastatin (LIPITOR) 80 MG tablet TAKE ONE TABLET BY MOUTH ONE TIME DAILY 90 tablet 3     atorvastatin (LIPITOR) 80 MG tablet Take 1 tablet (80 mg) by mouth daily 30 tablet 1     augmented betamethasone dipropionate (DIPROLENE-AF) 0.05 % ointment Apply to AA BID x 3-4 weeks then PRN 45 g 0     MARTITA CONTOUR NEXT test strip use to test blood sugar 4 times a day or as directed 100 each 5     BD FCO U/F 32G X 4 MM insulin pen needle USE 4 TIMES A DAY WITH INSULIN AND VICTOZA INJECTIONS 400 each 1     blood glucose monitoring (MARTITA CONTOUR NEXT) test strip Use to test blood sugar 4 times daily or as directed. 1 Box 9     blood glucose monitoring (NO BRAND SPECIFIED) test strip Reason for four times daily checking is lack of diabetic control.  Patient is on multiple doses of insulin daily. Dispense what patient's plan will cover.  Dispense box of 100 strips at a time 1 Box 1     calcium carbonate-vitamin D (CALTRATE 600+D) 600-400 MG-UNIT CHEW Take 2 chew tab by mouth every evening       cetirizine (ZYRTEC) 10 MG tablet Take 1 tablet (10 mg) by mouth daily       Cholecalciferol (VITAMIN D) 2000 UNITS tablet Take 2,000 Units by mouth daily       Continuous Blood Gluc   (VoltaireSTYLE BALWINDER 14 DAY READER) HENNA 1 each 3 times daily 1 Device 1     Continuous Blood Gluc Sensor (FREESTYLE BALWINDER 14 DAY SENSOR) MISC 1 each every 14 days 2 each 11     CONTOUR NEXT TEST test strip USE TO TEST BLOOD SUGAR 4 TIMES DAILY OR AS DIRECTED. 100 each 8     cyanocolbalamin (VITAMIN B-12) 1000 MCG tablet Take 1,000 mcg by mouth every other day        dulaglutide (TRULICITY) 1.5 MG/0.5ML pen Inject 1.5 mg Subcutaneous every 7 days DISPENSE = 3 MONTH SUPPLY 6 mL 3     fenofibrate (TRIGLIDE/LOFIBRA) 160 MG tablet TAKE ONE TABLET BY MOUTH ONE TIME DAILY 90 tablet 3     fluticasone (FLONASE) 50 MCG/ACT nasal spray Spray 2 sprays into both nostrils daily 16 g 1     furosemide (LASIX) 20 MG tablet Take 1 tablet (20 mg) by mouth daily 90 tablet 3     HUMALOG KWIKPEN 100 UNIT/ML soln Use as directed up to 60 units per day. 15 mL 11     hydrocortisone (WESTCORT) 0.2 % cream Apply topically 2 times daily Apply sparingly to affected area 2 times daily as needed. 45 g 2     insulin degludec (TRESIBA) 200 UNIT/ML pen Inject 80units subcu daily 80 mL 3     irbesartan (AVAPRO) 300 MG tablet TAKE ONE TABLET BY MOUTH ONE TIME DAILY  90 tablet 1     KLOR-CON 10 MEQ CR tablet TAKE ONE TABLET BY MOUTH ONE TIME DAILY  90 tablet 0     levothyroxine (SYNTHROID/LEVOTHROID) 50 MCG tablet TAKE 1 TABLET BY MOUTH DAILY. 90 tablet 2     Magnesium 500 MG CAPS Take 1 capsule by mouth daily.       metFORMIN (GLUCOPHAGE) 1000 MG tablet 1 tab twice daily 180 tablet 1     ONE TOUCH DELICA LANCETS MISC 1 Device 4 times daily. 100 Stick prn     topiramate (TOPAMAX) 25 MG tablet 25 mg at bedtime for 1 week, 50 mg at bedtime for 1 week and 75 mg daily at bedtime thereafter 90 tablet 3     traZODone (DESYREL) 50 MG tablet take 1 to 2 tablets (50 to 100mg) by mouth nightly as needed 180 tablet 0     verapamil ER (CALAN-SR) 180 MG CR tablet TAKE ONE TABLET BY MOUTH TWICE DAILY  180 tablet 3     VITRON-C  MG TABS tablet TAKE TWO  TABLETS BY MOUTH TWICE DAILY  360 tablet 1       Review of Systems     Constitutional: Normal appetite, feels well no polyuria or polydipsia.   Head: no headache   Eye: vision has declined   ENT: No throat congestion.   Respiratory: no cough   Cardiovascular: Has known murmur,   Gastrointestinal: Negative for vomiting, abdominal pain and diarrhea.  Genitourinary: No bladder problems.  Musculoskeletal: Negative for myalgias. No weakness.  Neurological: Negative for seizures and headaches.  Psychiatric/Behavioral: Negative for behavioral problem and dysphoric mood.    Past Medical History:   Diagnosis Date     Allergic rhinitis, cause unspecified      Hepatitis, unspecified 1968     Iron Deficiency Anemia 3/09     Lymphoma (H) 12/08     Nonsenile cataract      Obesity, unspecified      Other and unspecified hyperlipidemia      Rheumatic fever without mention of heart involvement infant     Type II or unspecified type diabetes mellitus without mention of complication, not stated as uncontrolled 1992     Unspecified essential hypertension      Unspecified hypothyroidism      Vitamin B12 deficiency 3/09       Past Surgical History:   Procedure Laterality Date     C APPENDECTOMY  1958     C NONSPECIFIC PROCEDURE  1976    (leg) cystectomy     CATARACT IOL, RT/LT Left 12/21/2000    left     CATARACT IOL, RT/LT Right 01/06/2000    right     GASTRIC BYPASS  2/04    Dr. Hebert     LYMPH NODE BIOPSY  12/08    right groin, Dr. Licona     TONSILLECTOMY & ADENOIDECTOMY  1968       Family History   Problem Relation Age of Onset     Cardiovascular Father         AAA     Diabetes Mother      C.A.D. Mother         52     Eye Disorder Mother         glaucoma     Glaucoma Mother      Cardiovascular Brother         AAA, PVD     Cancer Brother         bladder     Cardiovascular Brother         AAA, valvular heart disease     Diabetes Brother         age 53     Cancer Brother         liver cancer     Glaucoma Other         MGGF     Macular  "Degeneration No family hx of        Social History     Social History     Marital status:      Spouse name: N/A     Number of children: 0     Years of education: N/A     Occupational History      [employer] [employer]     Social History Main Topics     Smoking status: Never Smoker     Smokeless tobacco: Never Used     Alcohol use No     Drug use: No     Sexual activity: No     Other Topics Concern     Caffeine Concern Yes     20 oz daily     Exercise No     Seat Belt Yes     Self-Exams Yes     Social History Narrative    Living arrangements - the patient lives alone.       Objective:   /70   Pulse 75   Ht 1.575 m (5' 2\")   Wt 100.4 kg (221 lb 4.8 oz)   BMI 40.48 kg/m     221 lbs 4.8 oz  Wt Readings from Last 4 Encounters:   06/10/19 100.4 kg (221 lb 4.8 oz)   04/22/19 104.7 kg (230 lb 14.4 oz)   04/04/19 103.5 kg (228 lb 1.6 oz)   12/10/18 106 kg (233 lb 11.2 oz)     Constitutional: Obese female in no acute distress cooperative and comfortable  EYES: anicteric  Some bruits from HEENT: Mouth/Throat: Mucous membrane is moist. Oropharynx is clear. No adenopathy. No large goiters, difficult to palpate due to short neck  Cardiovascular: RRR, S1, S2 normal.  Aortic area systolic murmur  Pulmonary/Chest: CTAB. No wheezing or rales   Abdominal: +BS. Nontender to palpation.  Abdomen is distended,  small bruises around her injection site.   No lipo hypertrophy.  White striae.  Neurological: Alert. Normal affect. Normal speech   Extremities: No clubbing, cyanosis or inflammation   Bilateral lower extremity edema left worse than the right.  Skin: normal texture, color  Feet: Bilateral loss of sensation in both feet, significant edema.  Lymphatic: no cervical lymphadenopathy.  Psychological: appropriate mood     Labs:  Normal electrolytes, stable renal function.  Microalbuminuria noted in August 2017  Normal bicarb    In House Labs:   Lab Results   Component Value Date    A1C 9.3 " 03/28/2017    A1C 9.0 10/17/2016    A1C 8.6 06/17/2016    A1C 8.3 02/16/2016    A1C 8.6 10/05/2015       TSH   Date Value Ref Range Status   04/23/2019 4.06 (H) 0.40 - 4.00 mU/L Final   05/24/2018 2.54 0.40 - 4.00 mU/L Final   03/12/2018 4.78 (H) 0.40 - 4.00 mU/L Final   03/28/2017 2.84 0.40 - 4.00 mU/L Final   02/10/2017 2.55 0.40 - 4.00 mU/L Final     T4 Free   Date Value Ref Range Status   04/23/2019 1.10 0.76 - 1.46 ng/dL Final   03/12/2018 1.17 0.76 - 1.46 ng/dL Final   10/17/2016 1.17 0.76 - 1.46 ng/dL Final   05/05/2010 1.05 0.70 - 1.85 ng/dL Final   09/13/2007 1.00 0.70 - 1.85 ng/dL Final       Creatinine   Date Value Ref Range Status   04/23/2019 1.61 (H) 0.52 - 1.04 mg/dL Final   ]    Recent Labs   Lab Test  03/28/17   1014  06/17/16   0750  05/22/15   0848   05/03/14   1018   CHOL  185  166  175   < >  161   HDL  35*  34*  32*   < >  39*   LDL  101*  78  76   < >  76   TRIG  247*  271*  334*   < >  231*   CHOLHDLRATIO   --    --   5.5*   --   4.2    < > = values in this interval not displayed.       No results found for: [id number], [id number], [id number]        Again, thank you for allowing me to participate in the care of your patient.      Sincerely,    Melvi Naik MD

## 2019-06-13 ENCOUNTER — TELEPHONE (OUTPATIENT)
Dept: ENDOCRINOLOGY | Facility: CLINIC | Age: 69
End: 2019-06-13

## 2019-06-14 DIAGNOSIS — E11.40 TYPE 2 DIABETES MELLITUS WITH DIABETIC NEUROPATHY (H): ICD-10-CM

## 2019-06-17 NOTE — TELEPHONE ENCOUNTER
metFORMIN (GLUCOPHAGE) 1000 MG tablet      Last Written Prescription Date:  11/27/18  Last Fill Quantity: 180,   # refills: 1  Last Office Visit : 6/10/19  Future Office visit:  10/14/19    Routing refill request to provider for review/approval because:  Failed protocol: abnormal lab- Creatinine.    *FYI- BP above parameters.

## 2019-06-22 DIAGNOSIS — E11.40 TYPE 2 DIABETES MELLITUS WITH DIABETIC NEUROPATHY (H): ICD-10-CM

## 2019-07-24 DIAGNOSIS — I10 ESSENTIAL HYPERTENSION: ICD-10-CM

## 2019-07-24 RX ORDER — FUROSEMIDE 20 MG
20 TABLET ORAL DAILY
Qty: 90 TABLET | Refills: 3 | Status: SHIPPED | OUTPATIENT
Start: 2019-07-24 | End: 2020-11-02

## 2019-07-24 RX ORDER — IRBESARTAN 300 MG/1
TABLET ORAL
Qty: 90 TABLET | Refills: 3 | Status: SHIPPED | OUTPATIENT
Start: 2019-07-24 | End: 2020-08-04

## 2019-07-24 NOTE — TELEPHONE ENCOUNTER
"Requested Prescriptions   Pending Prescriptions Disp Refills     furosemide (LASIX) 20 MG tablet [Pharmacy Med Name: Furosemide Oral Tablet 20 MG] 90 tablet 2     Sig: Take 1 tablet (20 mg) by mouth daily       Diuretics (Including Combos) Protocol Failed - 7/24/2019  7:01 AM        Failed - Blood pressure under 140/90 in past 12 months     BP Readings from Last 3 Encounters:   06/10/19 167/70   05/07/19 147/67   04/22/19 125/68                 Failed - Normal serum creatinine on file in past 12 months     Recent Labs   Lab Test 04/23/19  1029   CR 1.61*              Passed - Recent (12 mo) or future (30 days) visit within the authorizing provider's specialty     Patient had office visit in the last 12 months or has a visit in the next 30 days with authorizing provider or within the authorizing provider's specialty.  See \"Patient Info\" tab in inbasket, or \"Choose Columns\" in Meds & Orders section of the refill encounter.              Passed - Medication is active on med list        Passed - Patient is age 18 or older        Passed - No active pregancy on record        Passed - Normal serum potassium on file in past 12 months     Recent Labs   Lab Test 04/23/19  1029   POTASSIUM 4.1                    Passed - Normal serum sodium on file in past 12 months     Recent Labs   Lab Test 04/23/19  1029                 Passed - No positive pregnancy test in past 12 months        irbesartan (AVAPRO) 300 MG tablet [Pharmacy Med Name: Irbesartan Oral Tablet 300 MG] 90 tablet 0     Sig: TAKE ONE TABLET BY MOUTH ONE TIME DAILY       Angiotensin-II Receptors Failed - 7/24/2019  7:01 AM        Failed - Blood pressure under 140/90 in past 12 months     BP Readings from Last 3 Encounters:   06/10/19 167/70   05/07/19 147/67   04/22/19 125/68                 Failed - Normal serum creatinine on file in past 12 months     Recent Labs   Lab Test 04/23/19  1029   CR 1.61*             Passed - Recent (12 mo) or future (30 days) " "visit within the authorizing provider's specialty     Patient had office visit in the last 12 months or has a visit in the next 30 days with authorizing provider or within the authorizing provider's specialty.  See \"Patient Info\" tab in inbasket, or \"Choose Columns\" in Meds & Orders section of the refill encounter.              Passed - Medication is active on med list        Passed - Patient is age 18 or older        Passed - No active pregnancy on record        Passed - Normal serum potassium on file in past 12 months     Recent Labs   Lab Test 04/23/19  1029   POTASSIUM 4.1                    Passed - No positive pregnancy test in past 12 months        Routing refill request to provider for review/approval because:  Labs out of range:  Creat, bp        "

## 2019-07-29 DIAGNOSIS — R25.2 MUSCLE CRAMPS: ICD-10-CM

## 2019-07-29 RX ORDER — POTASSIUM CHLORIDE 750 MG/1
TABLET, EXTENDED RELEASE ORAL
Qty: 90 TABLET | Refills: 0 | Status: SHIPPED | OUTPATIENT
Start: 2019-07-29 | End: 2020-07-13

## 2019-07-29 NOTE — TELEPHONE ENCOUNTER
Potassium Chloride ER Oral Tablet Extended Release 10 MEQ        Last Written Prescription Date:  2-26-19  Last Fill Quantity: 90,   # refills: 0  Last Office Visit : 6-10-19  Future Office visit:  10-14-19    Routing refill request to provider for review/approval because:  Med not on protocol.

## 2019-08-16 ENCOUNTER — TRANSFERRED RECORDS (OUTPATIENT)
Dept: HEALTH INFORMATION MANAGEMENT | Facility: CLINIC | Age: 69
End: 2019-08-16

## 2019-08-21 ENCOUNTER — TRANSFERRED RECORDS (OUTPATIENT)
Dept: HEALTH INFORMATION MANAGEMENT | Facility: CLINIC | Age: 69
End: 2019-08-21

## 2019-08-29 DIAGNOSIS — E11.40 TYPE 2 DIABETES MELLITUS WITH DIABETIC NEUROPATHY, WITH LONG-TERM CURRENT USE OF INSULIN (H): ICD-10-CM

## 2019-08-29 DIAGNOSIS — Z79.4 TYPE 2 DIABETES MELLITUS WITH DIABETIC NEUROPATHY, WITH LONG-TERM CURRENT USE OF INSULIN (H): ICD-10-CM

## 2019-09-03 NOTE — TELEPHONE ENCOUNTER
Trulicity Subcutaneous Solution Pen-injector 1.5 MG/0.5ML   Inject 1.5 mg Subcutaneous every 7 days DISPENSE = 3 MONTH SUPPLY - Subcutaneous  Last Written Prescription Date:  8/23/2018  Last Fill Quantity: 6 ml,   # refills: 3 rfl  Last Office Visit : 6/10/2019  Future Office visit:  10/14/2019    Routing refill request to provider for review/approval because:  Abnormal labs and elevated BP.  A1C and Cr reviewed at last visit 6/10/2019    06/10/19 167/70   05/07/19 147/67   04/22/19 125/68     Lab Test 04/23/19  1029 04/22/19   A1C 8.7*  --    HEMOGLOBINA1  --  8.7*     Lab Test 04/23/19  1029   CR 1.61*

## 2019-09-30 ASSESSMENT — ENCOUNTER SYMPTOMS
POOR WOUND HEALING: 0
EYE WATERING: 1
TASTE DISTURBANCE: 0
SMELL DISTURBANCE: 0
NECK PAIN: 0
NECK MASS: 0
BACK PAIN: 1
SINUS CONGESTION: 1
EYE IRRITATION: 1
SINUS PAIN: 1
SKIN CHANGES: 0
EYE REDNESS: 0
SORE THROAT: 0
EYE PAIN: 1
MUSCLE CRAMPS: 1
HOARSE VOICE: 0
MUSCLE WEAKNESS: 0
ARTHRALGIAS: 1
JOINT SWELLING: 0
STIFFNESS: 1
MYALGIAS: 1
DOUBLE VISION: 0
TROUBLE SWALLOWING: 0
NAIL CHANGES: 1

## 2019-10-01 ENCOUNTER — HEALTH MAINTENANCE LETTER (OUTPATIENT)
Age: 69
End: 2019-10-01

## 2019-10-07 DIAGNOSIS — E66.01 MORBID OBESITY (H): ICD-10-CM

## 2019-10-11 RX ORDER — TOPIRAMATE 25 MG/1
TABLET, FILM COATED ORAL
Qty: 90 TABLET | Refills: 2 | Status: SHIPPED | OUTPATIENT
Start: 2019-10-11 | End: 2020-01-07

## 2019-10-11 NOTE — TELEPHONE ENCOUNTER
topiramate (TOPAMAX) 25 MG   Last Written Prescription Date:  5/6/19  Last Fill Quantity: 90,   # refills: 3  Last Office Visit : 6/10/19  Future Office visit:  10/14/19    Routing refill request to provider for review/approval because:  Drug not on the refill protocol

## 2019-10-14 ENCOUNTER — HOSPITAL ENCOUNTER (OUTPATIENT)
Facility: CLINIC | Age: 69
Setting detail: SPECIMEN
Discharge: HOME | End: 2019-10-14
Admitting: INTERNAL MEDICINE
Payer: COMMERCIAL

## 2019-10-14 ENCOUNTER — OFFICE VISIT (OUTPATIENT)
Dept: ENDOCRINOLOGY | Facility: CLINIC | Age: 69
End: 2019-10-14
Payer: COMMERCIAL

## 2019-10-14 VITALS
HEART RATE: 76 BPM | SYSTOLIC BLOOD PRESSURE: 144 MMHG | WEIGHT: 222.1 LBS | BODY MASS INDEX: 40.62 KG/M2 | DIASTOLIC BLOOD PRESSURE: 74 MMHG

## 2019-10-14 DIAGNOSIS — E11.21 WELL CONTROLLED TYPE 2 DIABETES MELLITUS WITH NEPHROPATHY (H): Primary | ICD-10-CM

## 2019-10-14 DIAGNOSIS — E03.9 HYPOTHYROIDISM, UNSPECIFIED TYPE: ICD-10-CM

## 2019-10-14 LAB — TSH SERPL DL<=0.005 MIU/L-ACNC: 2.54 MU/L (ref 0.4–4)

## 2019-10-14 PROCEDURE — 84443 ASSAY THYROID STIM HORMONE: CPT | Performed by: INTERNAL MEDICINE

## 2019-10-14 PROCEDURE — 36415 COLL VENOUS BLD VENIPUNCTURE: CPT | Performed by: INTERNAL MEDICINE

## 2019-10-14 RX ORDER — FLASH GLUCOSE SENSOR
1 KIT MISCELLANEOUS
Qty: 2 EACH | Refills: 11 | Status: SHIPPED | OUTPATIENT
Start: 2019-10-14 | End: 2021-03-16

## 2019-10-14 ASSESSMENT — PAIN SCALES - GENERAL: PAINLEVEL: NO PAIN (0)

## 2019-10-14 NOTE — PROGRESS NOTES
HPI:   Rachael is a 69 year old female with history morbid obesity S/P Gastric bypass surgery, hypothyroidism here for follow up of type 2 diabetes.  Her diabetes mellitus is complicated by diabetic neuropathy, mild nonproliferative retinopathy and nephropathy.  She currently has CKD stage 3.  She also follows with Dr. Naik. She has had type 2 Diabetes since her early 20s.     Since her last visit, she has started on the laurie glucose sensor.  She says this has made a tremendous difference in her ability to keep track of her glucose patterns and avoid high readings.      Rachael is currently taking the following for her diabetes:     Tresiba 64 units daily (down from 80 units previously)  Humalog- prescribed 2 units per 15g carb (3 times daily).  She has found she hasn't needed this.     Dulaglutide 1.5 mg weekly.   Metformin 1000 mg daily.     Rachael checks her glucose 10 times daily on her sensor.  Her overall average glucose this month is 151 mg/dL.  Sugars have been as low as the 40's and she has been waking up sweating profusely.  She has been in the target range 65% of the time, above target 30% and hypoglycemic 5% of the time.  She was having a lot of hypoglycemia and has slowly backed off her Tresiba.  She is now down to 64 units. She continues having to eat to keep her sugars up.      She is eating a lot of fish. She is trying to eat more fruits and vegetables. She wants to lose 15#.     She is now up at night and sleeping in the day since she retired. She is generally feeling well and has no other concerns.       Past Medical History:   Diagnosis Date     Allergic rhinitis, cause unspecified      Hepatitis, unspecified 1968     Iron Deficiency Anemia 3/09     Lymphoma (H) 12/08     Nonsenile cataract      Obesity, unspecified      Other and unspecified hyperlipidemia      Rheumatic fever without mention of heart involvement infant     Type II or unspecified type diabetes mellitus without mention of  complication, not stated as uncontrolled 1992     Unspecified essential hypertension      Unspecified hypothyroidism      Vitamin B12 deficiency 3/09       Past Surgical History:   Procedure Laterality Date     C APPENDECTOMY  1958     C NONSPECIFIC PROCEDURE  1976    (leg) cystectomy     CATARACT IOL, RT/LT Left 12/21/2000    left     CATARACT IOL, RT/LT Right 01/06/2000    right     GASTRIC BYPASS  2/04    Dr. Hebert     LYMPH NODE BIOPSY  12/08    right groin, Dr. Licona     TONSILLECTOMY & ADENOIDECTOMY  1968       Family History   Problem Relation Age of Onset     Cardiovascular Father         AAA     Diabetes Mother      C.A.D. Mother         52     Eye Disorder Mother         glaucoma     Glaucoma Mother      Cardiovascular Brother         AAA, PVD     Cancer Brother         bladder     Cardiovascular Brother         AAA, valvular heart disease     Diabetes Brother         age 53     Cancer Brother         liver cancer     Glaucoma Other         MGGF     Macular Degeneration No family hx of        Social History     Socioeconomic History     Marital status:      Spouse name: Not on file     Number of children: 0     Years of education: Not on file     Highest education level: Not on file   Occupational History     Employer: [employer],[address] [place] [address]   Social Needs     Financial resource strain: Not on file     Food insecurity:     Worry: Not on file     Inability: Not on file     Transportation needs:     Medical: Not on file     Non-medical: Not on file   Tobacco Use     Smoking status: Never Smoker     Smokeless tobacco: Never Used   Substance and Sexual Activity     Alcohol use: No     Drug use: No     Sexual activity: Never     Partners: Male   Lifestyle     Physical activity:     Days per week: Not on file     Minutes per session: Not on file     Stress: Not on file   Relationships     Social connections:     Talks on phone: Not on file     Gets together: Not on file     Attends  Congregation service: Not on file     Active member of club or organization: Not on file     Attends meetings of clubs or organizations: Not on file     Relationship status: Not on file     Intimate partner violence:     Fear of current or ex partner: Not on file     Emotionally abused: Not on file     Physically abused: Not on file     Forced sexual activity: Not on file   Other Topics Concern      Service Not Asked     Blood Transfusions Not Asked     Caffeine Concern Yes     Comment: 20 oz daily     Occupational Exposure Not Asked     Hobby Hazards Not Asked     Sleep Concern Not Asked     Stress Concern Not Asked     Weight Concern Not Asked     Special Diet Not Asked     Back Care Not Asked     Exercise No     Bike Helmet Not Asked     Seat Belt Yes     Self-Exams Yes     Parent/sibling w/ CABG, MI or angioplasty before 65F 55M? Not Asked   Social History Narrative    Living arrangements - the patient lives alone.   Social Hx: Lives with her sister and her sister's boyfriend.  Retired in 2017.     Current Outpatient Medications   Medication     atorvastatin (LIPITOR) 80 MG tablet     atorvastatin (LIPITOR) 80 MG tablet     augmented betamethasone dipropionate (DIPROLENE-AF) 0.05 % ointment     BD FCO U/F 32G X 4 MM insulin pen needle     blood glucose (MARTITA CONTOUR NEXT) test strip     blood glucose (NO BRAND SPECIFIED) test strip     blood glucose monitoring (MARTITA CONTOUR NEXT) test strip     calcium carbonate-vitamin D (CALTRATE 600+D) 600-400 MG-UNIT CHEW     cetirizine (ZYRTEC) 10 MG tablet     Cholecalciferol (VITAMIN D) 2000 UNITS tablet     Continuous Blood Gluc  (FREESTYLE BALWINDER 14 DAY READER) HENNA     Continuous Blood Gluc Sensor (FREESTYLE BALWINDER 14 DAY SENSOR) MISC     CONTOUR NEXT TEST test strip     cyanocolbalamin (VITAMIN B-12) 1000 MCG tablet     dulaglutide (TRULICITY) 1.5 MG/0.5ML pen     fenofibrate (TRIGLIDE/LOFIBRA) 160 MG tablet     fluticasone (FLONASE) 50 MCG/ACT nasal  spray     furosemide (LASIX) 20 MG tablet     HUMALOG KWIKPEN 100 UNIT/ML soln     hydrocortisone (WESTCORT) 0.2 % cream     insulin degludec (TRESIBA) 200 UNIT/ML pen     irbesartan (AVAPRO) 300 MG tablet     levothyroxine (SYNTHROID/LEVOTHROID) 50 MCG tablet     Magnesium 500 MG CAPS     metFORMIN (GLUCOPHAGE) 1000 MG tablet     ONE TOUCH DELICA LANCETS MISC     potassium chloride ER (K-TAB/KLOR-CON) 10 MEQ CR tablet     topiramate (TOPAMAX) 25 MG tablet     traZODone (DESYREL) 50 MG tablet     verapamil ER (CALAN-SR) 180 MG CR tablet     VITRON-C  MG TABS tablet     No current facility-administered medications for this visit.           Allergies   Allergen Reactions     Clonidine      headaches     Fexofenadine Hydrochloride      Allegra--headache     Gemfibrozil      lopid--rash ??from med     Lisinopril      edema     Norvasc [Amlodipine Besylate]      Hair loss     Pioglitazone Hydrochloride Swelling     actos--ankle edema     Doxazosin Mesylate Rash     cardura--rash     Physical Exam  BP (!) 144/74   Pulse 76   Wt 100.7 kg (222 lb 1.6 oz)   BMI 40.62 kg/m    GENERAL:  Alert and oriented X3, NAD, well dressed, answering questions appropriately, appears stated age.  EXTREMITIES: 1+ edema bilaterally, +pulses, no rashes, no lesions  RESULTS  Lab Results   Component Value Date    A1C 8.7 (H) 04/23/2019    A1C 9.0 (H) 12/10/2018    A1C 8.2 (H) 05/24/2018    A1C 8.1 (H) 03/12/2018    A1C 9.3 (H) 03/28/2017    HEMOGLOBINA1 8.7 (A) 04/22/2019    HEMOGLOBINA1 8.1 (A) 12/10/2018    HEMOGLOBINA1 8.1 (A) 08/30/2017       TSH   Date Value Ref Range Status   10/14/2019 2.54 0.40 - 4.00 mU/L Final   04/23/2019 4.06 (H) 0.40 - 4.00 mU/L Final   05/24/2018 2.54 0.40 - 4.00 mU/L Final   03/12/2018 4.78 (H) 0.40 - 4.00 mU/L Final   03/28/2017 2.84 0.40 - 4.00 mU/L Final     T4 Free   Date Value Ref Range Status   04/23/2019 1.10 0.76 - 1.46 ng/dL Final   03/12/2018 1.17 0.76 - 1.46 ng/dL Final   10/17/2016 1.17 0.76 -  1.46 ng/dL Final   05/05/2010 1.05 0.70 - 1.85 ng/dL Final   09/13/2007 1.00 0.70 - 1.85 ng/dL Final       ALT   Date Value Ref Range Status   04/23/2019 27 0 - 50 U/L Final   08/25/2017 33 0 - 50 U/L Final   ]    Recent Labs   Lab Test 04/23/19  1029 12/10/18  1448 03/12/18  1334  05/22/15  0848  05/03/14  1018   CHOL 210*  --  172   < > 175   < > 161   HDL 35*  --  35*   < > 32*   < > 39*   * 115* 94   < > 76   < > 76   TRIG 249*  --  213*   < > 334*   < > 231*   CHOLHDLRATIO  --   --   --   --  5.5*  --  4.2    < > = values in this interval not displayed.       Lab Results   Component Value Date     04/23/2019      Lab Results   Component Value Date    POTASSIUM 4.1 04/23/2019     Lab Results   Component Value Date    CHLORIDE 108 04/23/2019     Lab Results   Component Value Date    RASHEEDA 9.3 04/23/2019     Lab Results   Component Value Date    CO2 29 04/23/2019     Lab Results   Component Value Date    BUN 31 04/23/2019     Lab Results   Component Value Date    CR 1.61 04/23/2019       GFR Estimate   Date Value Ref Range Status   04/23/2019 32 (L) >60 mL/min/[1.73_m2] Final     Comment:     Non  GFR Calc  Starting 12/18/2018, serum creatinine based estimated GFR (eGFR) will be   calculated using the Chronic Kidney Disease Epidemiology Collaboration   (CKD-EPI) equation.     12/10/2018 31 (L) >60 mL/min/1.7m2 Final     Comment:     Non  GFR Calc   05/24/2018 34 (L) >60 mL/min/1.7m2 Final     Comment:     Non  GFR Calc     GFR Estimate If Black   Date Value Ref Range Status   04/23/2019 38 (L) >60 mL/min/[1.73_m2] Final     Comment:      GFR Calc  Starting 12/18/2018, serum creatinine based estimated GFR (eGFR) will be   calculated using the Chronic Kidney Disease Epidemiology Collaboration   (CKD-EPI) equation.     12/10/2018 38 (L) >60 mL/min/1.7m2 Final     Comment:      GFR Calc   05/24/2018 41 (L) >60 mL/min/1.7m2 Final      Comment:      GFR Calc       Lab Results   Component Value Date    MICROL 13 12/10/2018     No results found for: MICROALBUMIN  No results found for: CPEPT, GADAB, ISCAB    Vitamin B12   Date Value Ref Range Status   10/17/2016 2,075 (H) 193 - 986 pg/mL Final     Comment:     Interp: 247-911 = Normal   05/12/2012 742 >210 pg/mL Final     Comment:     Interp: 247-911 = Normal   ]    Most recent eye exam date: : Not Found     Assessment/Plan:     1.  Type 2 diabetes- Control has improved tremendously since she started wearing the glucose sensor. A1c is down to 7.0% today.  In fact, she continues having frequent hypoglycemia despite significant insulin reduction.  I advised her to reduce her Tresiba to 58 units.  She may need to reduce it further if she continues making dietary changes and increases her activity.  If her post-prandial numbers start to climb over 200 mg/dL, may need to add in Humalog 1/15g to start.  She prefers to remain on the Tresiba only, if possible.      2. Risk factors:     Retinopathy:  No.  Had eye exam within last year.   Nephropathy:  BP slightly elevated today.  No microalbuminuria.  She does have CKD  Neuropathy: Some tingling, numbness.  Tolerable.     Feet: OK, no ulcers.   Lipids:  On high dose statin    3.  F/U in 3 months as planned with Dr. aNik. I am also happy to see her as needed.     I spent 35 minutes with this patient face to face and explained the conditions and plans (more than 50% of time was counseling/coordination of care, diabetes management, follow up plan for worsening hyper and hypoglycemia) . The patient understood and is satisfied with today's visit.      Roxanne Masterson PA-C, MPAS   [facility]  Department of Medicine  Division of Endocrinology and Diabetes

## 2019-10-14 NOTE — PATIENT INSTRUCTIONS
Reduce your Tresiba to 58 units.  If lows continue, reduce further to 50 units      You may need to start using the Humalog 1 unit/15g carb on higher carb days.

## 2019-10-14 NOTE — LETTER
10/14/2019       RE: Ade Wilkins  [address] [id number]     Dear Colleague,    Thank you for referring your patient, Ade Wilkins, to the [facility] ENDOCRINOLOGY at [facility]. Please see a copy of my visit note below.    HPI:   Rachael is a 69 year old female with history morbid obesity S/P Gastric bypass surgery, hypothyroidism here for follow up of type 2 diabetes.  Her diabetes mellitus is complicated by diabetic neuropathy, mild nonproliferative retinopathy and nephropathy.  She currently has CKD stage 3.  She also follows with Dr. Naik. She has had type 2 Diabetes since her early 20s.     Since her last visit, she has started on the laurie glucose sensor.  She says this has made a tremendous difference in her ability to keep track of her glucose patterns and avoid high readings.      Rachael is currently taking the following for her diabetes:     Tresiba 64 units daily (down from 80 units previously)  Humalog- prescribed 2 units per 15g carb (3 times daily).  She has found she hasn't needed this.     Dulaglutide 1.5 mg weekly.   Metformin 1000 mg daily.     Rachael checks her glucose 10 times daily on her sensor.  Her overall average glucose this month is 151 mg/dL.  Sugars have been as low as the 40's and she has been waking up sweating profusely.  She has been in the target range 65% of the time, above target 30% and hypoglycemic 5% of the time.  She was having a lot of hypoglycemia and has slowly backed off her Tresiba.  She is now down to 64 units. She continues having to eat to keep her sugars up.      She is eating a lot of fish. She is trying to eat more fruits and vegetables. She wants to lose 15#.     She is now up at night and sleeping in the day since she retired. She is generally feeling well and has no other concerns.       Past Medical History:   Diagnosis Date     Allergic rhinitis, cause unspecified      Hepatitis, unspecified 1968      Iron Deficiency Anemia 3/09     Lymphoma (H) 12/08     Nonsenile cataract      Obesity, unspecified      Other and unspecified hyperlipidemia      Rheumatic fever without mention of heart involvement infant     Type II or unspecified type diabetes mellitus without mention of complication, not stated as uncontrolled 1992     Unspecified essential hypertension      Unspecified hypothyroidism      Vitamin B12 deficiency 3/09       Past Surgical History:   Procedure Laterality Date     C APPENDECTOMY  1958     C NONSPECIFIC PROCEDURE  1976    (leg) cystectomy     CATARACT IOL, RT/LT Left 12/21/2000    left     CATARACT IOL, RT/LT Right 01/06/2000    right     GASTRIC BYPASS  2/04    Dr. Hebert     LYMPH NODE BIOPSY  12/08    right groin, Dr. Licona     TONSILLECTOMY & ADENOIDECTOMY  1968       Family History   Problem Relation Age of Onset     Cardiovascular Father         AAA     Diabetes Mother      C.A.D. Mother         52     Eye Disorder Mother         glaucoma     Glaucoma Mother      Cardiovascular Brother         AAA, PVD     Cancer Brother         bladder     Cardiovascular Brother         AAA, valvular heart disease     Diabetes Brother         age 53     Cancer Brother         liver cancer     Glaucoma Other         MGGF     Macular Degeneration No family hx of        Social History     Socioeconomic History     Marital status:      Spouse name: Not on file     Number of children: 0     Years of education: Not on file     Highest education level: Not on file   Occupational History     Employer: [employer],[address]   Social Needs     Financial resource strain: Not on file     Food insecurity:     Worry: Not on file     Inability: Not on file     Transportation needs:     Medical: Not on file     Non-medical: Not on file   Tobacco Use     Smoking status: Never Smoker     Smokeless tobacco: Never Used   Substance and Sexual Activity     Alcohol use: No     Drug use: No     Sexual  activity: Never     Partners: Male   Lifestyle     Physical activity:     Days per week: Not on file     Minutes per session: Not on file     Stress: Not on file   Relationships     Social connections:     Talks on phone: Not on file     Gets together: Not on file     Attends Restoration service: Not on file     Active member of club or organization: Not on file     Attends meetings of clubs or organizations: Not on file     Relationship status: Not on file     Intimate partner violence:     Fear of current or ex partner: Not on file     Emotionally abused: Not on file     Physically abused: Not on file     Forced sexual activity: Not on file   Other Topics Concern      Service Not Asked     Blood Transfusions Not Asked     Caffeine Concern Yes     Comment: 20 oz daily     Occupational Exposure Not Asked     Hobby Hazards Not Asked     Sleep Concern Not Asked     Stress Concern Not Asked     Weight Concern Not Asked     Special Diet Not Asked     Back Care Not Asked     Exercise No     Bike Helmet Not Asked     Seat Belt Yes     Self-Exams Yes     Parent/sibling w/ CABG, MI or angioplasty before 65F 55M? Not Asked   Social History Narrative    Living arrangements - the patient lives alone.   Social Hx: Lives with her sister and her sister's boyfriend.  Retired in 2017.     Current Outpatient Medications   Medication     atorvastatin (LIPITOR) 80 MG tablet     atorvastatin (LIPITOR) 80 MG tablet     augmented betamethasone dipropionate (DIPROLENE-AF) 0.05 % ointment     BD FCO U/F 32G X 4 MM insulin pen needle     blood glucose (MARTITA CONTOUR NEXT) test strip     blood glucose (NO BRAND SPECIFIED) test strip     blood glucose monitoring (MARTITA CONTOUR NEXT) test strip     calcium carbonate-vitamin D (CALTRATE 600+D) 600-400 MG-UNIT CHEW     cetirizine (ZYRTEC) 10 MG tablet     Cholecalciferol (VITAMIN D) 2000 UNITS tablet     Continuous Blood Gluc  (FREESTYLE BALWINDER 14 DAY READER) HENNA     Continuous  Blood Gluc Sensor (FREESTYLE BALWINDER 14 DAY SENSOR) MISC     CONTOUR NEXT TEST test strip     cyanocolbalamin (VITAMIN B-12) 1000 MCG tablet     dulaglutide (TRULICITY) 1.5 MG/0.5ML pen     fenofibrate (TRIGLIDE/LOFIBRA) 160 MG tablet     fluticasone (FLONASE) 50 MCG/ACT nasal spray     furosemide (LASIX) 20 MG tablet     HUMALOG KWIKPEN 100 UNIT/ML soln     hydrocortisone (WESTCORT) 0.2 % cream     insulin degludec (TRESIBA) 200 UNIT/ML pen     irbesartan (AVAPRO) 300 MG tablet     levothyroxine (SYNTHROID/LEVOTHROID) 50 MCG tablet     Magnesium 500 MG CAPS     metFORMIN (GLUCOPHAGE) 1000 MG tablet     ONE TOUCH DELICA LANCETS MISC     potassium chloride ER (K-TAB/KLOR-CON) 10 MEQ CR tablet     topiramate (TOPAMAX) 25 MG tablet     traZODone (DESYREL) 50 MG tablet     verapamil ER (CALAN-SR) 180 MG CR tablet     VITRON-C  MG TABS tablet     No current facility-administered medications for this visit.           Allergies   Allergen Reactions     Clonidine      headaches     Fexofenadine Hydrochloride      Allegra--headache     Gemfibrozil      lopid--rash ??from med     Lisinopril      edema     Norvasc [Amlodipine Besylate]      Hair loss     Pioglitazone Hydrochloride Swelling     actos--ankle edema     Doxazosin Mesylate Rash     cardura--rash     Physical Exam  BP (!) 144/74   Pulse 76   Wt 100.7 kg (222 lb 1.6 oz)   BMI 40.62 kg/m     GENERAL:  Alert and oriented X3, NAD, well dressed, answering questions appropriately, appears stated age.  EXTREMITIES: 1+ edema bilaterally, +pulses, no rashes, no lesions  RESULTS  Lab Results   Component Value Date    A1C 8.7 (H) 04/23/2019    A1C 9.0 (H) 12/10/2018    A1C 8.2 (H) 05/24/2018    A1C 8.1 (H) 03/12/2018    A1C 9.3 (H) 03/28/2017    HEMOGLOBINA1 8.7 (A) 04/22/2019    HEMOGLOBINA1 8.1 (A) 12/10/2018    HEMOGLOBINA1 8.1 (A) 08/30/2017       TSH   Date Value Ref Range Status   10/14/2019 2.54 0.40 - 4.00 mU/L Final   04/23/2019 4.06 (H) 0.40 - 4.00 mU/L Final    05/24/2018 2.54 0.40 - 4.00 mU/L Final   03/12/2018 4.78 (H) 0.40 - 4.00 mU/L Final   03/28/2017 2.84 0.40 - 4.00 mU/L Final     T4 Free   Date Value Ref Range Status   04/23/2019 1.10 0.76 - 1.46 ng/dL Final   03/12/2018 1.17 0.76 - 1.46 ng/dL Final   10/17/2016 1.17 0.76 - 1.46 ng/dL Final   05/05/2010 1.05 0.70 - 1.85 ng/dL Final   09/13/2007 1.00 0.70 - 1.85 ng/dL Final       ALT   Date Value Ref Range Status   04/23/2019 27 0 - 50 U/L Final   08/25/2017 33 0 - 50 U/L Final   ]    Recent Labs   Lab Test 04/23/19  1029 12/10/18  1448 03/12/18  1334  05/22/15  0848  05/03/14  1018   CHOL 210*  --  172   < > 175   < > 161   HDL 35*  --  35*   < > 32*   < > 39*   * 115* 94   < > 76   < > 76   TRIG 249*  --  213*   < > 334*   < > 231*   CHOLHDLRATIO  --   --   --   --  5.5*  --  4.2    < > = values in this interval not displayed.       Lab Results   Component Value Date     04/23/2019      Lab Results   Component Value Date    POTASSIUM 4.1 04/23/2019     Lab Results   Component Value Date    CHLORIDE 108 04/23/2019     Lab Results   Component Value Date    RASHEEDA 9.3 04/23/2019     Lab Results   Component Value Date    CO2 29 04/23/2019     Lab Results   Component Value Date    BUN 31 04/23/2019     Lab Results   Component Value Date    CR 1.61 04/23/2019       GFR Estimate   Date Value Ref Range Status   04/23/2019 32 (L) >60 mL/min/[1.73_m2] Final     Comment:     Non  GFR Calc  Starting 12/18/2018, serum creatinine based estimated GFR (eGFR) will be   calculated using the Chronic Kidney Disease Epidemiology Collaboration   (CKD-EPI) equation.     12/10/2018 31 (L) >60 mL/min/1.7m2 Final     Comment:     Non  GFR Calc   05/24/2018 34 (L) >60 mL/min/1.7m2 Final     Comment:     Non  GFR Calc     GFR Estimate If Black   Date Value Ref Range Status   04/23/2019 38 (L) >60 mL/min/[1.73_m2] Final     Comment:      GFR Calc  Starting 12/18/2018,  serum creatinine based estimated GFR (eGFR) will be   calculated using the Chronic Kidney Disease Epidemiology Collaboration   (CKD-EPI) equation.     12/10/2018 38 (L) >60 mL/min/1.7m2 Final     Comment:      GFR Calc   05/24/2018 41 (L) >60 mL/min/1.7m2 Final     Comment:      GFR Calc       Lab Results   Component Value Date    MICROL 13 12/10/2018     No results found for: MICROALBUMIN  No results found for: CPEPT, GADAB, ISCAB    Vitamin B12   Date Value Ref Range Status   10/17/2016 2,075 (H) 193 - 986 pg/mL Final     Comment:     Interp: 247-911 = Normal   05/12/2012 742 >210 pg/mL Final     Comment:     Interp: 247-911 = Normal   ]    Most recent eye exam date: : Not Found     Assessment/Plan:     1.  Type 2 diabetes- Control has improved tremendously since she started wearing the glucose sensor. A1c is down to 7.0% today.  In fact, she continues having frequent hypoglycemia despite significant insulin reduction.  I advised her to reduce her Tresiba to 58 units.  She may need to reduce it further if she continues making dietary changes and increases her activity.  If her post-prandial numbers start to climb over 200 mg/dL, may need to add in Humalog 1/15g to start.  She prefers to remain on the Tresiba only, if possible.      2. Risk factors:     Retinopathy:  No.  Had eye exam within last year.   Nephropathy:  BP slightly elevated today.  No microalbuminuria.  She does have CKD  Neuropathy: Some tingling, numbness.  Tolerable.     Feet: OK, no ulcers.   Lipids:  On high dose statin    3.  F/U in 3 months as planned with Dr. Naik. I am also happy to see her as needed.     I spent 35 minutes with this patient face to face and explained the conditions and plans (more than 50% of time was counseling/coordination of care, diabetes management, follow up plan for worsening hyper and hypoglycemia) . The patient understood and is satisfied with today's visit.      Roxanne Masterson  ANYA, MPAS   [facility]  Department of Medicine  Division of Endocrinology and Diabetes

## 2019-10-25 DIAGNOSIS — Z98.84 BARIATRIC SURGERY STATUS: ICD-10-CM

## 2019-10-25 DIAGNOSIS — R25.2 MUSCLE CRAMPS: ICD-10-CM

## 2019-10-25 RX ORDER — TRAZODONE HYDROCHLORIDE 50 MG/1
TABLET, FILM COATED ORAL
Qty: 180 TABLET | Status: SHIPPED | OUTPATIENT
Start: 2019-10-25 | End: 2022-10-28

## 2019-10-25 RX ORDER — POTASSIUM CHLORIDE 750 MG/1
TABLET, FILM COATED, EXTENDED RELEASE ORAL
Qty: 90 TABLET | Refills: 0 | Status: SHIPPED | OUTPATIENT
Start: 2019-10-25 | End: 2020-02-21

## 2019-10-25 NOTE — TELEPHONE ENCOUNTER
"Requested Prescriptions   Pending Prescriptions Disp Refills     KLOR-CON 10 MEQ CR tablet [Pharmacy Med Name: Klor-Con 10 Oral Tablet Extended Release 10 MEQ] 90 tablet 0     Sig: TAKE ONE TABLET BY MOUTH ONE TIME DAILY       Potassium Supplements Protocol Passed - 10/25/2019  2:33 PM        Passed - Recent (12 mo) or future (30 days) visit within the authorizing provider's department     Patient has had an office visit with the authorizing provider or a provider within the authorizing providers department within the previous 12 mos or has a future within next 30 days. See \"Patient Info\" tab in inbasket, or \"Choose Columns\" in Meds & Orders section of the refill encounter.              Passed - Medication is active on med list        Passed - Patient is age 18 or older        Passed - Normal serum potassium in past 12 months     Recent Labs   Lab Test 04/23/19  1029   POTASSIUM 4.1                    traZODone (DESYREL) 50 MG tablet [Pharmacy Med Name: traZODone HCl Oral Tablet 50 MG] 180 tablet PRN     Sig: take 1 to 2 tablets (50 to 100mg) by mouth nightly as needed       Serotonin Modulators Passed - 10/25/2019  2:33 PM        Passed - Recent (12 mo) or future (30 days) visit within the authorizing provider's specialty     Patient has had an office visit with the authorizing provider or a provider within the authorizing providers department within the previous 12 mos or has a future within next 30 days. See \"Patient Info\" tab in inbasket, or \"Choose Columns\" in Meds & Orders section of the refill encounter.              Passed - Medication is active on med list        Passed - Patient is age 18 or older        Passed - No active pregnancy on record        Passed - No positive pregnancy test in past 12 months          Prescription approved per [facility] Refill Protocol.  "

## 2019-11-19 ENCOUNTER — E-VISIT (OUTPATIENT)
Dept: INTERNAL MEDICINE | Facility: CLINIC | Age: 69
End: 2019-11-19
Payer: COMMERCIAL

## 2019-11-19 DIAGNOSIS — J06.9 UPPER RESPIRATORY TRACT INFECTION, UNSPECIFIED TYPE: ICD-10-CM

## 2019-11-19 PROCEDURE — 99444 ZZC PHYSICIAN ONLINE EVALUATION & MANAGEMENT SERVICE: CPT | Performed by: INTERNAL MEDICINE

## 2019-11-19 RX ORDER — CEFPROZIL 500 MG/1
500 TABLET, FILM COATED ORAL 2 TIMES DAILY
Qty: 20 TABLET | Refills: 0 | Status: SHIPPED | OUTPATIENT
Start: 2019-11-19 | End: 2020-08-04

## 2019-11-19 RX ORDER — FLUTICASONE PROPIONATE 50 MCG
2 SPRAY, SUSPENSION (ML) NASAL DAILY
Qty: 16 ML | Refills: 1 | Status: SHIPPED | OUTPATIENT
Start: 2019-11-19 | End: 2020-09-12

## 2019-11-26 NOTE — TELEPHONE ENCOUNTER
Pharmacy informed.    You can check your insurance coverage for the Shingles vaccination at [facility] or your local pharmacy and get this vaccination done through them if you would like to.    Please do not hesitate to contact the clinic if you have any questions or concerns at [phone].

## 2019-12-13 ENCOUNTER — DOCUMENTATION ONLY (OUTPATIENT)
Dept: ENDOCRINOLOGY | Facility: CLINIC | Age: 69
End: 2019-12-13

## 2019-12-13 NOTE — PROGRESS NOTES
Forms received from: [employer]     Forms were [facility] for CGM and clinic notes     Faxed Date:12/16/19

## 2019-12-16 ENCOUNTER — MEDICAL CORRESPONDENCE (OUTPATIENT)
Dept: HEALTH INFORMATION MANAGEMENT | Facility: CLINIC | Age: 69
End: 2019-12-16

## 2019-12-24 ASSESSMENT — ENCOUNTER SYMPTOMS
HEARTBURN: 0
HOARSE VOICE: 1
HEADACHES: 0
JAUNDICE: 0
SPEECH CHANGE: 0
ARTHRALGIAS: 1
SEIZURES: 0
MUSCLE WEAKNESS: 0
TREMORS: 0
TINGLING: 0
WEAKNESS: 0
EYE REDNESS: 0
MYALGIAS: 0
JOINT SWELLING: 1
SINUS PAIN: 1
NECK MASS: 0
RECTAL PAIN: 0
BLOATING: 0
STIFFNESS: 1
TROUBLE SWALLOWING: 0
SINUS CONGESTION: 1
BACK PAIN: 1
NECK PAIN: 0
SMELL DISTURBANCE: 0
NUMBNESS: 0
LOSS OF CONSCIOUSNESS: 0
EYE PAIN: 0
DIARRHEA: 1
SORE THROAT: 1
BLOOD IN STOOL: 0
DOUBLE VISION: 0
VOMITING: 0
DIZZINESS: 1
EYE IRRITATION: 1
DISTURBANCES IN COORDINATION: 0
BOWEL INCONTINENCE: 0
MUSCLE CRAMPS: 0
TASTE DISTURBANCE: 0
PARALYSIS: 0
EYE WATERING: 0
MEMORY LOSS: 0
ABDOMINAL PAIN: 0
NAUSEA: 0
CONSTIPATION: 1

## 2020-01-07 ENCOUNTER — APPOINTMENT (OUTPATIENT)
Dept: LAB | Facility: CLINIC | Age: 70
End: 2020-01-07
Payer: COMMERCIAL

## 2020-01-07 ENCOUNTER — OFFICE VISIT (OUTPATIENT)
Dept: ENDOCRINOLOGY | Facility: CLINIC | Age: 70
End: 2020-01-07
Payer: COMMERCIAL

## 2020-01-07 VITALS
BODY MASS INDEX: 41.04 KG/M2 | SYSTOLIC BLOOD PRESSURE: 133 MMHG | HEIGHT: 62 IN | HEART RATE: 85 BPM | WEIGHT: 223 LBS | DIASTOLIC BLOOD PRESSURE: 69 MMHG

## 2020-01-07 DIAGNOSIS — E11.21 WELL CONTROLLED TYPE 2 DIABETES MELLITUS WITH NEPHROPATHY (H): Primary | ICD-10-CM

## 2020-01-07 DIAGNOSIS — E06.3 HYPOTHYROIDISM DUE TO HASHIMOTO'S THYROIDITIS: ICD-10-CM

## 2020-01-07 DIAGNOSIS — E66.01 MORBID OBESITY (H): ICD-10-CM

## 2020-01-07 DIAGNOSIS — G43.009 MIGRAINE WITHOUT AURA AND WITHOUT STATUS MIGRAINOSUS, NOT INTRACTABLE: ICD-10-CM

## 2020-01-07 LAB
CREAT UR-MCNC: 20 MG/DL
HBA1C MFR BLD: 8.1 % (ref 4.3–6)
MICROALBUMIN UR-MCNC: 10 MG/L
MICROALBUMIN/CREAT UR: 49.75 MG/G CR (ref 0–25)

## 2020-01-07 RX ORDER — TOPIRAMATE 25 MG/1
TABLET, FILM COATED ORAL
Qty: 90 TABLET | Refills: 3 | Status: SHIPPED | OUTPATIENT
Start: 2020-01-07 | End: 2020-07-10

## 2020-01-07 RX ORDER — EZETIMIBE 10 MG/1
10 TABLET ORAL DAILY
Qty: 90 TABLET | Refills: 3 | Status: SHIPPED | OUTPATIENT
Start: 2020-01-07 | End: 2021-03-07

## 2020-01-07 ASSESSMENT — MIFFLIN-ST. JEOR: SCORE: 1489.77

## 2020-01-07 ASSESSMENT — PAIN SCALES - GENERAL: PAINLEVEL: NO PAIN (0)

## 2020-01-07 NOTE — PROGRESS NOTES
Endocrinology and Diabetes Clinic    Interval history:   Ade Wilkins is a 69 year old T2DM here for follow up. Follow up form Dr Michaud. The patient ahs been doing ok. Since the last visit she switched her food intake to more frequent smaller meals. She notes that this works well for her appetite and that she now does not snack anymore. She covers about every other meal with Humalog. Takes Degludec insulin once daily, metformin. She has been on Topiramate which she finds helpful for portion size control and migraines. Weight has been stable, st post bariatric surgery. Takes supplements including VitB12,VItD, Calciuma and Iron.  HYPOGLYCEMIA in the morning if she sleeps in.  She notes that she is bored. Flipped her wake-sleep rhythm - up at night and sleeps during the day, has times of severe insomnia.  Limited PE due to joint pain.    Medications:   Current Outpatient Medications   Medication Sig Dispense Refill     atorvastatin (LIPITOR) 80 MG tablet TAKE ONE TABLET BY MOUTH ONE TIME DAILY 90 tablet 3     atorvastatin (LIPITOR) 80 MG tablet Take 1 tablet (80 mg) by mouth daily 30 tablet 1     augmented betamethasone dipropionate (DIPROLENE-AF) 0.05 % ointment Apply to AA BID x 3-4 weeks then PRN 45 g 0     BD FCO U/F 32G X 4 MM insulin pen needle USE 4 TIMES A DAY WITH INSULIN AND VICTOZA INJECTIONS 400 each 1     blood glucose (MARTITA CONTOUR NEXT) test strip Use to test blood sugar 5 times daily or as directed. 100 each 5     blood glucose (NO BRAND SPECIFIED) test strip Use to test blood sugar 5 times daily. 500 each 3     blood glucose monitoring (MARTITA CONTOUR NEXT) test strip Use to test blood sugar 4 times daily or as directed. 1 Box 9     calcium carbonate-vitamin D (CALTRATE 600+D) 600-400 MG-UNIT CHEW Take 2 chew tab by mouth every evening       cefPROZIL (CEFZIL) 500 MG tablet Take 1 tablet (500 mg) by mouth 2 times daily 20 tablet 0     cetirizine (ZYRTEC) 10 MG tablet Take 1 tablet (10 mg) by mouth  daily       Cholecalciferol (VITAMIN D) 2000 UNITS tablet Take 2,000 Units by mouth daily       Continuous Blood Gluc  (FREESTYLE BALWINDER 14 DAY READER) HENNA 1 each 3 times daily 1 Device 1     Continuous Blood Gluc Sensor (FREESTYLE BALWINDER 14 DAY SENSOR) MISC 1 each every 14 days 2 each 11     CONTOUR NEXT TEST test strip USE TO TEST BLOOD SUGAR 4 TIMES DAILY OR AS DIRECTED. 100 each 8     cyanocolbalamin (VITAMIN B-12) 1000 MCG tablet Take 1,000 mcg by mouth every other day        dulaglutide (TRULICITY) 1.5 MG/0.5ML pen Inject 1.5 mg Subcutaneous every 7 days 6 mL 3     fenofibrate (TRIGLIDE/LOFIBRA) 160 MG tablet TAKE ONE TABLET BY MOUTH ONE TIME DAILY 90 tablet 3     fluticasone (FLONASE) 50 MCG/ACT nasal spray Spray 2 sprays into both nostrils daily 16 mL 1     fluticasone (FLONASE) 50 MCG/ACT nasal spray Spray 2 sprays into both nostrils daily 16 g 1     furosemide (LASIX) 20 MG tablet Take 1 tablet (20 mg) by mouth daily 90 tablet 3     HUMALOG KWIKPEN 100 UNIT/ML soln Use as directed up to 60 units per day. 15 mL 11     hydrocortisone (WESTCORT) 0.2 % cream Apply topically 2 times daily Apply sparingly to affected area 2 times daily as needed. 45 g 2     insulin degludec (TRESIBA) 200 UNIT/ML pen Inject 80units subcu daily 80 mL 3     irbesartan (AVAPRO) 300 MG tablet TAKE ONE TABLET BY MOUTH ONE TIME DAILY  90 tablet 3     KLOR-CON 10 MEQ CR tablet TAKE ONE TABLET BY MOUTH ONE TIME DAILY  90 tablet 0     levothyroxine (SYNTHROID/LEVOTHROID) 50 MCG tablet TAKE 1 TABLET BY MOUTH DAILY. 90 tablet 2     Magnesium 500 MG CAPS Take 1 capsule by mouth daily.       metFORMIN (GLUCOPHAGE) 1000 MG tablet 1 tab twice daily 180 tablet 1     ONE TOUCH DELICA LANCETS MISC 1 Device 4 times daily. 100 Stick prn     potassium chloride ER (K-TAB/KLOR-CON) 10 MEQ CR tablet TAKE ONE TABLET BY MOUTH ONE TIME DAILY  90 tablet 0     topiramate (TOPAMAX) 25 MG tablet take 1 tablet (25 mg) at bedtime for 1 week, then take 2  "tablets (50 mg) at bedtime for 1 week, and then 3 tablet (75 mg) daily at bedtime t 90 tablet 2     traZODone (DESYREL) 50 MG tablet take 1 to 2 tablets (50 to 100mg) by mouth nightly as needed 180 tablet PRN     traZODone (DESYREL) 50 MG tablet take 1 to 2 tablets (50 to 100mg) by mouth nightly as needed 180 tablet 0     verapamil ER (CALAN-SR) 180 MG CR tablet TAKE ONE TABLET BY MOUTH TWICE DAILY  180 tablet 3     VITRON-C  MG TABS tablet TAKE TWO TABLETS BY MOUTH TWICE DAILY  360 tablet 1       Review of Systems: in addition to stated above  GENERAL: Negative  SKIN: Negative  HENT: Negative   EYE: Negative  HEART: Negative  RESPIRATORY: Negative   GI: Negative  : Negative  MSK: Negative  BLOOD/LYMPH: Negative  NEUROLOGIC: Negative   PSYCH: Negative    Physical Examination:  Blood pressure 133/69, pulse 85, height 1.575 m (5' 2\"), weight 101.2 kg (223 lb), not currently breastfeeding.  Body mass index is 40.79 kg/m .    Gen: pleasant stated appearing lively woman, NAD, morbid obesity    Wt Readings from Last 4 Encounters:   01/07/20 101.2 kg (223 lb)   10/14/19 100.7 kg (222 lb 1.6 oz)   06/10/19 100.4 kg (221 lb 4.8 oz)   04/22/19 104.7 kg (230 lb 14.4 oz)       Labs and Studies:   Recent Labs   Lab Test 10/14/19  1315 04/23/19  1029 12/10/18  1448 12/10/18  1444  03/12/18  1334 08/25/17  1014   A1C  --  8.7* 9.0*  --    < > 8.1*  --    TSH 2.54 4.06*  --   --    < > 4.78*  --    T4  --  1.10  --   --   --  1.17  --    LDL  --  125* 115*  --   --  94  --    HDL  --  35*  --   --   --  35*  --    TRIG  --  249*  --   --   --  213*  --    CR  --  1.61* 1.63*  --    < > 1.53*  --    MICROL  --   --   --  13  --   --  37    < > = values in this interval not displayed.        Assessment:  1. Elderly woman with T2DM longstanding insulin use with moderate blood glucose control. Reviewed importance of regular sleep, reviewed to use broad spectrum light to reset inner clock. Reviewed importance of having a meaning " in her life, pt is consider to volunteer.  The patient cannot take more basla insulin as she has hypoglycemia when fasting, needs to cover all meals with Humalog. Cont metformin. Consider start of SGLT2 inhibitor at next visit to prevent further progression of renal insuffiencey.  2. HTN has CRI, is no Verapamil and Irbesartan, ok controlled today  3. Dyslipidemia: on 80mg of Atorvastatin and Fenofibrate, LDL above 100,   4. St post bariatric surgery: doing well on supplements, on Topiramate for weight maintenance and migraines  5. Hypothyroidism doing well on Levothyroxine    Plan:   Start Ezetimide  Work on sleep  Assure to take Humalog with all meals  Cont Dugludec and Metformin  Consider SGLT2    Orders Placed This Encounter   Procedures     Hemoglobin A1c POCT     History of Diabetes  Type 2 diagnosed in late 20s.    In the 1970s she was initially started on oral medications and was transitioned to insulin in the 80s.    She had a gastric bypass surgery done in 2004 after which she lost about 70 pounds.  Her blood sugars were better controlled for the next few years but has gradually worsened over time.    Her weight had been steady around 210-215 pounds but in the recent years as her activity was limited by knee pain, she has gained significant weight.   She has a strong family history of type 2 diabetes mellitus with Mother and Sister with type 2 diabetes.        Complications     1. Retinopathy: Last exam was in May 19 2017, mild NPDR  2.  Nephropathy:  Lab Results   Component Value Date    CR 1.61 (H) 04/23/2019     Lab Results   Component Value Date    UMALCR 51.84 (H) 12/10/2018     3. Neuropathy Occasional tingling that improves with pain patch  4. Hypoglycemia as above- with prolonged fasting  5. Macrovascular: No history of cardiac events or stroke    This was a 25 min visit of which more than 23 minutes were spent in counseling in regards to diagnosis, clinical consequences and treatment indications  and options of diabetes care    Olga Lidia Hoover MD  Endocrinology and Diabetes  [facility]  [address]  [facility] [address]  Tel [phone]

## 2020-01-07 NOTE — PATIENT INSTRUCTIONS
Try to move your sleep earlier  Consider using a broad spectrum light which you would use upon awakening for 20 minutes  Assure you are taking Humalog with all meals  Carb ratio 1/5 units of Humalog for each gram of carbodhyrate, correction insulin is 1 units for each 20 mg/dl over 140 mg/dl  start Ezetemide for cholesterol  Shingles vaccine from your PCP  Repeat labs fasting prior to next visit  Urine microalbumin today    Assure Calcium suppelement is Calcium citrate

## 2020-01-07 NOTE — LETTER
1/7/2020       RE: Ade Wilkins  [address] [id number]     Dear Colleague,    Thank you for referring your patient, Ade Wilkins, to the [facility] ENDOCRINOLOGY at [facility]. Please see a copy of my visit note below.    Endocrinology and Diabetes Clinic    Interval history:   Ade Wilkins is a 69 year old T2DM here for follow up. Follow up form Dr Michaud. The patient ahs been doing ok. Since the last visit she switched her food intake to more frequent smaller meals. She notes that this works well for her appetite and that she now does not snack anymore. She covers about every other meal with Humalog. Takes Degludec insulin once daily, metformin. She has been on Topiramate which she finds helpful for portion size control and migraines. Weight has been stable, st post bariatric surgery. Takes supplements including VitB12,VItD, Calciuma and Iron.  HYPOGLYCEMIA in the morning if she sleeps in.  She notes that she is bored. Flipped her wake-sleep rhythm - up at night and sleeps during the day, has times of severe insomnia.  Limited PE due to joint pain.    Medications:   Current Outpatient Medications   Medication Sig Dispense Refill     atorvastatin (LIPITOR) 80 MG tablet TAKE ONE TABLET BY MOUTH ONE TIME DAILY 90 tablet 3     atorvastatin (LIPITOR) 80 MG tablet Take 1 tablet (80 mg) by mouth daily 30 tablet 1     augmented betamethasone dipropionate (DIPROLENE-AF) 0.05 % ointment Apply to AA BID x 3-4 weeks then PRN 45 g 0     BD FCO U/F 32G X 4 MM insulin pen needle USE 4 TIMES A DAY WITH INSULIN AND VICTOZA INJECTIONS 400 each 1     blood glucose (MARTITA CONTOUR NEXT) test strip Use to test blood sugar 5 times daily or as directed. 100 each 5     blood glucose (NO BRAND SPECIFIED) test strip Use to test blood sugar 5 times daily. 500 each 3     blood glucose monitoring (MARTITA CONTOUR NEXT) test strip Use to test blood sugar 4 times daily or as  directed. 1 Box 9     calcium carbonate-vitamin D (CALTRATE 600+D) 600-400 MG-UNIT CHEW Take 2 chew tab by mouth every evening       cefPROZIL (CEFZIL) 500 MG tablet Take 1 tablet (500 mg) by mouth 2 times daily 20 tablet 0     cetirizine (ZYRTEC) 10 MG tablet Take 1 tablet (10 mg) by mouth daily       Cholecalciferol (VITAMIN D) 2000 UNITS tablet Take 2,000 Units by mouth daily       Continuous Blood Gluc  (FREESTYLE BALWINDER 14 DAY READER) HENAN 1 each 3 times daily 1 Device 1     Continuous Blood Gluc Sensor (FREESTYLE BALWINDER 14 DAY SENSOR) MISC 1 each every 14 days 2 each 11     CONTOUR NEXT TEST test strip USE TO TEST BLOOD SUGAR 4 TIMES DAILY OR AS DIRECTED. 100 each 8     cyanocolbalamin (VITAMIN B-12) 1000 MCG tablet Take 1,000 mcg by mouth every other day        dulaglutide (TRULICITY) 1.5 MG/0.5ML pen Inject 1.5 mg Subcutaneous every 7 days 6 mL 3     fenofibrate (TRIGLIDE/LOFIBRA) 160 MG tablet TAKE ONE TABLET BY MOUTH ONE TIME DAILY 90 tablet 3     fluticasone (FLONASE) 50 MCG/ACT nasal spray Spray 2 sprays into both nostrils daily 16 mL 1     fluticasone (FLONASE) 50 MCG/ACT nasal spray Spray 2 sprays into both nostrils daily 16 g 1     furosemide (LASIX) 20 MG tablet Take 1 tablet (20 mg) by mouth daily 90 tablet 3     HUMALOG KWIKPEN 100 UNIT/ML soln Use as directed up to 60 units per day. 15 mL 11     hydrocortisone (WESTCORT) 0.2 % cream Apply topically 2 times daily Apply sparingly to affected area 2 times daily as needed. 45 g 2     insulin degludec (TRESIBA) 200 UNIT/ML pen Inject 80units subcu daily 80 mL 3     irbesartan (AVAPRO) 300 MG tablet TAKE ONE TABLET BY MOUTH ONE TIME DAILY  90 tablet 3     KLOR-CON 10 MEQ CR tablet TAKE ONE TABLET BY MOUTH ONE TIME DAILY  90 tablet 0     levothyroxine (SYNTHROID/LEVOTHROID) 50 MCG tablet TAKE 1 TABLET BY MOUTH DAILY. 90 tablet 2     Magnesium 500 MG CAPS Take 1 capsule by mouth daily.       metFORMIN (GLUCOPHAGE) 1000 MG tablet 1 tab twice daily  "180 tablet 1     ONE TOUCH DELICA LANCETS MISC 1 Device 4 times daily. 100 Stick prn     potassium chloride ER (K-TAB/KLOR-CON) 10 MEQ CR tablet TAKE ONE TABLET BY MOUTH ONE TIME DAILY  90 tablet 0     topiramate (TOPAMAX) 25 MG tablet take 1 tablet (25 mg) at bedtime for 1 week, then take 2 tablets (50 mg) at bedtime for 1 week, and then 3 tablet (75 mg) daily at bedtime t 90 tablet 2     traZODone (DESYREL) 50 MG tablet take 1 to 2 tablets (50 to 100mg) by mouth nightly as needed 180 tablet PRN     traZODone (DESYREL) 50 MG tablet take 1 to 2 tablets (50 to 100mg) by mouth nightly as needed 180 tablet 0     verapamil ER (CALAN-SR) 180 MG CR tablet TAKE ONE TABLET BY MOUTH TWICE DAILY  180 tablet 3     VITRON-C  MG TABS tablet TAKE TWO TABLETS BY MOUTH TWICE DAILY  360 tablet 1       Review of Systems: in addition to stated above  GENERAL: Negative  SKIN: Negative  HENT: Negative   EYE: Negative  HEART: Negative  RESPIRATORY: Negative   GI: Negative  : Negative  MSK: Negative  BLOOD/LYMPH: Negative  NEUROLOGIC: Negative   PSYCH: Negative    Physical Examination:  Blood pressure 133/69, pulse 85, height 1.575 m (5' 2\"), weight 101.2 kg (223 lb), not currently breastfeeding.  Body mass index is 40.79 kg/m .    Gen: pleasant stated appearing lively woman, NAD, morbid obesity    Wt Readings from Last 4 Encounters:   01/07/20 101.2 kg (223 lb)   10/14/19 100.7 kg (222 lb 1.6 oz)   06/10/19 100.4 kg (221 lb 4.8 oz)   04/22/19 104.7 kg (230 lb 14.4 oz)       Labs and Studies:   Recent Labs   Lab Test 10/14/19  1315 04/23/19  1029 12/10/18  1448 12/10/18  1444  03/12/18  1334 08/25/17  1014   A1C  --  8.7* 9.0*  --    < > 8.1*  --    TSH 2.54 4.06*  --   --    < > 4.78*  --    T4  --  1.10  --   --   --  1.17  --    LDL  --  125* 115*  --   --  94  --    HDL  --  35*  --   --   --  35*  --    TRIG  --  249*  --   --   --  213*  --    CR  --  1.61* 1.63*  --    < > 1.53*  --    MICROL  --   --   --  13  --   --  37 "    < > = values in this interval not displayed.        Assessment:  1. Elderly woman with T2DM longstanding insulin use with moderate blood glucose control. Reviewed importance of regular sleep, reviewed to use broad spectrum light to reset inner clock. Reviewed importance of having a meaning in her life, pt is consider to volunteer.  The patient cannot take more basla insulin as she has hypoglycemia when fasting, needs to cover all meals with Humalog. Cont metformin. Consider start of SGLT2 inhibitor at next visit to prevent further progression of renal insuffiencey.  2. HTN has CRI, is no Verapamil and Irbesartan, ok controlled today  3. Dyslipidemia: on 80mg of Atorvastatin and Fenofibrate, LDL above 100,   4. St post bariatric surgery: doing well on supplements, on Topiramate for weight maintenance and migraines  5. Hypothyroidism doing well on Levothyroxine    Plan:   Start Ezetimide  Work on sleep  Assure to take Humalog with all meals  Cont Dugludec and Metformin  Consider SGLT2    Orders Placed This Encounter   Procedures     Hemoglobin A1c POCT     History of Diabetes  Type 2 diagnosed in late 20s.    In the 1970s she was initially started on oral medications and was transitioned to insulin in the 80s.    She had a gastric bypass surgery done in 2004 after which she lost about 70 pounds.  Her blood sugars were better controlled for the next few years but has gradually worsened over time.    Her weight had been steady around 210-215 pounds but in the recent years as her activity was limited by knee pain, she has gained significant weight.   She has a strong family history of type 2 diabetes mellitus with Mother and Sister with type 2 diabetes.        Complications     1. Retinopathy: Last exam was in May 19 2017, mild NPDR  2.  Nephropathy:  Lab Results   Component Value Date    CR 1.61 (H) 04/23/2019     Lab Results   Component Value Date    UMALCR 51.84 (H) 12/10/2018     3. Neuropathy Occasional tingling  that improves with pain patch  4. Hypoglycemia as above- with prolonged fasting  5. Macrovascular: No history of cardiac events or stroke    This was a 25 min visit of which more than 23 minutes were spent in counseling in regards to diagnosis, clinical consequences and treatment indications and options of diabetes care    Olga Lidia Hoover MD  Endocrinology and Diabetes  [address]  [facility] [address]  Tel [phone]

## 2020-01-10 DIAGNOSIS — E03.9 HYPOTHYROIDISM, UNSPECIFIED TYPE: ICD-10-CM

## 2020-01-10 RX ORDER — LEVOTHYROXINE SODIUM 50 UG/1
TABLET ORAL
Qty: 90 TABLET | Refills: 2 | Status: SHIPPED | OUTPATIENT
Start: 2020-01-10 | End: 2020-01-20

## 2020-01-20 ENCOUNTER — MYC REFILL (OUTPATIENT)
Dept: INTERNAL MEDICINE | Facility: CLINIC | Age: 70
End: 2020-01-20

## 2020-01-20 DIAGNOSIS — E03.9 HYPOTHYROIDISM, UNSPECIFIED TYPE: ICD-10-CM

## 2020-01-21 RX ORDER — LEVOTHYROXINE SODIUM 50 UG/1
50 TABLET ORAL DAILY
Qty: 90 TABLET | Refills: 2 | Status: SHIPPED | OUTPATIENT
Start: 2020-01-21 | End: 2020-09-12

## 2020-01-25 DIAGNOSIS — E78.5 HYPERLIPIDEMIA LDL GOAL <100: ICD-10-CM

## 2020-01-25 DIAGNOSIS — I10 ESSENTIAL HYPERTENSION: ICD-10-CM

## 2020-01-25 NOTE — TELEPHONE ENCOUNTER
"Requested Prescriptions   Pending Prescriptions Disp Refills     verapamil ER (CALAN-SR) 180 MG CR tablet [Pharmacy Med Name: Verapamil HCl ER Oral Tablet Extended Release 180 MG] 180 tablet 2     Sig: TAKE ONE TABLET BY MOUTH TWICE DAILY. appointment needed for refills.   Last Written Prescription Date:  1/22/2019  Last Fill Quantity: 180,  # refills: 3   Last Office Visit: 4/4/2019   Future Office Visit:         Calcium Channel Blockers Protocol  Failed - 1/25/2020  7:01 AM        Failed - Normal serum creatinine on file in past 12 months     Recent Labs   Lab Test 04/23/19  1029   CR 1.61*             Passed - Blood pressure under 140/90 in past 12 months     BP Readings from Last 3 Encounters:   01/07/20 133/69   10/14/19 (!) 144/74   06/10/19 167/70                 Passed - Normal ALT in past 12 months     Recent Labs   Lab Test 04/23/19  1029   ALT 27             Passed - Recent (12 mo) or future (30 days) visit within the authorizing provider's specialty     Patient has had an office visit with the authorizing provider or a provider within the authorizing providers department within the previous 12 mos or has a future within next 30 days. See \"Patient Info\" tab in inbasket, or \"Choose Columns\" in Meds & Orders section of the refill encounter.              Passed - Medication is active on med list        Passed - Patient is age 18 or older        Passed - No active pregnancy on record        Passed - No positive pregnancy test in past 12 months        fenofibrate (TRIGLIDE/LOFIBRA) 160 MG tablet [Pharmacy Med Name: Fenofibrate Oral Tablet 160 MG] 90 tablet 2     Sig: TAKE ONE TABLET BY MOUTH ONE TIME DAILY. appointment needed for refills.   Last Written Prescription Date:  1/22/2019  Last Fill Quantity: 90,  # refills: 3   Last Office Visit: 4/4/2019   Future Office Visit:         Fibrates Passed - 1/25/2020  7:01 AM        Passed - Lipid panel on file in past 12 months     Recent Labs   Lab Test " "04/23/19  1029  05/22/15  0848   CHOL 210*   < > 175   TRIG 249*   < > 334*   HDL 35*   < > 32*   *   < > 76   NHDL 175*   < >  --    VLDL  --   --  67*   CHOLHDLRATIO  --   --  5.5*    < > = values in this interval not displayed.               Passed - No abnormal creatine kinase in past 12 months     Recent Labs   Lab Test 08/25/17  0837   CKT 64                Passed - Recent (12 mo) or future (30 days) visit within the authorizing provider's specialty     Patient has had an office visit with the authorizing provider or a provider within the authorizing providers department within the previous 12 mos or has a future within next 30 days. See \"Patient Info\" tab in inbasket, or \"Choose Columns\" in Meds & Orders section of the refill encounter.              Passed - Medication is active on med list        Passed - Patient is age 18 or older        Passed - No active pregnancy on record        Passed - No positive pregnancy test in past 12 months          "

## 2020-01-27 RX ORDER — FENOFIBRATE 160 MG/1
160 TABLET ORAL DAILY
Qty: 90 TABLET | Refills: 0 | Status: SHIPPED | OUTPATIENT
Start: 2020-01-27 | End: 2020-08-04

## 2020-01-28 RX ORDER — VERAPAMIL HYDROCHLORIDE 180 MG/1
180 TABLET, EXTENDED RELEASE ORAL 2 TIMES DAILY
Qty: 180 TABLET | Refills: 1 | Status: SHIPPED | OUTPATIENT
Start: 2020-01-28 | End: 2020-08-26

## 2020-02-19 ENCOUNTER — MYC REFILL (OUTPATIENT)
Dept: INTERNAL MEDICINE | Facility: CLINIC | Age: 70
End: 2020-02-19

## 2020-02-19 DIAGNOSIS — E11.40 TYPE 2 DIABETES MELLITUS WITH DIABETIC NEUROPATHY, WITH LONG-TERM CURRENT USE OF INSULIN (H): ICD-10-CM

## 2020-02-19 DIAGNOSIS — Z79.4 TYPE 2 DIABETES MELLITUS WITH DIABETIC NEUROPATHY, WITH LONG-TERM CURRENT USE OF INSULIN (H): ICD-10-CM

## 2020-02-21 ENCOUNTER — MYC REFILL (OUTPATIENT)
Dept: INTERNAL MEDICINE | Facility: CLINIC | Age: 70
End: 2020-02-21

## 2020-02-21 DIAGNOSIS — R25.2 MUSCLE CRAMPS: ICD-10-CM

## 2020-02-21 RX ORDER — POTASSIUM CHLORIDE 750 MG/1
10 TABLET, EXTENDED RELEASE ORAL DAILY
Qty: 90 TABLET | Refills: 0 | Status: SHIPPED | OUTPATIENT
Start: 2020-02-21 | End: 2020-07-10

## 2020-02-21 NOTE — TELEPHONE ENCOUNTER
"Requested Prescriptions   Pending Prescriptions Disp Refills     potassium chloride ER (KLOR-CON) 10 MEQ PO CR tablet 90 tablet 0     Sig: Take 1 tablet (10 mEq) by mouth daily       Potassium Supplements Protocol Passed - 2/21/2020 12:39 PM        Passed - Recent (12 mo) or future (30 days) visit within the authorizing provider's department     Patient has had an office visit with the authorizing provider or a provider within the authorizing providers department within the previous 12 mos or has a future within next 30 days. See \"Patient Info\" tab in inbasket, or \"Choose Columns\" in Meds & Orders section of the refill encounter.              Passed - Medication is active on med list        Passed - Patient is age 18 or older        Passed - Normal serum potassium in past 12 months     Recent Labs   Lab Test 04/23/19  1029   POTASSIUM 4.1                      Prescription approved per [facility] Refill Protocol.    Floresita ANDERSENN, RN, PHN      "

## 2020-03-19 DIAGNOSIS — E11.65 POORLY CONTROLLED TYPE 2 DIABETES MELLITUS WITH NEUROPATHY (H): ICD-10-CM

## 2020-03-19 DIAGNOSIS — E11.40 POORLY CONTROLLED TYPE 2 DIABETES MELLITUS WITH NEUROPATHY (H): ICD-10-CM

## 2020-03-19 RX ORDER — INSULIN LISPRO 100 [IU]/ML
INJECTION, SOLUTION INTRAVENOUS; SUBCUTANEOUS
Qty: 15 ML | Refills: 11 | Status: SHIPPED | OUTPATIENT
Start: 2020-03-19 | End: 2020-03-20

## 2020-03-20 DIAGNOSIS — E11.40 POORLY CONTROLLED TYPE 2 DIABETES MELLITUS WITH NEUROPATHY (H): ICD-10-CM

## 2020-03-20 DIAGNOSIS — E11.65 POORLY CONTROLLED TYPE 2 DIABETES MELLITUS WITH NEUROPATHY (H): ICD-10-CM

## 2020-03-20 RX ORDER — INSULIN LISPRO 100 [IU]/ML
INJECTION, SOLUTION INTRAVENOUS; SUBCUTANEOUS
Qty: 18 ML | Refills: 11 | Status: SHIPPED | OUTPATIENT
Start: 2020-03-20 | End: 2020-07-24

## 2020-03-20 NOTE — TELEPHONE ENCOUNTER
HUMALOG KWIKPEN 100 UNIT/ML soln     Routing refill request to provider for review/approval because:  [facility] Requesting clarification of orders            Please call them @ [phone]      HUMALOG KWIKPEN 100 UNIT/ML soln  15 mL  11  3/19/2020   Yes    Sig: Use as directed up to 60 units per day.    Sent to pharmacy as: HUMALOG KWIKPEN 100 UNIT/ML soln    Class: E-Prescribe    Order: [id number]    E-Prescribing Status: Receipt confirmed by pharmacy (3/19/2020  6:47 AM CDT)      Chey Kumar RN  Central Triage Red Flags/Med Refills

## 2020-03-27 ENCOUNTER — DOCUMENTATION ONLY (OUTPATIENT)
Dept: ENDOCRINOLOGY | Facility: CLINIC | Age: 70
End: 2020-03-27

## 2020-03-27 DIAGNOSIS — E11.40 TYPE 2 DIABETES MELLITUS WITH DIABETIC NEUROPATHY, WITH LONG-TERM CURRENT USE OF INSULIN (H): ICD-10-CM

## 2020-03-27 DIAGNOSIS — Z79.4 TYPE 2 DIABETES MELLITUS WITH DIABETIC NEUROPATHY, WITH LONG-TERM CURRENT USE OF INSULIN (H): ICD-10-CM

## 2020-03-27 DIAGNOSIS — E11.21 WELL CONTROLLED TYPE 2 DIABETES MELLITUS WITH NEPHROPATHY (H): ICD-10-CM

## 2020-03-27 DIAGNOSIS — E11.40 TYPE 2 DIABETES MELLITUS WITH DIABETIC NEUROPATHY, WITH LONG-TERM CURRENT USE OF INSULIN (H): Primary | ICD-10-CM

## 2020-03-27 DIAGNOSIS — E06.3 HYPOTHYROIDISM DUE TO HASHIMOTO'S THYROIDITIS: ICD-10-CM

## 2020-03-27 DIAGNOSIS — Z79.4 TYPE 2 DIABETES MELLITUS WITH DIABETIC NEUROPATHY, WITH LONG-TERM CURRENT USE OF INSULIN (H): Primary | ICD-10-CM

## 2020-03-27 LAB
ALBUMIN SERPL-MCNC: 3.6 G/DL (ref 3.4–5)
ALP SERPL-CCNC: 65 U/L (ref 40–150)
ALT SERPL W P-5'-P-CCNC: 28 U/L (ref 0–50)
ANION GAP SERPL CALCULATED.3IONS-SCNC: 1 MMOL/L (ref 3–14)
AST SERPL W P-5'-P-CCNC: 18 U/L (ref 0–45)
BILIRUB SERPL-MCNC: 0.4 MG/DL (ref 0.2–1.3)
BUN SERPL-MCNC: 24 MG/DL (ref 7–30)
CALCIUM SERPL-MCNC: 9 MG/DL (ref 8.5–10.1)
CHLORIDE SERPL-SCNC: 108 MMOL/L (ref 94–109)
CHOLEST SERPL-MCNC: 115 MG/DL
CO2 SERPL-SCNC: 32 MMOL/L (ref 20–32)
CREAT SERPL-MCNC: 1.55 MG/DL (ref 0.52–1.04)
GFR SERPL CREATININE-BSD FRML MDRD: 34 ML/MIN/{1.73_M2}
GLUCOSE SERPL-MCNC: 129 MG/DL (ref 70–99)
HBA1C MFR BLD: 8.4 % (ref 0–5.6)
HDLC SERPL-MCNC: 34 MG/DL
LDLC SERPL CALC-MCNC: 59 MG/DL
NONHDLC SERPL-MCNC: 81 MG/DL
POTASSIUM SERPL-SCNC: 4.2 MMOL/L (ref 3.4–5.3)
PROT SERPL-MCNC: 7.6 G/DL (ref 6.8–8.8)
SODIUM SERPL-SCNC: 141 MMOL/L (ref 133–144)
TRIGL SERPL-MCNC: 112 MG/DL
TSH SERPL DL<=0.005 MIU/L-ACNC: 3.47 MU/L (ref 0.4–4)

## 2020-03-27 PROCEDURE — 80053 COMPREHEN METABOLIC PANEL: CPT | Performed by: INTERNAL MEDICINE

## 2020-03-27 PROCEDURE — 80061 LIPID PANEL: CPT | Performed by: INTERNAL MEDICINE

## 2020-03-27 PROCEDURE — 84443 ASSAY THYROID STIM HORMONE: CPT | Performed by: INTERNAL MEDICINE

## 2020-03-27 PROCEDURE — 83036 HEMOGLOBIN GLYCOSYLATED A1C: CPT | Performed by: INTERNAL MEDICINE

## 2020-03-27 PROCEDURE — 36415 COLL VENOUS BLD VENIPUNCTURE: CPT | Performed by: INTERNAL MEDICINE

## 2020-03-27 NOTE — PROGRESS NOTES
Please place or confirm orders for upcoming lab appointment on 03/27/20. Thank you.    Patient would like to have her A1C done as well! Thank you.

## 2020-04-06 NOTE — PROGRESS NOTES
"Spoke with patient 4/6/2020@ 9:58am sent invitation so patient could accept invite for wyatt data  NG      Call patient on 4/7/2020@ 7:30am lvm for patient to call regarding her wyatt data also sent patient message through my chart  NG    Spoke with patient 4/7/2020@ 8:35 and states she was not able to upload her meter  RONEL    This is a  billable telephone visit.      The patient has been notified of following:     \"This telephone visit will be conducted via a call between you and your physician/provider. We have found that certain health care needs can be provided without the need for an in-person physical exam.  This service lets us provide the care you need with a video conversation.  If a prescription is necessary we can send it directly to your pharmacy.  If lab work is needed we can place an order for that and you can then stop by our lab to have the test done at a later time.    If during the course of the call the physician/provider feels a telephone visit is not appropriate, you will not be charged for this service.\"     Patient has given verbal consent for telephone visit? Yes  [facility]     Interval history:   Ade Wilkins is a 69 year old T2DM here for follow up. The patient has been doing ok. Has been able to partially normalize her sleep wake cycle.   Since the last visit she switched her food intake to more frequent smaller meals. She eats more vegetables for her snacks than before.   Medications: about 20 units Humalog with dinner; Degludec insulin 72 units once daily, metformin.   She has been on Topiramate which she finds helpful for portion size control and migraines. Weight has been stable, st post bariatric surgery. Takes supplements including VitB12,VItD, Calciuma and Iron.  HYPOGLYCEMIA continued problem in the morning   Limited PE due to joint pain but can walk some  Has Wyatt: unable to download, pt reviewed sensor: reports fasting 80s, last seven days 187, in the " evening BGs, takes Humalog, 20 units before,        Medications:   Current Outpatient Prescriptions          Current Outpatient Medications   Medication Sig Dispense Refill     atorvastatin (LIPITOR) 80 MG tablet TAKE ONE TABLET BY MOUTH ONE TIME DAILY 90 tablet 3     atorvastatin (LIPITOR) 80 MG tablet Take 1 tablet (80 mg) by mouth daily 30 tablet 1     augmented betamethasone dipropionate (DIPROLENE-AF) 0.05 % ointment Apply to AA BID x 3-4 weeks then PRN 45 g 0     BD FCO U/F 32G X 4 MM insulin pen needle USE 4 TIMES A DAY WITH INSULIN AND VICTOZA INJECTIONS 400 each 1     blood glucose (MARTITA CONTOUR NEXT) test strip Use to test blood sugar 5 times daily or as directed. 100 each 5     blood glucose (NO BRAND SPECIFIED) test strip Use to test blood sugar 5 times daily. 500 each 3     blood glucose monitoring (MARTITA CONTOUR NEXT) test strip Use to test blood sugar 4 times daily or as directed. 1 Box 9     calcium carbonate-vitamin D (CALTRATE 600+D) 600-400 MG-UNIT CHEW Take 2 chew tab by mouth every evening         cefPROZIL (CEFZIL) 500 MG tablet Take 1 tablet (500 mg) by mouth 2 times daily 20 tablet 0     cetirizine (ZYRTEC) 10 MG tablet Take 1 tablet (10 mg) by mouth daily         Cholecalciferol (VITAMIN D) 2000 UNITS tablet Take 2,000 Units by mouth daily         Continuous Blood Gluc  (FREESTYLE BALWINDER 14 DAY READER) HENNA 1 each 3 times daily 1 Device 1     Continuous Blood Gluc Sensor (FREESTYLE BALWINDER 14 DAY SENSOR) MISC 1 each every 14 days 2 each 11     CONTOUR NEXT TEST test strip USE TO TEST BLOOD SUGAR 4 TIMES DAILY OR AS DIRECTED. 100 each 8     cyanocolbalamin (VITAMIN B-12) 1000 MCG tablet Take 1,000 mcg by mouth every other day          dulaglutide (TRULICITY) 1.5 MG/0.5ML pen Inject 1.5 mg Subcutaneous every 7 days 6 mL 3     fenofibrate (TRIGLIDE/LOFIBRA) 160 MG tablet TAKE ONE TABLET BY MOUTH ONE TIME DAILY 90 tablet 3     fluticasone (FLONASE) 50 MCG/ACT nasal spray Spray 2 sprays  into both nostrils daily 16 mL 1     fluticasone (FLONASE) 50 MCG/ACT nasal spray Spray 2 sprays into both nostrils daily 16 g 1     furosemide (LASIX) 20 MG tablet Take 1 tablet (20 mg) by mouth daily 90 tablet 3     HUMALOG KWIKPEN 100 UNIT/ML soln Use as directed up to 60 units per day. 15 mL 11      hydrocortisone (WESTCORT) 0.2 % cream Apply topically 2 times daily Apply sparingly to affected area 2 times daily as needed. 45 g 2     insulin degludec (TRESIBA) 200 UNIT/ML pen Inject 72units subcu daily 80 mL 3     irbesartan (AVAPRO) 300 MG tablet TAKE ONE TABLET BY MOUTH ONE TIME DAILY  90 tablet 3     KLOR-CON 10 MEQ CR tablet TAKE ONE TABLET BY MOUTH ONE TIME DAILY  90 tablet 0     levothyroxine (SYNTHROID/LEVOTHROID) 50 MCG tablet TAKE 1 TABLET BY MOUTH DAILY. 90 tablet 2     Magnesium 500 MG CAPS Take 1 capsule by mouth daily.         metFORMIN (GLUCOPHAGE) 1000 MG tablet 1 tab twice daily 180 tablet 1     ONE TOUCH DELICA LANCETS MISC 1 Device 4 times daily. 100 Stick prn     potassium chloride ER (K-TAB/KLOR-CON) 10 MEQ CR tablet TAKE ONE TABLET BY MOUTH ONE TIME DAILY  90 tablet 0     topiramate (TOPAMAX) 25 MG tablet take 1 tablet (25 mg) at bedtime for 1 week, then take 2 tablets (50 mg) at bedtime for 1 week, and then 3 tablet (75 mg) daily at bedtime t 90 tablet 2     traZODone (DESYREL) 50 MG tablet take 1 to 2 tablets (50 to 100mg) by mouth nightly as needed 180 tablet PRN     traZODone (DESYREL) 50 MG tablet take 1 to 2 tablets (50 to 100mg) by mouth nightly as needed 180 tablet 0     verapamil ER (CALAN-SR) 180 MG CR tablet TAKE ONE TABLET BY MOUTH TWICE DAILY  180 tablet 3     VITRON-C  MG TABS tablet TAKE TWO TABLETS BY MOUTH TWICE DAILY  360 tablet 1           Review of Systems: in addition to stated above  GENERAL: Negative  SKIN: Negative  HENT: Negative   EYE: Negative  HEART: Negative  RESPIRATORY: Negative   GI: Negative  : Negative  MSK: Negative  BLOOD/LYMPH:  "Negative  NEUROLOGIC: Negative   PSYCH: Negative     Physical Examination:  Blood pressure 133/69, pulse 85, height 1.575 m (5' 2\"), weight 101.2 kg (223 lb), not currently breastfeeding.  Body mass index is 40.79 kg/m .     Gen: pleasant stated appearing lively woman, NAD, morbid obesity         Wt Readings from Last 4 Encounters:   01/07/20 101.2 kg (223 lb)   10/14/19 100.7 kg (222 lb 1.6 oz)   06/10/19 100.4 kg (221 lb 4.8 oz)   04/22/19 104.7 kg (230 lb 14.4 oz)        Labs and Studies:             Recent Labs   Lab Test 10/14/19  1315 04/23/19  1029 12/10/18  1448 12/10/18  1444   03/12/18  1334 08/25/17  1014   A1C  --  8.7* 9.0*  --    < > 8.1*  --    TSH 2.54 4.06*  --   --    < > 4.78*  --    T4  --  1.10  --   --   --  1.17  --    LDL  --  125* 115*  --   --  94  --    HDL  --  35*  --   --   --  35*  --    TRIG  --  249*  --   --   --  213*  --    CR  --  1.61* 1.63*  --    < > 1.53*  --    MICROL  --   --   --  13  --   --  37    < > = values in this interval not displayed.         Assessment:  1. Elderly woman with T2DM longstanding insulin use with moderate blood glucose control.   BGs appear better with better food choices. Needs more Humalog with dinner and less longacting as she has fasting hypoglycemia. Cont metformin. Consider start of SGLT2 inhibitor at next visit to prevent further progression of renal insuffiencey.  2. HTN has CRI, is on Verapamil and Irbesartan and furosemide with occ dizziness, hypotension and falls. Discussed to drink enough water, if still dizzy decrease Irbesartan to 1/2 tablets  3. Dyslipidemia: on 80mg of Atorvastatin and Fenofibrate,and Ezetimide, LDL improved  4. St post bariatric surgery: doing well on supplements, on Topiramate for weight maintenance and migraines  5. Hypothyroidism doing well on Levothyroxine    PLAN  Decrease Tresiba to 64 units once daily  Increase Humalog to 24 units  Enjoy the warmer wheather and go for walks  Drink more water, if you are still " dizzy despite good hydration, decrease Losartan to 1/2 tablet    Send in blood glucose readings in 10-14 days or upload the laurie and let us know    Follow up in 6 m    Lab work prior to the next visit     Plan:

## 2020-04-07 ENCOUNTER — VIRTUAL VISIT (OUTPATIENT)
Dept: ENDOCRINOLOGY | Facility: CLINIC | Age: 70
End: 2020-04-07
Payer: COMMERCIAL

## 2020-04-07 DIAGNOSIS — Z79.4 TYPE 2 DIABETES MELLITUS WITH DIABETIC NEUROPATHY, WITH LONG-TERM CURRENT USE OF INSULIN (H): Primary | ICD-10-CM

## 2020-04-07 DIAGNOSIS — E11.40 TYPE 2 DIABETES MELLITUS WITH DIABETIC NEUROPATHY, WITH LONG-TERM CURRENT USE OF INSULIN (H): Primary | ICD-10-CM

## 2020-04-07 NOTE — PATIENT INSTRUCTIONS
Decrease Tresiba to 64 units once daily  Increase Humalog to 24 units  Enjoy the warmer wheather and go for walks  Drink more water, if you are still dizzy despite good hydration, decrease Losartan to 1/2 tablet    Send in blood glucose readings in 10-14 days or upload the laurie and let us know    Follow up in 6 m    Lab work prior to the next visit

## 2020-04-17 ENCOUNTER — DOCUMENTATION ONLY (OUTPATIENT)
Dept: ENDOCRINOLOGY | Facility: CLINIC | Age: 70
End: 2020-04-17

## 2020-04-28 DIAGNOSIS — E11.40 TYPE 2 DIABETES MELLITUS WITH DIABETIC NEUROPATHY, WITH LONG-TERM CURRENT USE OF INSULIN (H): ICD-10-CM

## 2020-04-28 DIAGNOSIS — Z79.4 TYPE 2 DIABETES MELLITUS WITH DIABETIC NEUROPATHY, WITH LONG-TERM CURRENT USE OF INSULIN (H): ICD-10-CM

## 2020-04-28 NOTE — TELEPHONE ENCOUNTER
insulin degludec (TRESIBA) 200 UNIT/ML pen        Last Written Prescription Date:  06/10/19  Last Fill Quantity: 80mL,   # refills: 3  Last Office Visit : 04/07/20  Future Office visit:  None scheduled    Routing refill request to provider for review/approval because:  Insulin - refilled per clinic

## 2020-07-10 ENCOUNTER — MYC REFILL (OUTPATIENT)
Dept: ENDOCRINOLOGY | Facility: CLINIC | Age: 70
End: 2020-07-10

## 2020-07-10 ENCOUNTER — MYC REFILL (OUTPATIENT)
Dept: INTERNAL MEDICINE | Facility: CLINIC | Age: 70
End: 2020-07-10

## 2020-07-10 DIAGNOSIS — E11.40 TYPE 2 DIABETES MELLITUS WITH DIABETIC NEUROPATHY, WITH LONG-TERM CURRENT USE OF INSULIN (H): Primary | ICD-10-CM

## 2020-07-10 DIAGNOSIS — Z79.4 TYPE 2 DIABETES MELLITUS WITH DIABETIC NEUROPATHY, WITH LONG-TERM CURRENT USE OF INSULIN (H): Primary | ICD-10-CM

## 2020-07-10 DIAGNOSIS — E66.01 MORBID OBESITY (H): ICD-10-CM

## 2020-07-10 DIAGNOSIS — R25.2 MUSCLE CRAMPS: ICD-10-CM

## 2020-07-10 DIAGNOSIS — G43.009 MIGRAINE WITHOUT AURA AND WITHOUT STATUS MIGRAINOSUS, NOT INTRACTABLE: ICD-10-CM

## 2020-07-13 RX ORDER — TOPIRAMATE 25 MG/1
TABLET, FILM COATED ORAL
Qty: 90 TABLET | Refills: 3 | Status: SHIPPED | OUTPATIENT
Start: 2020-07-13 | End: 2020-11-08

## 2020-07-14 RX ORDER — POTASSIUM CHLORIDE 750 MG/1
10 TABLET, EXTENDED RELEASE ORAL DAILY
Qty: 90 TABLET | Refills: 0 | Status: SHIPPED | OUTPATIENT
Start: 2020-07-14 | End: 2020-08-04

## 2020-07-24 DIAGNOSIS — E11.65 POORLY CONTROLLED TYPE 2 DIABETES MELLITUS WITH NEUROPATHY (H): ICD-10-CM

## 2020-07-24 DIAGNOSIS — E11.40 POORLY CONTROLLED TYPE 2 DIABETES MELLITUS WITH NEUROPATHY (H): ICD-10-CM

## 2020-07-24 NOTE — TELEPHONE ENCOUNTER
Pt Request 90 day    HUMALOG 100 U/ML KWIK PEN INJ 3ML       Last Written Prescription Date:  3-20-20  Last Fill Quantity: 18 ml,   # refills: 11  Last Office Visit : 4-7-20  Future Office visit:  none    Routing refill request to provider for review/approval because:  Insulin - refilled per clinic

## 2020-07-24 NOTE — TELEPHONE ENCOUNTER
Short Acting Insulin Protocol [id number]/24/2020 10:53 AM   HgbA1C in past 3 or 6 months     Lab order in place.

## 2020-07-26 RX ORDER — INSULIN LISPRO 100 [IU]/ML
INJECTION, SOLUTION INTRAVENOUS; SUBCUTANEOUS
Qty: 60 ML | Refills: 3 | Status: SHIPPED | OUTPATIENT
Start: 2020-07-26 | End: 2020-07-29

## 2020-07-27 ENCOUNTER — MYC REFILL (OUTPATIENT)
Dept: INTERNAL MEDICINE | Facility: CLINIC | Age: 70
End: 2020-07-27

## 2020-07-27 ENCOUNTER — MYC REFILL (OUTPATIENT)
Dept: ENDOCRINOLOGY | Facility: CLINIC | Age: 70
End: 2020-07-27

## 2020-07-27 DIAGNOSIS — E11.65 TYPE 2 DIABETES MELLITUS WITH HYPERGLYCEMIA, WITHOUT LONG-TERM CURRENT USE OF INSULIN (H): ICD-10-CM

## 2020-07-27 DIAGNOSIS — E11.40 TYPE 2 DIABETES MELLITUS WITH DIABETIC NEUROPATHY, WITH LONG-TERM CURRENT USE OF INSULIN (H): ICD-10-CM

## 2020-07-27 DIAGNOSIS — Z79.4 TYPE 2 DIABETES MELLITUS WITH DIABETIC NEUROPATHY, WITH LONG-TERM CURRENT USE OF INSULIN (H): ICD-10-CM

## 2020-07-28 DIAGNOSIS — E11.65 POORLY CONTROLLED TYPE 2 DIABETES MELLITUS WITH NEUROPATHY (H): ICD-10-CM

## 2020-07-28 DIAGNOSIS — E11.40 POORLY CONTROLLED TYPE 2 DIABETES MELLITUS WITH NEUROPATHY (H): ICD-10-CM

## 2020-07-28 RX ORDER — PEN NEEDLE, DIABETIC 32GX 5/32"
NEEDLE, DISPOSABLE MISCELLANEOUS
Qty: 400 EACH | Refills: 0 | Status: SHIPPED | OUTPATIENT
Start: 2020-07-28

## 2020-07-29 DIAGNOSIS — E11.40 POORLY CONTROLLED TYPE 2 DIABETES MELLITUS WITH NEUROPATHY (H): ICD-10-CM

## 2020-07-29 DIAGNOSIS — E11.65 POORLY CONTROLLED TYPE 2 DIABETES MELLITUS WITH NEUROPATHY (H): ICD-10-CM

## 2020-07-29 RX ORDER — INSULIN LISPRO 100 [IU]/ML
INJECTION, SOLUTION INTRAVENOUS; SUBCUTANEOUS
Qty: 60 ML | Refills: 3 | Status: SHIPPED | OUTPATIENT
Start: 2020-07-29 | End: 2021-08-31

## 2020-07-29 NOTE — TELEPHONE ENCOUNTER
Patient requests 90 days supply    HUMALOG 100 U/ML KWIK PEN INJ 3ML       Last Written Prescription Date:  7-26-20  Last Fill Quantity: 60 ml,   # refills: 3  Last Office Visit : 4-7-20  Future Office visit:  none    Routing refill request to provider for review/approval because:  Insulin - refilled per clinic    Patient requests 90 days supply

## 2020-07-30 RX ORDER — INSULIN LISPRO 100 [IU]/ML
INJECTION, SOLUTION INTRAVENOUS; SUBCUTANEOUS
Qty: 216 ML | OUTPATIENT
Start: 2020-07-30

## 2020-07-30 NOTE — TELEPHONE ENCOUNTER
Duplicate: last rx 7-29-20 for 60 ml w/3 RF- pharm called, confirmed rx.    -HUMALOG KWIKPEN 100 UNIT/ML soln  60 mL  3  7/29/2020   Yes   Sig: INJECT  Per sliding scale UP TO 60 UNITS UNDER THE SKIN DAILY  [facility] [place], [place] - [address]

## 2020-08-04 ENCOUNTER — VIRTUAL VISIT (OUTPATIENT)
Dept: INTERNAL MEDICINE | Facility: CLINIC | Age: 70
End: 2020-08-04
Payer: COMMERCIAL

## 2020-08-04 DIAGNOSIS — Z79.899 MEDICATION MANAGEMENT: ICD-10-CM

## 2020-08-04 DIAGNOSIS — E78.00 PURE HYPERCHOLESTEROLEMIA: ICD-10-CM

## 2020-08-04 DIAGNOSIS — N18.30 CKD (CHRONIC KIDNEY DISEASE) STAGE 3, GFR 30-59 ML/MIN (H): ICD-10-CM

## 2020-08-04 DIAGNOSIS — E11.40 TYPE 2 DIABETES MELLITUS WITH DIABETIC NEUROPATHY, WITH LONG-TERM CURRENT USE OF INSULIN (H): ICD-10-CM

## 2020-08-04 DIAGNOSIS — I10 BENIGN ESSENTIAL HYPERTENSION: Primary | ICD-10-CM

## 2020-08-04 DIAGNOSIS — R60.0 BILATERAL LOWER EXTREMITY EDEMA: ICD-10-CM

## 2020-08-04 DIAGNOSIS — Z79.4 TYPE 2 DIABETES MELLITUS WITH DIABETIC NEUROPATHY, WITH LONG-TERM CURRENT USE OF INSULIN (H): ICD-10-CM

## 2020-08-04 PROCEDURE — 99214 OFFICE O/P EST MOD 30 MIN: CPT | Mod: 95 | Performed by: INTERNAL MEDICINE

## 2020-08-04 RX ORDER — POTASSIUM CHLORIDE 1500 MG/1
20 TABLET, EXTENDED RELEASE ORAL 2 TIMES DAILY
Qty: 90 TABLET | Refills: 3 | Status: SHIPPED | OUTPATIENT
Start: 2020-08-04 | End: 2020-08-04

## 2020-08-04 RX ORDER — POTASSIUM CHLORIDE 1500 MG/1
TABLET, EXTENDED RELEASE ORAL
Qty: 180 TABLET | Refills: 3 | Status: SHIPPED | OUTPATIENT
Start: 2020-08-04 | End: 2020-11-02

## 2020-08-04 RX ORDER — IRBESARTAN 300 MG/1
150 TABLET ORAL DAILY
COMMUNITY
Start: 2020-08-04 | End: 2021-01-15

## 2020-08-04 RX ORDER — FENOFIBRATE 160 MG/1
160 TABLET ORAL DAILY
Qty: 90 TABLET | Refills: 0 | Status: CANCELLED | OUTPATIENT
Start: 2020-08-04

## 2020-08-04 RX ORDER — FUROSEMIDE 20 MG
20 TABLET ORAL DAILY
Qty: 90 TABLET | Refills: 3 | Status: CANCELLED | OUTPATIENT
Start: 2020-08-04

## 2020-08-04 RX ORDER — ATORVASTATIN CALCIUM 80 MG/1
80 TABLET, FILM COATED ORAL DAILY
Qty: 90 TABLET | Refills: 3 | Status: SHIPPED | OUTPATIENT
Start: 2020-08-04 | End: 2021-09-20

## 2020-08-04 RX ORDER — FUROSEMIDE 40 MG
40 TABLET ORAL DAILY
Qty: 90 TABLET | Refills: 3 | Status: SHIPPED | OUTPATIENT
Start: 2020-08-04 | End: 2020-11-02

## 2020-08-04 NOTE — PROGRESS NOTES
"Ade Wilkins is a 69 year old female who is being evaluated via a billable telephone visit.      The patient has been notified of following:     \"This telephone visit will be conducted via a call between you and your physician/provider. We have found that certain health care needs can be provided without the need for a physical exam.  This service lets us provide the care you need with a short phone conversation.  If a prescription is necessary we can send it directly to your pharmacy.  If lab work is needed we can place an order for that and you can then stop by our lab to have the test done at a later time.    Telephone visits are billed at different rates depending on your insurance coverage. During this emergency period, for some insurers they may be billed the same as an in-person visit.  Please reach out to your insurance provider with any questions.    If during the course of the call the physician/provider feels a telephone visit is not appropriate, you will not be charged for this service.\"    Patient has given verbal consent for Telephone visit?  Yes    What phone number would you like to be contacted at? [phone]    How would you like to obtain your AVS? MyChart    TELEPHONE VISIT                                                      SUBJECTIVE:                                                      HPI: Ade Wilkins is a pleasant 69 year old female who requested a telephone visit to get refills:    Medications reviewed with patient.    She is currently on verapamil only for blood pressure. Her blood pressure was previously well controlled on verapamil and irbesartan, but she stopped irbesartan because she did not like the way it made her feel. PMH significant for CKD stage III and IDDM. Educated patient re: the importance of adequate blood pressure control and being on an ARB/ACE-I in the setting of IDDM and CKD stage III. She is agreeable to restarting irbesartan.    She is currently on " atorvastatin, Zetia, and fenofibrate for her cholesterol. Tolerating well - no adverse side effects.  Patient's triglycerides have never been significantly elevated - fenofibrate no longer indicated.    Finally, patient reports that her bilateral lower extremity edema is poorly controlled on Lasix 20 mg daily.    ASSESSMENT/PLAN:                                                      (I10) Benign essential hypertension  (primary encounter diagnosis)  (E11.40,  Z79.4) Type 2 diabetes mellitus with diabetic neuropathy, with long-term current use of insulin (H)  (N18.3) CKD (chronic kidney disease) stage 3, GFR 30-59 ml/min (H)  Comment: previously well controlled on verapamil and irbesartan; patient stopped irbesartan because she did not like the way it made her feel; discussed the importance of adequate blood pressure control and being on an ARB/ACE-I in the setting of IDDM and CKD stage III.  Plan: patient will restart irbesartan 150 mg daily; continue verapamil without change.    (E78.00) Pure hypercholesterolemia  (Z79.899) Medication management  Comment: well-controlled on Lipitor and Zetia.  Plan: CPM; refills atorvastatin provided; patient may discontinue fenofibrate - no longer indicated.    (R60.0) Bilateral lower extremity edema  Comment: Poorly controlled on Lasix 20 mg daily.  Plan: INCREASE Lasix from 20 to 40 mg daily; INCREASE potassium supplement from 10 to 20 mEq daily.    Total time of call between patient and provider was 9 minutes.     (Chart documentation was completed, in part, with [employer] voice-recognition software. Even though reviewed, some grammatical, spelling, and word errors may remain.)    Judith Aviles MD   [address] [address]  T: [phone], F: [phone]

## 2020-08-07 ENCOUNTER — TRANSFERRED RECORDS (OUTPATIENT)
Dept: HEALTH INFORMATION MANAGEMENT | Facility: CLINIC | Age: 70
End: 2020-08-07

## 2020-08-07 LAB — HBA1C MFR BLD: 8.7 % (ref 0–5.7)

## 2020-08-25 DIAGNOSIS — I10 ESSENTIAL HYPERTENSION: ICD-10-CM

## 2020-08-25 NOTE — TELEPHONE ENCOUNTER
Routing refill request to provider for review/approval because:  Labs not current:  Serum creatinine

## 2020-08-26 RX ORDER — VERAPAMIL HYDROCHLORIDE 180 MG/1
TABLET, EXTENDED RELEASE ORAL
Qty: 180 TABLET | Refills: 1 | Status: SHIPPED | OUTPATIENT
Start: 2020-08-26 | End: 2022-03-11

## 2020-08-26 NOTE — TELEPHONE ENCOUNTER
----- Message from Ashley Rodriguez NP sent at 8/26/2020  1:47 PM CDT -----  Regarding: FW: Anemia    ----- Message -----  From: Ashley Rodriguez NP  Sent: 8/26/2020   1:19 PM CDT  To: [facility]  Subject: Anemia                                           Please call patient and notify that her recent blood work showed anemia. I sent prescription for Iron supplements to her pharmacy to be taken daily with orange juice to improve absorption. She will need a repeat CBC in 6 weeks to ensure the anemia is due to iron deficiency and not to other causes. Please assist to schedule the ordered CBC in 6 weeks.    Thanks,  Ashley       Thanks

## 2020-09-12 ENCOUNTER — MYC REFILL (OUTPATIENT)
Dept: INTERNAL MEDICINE | Facility: CLINIC | Age: 70
End: 2020-09-12

## 2020-09-12 DIAGNOSIS — J06.9 UPPER RESPIRATORY TRACT INFECTION, UNSPECIFIED TYPE: ICD-10-CM

## 2020-09-12 DIAGNOSIS — E11.65 TYPE 2 DIABETES MELLITUS WITH HYPERGLYCEMIA, WITHOUT LONG-TERM CURRENT USE OF INSULIN (H): ICD-10-CM

## 2020-09-12 DIAGNOSIS — E03.9 HYPOTHYROIDISM, UNSPECIFIED TYPE: ICD-10-CM

## 2020-09-14 RX ORDER — LEVOTHYROXINE SODIUM 50 UG/1
50 TABLET ORAL DAILY
Qty: 90 TABLET | Refills: 2 | Status: SHIPPED | OUTPATIENT
Start: 2020-09-14 | End: 2022-02-22

## 2020-09-15 DIAGNOSIS — J06.9 UPPER RESPIRATORY TRACT INFECTION, UNSPECIFIED TYPE: ICD-10-CM

## 2020-09-15 RX ORDER — FLUTICASONE PROPIONATE 50 MCG
2 SPRAY, SUSPENSION (ML) NASAL DAILY
Qty: 16 ML | Refills: 10 | Status: SHIPPED | OUTPATIENT
Start: 2020-09-15 | End: 2020-09-15

## 2020-09-15 RX ORDER — FLUTICASONE PROPIONATE 50 MCG
SPRAY, SUSPENSION (ML) NASAL
Qty: 48 G | Refills: 2 | Status: SHIPPED | OUTPATIENT
Start: 2020-09-15 | End: 2021-10-19

## 2020-09-28 ENCOUNTER — MYC REFILL (OUTPATIENT)
Dept: ENDOCRINOLOGY | Facility: CLINIC | Age: 70
End: 2020-09-28

## 2020-09-28 DIAGNOSIS — Z79.4 TYPE 2 DIABETES MELLITUS WITH DIABETIC NEUROPATHY, WITH LONG-TERM CURRENT USE OF INSULIN (H): ICD-10-CM

## 2020-09-28 DIAGNOSIS — E11.40 TYPE 2 DIABETES MELLITUS WITH DIABETIC NEUROPATHY, WITH LONG-TERM CURRENT USE OF INSULIN (H): ICD-10-CM

## 2020-09-30 RX ORDER — DULAGLUTIDE 1.5 MG/.5ML
1.5 INJECTION, SOLUTION SUBCUTANEOUS
Qty: 6 ML | Refills: 3 | Status: SHIPPED | OUTPATIENT
Start: 2020-09-30 | End: 2021-09-21

## 2020-10-09 DIAGNOSIS — Z79.4 TYPE 2 DIABETES MELLITUS WITH DIABETIC NEUROPATHY, WITH LONG-TERM CURRENT USE OF INSULIN (H): ICD-10-CM

## 2020-10-09 DIAGNOSIS — E11.40 TYPE 2 DIABETES MELLITUS WITH DIABETIC NEUROPATHY, WITH LONG-TERM CURRENT USE OF INSULIN (H): ICD-10-CM

## 2020-10-09 LAB
HBA1C MFR BLD: 8.1 % (ref 0–5.6)
HGB BLD-MCNC: 11.1 G/DL (ref 11.7–15.7)

## 2020-10-09 PROCEDURE — 82310 ASSAY OF CALCIUM: CPT | Performed by: INTERNAL MEDICINE

## 2020-10-09 PROCEDURE — 84132 ASSAY OF SERUM POTASSIUM: CPT | Performed by: INTERNAL MEDICINE

## 2020-10-09 PROCEDURE — 83036 HEMOGLOBIN GLYCOSYLATED A1C: CPT | Performed by: INTERNAL MEDICINE

## 2020-10-09 PROCEDURE — 82043 UR ALBUMIN QUANTITATIVE: CPT | Performed by: INTERNAL MEDICINE

## 2020-10-09 PROCEDURE — 36415 COLL VENOUS BLD VENIPUNCTURE: CPT | Performed by: INTERNAL MEDICINE

## 2020-10-09 PROCEDURE — 85018 HEMOGLOBIN: CPT | Performed by: INTERNAL MEDICINE

## 2020-10-09 PROCEDURE — 82565 ASSAY OF CREATININE: CPT | Performed by: INTERNAL MEDICINE

## 2020-10-09 PROCEDURE — 82040 ASSAY OF SERUM ALBUMIN: CPT | Performed by: INTERNAL MEDICINE

## 2020-10-10 LAB
ALBUMIN SERPL-MCNC: 3.7 G/DL (ref 3.4–5)
CALCIUM SERPL-MCNC: 9.5 MG/DL (ref 8.5–10.1)
CREAT SERPL-MCNC: 1.13 MG/DL (ref 0.52–1.04)
CREAT UR-MCNC: 85 MG/DL
GFR SERPL CREATININE-BSD FRML MDRD: 49 ML/MIN/{1.73_M2}
MICROALBUMIN UR-MCNC: 35 MG/L
MICROALBUMIN/CREAT UR: 40.99 MG/G CR (ref 0–25)
POTASSIUM SERPL-SCNC: 4.1 MMOL/L (ref 3.4–5.3)

## 2020-10-12 NOTE — PROGRESS NOTES
"DM  Jacobo Mohan, [facility]    Outcome for 10/13/20 7:27 AM :Glucose sent via Email    Ade Wilkins is a 70 year old female who is being evaluated via a billable telephone visit.      The patient has been notified of following:     \"This telephone visit will be conducted via a call between you and your physician/provider. We have found that certain health care needs can be provided without the need for a physical exam.  This service lets us provide the care you need with a short phone conversation.  If a prescription is necessary we can send it directly to your pharmacy.  If lab work is needed we can place an order for that and you can then stop by our lab to have the test done at a later time.    Telephone visits are billed at different rates depending on your insurance coverage. During this emergency period, for some insurers they may be billed the same as an in-person visit.  Please reach out to your insurance provider with any questions.    If during the course of the call the physician/provider feels a telephone visit is not appropriate, you will not be charged for this service.\"    Patient has given verbal consent for Telephone visit?  Yes    What phone number would you like to be contacted at? [phone]    How would you like to obtain your AVS? Dorie Hoover     Interval history:   Ade Wilkins is a 70 year old T2DM here for follow up  The patient has decreased her Tresiba insulin from 72 to 55 units once daily because of fasting hypoglycemia  She has not been taking any mealtime insulin  She continues to be on Trulicity and metformin  She changed her diet to 4 small meals daily  With this hypoglycemia is improved    Uses Wyatt which she finds helpful  Sensor data download reveals average blood sugar 196 variability 44% very high blood sugar 27% high blood sugar 21% target 49% no 3% very low 0 point sent  Glucose pattern good blood sugars in the morning and evening hyperglycemia during the " day    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  Mild albuminuria    Assessment:  1. Elderly woman with T2DM longstanding insulin use with moderate blood glucose control. Best A1c in several years, which might be artificially lower due to anemia. Uses less insulin. It seems that change in eating has helped. Seems to do better when working.  Due to CRI even if improving recommended to start SGLT2 inhibitor  Adverse effects particularly fungal genital infection were discussed  Continue metformin and Tresiba at current dose  Continued laurie    2.   HTN has CRI, is on Verapamil and Irbesartan and furosemide with occ dizziness, hypotension and falls. Discussed to drink enough water, if still dizzy decrease Irbesartan to 1/2 tablets  3. Dyslipidemia: on 80mg of Atorvastatin and Fenofibrate,and Ezetimide, LDL improved  4. St post bariatric surgery: doing well on supplements, on Topiramate for weight maintenance and migraines  5. Hypothyroidism doing well on Levothyroxine    PLAN  Continue Tresiba to 55 units once daily  Hold off with Humalog  Cont metformin  Start Empagliflozin or other SGLT-2 inhibitor at starting dose  Once starting new diabetes medication, decrease Furosemide to 20 mg once daily and decrease Tresiba to 50 units  Assure adequate fluid intake    Follow up in 3 months    This was a 25 min visit of which more than 13  minutes were spent in counseling in regards to diagnosis, clinical consequences and treatment indications and options of diabetes care     Olga Lidia Hoover MD  Endocrinology and Diabetes    Pager [phone]      Medications:   Current Outpatient Prescriptions          Current Outpatient Medications   Medication Sig Dispense Refill     atorvastatin (LIPITOR) 80 MG tablet TAKE ONE TABLET BY MOUTH ONE TIME DAILY 90 tablet 3     atorvastatin (LIPITOR) 80 MG tablet Take 1 tablet (80 mg) by mouth daily 30 tablet 1     augmented betamethasone dipropionate (DIPROLENE-AF) 0.05 % ointment Apply to AA BID x 3-4 weeks then PRN 45 g 0     BD FCO U/F 32G X 4 MM insulin pen needle USE 4 TIMES A DAY WITH INSULIN AND VICTOZA INJECTIONS 400 each 1     blood glucose (MARTITA CONTOUR NEXT) test strip Use to test blood sugar 5 times daily or as directed. 100 each 5     blood glucose (NO BRAND SPECIFIED) test strip Use to test blood sugar 5 times daily. 500 each 3     blood glucose monitoring (MARTITA CONTOUR NEXT) test strip Use to test blood sugar 4 times daily or as directed. 1 Box 9     calcium carbonate-vitamin D (CALTRATE 600+D) 600-400 MG-UNIT CHEW Take 2 chew tab by mouth every evening         cefPROZIL (CEFZIL) 500 MG tablet Take 1 tablet (500 mg) by mouth 2 times daily 20 tablet 0     cetirizine (ZYRTEC) 10 MG tablet Take 1 tablet (10 mg) by mouth daily         Cholecalciferol (VITAMIN D) 2000 UNITS tablet Take 2,000 Units by mouth daily         Continuous Blood Gluc  (FREESTYLE BALWINDER 14 DAY READER) HENNA 1 each 3 times daily 1 Device 1     Continuous Blood Gluc Sensor (FREESTYLE BALWINDER 14 DAY SENSOR) MISC 1 each every 14 days 2 each 11     CONTOUR NEXT TEST test strip USE TO TEST BLOOD SUGAR 4 TIMES DAILY OR AS DIRECTED. 100 each 8     cyanocolbalamin (VITAMIN B-12) 1000 MCG tablet Take 1,000 mcg by mouth every other day          dulaglutide (TRULICITY) 1.5 MG/0.5ML pen Inject 1.5 mg Subcutaneous every 7 days  6 mL 3     fenofibrate (TRIGLIDE/LOFIBRA) 160 MG tablet TAKE ONE TABLET BY MOUTH ONE TIME DAILY 90 tablet 3     fluticasone (FLONASE) 50 MCG/ACT nasal spray Spray 2 sprays into both nostrils daily 16 mL 1     fluticasone (FLONASE) 50 MCG/ACT nasal spray Spray 2 sprays into both nostrils daily 16 g 1     furosemide (LASIX) 20 MG tablet Take 1 tablet (20 mg) by mouth daily 90 tablet 3     HUMALOG KWIKPEN 100 UNIT/ML soln Use as directed up to 60 units per day. 15 mL 11      hydrocortisone (WESTCORT) 0.2 % cream Apply topically 2 times daily Apply sparingly to affected area 2 times daily as needed. 45 g 2     insulin degludec (TRESIBA) 200 UNIT/ML pen Inject 72units subcu daily 80 mL 3     irbesartan (AVAPRO) 300 MG tablet TAKE ONE TABLET BY MOUTH ONE TIME DAILY  90 tablet 3     KLOR-CON 10 MEQ CR tablet TAKE ONE TABLET BY MOUTH ONE TIME DAILY  90 tablet 0     levothyroxine (SYNTHROID/LEVOTHROID) 50 MCG tablet TAKE 1 TABLET BY MOUTH DAILY. 90 tablet 2     Magnesium 500 MG CAPS Take 1 capsule by mouth daily.         metFORMIN (GLUCOPHAGE) 1000 MG tablet 1 tab twice daily 180 tablet 1     ONE TOUCH DELICA LANCETS MISC 1 Device 4 times daily. 100 Stick prn     potassium chloride ER (K-TAB/KLOR-CON) 10 MEQ CR tablet TAKE ONE TABLET BY MOUTH ONE TIME DAILY  90 tablet 0     topiramate (TOPAMAX) 25 MG tablet take 1 tablet (25 mg) at bedtime for 1 week, then take 2 tablets (50 mg) at bedtime for 1 week, and then 3 tablet (75 mg) daily at bedtime t 90 tablet 2     traZODone (DESYREL) 50 MG tablet take 1 to 2 tablets (50 to 100mg) by mouth nightly as needed 180 tablet PRN     traZODone (DESYREL) 50 MG tablet take 1 to 2 tablets (50 to 100mg) by mouth nightly as needed 180 tablet 0     verapamil ER (CALAN-SR) 180 MG CR tablet TAKE ONE TABLET BY MOUTH TWICE DAILY  180 tablet 3     VITRON-C  MG TABS tablet TAKE TWO TABLETS BY MOUTH TWICE DAILY  360 tablet 1           Review of Systems: in addition to stated above  GENERAL:  "Negative  SKIN: Negative  HENT: Negative   EYE: Negative  HEART: Negative  RESPIRATORY: Negative   GI: Negative  : Negative  MSK: Negative  BLOOD/LYMPH: Negative  NEUROLOGIC: Negative   PSYCH: Negative     Physical Examination:  Blood pressure 133/69, pulse 85, height 1.575 m (5' 2\"), weight 101.2 kg (223 lb), not currently breastfeeding.  Body mass index is 40.79 kg/m .      Reported vitals:  There were no vitals taken for this visit.   healthy, alert and no distress  PSYCH: Alert and oriented times 3; coherent speech, normal   rate and volume, able to articulate logical thoughts, able   to abstract reason, no tangential thoughts, no hallucinations   or delusions  Her affect is normal and pleasant  RESP: No cough, no audible wheezing, able to talk in full sentences  Remainder of exam unable to be completed due to telephone visits           Wt Readings from Last 4 Encounters:   01/07/20 101.2 kg (223 lb)   10/14/19 100.7 kg (222 lb 1.6 oz)   06/10/19 100.4 kg (221 lb 4.8 oz)   04/22/19 104.7 kg (230 lb 14.4 oz)        Lab Results   Component Value Date     03/27/2020    CHLORIDE 108 03/27/2020    CO2 32 03/27/2020     (H) 03/27/2020    CR 1.13 (H) 10/09/2020    CR 1.55 (H) 03/27/2020    CR 1.61 (H) 04/23/2019    CR 1.63 (H) 12/10/2018    CR 1.53 (H) 05/24/2018    RASHEEDA 9.5 10/09/2020    ALBUMIN 3.7 10/09/2020    ALKPHOS 65 03/27/2020    LDL 59 03/27/2020    HDL 34 (L) 03/27/2020    TRIG 112 03/27/2020     Lab Results   Component Value Date    MICROL 35 10/09/2020    MICROL 10 01/07/2020    MICROL 13 12/10/2018    MICROL 37 08/25/2017    MICROL 46 03/28/2017     Lab Results   Component Value Date    A1C 8.1 (H) 10/09/2020    A1C 8.7 (A) 08/07/2020    A1C 8.4 (H) 03/27/2020    A1C 8.7 (H) 04/23/2019    A1C 9.0 (H) 12/10/2018       Lab Results   Component Value Date    HGB 11.1 (L) 10/09/2020               "

## 2020-10-13 ENCOUNTER — VIRTUAL VISIT (OUTPATIENT)
Dept: ENDOCRINOLOGY | Facility: CLINIC | Age: 70
End: 2020-10-13
Payer: COMMERCIAL

## 2020-10-13 DIAGNOSIS — Z79.4 TYPE 2 DIABETES MELLITUS WITH DIABETIC NEUROPATHY, WITH LONG-TERM CURRENT USE OF INSULIN (H): Primary | ICD-10-CM

## 2020-10-13 DIAGNOSIS — E11.40 TYPE 2 DIABETES MELLITUS WITH DIABETIC NEUROPATHY, WITH LONG-TERM CURRENT USE OF INSULIN (H): Primary | ICD-10-CM

## 2020-10-13 PROCEDURE — 99214 OFFICE O/P EST MOD 30 MIN: CPT | Mod: 95 | Performed by: INTERNAL MEDICINE

## 2020-10-13 NOTE — PATIENT INSTRUCTIONS
Start SGLT2-inhibitor once daily  Upon start of the new medication:  Decrease Furosemide form 40 mg to 20 mg once daily  Decrease Tresiba from 55 to 50 units once daily    Do labs 10 days after starting the new medication  Please contact us to schedule at any of our [place] lab locations  Call [phone] ([phone]), select option [phone]    Follow up in 3 months

## 2020-10-13 NOTE — LETTER
"10/13/2020       RE: Ade Wilkins  [address] [facility] [address] [address]     Dear Colleague,    Thank you for referring your patient, Ade Wilkins, to the [facility]. Please see a copy of my visit note below.    DM  Jacobo Mohan, KEYSHA    Outcome for 10/13/20 7:27 AM :Glucose sent via Email    Ade Wilkins is a 70 year old female who is being evaluated via a billable telephone visit.      The patient has been notified of following:     \"This telephone visit will be conducted via a call between you and your physician/provider. We have found that certain health care needs can be provided without the need for a physical exam.  This service lets us provide the care you need with a short phone conversation.  If a prescription is necessary we can send it directly to your pharmacy.  If lab work is needed we can place an order for that and you can then stop by our lab to have the test done at a later time.    Telephone visits are billed at different rates depending on your insurance coverage. During this emergency period, for some insurers they may be billed the same as an in-person visit.  Please reach out to your insurance provider with any questions.    If during the course of the call the physician/provider feels a telephone visit is not appropriate, you will not be charged for this service.\"    Patient has given verbal consent for Telephone visit?  Yes    What phone number would you like to be contacted at? [phone]    How would you like to obtain your AVS? Dorie Hoover     Interval history:   Ade Wilkins is a 70 year old T2DM here for follow up  The patient has decreased her Tresiba insulin from 72 to 55 units once daily because of fasting hypoglycemia  She has not been taking any mealtime insulin  She continues to be on Trulicity and metformin  She changed her diet to 4 small meals daily  With " this hypoglycemia is improved    Uses Wyatt which she finds helpful  Sensor data download reveals average blood sugar 196 variability 44% very high blood sugar 27% high blood sugar 21% target 49% no 3% very low 0 point sent  Glucose pattern good blood sugars in the morning and evening hyperglycemia during the day    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  Mild albuminuria    Assessment:  1. Elderly woman with T2DM longstanding insulin use with moderate blood glucose control. Best A1c in several years, which might be artificially lower due to anemia. Uses less insulin. It seems that change in eating has helped. Seems to do better when working.  Due to CRI even if improving recommended to start SGLT2 inhibitor  Adverse effects particularly fungal genital infection were discussed  Continue metformin and Tresiba at current dose  Continued wyatt    2.   HTN has CRI, is on Verapamil and Irbesartan and furosemide with occ dizziness, hypotension and falls. Discussed to drink enough water, if still dizzy decrease Irbesartan to 1/2 tablets  3. Dyslipidemia: on 80mg of Atorvastatin and Fenofibrate,and Ezetimide, LDL improved  4. St post bariatric surgery: doing well on supplements, on Topiramate for weight maintenance and migraines  5. Hypothyroidism doing well on Levothyroxine    PLAN  Continue Tresiba to 55 units once daily  Hold off with  Humalog  Cont metformin  Start Empagliflozin or other SGLT-2 inhibitor at starting dose  Once starting new diabetes medication, decrease Furosemide to 20 mg once daily and decrease Tresiba to 50 units  Assure adequate fluid intake    Follow up in 3 months    This was a 25 min visit of which more than 13 minutes were spent in counseling in regards to diagnosis, clinical consequences and treatment indications and options of diabetes care     Olga Lidia Hoover MD  Endocrinology and Diabetes    Pager [phone]      Medications:   Current Outpatient Prescriptions          Current Outpatient Medications   Medication Sig Dispense Refill     atorvastatin (LIPITOR) 80 MG tablet TAKE ONE TABLET BY MOUTH ONE TIME DAILY 90 tablet 3     atorvastatin (LIPITOR) 80 MG tablet Take 1 tablet (80 mg) by mouth daily 30 tablet 1     augmented betamethasone dipropionate (DIPROLENE-AF) 0.05 % ointment Apply to AA BID x 3-4 weeks then PRN 45 g 0     BD FCO U/F 32G X 4 MM insulin pen needle USE 4 TIMES A DAY WITH INSULIN AND VICTOZA INJECTIONS 400 each 1     blood glucose (MARTITA CONTOUR NEXT) test strip Use to test blood sugar 5 times daily or as directed. 100 each 5     blood glucose (NO BRAND SPECIFIED) test strip Use to test blood sugar 5 times daily. 500 each 3     blood glucose monitoring (MARTITA CONTOUR NEXT) test strip Use to test blood sugar 4 times daily or as directed. 1 Box 9     calcium carbonate-vitamin D (CALTRATE 600+D) 600-400 MG-UNIT CHEW Take 2 chew tab by mouth every evening         cefPROZIL (CEFZIL) 500 MG tablet Take 1 tablet (500 mg) by mouth 2 times daily 20 tablet 0     cetirizine (ZYRTEC) 10 MG tablet Take 1 tablet (10 mg) by mouth daily         Cholecalciferol (VITAMIN D) 2000 UNITS tablet Take 2,000 Units by mouth daily         Continuous Blood Gluc  (FREESTYLE BALWINDER 14 DAY READER) HENNA 1 each 3 times daily 1 Device 1     Continuous Blood Gluc Sensor (FREESTYLE BALWINDER 14 DAY SENSOR) MISC 1  each every 14 days 2 each 11     CONTOUR NEXT TEST test strip USE TO TEST BLOOD SUGAR 4 TIMES DAILY OR AS DIRECTED. 100 each 8     cyanocolbalamin (VITAMIN B-12) 1000 MCG tablet Take 1,000 mcg by mouth every other day          dulaglutide (TRULICITY) 1.5 MG/0.5ML pen Inject 1.5 mg Subcutaneous every 7 days 6 mL 3     fenofibrate (TRIGLIDE/LOFIBRA) 160 MG tablet TAKE ONE TABLET BY MOUTH ONE TIME DAILY 90 tablet 3     fluticasone (FLONASE) 50 MCG/ACT nasal spray Spray 2 sprays into both nostrils daily 16 mL 1     fluticasone (FLONASE) 50 MCG/ACT nasal spray Spray 2 sprays into both nostrils daily 16 g 1     furosemide (LASIX) 20 MG tablet Take 1 tablet (20 mg) by mouth daily 90 tablet 3     HUMALOG KWIKPEN 100 UNIT/ML soln Use as directed up to 60 units per day. 15 mL 11      hydrocortisone (WESTCORT) 0.2 % cream Apply topically 2 times daily Apply sparingly to affected area 2 times daily as needed. 45 g 2     insulin degludec (TRESIBA) 200 UNIT/ML pen Inject 72units subcu daily 80 mL 3     irbesartan (AVAPRO) 300 MG tablet TAKE ONE TABLET BY MOUTH ONE TIME DAILY  90 tablet 3     KLOR-CON 10 MEQ CR tablet TAKE ONE TABLET BY MOUTH ONE TIME DAILY  90 tablet 0     levothyroxine (SYNTHROID/LEVOTHROID) 50 MCG tablet TAKE 1 TABLET BY MOUTH DAILY. 90 tablet 2     Magnesium 500 MG CAPS Take 1 capsule by mouth daily.         metFORMIN (GLUCOPHAGE) 1000 MG tablet 1 tab twice daily 180 tablet 1     ONE TOUCH DELICA LANCETS MISC 1 Device 4 times daily. 100 Stick prn     potassium chloride ER (K-TAB/KLOR-CON) 10 MEQ CR tablet TAKE ONE TABLET BY MOUTH ONE TIME DAILY  90 tablet 0     topiramate (TOPAMAX) 25 MG tablet take 1 tablet (25 mg) at bedtime for 1 week, then take 2 tablets (50 mg) at bedtime for 1 week, and then 3 tablet (75 mg) daily at bedtime t 90 tablet 2     traZODone (DESYREL) 50 MG tablet take 1 to 2 tablets (50 to 100mg) by mouth nightly as needed 180 tablet PRN     traZODone (DESYREL) 50 MG tablet take 1 to 2  "tablets (50 to 100mg) by mouth nightly as needed 180 tablet 0     verapamil ER (CALAN-SR) 180 MG CR tablet TAKE ONE TABLET BY MOUTH TWICE DAILY  180 tablet 3     VITRON-C  MG TABS tablet TAKE TWO TABLETS BY MOUTH TWICE DAILY  360 tablet 1           Review of Systems: in addition to stated above  GENERAL: Negative  SKIN: Negative  HENT: Negative   EYE: Negative  HEART: Negative  RESPIRATORY: Negative   GI: Negative  : Negative  MSK: Negative  BLOOD/LYMPH: Negative  NEUROLOGIC: Negative   PSYCH: Negative     Physical Examination:  Blood pressure 133/69, pulse 85, height 1.575 m (5' 2\"), weight 101.2 kg (223 lb), not currently breastfeeding.  Body mass index is 40.79 kg/m .      Reported vitals:  There were no vitals taken for this visit.   healthy, alert and no distress  PSYCH: Alert and oriented times 3; coherent speech, normal   rate and volume, able to articulate logical thoughts, able   to abstract reason, no tangential thoughts, no hallucinations   or delusions  Her affect is normal and pleasant  RESP: No cough, no audible wheezing, able to talk in full sentences  Remainder of exam unable to be completed due to telephone visits           Wt Readings from Last 4 Encounters:   01/07/20 101.2 kg (223 lb)   10/14/19 100.7 kg (222 lb 1.6 oz)   06/10/19 100.4 kg (221 lb 4.8 oz)   04/22/19 104.7 kg (230 lb 14.4 oz)        Lab Results   Component Value Date     03/27/2020    CHLORIDE 108 03/27/2020    CO2 32 03/27/2020     (H) 03/27/2020    CR 1.13 (H) 10/09/2020    CR 1.55 (H) 03/27/2020    CR 1.61 (H) 04/23/2019    CR 1.63 (H) 12/10/2018    CR 1.53 (H) 05/24/2018    RASHEEDA 9.5 10/09/2020    ALBUMIN 3.7 10/09/2020    ALKPHOS 65 03/27/2020    LDL 59 03/27/2020    HDL 34 (L) 03/27/2020    TRIG 112 03/27/2020     Lab Results   Component Value Date    MICROL 35 10/09/2020    MICROL 10 01/07/2020    MICROL 13 12/10/2018    MICROL 37 08/25/2017    MICROL 46 03/28/2017     Lab Results   Component Value Date "    A1C 8.1 (H) 10/09/2020    A1C 8.7 (A) 08/07/2020    A1C 8.4 (H) 03/27/2020    A1C 8.7 (H) 04/23/2019    A1C 9.0 (H) 12/10/2018       Lab Results   Component Value Date    HGB 11.1 (L) 10/09/2020

## 2020-11-02 ENCOUNTER — TELEPHONE (OUTPATIENT)
Dept: INTERNAL MEDICINE | Facility: CLINIC | Age: 70
End: 2020-11-02

## 2020-11-02 NOTE — TELEPHONE ENCOUNTER
Prior Authorization Retail Medication Request    Medication/Dose: potassium chloride (KLOR-CON) 20 MEQ packet  ICD code (if different than what is on RX):  I10  Previously Tried and Failed:    Rationale:      Insurance Name:  BCBS Medicare Advantage  Insurance ID:  [id number]  Key: [id number]      Pharmacy Information (if different than what is on RX)  Name:  [facility]  Phone:  [phone]

## 2020-11-03 NOTE — TELEPHONE ENCOUNTER
Prior Authorization Approval    Authorization Effective Date: 8/5/2020  Authorization Expiration Date: 11/3/2021  Medication: potassium chloride (KLOR-CON) 20 MEQ packet-APPROVED  Approved Dose/Quantity:    Reference #:     Insurance Company: [employer] [place] - Phone [phone] Fax [phone]  Expected CoPay:       CoPay Card Available:      Foundation Assistance Needed:    Which Pharmacy is filling the prescription (Not needed for infusion/clinic administered): [id number] - [facility] [address] [place] [facility] [address]  Pharmacy Notified: Yes  Patient Notified: Yes  **Instructed pharmacy to notify patient when script is ready to /ship.**

## 2020-11-03 NOTE — TELEPHONE ENCOUNTER
Central Prior Authorization Team   Phone: [phone]    PA Initiation    Medication: potassium chloride (KLOR-CON) 20 MEQ packet  Insurance Company: [facility] - Phone [phone] Fax [phone]  Pharmacy Filling the Rx: [id number] [place], [place] - [address] AT [facility] [address]  Filling Pharmacy Phone: [phone]  Filling Pharmacy Fax: [phone]  Start Date: 11/3/2020

## 2020-11-08 ENCOUNTER — MYC REFILL (OUTPATIENT)
Dept: ENDOCRINOLOGY | Facility: CLINIC | Age: 70
End: 2020-11-08

## 2020-11-08 DIAGNOSIS — G43.009 MIGRAINE WITHOUT AURA AND WITHOUT STATUS MIGRAINOSUS, NOT INTRACTABLE: ICD-10-CM

## 2020-11-08 DIAGNOSIS — E66.01 MORBID OBESITY (H): ICD-10-CM

## 2020-11-10 DIAGNOSIS — E11.40 TYPE 2 DIABETES MELLITUS WITH DIABETIC NEUROPATHY, WITH LONG-TERM CURRENT USE OF INSULIN (H): ICD-10-CM

## 2020-11-10 DIAGNOSIS — E66.01 MORBID OBESITY (H): Primary | ICD-10-CM

## 2020-11-10 DIAGNOSIS — Z79.4 TYPE 2 DIABETES MELLITUS WITH DIABETIC NEUROPATHY, WITH LONG-TERM CURRENT USE OF INSULIN (H): ICD-10-CM

## 2020-11-10 RX ORDER — TOPIRAMATE 25 MG/1
TABLET, FILM COATED ORAL
Qty: 90 TABLET | Refills: 3 | Status: SHIPPED | OUTPATIENT
Start: 2020-11-10 | End: 2020-11-24

## 2020-11-13 DIAGNOSIS — Z79.4 TYPE 2 DIABETES MELLITUS WITH DIABETIC NEUROPATHY, WITH LONG-TERM CURRENT USE OF INSULIN (H): ICD-10-CM

## 2020-11-13 DIAGNOSIS — E11.40 TYPE 2 DIABETES MELLITUS WITH DIABETIC NEUROPATHY, WITH LONG-TERM CURRENT USE OF INSULIN (H): ICD-10-CM

## 2020-11-13 PROCEDURE — 80048 BASIC METABOLIC PNL TOTAL CA: CPT | Performed by: INTERNAL MEDICINE

## 2020-11-15 LAB
ANION GAP SERPL CALCULATED.3IONS-SCNC: 19 MMOL/L (ref 3–14)
BUN SERPL-MCNC: 25 MG/DL (ref 7–30)
CALCIUM SERPL-MCNC: 9.8 MG/DL (ref 8.5–10.1)
CHLORIDE SERPL-SCNC: 111 MMOL/L (ref 94–109)
CO2 SERPL-SCNC: 13 MMOL/L (ref 20–32)
CREAT SERPL-MCNC: 1.21 MG/DL (ref 0.52–1.04)
GFR SERPL CREATININE-BSD FRML MDRD: 45 ML/MIN/{1.73_M2}
GLUCOSE SERPL-MCNC: 169 MG/DL (ref 70–99)
POTASSIUM SERPL-SCNC: 4 MMOL/L (ref 3.4–5.3)
SODIUM SERPL-SCNC: 143 MMOL/L (ref 133–144)

## 2020-11-21 ENCOUNTER — PATIENT SELF-TRIAGE (OUTPATIENT)
Dept: URGENT CARE | Facility: CLINIC | Age: 70
End: 2020-11-21

## 2020-11-24 ENCOUNTER — MYC REFILL (OUTPATIENT)
Dept: ENDOCRINOLOGY | Facility: CLINIC | Age: 70
End: 2020-11-24

## 2020-11-24 ENCOUNTER — E-VISIT (OUTPATIENT)
Dept: INTERNAL MEDICINE | Facility: CLINIC | Age: 70
End: 2020-11-24
Payer: COMMERCIAL

## 2020-11-24 DIAGNOSIS — E66.01 MORBID OBESITY (H): ICD-10-CM

## 2020-11-24 DIAGNOSIS — H66.90 ACUTE OTITIS MEDIA, UNSPECIFIED OTITIS MEDIA TYPE: Primary | ICD-10-CM

## 2020-11-24 DIAGNOSIS — G43.009 MIGRAINE WITHOUT AURA AND WITHOUT STATUS MIGRAINOSUS, NOT INTRACTABLE: ICD-10-CM

## 2020-11-24 PROCEDURE — 99421 OL DIG E/M SVC 5-10 MIN: CPT | Performed by: INTERNAL MEDICINE

## 2020-11-25 RX ORDER — TOPIRAMATE 25 MG/1
TABLET, FILM COATED ORAL
Qty: 90 TABLET | Refills: 3 | Status: SHIPPED | OUTPATIENT
Start: 2020-11-25 | End: 2021-01-12 | Stop reason: ALTCHOICE

## 2020-11-25 NOTE — TELEPHONE ENCOUNTER
Anti-Seizure Meds Protocol [id number]/24/2020 04:19 PM   Review Authorizing provider's last note.  Protocol Details    Normal CBC on file in past 26 months     Normal serum creatinine on file in past 26 months     Normal platelet count on file in past 26 months      rtc in place

## 2020-12-03 ENCOUNTER — MYC MEDICAL ADVICE (OUTPATIENT)
Dept: ENDOCRINOLOGY | Facility: CLINIC | Age: 70
End: 2020-12-03

## 2020-12-04 ENCOUNTER — TRANSFERRED RECORDS (OUTPATIENT)
Dept: HEALTH INFORMATION MANAGEMENT | Facility: CLINIC | Age: 70
End: 2020-12-04

## 2020-12-04 LAB — RETINOPATHY: NORMAL

## 2021-01-08 DIAGNOSIS — E66.01 MORBID OBESITY (H): ICD-10-CM

## 2021-01-08 DIAGNOSIS — Z79.4 TYPE 2 DIABETES MELLITUS WITH DIABETIC NEUROPATHY, WITH LONG-TERM CURRENT USE OF INSULIN (H): ICD-10-CM

## 2021-01-08 DIAGNOSIS — E11.40 TYPE 2 DIABETES MELLITUS WITH DIABETIC NEUROPATHY, WITH LONG-TERM CURRENT USE OF INSULIN (H): ICD-10-CM

## 2021-01-08 LAB
BASOPHILS # BLD AUTO: 0 10E9/L (ref 0–0.2)
BASOPHILS NFR BLD AUTO: 0.4 %
DIFFERENTIAL METHOD BLD: ABNORMAL
EOSINOPHIL # BLD AUTO: 0.4 10E9/L (ref 0–0.7)
EOSINOPHIL NFR BLD AUTO: 4.8 %
ERYTHROCYTE [DISTWIDTH] IN BLOOD BY AUTOMATED COUNT: 13.5 % (ref 10–15)
HBA1C MFR BLD: 8.1 % (ref 0–5.6)
HCT VFR BLD AUTO: 38.2 % (ref 35–47)
HGB BLD-MCNC: 11.6 G/DL (ref 11.7–15.7)
LYMPHOCYTES # BLD AUTO: 2.5 10E9/L (ref 0.8–5.3)
LYMPHOCYTES NFR BLD AUTO: 30.3 %
MCH RBC QN AUTO: 28.2 PG (ref 26.5–33)
MCHC RBC AUTO-ENTMCNC: 30.4 G/DL (ref 31.5–36.5)
MCV RBC AUTO: 93 FL (ref 78–100)
MONOCYTES # BLD AUTO: 0.7 10E9/L (ref 0–1.3)
MONOCYTES NFR BLD AUTO: 7.9 %
NEUTROPHILS # BLD AUTO: 4.7 10E9/L (ref 1.6–8.3)
NEUTROPHILS NFR BLD AUTO: 56.6 %
PLATELET # BLD AUTO: 274 10E9/L (ref 150–450)
RBC # BLD AUTO: 4.12 10E12/L (ref 3.8–5.2)
WBC # BLD AUTO: 8.3 10E9/L (ref 4–11)

## 2021-01-08 PROCEDURE — 85025 COMPLETE CBC W/AUTO DIFF WBC: CPT | Performed by: INTERNAL MEDICINE

## 2021-01-08 PROCEDURE — 36415 COLL VENOUS BLD VENIPUNCTURE: CPT | Performed by: INTERNAL MEDICINE

## 2021-01-08 PROCEDURE — 83036 HEMOGLOBIN GLYCOSYLATED A1C: CPT | Performed by: INTERNAL MEDICINE

## 2021-01-11 NOTE — PATIENT INSTRUCTIONS
We appreciate your assistance in coordinating your healthcare.    increase Tresiba to 60 units once daily  Novolog with meal, before meals  Contimue metformin  Empagliflozin     Topiramate    Follow up in 6 months       Please upload your insulin pump, blood sugar meter and/or continuous glucose monitor at home 1-2 days before your next diabetes-related appointment.   This will allow your provider to review your  data before your scheduled virtual visit.    To ask a question to your Endocrine care team, please send them a [employer] message, or reach them by phone at [phone]     To expedite your medication refill(s), please contact your pharmacy and have them   fax a refill request to: [phone].  *Please allow 3 business days for routine medication refills.  *Please allow 5 business days for controlled substance medication refills.    For after-hours urgent Endocrine issues, that do not require 911, please dial [phone], and ask to speak with the Endocrinologist On-Call

## 2021-01-11 NOTE — PROGRESS NOTES
dm   Jacobo Mohan CMA    Outcome for 01/11/21 1:21 PM :Glucose sent via Email    Ade is a 70 year old who is being evaluated via a billable video visit.      How would you like to obtain your AVS? MyChart  If the video visit is dropped, the invitation should be resent by: Send to e-mail at: [email or web address]  Will anyone else be joining your video visit? No    Video Start Time: 10:36am  Video-Visit Details    Type of service:  Video Visit    Video End Time:11:00am    Originating Location (pt. Location): Home    Distant Location (provider location):  [facility] ENDOCR[facility]OLOG[facility] [place]     Platform used for Video Visit: Doximity   DM  Jacobo Mohan CMA    Outcome for 10/13/20 7:27 AM :Glucose sent via Email    Ade Wilkins is a 70 year old female who is being evaluated via a billable telephone visit.    Olga Lidia Hoover     Interval history:   Ade Wilkins is a 70 year old T2DM here for follow up  The patient has started Empagliflozin at the last visit, in addition to Tresiba 50 untis dialy,  and trulicity; Novolog depending on carbs 20 units; takes it three times after meal, within 15 minutes;     Uses [employer] download    Sensor data download reveals average blood sugar 235 44% very high blood sugar 25% high blood sugar; 28% target 49%; low 1% very low 2%t  Glucose pattern fasting hyperglycemia, better befor dinner, hyperglycemia after dinner     Obesity  Status post Noe-en-Y bariatric surgery,  Patient is on topiramate  Weight is stable at about 220 pounds, the patient does not have a scale at home    Eyes; R eye proliereative retinopathy, is receiving injection q 7w  Feet: right ankle peripheral edema, resolved in the morning, wearing compression stockings, low salt diet, on Furosemide 40 mg once daily, denies numbness and tinglin    CRI:   Renal function actually improved on recent labs compared to years before!  Mild albuminuria    Assessment:  1. Elderly woman with T2DM longstanding  insulin with poor and worsened blood glucose control on tresiba, Trulicity and Empagliflozin  Needs increase in Tresiba, take novolog beofre meals.  Cont SGLT2 inhibitor  Continued laurie    2.   HTN has CRI, is on Verapamil and Irbesartan and furosemide with occ dizziness, hypotension and falls.Discussed to check blood pressure at home  3. Dyslipidemia: on 80mg of Atorvastatin and Fenofibrate,and Ezetimide, LDL improved  4. St post bariatric surgery: doing well on supplements, on Topiramate for weight maintenance and migraines  5. Hypothyroidism doing well on Levothyroxine    Morbid obesity patient is tolerating topiramate well, also helps with migraines.  Is interested to only have 1 tablet.  Agreed to increase from 75 to 100 mg    PLAN  increase Tresiba to 60 units once daily  Novolog sliding scale before meals  Cont metformin  Empagliflozin 10 mg cont      Follow up in 6 months    40 minutes spent on the date of the encounter doing chart review, review of test results, interpretation of tests, patient visit and documentation         Olga Lidia Hoover MD  Endocrinology and Diabetes    Pager [phone]      Medications:   Current Outpatient Prescriptions          Current Outpatient Medications   Medication Sig Dispense Refill     atorvastatin (LIPITOR) 80 MG tablet TAKE ONE TABLET BY MOUTH ONE TIME DAILY 90 tablet 3     atorvastatin (LIPITOR) 80 MG tablet Take 1 tablet (80 mg) by mouth daily 30 tablet 1     augmented betamethasone dipropionate (DIPROLENE-AF) 0.05 % ointment Apply to AA BID x 3-4 weeks then PRN 45 g 0     BD FCO U/F 32G X 4 MM insulin pen needle USE 4 TIMES A DAY WITH INSULIN AND VICTOZA INJECTIONS 400 each 1     blood glucose (MARTITA CONTOUR NEXT) test strip Use to test blood sugar 5 times daily or as directed. 100 each 5     blood glucose (NO BRAND SPECIFIED) test strip Use to test blood sugar 5 times daily. 500 each 3     blood glucose monitoring (MARTITA CONTOUR NEXT) test strip Use  to test blood sugar 4 times daily or as directed. 1 Box 9     calcium carbonate-vitamin D (CALTRATE 600+D) 600-400 MG-UNIT CHEW Take 2 chew tab by mouth every evening         cefPROZIL (CEFZIL) 500 MG tablet Take 1 tablet (500 mg) by mouth 2 times daily 20 tablet 0     cetirizine (ZYRTEC) 10 MG tablet Take 1 tablet (10 mg) by mouth daily         Cholecalciferol (VITAMIN D) 2000 UNITS tablet Take 2,000 Units by mouth daily         Continuous Blood Gluc  (FREESTYLE BALWINDER 14 DAY READER) HENNA 1 each 3 times daily 1 Device 1     Continuous Blood Gluc Sensor (FREESTYLE BALWINDER 14 DAY SENSOR) MISC 1 each every 14 days 2 each 11     CONTOUR NEXT TEST test strip USE TO TEST BLOOD SUGAR 4 TIMES DAILY OR AS DIRECTED. 100 each 8     cyanocolbalamin (VITAMIN B-12) 1000 MCG tablet Take 1,000 mcg by mouth every other day          dulaglutide (TRULICITY) 1.5 MG/0.5ML pen Inject 1.5 mg Subcutaneous every 7 days 6 mL 3     fenofibrate (TRIGLIDE/LOFIBRA) 160 MG tablet TAKE ONE TABLET BY MOUTH ONE TIME DAILY 90 tablet 3     fluticasone (FLONASE) 50 MCG/ACT nasal spray Spray 2 sprays into both nostrils daily 16 mL 1     fluticasone (FLONASE) 50 MCG/ACT nasal spray Spray 2 sprays into both nostrils daily 16 g 1     furosemide (LASIX) 20 MG tablet Take 1 tablet (20 mg) by mouth daily 90 tablet 3     HUMALOG KWIKPEN 100 UNIT/ML soln Use as directed up to 60 units per day. 15 mL 11      hydrocortisone (WESTCORT) 0.2 % cream Apply topically 2 times daily Apply sparingly to affected area 2 times daily as needed. 45 g 2     insulin degludec (TRESIBA) 200 UNIT/ML pen Inject 72units subcu daily 80 mL 3     irbesartan (AVAPRO) 300 MG tablet TAKE ONE TABLET BY MOUTH ONE TIME DAILY  90 tablet 3     KLOR-CON 10 MEQ CR tablet TAKE ONE TABLET BY MOUTH ONE TIME DAILY  90 tablet 0     levothyroxine (SYNTHROID/LEVOTHROID) 50 MCG tablet TAKE 1 TABLET BY MOUTH DAILY. 90 tablet 2     Magnesium 500 MG CAPS Take 1 capsule by mouth daily.          "metFORMIN (GLUCOPHAGE) 1000 MG tablet 1 tab twice daily 180 tablet 1     ONE TOUCH DELICA LANCETS MISC 1 Device 4 times daily. 100 Stick prn     potassium chloride ER (K-TAB/KLOR-CON) 10 MEQ CR tablet TAKE ONE TABLET BY MOUTH ONE TIME DAILY  90 tablet 0     topiramate (TOPAMAX) 25 MG tablet take 1 tablet (25 mg) at bedtime for 1 week, then take 2 tablets (50 mg) at bedtime for 1 week, and then 3 tablet (75 mg) daily at bedtime t 90 tablet 2     traZODone (DESYREL) 50 MG tablet take 1 to 2 tablets (50 to 100mg) by mouth nightly as needed 180 tablet PRN     traZODone (DESYREL) 50 MG tablet take 1 to 2 tablets (50 to 100mg) by mouth nightly as needed 180 tablet 0     verapamil ER (CALAN-SR) 180 MG CR tablet TAKE ONE TABLET BY MOUTH TWICE DAILY  180 tablet 3     VITRON-C  MG TABS tablet TAKE TWO TABLETS BY MOUTH TWICE DAILY  360 tablet 1           Review of Systems: in addition to stated above  GENERAL: Negative  SKIN: Negative  HENT: Negative   EYE: Negative  HEART: Negative  RESPIRATORY: Negative   GI: Negative  : Negative  MSK: Negative  BLOOD/LYMPH: Negative  NEUROLOGIC: Negative   PSYCH: Negative     Physical Examination:  Blood pressure 133/69, pulse 85, height 1.575 m (5' 2\"), weight 101.2 kg (223 lb), not currently breastfeeding.  Body mass index is 40.79 kg/m .      Reported vitals:  There were no vitals taken for this visit.   healthy, alert and no distress  PSYCH: Alert and oriented times 3; coherent speech, normal   rate and volume, able to articulate logical thoughts, able   to abstract reason, no tangential thoughts, no hallucinations   or delusions  Her affect is normal and pleasant  RESP: No cough, no audible wheezing, able to talk in full sentences  Remainder of exam unable to be completed due to telephone visits           Wt Readings from Last 4 Encounters:   01/07/20 101.2 kg (223 lb)   10/14/19 100.7 kg (222 lb 1.6 oz)   06/10/19 100.4 kg (221 lb 4.8 oz)   04/22/19 104.7 kg (230 lb 14.4 oz) "        Lab Results   Component Value Date     11/13/2020    CHLORIDE 111 (H) 11/13/2020    CO2 13 (L) 11/13/2020     (H) 11/13/2020    CR 1.21 (H) 11/13/2020    CR 1.13 (H) 10/09/2020    CR 1.55 (H) 03/27/2020    CR 1.61 (H) 04/23/2019    CR 1.63 (H) 12/10/2018    RASHEEDA 9.8 11/13/2020    ALBUMIN 3.7 10/09/2020    ALKPHOS 65 03/27/2020    LDL 59 03/27/2020    HDL 34 (L) 03/27/2020    TRIG 112 03/27/2020     Lab Results   Component Value Date    MICROL 35 10/09/2020    MICROL 10 01/07/2020    MICROL 13 12/10/2018    MICROL 37 08/25/2017    MICROL 46 03/28/2017     Lab Results   Component Value Date    A1C 8.1 (H) 01/08/2021    A1C 8.1 (H) 10/09/2020    A1C 8.7 (A) 08/07/2020    A1C 8.4 (H) 03/27/2020    A1C 8.7 (H) 04/23/2019       Lab Results   Component Value Date    HGB 11.6 (L) 01/08/2021

## 2021-01-12 ENCOUNTER — VIRTUAL VISIT (OUTPATIENT)
Dept: ENDOCRINOLOGY | Facility: CLINIC | Age: 71
End: 2021-01-12
Payer: COMMERCIAL

## 2021-01-12 DIAGNOSIS — Z79.4 TYPE 2 DIABETES MELLITUS WITH DIABETIC NEUROPATHY, WITH LONG-TERM CURRENT USE OF INSULIN (H): ICD-10-CM

## 2021-01-12 DIAGNOSIS — E11.40 TYPE 2 DIABETES MELLITUS WITH DIABETIC NEUROPATHY, WITH LONG-TERM CURRENT USE OF INSULIN (H): ICD-10-CM

## 2021-01-12 DIAGNOSIS — E66.01 MORBID OBESITY (H): Primary | ICD-10-CM

## 2021-01-12 DIAGNOSIS — G43.009 MIGRAINE WITHOUT AURA AND WITHOUT STATUS MIGRAINOSUS, NOT INTRACTABLE: ICD-10-CM

## 2021-01-12 DIAGNOSIS — Z98.84 BARIATRIC SURGERY STATUS: ICD-10-CM

## 2021-01-12 PROCEDURE — 99214 OFFICE O/P EST MOD 30 MIN: CPT | Mod: 95 | Performed by: INTERNAL MEDICINE

## 2021-01-12 RX ORDER — TOPIRAMATE 100 MG/1
100 TABLET, FILM COATED ORAL DAILY
Qty: 90 TABLET | Refills: 3 | Status: SHIPPED | OUTPATIENT
Start: 2021-01-12 | End: 2022-03-03

## 2021-01-12 NOTE — LETTER
1/12/2021       RE: Ade Wilkins  [address]  [place] [address]     Dear Colleague,    Thank you for referring your patient, Ade Wilkins, to the [facility] ENDOCRINOLOGY [facility]IN[facility] [place] at [facility]. Please see a copy of my visit note below.    dm   Jacobo Mohan CMA    Outcome for 01/11/21 1:21 PM :Glucose sent via Email    Ade is a 70 year old who is being evaluated via a billable video visit.      How would you like to obtain your AVS? MyChart  If the video visit is dropped, the invitation should be resent by: Send to e-mail at: [email or web address]  Will anyone else be joining your video visit? No    Video Start Time: 10:36am  Video-Visit Details    Type of service:  Video Visit    Video End Time:11:00am    Originating Location (pt. Location): Home    Distant Location (provider location):  [facility] ENDOCRINOLOGY [facility]     Platform used for Video Visit: Doximity   DM  Jacobo Mohan CMA    Outcome for 10/13/20 7:27 AM :Glucose sent via Email    Ade Wilkins is a 70 year old female who is being evaluated via a billable telephone visit.    Olga Lidia Trost     Interval history:   Ade Wilkins is a 70 year old T2DM here for follow up  The patient has started Empagliflozin at the last visit, in addition to Tresiba 50 untis dialy,  and trulicity; Novolog depending on carbs 20 units; takes it three times after meal, within 15 minutes;     Uses [employer] download    Sensor data download reveals average blood sugar 235 44% very high blood sugar 25% high blood sugar; 28% target 49%; low 1% very low 2%t  Glucose pattern fasting hyperglycemia, better befor dinner, hyperglycemia after dinner     Obesity  Status post Noe-en-Y bariatric surgery,  Patient is on topiramate  Weight is stable at about 220 pounds, the patient does not have a scale at home    Eyes; R eye proliereative retinopathy, is receiving injection q 7w  Feet: right  ankle peripheral edema, resolved in the morning, wearing compression stockings, low salt diet, on Furosemide 40 mg once daily, denies numbness and tinglin    CRI:   Renal function actually improved on recent labs compared to years before!  Mild albuminuria    Assessment:  1. Elderly woman with T2DM longstanding insulin with poor and worsened blood glucose control on tresiba, Trulicity and Empagliflozin  Needs increase in Tresiba, take novolog beofre meals.  Cont SGLT2 inhibitor  Continued laurie    2.   HTN has CRI, is on Verapamil and Irbesartan and furosemide with occ dizziness, hypotension and falls.Discussed to check blood pressure at home  3. Dyslipidemia: on 80mg of Atorvastatin and Fenofibrate,and Ezetimide, LDL improved  4. St post bariatric surgery: doing well on supplements, on Topiramate for weight maintenance and migraines  5. Hypothyroidism doing well on Levothyroxine    Morbid obesity patient is tolerating topiramate well, also helps with migraines.  Is interested to only have 1 tablet.  Agreed to increase from 75 to 100 mg    PLAN  increase Tresiba to 60 units once daily  Novolog sliding scale before meals  Cont metformin  Empagliflozin 10 mg cont      Follow up in 6 months    40 minutes spent on the date of the encounter doing chart review, review of test results, interpretation of tests, patient visit and documentation       Olga Lidia Hoover MD  Endocrinology and Diabetes    Pager [phone]    Medications:   Current Outpatient Prescriptions          Current Outpatient Medications   Medication Sig Dispense Refill     atorvastatin (LIPITOR) 80 MG tablet TAKE ONE TABLET BY MOUTH ONE TIME DAILY 90 tablet 3     atorvastatin (LIPITOR) 80 MG tablet Take 1 tablet (80 mg) by mouth daily 30 tablet 1     augmented betamethasone dipropionate (DIPROLENE-AF) 0.05 % ointment Apply to AA BID x 3-4 weeks then PRN 45 g 0     BD FCO U/F 32G X 4 MM insulin pen needle USE 4 TIMES A DAY WITH INSULIN AND  VICTOZA INJECTIONS 400 each 1     blood glucose (MARTITA CONTOUR NEXT) test strip Use to test blood sugar 5 times daily or as directed. 100 each 5     blood glucose (NO BRAND SPECIFIED) test strip Use to test blood sugar 5 times daily. 500 each 3     blood glucose monitoring (MARTITA CONTOUR NEXT) test strip Use to test blood sugar 4 times daily or as directed. 1 Box 9     calcium carbonate-vitamin D (CALTRATE 600+D) 600-400 MG-UNIT CHEW Take 2 chew tab by mouth every evening         cefPROZIL (CEFZIL) 500 MG tablet Take 1 tablet (500 mg) by mouth 2 times daily 20 tablet 0     cetirizine (ZYRTEC) 10 MG tablet Take 1 tablet (10 mg) by mouth daily         Cholecalciferol (VITAMIN D) 2000 UNITS tablet Take 2,000 Units by mouth daily         Continuous Blood Gluc  (FREESTYLE BALWINDER 14 DAY READER) HENNA 1 each 3 times daily 1 Device 1     Continuous Blood Gluc Sensor ([employer] BALWINDER 14 DAY SENSOR) MISC 1 each every 14 days 2 each 11     CONTOUR NEXT TEST test strip USE TO TEST BLOOD SUGAR 4 TIMES DAILY OR AS DIRECTED. 100 each 8     cyanocolbalamin (VITAMIN B-12) 1000 MCG tablet Take 1,000 mcg by mouth every other day          dulaglutide (TRULICITY) 1.5 MG/0.5ML pen Inject 1.5 mg Subcutaneous every 7 days 6 mL 3     fenofibrate (TRIGLIDE/LOFIBRA) 160 MG tablet TAKE ONE TABLET BY MOUTH ONE TIME DAILY 90 tablet 3     fluticasone (FLONASE) 50 MCG/ACT nasal spray Spray 2 sprays into both nostrils daily 16 mL 1     fluticasone (FLONASE) 50 MCG/ACT nasal spray Spray 2 sprays into both nostrils daily 16 g 1     furosemide (LASIX) 20 MG tablet Take 1 tablet (20 mg) by mouth daily 90 tablet 3     HUMALOG KWIKPEN 100 UNIT/ML soln Use as directed up to 60 units per day. 15 mL 11      hydrocortisone (WESTCORT) 0.2 % cream Apply topically 2 times daily Apply sparingly to affected area 2 times daily as needed. 45 g 2     insulin degludec (TRESIBA) 200 UNIT/ML pen Inject 72units subcu daily 80 mL 3     irbesartan (AVAPRO) 300 MG  "tablet TAKE ONE TABLET BY MOUTH ONE TIME DAILY  90 tablet 3     KLOR-CON 10 MEQ CR tablet TAKE ONE TABLET BY MOUTH ONE TIME DAILY  90 tablet 0     levothyroxine (SYNTHROID/LEVOTHROID) 50 MCG tablet TAKE 1 TABLET BY MOUTH DAILY. 90 tablet 2     Magnesium 500 MG CAPS Take 1 capsule by mouth daily.         metFORMIN (GLUCOPHAGE) 1000 MG tablet 1 tab twice daily 180 tablet 1     ONE TOUCH DELICA LANCETS MISC 1 Device 4 times daily. 100 Stick prn     potassium chloride ER (K-TAB/KLOR-CON) 10 MEQ CR tablet TAKE ONE TABLET BY MOUTH ONE TIME DAILY  90 tablet 0     topiramate (TOPAMAX) 25 MG tablet take 1 tablet (25 mg) at bedtime for 1 week, then take 2 tablets (50 mg) at bedtime for 1 week, and then 3 tablet (75 mg) daily at bedtime t 90 tablet 2     traZODone (DESYREL) 50 MG tablet take 1 to 2 tablets (50 to 100mg) by mouth nightly as needed 180 tablet PRN     traZODone (DESYREL) 50 MG tablet take 1 to 2 tablets (50 to 100mg) by mouth nightly as needed 180 tablet 0     verapamil ER (CALAN-SR) 180 MG CR tablet TAKE ONE TABLET BY MOUTH TWICE DAILY  180 tablet 3     VITRON-C  MG TABS tablet TAKE TWO TABLETS BY MOUTH TWICE DAILY  360 tablet 1           Review of Systems: in addition to stated above  GENERAL: Negative  SKIN: Negative  HENT: Negative   EYE: Negative  HEART: Negative  RESPIRATORY: Negative   GI: Negative  : Negative  MSK: Negative  BLOOD/LYMPH: Negative  NEUROLOGIC: Negative   PSYCH: Negative     Physical Examination:  Blood pressure 133/69, pulse 85, height 1.575 m (5' 2\"), weight 101.2 kg (223 lb), not currently breastfeeding.  Body mass index is 40.79 kg/m .      Reported vitals:  There were no vitals taken for this visit.   healthy, alert and no distress  PSYCH: Alert and oriented times 3; coherent speech, normal   rate and volume, able to articulate logical thoughts, able   to abstract reason, no tangential thoughts, no hallucinations   or delusions  Her affect is normal and pleasant  RESP: No " cough, no audible wheezing, able to talk in full sentences  Remainder of exam unable to be completed due to telephone visits           Wt Readings from Last 4 Encounters:   01/07/20 101.2 kg (223 lb)   10/14/19 100.7 kg (222 lb 1.6 oz)   06/10/19 100.4 kg (221 lb 4.8 oz)   04/22/19 104.7 kg (230 lb 14.4 oz)        Lab Results   Component Value Date     11/13/2020    CHLORIDE 111 (H) 11/13/2020    CO2 13 (L) 11/13/2020     (H) 11/13/2020    CR 1.21 (H) 11/13/2020    CR 1.13 (H) 10/09/2020    CR 1.55 (H) 03/27/2020    CR 1.61 (H) 04/23/2019    CR 1.63 (H) 12/10/2018    RASHEEDA 9.8 11/13/2020    ALBUMIN 3.7 10/09/2020    ALKPHOS 65 03/27/2020    LDL 59 03/27/2020    HDL 34 (L) 03/27/2020    TRIG 112 03/27/2020     Lab Results   Component Value Date    MICROL 35 10/09/2020    MICROL 10 01/07/2020    MICROL 13 12/10/2018    MICROL 37 08/25/2017    MICROL 46 03/28/2017     Lab Results   Component Value Date    A1C 8.1 (H) 01/08/2021    A1C 8.1 (H) 10/09/2020    A1C 8.7 (A) 08/07/2020    A1C 8.4 (H) 03/27/2020    A1C 8.7 (H) 04/23/2019       Lab Results   Component Value Date    HGB 11.6 (L) 01/08/2021

## 2021-01-15 ENCOUNTER — HEALTH MAINTENANCE LETTER (OUTPATIENT)
Age: 71
End: 2021-01-15

## 2021-01-15 DIAGNOSIS — Z79.4 TYPE 2 DIABETES MELLITUS WITH DIABETIC NEUROPATHY, WITH LONG-TERM CURRENT USE OF INSULIN (H): ICD-10-CM

## 2021-01-15 DIAGNOSIS — E11.40 TYPE 2 DIABETES MELLITUS WITH DIABETIC NEUROPATHY, WITH LONG-TERM CURRENT USE OF INSULIN (H): ICD-10-CM

## 2021-01-15 DIAGNOSIS — I10 ESSENTIAL HYPERTENSION: Primary | ICD-10-CM

## 2021-01-15 NOTE — TELEPHONE ENCOUNTER
Routing refill request to provider for review/approval because:  Blood pressure out of range and needs labs

## 2021-01-18 RX ORDER — IRBESARTAN 300 MG/1
150 TABLET ORAL DAILY
Qty: 90 TABLET | Refills: 1 | Status: SHIPPED | OUTPATIENT
Start: 2021-01-18 | End: 2022-04-11

## 2021-03-04 ENCOUNTER — MYC MEDICAL ADVICE (OUTPATIENT)
Dept: ENDOCRINOLOGY | Facility: CLINIC | Age: 71
End: 2021-03-04

## 2021-03-04 DIAGNOSIS — E11.21 WELL CONTROLLED TYPE 2 DIABETES MELLITUS WITH NEPHROPATHY (H): ICD-10-CM

## 2021-03-05 ENCOUNTER — MYC MEDICAL ADVICE (OUTPATIENT)
Dept: INTERNAL MEDICINE | Facility: CLINIC | Age: 71
End: 2021-03-05

## 2021-03-07 RX ORDER — EZETIMIBE 10 MG/1
10 TABLET ORAL DAILY
Qty: 90 TABLET | Refills: 3 | Status: SHIPPED | OUTPATIENT
Start: 2021-03-07 | End: 2022-06-02

## 2021-03-08 NOTE — TELEPHONE ENCOUNTER
Dr. Howard:    Patient has cullen. with you on 3/34 but has several questions prior to that time.    1.  During her visit, patient wants to discuss:  Loss of muscle tone, high systolic BP, Constipation/diarrhea      2.  Patient has had both Pfizer COVID vaccines.  Wants Shingles Vaccine now.    2  Pt is wondering if she should have any labs done prior to OV with you. The lab below are pended for Future by END.  Appt with them on 7/20/21  Also Liver panel??    Thanks,  America Diallo, MSN, RN  [facility]       .

## 2021-03-09 NOTE — TELEPHONE ENCOUNTER
1.  We can address these things at her visit.   Please bring in home or other blood pressure records.  2.  Suggest that she get Shingrix from pharmacy, as coverage is best there and they will be able to tell her the cost.  This can be done now, since it's over 2 weeks since COVID shots completed.  3.  No labs needed before visit.   They are all ordered by endocrine for follow-up with them.

## 2021-03-16 ENCOUNTER — MYC MEDICAL ADVICE (OUTPATIENT)
Dept: ENDOCRINOLOGY | Facility: CLINIC | Age: 71
End: 2021-03-16

## 2021-03-16 DIAGNOSIS — E11.21 WELL CONTROLLED TYPE 2 DIABETES MELLITUS WITH NEPHROPATHY (H): ICD-10-CM

## 2021-03-16 RX ORDER — FLASH GLUCOSE SENSOR
1 KIT MISCELLANEOUS
Qty: 2 EACH | Refills: 11 | Status: SHIPPED | OUTPATIENT
Start: 2021-03-16 | End: 2021-11-22

## 2021-03-24 ENCOUNTER — OFFICE VISIT (OUTPATIENT)
Dept: INTERNAL MEDICINE | Facility: CLINIC | Age: 71
End: 2021-03-24
Payer: COMMERCIAL

## 2021-03-24 VITALS
TEMPERATURE: 98.2 F | SYSTOLIC BLOOD PRESSURE: 126 MMHG | OXYGEN SATURATION: 97 % | WEIGHT: 214 LBS | HEART RATE: 67 BPM | DIASTOLIC BLOOD PRESSURE: 48 MMHG | HEIGHT: 63 IN | BODY MASS INDEX: 37.92 KG/M2

## 2021-03-24 DIAGNOSIS — H66.90 ACUTE OTITIS MEDIA, UNSPECIFIED OTITIS MEDIA TYPE: ICD-10-CM

## 2021-03-24 DIAGNOSIS — L30.9 DERMATITIS: ICD-10-CM

## 2021-03-24 DIAGNOSIS — I87.2 VENOUS INSUFFICIENCY: ICD-10-CM

## 2021-03-24 DIAGNOSIS — Z79.4 TYPE 2 DIABETES MELLITUS WITH DIABETIC NEUROPATHY, WITH LONG-TERM CURRENT USE OF INSULIN (H): ICD-10-CM

## 2021-03-24 DIAGNOSIS — N18.31 STAGE 3A CHRONIC KIDNEY DISEASE (H): ICD-10-CM

## 2021-03-24 DIAGNOSIS — E11.40 TYPE 2 DIABETES MELLITUS WITH DIABETIC NEUROPATHY, WITH LONG-TERM CURRENT USE OF INSULIN (H): ICD-10-CM

## 2021-03-24 DIAGNOSIS — N25.81 SECONDARY RENAL HYPERPARATHYROIDISM (H): ICD-10-CM

## 2021-03-24 DIAGNOSIS — Z00.00 ENCOUNTER FOR MEDICARE ANNUAL WELLNESS EXAM: Primary | ICD-10-CM

## 2021-03-24 DIAGNOSIS — J01.00 ACUTE MAXILLARY SINUSITIS, RECURRENCE NOT SPECIFIED: ICD-10-CM

## 2021-03-24 LAB
ANION GAP SERPL CALCULATED.3IONS-SCNC: 6 MMOL/L (ref 3–14)
BUN SERPL-MCNC: 28 MG/DL (ref 7–30)
CALCIUM SERPL-MCNC: 9.9 MG/DL (ref 8.5–10.1)
CHLORIDE SERPL-SCNC: 108 MMOL/L (ref 94–109)
CO2 SERPL-SCNC: 25 MMOL/L (ref 20–32)
CREAT SERPL-MCNC: 1.4 MG/DL (ref 0.52–1.04)
GFR SERPL CREATININE-BSD FRML MDRD: 38 ML/MIN/{1.73_M2}
GLUCOSE SERPL-MCNC: 141 MG/DL (ref 70–99)
POTASSIUM SERPL-SCNC: 5 MMOL/L (ref 3.4–5.3)
SODIUM SERPL-SCNC: 139 MMOL/L (ref 133–144)

## 2021-03-24 PROCEDURE — 99207 PR FOOT EXAM NO CHARGE: CPT | Mod: 25 | Performed by: INTERNAL MEDICINE

## 2021-03-24 PROCEDURE — 80048 BASIC METABOLIC PNL TOTAL CA: CPT | Performed by: INTERNAL MEDICINE

## 2021-03-24 PROCEDURE — 99213 OFFICE O/P EST LOW 20 MIN: CPT | Mod: 25 | Performed by: INTERNAL MEDICINE

## 2021-03-24 PROCEDURE — 36415 COLL VENOUS BLD VENIPUNCTURE: CPT | Performed by: INTERNAL MEDICINE

## 2021-03-24 PROCEDURE — G0438 PPPS, INITIAL VISIT: HCPCS | Performed by: INTERNAL MEDICINE

## 2021-03-24 RX ORDER — INSULIN GLARGINE 100 [IU]/ML
70 INJECTION, SOLUTION SUBCUTANEOUS
COMMUNITY
End: 2021-03-24 | Stop reason: ALTCHOICE

## 2021-03-24 RX ORDER — TRIAMCINOLONE ACETONIDE 1 MG/G
OINTMENT TOPICAL 2 TIMES DAILY
Qty: 30 G | Refills: 2 | Status: SHIPPED | OUTPATIENT
Start: 2021-03-24 | End: 2022-11-02

## 2021-03-24 RX ORDER — MULTIVITAMIN
1 TABLET ORAL DAILY
COMMUNITY
End: 2021-07-08

## 2021-03-24 ASSESSMENT — MIFFLIN-ST. JEOR: SCORE: 1451.89

## 2021-03-24 ASSESSMENT — ACTIVITIES OF DAILY LIVING (ADL): CURRENT_FUNCTION: NO ASSISTANCE NEEDED

## 2021-03-24 NOTE — PATIENT INSTRUCTIONS
Patient Education   Personalized Prevention Plan  You are due for the preventive services outlined below.  Your care team is available to assist you in scheduling these services.  If you have already completed any of these items, please share that information with your care team to update in your medical record.  Health Maintenance Due   Topic Date Due     Zoster (Shingles) Vaccine (2 of 3) 11/19/2012     Discuss Advance Care Planning  02/08/2017     Diabetic Foot Exam  11/04/2017     Annual Wellness Visit  04/04/2020     FALL RISK ASSESSMENT  04/04/2020     Liver Monitoring Lab  03/27/2021     Cholesterol Lab  03/27/2021     Thyroid Function Lab  03/27/2021         PLAN:  1.  Continue compression hose.   Elevate legs and feet above the heart.    2.  Check labs today.  3.  Prescription for antibiotics for sinus infection.   Take with food.  Consider probiotic at the same time.      4.  Double furosemide to 40 mg daily  5.  Very low salt diet  6.  Plan to recheck labs in 2-3 weeks  7.  Steroid cream for top of left foot, take maximum of 3 weeks at a time  8.  Follow-up with endocrinologist, as planned.

## 2021-03-24 NOTE — PROGRESS NOTES
"SUBJECTIVE:   Ade Wilkins is a 70 year old female who presents for Preventive Visit.      Patient has been advised of split billing requirements and indicates understanding: Yes   Are you in the first 12 months of your Medicare coverage?  No    Healthy Habits:    In general, how would you rate your overall health?  Good    Frequency of exercise:  None    Duration of exercise:  Other    Do you usually eat at least 4 servings of fruit and vegetables a day, include whole grains    & fiber and avoid regularly eating high fat or \"junk\" foods?  No    Taking medications regularly:  Yes    Barriers to taking medications:  None    Medication side effects:  Lightheadedness    Ability to successfully perform activities of daily living:  No assistance needed    Home Safety:  No safety concerns identified    Hearing Impairment:  Difficulty following a conversation in a noisy restaurant or crowded room, feel that people are mumbling or not speaking clearly, difficult to understand a speaker at a public meeting or Mu-ism service, difficulty following dialogue in the theater and difficulty understanding soft or whispered speech    In the past 6 months, have you been bothered by leaking of urine?  No    In general, how would you rate your overall mental or emotional health?  Very good      PHQ-2 Total Score:    Additional concerns today:  Yes    Do you feel safe in your environment? Yes    Have you ever done Advance Care Planning? (For example, a Health Directive, POLST, or a discussion with a medical provider or your loved ones about your wishes): Yes, patient states has an Advance Care Planning document and will bring a copy to the clinic.       Fall risk  Fallen 2 or more times in the past year?: Yes  Any fall with injury in the past year?: No    Cognitive Screening   1) Repeat 3 items (Leader, Season, Table)    2) Clock draw: NORMAL  3) 3 item recall: Recalls 3 objects  Results: 3 items recalled: COGNITIVE IMPAIRMENT LESS " LIKELY    Mini-CogTM Copyright ESAU Khan. Licensed by the author for use in [facility]; reprinted with permission (sobentley@[facility]). All rights reserved.      Do you have sleep apnea, excessive snoring or daytime drowsiness?: no    Reviewed and updated as needed this visit by clinical staff                 Reviewed and updated as needed this visit by Provider                Social History     Tobacco Use     Smoking status: Never Smoker     Smokeless tobacco: Never Used   Substance Use Topics     Alcohol use: No     If you drink alcohol do you typically have >3 drinks per day or >7 drinks per week? Not applicable    Alcohol Use 4/2/2019   Prescreen: >3 drinks/day or >7 drinks/week? No         Additional issues to address:  1.  Sinus infection symptoms past 2 weeks.   Tinnitus, and dizziness again when standing up.  Antibiotics last year worked well.   No fever/chills, cough, sore throat.   Bad taste in mouth.  2.  Foot rash, blood pressure medication, loss of muscle tone  3.  Patient will get a cold foot and R leg paresthesias when lies in a certain position    Current providers sharing in care for this patient include:   Patient Care Team:  Dimitry Howard MD as PCP - General  Melvi Naik MD as MD (INTERNAL MEDICINE - ENDOCRINOLOGY, DIABETES & METABOLISM)  Olga Lidia Hoover MD as Assigned Endocrinology Provider  Judith Aviles MD as Assigned PCP    The following health maintenance items are reviewed in Epic and correct as of today:  Health Maintenance   Topic Date Due     ZOSTER IMMUNIZATION (2 of 3) 11/19/2012     ADVANCE CARE PLANNING  02/08/2017     DIABETIC FOOT EXAM  11/04/2017     MEDICARE ANNUAL WELLNESS VISIT  04/04/2020     FALL RISK ASSESSMENT  04/04/2020     ALT  03/27/2021     LIPID  03/27/2021     TSH W/FREE T4 REFLEX  03/27/2021     A1C  04/08/2021     BMP  05/13/2021     MAMMO SCREENING  05/28/2021     MICROALBUMIN  10/09/2021     EYE EXAM  12/04/2021     DEXA   "04/23/2022     COLORECTAL CANCER SCREENING  05/07/2024     DTAP/TDAP/TD IMMUNIZATION (4 - Td) 10/09/2025     HEPATITIS C SCREENING  Completed     PHQ-2  Completed     INFLUENZA VACCINE  Completed     Pneumococcal Vaccine: Pediatrics (0 to 5 Years) and At-Risk Patients (6 to 64 Years)  Completed     Pneumococcal Vaccine: 65+ Years  Completed     COVID-19 Vaccine  Completed     IPV IMMUNIZATION  Aged Out     MENINGITIS IMMUNIZATION  Aged Out         Review of Systems      OBJECTIVE:   BP (!) 142/60   Pulse 67   Temp 98.2  F (36.8  C) (Tympanic)   Ht 1.588 m (5' 2.5\")   Wt 97.1 kg (214 lb)   LMP  (LMP Unknown)   SpO2 97%   Breastfeeding No   BMI 38.52 kg/m   Estimated body mass index is 40.79 kg/m  as calculated from the following:    Height as of 1/7/20: 1.575 m (5' 2\").    Weight as of 1/7/20: 101.2 kg (223 lb).  Physical Exam   Recheck Blood pressure 126/48  Regular heart tones  R maxillary tenderness, mouth normal  2+ edema R foot, pilling 1+ R foot  Redness above L ankle > R, without luda cellulitis  Edema in lower extremities is probably 2+ on the right and 3+ on the left, to mid calf at least  Mild scale on back of left leg as well as well as skin exfoliation in front   there is an area of dermal thickening, pink in color 4-5 x 2+ centimeters on the dorsum of the left forefoot, close to the toes      ASSESSMENT / PLAN:     ASSESSMENT:   1.  Annual Wellness Visit  2.  DM with neuropathy, managed by endocrine at [facility]  3.  Dependent edema.  Probably venous insufficiency, but no recent echo to rule out CHF.    4.  Dermatitis top of left foot, suspect edema contributing  5.  R maxillary sinusitis  6.  Secondary hyperparathyroidism  7.  CKD3  8.  Dermatitis L foot  9.  Positional paresthesias R leg, suggesting nerve compression problem.    10.  Morbid Obesity, improvement since last visit    PLAN:  1.  Continue compression hose.   Elevate legs and feet above the heart as much as possible  2.  Check labs today " "--> creatinine up slightly.    3.  Prescription for antibiotics for sinus infection.   Take with food.  Consider probiotic at the same time.      4.  Double furosemide to 40 mg daily  5.  Very low salt diet  6.  Plan to recheck labs in 2-3 weeks  7.  Steroid cream for top of left foot, take maximum of 3 weeks at a time  8.  Follow-up with endocrinologist, as planned.    Patient has been advised of split billing requirements and indicates understanding: Yes  COUNSELING:  Reviewed preventive health counseling, as reflected in patient instructions       Regular exercise    Estimated body mass index is 40.79 kg/m  as calculated from the following:    Height as of 1/7/20: 1.575 m (5' 2\").    Weight as of 1/7/20: 101.2 kg (223 lb).    Weight management plan: Discussed healthy diet and exercise guidelines    She reports that she has never smoked. She has never used smokeless tobacco.      Appropriate preventive services were discussed with this patient, including applicable screening as appropriate for cardiovascular disease, diabetes, osteopenia/osteoporosis, and glaucoma.  As appropriate for age/gender, discussed screening for colorectal cancer, prostate cancer, breast cancer, and cervical cancer. Checklist reviewing preventive services available has been given to the patient.    Reviewed patients plan of care and provided an AVS. The Basic Care Plan (routine screening as documented in Health Maintenance) for Ade meets the Care Plan requirement. This Care Plan has been established and reviewed with the Patient.    Counseling Resources:  ATP IV Guidelines  Pooled Cohorts Equation Calculator  Breast Cancer Risk Calculator  Breast Cancer: Medication to Reduce Risk  FRAX Risk Assessment  ICSI Preventive Guidelines  Dietary Guidelines for Americans, 2010  USDA's MyPlate  ASA Prophylaxis  Lung CA Screening    Dimitry Howard MD  [facility]    Identified Health Risks:  "

## 2021-04-01 ENCOUNTER — TRANSFERRED RECORDS (OUTPATIENT)
Dept: HEALTH INFORMATION MANAGEMENT | Facility: CLINIC | Age: 71
End: 2021-04-01

## 2021-04-13 DIAGNOSIS — I87.2 VENOUS INSUFFICIENCY: ICD-10-CM

## 2021-04-13 DIAGNOSIS — N18.31 STAGE 3A CHRONIC KIDNEY DISEASE (H): ICD-10-CM

## 2021-04-13 DIAGNOSIS — N25.81 SECONDARY RENAL HYPERPARATHYROIDISM (H): ICD-10-CM

## 2021-04-13 LAB
ALBUMIN SERPL-MCNC: 3.7 G/DL (ref 3.4–5)
ALP SERPL-CCNC: 103 U/L (ref 40–150)
ALT SERPL W P-5'-P-CCNC: 38 U/L (ref 0–50)
ANION GAP SERPL CALCULATED.3IONS-SCNC: 3 MMOL/L (ref 3–14)
AST SERPL W P-5'-P-CCNC: 20 U/L (ref 0–45)
BILIRUB SERPL-MCNC: 0.5 MG/DL (ref 0.2–1.3)
BUN SERPL-MCNC: 29 MG/DL (ref 7–30)
CALCIUM SERPL-MCNC: 9.2 MG/DL (ref 8.5–10.1)
CHLORIDE SERPL-SCNC: 110 MMOL/L (ref 94–109)
CO2 SERPL-SCNC: 28 MMOL/L (ref 20–32)
CREAT SERPL-MCNC: 1.38 MG/DL (ref 0.52–1.04)
GFR SERPL CREATININE-BSD FRML MDRD: 38 ML/MIN/{1.73_M2}
GLUCOSE SERPL-MCNC: 125 MG/DL (ref 70–99)
POTASSIUM SERPL-SCNC: 4.2 MMOL/L (ref 3.4–5.3)
PROT SERPL-MCNC: 7 G/DL (ref 6.8–8.8)
SODIUM SERPL-SCNC: 141 MMOL/L (ref 133–144)

## 2021-04-13 PROCEDURE — 80053 COMPREHEN METABOLIC PANEL: CPT | Performed by: INTERNAL MEDICINE

## 2021-04-13 PROCEDURE — 36415 COLL VENOUS BLD VENIPUNCTURE: CPT | Performed by: INTERNAL MEDICINE

## 2021-04-30 ENCOUNTER — TRANSFERRED RECORDS (OUTPATIENT)
Dept: HEALTH INFORMATION MANAGEMENT | Facility: CLINIC | Age: 71
End: 2021-04-30

## 2021-04-30 LAB — RETINOPATHY: POSITIVE

## 2021-05-15 ENCOUNTER — HEALTH MAINTENANCE LETTER (OUTPATIENT)
Age: 71
End: 2021-05-15

## 2021-06-15 DIAGNOSIS — Z12.31 VISIT FOR SCREENING MAMMOGRAM: ICD-10-CM

## 2021-06-15 PROCEDURE — 77067 SCR MAMMO BI INCL CAD: CPT | Mod: TC | Performed by: RADIOLOGY

## 2021-06-15 PROCEDURE — 77063 BREAST TOMOSYNTHESIS BI: CPT | Mod: TC | Performed by: RADIOLOGY

## 2021-07-08 ENCOUNTER — E-VISIT (OUTPATIENT)
Dept: INTERNAL MEDICINE | Facility: CLINIC | Age: 71
End: 2021-07-08
Payer: COMMERCIAL

## 2021-07-08 DIAGNOSIS — J01.90 ACUTE BACTERIAL SINUSITIS: Primary | ICD-10-CM

## 2021-07-08 DIAGNOSIS — B96.89 ACUTE BACTERIAL SINUSITIS: Primary | ICD-10-CM

## 2021-07-08 PROCEDURE — 99421 OL DIG E/M SVC 5-10 MIN: CPT | Performed by: INTERNAL MEDICINE

## 2021-07-28 ENCOUNTER — MYC REFILL (OUTPATIENT)
Dept: INTERNAL MEDICINE | Facility: CLINIC | Age: 71
End: 2021-07-28

## 2021-07-28 DIAGNOSIS — E11.65 TYPE 2 DIABETES MELLITUS WITH HYPERGLYCEMIA, WITHOUT LONG-TERM CURRENT USE OF INSULIN (H): ICD-10-CM

## 2021-08-20 ENCOUNTER — TRANSFERRED RECORDS (OUTPATIENT)
Dept: HEALTH INFORMATION MANAGEMENT | Facility: CLINIC | Age: 71
End: 2021-08-20
Payer: COMMERCIAL

## 2021-08-20 LAB
ALT SERPL-CCNC: 23 U/L (ref 7–40)
AST SERPL-CCNC: 18 U/L (ref 13–40)
CREATININE (EXTERNAL): 1.47 MG/DL (ref 0.5–1.2)
GFR ESTIMATED (EXTERNAL): 35.6 ML/MIN/1.73M2
GLUCOSE (EXTERNAL): 164 MG/DL (ref 73–126)
POTASSIUM (EXTERNAL): 4.1 MMOL/L (ref 3.5–5.1)

## 2021-08-27 ENCOUNTER — LAB (OUTPATIENT)
Dept: LAB | Facility: CLINIC | Age: 71
End: 2021-08-27
Payer: COMMERCIAL

## 2021-08-27 DIAGNOSIS — E11.40 TYPE 2 DIABETES MELLITUS WITH DIABETIC NEUROPATHY, WITH LONG-TERM CURRENT USE OF INSULIN (H): ICD-10-CM

## 2021-08-27 DIAGNOSIS — Z98.84 BARIATRIC SURGERY STATUS: ICD-10-CM

## 2021-08-27 DIAGNOSIS — Z79.4 TYPE 2 DIABETES MELLITUS WITH DIABETIC NEUROPATHY, WITH LONG-TERM CURRENT USE OF INSULIN (H): ICD-10-CM

## 2021-08-27 LAB
BUN SERPL-MCNC: 25 MG/DL (ref 7–30)
CHOLEST SERPL-MCNC: 115 MG/DL
CREAT SERPL-MCNC: 1.43 MG/DL (ref 0.52–1.04)
CREAT UR-MCNC: 35 MG/DL
FASTING STATUS PATIENT QL REPORTED: YES
FASTING STATUS PATIENT QL REPORTED: YES
GFR SERPL CREATININE-BSD FRML MDRD: 37 ML/MIN/1.73M2
GLUCOSE BLD-MCNC: 87 MG/DL (ref 70–99)
HBA1C MFR BLD: 8 % (ref 0–5.6)
HDLC SERPL-MCNC: 36 MG/DL
LDLC SERPL CALC-MCNC: 49 MG/DL
MICROALBUMIN UR-MCNC: 6 MG/L
MICROALBUMIN/CREAT UR: 17.14 MG/G CR (ref 0–25)
NONHDLC SERPL-MCNC: 79 MG/DL
POTASSIUM BLD-SCNC: 4 MMOL/L (ref 3.4–5.3)
TRIGL SERPL-MCNC: 148 MG/DL
TSH SERPL DL<=0.005 MIU/L-ACNC: 2.37 MU/L (ref 0.4–4)

## 2021-08-27 PROCEDURE — 82306 VITAMIN D 25 HYDROXY: CPT

## 2021-08-27 PROCEDURE — 82565 ASSAY OF CREATININE: CPT

## 2021-08-27 PROCEDURE — 80061 LIPID PANEL: CPT

## 2021-08-27 PROCEDURE — 36415 COLL VENOUS BLD VENIPUNCTURE: CPT

## 2021-08-27 PROCEDURE — 84443 ASSAY THYROID STIM HORMONE: CPT

## 2021-08-27 PROCEDURE — 84520 ASSAY OF UREA NITROGEN: CPT

## 2021-08-27 PROCEDURE — 83036 HEMOGLOBIN GLYCOSYLATED A1C: CPT

## 2021-08-27 PROCEDURE — 83970 ASSAY OF PARATHORMONE: CPT

## 2021-08-27 PROCEDURE — 84132 ASSAY OF SERUM POTASSIUM: CPT

## 2021-08-27 PROCEDURE — 82043 UR ALBUMIN QUANTITATIVE: CPT

## 2021-08-27 PROCEDURE — 82947 ASSAY GLUCOSE BLOOD QUANT: CPT

## 2021-08-28 LAB — PTH-INTACT SERPL-MCNC: 56 PG/ML (ref 18–80)

## 2021-08-30 NOTE — PATIENT INSTRUCTIONS
Continue Humalgo 10 untis with lunch, increase to 15 units with dinner  Continue Tresiba 66 units or less daily      Start Spironolactone 25 mg once daily  Decrease potassium to one table twice daily when starting Spironolactone  Blood work in 5 days abotu after Spironolactone start    Follow up with me in 3 months      We appreciate your assistance in coordinating your healthcare.     Please upload your insulin pump, blood sugar meter and/or continuous glucose monitor at home 1-2 days before your next diabetes-related appointment.   This will allow your provider to review your  data before your scheduled virtual visit.    To ask a question to your Endocrine care team, please send them a [employer] message, or reach them by phone at [phone]           To expedite your medication refill(s), please contact your pharmacy and have them   fax a refill request to: [phone].  *Please allow 3 business days for routine medication refills.  *Please allow 5 business days for controlled substance medication refills.    For after-hours urgent Endocrine issues, that do not require 281, please dial [phone], and ask to speak with the Endocrinologist On-Call

## 2021-08-30 NOTE — PROGRESS NOTES
Dm  Jacobo Mohan CMA    Outcome for 08/31/21 8:07 AM :Glucose sent via Email    Ade is a 70 year old who is being evaluated via a billable video visit.      How would you like to obtain your AVS? MyChart  If the video visit is dropped, the invitation should be re    Endocrinology and Diabetes Clinic      Ade Wilkins is a 70 year old female who is being evaluated via a billable video visit.        Video Start Time: 9:33am  Video End Time: 9:58am    Ade Wilkins complains of  chief complaint  sent by: Text to cell phone: [phone]  Will anyone else be joining your video visit? No     Interval history:   Ade Wilkins is a 70 year old T2DM here for 7m follow up    Diabetes medication  The patient has been on  Empagliflozin, in addition to Tresiba which she decreased from 76 untis daily to 66 by the patient,  and trulicity; Novolog 10 units with lunch and supper, not with breakfast as preprandial BGs are good.      Concern is fatigued, is sleeping 10-12 hours, then does not sleep, no pain; goes to bed very late    Uses [employer], download  Target range 57% high 28% low 2% very high 13%  Pattern BGs are high 9pm-2am around 300s, during the day low 100s    Has seen Oncologist Dr Art at [facility], [place]; has been doing labs there    Obesity  Status post Noe-en-Y bariatric surgery,  Patient is on topiramate  Weight is stable at about 220 pounds, the patient does not have a scale at home    Eyes; R eye proliereative retinopathy, is receiving injection q 7w  Feet: left ankle peripheral edema, resolved in the morning, wearing compression stockings, low salt diet, on Furosemide 40 mg once daily, denies numbness and tinglin  Had been skipping Novolog with dinner  CRI:   Renal function actually improved on recent labs compared to years before!  Mild albuminuria    Tresiba decreased to 66 units daily    Assessment:  1. Elderly woman with T2DM longstanding insulin with moderate blood glucose control on tresiba,  Novolog, Trulicity and Empagliflozin and CRI with hypertension    Agree with patients Tresiba adjustment, needs more insulin at dinner time. Still on more Tresiba then Humalog    HTN: on Irbesartan max dose, otherwise Furosemide and Verapamil; intolerant to Lisiniopritl, history of low potassium on supplements; bloodpressure control very important consdiering CRI, pt with current problems with dizziness, therefore would avoid Chlorthalidone for now    Dyslipidemia: on 80mg of Atorvastatin and Fenofibrate,and Ezetimide, LDL below 70     St post bariatric surgery: doing well on supplements, on Topiramate for weight maintenance and migraines   Hypothyroidism doing well on Levothyroxine    Plan  Start Spironolactone 25 mg once daily  Decrease potassium to one table twice daily when starting Spironolactone  Blood work in 5 days abotu after Spironolactone start  Continue Humalgo 10 untis with lunch, increase to 15 units with dinner  Continue Tresiba 66 units or less daily  Cont metformin  Empagliflozin 10 mg cont    Follow up with Roxanne Masterson in 3 months  Follow up in 6 months    40 minutes spent on the date of the encounter doing chart review, review of test results, interpretation of tests, patient visit and documentation       Olga Lidia Hoover MD  Endocrinology and Diabetes  Telephone contact:  [facility] [place] [phone]  [facility] [phone]            Medications:   Current Outpatient Prescriptions          Current Outpatient Medications   Medication Sig Dispense Refill     atorvastatin (LIPITOR) 80 MG tablet TAKE ONE TABLET BY MOUTH ONE TIME DAILY 90 tablet 3     atorvastatin (LIPITOR) 80 MG tablet Take 1 tablet (80 mg) by mouth daily 30 tablet 1     augmented betamethasone dipropionate (DIPROLENE-AF) 0.05 % ointment Apply to AA BID x 3-4 weeks then PRN 45 g 0     BD FCO U/F 32G X 4 MM insulin pen needle USE 4 TIMES A DAY WITH INSULIN AND VICTOZA INJECTIONS  400 each 1     blood glucose (MARTITA CONTOUR NEXT) test strip Use to test blood sugar 5 times daily or as directed. 100 each 5     blood glucose (NO BRAND SPECIFIED) test strip Use to test blood sugar 5 times daily. 500 each 3     blood glucose monitoring (MARTITA CONTOUR NEXT) test strip Use to test blood sugar 4 times daily or as directed. 1 Box 9     calcium carbonate-vitamin D (CALTRATE 600+D) 600-400 MG-UNIT CHEW Take 2 chew tab by mouth every evening         cefPROZIL (CEFZIL) 500 MG tablet Take 1 tablet (500 mg) by mouth 2 times daily 20 tablet 0     cetirizine (ZYRTEC) 10 MG tablet Take 1 tablet (10 mg) by mouth daily         Cholecalciferol (VITAMIN D) 2000 UNITS tablet Take 2,000 Units by mouth daily         Continuous Blood Gluc  (FREESTYLE BALWINDER 14 DAY READER) HENNA 1 each 3 times daily 1 Device 1     Continuous Blood Gluc Sensor (PawnUp.comSTYLE BALWINDER 14 DAY SENSOR) MISC 1 each every 14 days 2 each 11     CONTOUR NEXT TEST test strip USE TO TEST BLOOD SUGAR 4 TIMES DAILY OR AS DIRECTED. 100 each 8     cyanocolbalamin (VITAMIN B-12) 1000 MCG tablet Take 1,000 mcg by mouth every other day          dulaglutide (TRULICITY) 1.5 MG/0.5ML pen Inject 1.5 mg Subcutaneous every 7 days 6 mL 3     fenofibrate (TRIGLIDE/LOFIBRA) 160 MG tablet TAKE ONE TABLET BY MOUTH ONE TIME DAILY 90 tablet 3     fluticasone (FLONASE) 50 MCG/ACT nasal spray Spray 2 sprays into both nostrils daily 16 mL 1     fluticasone (FLONASE) 50 MCG/ACT nasal spray Spray 2 sprays into both nostrils daily 16 g 1     furosemide (LASIX) 20 MG tablet Take 1 tablet (20 mg) by mouth daily 90 tablet 3     HUMALOG KWIKPEN 100 UNIT/ML soln Use as directed up to 60 units per day. 15 mL 11      hydrocortisone (WESTCORT) 0.2 % cream Apply topically 2 times daily Apply sparingly to affected area 2 times daily as needed. 45 g 2     insulin degludec (TRESIBA) 200 UNIT/ML pen Inject 72units subcu daily 80 mL 3     irbesartan (AVAPRO) 300 MG tablet TAKE ONE  "TABLET BY MOUTH ONE TIME DAILY  90 tablet 3     KLOR-CON 10 MEQ CR tablet TAKE ONE TABLET BY MOUTH ONE TIME DAILY  90 tablet 0     levothyroxine (SYNTHROID/LEVOTHROID) 50 MCG tablet TAKE 1 TABLET BY MOUTH DAILY. 90 tablet 2     Magnesium 500 MG CAPS Take 1 capsule by mouth daily.         metFORMIN (GLUCOPHAGE) 1000 MG tablet 1 tab twice daily 180 tablet 1     ONE TOUCH DELICA LANCETS MISC 1 Device 4 times daily. 100 Stick prn     potassium chloride ER (K-TAB/KLOR-CON) 10 MEQ CR tablet TAKE ONE TABLET BY MOUTH ONE TIME DAILY  90 tablet 0     topiramate (TOPAMAX) 25 MG tablet take 1 tablet (25 mg) at bedtime for 1 week, then take 2 tablets (50 mg) at bedtime for 1 week, and then 3 tablet (75 mg) daily at bedtime t 90 tablet 2     traZODone (DESYREL) 50 MG tablet take 1 to 2 tablets (50 to 100mg) by mouth nightly as needed 180 tablet PRN     traZODone (DESYREL) 50 MG tablet take 1 to 2 tablets (50 to 100mg) by mouth nightly as needed 180 tablet 0     verapamil ER (CALAN-SR) 180 MG CR tablet TAKE ONE TABLET BY MOUTH TWICE DAILY  180 tablet 3     VITRON-C  MG TABS tablet TAKE TWO TABLETS BY MOUTH TWICE DAILY  360 tablet 1           Review of Systems: in addition to stated above  GENERAL: Negative  SKIN: Negative  HENT: Negative   EYE: Negative  HEART: Negative  RESPIRATORY: Negative   GI: Negative  : Negative  MSK: Negative  BLOOD/LYMPH: Negative  NEUROLOGIC: Negative   PSYCH: Negative     Physical Examination:  Check blood pressure 140s/57 mmHg  Blood pressure 133/69, pulse 85, height 1.575 m (5' 2\"), weight 101.2 kg (223 lb), not currently   breastfeeding.  Body mass index is 40.79 kg/m .       Wt Readings from Last 4 Encounters:   03/24/21 97.1 kg (214 lb)   01/07/20 101.2 kg (223 lb)   10/14/19 100.7 kg (222 lb 1.6 oz)   06/10/19 100.4 kg (221 lb 4.8 oz)      Reported vitals:  There were no vitals taken for this visit.   healthy, alert and no distress  PSYCH: Alert and oriented times 3; coherent speech, " normal   rate and volume, able to articulate logical thoughts, able   to abstract reason, no tangential thoughts, no hallucinations   or delusions  Her affect is normal and pleasant  RESP: No cough, no audible wheezing, able to talk in full sentences  Remainder of exam unable to be completed due to telephone visits           Wt Readings from Last 4 Encounters:   01/07/20 101.2 kg (223 lb)   10/14/19 100.7 kg (222 lb 1.6 oz)   06/10/19 100.4 kg (221 lb 4.8 oz)   04/22/19 104.7 kg (230 lb 14.4 oz)     Lab Results   Component Value Date    CR 1.43 (H) 08/27/2021    CR 1.38 (H) 04/13/2021    CR 1.40 (H) 03/24/2021    CR 1.21 (H) 11/13/2020    CR 1.13 (H) 10/09/2020    CR 1.55 (H) 03/27/2020    CR 1.61 (H) 04/23/2019    CR 1.63 (H) 12/10/2018    CR 1.53 (H) 05/24/2018    CR 1.53 (H) 03/12/2018          Lab Results   Component Value Date     04/13/2021    CHLORIDE 110 (H) 04/13/2021    CO2 28 04/13/2021    GLC 87 08/27/2021    CR 1.43 (H) 08/27/2021    CR 1.38 (H) 04/13/2021    CR 1.40 (H) 03/24/2021    CR 1.21 (H) 11/13/2020    CR 1.13 (H) 10/09/2020    RASHEEDA 9.2 04/13/2021    ALBUMIN 3.7 04/13/2021    ALKPHOS 103 04/13/2021    LDL 49 08/27/2021    HDL 36 (L) 08/27/2021    TRIG 148 08/27/2021     Lab Results   Component Value Date    TSH 2.37 08/27/2021    TSH 3.47 03/27/2020    TSH 2.54 10/14/2019         Lab Results   Component Value Date    MICROL 6 08/27/2021    MICROL 35 10/09/2020    MICROL 10 01/07/2020    MICROL 13 12/10/2018    MICROL 37 08/25/2017     Lab Results   Component Value Date    A1C 8.0 (H) 08/27/2021    A1C 8.1 (H) 01/08/2021    A1C 8.1 (H) 10/09/2020    A1C 8.7 (A) 08/07/2020    A1C 8.4 (H) 03/27/2020       Lab Results   Component Value Date    HGB 11.6 (L) 01/08/2021

## 2021-08-31 ENCOUNTER — VIRTUAL VISIT (OUTPATIENT)
Dept: ENDOCRINOLOGY | Facility: CLINIC | Age: 71
End: 2021-08-31
Payer: COMMERCIAL

## 2021-08-31 ENCOUNTER — TELEPHONE (OUTPATIENT)
Dept: ENDOCRINOLOGY | Facility: CLINIC | Age: 71
End: 2021-08-31

## 2021-08-31 DIAGNOSIS — I10 BENIGN ESSENTIAL HYPERTENSION: Primary | ICD-10-CM

## 2021-08-31 DIAGNOSIS — E11.65 POORLY CONTROLLED TYPE 2 DIABETES MELLITUS WITH NEUROPATHY (H): ICD-10-CM

## 2021-08-31 DIAGNOSIS — E11.40 POORLY CONTROLLED TYPE 2 DIABETES MELLITUS WITH NEUROPATHY (H): ICD-10-CM

## 2021-08-31 LAB
DEPRECATED CALCIDIOL+CALCIFEROL SERPL-MC: <68 UG/L (ref 20–75)
VITAMIN D2 SERPL-MCNC: <5 UG/L
VITAMIN D3 SERPL-MCNC: 63 UG/L

## 2021-08-31 PROCEDURE — 99215 OFFICE O/P EST HI 40 MIN: CPT | Mod: 95 | Performed by: INTERNAL MEDICINE

## 2021-08-31 RX ORDER — NIFEDIPINE 30 MG/1
30 TABLET, EXTENDED RELEASE ORAL DAILY
Qty: 90 TABLET | Refills: 3 | Status: SHIPPED | OUTPATIENT
Start: 2021-08-31 | End: 2021-08-31 | Stop reason: ALTCHOICE

## 2021-08-31 RX ORDER — FUROSEMIDE 40 MG
40 TABLET ORAL DAILY
COMMUNITY
Start: 2021-07-29 | End: 2021-11-18

## 2021-08-31 RX ORDER — INSULIN LISPRO 100 [IU]/ML
INJECTION, SOLUTION INTRAVENOUS; SUBCUTANEOUS
Qty: 60 ML | Refills: 3 | Status: SHIPPED | OUTPATIENT
Start: 2021-08-31 | End: 2021-11-22 | Stop reason: ALTCHOICE

## 2021-08-31 RX ORDER — SPIRONOLACTONE 25 MG/1
25 TABLET ORAL DAILY
Qty: 90 TABLET | Refills: 3 | Status: SHIPPED | OUTPATIENT
Start: 2021-08-31 | End: 2022-04-11

## 2021-08-31 NOTE — TELEPHONE ENCOUNTER
HUMALOG KWIKPEN 100 UNIT/ML soln        Last Written Prescription Date:  07/29/20  Last Fill Quantity: 60mL,   # refills: 3  Last Office Visit : 08/31/21  Future Office visit:  08/31/21    Routing refill request to provider for review/approval because:  Insulin - refilled per clinic

## 2021-08-31 NOTE — LETTER
8/31/2021       RE: Ade iWlkins  [address]  [place] [address]     Dear Colleague,    Thank you for referring your patient, Ade Wilkins, to the [facility] [place] [facility]. Please see a copy of my visit note below.    Dm  Jacobo Mohan, KEYSHA    Outcome for 08/31/21 8:07 AM :Glucose sent via Email    Ade is a 70 year old who is being evaluated via a billable video visit.      How would you like to obtain your AVS? MyChart  If the video visit is dropped, the invitation should be re    Endocrinology and Diabetes Clinic      Ade Wilkins is a 70 year old female who is being evaluated via a billable video visit.        Video Start Time: 9:33am  Video End Time: 9:58am    Ade Wilkins complains of  chief complaint  sent by: Text to cell phone: [phone]  Will anyone else be joining your video visit? No     Interval history:   Ade Wilkins is a 70 year old T2DM here for 7m follow up    Diabetes medication  The patient has been on  Empagliflozin, in addition to Tresiba which she decreased from 76 untis daily to 66 by the patient,  and trulicity; Novolog 10 units with lunch and supper, not with breakfast as preprandial BGs are good.      Concern is fatigued, is sleeping 10-12 hours, then does not sleep, no pain; goes to bed very late    Uses [employer], download  Target range 57% high 28% low 2% very high 13%  Pattern BGs are high 9pm-2am around 300s, during the day low 100s    Has seen Oncologist Dr Art at [facility], [place]; has been doing labs there    Obesity  Status post Noe-en-Y bariatric surgery,  Patient is on topiramate  Weight is stable at about 220 pounds, the patient does not have a scale at home    Eyes; R eye proliereative retinopathy, is receiving injection q 7w  Feet: left ankle peripheral edema, resolved in the morning, wearing compression stockings, low salt diet, on Furosemide 40 mg once daily,  denies numbness and tinglin  Had been skipping Novolog with dinner  CRI:   Renal function actually improved on recent labs compared to years before!  Mild albuminuria    Tresiba decreased to 66 units daily    Assessment:  1. Elderly woman with T2DM longstanding insulin with moderate blood glucose control on tresiba, Novolog, Trulicity and Empagliflozin and CRI with hypertension    Agree with patients Tresiba adjustment, needs more insulin at dinner time. Still on more Tresiba then Humalog    HTN: on Irbesartan max dose, otherwise Furosemide and Verapamil; intolerant to Lisiniopritl, history of low potassium on supplements; bloodpressure control very important consdiering CRI, pt with current problems with dizziness, therefore would avoid Chlorthalidone for now    Dyslipidemia: on 80mg of Atorvastatin and Fenofibrate,and Ezetimide, LDL below 70     St post bariatric surgery: doing well on supplements, on Topiramate for weight maintenance and migraines   Hypothyroidism doing well on Levothyroxine    Plan  Start Spironolactone 25 mg once daily  Decrease potassium to one table twice daily when starting Spironolactone  Blood work in 5 days abotu after Spironolactone start  Continue Humalgo 10 untis with lunch, increase to 15 units with dinner  Continue Tresiba 66 units or less daily  Cont metformin  Empagliflozin 10 mg cont    Follow up with Roxanne Masterson in 3 months  Follow up in 6 months    40 minutes spent on the date of the encounter doing chart review, review of test results, interpretation of tests, patient visit and documentation       Olga Lidia Hoover MD  Endocrinology and Diabetes  Telephone contact:  [facility] [place] [phone]  [facility] [phone]            Medications:   Current Outpatient Prescriptions          Current Outpatient Medications   Medication Sig Dispense Refill     atorvastatin (LIPITOR) 80 MG tablet TAKE ONE TABLET BY MOUTH ONE TIME DAILY  90 tablet 3     atorvastatin (LIPITOR) 80 MG tablet Take 1 tablet (80 mg) by mouth daily 30 tablet 1     augmented betamethasone dipropionate (DIPROLENE-AF) 0.05 % ointment Apply to AA BID x 3-4 weeks then PRN 45 g 0     BD FCO U/F 32G X 4 MM insulin pen needle USE 4 TIMES A DAY WITH INSULIN AND VICTOZA INJECTIONS 400 each 1     blood glucose (MARTITA CONTOUR NEXT) test strip Use to test blood sugar 5 times daily or as directed. 100 each 5     blood glucose (NO BRAND SPECIFIED) test strip Use to test blood sugar 5 times daily. 500 each 3     blood glucose monitoring (MARTITA CONTOUR NEXT) test strip Use to test blood sugar 4 times daily or as directed. 1 Box 9     calcium carbonate-vitamin D (CALTRATE 600+D) 600-400 MG-UNIT CHEW Take 2 chew tab by mouth every evening         cefPROZIL (CEFZIL) 500 MG tablet Take 1 tablet (500 mg) by mouth 2 times daily 20 tablet 0     cetirizine (ZYRTEC) 10 MG tablet Take 1 tablet (10 mg) by mouth daily         Cholecalciferol (VITAMIN D) 2000 UNITS tablet Take 2,000 Units by mouth daily         Continuous Blood Gluc  (FREESTYLE BALWINDER 14 DAY READER) HENNA 1 each 3 times daily 1 Device 1     Continuous Blood Gluc Sensor (FREESTYLE BALWINDER 14 DAY SENSOR) MISC 1 each every 14 days 2 each 11     CONTOUR NEXT TEST test strip USE TO TEST BLOOD SUGAR 4 TIMES DAILY OR AS DIRECTED. 100 each 8     cyanocolbalamin (VITAMIN B-12) 1000 MCG tablet Take 1,000 mcg by mouth every other day          dulaglutide (TRULICITY) 1.5 MG/0.5ML pen Inject 1.5 mg Subcutaneous every 7 days 6 mL 3     fenofibrate (TRIGLIDE/LOFIBRA) 160 MG tablet TAKE ONE TABLET BY MOUTH ONE TIME DAILY 90 tablet 3     fluticasone (FLONASE) 50 MCG/ACT nasal spray Spray 2 sprays into both nostrils daily 16 mL 1     fluticasone (FLONASE) 50 MCG/ACT nasal spray Spray 2 sprays into both nostrils daily 16 g 1     furosemide (LASIX) 20 MG tablet Take 1 tablet (20 mg) by mouth daily 90 tablet 3     HUMALOG KWIKPEN 100 UNIT/ML soln Use  "as directed up to 60 units per day. 15 mL 11      hydrocortisone (WESTCORT) 0.2 % cream Apply topically 2 times daily Apply sparingly to affected area 2 times daily as needed. 45 g 2     insulin degludec (TRESIBA) 200 UNIT/ML pen Inject 72units subcu daily 80 mL 3     irbesartan (AVAPRO) 300 MG tablet TAKE ONE TABLET BY MOUTH ONE TIME DAILY  90 tablet 3     KLOR-CON 10 MEQ CR tablet TAKE ONE TABLET BY MOUTH ONE TIME DAILY  90 tablet 0     levothyroxine (SYNTHROID/LEVOTHROID) 50 MCG tablet TAKE 1 TABLET BY MOUTH DAILY. 90 tablet 2     Magnesium 500 MG CAPS Take 1 capsule by mouth daily.         metFORMIN (GLUCOPHAGE) 1000 MG tablet 1 tab twice daily 180 tablet 1     ONE TOUCH DELICA LANCETS MISC 1 Device 4 times daily. 100 Stick prn     potassium chloride ER (K-TAB/KLOR-CON) 10 MEQ CR tablet TAKE ONE TABLET BY MOUTH ONE TIME DAILY  90 tablet 0     topiramate (TOPAMAX) 25 MG tablet take 1 tablet (25 mg) at bedtime for 1 week, then take 2 tablets (50 mg) at bedtime for 1 week, and then 3 tablet (75 mg) daily at bedtime t 90 tablet 2     traZODone (DESYREL) 50 MG tablet take 1 to 2 tablets (50 to 100mg) by mouth nightly as needed 180 tablet PRN     traZODone (DESYREL) 50 MG tablet take 1 to 2 tablets (50 to 100mg) by mouth nightly as needed 180 tablet 0     verapamil ER (CALAN-SR) 180 MG CR tablet TAKE ONE TABLET BY MOUTH TWICE DAILY  180 tablet 3     VITRON-C  MG TABS tablet TAKE TWO TABLETS BY MOUTH TWICE DAILY  360 tablet 1           Review of Systems: in addition to stated above  GENERAL: Negative  SKIN: Negative  HENT: Negative   EYE: Negative  HEART: Negative  RESPIRATORY: Negative   GI: Negative  : Negative  MSK: Negative  BLOOD/LYMPH: Negative  NEUROLOGIC: Negative   PSYCH: Negative     Physical Examination:  Check blood pressure 140s/57 mmHg  Blood pressure 133/69, pulse 85, height 1.575 m (5' 2\"), weight 101.2 kg (223 lb), not currently   breastfeeding.  Body mass index is 40.79 kg/m .       Wt " Readings from Last 4 Encounters:   03/24/21 97.1 kg (214 lb)   01/07/20 101.2 kg (223 lb)   10/14/19 100.7 kg (222 lb 1.6 oz)   06/10/19 100.4 kg (221 lb 4.8 oz)      Reported vitals:  There were no vitals taken for this visit.   healthy, alert and no distress  PSYCH: Alert and oriented times 3; coherent speech, normal   rate and volume, able to articulate logical thoughts, able   to abstract reason, no tangential thoughts, no hallucinations   or delusions  Her affect is normal and pleasant  RESP: No cough, no audible wheezing, able to talk in full sentences  Remainder of exam unable to be completed due to telephone visits           Wt Readings from Last 4 Encounters:   01/07/20 101.2 kg (223 lb)   10/14/19 100.7 kg (222 lb 1.6 oz)   06/10/19 100.4 kg (221 lb 4.8 oz)   04/22/19 104.7 kg (230 lb 14.4 oz)     Lab Results   Component Value Date    CR 1.43 (H) 08/27/2021    CR 1.38 (H) 04/13/2021    CR 1.40 (H) 03/24/2021    CR 1.21 (H) 11/13/2020    CR 1.13 (H) 10/09/2020    CR 1.55 (H) 03/27/2020    CR 1.61 (H) 04/23/2019    CR 1.63 (H) 12/10/2018    CR 1.53 (H) 05/24/2018    CR 1.53 (H) 03/12/2018          Lab Results   Component Value Date     04/13/2021    CHLORIDE 110 (H) 04/13/2021    CO2 28 04/13/2021    GLC 87 08/27/2021    CR 1.43 (H) 08/27/2021    CR 1.38 (H) 04/13/2021    CR 1.40 (H) 03/24/2021    CR 1.21 (H) 11/13/2020    CR 1.13 (H) 10/09/2020    RASHEEDA 9.2 04/13/2021    ALBUMIN 3.7 04/13/2021    ALKPHOS 103 04/13/2021    LDL 49 08/27/2021    HDL 36 (L) 08/27/2021    TRIG 148 08/27/2021     Lab Results   Component Value Date    TSH 2.37 08/27/2021    TSH 3.47 03/27/2020    TSH 2.54 10/14/2019         Lab Results   Component Value Date    MICROL 6 08/27/2021    MICROL 35 10/09/2020    MICROL 10 01/07/2020    MICROL 13 12/10/2018    MICROL 37 08/25/2017     Lab Results   Component Value Date    A1C 8.0 (H) 08/27/2021    A1C 8.1 (H) 01/08/2021    A1C 8.1 (H) 10/09/2020    A1C 8.7 (A) 08/07/2020    A1C 8.4  (H) 03/27/2020       Lab Results   Component Value Date    HGB 11.6 (L) 01/08/2021

## 2021-08-31 NOTE — Clinical Note
Please send not to Dr Art [facility] in [place], [facility]

## 2021-08-31 NOTE — TELEPHONE ENCOUNTER
Pharmacy is getting a notice this medication combined with irbesartan and potassium can cause Hyperkalemia. just want to make sure provider is aware.

## 2021-08-31 NOTE — TELEPHONE ENCOUNTER
[facility] Call Center    Phone Message    May a detailed message be left on voicemail: yes     Reason for Call: Medication Question or concern regarding medication   Prescription Clarification  Name of Medication: spironolactone (ALDACTONE) 25 MG tablet  Prescribing Provider: Dr Hoover   Pharmacy: [id number] - [facility] [address]     What on the order needs clarification? DUR with irbesartan and potassium. Hyperkalemia.         Action Taken: Message routed to:  [facility] Endo    Travel Screening: Not Applicable

## 2021-09-01 NOTE — TELEPHONE ENCOUNTER
Please let pharmacy know ; I am aware that combination of ARBs and spironolactone can cause high potassium levels.  Patient actually has had low potassium in the past, I had her decrease the potassium supplements she is taking and close follow-up labs drawn.

## 2021-09-04 ENCOUNTER — HEALTH MAINTENANCE LETTER (OUTPATIENT)
Age: 71
End: 2021-09-04

## 2021-09-13 ENCOUNTER — LAB (OUTPATIENT)
Dept: LAB | Facility: CLINIC | Age: 71
End: 2021-09-13
Payer: COMMERCIAL

## 2021-09-13 DIAGNOSIS — I10 BENIGN ESSENTIAL HYPERTENSION: ICD-10-CM

## 2021-09-13 PROCEDURE — 84520 ASSAY OF UREA NITROGEN: CPT

## 2021-09-13 PROCEDURE — 84132 ASSAY OF SERUM POTASSIUM: CPT

## 2021-09-13 PROCEDURE — 36415 COLL VENOUS BLD VENIPUNCTURE: CPT

## 2021-09-13 PROCEDURE — 82565 ASSAY OF CREATININE: CPT

## 2021-09-14 LAB
BUN SERPL-MCNC: 37 MG/DL (ref 7–30)
CREAT SERPL-MCNC: 1.49 MG/DL (ref 0.52–1.04)
GFR SERPL CREATININE-BSD FRML MDRD: 35 ML/MIN/1.73M2
POTASSIUM BLD-SCNC: 4.4 MMOL/L (ref 3.4–5.3)

## 2021-09-20 DIAGNOSIS — E78.00 PURE HYPERCHOLESTEROLEMIA: ICD-10-CM

## 2021-09-20 RX ORDER — ATORVASTATIN CALCIUM 80 MG/1
TABLET, FILM COATED ORAL
Qty: 90 TABLET | Refills: 1 | Status: SHIPPED | OUTPATIENT
Start: 2021-09-20 | End: 2022-03-15

## 2021-09-20 NOTE — TELEPHONE ENCOUNTER
Routing refill request to provider for review/approval because:  Needs new PCP  Malorie Fuller RN

## 2021-09-20 NOTE — TELEPHONE ENCOUNTER
Have never met pt. RF request routed to pool.  Pt being managed by Endo now  and previously by Dr Howard. Had llipdis done last month and LFTs in April. Med refilled until pt establishes care with someone in clinic. No appt scheduled currenlty

## 2021-09-21 DIAGNOSIS — E11.40 TYPE 2 DIABETES MELLITUS WITH DIABETIC NEUROPATHY, WITH LONG-TERM CURRENT USE OF INSULIN (H): ICD-10-CM

## 2021-09-21 DIAGNOSIS — Z79.4 TYPE 2 DIABETES MELLITUS WITH DIABETIC NEUROPATHY, WITH LONG-TERM CURRENT USE OF INSULIN (H): ICD-10-CM

## 2021-09-21 RX ORDER — DULAGLUTIDE 1.5 MG/.5ML
1.5 INJECTION, SOLUTION SUBCUTANEOUS
Qty: 6 ML | Refills: 3 | Status: SHIPPED | OUTPATIENT
Start: 2021-09-21 | End: 2021-11-22 | Stop reason: DRUGHIGH

## 2021-09-21 NOTE — TELEPHONE ENCOUNTER
dulaglutide (TRULICITY) 1.5 MG/0.5ML pen  Last Written Prescription Date:  9/30/2020  Last Fill Quantity: 6,   # refills: 3  Last Office Visit : 8/31/2021  Future Office visit:  11/22/2021    Routing refill request to provider for review/approval because:  Creatinine Abnormal  Refer to provider for review     Recent Labs   Lab Test 09/13/21  1323   CR 1.49*       Chey Kumar RN  Central Triage Red Flags/Med Refills

## 2021-10-06 ENCOUNTER — TRANSFERRED RECORDS (OUTPATIENT)
Dept: HEALTH INFORMATION MANAGEMENT | Facility: CLINIC | Age: 71
End: 2021-10-06

## 2021-10-19 DIAGNOSIS — J06.9 UPPER RESPIRATORY TRACT INFECTION, UNSPECIFIED TYPE: ICD-10-CM

## 2021-10-19 RX ORDER — FLUTICASONE PROPIONATE 50 MCG
SPRAY, SUSPENSION (ML) NASAL
Qty: 48 G | Refills: 2 | Status: SHIPPED | OUTPATIENT
Start: 2021-10-19 | End: 2023-03-20

## 2021-10-19 NOTE — TELEPHONE ENCOUNTER
Routing refill request to provider for review/approval because:  Passes protocol but patient needs to establish a new pcp   Malorie Fuller RN

## 2021-11-01 DIAGNOSIS — E11.40 TYPE 2 DIABETES MELLITUS WITH DIABETIC NEUROPATHY, WITH LONG-TERM CURRENT USE OF INSULIN (H): ICD-10-CM

## 2021-11-01 DIAGNOSIS — Z79.4 TYPE 2 DIABETES MELLITUS WITH DIABETIC NEUROPATHY, WITH LONG-TERM CURRENT USE OF INSULIN (H): ICD-10-CM

## 2021-11-03 NOTE — TELEPHONE ENCOUNTER
LCV: 8/31/2021  [facility] [place]  Abnormal Cr- reviewed by provider

## 2021-11-17 DIAGNOSIS — I35.0 AORTIC VALVE STENOSIS, ETIOLOGY OF CARDIAC VALVE DISEASE UNSPECIFIED: Primary | ICD-10-CM

## 2021-11-18 RX ORDER — FUROSEMIDE 40 MG
TABLET ORAL
Qty: 90 TABLET | Refills: 0 | Status: SHIPPED | OUTPATIENT
Start: 2021-11-18 | End: 2022-03-11

## 2021-11-18 ASSESSMENT — ENCOUNTER SYMPTOMS
SMELL DISTURBANCE: 0
SINUS CONGESTION: 0
TROUBLE SWALLOWING: 0
NERVOUS/ANXIOUS: 0
INSOMNIA: 1
SORE THROAT: 0
HOARSE VOICE: 1
MUSCLE WEAKNESS: 0
JAUNDICE: 0
MYALGIAS: 0
BOWEL INCONTINENCE: 0
NECK MASS: 0
NECK PAIN: 1
DECREASED CONCENTRATION: 0
VOMITING: 0
DIARRHEA: 0
ARTHRALGIAS: 1
ABDOMINAL PAIN: 0
NAUSEA: 0
BLOOD IN STOOL: 0
JOINT SWELLING: 0
RECTAL PAIN: 0
MUSCLE CRAMPS: 0
BLOATING: 1
CONSTIPATION: 1
PANIC: 0
HEARTBURN: 0
TASTE DISTURBANCE: 0
SINUS PAIN: 0
BACK PAIN: 1
DEPRESSION: 0
STIFFNESS: 1

## 2021-11-18 NOTE — TELEPHONE ENCOUNTER
Routing refill request to provider for review/approval because:  Medication is reported/historical  Creatinine   Date Value Ref Range Status   09/13/2021 1.49 (H) 0.52 - 1.04 mg/dL Final   04/13/2021 1.38 (H) 0.52 - 1.04 mg/dL Final         Conrado Chao RN  [facility] Triage Nurse

## 2021-11-19 NOTE — PROGRESS NOTES
Ade is a 71 year old who is being evaluated via a billable video visit.      How would you like to obtain your AVS? Novarra  If the video visit is dropped, the invitation should be resent by: Text to cell phone: [phone]  Will anyone else be joining your video visit? No      Video Start Time: 0944  Video End Time: 1006    Outcome for 11/19/21 9:57 AM: Left Voicemail    Outcome for 11/19/21 11:36 AM: Pt plans to upload via [facility] later today. Is currently out of office. See [facility] message.      HPI:   Rachael is a 71 year old female with history morbid obesity S/P Gastric bypass surgery, hypothyroidism, CKD here for follow up of type 2 diabetes.  Her diabetes mellitus is complicated by diabetic neuropathy, mild nonproliferative retinopathy and nephropathy.  She also follows with . She has had type 2 Diabetes since her early 20s.      Today she reports she has been feeling pretty good and has no major concerns.  She wears the laurie sensor and thinks she needs a new , as hers is almost 3 years old.     Rachael is currently taking the following for her diabetes:     Tresiba 50 units at night.   Humalog- depends on what she is eating 2-3 units/carb.    Dulaglutide 1.5 mg weekly.   Metformin 1000 mg daily.   Jardiance 10 mg daily.     She eats smaller meals throughout the day.   Up at 9am- cream of wheat or oatmeal and tea.   10am- snack- toast   Lunch- bagel with ham or turkey  2-3pm- energy bar or something.   Dinner- around 6:30 or 7, jason callendars roast turkey with dressing, vegetables.   10:30pm- toast      Recent glucose is as follows:                 Arthritis in her knees/hips bothering her.  She has problems getting around outside.  She usually has someone with her.  She goes out with a friend to the mall and the movies. She otherwise is generally feeling well and has no other concerns.       Past Medical History:   Diagnosis Date     Allergic rhinitis, cause unspecified      Hepatitis,  unspecified 1968     Iron Deficiency Anemia 3/09     Lymphoma (H) 12/08     Nonsenile cataract      Obesity, unspecified      Other and unspecified hyperlipidemia      Rheumatic fever without mention of heart involvement infant     Type II or unspecified type diabetes mellitus without mention of complication, not stated as uncontrolled 1992     Unspecified essential hypertension      Unspecified hypothyroidism      Vitamin B12 deficiency 3/09       Past Surgical History:   Procedure Laterality Date     C APPENDECTOMY  1958     CATARACT IOL, RT/LT Left 12/21/2000    left     CATARACT IOL, RT/LT Right 01/06/2000    right     GASTRIC BYPASS  2/04    Dr. Hebert     LYMPH NODE BIOPSY  12/08    right groin, Dr. Licona     TONSILLECTOMY & ADENOIDECTOMY  1968     ZZC NONSPECIFIC PROCEDURE  1976    (leg) cystectomy       Family History   Problem Relation Age of Onset     Cardiovascular Father         AAA     Diabetes Mother      C.A.D. Mother         52     Eye Disorder Mother         glaucoma     Glaucoma Mother      Cardiovascular Brother         AAA, PVD     Cancer Brother         bladder     Cardiovascular Brother         AAA, valvular heart disease     Diabetes Brother         age 53     Cancer Brother         liver cancer     Glaucoma Other         MGGF     Macular Degeneration No family hx of        Social History     Socioeconomic History     Marital status:      Spouse name: Not on file     Number of children: 0     Years of education: Not on file     Highest education level: Not on file   Occupational History     Employer: [employer],[address] [facility]   Social Needs     Financial resource strain: Not on file     Food insecurity:     Worry: Not on file     Inability: Not on file     Transportation needs:     Medical: Not on file     Non-medical: Not on file   Tobacco Use     Smoking status: Never Smoker     Smokeless tobacco: Never Used   Substance and Sexual Activity     Alcohol use: No     Drug  use: No     Sexual activity: Never     Partners: Male   Lifestyle     Physical activity:     Days per week: Not on file     Minutes per session: Not on file     Stress: Not on file   Relationships     Social connections:     Talks on phone: Not on file     Gets together: Not on file     Attends Buddhist service: Not on file     Active member of club or organization: Not on file     Attends meetings of clubs or organizations: Not on file     Relationship status: Not on file     Intimate partner violence:     Fear of current or ex partner: Not on file     Emotionally abused: Not on file     Physically abused: Not on file     Forced sexual activity: Not on file   Other Topics Concern      Service Not Asked     Blood Transfusions Not Asked     Caffeine Concern Yes     Comment: 20 oz daily     Occupational Exposure Not Asked     Hobby Hazards Not Asked     Sleep Concern Not Asked     Stress Concern Not Asked     Weight Concern Not Asked     Special Diet Not Asked     Back Care Not Asked     Exercise No     Bike Helmet Not Asked     Seat Belt Yes     Self-Exams Yes     Parent/sibling w/ CABG, MI or angioplasty before 65F 55M? Not Asked   Social History Narrative    Living arrangements - the patient lives alone.   Social Hx: Lives with her sister and her sister's boyfriend.  . 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     Current Outpatient Medications   Medication     atorvastatin (LIPITOR) 80 MG tablet     augmented betamethasone dipropionate (DIPROLENE-AF) 0.05 % ointment     calcium carbonate-vitamin D (CALTRATE 600+D) 600-400 MG-UNIT CHEW     cetirizine (ZYRTEC) 10 MG tablet     Cholecalciferol (VITAMIN D) 2000 UNITS tablet     Continuous Blood Gluc  (FREESTYLE BALWINDER 14 DAY READER) HENNA     Continuous Blood Gluc Sensor (FREESTYLE BALWINDER 14 DAY SENSOR) MISC     cyanocolbalamin (VITAMIN B-12) 1000 MCG tablet     dulaglutide (TRULICITY) 1.5 MG/0.5ML pen     empagliflozin (JARDIANCE) 10  MG TABS tablet     ezetimibe (ZETIA) 10 MG tablet     fluticasone (FLONASE) 50 MCG/ACT nasal spray     furosemide (LASIX) 40 MG tablet     HUMALOG KWIKPEN 100 UNIT/ML soln     hydrocortisone (WESTCORT) 0.2 % cream     insulin degludec (TRESIBA) 200 UNIT/ML pen     insulin pen needle (BD FCO U/F) 32G X 4 MM miscellaneous     irbesartan (AVAPRO) 300 MG tablet     levothyroxine (SYNTHROID/LEVOTHROID) 50 MCG tablet     Magnesium 500 MG CAPS     metFORMIN (GLUCOPHAGE) 1000 MG tablet     ONE TOUCH DELICA LANCETS MISC     potassium chloride ER (KLOR-CON M) 10 MEQ CR tablet     spironolactone (ALDACTONE) 25 MG tablet     topiramate (TOPAMAX) 100 MG tablet     traZODone (DESYREL) 50 MG tablet     triamcinolone (KENALOG) 0.1 % external ointment     verapamil ER (CALAN-SR) 180 MG CR tablet     VITRON-C  MG TABS tablet     No current facility-administered medications for this visit.          Allergies   Allergen Reactions     Clonidine      headaches     Fexofenadine Hydrochloride      Allegra--headache     Gemfibrozil      lopid--rash ??from med     Lisinopril      edema     Norvasc [Amlodipine Besylate]      Hair loss     Pioglitazone Hydrochloride Swelling     actos--ankle edema     Cortisone Rash     cream--rash     Doxazosin Mesylate Rash     cardura--rash     Physical Exam  LMP  (LMP Unknown)   GENERAL: healthy, alert and no distress  RESP: no audible wheeze, cough, or visible cyanosis.  No visible retractions or increased work of breathing.  Able to speak fully in complete sentences.  PSYCH: mentation appears normal, affect normal/bright, judgement and insight intact, normal speech and appearance well-groomed    RESULTS  Lab Results   Component Value Date    A1C 8.0 (H) 08/27/2021    A1C 8.1 (H) 01/08/2021    A1C 8.1 (H) 10/09/2020    A1C 8.7 (A) 08/07/2020    A1C 8.4 (H) 03/27/2020    A1C 8.7 (H) 04/23/2019    HEMOGLOBINA1 8.1 (A) 01/07/2020    HEMOGLOBINA1 8.7 (A) 04/22/2019    HEMOGLOBINA1 8.1 (A) 12/10/2018     HEMOGLOBINA1 8.1 (A) 08/30/2017       TSH   Date Value Ref Range Status   08/27/2021 2.37 0.40 - 4.00 mU/L Final   03/27/2020 3.47 0.40 - 4.00 mU/L Final   10/14/2019 2.54 0.40 - 4.00 mU/L Final   04/23/2019 4.06 (H) 0.40 - 4.00 mU/L Final   05/24/2018 2.54 0.40 - 4.00 mU/L Final   03/12/2018 4.78 (H) 0.40 - 4.00 mU/L Final     T4 Free   Date Value Ref Range Status   04/23/2019 1.10 0.76 - 1.46 ng/dL Final   03/12/2018 1.17 0.76 - 1.46 ng/dL Final   10/17/2016 1.17 0.76 - 1.46 ng/dL Final   05/05/2010 1.05 0.70 - 1.85 ng/dL Final   09/13/2007 1.00 0.70 - 1.85 ng/dL Final       ALT   Date Value Ref Range Status   04/13/2021 38 0 - 50 U/L Final   03/27/2020 28 0 - 50 U/L Final   ]    Recent Labs   Lab Test 08/27/21  0815 03/27/20  1023 06/17/16  0750 05/22/15  0848 05/12/14  0000 05/03/14  1018   CHOL 115 115   < > 175   < > 161   HDL 36* 34*   < > 32*   < > 39*   LDL 49 59   < > 76   < > 76   TRIG 148 112   < > 334*   < > 231*   CHOLHDLRATIO  --   --   --  5.5*  --  4.2    < > = values in this interval not displayed.       Lab Results   Component Value Date     04/13/2021      Lab Results   Component Value Date    POTASSIUM 4.4 09/13/2021    POTASSIUM 4.2 04/13/2021     Lab Results   Component Value Date    CHLORIDE 110 04/13/2021     Lab Results   Component Value Date    RASHEEDA 9.2 04/13/2021     Lab Results   Component Value Date    CO2 28 04/13/2021     Lab Results   Component Value Date    BUN 37 09/13/2021    BUN 29 04/13/2021     Lab Results   Component Value Date    CR 1.49 09/13/2021    CR 1.38 04/13/2021       GFR Estimate   Date Value Ref Range Status   09/13/2021 35 (L) >60 mL/min/1.73m2 Final     Comment:     As of July 11, 2021, eGFR is calculated by the CKD-EPI creatinine equation, without race adjustment. eGFR can be influenced by muscle mass, exercise, and diet. The reported eGFR is an estimation only and is only applicable if the renal function is stable.   08/27/2021 37 (L) >60 mL/min/1.73m2  Final     Comment:     As of July 11, 2021, eGFR is calculated by the CKD-EPI creatinine equation, without race adjustment. eGFR can be influenced by muscle mass, exercise, and diet. The reported eGFR is an estimation only and is only applicable if the renal function is stable.   04/13/2021 38 (L) >60 mL/min/[1.73_m2] Final     Comment:     Non  GFR Calc  Starting 12/18/2018, serum creatinine based estimated GFR (eGFR) will be   calculated using the Chronic Kidney Disease Epidemiology Collaboration   (CKD-EPI) equation.     03/24/2021 38 (L) >60 mL/min/[1.73_m2] Final     Comment:     Non  GFR Calc  Starting 12/18/2018, serum creatinine based estimated GFR (eGFR) will be   calculated using the Chronic Kidney Disease Epidemiology Collaboration   (CKD-EPI) equation.     11/13/2020 45 (L) >60 mL/min/[1.73_m2] Final     Comment:     Non  GFR Calc  Starting 12/18/2018, serum creatinine based estimated GFR (eGFR) will be   calculated using the Chronic Kidney Disease Epidemiology Collaboration   (CKD-EPI) equation.       GFR Estimate If Black   Date Value Ref Range Status   04/13/2021 45 (L) >60 mL/min/[1.73_m2] Final     Comment:      GFR Calc  Starting 12/18/2018, serum creatinine based estimated GFR (eGFR) will be   calculated using the Chronic Kidney Disease Epidemiology Collaboration   (CKD-EPI) equation.     03/24/2021 44 (L) >60 mL/min/[1.73_m2] Final     Comment:      GFR Calc  Starting 12/18/2018, serum creatinine based estimated GFR (eGFR) will be   calculated using the Chronic Kidney Disease Epidemiology Collaboration   (CKD-EPI) equation.     11/13/2020 52 (L) >60 mL/min/[1.73_m2] Final     Comment:      GFR Calc  Starting 12/18/2018, serum creatinine based estimated GFR (eGFR) will be   calculated using the Chronic Kidney Disease Epidemiology Collaboration   (CKD-EPI) equation.         Lab Results   Component Value  Date    MICROL 6 08/27/2021    MICROL 35 10/09/2020     No results found for: MICROALBUMIN  No results found for: CPEPT, GADAB, ISCAB    Vitamin B12   Date Value Ref Range Status   10/17/2016 2,075 (H) 193 - 986 pg/mL Final     Comment:     Interp: 247-911 = Normal   05/12/2012 742 >210 pg/mL Final     Comment:     Interp: 247-911 = Normal   ]    Most recent eye exam date: : Not Found     Assessment/Plan:     1.  Type 2 diabetes- Kaia's glucose control is fair.  Glucose is low in the morning, but climbing high post-prandially, particularly in the evening.  We discussed increasing Trulicity to 3 mg to help with post-prandial hyperglycemia and potential further weight loss.  She is tolerating the 1.5 mg dose and is interested in increasing this.  I anticipate she will have further AM hypoglycemia once her post-prandial glucose improves, so I advised her to back off Tresiba to 40 units daily once she starts higher dose trulicity.  No other changes today.     2. Risk factors:     Retinopathy:  No.  Had eye exam within last year. She does have macular edema and follows with ophthalmologist regularly.   Nephropathy:  BP historically well controlled.  No microalbuminuria.  She does have CKD  Neuropathy: Some tingling, numbness.  Tolerable.    Feet: OK, no ulcers.   Lipids:  On high dose statin    3.  F/U in 3 months as planned with Dr. Hoover, and see me in 6 months.      45 minutes spent on the date of the encounter doing chart review, review of test results, review and interpretation of glucose sensor data, patient visit and documentation, counseling/coordination of care, and discussion of follow up plan for worsening hyper and hypoglycemia.  The patient understood and is satisfied with today's visit.        Roxanne Masterson PA-C, MPAS   [facility]  Department of Medicine  Division of Endocrinology and Diabetes

## 2021-11-22 ENCOUNTER — VIRTUAL VISIT (OUTPATIENT)
Dept: ENDOCRINOLOGY | Facility: CLINIC | Age: 71
End: 2021-11-22
Payer: COMMERCIAL

## 2021-11-22 DIAGNOSIS — E11.40 TYPE 2 DIABETES MELLITUS WITH DIABETIC NEUROPATHY, WITH LONG-TERM CURRENT USE OF INSULIN (H): ICD-10-CM

## 2021-11-22 DIAGNOSIS — E11.40 POORLY CONTROLLED TYPE 2 DIABETES MELLITUS WITH NEUROPATHY (H): Primary | ICD-10-CM

## 2021-11-22 DIAGNOSIS — E11.21 WELL CONTROLLED TYPE 2 DIABETES MELLITUS WITH NEPHROPATHY (H): ICD-10-CM

## 2021-11-22 DIAGNOSIS — Z79.4 TYPE 2 DIABETES MELLITUS WITH DIABETIC NEUROPATHY, WITH LONG-TERM CURRENT USE OF INSULIN (H): ICD-10-CM

## 2021-11-22 DIAGNOSIS — E11.65 POORLY CONTROLLED TYPE 2 DIABETES MELLITUS WITH NEUROPATHY (H): Primary | ICD-10-CM

## 2021-11-22 PROCEDURE — 99215 OFFICE O/P EST HI 40 MIN: CPT | Mod: 95 | Performed by: PHYSICIAN ASSISTANT

## 2021-11-22 RX ORDER — FLASH GLUCOSE SENSOR
1 KIT MISCELLANEOUS
Qty: 2 EACH | Refills: 11 | Status: SHIPPED | OUTPATIENT
Start: 2021-11-22 | End: 2022-11-09

## 2021-11-22 RX ORDER — FLASH GLUCOSE SCANNING READER
1 EACH MISCELLANEOUS 3 TIMES DAILY
Qty: 1 EACH | Refills: 1 | Status: SHIPPED | OUTPATIENT
Start: 2021-11-22 | End: 2023-03-20 | Stop reason: ALTCHOICE

## 2021-11-22 NOTE — PATIENT INSTRUCTIONS
Nice seeing you today, Kaia!     Please increase Trulicity to 3 mg weekly.     When you start the higher dose trulicity, then lower the Tresiba from 50 units to 40 units.      Let me know if you start having low blood sugars, or if your glucose is over 250 mg/dL.

## 2021-11-22 NOTE — LETTER
11/22/2021       RE: Ade Wilkins  [address]  [facility] [address]     Dear Colleague,    Thank you for referring your patient, Ade Wilkins, to the [facility]OCRINOLOG[facility] [place] at [facility]. Please see a copy of my visit note below.    Ade is a 71 year old who is being evaluated via a billable video visit.      How would you like to obtain your AVS? [employer]  If the video visit is dropped, the invitation should be resent by: Text to cell phone: [phone]  Will anyone else be joining your video visit? No      Video Start Time: 0944  Video End Time: 1006    Outcome for 11/19/21 9:57 AM: Left Voicemail    Outcome for 11/19/21 11:36 AM: Pt plans to upload via Med[employer]ly later today. Is currently out of office. See Med[employer]ly message.      HPI:   Rachael is a 71 year old female with history morbid obesity S/P Gastric bypass surgery, hypothyroidism, CKD here for follow up of type 2 diabetes.  Her diabetes mellitus is complicated by diabetic neuropathy, mild nonproliferative retinopathy and nephropathy.  She also follows with . She has had type 2 Diabetes since her early 20s.      Today she reports she has been feeling pretty good and has no major concerns.  She wears the laurie sensor and thinks she needs a new , as hers is almost 3 years old.     Rachael is currently taking the following for her diabetes:     Tresiba 50 units at night.   Humalog- depends on what she is eating 2-3 units/carb.    Dulaglutide 1.5 mg weekly.   Metformin 1000 mg daily.   Jardiance 10 mg daily.     She eats smaller meals throughout the day.   Up at 9am- cream of wheat or oatmeal and tea.   10am- snack- toast   Lunch- bagel with ham or turkey  2-3pm- energy bar or something.   Dinner- around 6:30 or 7, jason callendars roast turkey with dressing, vegetables.   10:30pm- toast      Recent glucose is as follows:                 Arthritis in her  knees/hips bothering her.  She has problems getting around outside.  She usually has someone with her.  She goes out with a friend to the mall and the movies. She otherwise is generally feeling well and has no other concerns.       Past Medical History:   Diagnosis Date     Allergic rhinitis, cause unspecified      Hepatitis, unspecified 1968     Iron Deficiency Anemia 3/09     Lymphoma (H) 12/08     Nonsenile cataract      Obesity, unspecified      Other and unspecified hyperlipidemia      Rheumatic fever without mention of heart involvement infant     Type II or unspecified type diabetes mellitus without mention of complication, not stated as uncontrolled 1992     Unspecified essential hypertension      Unspecified hypothyroidism      Vitamin B12 deficiency 3/09       Past Surgical History:   Procedure Laterality Date     C APPENDECTOMY  1958     CATARACT IOL, RT/LT Left 12/21/2000    left     CATARACT IOL, RT/LT Right 01/06/2000    right     GASTRIC BYPASS  2/04    Dr. Hebert     LYMPH NODE BIOPSY  12/08    right groin, Dr. Licona     TONSILLECTOMY & ADENOIDECTOMY  1968     ZZC NONSPECIFIC PROCEDURE  1976    (leg) cystectomy       Family History   Problem Relation Age of Onset     Cardiovascular Father         AAA     Diabetes Mother      C.A.D. Mother         52     Eye Disorder Mother         glaucoma     Glaucoma Mother      Cardiovascular Brother         AAA, PVD     Cancer Brother         bladder     Cardiovascular Brother         AAA, valvular heart disease     Diabetes Brother         age 53     Cancer Brother         liver cancer     Glaucoma Other         MGGF     Macular Degeneration No family hx of        Social History     Socioeconomic History     Marital status:      Spouse name: Not on file     Number of children: 0     Years of education: Not on file     Highest education level: Not on file   Occupational History     Employer: [employer],[address] [employer]   Social Needs      Financial resource strain: Not on file     Food insecurity:     Worry: Not on file     Inability: Not on file     Transportation needs:     Medical: Not on file     Non-medical: Not on file   Tobacco Use     Smoking status: Never Smoker     Smokeless tobacco: Never Used   Substance and Sexual Activity     Alcohol use: No     Drug use: No     Sexual activity: Never     Partners: Male   Lifestyle     Physical activity:     Days per week: Not on file     Minutes per session: Not on file     Stress: Not on file   Relationships     Social connections:     Talks on phone: Not on file     Gets together: Not on file     Attends Quaker service: Not on file     Active member of club or organization: Not on file     Attends meetings of clubs or organizations: Not on file     Relationship status: Not on file     Intimate partner violence:     Fear of current or ex partner: Not on file     Emotionally abused: Not on file     Physically abused: Not on file     Forced sexual activity: Not on file   Other Topics Concern      Service Not Asked     Blood Transfusions Not Asked     Caffeine Concern Yes     Comment: 20 oz daily     Occupational Exposure Not Asked     Hobby Hazards Not Asked     Sleep Concern Not Asked     Stress Concern Not Asked     Weight Concern Not Asked     Special Diet Not Asked     Back Care Not Asked     Exercise No     Bike Helmet Not Asked     Seat Belt Yes     Self-Exams Yes     Parent/sibling w/ CABG, MI or angioplasty before 65F 55M? Not Asked   Social History Narrative    Living arrangements - the patient lives alone.   Social Hx: Lives with her sister and her sister's boyfriend.  . 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     Current Outpatient Medications   Medication     atorvastatin (LIPITOR) 80 MG tablet     augmented betamethasone dipropionate (DIPROLENE-AF) 0.05 % ointment     calcium carbonate-vitamin D (CALTRATE 600+D) 600-400 MG-UNIT CHEW     cetirizine (ZYRTEC)  10 MG tablet     Cholecalciferol (VITAMIN D) 2000 UNITS tablet     Continuous Blood Gluc  (FREESTYLE BALWINDER 14 DAY READER) HENNA     Continuous Blood Gluc Sensor (FREESTYLE BALWINDER 14 DAY SENSOR) MISC     cyanocolbalamin (VITAMIN B-12) 1000 MCG tablet     dulaglutide (TRULICITY) 1.5 MG/0.5ML pen     empagliflozin (JARDIANCE) 10 MG TABS tablet     ezetimibe (ZETIA) 10 MG tablet     fluticasone (FLONASE) 50 MCG/ACT nasal spray     furosemide (LASIX) 40 MG tablet     HUMALOG KWIKPEN 100 UNIT/ML soln     hydrocortisone (WESTCORT) 0.2 % cream     insulin degludec (TRESIBA) 200 UNIT/ML pen     insulin pen needle (BD FCO U/F) 32G X 4 MM miscellaneous     irbesartan (AVAPRO) 300 MG tablet     levothyroxine (SYNTHROID/LEVOTHROID) 50 MCG tablet     Magnesium 500 MG CAPS     metFORMIN (GLUCOPHAGE) 1000 MG tablet     ONE TOUCH DELICA LANCETS MISC     potassium chloride ER (KLOR-CON M) 10 MEQ CR tablet     spironolactone (ALDACTONE) 25 MG tablet     topiramate (TOPAMAX) 100 MG tablet     traZODone (DESYREL) 50 MG tablet     triamcinolone (KENALOG) 0.1 % external ointment     verapamil ER (CALAN-SR) 180 MG CR tablet     VITRON-C  MG TABS tablet     No current facility-administered medications for this visit.          Allergies   Allergen Reactions     Clonidine      headaches     Fexofenadine Hydrochloride      Allegra--headache     Gemfibrozil      lopid--rash ??from med     Lisinopril      edema     Norvasc [Amlodipine Besylate]      Hair loss     Pioglitazone Hydrochloride Swelling     actos--ankle edema     Cortisone Rash     cream--rash     Doxazosin Mesylate Rash     cardura--rash     Physical Exam  LMP  (LMP Unknown)   GENERAL: healthy, alert and no distress  RESP: no audible wheeze, cough, or visible cyanosis.  No visible retractions or increased work of breathing.  Able to speak fully in complete sentences.  PSYCH: mentation appears normal, affect normal/bright, judgement and insight intact, normal speech and  appearance well-groomed    RESULTS  Lab Results   Component Value Date    A1C 8.0 (H) 08/27/2021    A1C 8.1 (H) 01/08/2021    A1C 8.1 (H) 10/09/2020    A1C 8.7 (A) 08/07/2020    A1C 8.4 (H) 03/27/2020    A1C 8.7 (H) 04/23/2019    HEMOGLOBINA1 8.1 (A) 01/07/2020    HEMOGLOBINA1 8.7 (A) 04/22/2019    HEMOGLOBINA1 8.1 (A) 12/10/2018    HEMOGLOBINA1 8.1 (A) 08/30/2017       TSH   Date Value Ref Range Status   08/27/2021 2.37 0.40 - 4.00 mU/L Final   03/27/2020 3.47 0.40 - 4.00 mU/L Final   10/14/2019 2.54 0.40 - 4.00 mU/L Final   04/23/2019 4.06 (H) 0.40 - 4.00 mU/L Final   05/24/2018 2.54 0.40 - 4.00 mU/L Final   03/12/2018 4.78 (H) 0.40 - 4.00 mU/L Final     T4 Free   Date Value Ref Range Status   04/23/2019 1.10 0.76 - 1.46 ng/dL Final   03/12/2018 1.17 0.76 - 1.46 ng/dL Final   10/17/2016 1.17 0.76 - 1.46 ng/dL Final   05/05/2010 1.05 0.70 - 1.85 ng/dL Final   09/13/2007 1.00 0.70 - 1.85 ng/dL Final       ALT   Date Value Ref Range Status   04/13/2021 38 0 - 50 U/L Final   03/27/2020 28 0 - 50 U/L Final   ]    Recent Labs   Lab Test 08/27/21  0815 03/27/20  1023 06/17/16  0750 05/22/15  0848 05/12/14  0000 05/03/14  1018   CHOL 115 115   < > 175   < > 161   HDL 36* 34*   < > 32*   < > 39*   LDL 49 59   < > 76   < > 76   TRIG 148 112   < > 334*   < > 231*   CHOLHDLRATIO  --   --   --  5.5*  --  4.2    < > = values in this interval not displayed.       Lab Results   Component Value Date     04/13/2021      Lab Results   Component Value Date    POTASSIUM 4.4 09/13/2021    POTASSIUM 4.2 04/13/2021     Lab Results   Component Value Date    CHLORIDE 110 04/13/2021     Lab Results   Component Value Date    RASHEEDA 9.2 04/13/2021     Lab Results   Component Value Date    CO2 28 04/13/2021     Lab Results   Component Value Date    BUN 37 09/13/2021    BUN 29 04/13/2021     Lab Results   Component Value Date    CR 1.49 09/13/2021    CR 1.38 04/13/2021       GFR Estimate   Date Value Ref Range Status   09/13/2021 35 (L) >60  mL/min/1.73m2 Final     Comment:     As of July 11, 2021, eGFR is calculated by the CKD-EPI creatinine equation, without race adjustment. eGFR can be influenced by muscle mass, exercise, and diet. The reported eGFR is an estimation only and is only applicable if the renal function is stable.   08/27/2021 37 (L) >60 mL/min/1.73m2 Final     Comment:     As of July 11, 2021, eGFR is calculated by the CKD-EPI creatinine equation, without race adjustment. eGFR can be influenced by muscle mass, exercise, and diet. The reported eGFR is an estimation only and is only applicable if the renal function is stable.   04/13/2021 38 (L) >60 mL/min/[1.73_m2] Final     Comment:     Non  GFR Calc  Starting 12/18/2018, serum creatinine based estimated GFR (eGFR) will be   calculated using the Chronic Kidney Disease Epidemiology Collaboration   (CKD-EPI) equation.     03/24/2021 38 (L) >60 mL/min/[1.73_m2] Final     Comment:     Non  GFR Calc  Starting 12/18/2018, serum creatinine based estimated GFR (eGFR) will be   calculated using the Chronic Kidney Disease Epidemiology Collaboration   (CKD-EPI) equation.     11/13/2020 45 (L) >60 mL/min/[1.73_m2] Final     Comment:     Non  GFR Calc  Starting 12/18/2018, serum creatinine based estimated GFR (eGFR) will be   calculated using the Chronic Kidney Disease Epidemiology Collaboration   (CKD-EPI) equation.       GFR Estimate If Black   Date Value Ref Range Status   04/13/2021 45 (L) >60 mL/min/[1.73_m2] Final     Comment:      GFR Calc  Starting 12/18/2018, serum creatinine based estimated GFR (eGFR) will be   calculated using the Chronic Kidney Disease Epidemiology Collaboration   (CKD-EPI) equation.     03/24/2021 44 (L) >60 mL/min/[1.73_m2] Final     Comment:      GFR Calc  Starting 12/18/2018, serum creatinine based estimated GFR (eGFR) will be   calculated using the Chronic Kidney Disease Epidemiology  Collaboration   (CKD-EPI) equation.     11/13/2020 52 (L) >60 mL/min/[1.73_m2] Final     Comment:      GFR Calc  Starting 12/18/2018, serum creatinine based estimated GFR (eGFR) will be   calculated using the Chronic Kidney Disease Epidemiology Collaboration   (CKD-EPI) equation.         Lab Results   Component Value Date    MICROL 6 08/27/2021    MICROL 35 10/09/2020     No results found for: MICROALBUMIN  No results found for: CPEPT, GADAB, ISCAB    Vitamin B12   Date Value Ref Range Status   10/17/2016 2,075 (H) 193 - 986 pg/mL Final     Comment:     Interp: 247-911 = Normal   05/12/2012 742 >210 pg/mL Final     Comment:     Interp: 247-911 = Normal   ]    Most recent eye exam date: : Not Found     Assessment/Plan:     1.  Type 2 diabetes- Kaia's glucose control is fair.  Glucose is low in the morning, but climbing high post-prandially, particularly in the evening.  We discussed increasing Trulicity to 3 mg to help with post-prandial hyperglycemia and potential further weight loss.  She is tolerating the 1.5 mg dose and is interested in increasing this.  I anticipate she will have further AM hypoglycemia once her post-prandial glucose improves, so I advised her to back off Tresiba to 40 units daily once she starts higher dose trulicity.  No other changes today.     2. Risk factors:     Retinopathy:  No.  Had eye exam within last year. She does have macular edema and follows with ophthalmologist regularly.   Nephropathy:  BP historically well controlled.  No microalbuminuria.  She does have CKD  Neuropathy: Some tingling, numbness.  Tolerable.    Feet: OK, no ulcers.   Lipids:  On high dose statin    3.  F/U in 3 months as planned with Dr. Hoover, and see me in 6 months.      45 minutes spent on the date of the encounter doing chart review, review of test results, review and interpretation of glucose sensor data, patient visit and documentation, counseling/coordination of care, and discussion of  follow up plan for worsening hyper and hypoglycemia.  The patient understood and is satisfied with today's visit.        Roxanne Masterson PA-C, MPAS   [facility]  Department of Medicine  Division of Endocrinology and Diabetes

## 2021-11-24 ENCOUNTER — LAB (OUTPATIENT)
Dept: LAB | Facility: CLINIC | Age: 71
End: 2021-11-24
Payer: COMMERCIAL

## 2021-11-24 DIAGNOSIS — E11.65 POORLY CONTROLLED TYPE 2 DIABETES MELLITUS WITH NEUROPATHY (H): ICD-10-CM

## 2021-11-24 DIAGNOSIS — E11.40 POORLY CONTROLLED TYPE 2 DIABETES MELLITUS WITH NEUROPATHY (H): ICD-10-CM

## 2021-11-24 LAB
ANION GAP SERPL CALCULATED.3IONS-SCNC: 8 MMOL/L (ref 3–14)
BUN SERPL-MCNC: 25 MG/DL (ref 7–30)
CALCIUM SERPL-MCNC: 9.3 MG/DL (ref 8.5–10.1)
CHLORIDE BLD-SCNC: 108 MMOL/L (ref 94–109)
CO2 SERPL-SCNC: 23 MMOL/L (ref 20–32)
CREAT SERPL-MCNC: 1.48 MG/DL (ref 0.52–1.04)
GFR SERPL CREATININE-BSD FRML MDRD: 35 ML/MIN/1.73M2
GLUCOSE BLD-MCNC: 227 MG/DL (ref 70–99)
HBA1C MFR BLD: 7.6 % (ref 0–5.6)
POTASSIUM BLD-SCNC: 4.3 MMOL/L (ref 3.4–5.3)
SODIUM SERPL-SCNC: 139 MMOL/L (ref 133–144)

## 2021-11-24 PROCEDURE — 36415 COLL VENOUS BLD VENIPUNCTURE: CPT

## 2021-11-24 PROCEDURE — 80048 BASIC METABOLIC PNL TOTAL CA: CPT

## 2021-11-24 PROCEDURE — 83036 HEMOGLOBIN GLYCOSYLATED A1C: CPT

## 2021-12-11 ENCOUNTER — MYC MEDICAL ADVICE (OUTPATIENT)
Dept: ENDOCRINOLOGY | Facility: CLINIC | Age: 71
End: 2021-12-11
Payer: COMMERCIAL

## 2021-12-11 DIAGNOSIS — E11.65 POORLY CONTROLLED TYPE 2 DIABETES MELLITUS WITH NEUROPATHY (H): ICD-10-CM

## 2021-12-11 DIAGNOSIS — E11.40 POORLY CONTROLLED TYPE 2 DIABETES MELLITUS WITH NEUROPATHY (H): ICD-10-CM

## 2021-12-11 DIAGNOSIS — Z79.4 TYPE 2 DIABETES MELLITUS WITH DIABETIC NEUROPATHY, WITH LONG-TERM CURRENT USE OF INSULIN (H): ICD-10-CM

## 2021-12-11 DIAGNOSIS — E11.40 TYPE 2 DIABETES MELLITUS WITH DIABETIC NEUROPATHY, WITH LONG-TERM CURRENT USE OF INSULIN (H): ICD-10-CM

## 2021-12-13 RX ORDER — INSULIN LISPRO 100 [IU]/ML
INJECTION, SOLUTION INTRAVENOUS; SUBCUTANEOUS
Qty: 60 ML | Refills: 3 | Status: SHIPPED | OUTPATIENT
Start: 2021-12-13 | End: 2023-01-23

## 2021-12-15 ENCOUNTER — TELEPHONE (OUTPATIENT)
Dept: ENDOCRINOLOGY | Facility: CLINIC | Age: 71
End: 2021-12-15

## 2021-12-15 NOTE — TELEPHONE ENCOUNTER
Prior Authorization Retail Medication Request    Medication/Dose: (TRESIBA) 200 UNIT/ML pen  ICD code (if different than what is on RX):  Type 2 diabetes mellitus with diabetic neuropathy, with long-term current use of insulin (H) [E11.40, Z79.4]   Previously Tried and Failed:    Rationale:      Insurance Name:  [facility]  Insurance ID:  [id number]    Pharmacy Information (if different than what is on RX)  Name:  [id number] - [place], [place] - [address] [facility]  Phone:  [phone]

## 2021-12-17 NOTE — TELEPHONE ENCOUNTER
Central Prior Authorization Team   Phone: [phone]      PA Initiation    Medication: (TRESIBA) 200 UNIT/ML pen-PA initiated  Insurance Company: [employer] [place] - Phone [phone] Fax [phone]  Pharmacy Filling the Rx: [id number] - [place], [place] - [address] [facility]  Filling Pharmacy Phone: [phone]  Filling Pharmacy Fax:    Start Date: 12/17/2021

## 2021-12-21 NOTE — TELEPHONE ENCOUNTER
I received approval letter from [employer], but there are no dates on it. I spoke to Dio at [employer] and she said it is already on the formulary and doesn't need a PA. The letter should have stated this, but the bottom of the letter is cut off. She would not resent the letter, saying it came from [facility]. She tried to transfer me to them, but cut me off.       Prior Authorization Not Needed per Insurance    Medication: (TRESIBA) 200 UNIT/ML pen-PA initiated  Insurance Company: [facility] - Phone [phone] Fax [phone]  Expected CoPay:      Pharmacy Filling the Rx: [id number] - [place], [place] - [address] [facility]  Pharmacy Notified:  Yes  Patient Notified:  No

## 2022-01-15 NOTE — TELEPHONE ENCOUNTER
"Requested Prescriptions   Pending Prescriptions Disp Refills     traZODone (DESYREL) 50 MG tablet [Pharmacy Med Name: TraZODone HCl Oral Tablet 50 MG] 180 tablet PRN    Last Written Prescription Date:  8/21/2017  Last Fill Quantity: 180,  # refills: prn   Last office visit: 1/18/2018 with prescribing provider:  1/18/2018   Future Office Visit:     Sig: take 1 to 2 tablets (50 to 100mg) by mouth nightly as needed    Serotonin Modulators Passed    9/7/2018  7:00 AM       Passed - Recent (12 mo) or future (30 days) visit within the authorizing provider's specialty    Patient had office visit in the last 12 months or has a visit in the next 30 days with authorizing provider or within the authorizing provider's specialty.  See \"Patient Info\" tab in inbasket, or \"Choose Columns\" in Meds & Orders section of the refill encounter.           Passed - Patient is age 18 or older       Passed - No active pregnancy on record       Passed - No positive pregnancy test in past 12 months          "
Prescription approved per [facility] Refill Protocol.    
No

## 2022-02-02 DIAGNOSIS — E11.21 WELL CONTROLLED TYPE 2 DIABETES MELLITUS WITH NEPHROPATHY (H): ICD-10-CM

## 2022-02-03 RX ORDER — FLASH GLUCOSE SENSOR
KIT MISCELLANEOUS
Qty: 2 EACH | Refills: 0 | OUTPATIENT
Start: 2022-02-03

## 2022-02-16 DIAGNOSIS — I10 ESSENTIAL HYPERTENSION: ICD-10-CM

## 2022-02-16 NOTE — PROGRESS NOTES
Outcome for 02/16/22 2:38 PM: 3sun message sent   Outcome for 02/17/22 1:54 PM: Spoke with patient. She will upload Wyatt data on friday.    Nila Rowell on 2/17/2022 at 1:54 PM  Outcome for 02/21/22 9:38 AM: Data emailed to provider   Nila Rowell on 2/21/2022 at 9:38 AM        Ade is a 70 year old who is being evaluated via a billable video visit.      How would you like to obtain your AVS? [email or web address]  If the video visit is dropped, the invitation should be re    Endocrinology and Diabetes Clinic      Ade Wilkins is a 70 year old female who is being evaluated via a billable video visit.        Video Start Time: 9:34am  Video End Time: 9:55am    Interval history:   Ade Wilkins is a 71 year old T2DM here for 6m follow up  Saw Roxanne Masterson in 12/21, Trulicity increased form 1.5 -> 3 mg q week  Currently staying up throughout the night.               Diabetes medication  Empagliflozin 10 mg  Dulaglutide 3 mg (trulicity)  Tresiba 36-38 units  Novolog 8-10 units with for 3 meals a day, not with breakfast as preprandial BGs are good.      Concern is fatigued, is sleeping 10-12 hours, then does not sleep, no pain; goes to bed very late    [facility]; has been doing labs there    Obesity  Status post Noe-en-Y bariatric surgery,  Patient is on topiramate  Weight is stable at about 220 pounds, the patient does not have a scale at home    Complications  Eyes; R eye proliereative retinopathy, is receiving injection q 7w  Feet: left ankle peripheral edema, resolved in the morning, wearing compression stockings, low salt diet, on Furosemide 40 mg once daily, denies numbness and tinglin  Had been skipping Novolog with dinner  CRI:   Renal function actually improved on recent labs compared to years before!  Mild albuminuria    HTN: Irbesartan 300 mg, Spironolactone 25 mg daily, Furosemide  Lipids: Atorvastatin 80 mg daily, Zetia 10 mg daily    Assessment:  1. Elderly woman  with T2DM longstanding insulin with moderate blood glucose control on tresiba, Novolog, Trulicity and Empagliflozin complication of retinopathy and CRI with hypertension, hypokalemia,     HTN: on Irbesartan max dose, otherwise Furosemide and Verapamil; intolerant to Lisiniopritl, history of low potassium on supplements; bloodpressure control very important consdiering CRI, pt with current problems with dizziness, therefore would avoid Chlorthalidone for now    Will obtain Micha/Renin to eval consdiering history of hypokalemia    Dyslipidemia: on 80mg of Atorvastatin and Ezetimide, LDL below 70     St post bariatric surgery: doing well on supplements, on Topiramate for weight maintenance and migraines     Hypothyroidism doing well on Levothyroxine 150 mcg daily    Plan  Continue Tresiba 36 units or less daily  Cont metformin  Empagliflozin 10 mg cont  Cont Trulicity  Increase Novolog to 10-12 units with meals    Decrease Potassium to 10 MEQ 1 tab twice daily    Follow up with Roxanne aMsterson in 3 months  Follow up in 6 months    Lab prior to next visit    40 minutes spent on the date of the encounter doing chart review, review of test results, interpretation of tests, patient visit and documentation       Olga Lidia Hoover MD  Endocrinology and Diabetes  Telephone contact:  [facility] [place] [phone]  [facility] [phone]            Medications:   Current Outpatient Prescriptions          Current Outpatient Medications   Medication Sig Dispense Refill     atorvastatin (LIPITOR) 80 MG tablet TAKE ONE TABLET BY MOUTH ONE TIME DAILY 90 tablet 3     atorvastatin (LIPITOR) 80 MG tablet Take 1 tablet (80 mg) by mouth daily 30 tablet 1     augmented betamethasone dipropionate (DIPROLENE-AF) 0.05 % ointment Apply to AA BID x 3-4 weeks then PRN 45 g 0     BD FCO U/F 32G X 4 MM insulin pen needle USE 4 TIMES A DAY WITH INSULIN AND VICTOZA INJECTIONS 400 each 1     blood  glucose (MARTITA CONTOUR NEXT) test strip Use to test blood sugar 5 times daily or as directed. 100 each 5     blood glucose (NO BRAND SPECIFIED) test strip Use to test blood sugar 5 times daily. 500 each 3     blood glucose monitoring (MARTITA CONTOUR NEXT) test strip Use to test blood sugar 4 times daily or as directed. 1 Box 9     calcium carbonate-vitamin D (CALTRATE 600+D) 600-400 MG-UNIT CHEW Take 2 chew tab by mouth every evening         cefPROZIL (CEFZIL) 500 MG tablet Take 1 tablet (500 mg) by mouth 2 times daily 20 tablet 0     cetirizine (ZYRTEC) 10 MG tablet Take 1 tablet (10 mg) by mouth daily         Cholecalciferol (VITAMIN D) 2000 UNITS tablet Take 2,000 Units by mouth daily         Continuous Blood Gluc  (FREESTYLE BALWINDER 14 DAY READER) HENNA 1 each 3 times daily 1 Device 1     Continuous Blood Gluc Sensor (MobimediaSTYLE BALWINDER 14 DAY SENSOR) MISC 1 each every 14 days 2 each 11     CONTOUR NEXT TEST test strip USE TO TEST BLOOD SUGAR 4 TIMES DAILY OR AS DIRECTED. 100 each 8     cyanocolbalamin (VITAMIN B-12) 1000 MCG tablet Take 1,000 mcg by mouth every other day          dulaglutide (TRULICITY) 1.5 MG/0.5ML pen Inject 1.5 mg Subcutaneous every 7 days 6 mL 3     fenofibrate (TRIGLIDE/LOFIBRA) 160 MG tablet TAKE ONE TABLET BY MOUTH ONE TIME DAILY 90 tablet 3     fluticasone (FLONASE) 50 MCG/ACT nasal spray Spray 2 sprays into both nostrils daily 16 mL 1     fluticasone (FLONASE) 50 MCG/ACT nasal spray Spray 2 sprays into both nostrils daily 16 g 1     furosemide (LASIX) 20 MG tablet Take 1 tablet (20 mg) by mouth daily 90 tablet 3     HUMALOG KWIKPEN 100 UNIT/ML soln Use as directed up to 60 units per day. 15 mL 11      hydrocortisone (WESTCORT) 0.2 % cream Apply topically 2 times daily Apply sparingly to affected area 2 times daily as needed. 45 g 2     insulin degludec (TRESIBA) 200 UNIT/ML pen Inject 72units subcu daily 80 mL 3     irbesartan (AVAPRO) 300 MG tablet TAKE ONE TABLET BY MOUTH ONE TIME  "DAILY  90 tablet 3     KLOR-CON 10 MEQ CR tablet TAKE ONE TABLET BY MOUTH ONE TIME DAILY  90 tablet 0     levothyroxine (SYNTHROID/LEVOTHROID) 50 MCG tablet TAKE 1 TABLET BY MOUTH DAILY. 90 tablet 2     Magnesium 500 MG CAPS Take 1 capsule by mouth daily.         metFORMIN (GLUCOPHAGE) 1000 MG tablet 1 tab twice daily 180 tablet 1     ONE TOUCH DELICA LANCETS MISC 1 Device 4 times daily. 100 Stick prn     potassium chloride ER (K-TAB/KLOR-CON) 10 MEQ CR tablet TAKE ONE TABLET BY MOUTH ONE TIME DAILY  90 tablet 0     topiramate (TOPAMAX) 25 MG tablet take 1 tablet (25 mg) at bedtime for 1 week, then take 2 tablets (50 mg) at bedtime for 1 week, and then 3 tablet (75 mg) daily at bedtime t 90 tablet 2     traZODone (DESYREL) 50 MG tablet take 1 to 2 tablets (50 to 100mg) by mouth nightly as needed 180 tablet PRN     traZODone (DESYREL) 50 MG tablet take 1 to 2 tablets (50 to 100mg) by mouth nightly as needed 180 tablet 0     verapamil ER (CALAN-SR) 180 MG CR tablet TAKE ONE TABLET BY MOUTH TWICE DAILY  180 tablet 3     VITRON-C  MG TABS tablet TAKE TWO TABLETS BY MOUTH TWICE DAILY  360 tablet 1           Review of Systems: in addition to stated above  GENERAL: Negative  SKIN: Negative  HENT: Negative   EYE: Negative  HEART: Negative  RESPIRATORY: Negative   GI: Negative  : Negative  MSK: Negative  BLOOD/LYMPH: Negative  NEUROLOGIC: Negative   PSYCH: Negative     Physical Examination:  Check blood pressure 140s/57 mmHg  Blood pressure 133/69, pulse 85, height 1.575 m (5' 2\"), weight 101.2 kg (223 lb), not currently   breastfeeding.  Body mass index is 40.79 kg/m .       Wt Readings from Last 4 Encounters:   03/24/21 97.1 kg (214 lb)   01/07/20 101.2 kg (223 lb)   10/14/19 100.7 kg (222 lb 1.6 oz)   06/10/19 100.4 kg (221 lb 4.8 oz)      Reported vitals:  There were no vitals taken for this visit.   healthy, alert and no distress  PSYCH: Alert and oriented times 3; coherent speech, normal   rate and volume, able " to articulate logical thoughts, able   to abstract reason, no tangential thoughts, no hallucinations   or delusions  Her affect is normal and pleasant  RESP: No cough, no audible wheezing, able to talk in full sentences  Remainder of exam unable to be completed due to telephone visits           Wt Readings from Last 4 Encounters:   01/07/20 101.2 kg (223 lb)   10/14/19 100.7 kg (222 lb 1.6 oz)   06/10/19 100.4 kg (221 lb 4.8 oz)   04/22/19 104.7 kg (230 lb 14.4 oz)     Lab Results   Component Value Date    CR 1.48 (H) 11/24/2021    CR 1.49 (H) 09/13/2021    CR 1.43 (H) 08/27/2021    CR 1.38 (H) 04/13/2021    CR 1.40 (H) 03/24/2021    CR 1.21 (H) 11/13/2020    CR 1.13 (H) 10/09/2020    CR 1.55 (H) 03/27/2020    CR 1.61 (H) 04/23/2019    CR 1.63 (H) 12/10/2018          Lab Results   Component Value Date     11/24/2021    CHLORIDE 108 11/24/2021    CO2 23 11/24/2021     (H) 11/24/2021    CR 1.48 (H) 11/24/2021    CR 1.49 (H) 09/13/2021    CR 1.43 (H) 08/27/2021    CR 1.38 (H) 04/13/2021    CR 1.40 (H) 03/24/2021    RASHEEDA 9.3 11/24/2021    ALBUMIN 3.7 04/13/2021    ALKPHOS 103 04/13/2021    LDL 49 08/27/2021    HDL 36 (L) 08/27/2021    TRIG 148 08/27/2021     Lab Results   Component Value Date    TSH 2.37 08/27/2021    TSH 3.47 03/27/2020    TSH 2.54 10/14/2019     Lab Results   Component Value Date    POTASSIUM 4.3 11/24/2021     Lab Results   Component Value Date    POTASSIUM 4.3 11/24/2021    POTASSIUM 4.4 09/13/2021    POTASSIUM 4.0 08/27/2021         Lab Results   Component Value Date    MICROL 6 08/27/2021    MICROL 35 10/09/2020    MICROL 10 01/07/2020    MICROL 13 12/10/2018    MICROL 37 08/25/2017     Lab Results   Component Value Date    A1C 7.6 (H) 11/24/2021    A1C 8.0 (H) 08/27/2021    A1C 8.1 (H) 01/08/2021    A1C 8.1 (H) 10/09/2020    A1C 8.7 (A) 08/07/2020       Lab Results   Component Value Date    HGB 11.6 (L) 01/08/2021                 Answers for HPI/ROS submitted by the patient on  2/18/2022  General Symptoms: No  Skin Symptoms: No  HENT Symptoms: Yes  EYE SYMPTOMS: No  HEART SYMPTOMS: No  LUNG SYMPTOMS: No  INTESTINAL SYMPTOMS: No  URINARY SYMPTOMS: No  GYNECOLOGIC SYMPTOMS: No  BREAST SYMPTOMS: No  SKELETAL SYMPTOMS: No  BLOOD SYMPTOMS: No  NERVOUS SYSTEM SYMPTOMS: No  MENTAL HEALTH SYMPTOMS: No  Ear pain: Yes  Ear discharge: No  Hearing loss: No  Tinnitus: Yes  Nosebleeds: No  Congestion: No  Sinus pain: Yes  Trouble swallowing: No   Voice hoarseness: Yes  Mouth sores: No  Sore throat: Yes  Tooth pain: No  Gum tenderness: No  Bleeding gums: No  Change in taste: No  Change in sense of smell: No  Dry mouth: Yes  Hearing aid used: No  Neck lump: No

## 2022-02-16 NOTE — LETTER
[facility]  [address] [address]  [phone]      2/18/2022       Ade Wilkins  [address] [address]        Dear Ade,    In reviewing your recent refill request, it was noted that you are due for an establish care appointment with a new physician. Refills will be approved during your follow up visit. If you have already established care with a new provider, please contact them for your refill and disregard this message.     Please visit [facility] or call the clinic to schedule an appointment at [phone] at your convenience.       Sincerely,      [facility]

## 2022-02-17 RX ORDER — POTASSIUM CHLORIDE 750 MG/1
TABLET, EXTENDED RELEASE ORAL
Qty: 360 TABLET | Refills: 0 | OUTPATIENT
Start: 2022-02-17

## 2022-02-17 NOTE — TELEPHONE ENCOUNTER
Routing refill request to provider for review/approval because:  Drug interaction warning  Ronda Story RN

## 2022-02-18 ENCOUNTER — MYC MEDICAL ADVICE (OUTPATIENT)
Dept: INTERNAL MEDICINE | Facility: CLINIC | Age: 72
End: 2022-02-18
Payer: COMMERCIAL

## 2022-02-18 ASSESSMENT — ENCOUNTER SYMPTOMS
TASTE DISTURBANCE: 0
HOARSE VOICE: 1
SMELL DISTURBANCE: 0
SORE THROAT: 1
SINUS CONGESTION: 0
TROUBLE SWALLOWING: 0
NECK MASS: 0
SINUS PAIN: 1

## 2022-02-22 ENCOUNTER — VIRTUAL VISIT (OUTPATIENT)
Dept: ENDOCRINOLOGY | Facility: CLINIC | Age: 72
End: 2022-02-22
Payer: COMMERCIAL

## 2022-02-22 DIAGNOSIS — E66.89 OTHER OBESITY: ICD-10-CM

## 2022-02-22 DIAGNOSIS — E03.9 HYPOTHYROIDISM, UNSPECIFIED TYPE: ICD-10-CM

## 2022-02-22 DIAGNOSIS — E08.22 DIABETES MELLITUS DUE TO UNDERLYING CONDITION WITH DIABETIC CHRONIC KIDNEY DISEASE, UNSPECIFIED CKD STAGE, UNSPECIFIED WHETHER LONG TERM INSULIN USE (H): ICD-10-CM

## 2022-02-22 DIAGNOSIS — E11.40 TYPE 2 DIABETES MELLITUS WITH DIABETIC NEUROPATHY, WITH LONG-TERM CURRENT USE OF INSULIN (H): Primary | ICD-10-CM

## 2022-02-22 DIAGNOSIS — Z98.84 BARIATRIC SURGERY STATUS: ICD-10-CM

## 2022-02-22 DIAGNOSIS — Z79.4 TYPE 2 DIABETES MELLITUS WITH DIABETIC NEUROPATHY, WITH LONG-TERM CURRENT USE OF INSULIN (H): Primary | ICD-10-CM

## 2022-02-22 DIAGNOSIS — I10 ESSENTIAL HYPERTENSION: ICD-10-CM

## 2022-02-22 PROCEDURE — 99215 OFFICE O/P EST HI 40 MIN: CPT | Mod: 95 | Performed by: INTERNAL MEDICINE

## 2022-02-22 RX ORDER — LEVOTHYROXINE SODIUM 50 UG/1
50 TABLET ORAL DAILY
Qty: 90 TABLET | Refills: 3 | Status: SHIPPED | OUTPATIENT
Start: 2022-02-22 | End: 2023-05-08

## 2022-02-22 RX ORDER — POTASSIUM CHLORIDE 750 MG/1
10 TABLET, EXTENDED RELEASE ORAL 2 TIMES DAILY
Qty: 360 TABLET | Refills: 3 | Status: SHIPPED | OUTPATIENT
Start: 2022-02-22 | End: 2023-03-20

## 2022-02-22 NOTE — PATIENT INSTRUCTIONS
Increase Novolog 10-12 Units with meals    Otherwise continue Trulicity Jardiance metformin and Tresiba at current dose    Continue levothyroxine at 50 mcg daily    Continue iron vitamin B-12 and vitamin D supplements for s/p bariatric surgery    Decrease potassium 10mEq to 1 tablet twice a day    Follow-up with diabetes clinic in 3 months and myself in 6 months    Lab work prior to next visit    Please contact us to schedule at any of our [place] lab locations  Call [phone] ([phone]), select option 1    Or you can schedule via [employer]

## 2022-02-22 NOTE — PROGRESS NOTES
Ade is a 71 year old who is being evaluated via a billable video visit.      Patient denies any changes since echeck-in regarding medication and allergies and states all information entered during echeck-in remains accurate.    Samira SOTELO    How would you like to obtain your AVS? MyChart  If the video visit is dropped, the invitation should be resent by: Text to cell phone: [phone]  Will anyone else be joining your video visit? No

## 2022-02-22 NOTE — LETTER
2/22/2022       RE: Ade Wilkins  [address]  [place] [address]     Dear Colleague,    Thank you for referring your patient, Ade Wilkins, to the [facility] [place] at [facility]. Please see a copy of my visit note below.    Outcome for 02/16/22 2:38 PM: Bridestory message sent   Outcome for 02/17/22 1:54 PM: Spoke with patient. She will upload Wyatt data on friday.    Nila Rowell on 2/17/2022 at 1:54 PM  Outcome for 02/21/22 9:38 AM: Data emailed to provider   Nila Rowell on 2/21/2022 at 9:38 AM        Ade is a 70 year old who is being evaluated via a billable video visit.      How would you like to obtain your AVS? [employer]  If the video visit is dropped, the invitation should be re    Endocrinology and Diabetes Clinic      Ade Wilkins is a 70 year old female who is being evaluated via a billable video visit.        Video Start Time: 9:34am  Video End Time: 9:55am    Interval history:   Ade Wilkins is a 71 year old T2DM here for 6m follow up  Saw Roxanne Masterson in 12/21, Trulicity increased form 1.5 -> 3 mg q week  Currently staying up throughout the night.               Diabetes medication  Empagliflozin 10 mg  Dulaglutide 3 mg (trulicity)  Tresiba 36-38 units  Novolog 8-10 units with for 3 meals a day, not with breakfast as preprandial BGs are good.      Concern is fatigued, is sleeping 10-12 hours, then does not sleep, no pain; goes to bed very late    Has seen Oncologist Dr Art at [facility]; has been doing labs there    Obesity  Status post Noe-en-Y bariatric surgery,  Patient is on topiramate  Weight is stable at about 220 pounds, the patient does not have a scale at home    Complications  Eyes; R eye proliereative retinopathy, is receiving injection q 7w  Feet: left ankle peripheral edema, resolved in the morning, wearing compression stockings, low salt diet, on Furosemide 40 mg once  daily, denies numbness and tinglin  Had been skipping Novolog with dinner  CRI:   Renal function actually improved on recent labs compared to years before!  Mild albuminuria    HTN: Irbesartan 300 mg, Spironolactone 25 mg daily, Furosemide  Lipids: Atorvastatin 80 mg daily, Zetia 10 mg daily    Assessment:  1. Elderly woman with T2DM longstanding insulin with moderate blood glucose control on tresiba, Novolog, Trulicity and Empagliflozin complication of retinopathy and CRI with hypertension, hypokalemia,     HTN: on Irbesartan max dose, otherwise Furosemide and Verapamil; intolerant to Lisiniopritl, history of low potassium on supplements; bloodpressure control very important consdiering CRI, pt with current problems with dizziness, therefore would avoid Chlorthalidone for now    Will obtain Micha/Renin to eval consdiering history of hypokalemia    Dyslipidemia: on 80mg of Atorvastatin and Ezetimide, LDL below 70     St post bariatric surgery: doing well on supplements, on Topiramate for weight maintenance and migraines     Hypothyroidism doing well on Levothyroxine 150 mcg daily    Plan  Continue Tresiba 36 units or less daily  Cont metformin  Empagliflozin 10 mg cont  Cont Trulicity  Increase Novolog to 10-12 units with meals    Decrease Potassium to 10 MEQ 1 tab twice daily    Follow up with Roxanne Masterson in 3 months  Follow up in 6 months    Lab prior to next visit    40 minutes spent on the date of the encounter doing chart review, review of test results, interpretation of tests, patient visit and documentation       Olga Lidia Hoover MD  Endocrinology and Diabetes  Telephone contact:  [facility] [place] [phone]  [facility] [phone]            Medications:   Current Outpatient Prescriptions          Current Outpatient Medications   Medication Sig Dispense Refill     atorvastatin (LIPITOR) 80 MG tablet TAKE ONE TABLET BY MOUTH ONE TIME DAILY 90 tablet 3      atorvastatin (LIPITOR) 80 MG tablet Take 1 tablet (80 mg) by mouth daily 30 tablet 1     augmented betamethasone dipropionate (DIPROLENE-AF) 0.05 % ointment Apply to AA BID x 3-4 weeks then PRN 45 g 0     BD FCO U/F 32G X 4 MM insulin pen needle USE 4 TIMES A DAY WITH INSULIN AND VICTOZA INJECTIONS 400 each 1     blood glucose (MARTITA CONTOUR NEXT) test strip Use to test blood sugar 5 times daily or as directed. 100 each 5     blood glucose (NO BRAND SPECIFIED) test strip Use to test blood sugar 5 times daily. 500 each 3     blood glucose monitoring (MARTITA CONTOUR NEXT) test strip Use to test blood sugar 4 times daily or as directed. 1 Box 9     calcium carbonate-vitamin D (CALTRATE 600+D) 600-400 MG-UNIT CHEW Take 2 chew tab by mouth every evening         cefPROZIL (CEFZIL) 500 MG tablet Take 1 tablet (500 mg) by mouth 2 times daily 20 tablet 0     cetirizine (ZYRTEC) 10 MG tablet Take 1 tablet (10 mg) by mouth daily         Cholecalciferol (VITAMIN D) 2000 UNITS tablet Take 2,000 Units by mouth daily         Continuous Blood Gluc  (FREESTYLE BALWINDER 14 DAY READER) HENNA 1 each 3 times daily 1 Device 1     Continuous Blood Gluc Sensor (FREESTYLE BALWINDER 14 DAY SENSOR) MISC 1 each every 14 days 2 each 11     CONTOUR NEXT TEST test strip USE TO TEST BLOOD SUGAR 4 TIMES DAILY OR AS DIRECTED. 100 each 8     cyanocolbalamin (VITAMIN B-12) 1000 MCG tablet Take 1,000 mcg by mouth every other day          dulaglutide (TRULICITY) 1.5 MG/0.5ML pen Inject 1.5 mg Subcutaneous every 7 days 6 mL 3     fenofibrate (TRIGLIDE/LOFIBRA) 160 MG tablet TAKE ONE TABLET BY MOUTH ONE TIME DAILY 90 tablet 3     fluticasone (FLONASE) 50 MCG/ACT nasal spray Spray 2 sprays into both nostrils daily 16 mL 1     fluticasone (FLONASE) 50 MCG/ACT nasal spray Spray 2 sprays into both nostrils daily 16 g 1     furosemide (LASIX) 20 MG tablet Take 1 tablet (20 mg) by mouth daily 90 tablet 3     HUMALOG KWIKPEN 100 UNIT/ML soln Use as directed up  "to 60 units per day. 15 mL 11      hydrocortisone (WESTCORT) 0.2 % cream Apply topically 2 times daily Apply sparingly to affected area 2 times daily as needed. 45 g 2     insulin degludec (TRESIBA) 200 UNIT/ML pen Inject 72units subcu daily 80 mL 3     irbesartan (AVAPRO) 300 MG tablet TAKE ONE TABLET BY MOUTH ONE TIME DAILY  90 tablet 3     KLOR-CON 10 MEQ CR tablet TAKE ONE TABLET BY MOUTH ONE TIME DAILY  90 tablet 0     levothyroxine (SYNTHROID/LEVOTHROID) 50 MCG tablet TAKE 1 TABLET BY MOUTH DAILY. 90 tablet 2     Magnesium 500 MG CAPS Take 1 capsule by mouth daily.         metFORMIN (GLUCOPHAGE) 1000 MG tablet 1 tab twice daily 180 tablet 1     ONE TOUCH DELICA LANCETS MISC 1 Device 4 times daily. 100 Stick prn     potassium chloride ER (K-TAB/KLOR-CON) 10 MEQ CR tablet TAKE ONE TABLET BY MOUTH ONE TIME DAILY  90 tablet 0     topiramate (TOPAMAX) 25 MG tablet take 1 tablet (25 mg) at bedtime for 1 week, then take 2 tablets (50 mg) at bedtime for 1 week, and then 3 tablet (75 mg) daily at bedtime t 90 tablet 2     traZODone (DESYREL) 50 MG tablet take 1 to 2 tablets (50 to 100mg) by mouth nightly as needed 180 tablet PRN     traZODone (DESYREL) 50 MG tablet take 1 to 2 tablets (50 to 100mg) by mouth nightly as needed 180 tablet 0     verapamil ER (CALAN-SR) 180 MG CR tablet TAKE ONE TABLET BY MOUTH TWICE DAILY  180 tablet 3     VITRON-C  MG TABS tablet TAKE TWO TABLETS BY MOUTH TWICE DAILY  360 tablet 1           Review of Systems: in addition to stated above  GENERAL: Negative  SKIN: Negative  HENT: Negative   EYE: Negative  HEART: Negative  RESPIRATORY: Negative   GI: Negative  : Negative  MSK: Negative  BLOOD/LYMPH: Negative  NEUROLOGIC: Negative   PSYCH: Negative     Physical Examination:  Check blood pressure 140s/57 mmHg  Blood pressure 133/69, pulse 85, height 1.575 m (5' 2\"), weight 101.2 kg (223 lb), not currently   breastfeeding.  Body mass index is 40.79 kg/m .       Wt Readings from Last 4 " Encounters:   03/24/21 97.1 kg (214 lb)   01/07/20 101.2 kg (223 lb)   10/14/19 100.7 kg (222 lb 1.6 oz)   06/10/19 100.4 kg (221 lb 4.8 oz)      Reported vitals:  There were no vitals taken for this visit.   healthy, alert and no distress  PSYCH: Alert and oriented times 3; coherent speech, normal   rate and volume, able to articulate logical thoughts, able   to abstract reason, no tangential thoughts, no hallucinations   or delusions  Her affect is normal and pleasant  RESP: No cough, no audible wheezing, able to talk in full sentences  Remainder of exam unable to be completed due to telephone visits           Wt Readings from Last 4 Encounters:   01/07/20 101.2 kg (223 lb)   10/14/19 100.7 kg (222 lb 1.6 oz)   06/10/19 100.4 kg (221 lb 4.8 oz)   04/22/19 104.7 kg (230 lb 14.4 oz)     Lab Results   Component Value Date    CR 1.48 (H) 11/24/2021    CR 1.49 (H) 09/13/2021    CR 1.43 (H) 08/27/2021    CR 1.38 (H) 04/13/2021    CR 1.40 (H) 03/24/2021    CR 1.21 (H) 11/13/2020    CR 1.13 (H) 10/09/2020    CR 1.55 (H) 03/27/2020    CR 1.61 (H) 04/23/2019    CR 1.63 (H) 12/10/2018          Lab Results   Component Value Date     11/24/2021    CHLORIDE 108 11/24/2021    CO2 23 11/24/2021     (H) 11/24/2021    CR 1.48 (H) 11/24/2021    CR 1.49 (H) 09/13/2021    CR 1.43 (H) 08/27/2021    CR 1.38 (H) 04/13/2021    CR 1.40 (H) 03/24/2021    RASHEEDA 9.3 11/24/2021    ALBUMIN 3.7 04/13/2021    ALKPHOS 103 04/13/2021    LDL 49 08/27/2021    HDL 36 (L) 08/27/2021    TRIG 148 08/27/2021     Lab Results   Component Value Date    TSH 2.37 08/27/2021    TSH 3.47 03/27/2020    TSH 2.54 10/14/2019     Lab Results   Component Value Date    POTASSIUM 4.3 11/24/2021     Lab Results   Component Value Date    POTASSIUM 4.3 11/24/2021    POTASSIUM 4.4 09/13/2021    POTASSIUM 4.0 08/27/2021         Lab Results   Component Value Date    MICROL 6 08/27/2021    MICROL 35 10/09/2020    MICROL 10 01/07/2020    MICROL 13 12/10/2018     MICROL 37 08/25/2017     Lab Results   Component Value Date    A1C 7.6 (H) 11/24/2021    A1C 8.0 (H) 08/27/2021    A1C 8.1 (H) 01/08/2021    A1C 8.1 (H) 10/09/2020    A1C 8.7 (A) 08/07/2020       Lab Results   Component Value Date    HGB 11.6 (L) 01/08/2021                 Answers for HPI/ROS submitted by the patient on 2/18/2022  General Symptoms: No  Skin Symptoms: No  HENT Symptoms: Yes  EYE SYMPTOMS: No  HEART SYMPTOMS: No  LUNG SYMPTOMS: No  INTESTINAL SYMPTOMS: No  URINARY SYMPTOMS: No  GYNECOLOGIC SYMPTOMS: No  BREAST SYMPTOMS: No  SKELETAL SYMPTOMS: No  BLOOD SYMPTOMS: No  NERVOUS SYSTEM SYMPTOMS: No  MENTAL HEALTH SYMPTOMS: No  Ear pain: Yes  Ear discharge: No  Hearing loss: No  Tinnitus: Yes  Nosebleeds: No  Congestion: No  Sinus pain: Yes  Trouble swallowing: No   Voice hoarseness: Yes  Mouth sores: No  Sore throat: Yes  Tooth pain: No  Gum tenderness: No  Bleeding gums: No  Change in taste: No  Change in sense of smell: No  Dry mouth: Yes  Hearing aid used: No  Neck lump: No        Ade is a 71 year old who is being evaluated via a billable video visit.      Patient denies any changes since echeck-in regarding medication and allergies and states all information entered during echeck-in remains accurate.    Samira SOTELO    How would you like to obtain your AVS? MyChart  If the video visit is dropped, the invitation should be resent by: Text to cell phone: [phone]  Will anyone else be joining your video visit? No

## 2022-02-24 ENCOUNTER — TELEPHONE (OUTPATIENT)
Dept: ENDOCRINOLOGY | Facility: CLINIC | Age: 72
End: 2022-02-24
Payer: COMMERCIAL

## 2022-02-24 NOTE — TELEPHONE ENCOUNTER
Looks like Ade has scheduled both follow up visits but there is still orders for labs that I didn't see a lab appt scheduled yet. I tried to call to schedule but got a VM. LM to call scheduling. Sent [employer] message. Forwarding to scheduling pool for further follow up.

## 2022-02-25 DIAGNOSIS — Z79.4 TYPE 2 DIABETES MELLITUS WITH DIABETIC NEUROPATHY, WITH LONG-TERM CURRENT USE OF INSULIN (H): ICD-10-CM

## 2022-02-25 DIAGNOSIS — E11.40 TYPE 2 DIABETES MELLITUS WITH DIABETIC NEUROPATHY, WITH LONG-TERM CURRENT USE OF INSULIN (H): ICD-10-CM

## 2022-02-25 DIAGNOSIS — E66.01 MORBID OBESITY (H): ICD-10-CM

## 2022-02-25 DIAGNOSIS — G43.009 MIGRAINE WITHOUT AURA AND WITHOUT STATUS MIGRAINOSUS, NOT INTRACTABLE: ICD-10-CM

## 2022-03-02 NOTE — TELEPHONE ENCOUNTER
topiramate (TOPAMAX) 100 MG tablet      Last Written Prescription Date:  1/12/2021  Last Fill Quantity: 90,   # refills: 3     empagliflozin (JARDIANCE) 10 MG TABS tablet  Last Written Prescription Date:  11/3/21  Last Fill Quantity: 30,   # refills: 2  Last Office Visit : 2/22/22 Kiko  Future Office visit:  5/16/22 Zelalem Jensen refill request to provider for review/approval because:  CBC due( last 1/8/2021)  Abn creatinine 3/24-21 -11/24/21 range 1.40-1.48    11/24/21  1508    CR 1.48*

## 2022-03-03 RX ORDER — TOPIRAMATE 100 MG/1
100 TABLET, FILM COATED ORAL DAILY
Qty: 90 TABLET | Refills: 3 | Status: SHIPPED | OUTPATIENT
Start: 2022-03-03 | End: 2022-04-14 | Stop reason: DRUGHIGH

## 2022-03-11 DIAGNOSIS — I10 ESSENTIAL HYPERTENSION: ICD-10-CM

## 2022-03-11 DIAGNOSIS — I35.0 AORTIC VALVE STENOSIS, ETIOLOGY OF CARDIAC VALVE DISEASE UNSPECIFIED: ICD-10-CM

## 2022-03-11 RX ORDER — VERAPAMIL HYDROCHLORIDE 180 MG/1
TABLET, EXTENDED RELEASE ORAL
Qty: 180 TABLET | Refills: 0 | Status: SHIPPED | OUTPATIENT
Start: 2022-03-11 | End: 2022-04-11

## 2022-03-11 RX ORDER — FUROSEMIDE 40 MG
TABLET ORAL
Qty: 90 TABLET | Refills: 0 | Status: SHIPPED | OUTPATIENT
Start: 2022-03-11 | End: 2022-06-14

## 2022-03-11 NOTE — TELEPHONE ENCOUNTER
Routing refill request to provider for review/approval because:  Labs out of range:    Creatinine   Date Value Ref Range Status   11/24/2021 1.48 (H) 0.52 - 1.04 mg/dL Final   04/13/2021 1.38 (H) 0.52 - 1.04 mg/dL Final     Pt has appt to establish care 3/28/22.     Ivelisse Landis RN

## 2022-03-11 NOTE — TELEPHONE ENCOUNTER
Routing refill request to provider for review/approval because:  Failed protocol due to:    Normal serum creatinine on file in past 12 months  Creatinine   Date Value Ref Range Status   11/24/2021 1.48 (H) 0.52 - 1.04 mg/dL Final   04/13/2021 1.38 (H) 0.52 - 1.04 mg/dL Final     Has appt to establish care 3/28/22-previous Dr. Howard patient.     Ivelisse Landis RN

## 2022-03-14 DIAGNOSIS — E78.00 PURE HYPERCHOLESTEROLEMIA: ICD-10-CM

## 2022-03-15 ENCOUNTER — OFFICE VISIT (OUTPATIENT)
Dept: INTERNAL MEDICINE | Facility: CLINIC | Age: 72
End: 2022-03-15
Payer: COMMERCIAL

## 2022-03-15 VITALS
WEIGHT: 209 LBS | BODY MASS INDEX: 37.62 KG/M2 | HEART RATE: 76 BPM | DIASTOLIC BLOOD PRESSURE: 52 MMHG | RESPIRATION RATE: 18 BRPM | OXYGEN SATURATION: 98 % | SYSTOLIC BLOOD PRESSURE: 124 MMHG | TEMPERATURE: 97.4 F

## 2022-03-15 DIAGNOSIS — J32.9 CHRONIC SINUSITIS, UNSPECIFIED LOCATION: ICD-10-CM

## 2022-03-15 DIAGNOSIS — R07.0 THROAT PAIN: Primary | ICD-10-CM

## 2022-03-15 DIAGNOSIS — I06.0 RHEUMATIC AORTIC STENOSIS: ICD-10-CM

## 2022-03-15 LAB
DEPRECATED S PYO AG THROAT QL EIA: NEGATIVE
GROUP A STREP BY PCR: NOT DETECTED

## 2022-03-15 PROCEDURE — 87651 STREP A DNA AMP PROBE: CPT | Performed by: INTERNAL MEDICINE

## 2022-03-15 PROCEDURE — 99214 OFFICE O/P EST MOD 30 MIN: CPT | Performed by: INTERNAL MEDICINE

## 2022-03-15 RX ORDER — ATORVASTATIN CALCIUM 80 MG/1
TABLET, FILM COATED ORAL
Qty: 90 TABLET | Refills: 0 | Status: SHIPPED | OUTPATIENT
Start: 2022-03-15 | End: 2022-04-14 | Stop reason: ALTCHOICE

## 2022-03-15 RX ORDER — DOXYCYCLINE 100 MG/1
100 CAPSULE ORAL 2 TIMES DAILY
Qty: 20 CAPSULE | Refills: 0 | Status: SHIPPED | OUTPATIENT
Start: 2022-03-15 | End: 2022-03-25

## 2022-03-15 NOTE — PROGRESS NOTES
ASSESSMENT:   1. Throat pain  Streptococcal infection was considered lower risk given absence of fevers and symptoms beyond the throat area but because of diabetic history, streptococcal test was ordered and came back negative.  Therefore likely related to postpharyngeal sinusitis drainage.  We will continue gargling as needed.  Additionally therapy as below  - Streptococcus A Rapid Screen w/Reflex to PCR - Clinic Collect  - Group A Streptococcus PCR Throat Swab    2. Chronic sinusitis, unspecified location  Symptoms present 3 to 4 weeks.  Given length of symptoms diabetic history, is likely now transitioned into a bacterial etiology and will therefore treat with doxycycline for 10 days  - doxycycline hyclate (VIBRAMYCIN) 100 MG capsule; Take 1 capsule (100 mg) by mouth 2 times daily for 10 days  Dispense: 20 capsule; Refill: 0    3. Rheumatic aortic stenosis  Patient states she has a longstanding history of aortic stenosis murmur.  However, last echo was done 6 years ago and, when I let the patient listed herself to her murmur, she commented that it was much louder than what she remembers in the past.  This and patient's mild shortness of breath (which could be related to obesity however), will get echo to assess current level of stenosis  - Echocardiogram Complete; Future      PLAN:  Doxycycline 100mg capsule, 1 capsule twice a day with food for 10 days for sinus infection/drainage into throat  ECHO heart  [facility] for aortic stenosis. Call [phone] to schedule  Follow-up with Dr Aviles as scheduled to establish care, labs re: chronic kidney disease, etc  Continue with medications  Continue gargling as needed.  May also use Cepacol lozenges over-the-counter as needed for throat pain      (Chart documentation was completed, in part, with HY[employer]IE[employer] voice-recognition software. Even though reviewed, some grammatical, spelling, and word errors may remain.)    Frederick Hills MD  Internal Medicine Department  [facility]  [facility] LAURA Booker is a 71 year old who presents for the following health issues     History of Present Illness       Reason for visit:  Sore throat suspect strep  Symptom onset:  3-4 weeks ago  Symptoms include:  Sore throat, chills, sinus congestion  Symptom intensity:  Severe  Symptom progression:  Staying the same  Had these symptoms before:  Yes  Has tried/received treatment for these symptoms:  Yes  Previous treatment was successful:  Yes  Prior treatment description:  Massive amounts of amoxicillin + other antibiotics  What makes it worse:  No  What makes it better:  I ve been treating w/aspirin, salt water gargle, and fluids    She eats 2-3 servings of fruits and vegetables daily.She consumes 0 sweetened beverage(s) daily.She exercises with enough effort to increase her heart rate 10 to 19 minutes per day.  She exercises with enough effort to increase her heart rate 3 or less days per week. She is missing 1 dose(s) of medications per week.  She is not taking prescribed medications regularly due to remembering to take.      Most recent lab results reviewed with pt.    Meeting patient for the first time today.  Previously managed by Dr. Howard.  Patient states that she has an appointment with Dr Aviles on 3/28/22 to establish care as primary physician as Dr Aviles has been managing many of her prescription refills since Dr Howard retired.  Patient also followed by endocrinology who is managing her diabetes along with an allergist   Saw pus in the  back the throat before.  Gargling to wash off and not present currently  Has tenderness in the maxillary sinuses.  Yellowish nasal discharge.   Plugged right ear and tinnitus that is chronic. Working with ENT and last seen Fall 2021.  ENT wanted to do MRI brain per pt but pt did not have done due to clautrophobia.  Slight hearing reduction that is unchanged from last ENT exam per pt.    Nonsmoker   Slight SOB that is chronic. No  "cough. No status post   History of obesity though weight is down 25 pounds in 4 years       Additional ROS:   Constitutional, HEENT, Cardiovascular, Pulmonary, GI and , Neuro, MSK and Psych review of systems/symptoms are otherwise negative or unchanged from previous, except as noted above.      OBJECTIVE:  /52   Pulse 76   Temp 97.4  F (36.3  C) (Temporal)   Resp 18   Wt 94.8 kg (209 lb)   LMP  (LMP Unknown)   SpO2 98%   BMI 37.62 kg/m     Estimated body mass index is 37.62 kg/m  as calculated from the following:    Height as of 3/24/21: 1.588 m (5' 2.5\").    Weight as of this encounter: 94.8 kg (209 lb).     HENT: ear canals and TM's normal and nose and mouth without ulcers or lesions.  Mild posterior pharyngeal erythema without exudate.  Maxillary sinuses tender to palpation bilaterally  Neck: no adenopathy. Thyroid normal to palpation. No bruits  Pulm: Lungs clear to auscultation after initial single cough which was nonproductive  CV: Regular rates and rhythm. 3/6 LYNDON  RUSB  GI: Soft, obese, nontender, Normal active bowel sounds, No hepatosplenomegaly or masses palpable  Ext: Peripheral pulses intact. Trace BLE edema.                 "

## 2022-03-15 NOTE — TELEPHONE ENCOUNTER
Routing refill request to provider for review/approval because:  Drug not on the [facility] refill protocol   Pt scheduled for an appt 3/28/22.     Ivelisse Landis RN

## 2022-03-15 NOTE — PATIENT INSTRUCTIONS
Doxycycline 100mg capsule, 1 capsule twice a day with food for 10 days for sinus infection/drainage into throat  ECHO heart  [facility] for aortic stenosis. Call [phone] to schedule  Follow-up with Dr Aviles as scheduled to establish care, labs re: chronic kidney disease, etc  Continue with medications  Continue gargling as needed.  May also use Cepacol lozenges over-the-counter as needed for throat pain

## 2022-03-25 ENCOUNTER — MYC REFILL (OUTPATIENT)
Dept: INTERNAL MEDICINE | Facility: CLINIC | Age: 72
End: 2022-03-25
Payer: COMMERCIAL

## 2022-03-25 DIAGNOSIS — E11.65 TYPE 2 DIABETES MELLITUS WITH HYPERGLYCEMIA, WITHOUT LONG-TERM CURRENT USE OF INSULIN (H): ICD-10-CM

## 2022-03-27 PROBLEM — I10 BENIGN ESSENTIAL HYPERTENSION: Status: ACTIVE | Noted: 2022-03-27

## 2022-03-27 PROBLEM — E78.00 PURE HYPERCHOLESTEROLEMIA: Status: ACTIVE | Noted: 2022-03-27

## 2022-03-27 PROBLEM — E11.21 TYPE 2 DIABETES MELLITUS WITH DIABETIC NEPHROPATHY (H): Status: ACTIVE | Noted: 2022-03-27

## 2022-03-27 PROBLEM — D12.6 COLONIC ADENOMA: Status: RESOLVED | Noted: 2019-05-07 | Resolved: 2022-03-27

## 2022-03-27 PROBLEM — N18.32 CHRONIC KIDNEY DISEASE, STAGE 3B (H): Status: ACTIVE | Noted: 2022-03-27

## 2022-03-27 PROBLEM — M85.80 OSTEOPENIA: Status: ACTIVE | Noted: 2022-03-27

## 2022-03-27 PROBLEM — E11.9 TYPE 2 DIABETES MELLITUS (H): Status: ACTIVE | Noted: 2022-03-27

## 2022-03-27 PROBLEM — E11.40 TYPE 2 DIABETES MELLITUS WITH DIABETIC NEUROPATHY (H): Status: ACTIVE | Noted: 2022-03-27

## 2022-03-27 PROBLEM — E11.319 DIABETIC RETINOPATHY OF BOTH EYES (H): Status: ACTIVE | Noted: 2022-03-27

## 2022-03-27 PROBLEM — G43.009 MIGRAINE WITHOUT AURA AND WITHOUT STATUS MIGRAINOSUS, NOT INTRACTABLE: Status: RESOLVED | Noted: 2021-01-12 | Resolved: 2022-03-27

## 2022-03-27 PROBLEM — E55.9 VITAMIN D DEFICIENCY: Status: RESOLVED | Noted: 2017-02-16 | Resolved: 2022-03-27

## 2022-03-28 ENCOUNTER — OFFICE VISIT (OUTPATIENT)
Dept: INTERNAL MEDICINE | Facility: CLINIC | Age: 72
End: 2022-03-28
Payer: COMMERCIAL

## 2022-03-28 VITALS
WEIGHT: 209 LBS | OXYGEN SATURATION: 97 % | BODY MASS INDEX: 37.03 KG/M2 | HEIGHT: 63 IN | TEMPERATURE: 97.6 F | HEART RATE: 80 BPM | DIASTOLIC BLOOD PRESSURE: 58 MMHG | RESPIRATION RATE: 18 BRPM | SYSTOLIC BLOOD PRESSURE: 142 MMHG

## 2022-03-28 DIAGNOSIS — Z00.00 MEDICARE ANNUAL WELLNESS VISIT, SUBSEQUENT: Primary | ICD-10-CM

## 2022-03-28 DIAGNOSIS — Z78.0 ASYMPTOMATIC MENOPAUSE: ICD-10-CM

## 2022-03-28 DIAGNOSIS — E66.01 MORBID OBESITY (H): ICD-10-CM

## 2022-03-28 DIAGNOSIS — R60.0 BILATERAL LOWER EXTREMITY EDEMA: ICD-10-CM

## 2022-03-28 DIAGNOSIS — N18.32 CHRONIC KIDNEY DISEASE, STAGE 3B (H): ICD-10-CM

## 2022-03-28 PROBLEM — E66.9 OBESITY (BMI 30-39.9): Status: ACTIVE | Noted: 2022-03-28

## 2022-03-28 PROCEDURE — 99397 PER PM REEVAL EST PAT 65+ YR: CPT | Performed by: INTERNAL MEDICINE

## 2022-03-28 NOTE — PROGRESS NOTES
ASSESSMENT/PLAN                                                       (Z00.00) Medicare annual wellness visit, subsequent  (primary encounter diagnosis)  Comment: PMH, PSH, FH, SH, medications, allergies, immunizations, and preventative health measures reviewed and updated as appropriate.  Plan: see below for plans.      (Z78.0) Asymptomatic menopause  Plan: DEXA ordered - patient to schedule.     (N18.32) Chronic kidney disease, stage 3b (H)  Comment: known issue that I take into account for their medical decisions, no current exacerbations or new concerns.    (E66.01) Morbid obesity (H)  Comment: known issue that I take into account for their medical decisions, no current exacerbations or new concerns.    (R60.0) Bilateral lower extremity edema  Plan: Lymphedema Therapy Referral placed - patient will be contacted to schedule.      Appropriate preventive services were discussed with this patient, including applicable screening as appropriate for cardiovascular disease, diabetes, osteopenia/osteoporosis, and glaucoma.  As appropriate for age/gender, discussed screening for colorectal cancer, prostate cancer, breast cancer, and cervical cancer. Checklist reviewing preventive services available has been given to the patient.    Reviewed patients plan of care. The Basic Care Plan (routine screening as documented in Health Maintenance) for Ade Wilkins meets the Care Plan requirement. This Care Plan has been established and reviewed with the Patient.    Judith Aviles MD   [address] [address]  T: [phone], F: [phone]    SUBJECTIVE                                                      Ade Wiklins is a very pleasant 71 year old female who presents for her subsequent AWV:    Current providers (other than myself): Kiko (endocrinology), Yonny (ophthalmology)    PMH, PSH, FH, SH, medications, allergies, immunizations, preventative health, and health risk assessment  reviewed and updated as appropriate.    Past Medical History:   Diagnosis Date     Benign essential hypertension      Chronic kidney disease, stage 3b (H)      Diabetic retinopathy of both eyes (H)      Obesity (BMI 30-39.9)      Osteopenia      Pure hypercholesterolemia      Type 2 diabetes mellitus (H)      Type 2 diabetes mellitus with diabetic nephropathy (H)      Type 2 diabetes mellitus with diabetic neuropathy (H)      Past Surgical History:   Procedure Laterality Date     APPENDECTOMY       CATARACT IOL, RT/LT Bilateral      GASTRIC BYPASS  2004     TONSILLECTOMY & ADENOIDECTOMY       Family History   Problem Relation Age of Onset     Diabetes Type 2  Mother      Glaucoma Mother      Coronary Artery Disease Mother      Abdominal Aortic Aneurysm Father      Myocardial Infarction Father      Breast Cancer Sister      Abdominal Aortic Aneurysm Brother      Bladder Cancer Brother      Diabetes Type 2  Brother      Liver Cancer Brother      Myocardial Infarction Brother      Alzheimer Disease Paternal Grandmother      Cerebrovascular Disease Paternal Grandfather      Ovarian Cancer No family hx of      Colon Cancer No family hx of      Social History     Occupational History     Occupation: Retired -    Tobacco Use     Smoking status: Never Smoker     Smokeless tobacco: Never Used   Substance and Sexual Activity     Alcohol use: No     Drug use: No     Sexual activity: Not Currently   Social History Narrative    . Single.    No kids.    No formal exercise.      Allergies   Allergen Reactions     Clonidine Headache     Doxazosin Mesylate Rash     Fexofenadine Hydrochloride Headache     Gemfibrozil Rash     Lisinopril Angioedema     Pioglitazone Hydrochloride Swelling     ankle edema     Current Outpatient Medications   Medication Sig     atorvastatin (LIPITOR) 80 MG tablet TAKE ONE TABLET BY MOUTH ONE TIME DAILY     augmented betamethasone dipropionate (DIPROLENE-AF) 0.05 % ointment  Apply to AA BID x 3-4 weeks then PRN     calcium carbonate-vitamin D (CALTRATE 600+D) 600-400 MG-UNIT CHEW Take 2 chew tab by mouth every evening     cetirizine (ZYRTEC) 10 MG tablet Take 1 tablet (10 mg) by mouth daily     Cholecalciferol (VITAMIN D) 2000 UNITS tablet Take 2,000 Units by mouth daily     Continuous Blood Gluc  (FREESTYLE BALWINDER 14 DAY READER) HENNA 1 each 3 times daily     Continuous Blood Gluc Sensor (FREESTYLE BALWINDER 14 DAY SENSOR) MISC 1 each every 14 days     cyanocolbalamin (VITAMIN B-12) 1000 MCG tablet Take 1,000 mcg by mouth every other day      Dulaglutide 3 MG/0.5ML SOPN Inject 3 mg Subcutaneous once a week     empagliflozin (JARDIANCE) 10 MG TABS tablet Take 1 tablet (10 mg) by mouth daily     ezetimibe (ZETIA) 10 MG tablet Take 1 tablet (10 mg) by mouth daily     fluticasone (FLONASE) 50 MCG/ACT nasal spray SHAKE LIQUID AND USE 2 SPRAYS IN EACH NOSTRIL DAILY     furosemide (LASIX) 40 MG tablet TAKE ONE TABLET BY MOUTH ONE TIME DAILY     HUMALOG KWIKPEN 100 UNIT/ML soln INJECT  Per sliding scale UP TO 60 UNITS UNDER THE SKIN DAILY.     hydrocortisone (WESTCORT) 0.2 % cream Apply topically 2 times daily Apply sparingly to affected area 2 times daily as needed.     insulin degludec (TRESIBA) 200 UNIT/ML pen Inject 35  units subcu daily     insulin pen needle (BD FCO U/F) 32G X 4 MM miscellaneous Use 4 pen needles daily or as directed.     irbesartan (AVAPRO) 300 MG tablet Take 0.5 tablets (150 mg) by mouth daily     levothyroxine (SYNTHROID/LEVOTHROID) 50 MCG tablet Take 1 tablet (50 mcg) by mouth daily     Magnesium 500 MG CAPS Take 1 capsule by mouth daily.     metFORMIN (GLUCOPHAGE) 1000 MG tablet 1 tab twice daily     ONE TOUCH DELICA LANCETS MISC 1 Device 4 times daily.     potassium chloride ER (KLOR-CON M) 10 MEQ CR tablet Take 1 tablet (10 mEq) by mouth 2 times daily     spironolactone (ALDACTONE) 25 MG tablet Take 1 tablet (25 mg) by mouth daily     topiramate (TOPAMAX) 100  MG tablet Take 1 tablet (100 mg) by mouth daily     traZODone (DESYREL) 50 MG tablet take 1 to 2 tablets (50 to 100mg) by mouth nightly as needed     triamcinolone (KENALOG) 0.1 % external ointment Apply topically 2 times daily To left foot rash     verapamil ER (CALAN-SR) 180 MG CR tablet TAKE ONE TABLET BY MOUTH TWICE DAILY     VITRON-C  MG TABS tablet TAKE TWO TABLETS BY MOUTH TWICE DAILY      Immunization History   Administered Date(s) Administered     COVID-19,PF,Pfizer (12+ Yrs) 01/30/2021, 02/20/2021, 09/27/2021, 09/28/2021     Flu, Unspecified 09/17/2019, 09/26/2020     Influenza (High Dose) 3 valent vaccine 10/09/2015, 09/22/2016, 09/21/2017     Influenza (IIV3) PF 12/03/2001, 11/25/2002, 10/22/2004, 11/03/2005, 11/07/2006, 10/25/2007, 10/18/2008, 09/21/2009, 10/01/2011, 11/23/2012, 08/06/2013, 11/14/2014     Influenza, Quad, High Dose, Pf, 65yr+ (Fluzone HD) 09/13/2021     Pneumo Conj 13-V (2010&after) 06/20/2016     Pneumococcal 23 valent 12/03/2001, 09/21/2009, 05/21/2012, 08/25/2017     TD (ADULT, 7+) 01/15/1986, 09/12/2005     TDAP Vaccine (Adacel) 10/09/2015     Zoster vaccine recombinant adjuvanted (SHINGRIX) 03/16/2021, 06/26/2021     Zoster vaccine, live 09/24/2012     PREVENTATIVE HEALTH                                                      BMI: obese  Blood pressure: well-controlled on current regimen   Breast CA screening: up to date   Colon CA screening: up to date   Lung CA screening: n/a   Dexa: DUE  Screening cholesterol: n/a - already being treated for this condition  Screening diabetes: n/a - already being treated for this condition  Alcohol misuse screening: alcohol use reviewed - no intervention indicated at this time  Immunizations: reviewed; up to date     HEALTH RISK ASSESSMENT                                                      In general, how would you rate your overall physical health? good  Outside of work, how many days during the week do you exercise? 1 day/week  Outside  "of work, approximately how many minutes a day do you exercise? less than 15 minutes    If you drink alcohol do you typically have >3 drinks per day or >7 drinks per week? No  Do you usually eat at least 4 servings of fruit and vegetables a day, include whole grains & fiber and avoid regularly eating high fat or \"junk\" foods? No     Do you have any problems taking medications regularly? No  Do you have any side effects from medications? No    Assistance with daily activities: No    Safety concerns: No    Fall risk assessment: completed today (see ambulatory assessments)    Hearing concerns: YES - Feel people are mumbling or not speaking clearly    In the past 6 months, have you been bothered by leaking of urine: No    In general, how would you rate your overall mental or emotional health: very good    PHQ-2/PHQ-9 assessment: completed today (see ambulatory assessments)    Additional concerns today: YES - Bilateral lower extremity edema    OBJECTIVE                                                      BP (!) 142/58 (BP Location: Left arm, Patient Position: Chair, Cuff Size: Adult Large)   Pulse 80   Temp 97.6  F (36.4  C) (Temporal)   Resp 18   Ht 1.588 m (5' 2.5\")   Wt 94.8 kg (209 lb)   LMP  (LMP Unknown)   SpO2 97%   BMI 37.62 kg/m    Constitutional: well-appearing  Head, Ears, and Eyes: normocephalic; normal external auditory canal and pinna; tympanic membranes visualized and normal; normal lids and conjunctivae  Neck: supple, symmetric, no thyromegaly or lymphadenopathy  Respiratory: normal respiratory effort; clear to auscultation bilaterally  Cardiovascular: regular rate and rhythm; moderate-severe, bilateral, symmetric, pitting edema, from dorsum of feet to below knees  Gastrointestinal: soft, non-tender, and non-distended; no organomegaly or masses  Musculoskeletal: normal gait and station  Psych: normal judgment and insight; normal mood and affect; recent and remote memory intact    Cognitive " impairment noted: No  ---  (Note was completed, in part, with [employer] voice-recognition software. Documentation was reviewed, but some grammatical, spelling, and word errors may remain.)

## 2022-04-05 ENCOUNTER — HOSPITAL ENCOUNTER (OUTPATIENT)
Dept: CARDIOLOGY | Facility: CLINIC | Age: 72
Discharge: HOME | End: 2022-04-05
Attending: INTERNAL MEDICINE | Admitting: INTERNAL MEDICINE
Payer: COMMERCIAL

## 2022-04-05 DIAGNOSIS — I06.0 RHEUMATIC AORTIC STENOSIS: ICD-10-CM

## 2022-04-05 LAB — LVEF ECHO: NORMAL

## 2022-04-05 PROCEDURE — 93306 TTE W/DOPPLER COMPLETE: CPT

## 2022-04-05 PROCEDURE — 93306 TTE W/DOPPLER COMPLETE: CPT | Mod: 26 | Performed by: INTERNAL MEDICINE

## 2022-04-06 ENCOUNTER — TELEPHONE (OUTPATIENT)
Dept: INTERNAL MEDICINE | Facility: CLINIC | Age: 72
End: 2022-04-06
Payer: COMMERCIAL

## 2022-04-06 ENCOUNTER — TELEPHONE (OUTPATIENT)
Dept: CARDIOLOGY | Facility: CLINIC | Age: 72
End: 2022-04-06
Payer: COMMERCIAL

## 2022-04-06 DIAGNOSIS — I35.0 SEVERE AORTIC STENOSIS: Primary | ICD-10-CM

## 2022-04-06 NOTE — TELEPHONE ENCOUNTER
Spoke with patient regarding echo results.  Severe aortic stenosis now where it had been mild stenosis in the past with echo 2016.  Patient has symptoms of mild shortness of breath and slight occasional pressure in her chest with exercise.  No symptoms currently at rest.  Referral placed to  [facility] cardiology at [facility] and TAVR clinic assessment and patient given telephone number to call if he does not hear from central schedulers within 2 days.  I met patient only once before a month ago.  Patient has now established care with Dr Aviles. Will route this note to her so she is aware of referral and future follow-up

## 2022-04-06 NOTE — TELEPHONE ENCOUNTER
[facility] Call Center    Phone Message    May a detailed message be left on voicemail: yes     Reason for Call: Appointment Intake    Referring Provider Name: Dr. Marco Antonio Hills    Diagnosis and/or Symptoms: Severe aortic stenosis. Needs to be evaluated by TAVR clinic for possible future TAVR vs other    Thank you!

## 2022-04-08 ENCOUNTER — TELEPHONE (OUTPATIENT)
Dept: CARDIOLOGY | Facility: CLINIC | Age: 72
End: 2022-04-08
Payer: COMMERCIAL

## 2022-04-08 ENCOUNTER — TRANSFERRED RECORDS (OUTPATIENT)
Dept: HEALTH INFORMATION MANAGEMENT | Facility: CLINIC | Age: 72
End: 2022-04-08
Payer: COMMERCIAL

## 2022-04-08 LAB — RETINOPATHY: POSITIVE

## 2022-04-08 NOTE — TELEPHONE ENCOUNTER
TAVR referral received by: Dr. Hills    Chart reviewed, recent Cr/GFR noted:  Recent Labs   Lab Test 11/24/21  1508 09/13/21  1323     --    POTASSIUM 4.3 4.4   CHLORIDE 108  --    CO2 23  --    BUN 25 37*   GFR 35 35   CR 1.48* 1.49*   ANIONGAP 8  --    RASHEEDA 9.3  --    *  --    Cannot schedule TAVR CT prior to TAVR OV due to low GFR.  Contrast allergy: No  Patient states she is claustrophobic.   Last dental visit: Sees dentist regularly, last visit 12/14/21. Dr. Sabi Huffman in [place].   Support/living situation: Lives with sister, friend support. Friend will accompany to OV.  Telephoned patient to introduce self, provide education on aortic stenosis. Will provide new patient packet in office visit. Scheduled with Dr. Lewis 4/14/22-directions to heart clinic provided.    Beth Noble RN  Structural Heart Coordinator  [facility]

## 2022-04-10 ENCOUNTER — HEALTH MAINTENANCE LETTER (OUTPATIENT)
Age: 72
End: 2022-04-10

## 2022-04-11 ENCOUNTER — VIRTUAL VISIT (OUTPATIENT)
Dept: PHARMACY | Facility: CLINIC | Age: 72
End: 2022-04-11
Payer: COMMERCIAL

## 2022-04-11 DIAGNOSIS — R01.1 HEART MURMUR: ICD-10-CM

## 2022-04-11 DIAGNOSIS — M25.562 CHRONIC PAIN OF BOTH KNEES: ICD-10-CM

## 2022-04-11 DIAGNOSIS — G89.29 CHRONIC PAIN OF BOTH KNEES: ICD-10-CM

## 2022-04-11 DIAGNOSIS — M25.561 CHRONIC PAIN OF BOTH KNEES: ICD-10-CM

## 2022-04-11 DIAGNOSIS — J30.1 NON-SEASONAL ALLERGIC RHINITIS DUE TO POLLEN: ICD-10-CM

## 2022-04-11 DIAGNOSIS — I10 BENIGN ESSENTIAL HYPERTENSION: ICD-10-CM

## 2022-04-11 DIAGNOSIS — G47.00 INSOMNIA, UNSPECIFIED TYPE: ICD-10-CM

## 2022-04-11 DIAGNOSIS — G43.719 INTRACTABLE CHRONIC MIGRAINE WITHOUT AURA AND WITHOUT STATUS MIGRAINOSUS: ICD-10-CM

## 2022-04-11 DIAGNOSIS — Z98.84 H/O GASTRIC BYPASS: Primary | ICD-10-CM

## 2022-04-11 DIAGNOSIS — E11.65 TYPE 2 DIABETES MELLITUS WITH HYPERGLYCEMIA, WITHOUT LONG-TERM CURRENT USE OF INSULIN (H): ICD-10-CM

## 2022-04-11 DIAGNOSIS — E03.9 HYPOTHYROIDISM, UNSPECIFIED TYPE: ICD-10-CM

## 2022-04-11 DIAGNOSIS — R60.9 EDEMA, UNSPECIFIED TYPE: ICD-10-CM

## 2022-04-11 DIAGNOSIS — L30.9 DERMATITIS: ICD-10-CM

## 2022-04-11 DIAGNOSIS — E78.00 PURE HYPERCHOLESTEROLEMIA: ICD-10-CM

## 2022-04-11 PROCEDURE — 99605 MTMS BY PHARM NP 15 MIN: CPT | Performed by: PHARMACIST

## 2022-04-11 PROCEDURE — 99607 MTMS BY PHARM ADDL 15 MIN: CPT | Performed by: PHARMACIST

## 2022-04-11 RX ORDER — VERAPAMIL HYDROCHLORIDE 180 MG/1
180 TABLET, EXTENDED RELEASE ORAL DAILY
Qty: 180 TABLET | Refills: 0 | COMMUNITY
Start: 2022-04-11 | End: 2022-07-19 | Stop reason: ALTCHOICE

## 2022-04-11 RX ORDER — SPIRONOLACTONE 25 MG/1
12.5 TABLET ORAL DAILY
Qty: 90 TABLET | Refills: 3 | COMMUNITY
Start: 2022-04-11 | End: 2022-07-07

## 2022-04-11 NOTE — PROGRESS NOTES
Medication Therapy Management (MTM) Encounter    ASSESSMENT:                            Medication Adherence/Access: No issues identified    Type 2 Diabetes:  A1c at goal <8%.  Home readings variable with some 'lows' per patient.  She may benefit from Libre2 with alarms to alert her of lows.  She will discuss with Endocrinology team  Would recommend rechecking labs, if GFR > 20 then would recommend continuing Jardiance therapy.  Potentially would benefit from minimizing one of the other diuretics so she would be able to stay on the Jardiance for diabetes, renal and cardiac benefits.    Hypertension/Edema: home readings at goal.  Most recent OV reading elevated.  As follow-up scheduled with Cardiology this week.      Heart Murmur:  Follow-up with Cardiology this week as planned    Hyperlipidemia: LDL well below goal.  Consider ongoing need for dual therapy; maybe reassess after cardiac procedure.  Atorvastatin and verapamil used together increases the risk of myalgia.  No symptoms at this time.  Could consider monitoring or changing to rosuvastatin which does not interact with verapamil.    Hypothyroidism: stable    Allergic Rhinitis: stable    Chronic Migraine without Aura: side effects from the topiramate have been bothersome and she would like to change therapy; the side effects could be a result of her declining renal function.  Could consider reducing her dose to see if a lower dose would be effective without the same side effects before changing therapy.  If alternative therapy is needed could consider increasing dose of verapamil or starting CGRP therapy.    H/O Gastric Bypass: stable.  Iron and B12 labs at goal - completed at [facility].  Recommend rechecking vitamin D labs.    Dermatitis: needing refill on the Diprolene    Knee Pain: stable    Insomnia: stable    PLAN:                            1.  Diprolene ointment refill request sent to Dr. Aviles  2.  Follow-up with Endocrinology to discuss Wyatt 2   3.   Recheck vitamin D labs    Considerations for Endocrinology Team  1.  Reducing dose of topiramate to see if lower dose is still effective and better tolerated - approved by Dr. Hoover.  See phone encounter on 4/14/22.    Consideration for Dr. Aviles  1.  Changing atorvastatin to rosuvastatin due to potential interaction with verapamil - approved by Dr Aviles, see phone encounter on 4/14/22.        Follow-up: Return in about 8 days (around 4/19/2022) for Medication Therapy Management.    SUBJECTIVE/OBJECTIVE:                          Ade Wilkins is a 71 year old female called for an initial visit. She was referred to me from [facility].      Reason for visit: Jardiance and kidney function    Allergies/ADRs: Reviewed in chart  Tobacco: She reports that she has never smoked. She has never used smokeless tobacco.  Alcohol: Less than 1 beverage / month    Medication Adherence/Access: no issues reported  Patient uses pill box(es).  Will occasionally miss evening pills.      Type 2 Diabetes:  Currently taking metformin, Trulicity 3 mg weekly (dose increased Nov) , Jardiance 10 mg daily (morning, started 1 year ago), Tresiba 44 units daily and Humalog as needed for blood sugars >170. Patient is not experiencing side effects.  Patient wonders if Jardiance is making her renal function worse and if it should be stopped.  Followed by Dr. Hoover at [facility].    Blood sugar monitoring: Continuous Glucose Monitor. Ranges (from patient's glucose log): 197 while on the phone (1.5 hours after lunch).  Fasting 115, highest up to 300 if not doing well with meals    Symptoms of low blood sugar? sweaty  Symptoms of high blood sugar? shaky  Eye exam: macular edema in right eye; seeing every 9 weeks.  Last visit 4/8/22.  Foot exam: up to date  Aspirin: Not taking due to bloody noses while on ASA in the past.  Statin: Yes:    ACEi/ARB: Yes:   Urine Albumin:   Lab Results   Component Value Date    UMALCR 17.14 08/27/2021      Lab Results   Component Value  Date    A1C 7.6 11/24/2021    A1C 8.0 08/27/2021    A1C 8.1 01/08/2021    A1C 8.1 10/09/2020    A1C 8.7 08/07/2020    A1C 8.4 03/27/2020    A1C 8.7 04/23/2019         Hypertension/Edema: Current medications include irbesartan 150 mg daily, spironolactone 12.5 mg daily, verapamil 180 mg daily and furosemide 40 mg daily.  Doses have been reduced due to dizziness; no significant improvement with change.  Taking potassium 10 meq twice daily.  Wears compression stockings.  Patient does self-monitor blood pressure. Home BP monitoring in range of 108-130/57-60's.  Patient reports the following medication side effects: dizziness.    BP Readings from Last 3 Encounters:   03/28/22 (!) 142/58   03/15/22 124/52   03/24/21 126/48     Potassium   Date Value Ref Range Status   11/24/2021 4.3 3.4 - 5.3 mmol/L Final   04/13/2021 4.2 3.4 - 5.3 mmol/L Final       Heart Murmur:  Has been told she needs valve replacement:  Seeing Cardiologist this week.  Feels cold all the time and dizzy when standing.  No other symptoms at this time.     Hyperlipidemia: Current therapy includes atorvastatin 80 mg daily and Ezetimibe (Zetia) 10 mg once daily she has been on this combination since 2012.  Patient reports no significant myalgias or other side effects.    Recent Labs   Lab Test 08/27/21  0815 03/27/20  1023 06/17/16  0750 05/22/15  0848 05/12/14  0000 05/03/14  1018   CHOL 115 115   < > 175   < > 161   HDL 36* 34*   < > 32*   < > 39*   LDL 49 59   < > 76   < > 76   TRIG 148 112   < > 334*   < > 231*   CHOLHDLRATIO  --   --   --  5.5*  --  4.2    < > = values in this interval not displayed.       Hypothyroidism: Patient is taking levothyroxine 50 mcg daily. Patient is having the following symptoms: dry mouth  TSH   Date Value Ref Range Status   08/27/2021 2.37 0.40 - 4.00 mU/L Final   03/27/2020 3.47 0.40 - 4.00 mU/L Final     Allergic Rhinitis: Current medications include cetirizine 10 mg once daily and fluticasone nasal spray 2 spray(s)  once daily. Primary symptoms are runny nose and sneezing. Allergies to mold, grass and Joh[place] [employer].  Patient feels that current therapy is effective.     Chronic Migraine without Aura: Current preventive medications include: Topiramate 100 mg once daily.  Patient's triggers include: Sleep disturbances and associated symptoms include: nagging headache.   Patient reports having 0 headache days per month.   Current side effects include: word finding challenges.   Would like to try a different medication because the word finding is becoming bother.      H/O Gastric Bypass: s/p Bypass in 2004.  Currently taking calcium 1 tablets twice daily, vitamin D 2000 units, magnesium, potassium, B12 1000 mcg every other day and Vitron-C 2 tablets twice daily. No reported issues at this time. .  Sees oncology Dr. Art and questions if more recent labs have been done there (8/21/22).   Labs from [facility]:  Vitamin B12 2100   Ferritin 68.9  TSAT 31%  Hgb 11.3    Potassium   Date Value Ref Range Status   11/24/2021 4.3 3.4 - 5.3 mmol/L Final   04/13/2021 4.2 3.4 - 5.3 mmol/L Final     Hemoglobin   Date Value Ref Range Status   01/08/2021 11.6 (L) 11.7 - 15.7 g/dL Final   ]      Vitamin D Deficiency Screening Results:  Lab Results   Component Value Date    VITDT 37 03/28/2017    VITDT 10 (L) 02/10/2017         Dermatitis: Using betamethasone as needed - this has really been effective for Venous stasis dermatitis of both lower extremities.  Has been having a hard time getting it refilled.  Has not seen Dermatologist in a few years.  Wearing compression stockings which irritates skin.      Knee Pain: Was taking Aleve for knee pain but stopped since she has not been out and about.  Made that change about 1 year. No other OTC medications at this time.    Insomnia: Current medications include: trazodone  mg as needed. Patient reports trouble falling asleep. Now sleeping 6 hours per night.  No side effects.       Today's Vitals: LMP   (LMP Unknown)   ----------------      I spent 43 minutes with this patient today. All changes were made via collaborative practice agreement with Judith Aviles MD. A copy of the visit note was provided to the patient's provider(s).    The patient was sent via [employer] a summary of these recommendations.     Joanne Gamble , Pharm D  [phone] (phone)  Medication Therapy Management Pharmacist     Telemedicine Visit Details  Type of service:  Telephone visit  Start Time: 300pm  End Time: 3:43 PM  Originating Location (patient location): [place]  Distant Location (provider location):  [facility]     Medication Therapy Recommendations  Dermatitis    Current Medication: augmented betamethasone dipropionate (DIPROLENE-AF) 0.05 % external ointment   Rationale: Medication product not available - Adherence - Adherence   Recommendation: Provide Adherence Intervention   Status: Accepted per Provider         Intractable chronic migraine without aura and without status migrainosus    Current Medication: topiramate (TOPAMAX) 100 MG tablet   Rationale: Undesirable effect - Adverse medication event - Safety   Recommendation: Change Medication   Status: Contact Provider - Awaiting Response         Pure hypercholesterolemia    Current Medication: atorvastatin (LIPITOR) 80 MG tablet   Rationale: Medication interaction - Adverse medication event - Safety   Recommendation: Change Medication   Status: Contact Provider - Awaiting Response         Type 2 diabetes mellitus (H)    Current Medication: Continuous Blood Gluc  (FREESTYLE BALWINDER 14 DAY READER) HENNA   Rationale: More effective medication available - Ineffective medication - Effectiveness   Recommendation: Change Medication   Status: Contact Provider - Awaiting Response

## 2022-04-11 NOTE — LETTER
"Recommended To-Do List      Prepared on: 4/12/2022     You can get the best results from your medications by completing the items on this \"To-Do List.\"      Bring your To-Do List when you go to your doctor. And, share it with your family or caregivers.    My To-Do List:  What we talked about: What I should do:   An issue with your medication    I will reach out to your Endorinology team about reducing the dose of  topiramate (TOPAMAX) to if that helps with the side effects.          What we talked about: What I should do:   An issue with your medication    I will reach out to Dr. Aviles about the possible interaction between verapamil and atorvastatin.          What we talked about: What I should do:   A medication that is not working    Talk to your Endocrinology team about FreeStyle Libre2 which has alarms.            What we talked about: What I should do:   The importance of taking your medication as intended    Refill request for augmented betamethasone dipropionate (DIPROLENE-AF) sent to Dr. Aviles today.          What we talked about: What I should do:                     "

## 2022-04-11 NOTE — LETTER
_  Medication List        Prepared on: 4/12/2022     Bring your Medication List when you go to the doctor, hospital, or   emergency room. And, share it with your family or caregivers.     Note any changes to how you take your medications.  Cross out medications when you no longer use them.    Medication How I take it Why I use it Prescriber   atorvastatin (LIPITOR) 80 MG tablet TAKE ONE TABLET (80 mg) BY MOUTH ONE TIME DAILY Pure Hypercholesterolemia Judith Aviles MD   augmented betamethasone dipropionate (DIPROLENE-AF) 0.05 % external ointment Apply to shins twice daily as needed  Venous stasis dermatitis of both lower extremities Judith Aviles MD   calcium carbonate 600 mg-vitamin D 400 units (CALTRATE) 600-400 MG-UNIT per tablet Take 1 tablet (600-400 mg) by mouth in the morning and 1 tablet in the evening. Bone health Patient Reported   cetirizine (ZYRTEC) 10 MG tablet Take 1 tablet (10 mg) by mouth daily Allergic Rhinitis, Cause Unspecified Dimitry Howard MD   Cholecalciferol (VITAMIN D) 2000 UNITS tablet Take 1 tablet (2,000 Units) by mouth daily Vitamin D Deficiency Dimitry Howard MD   cyanocolbalamin (VITAMIN B-12) 1000 MCG tablet Take 1 tablet (1,000 mcg) by mouth in the morning. Diet Supplement Dimitry Howard MD   Dulaglutide 3 MG/0.5ML SOPN Inject 3 mg under the skin once a week Type 2 diabetes mellitus with neuropathy (H) Roxanne Masterson PA-C   empagliflozin (JARDIANCE) 10 MG TABS tablet Take 1 tablet (10 mg) by mouth daily Type 2 diabetes mellitus with diabetic neuropathy, with long-term current use of insulin (H) Olga Lidia Hoover MD   ezetimibe (ZETIA) 10 MG tablet Take 1 tablet (10 mg) by mouth daily Well controlled type 2 diabetes mellitus with nephropathy (H) Olga Lidia Hoover MD   fluticasone (FLONASE) 50 MCG/ACT nasal spray SHAKE LIQUID AND USE 2 SPRAYS IN EACH NOSTRIL DAILY Upper respiratory tract infection, unspecified type Judith Aviles MD   furosemide (LASIX) 40 MG tablet TAKE  ONE TABLET (40 mg) BY MOUTH ONE TIME DAILY Aortic valve stenosis, etiology of cardiac valve disease unspecified Judith Aviles MD   HUMALOG KWIKPEN 100 UNIT/ML soln INJECT  Under the skin Per sliding scale UP TO 60 UNITS DAILY. Type 2 diabetes mellitus with neuropathy (H) Roxanne Masterson PA-C   insulin degludec (TRESIBA) 200 UNIT/ML pen Inject 35 units under the skin daily Type 2 diabetes mellitus with diabetic neuropathy, with long-term current use of insulin (H) Roxanne Masterson PA-C   irbesartan (AVAPRO) 150 MG tablet Take 1 tablet (150 mg) by mouth daily Benign Essential Hypertension Judith Aviles MD   levothyroxine (SYNTHROID/LEVOTHROID) 50 MCG tablet Take 1 tablet (50 mcg) by mouth daily Hypothyroidism, unspecified type Olga Lidia Hoover MD   metFORMIN (GLUCOPHAGE) 1000 MG tablet Take 1 tablet (1,000 mg) by mouth daily (with dinner) Type 2 diabetes mellitus with hyperglycemia, without long-term current use of insulin (H) Judith Aviles MD   potassium chloride ER (KLOR-CON M) 10 MEQ CR tablet Take 1 tablet (10 mEq) by mouth 2 times daily Essential Hypertension Olga Lidia Hoover MD   spironolactone (ALDACTONE) 25 MG tablet Take 0.5 tablets (12.5 mg) by mouth in the morning. Benign Essential Hypertension Judith Aviles MD   topiramate (TOPAMAX) 100 MG tablet Take 1 tablet (100 mg) by mouth daily Morbid Obesity (H); Migraine without aura and without status migrainosus, not intractable Olag Lidia Hoover MD   traZODone (DESYREL) 50 MG tablet take 1 to 2 tablets (50 to 100mg) by mouth nightly as needed Bariatric Surgery Status Dimitry Howard MD   triamcinolone (KENALOG) 0.1 % external ointment Apply topically 2 times daily To left foot rash Dermatitis Dimitry Howard MD   verapamil ER (CALAN-SR) 180 MG CR tablet Take 1 tablet (180 mg) by mouth in the morning. Blood Pressure Judith Aviles MD   VITRON-C  MG TABS tablet TAKE TWO TABLETS (130-250 mg) BY MOUTH TWICE DAILY  Iron deficiency anemia,  unspecified iron deficiency anemia type Dimitry Dallin Howard MD         Add new medications, over-the-counter drugs, herbals, vitamins, or  minerals in the blank rows below.    Medication How I take it Why I use it Prescriber                          Allergies:      norvasc [amlodipine]; clonidine; doxazosin mesylate; fexofenadine hydrochloride; gemfibrozil; lisinopril; pioglitazone hydrochloride        Side effects I have had:              Other Information:             My notes and questions:

## 2022-04-11 NOTE — LETTER
April 12, 2022  Ade BERTAH Claudette  [address] [address]    Dear Ms. Wilkins, [facility]        Thank you for talking with me on Apr 11, 2022 about your health and medications. As a follow-up to our conversation, I have included two documents:      1. Your Recommended To-Do List has steps you should take to get the best results from your medications.  2. Your Medication List will help you keep track of your medications and how to take them.    If you want to talk about these documents, please call Joanne Gamble RPH at phone: [phone], Monday-Friday 8-4:30pm.    I look forward to working with you and your doctors to make sure your medications work well for you.    Sincerely,    Joanne Gamble RPH  [facility] Pharmacist, [facility]

## 2022-04-12 DIAGNOSIS — I87.2 VENOUS STASIS DERMATITIS OF BOTH LOWER EXTREMITIES: ICD-10-CM

## 2022-04-12 RX ORDER — BETAMETHASONE DIPROPIONATE 0.5 MG/G
OINTMENT, AUGMENTED TOPICAL
Qty: 45 G | Refills: 1 | Status: SHIPPED | OUTPATIENT
Start: 2022-04-12 | End: 2022-06-06

## 2022-04-12 NOTE — PATIENT INSTRUCTIONS
Recommendations from today's MTM visit:                                                    MTM (medication therapy management) is a service provided by a clinical pharmacist designed to help you get the most of out of your medicines.   Today we reviewed what your medicines are for, how to know if they are working, that your medicines are safe and how to make your medicine regimen as easy as possible.      1.  Diprolene ointment refill request sent to Dr. Aviles  2.  Follow-up with Endocrinology to discuss Wyatt 2 - this updated system has alarms that you can set to alert you of low and high blood sugar readings  3.  Recheck vitamin D labs  4.  Labs with Cardiology as planned.  Once those labs are back we can discussion options with the Jardiance.    Considerations for Endocrinology Team - I will reach out to them about this recommendation.  1.  Reducing dose of topiramate to see if lower dose is still effective in controlling migraines and better tolerated.    Consideration for Dr. Aviles - I will reach out to her about this recommendation.  1.  There is a potential interaction between atorvastatin and verapamil that may increase your risk of side effects from the atorvastatin.  I would recommend changing atorvastatin to rosuvastatin (a different cholesterol medication) that does not interact with verapamil.    Follow-up: Return in about 8 days (around 4/19/2022) for Medication Therapy Management. To review labs    It was great to speak with you today.  I value your experience and would be very thankful for your time with providing feedback on our clinic survey. You may receive a survey via email or text message in the next few days.     To schedule another MTM appointment, please call the clinic directly or you may call the MTM scheduling line at [phone] or toll-free at [phone].     My Clinical Pharmacist's contact information:                                                      Please feel free to contact  me with any questions or concerns you have.      Joanne Gamble , Pharm D  [phone] (phone)  Medication Therapy Management Pharmacist

## 2022-04-12 NOTE — TELEPHONE ENCOUNTER
Patient uses very sparing on legs to help with irritation from compress stockings.  She is no longer seeing Derm who originally prescribed prescription.  Asking if Dr. Aviles would refill for her.

## 2022-04-14 ENCOUNTER — TELEPHONE (OUTPATIENT)
Dept: PHARMACY | Facility: CLINIC | Age: 72
End: 2022-04-14

## 2022-04-14 ENCOUNTER — OFFICE VISIT (OUTPATIENT)
Dept: CARDIOLOGY | Facility: CLINIC | Age: 72
End: 2022-04-14
Payer: COMMERCIAL

## 2022-04-14 ENCOUNTER — E-VISIT (OUTPATIENT)
Dept: INTERNAL MEDICINE | Facility: CLINIC | Age: 72
End: 2022-04-14

## 2022-04-14 VITALS
DIASTOLIC BLOOD PRESSURE: 60 MMHG | SYSTOLIC BLOOD PRESSURE: 137 MMHG | BODY MASS INDEX: 35.61 KG/M2 | WEIGHT: 201 LBS | HEART RATE: 87 BPM | HEIGHT: 63 IN

## 2022-04-14 DIAGNOSIS — J02.9 SORE THROAT: Primary | ICD-10-CM

## 2022-04-14 DIAGNOSIS — E11.65 TYPE 2 DIABETES MELLITUS WITH HYPERGLYCEMIA, WITHOUT LONG-TERM CURRENT USE OF INSULIN (H): ICD-10-CM

## 2022-04-14 DIAGNOSIS — I06.0 RHEUMATIC AORTIC STENOSIS: Primary | ICD-10-CM

## 2022-04-14 DIAGNOSIS — E78.00 PURE HYPERCHOLESTEROLEMIA: Primary | ICD-10-CM

## 2022-04-14 DIAGNOSIS — G43.719 INTRACTABLE CHRONIC MIGRAINE WITHOUT AURA AND WITHOUT STATUS MIGRAINOSUS: ICD-10-CM

## 2022-04-14 DIAGNOSIS — I35.0 SEVERE AORTIC STENOSIS: ICD-10-CM

## 2022-04-14 DIAGNOSIS — Z11.59 ENCOUNTER FOR SCREENING FOR OTHER VIRAL DISEASES: Primary | ICD-10-CM

## 2022-04-14 PROCEDURE — 93000 ELECTROCARDIOGRAM COMPLETE: CPT | Performed by: INTERNAL MEDICINE

## 2022-04-14 PROCEDURE — 99421 OL DIG E/M SVC 5-10 MIN: CPT | Performed by: INTERNAL MEDICINE

## 2022-04-14 PROCEDURE — 99205 OFFICE O/P NEW HI 60 MIN: CPT | Performed by: INTERNAL MEDICINE

## 2022-04-14 RX ORDER — TOPIRAMATE 25 MG/1
75 TABLET, FILM COATED ORAL AT BEDTIME
Qty: 270 TABLET | Refills: 1 | Status: SHIPPED | OUTPATIENT
Start: 2022-04-14 | End: 2022-10-11

## 2022-04-14 RX ORDER — ROSUVASTATIN CALCIUM 20 MG/1
20 TABLET, COATED ORAL DAILY
Qty: 90 TABLET | Refills: 1 | Status: ON HOLD | OUTPATIENT
Start: 2022-04-14 | End: 2022-06-09

## 2022-04-14 NOTE — PROGRESS NOTES
HPI and Plan:     Ade Wilkins is a pleasant 71-year-old comes in the transcatheter valve clinic structural heart clinic with her friend.  She is referred by Dr. Dhillon.  She has had slowly progressive fatigue and shortness of breath over the past 2 years.  Echocardiogram showed now severe aortic valve stenosis with a mean gradient 46 mmHg aortic valve area 0.9 cm  and preserved left ventricular ejection fraction.  I have reviewed the images as well.  There is no other significant valvular disease.    She has diabetes hypertension but has no history of vascular disease stroke myocardial infarction or congestive heart failure.  She is NYHA class II-III.  Her STS risk score is 2.5%.    EKG shows LVH but otherwise unremarkable.  Recommend aortic valve replacement.  I outlined both surgical aortic valve replacement and transcatheter valve replacement the risk and benefits of each.  She would be able to go either approach depending on outcomes of our TAVR CT scan and or coronary angiography.  The risk and benefits of all these procedures were explained to her including risk of death vascular complication permanent pacemaker etc.  She would like to proceed she prefers transcatheter valve replacement if possible.  Obviously she has renal insufficiency GFR diminished to 35 wanting to limit contrast on both her CT as well as her coronary angiogram.    Today's clinic visit entailed:  Review of the result(s) of each unique test - echocardiogram, EKG,   Discussion of management or test interpretation with external physician/other qualified healthcare professional/appropriate source - echocardiogram, EKG  Ordering of each unique test  No LOS data to display   Time spent doing chart review, history and exam, documentation and further activities per the note  Provider  Link to [facility] Help Grid     The level of medical decision making during this visit was of high complexity.      Orders Placed This Encounter   Procedures     EKG  12-lead complete w/read - Clinics (performed today)     No orders of the defined types were placed in this encounter.    There are no discontinued medications.      Encounter Diagnoses   Name Primary?     Rheumatic aortic stenosis Yes     Severe aortic stenosis        CURRENT MEDICATIONS:  Current Outpatient Medications   Medication Sig Dispense Refill     augmented betamethasone dipropionate (DIPROLENE-AF) 0.05 % external ointment Apply to AA BID x 3-4 weeks then PRN 45 g 1     calcium carbonate 600 mg-vitamin D 400 units (CALTRATE) 600-400 MG-UNIT per tablet Take 1 tablet by mouth in the morning and 1 tablet in the evening.       cetirizine (ZYRTEC) 10 MG tablet Take 1 tablet (10 mg) by mouth daily       Cholecalciferol (VITAMIN D) 2000 UNITS tablet Take 2,000 Units by mouth daily       Continuous Blood Gluc  (FREESTYLE BALWINDER 14 DAY READER) HENNA 1 each 3 times daily 1 each 1     Continuous Blood Gluc Sensor (FREESTYLE BALWINDER 14 DAY SENSOR) MISC 1 each every 14 days 2 each 11     cyanocolbalamin (VITAMIN B-12) 1000 MCG tablet Take 1,000 mcg by mouth in the morning.       Dulaglutide 3 MG/0.5ML SOPN Inject 3 mg Subcutaneous once a week 6 mL 3     empagliflozin (JARDIANCE) 10 MG TABS tablet Take 1 tablet (10 mg) by mouth daily 30 tablet 5     ezetimibe (ZETIA) 10 MG tablet Take 1 tablet (10 mg) by mouth daily 90 tablet 3     fluticasone (FLONASE) 50 MCG/ACT nasal spray SHAKE LIQUID AND USE 2 SPRAYS IN EACH NOSTRIL DAILY 48 g 2     furosemide (LASIX) 40 MG tablet TAKE ONE TABLET BY MOUTH ONE TIME DAILY 90 tablet 0     HUMALOG KWIKPEN 100 UNIT/ML soln INJECT  Per sliding scale UP TO 60 UNITS UNDER THE SKIN DAILY. 60 mL 3     insulin degludec (TRESIBA) 200 UNIT/ML pen Inject 35  units subcu daily 40 mL 3     insulin pen needle (BD FCO U/F) 32G X 4 MM miscellaneous Use 4 pen needles daily or as directed. 400 each 0     irbesartan (AVAPRO) 150 MG tablet Take 1 tablet (150 mg) by mouth daily 90 tablet 3      levothyroxine (SYNTHROID/LEVOTHROID) 50 MCG tablet Take 1 tablet (50 mcg) by mouth daily 90 tablet 3     metFORMIN (GLUCOPHAGE) 1000 MG tablet Take 1,000 mg by mouth daily (with dinner) 180 tablet 3     potassium chloride ER (KLOR-CON M) 10 MEQ CR tablet Take 1 tablet (10 mEq) by mouth 2 times daily 360 tablet 3     rosuvastatin (CRESTOR) 20 MG tablet Take 1 tablet (20 mg) by mouth daily 90 tablet 1     spironolactone (ALDACTONE) 25 MG tablet Take 0.5 tablets (12.5 mg) by mouth in the morning. 90 tablet 3     topiramate (TOPAMAX) 25 MG tablet Take 3 tablets (75 mg) by mouth At Bedtime 270 tablet 1     traZODone (DESYREL) 50 MG tablet take 1 to 2 tablets (50 to 100mg) by mouth nightly as needed 180 tablet PRN     triamcinolone (KENALOG) 0.1 % external ointment Apply topically 2 times daily To left foot rash 30 g 2     verapamil ER (CALAN-SR) 180 MG CR tablet Take 1 tablet (180 mg) by mouth in the morning. 180 tablet 0     VITRON-C  MG TABS tablet TAKE TWO TABLETS BY MOUTH TWICE DAILY  360 tablet 1       ALLERGIES     Allergies   Allergen Reactions     Norvasc [Amlodipine] Other (See Comments)     hairloss     Clonidine Headache     Doxazosin Mesylate Rash     Fexofenadine Hydrochloride Headache     Gemfibrozil Rash     Lisinopril Angioedema     Pioglitazone Hydrochloride Swelling     ankle edema       PAST MEDICAL HISTORY:  Past Medical History:   Diagnosis Date     Benign essential hypertension      Chronic kidney disease, stage 3b (H)      Diabetic retinopathy of both eyes (H)      Obesity (BMI 30-39.9)      Osteopenia      Pure hypercholesterolemia      Type 2 diabetes mellitus (H)      Type 2 diabetes mellitus with diabetic nephropathy (H)      Type 2 diabetes mellitus with diabetic neuropathy (H)        PAST SURGICAL HISTORY:  Past Surgical History:   Procedure Laterality Date     APPENDECTOMY       CATARACT IOL, RT/LT Bilateral      GASTRIC BYPASS  2004     TONSILLECTOMY & ADENOIDECTOMY         FAMILY  "HISTORY:  Family History   Problem Relation Age of Onset     Diabetes Type 2  Mother      Glaucoma Mother      Coronary Artery Disease Mother      Abdominal Aortic Aneurysm Father      Myocardial Infarction Father      Breast Cancer Sister      Abdominal Aortic Aneurysm Brother      Bladder Cancer Brother      Diabetes Type 2  Brother      Liver Cancer Brother      Myocardial Infarction Brother      Alzheimer Disease Paternal Grandmother      Cerebrovascular Disease Paternal Grandfather      Ovarian Cancer No family hx of      Colon Cancer No family hx of        SOCIAL HISTORY:  Social History     Socioeconomic History     Marital status:      Spouse name: None     Number of children: 0     Years of education: None     Highest education level: None   Occupational History     Occupation: Retired -    Tobacco Use     Smoking status: Never Smoker     Smokeless tobacco: Never Used   Substance and Sexual Activity     Alcohol use: No     Drug use: No     Sexual activity: Not Currently   Other Topics Concern     Caffeine Concern Yes     Comment: 20 oz daily     Exercise No     Seat Belt Yes     Self-Exams Yes   Social History Narrative    . Single.    No kids.    No formal exercise.        Review of Systems:  Skin:  Negative     Eyes:  Negative    ENT:  Negative    Respiratory:  Positive for dyspnea on exertion;shortness of breath  Cardiovascular:    Positive for;palpitations;edema;fatigue;lightheadedness;dizziness  Gastroenterology: Negative    Genitourinary:  Negative    Musculoskeletal:  Negative    Neurologic:  Negative    Psychiatric:  Negative    Heme/Lymph/Imm:  Negative    Endocrine:  Positive for thyroid disorder;diabetes    Physical Exam:  Vitals: /60   Pulse 87   Ht 1.588 m (5' 2.5\")   Wt 91.2 kg (201 lb)   LMP  (LMP Unknown)   BMI 36.18 kg/m      Constitutional:           Skin:             Head:           Eyes:           Lymph:      ENT:           Neck:       "     Respiratory:            Cardiac:                                                           GI:           Extremities and Muscular Skeletal:                 Neurological:           Psych:         Recent Lab Results:  LIPID RESULTS:  Lab Results   Component Value Date    CHOL 115 08/27/2021    CHOL 115 03/27/2020    HDL 36 (L) 08/27/2021    HDL 34 (L) 03/27/2020    LDL 49 08/27/2021    LDL 59 03/27/2020    TRIG 148 08/27/2021    TRIG 112 03/27/2020    CHOLHDLRATIO 5.5 (H) 05/22/2015       LIVER ENZYME RESULTS:  Lab Results   Component Value Date    AST 20 04/13/2021    ALT 38 04/13/2021       CBC RESULTS:  Lab Results   Component Value Date    WBC 8.3 01/08/2021    RBC 4.12 01/08/2021    HGB 11.6 (L) 01/08/2021    HCT 38.2 01/08/2021    MCV 93 01/08/2021    MCH 28.2 01/08/2021    MCHC 30.4 (L) 01/08/2021    RDW 13.5 01/08/2021     01/08/2021       BMP RESULTS:  Lab Results   Component Value Date     11/24/2021     04/13/2021    POTASSIUM 4.3 11/24/2021    POTASSIUM 4.2 04/13/2021    CHLORIDE 108 11/24/2021    CHLORIDE 110 (H) 04/13/2021    CO2 23 11/24/2021    CO2 28 04/13/2021    ANIONGAP 8 11/24/2021    ANIONGAP 3 04/13/2021     (H) 11/24/2021     (H) 04/13/2021    BUN 25 11/24/2021    BUN 29 04/13/2021    CR 1.48 (H) 11/24/2021    CR 1.38 (H) 04/13/2021    GFRESTIMATED 35 (L) 11/24/2021    GFRESTIMATED 38 (L) 04/13/2021    GFRESTBLACK 45 (L) 04/13/2021    RASHEEDA 9.3 11/24/2021    RASHEEDA 9.2 04/13/2021        A1C RESULTS:  Lab Results   Component Value Date    A1C 7.6 (H) 11/24/2021    A1C 8.1 (H) 01/08/2021       INR RESULTS:  No results found for: INR        CC  Frederick Hills MD  [address] [place] [id number]

## 2022-04-14 NOTE — TELEPHONE ENCOUNTER
Called Ade to follow-up on response from Dr. Hoover on the topirmate dose decrease and the response from Dr. Aviles on changing atorvastatin to rosuvastatin.    Ade was in agreement with both changes.  Will send updated prescriptions to the pharmacy and follow-up with her next month to see how she is feeling with the changes.

## 2022-04-14 NOTE — TELEPHONE ENCOUNTER
Provider E-Visit time total (minutes): 9 minutes.  Last GFR reviewed and > 30. Will therefore prescribe standard dose Augmentin

## 2022-04-14 NOTE — PROGRESS NOTES
TAVR Coordinator visit:  Provided additional education regarding TAVR procedure, after being present for discussion with physician. Explained the work-up process and next steps for patient. Patient provided our direct contact number and instructed to call with any questions.     Completed frailty testing and KCCQ.   5 meter walk: 8 seconds  Frailty score: 3/5 (albumin, eyeball, and 5 meter walk)    KCCQ Results:   1a. 5  1b. 3  1c. 2  2. 4  3. 5  4. 5  5. 2  6. 4  7. 3  8a. 4  8b. 3  8c. 3    Preliminary STS Risk Score: 2.5%  NYHA Class: II-III      Aileen Lucas, RN  Structural Heart Coordinator  [facility]

## 2022-04-14 NOTE — LETTER
4/14/2022    Judith Aviles MD  [address] [facility] [address]    RE: Ade Wilkins       Dear Colleague,     I had the pleasure of seeing Ade Wilkins in the [facility].  HPI and Plan:     Ade Wilkins is a pleasant 71-year-old comes in the transcatheter valve clinic structural heart clinic with her friend.  She is referred by Dr. Dhillon.  She has had slowly progressive fatigue and shortness of breath over the past 2 years.  Echocardiogram showed now severe aortic valve stenosis with a mean gradient 46 mmHg aortic valve area 0.9 cm  and preserved left ventricular ejection fraction.  I have reviewed the images as well.  There is no other significant valvular disease.    She has diabetes hypertension but has no history of vascular disease stroke myocardial infarction or congestive heart failure.  She is NYHA class II-III.  Her STS risk score is 2.5%.    EKG shows LVH but otherwise unremarkable.  Recommend aortic valve replacement.  I outlined both surgical aortic valve replacement and transcatheter valve replacement the risk and benefits of each.  She would be able to go either approach depending on outcomes of our TAVR CT scan and or coronary angiography.  The risk and benefits of all these procedures were explained to her including risk of death vascular complication permanent pacemaker etc.  She would like to proceed she prefers transcatheter valve replacement if possible.  Obviously she has renal insufficiency GFR diminished to 35 wanting to limit contrast on both her CT as well as her coronary angiogram.    Today's clinic visit entailed:  Review of the result(s) of each unique test - echocardiogram, EKG,   Discussion of management or test interpretation with external physician/other qualified healthcare professional/appropriate source - echocardiogram, EKG  Ordering of each unique test  No LOS data to display   Time spent doing chart review, history and exam, documentation and  further activities per the note  Provider  Link to MDM Help Grid     The level of medical decision making during this visit was of high complexity.      Orders Placed This Encounter   Procedures     EKG 12-lead complete w/read - Clinics (performed today)     No orders of the defined types were placed in this encounter.    There are no discontinued medications.      Encounter Diagnoses   Name Primary?     Rheumatic aortic stenosis Yes     Severe aortic stenosis        CURRENT MEDICATIONS:  Current Outpatient Medications   Medication Sig Dispense Refill     augmented betamethasone dipropionate (DIPROLENE-AF) 0.05 % external ointment Apply to AA BID x 3-4 weeks then PRN 45 g 1     calcium carbonate 600 mg-vitamin D 400 units (CALTRATE) 600-400 MG-UNIT per tablet Take 1 tablet by mouth in the morning and 1 tablet in the evening.       cetirizine (ZYRTEC) 10 MG tablet Take 1 tablet (10 mg) by mouth daily       Cholecalciferol (VITAMIN D) 2000 UNITS tablet Take 2,000 Units by mouth daily       Continuous Blood Gluc  (FREESTYLE BALWINDER 14 DAY READER) HENNA 1 each 3 times daily 1 each 1     Continuous Blood Gluc Sensor (FREESTYLE BALWINDER 14 DAY SENSOR) MISC 1 each every 14 days 2 each 11     cyanocolbalamin (VITAMIN B-12) 1000 MCG tablet Take 1,000 mcg by mouth in the morning.       Dulaglutide 3 MG/0.5ML SOPN Inject 3 mg Subcutaneous once a week 6 mL 3     empagliflozin (JARDIANCE) 10 MG TABS tablet Take 1 tablet (10 mg) by mouth daily 30 tablet 5     ezetimibe (ZETIA) 10 MG tablet Take 1 tablet (10 mg) by mouth daily 90 tablet 3     fluticasone (FLONASE) 50 MCG/ACT nasal spray SHAKE LIQUID AND USE 2 SPRAYS IN EACH NOSTRIL DAILY 48 g 2     furosemide (LASIX) 40 MG tablet TAKE ONE TABLET BY MOUTH ONE TIME DAILY 90 tablet 0     HUMALOG KWIKPEN 100 UNIT/ML soln INJECT  Per sliding scale UP TO 60 UNITS UNDER THE SKIN DAILY. 60 mL 3     insulin degludec (TRESIBA) 200 UNIT/ML pen Inject 35  units subcu daily 40 mL 3      insulin pen needle (BD FCO U/F) 32G X 4 MM miscellaneous Use 4 pen needles daily or as directed. 400 each 0     irbesartan (AVAPRO) 150 MG tablet Take 1 tablet (150 mg) by mouth daily 90 tablet 3     levothyroxine (SYNTHROID/LEVOTHROID) 50 MCG tablet Take 1 tablet (50 mcg) by mouth daily 90 tablet 3     metFORMIN (GLUCOPHAGE) 1000 MG tablet Take 1,000 mg by mouth daily (with dinner) 180 tablet 3     potassium chloride ER (KLOR-CON M) 10 MEQ CR tablet Take 1 tablet (10 mEq) by mouth 2 times daily 360 tablet 3     rosuvastatin (CRESTOR) 20 MG tablet Take 1 tablet (20 mg) by mouth daily 90 tablet 1     spironolactone (ALDACTONE) 25 MG tablet Take 0.5 tablets (12.5 mg) by mouth in the morning. 90 tablet 3     topiramate (TOPAMAX) 25 MG tablet Take 3 tablets (75 mg) by mouth At Bedtime 270 tablet 1     traZODone (DESYREL) 50 MG tablet take 1 to 2 tablets (50 to 100mg) by mouth nightly as needed 180 tablet PRN     triamcinolone (KENALOG) 0.1 % external ointment Apply topically 2 times daily To left foot rash 30 g 2     verapamil ER (CALAN-SR) 180 MG CR tablet Take 1 tablet (180 mg) by mouth in the morning. 180 tablet 0     VITRON-C  MG TABS tablet TAKE TWO TABLETS BY MOUTH TWICE DAILY  360 tablet 1       ALLERGIES     Allergies   Allergen Reactions     Norvasc [Amlodipine] Other (See Comments)     hairloss     Clonidine Headache     Doxazosin Mesylate Rash     Fexofenadine Hydrochloride Headache     Gemfibrozil Rash     Lisinopril Angioedema     Pioglitazone Hydrochloride Swelling     ankle edema       PAST MEDICAL HISTORY:  Past Medical History:   Diagnosis Date     Benign essential hypertension      Chronic kidney disease, stage 3b (H)      Diabetic retinopathy of both eyes (H)      Obesity (BMI 30-39.9)      Osteopenia      Pure hypercholesterolemia      Type 2 diabetes mellitus (H)      Type 2 diabetes mellitus with diabetic nephropathy (H)      Type 2 diabetes mellitus with diabetic neuropathy (H)        PAST  SURGICAL HISTORY:  Past Surgical History:   Procedure Laterality Date     APPENDECTOMY       CATARACT IOL, RT/LT Bilateral      GASTRIC BYPASS  2004     TONSILLECTOMY & ADENOIDECTOMY         FAMILY HISTORY:  Family History   Problem Relation Age of Onset     Diabetes Type 2  Mother      Glaucoma Mother      Coronary Artery Disease Mother      Abdominal Aortic Aneurysm Father      Myocardial Infarction Father      Breast Cancer Sister      Abdominal Aortic Aneurysm Brother      Bladder Cancer Brother      Diabetes Type 2  Brother      Liver Cancer Brother      Myocardial Infarction Brother      Alzheimer Disease Paternal Grandmother      Cerebrovascular Disease Paternal Grandfather      Ovarian Cancer No family hx of      Colon Cancer No family hx of        SOCIAL HISTORY:  Social History     Socioeconomic History     Marital status:      Spouse name: None     Number of children: 0     Years of education: None     Highest education level: None   Occupational History     Occupation: Retired -    Tobacco Use     Smoking status: Never Smoker     Smokeless tobacco: Never Used   Substance and Sexual Activity     Alcohol use: No     Drug use: No     Sexual activity: Not Currently   Other Topics Concern     Caffeine Concern Yes     Comment: 20 oz daily     Exercise No     Seat Belt Yes     Self-Exams Yes   Social History Narrative    . Single.    No kids.    No formal exercise.        Review of Systems:  Skin:  Negative     Eyes:  Negative    ENT:  Negative    Respiratory:  Positive for dyspnea on exertion;shortness of breath  Cardiovascular:    Positive for;palpitations;edema;fatigue;lightheadedness;dizziness  Gastroenterology: Negative    Genitourinary:  Negative    Musculoskeletal:  Negative    Neurologic:  Negative    Psychiatric:  Negative    Heme/Lymph/Imm:  Negative    Endocrine:  Positive for thyroid disorder;diabetes    Physical Exam:  Vitals: /60   Pulse 87   Ht 1.588 m  "(5' 2.5\")   Wt 91.2 kg (201 lb)   LMP  (LMP Unknown)   BMI 36.18 kg/m      Constitutional:           Skin:             Head:           Eyes:           Lymph:      ENT:           Neck:           Respiratory:            Cardiac:                                                           GI:           Extremities and Muscular Skeletal:                 Neurological:           Psych:         Recent Lab Results:  LIPID RESULTS:  Lab Results   Component Value Date    CHOL 115 08/27/2021    CHOL 115 03/27/2020    HDL 36 (L) 08/27/2021    HDL 34 (L) 03/27/2020    LDL 49 08/27/2021    LDL 59 03/27/2020    TRIG 148 08/27/2021    TRIG 112 03/27/2020    CHOLHDLRATIO 5.5 (H) 05/22/2015       LIVER ENZYME RESULTS:  Lab Results   Component Value Date    AST 20 04/13/2021    ALT 38 04/13/2021       CBC RESULTS:  Lab Results   Component Value Date    WBC 8.3 01/08/2021    RBC 4.12 01/08/2021    HGB 11.6 (L) 01/08/2021    HCT 38.2 01/08/2021    MCV 93 01/08/2021    MCH 28.2 01/08/2021    MCHC 30.4 (L) 01/08/2021    RDW 13.5 01/08/2021     01/08/2021       BMP RESULTS:  Lab Results   Component Value Date     11/24/2021     04/13/2021    POTASSIUM 4.3 11/24/2021    POTASSIUM 4.2 04/13/2021    CHLORIDE 108 11/24/2021    CHLORIDE 110 (H) 04/13/2021    CO2 23 11/24/2021    CO2 28 04/13/2021    ANIONGAP 8 11/24/2021    ANIONGAP 3 04/13/2021     (H) 11/24/2021     (H) 04/13/2021    BUN 25 11/24/2021    BUN 29 04/13/2021    CR 1.48 (H) 11/24/2021    CR 1.38 (H) 04/13/2021    GFRESTIMATED 35 (L) 11/24/2021    GFRESTIMATED 38 (L) 04/13/2021    GFRESTBLACK 45 (L) 04/13/2021    RASHEEDA 9.3 11/24/2021    RASHEEDA 9.2 04/13/2021        A1C RESULTS:  Lab Results   Component Value Date    A1C 7.6 (H) 11/24/2021    A1C 8.1 (H) 01/08/2021       INR RESULTS:  No results found for: INR        CC  Frederick Hills MD  [address] [place] [id number]                  TAVR Coordinator visit:  Provided additional education " regarding TAVR procedure, after being present for discussion with physician. Explained the work-up process and next steps for patient. Patient provided our direct contact number and instructed to call with any questions.     Completed frailty testing and KCCQ.   5 meter walk: 8 seconds  Frailty score: 3/5 (albumin, eyeball, and 5 meter walk)    KCCQ Results:   1a. 5  1b. 3  1c. 2  2. 4  3. 5  4. 5  5. 2  6. 4  7. 3  8a. 4  8b. 3  8c. 3    Preliminary STS Risk Score: 2.5%  NYHA Class: II-III      Aileen Lucas, RN  Structural Heart Coordinator  [facility]    Thank you for allowing me to participate in the care of your patient.      Sincerely,     AYDIN BELL MD     [facility]  cc:   Frederick Hills MD  [address] [place] [id number]

## 2022-04-14 NOTE — TELEPHONE ENCOUNTER
----- Message from Olga Lidia Hoover MD sent at 4/12/2022 12:12 PM CDT -----  Regarding: RE: Migraine Therapy  Sure, sounds good.    Olga Lidia Hoover MD  Staff Physician  Division of Endocrinology  [facility]  Pager [phone]      ----- Message -----  From: Joanne Gamble, [facility]  Sent: 4/12/2022  12:07 PM CDT  To: Roxanne Masterson PA-C, Olga Lidia Hoover MD  Subject: Migraine Therapy                                 [place]    I met with Ade yesterday to review her medication. She is having concerns with side effects from the topiramate (word finding difficulties) that are bothersome and would like to change therapy.  This may be a result of the decline in her renal function.  Since the topiramate has worked so well for her, before changing would it be reasonable to try a lower dose 75 mg to see if that is still effective and better tolerated?    I appreciate your thoughts!    Joanne Gamble , Pharm D  [phone] (phone)  Medication Therapy Management Pharmacist

## 2022-04-21 ENCOUNTER — LAB (OUTPATIENT)
Dept: LAB | Facility: CLINIC | Age: 72
End: 2022-04-21
Payer: COMMERCIAL

## 2022-04-21 DIAGNOSIS — I35.0 SEVERE AORTIC STENOSIS: ICD-10-CM

## 2022-04-21 LAB
ALBUMIN SERPL-MCNC: 3.7 G/DL (ref 3.4–5)
ALP SERPL-CCNC: 95 U/L (ref 40–150)
ALT SERPL W P-5'-P-CCNC: 94 U/L (ref 0–50)
ANION GAP SERPL CALCULATED.3IONS-SCNC: 6 MMOL/L (ref 3–14)
AST SERPL W P-5'-P-CCNC: 49 U/L (ref 0–45)
BILIRUB SERPL-MCNC: 0.3 MG/DL (ref 0.2–1.3)
BUN SERPL-MCNC: 25 MG/DL (ref 7–30)
CALCIUM SERPL-MCNC: 9.3 MG/DL (ref 8.5–10.1)
CHLORIDE BLD-SCNC: 105 MMOL/L (ref 94–109)
CO2 SERPL-SCNC: 26 MMOL/L (ref 20–32)
CREAT SERPL-MCNC: 1.38 MG/DL (ref 0.52–1.04)
ERYTHROCYTE [DISTWIDTH] IN BLOOD BY AUTOMATED COUNT: 13.8 % (ref 10–15)
GFR SERPL CREATININE-BSD FRML MDRD: 41 ML/MIN/1.73M2
GLUCOSE BLD-MCNC: 142 MG/DL (ref 70–99)
HCT VFR BLD AUTO: 38.1 % (ref 35–47)
HGB BLD-MCNC: 11.9 G/DL (ref 11.7–15.7)
MCH RBC QN AUTO: 27.6 PG (ref 26.5–33)
MCHC RBC AUTO-ENTMCNC: 31.2 G/DL (ref 31.5–36.5)
MCV RBC AUTO: 88 FL (ref 78–100)
PLATELET # BLD AUTO: 323 10E3/UL (ref 150–450)
POTASSIUM BLD-SCNC: 4.1 MMOL/L (ref 3.4–5.3)
PROT SERPL-MCNC: 7 G/DL (ref 6.8–8.8)
RBC # BLD AUTO: 4.31 10E6/UL (ref 3.8–5.2)
SODIUM SERPL-SCNC: 137 MMOL/L (ref 133–144)
WBC # BLD AUTO: 10.6 10E3/UL (ref 4–11)

## 2022-04-21 PROCEDURE — 85027 COMPLETE CBC AUTOMATED: CPT | Performed by: INTERNAL MEDICINE

## 2022-04-21 PROCEDURE — 80053 COMPREHEN METABOLIC PANEL: CPT | Performed by: INTERNAL MEDICINE

## 2022-04-21 PROCEDURE — 36415 COLL VENOUS BLD VENIPUNCTURE: CPT | Performed by: INTERNAL MEDICINE

## 2022-04-27 ENCOUNTER — TELEPHONE (OUTPATIENT)
Dept: CARDIOLOGY | Facility: CLINIC | Age: 72
End: 2022-04-27
Payer: COMMERCIAL

## 2022-04-27 ENCOUNTER — MYC MEDICAL ADVICE (OUTPATIENT)
Dept: ENDOCRINOLOGY | Facility: CLINIC | Age: 72
End: 2022-04-27
Payer: COMMERCIAL

## 2022-04-27 DIAGNOSIS — I35.0 SEVERE AORTIC STENOSIS: Primary | ICD-10-CM

## 2022-04-27 NOTE — TELEPHONE ENCOUNTER
[facility] - ANGIOGRAM INSTRUCTIONS:  - Ade Wilkins is scheduled for a coronary angiogram at [facility] on 2022. Check in time is at 0730 and procedure time is at 0930.  - Advised patient not eat or drink after midnight on day of procedure.   - Patient advised to take morning medications with a sip of water on the day of the procedure, noting the followin. Aspirin: Patient is currently taking 81mg of aspirin, patient instructed to take 4 tabs (324mg) of aspirin the morning of the procedure.  2. Diabetic Medications: Patient to hold Metformin on the day of the procedure and should continue to hold for 48 hours after the procedure. Repeat BMP is scheduled on 2022 at 01:15pm.  3. Anticoagulants: Patient does not take an anticoagulant.  4. Diuretics: Patient advised to hold spironolactone and furosemide  the morning of the procedure.   5. All vitamins and supplements to be held the morning of the procedure.  - Verified patient does not have an allergy to contrast dye.  - Verified patient has someone available to drive them home from the hospital and can stay with them for 24 hours after the procedure. They understand that one visitor may accompany them into the hospital on the day of angiogram, with both patient and visitor to wear a mask.  - COVID testing: Scheduled on 2022    Angiogram orders have been signed & held.    Anais Pablo RN  [facility]

## 2022-04-30 ENCOUNTER — LAB (OUTPATIENT)
Dept: URGENT CARE | Facility: URGENT CARE | Age: 72
End: 2022-04-30
Payer: COMMERCIAL

## 2022-04-30 DIAGNOSIS — Z11.59 ENCOUNTER FOR SCREENING FOR OTHER VIRAL DISEASES: ICD-10-CM

## 2022-04-30 PROCEDURE — U0005 INFEC AGEN DETEC AMPLI PROBE: HCPCS

## 2022-04-30 PROCEDURE — U0003 INFECTIOUS AGENT DETECTION BY NUCLEIC ACID (DNA OR RNA); SEVERE ACUTE RESPIRATORY SYNDROME CORONAVIRUS 2 (SARS-COV-2) (CORONAVIRUS DISEASE [COVID-19]), AMPLIFIED PROBE TECHNIQUE, MAKING USE OF HIGH THROUGHPUT TECHNOLOGIES AS DESCRIBED BY CMS-2020-01-R: HCPCS

## 2022-05-01 LAB — SARS-COV-2 RNA RESP QL NAA+PROBE: NEGATIVE

## 2022-05-02 DIAGNOSIS — I35.0 SEVERE AORTIC STENOSIS: Primary | ICD-10-CM

## 2022-05-02 RX ORDER — SODIUM CHLORIDE 9 MG/ML
INJECTION, SOLUTION INTRAVENOUS CONTINUOUS
Status: CANCELLED | OUTPATIENT
Start: 2022-05-02

## 2022-05-02 RX ORDER — LIDOCAINE 40 MG/G
CREAM TOPICAL
Status: CANCELLED | OUTPATIENT
Start: 2022-05-02

## 2022-05-02 RX ORDER — ASPIRIN 325 MG
325 TABLET ORAL ONCE
Status: CANCELLED | OUTPATIENT
Start: 2022-05-02 | End: 2022-05-02

## 2022-05-02 RX ORDER — ASPIRIN 81 MG/1
243 TABLET, CHEWABLE ORAL ONCE
Status: CANCELLED | OUTPATIENT
Start: 2022-05-02

## 2022-05-02 RX ORDER — POTASSIUM CHLORIDE 1500 MG/1
20 TABLET, EXTENDED RELEASE ORAL
Status: CANCELLED | OUTPATIENT
Start: 2022-05-02

## 2022-05-03 ENCOUNTER — TELEPHONE (OUTPATIENT)
Dept: MEDSURG UNIT | Facility: CLINIC | Age: 72
End: 2022-05-03

## 2022-05-04 ENCOUNTER — TELEPHONE (OUTPATIENT)
Dept: CARDIOLOGY | Facility: CLINIC | Age: 72
End: 2022-05-04

## 2022-05-04 ENCOUNTER — HOSPITAL ENCOUNTER (OUTPATIENT)
Facility: CLINIC | Age: 72
Discharge: HOME | End: 2022-05-04
Admitting: INTERNAL MEDICINE
Payer: COMMERCIAL

## 2022-05-04 VITALS
RESPIRATION RATE: 16 BRPM | HEART RATE: 64 BPM | DIASTOLIC BLOOD PRESSURE: 53 MMHG | SYSTOLIC BLOOD PRESSURE: 139 MMHG | BODY MASS INDEX: 38.54 KG/M2 | TEMPERATURE: 99.2 F | WEIGHT: 209.44 LBS | HEIGHT: 62 IN | OXYGEN SATURATION: 96 %

## 2022-05-04 DIAGNOSIS — I25.119 CORONARY ARTERY DISEASE INVOLVING NATIVE CORONARY ARTERY OF NATIVE HEART WITH ANGINA PECTORIS (H): Primary | ICD-10-CM

## 2022-05-04 DIAGNOSIS — I35.0 SEVERE AORTIC STENOSIS: ICD-10-CM

## 2022-05-04 PROBLEM — Z98.890 STATUS POST CORONARY ANGIOGRAM: Status: ACTIVE | Noted: 2022-05-04

## 2022-05-04 LAB
ACT BLD: 279 SECONDS (ref 74–150)
ANION GAP SERPL CALCULATED.3IONS-SCNC: 5 MMOL/L (ref 3–14)
APTT PPP: 27 SECONDS (ref 22–38)
BUN SERPL-MCNC: 27 MG/DL (ref 7–30)
CALCIUM SERPL-MCNC: 9 MG/DL (ref 8.5–10.1)
CHLORIDE BLD-SCNC: 112 MMOL/L (ref 94–109)
CHOLEST SERPL-MCNC: 83 MG/DL
CO2 SERPL-SCNC: 25 MMOL/L (ref 20–32)
CREAT SERPL-MCNC: 1.39 MG/DL (ref 0.52–1.04)
ERYTHROCYTE [DISTWIDTH] IN BLOOD BY AUTOMATED COUNT: 13.4 % (ref 10–15)
GFR SERPL CREATININE-BSD FRML MDRD: 40 ML/MIN/1.73M2
GLUCOSE BLD-MCNC: 99 MG/DL (ref 70–99)
HBA1C MFR BLD: 7.9 % (ref 0–5.6)
HCT VFR BLD AUTO: 35.5 % (ref 35–47)
HDLC SERPL-MCNC: 41 MG/DL
HGB BLD-MCNC: 11.1 G/DL (ref 11.7–15.7)
INR PPP: 0.97 (ref 0.85–1.15)
LDLC SERPL CALC-MCNC: 30 MG/DL
MCH RBC QN AUTO: 28 PG (ref 26.5–33)
MCHC RBC AUTO-ENTMCNC: 31.3 G/DL (ref 31.5–36.5)
MCV RBC AUTO: 89 FL (ref 78–100)
NONHDLC SERPL-MCNC: 42 MG/DL
PLATELET # BLD AUTO: 296 10E3/UL (ref 150–450)
POTASSIUM BLD-SCNC: 4.2 MMOL/L (ref 3.4–5.3)
RBC # BLD AUTO: 3.97 10E6/UL (ref 3.8–5.2)
SODIUM SERPL-SCNC: 142 MMOL/L (ref 133–144)
TRIGL SERPL-MCNC: 62 MG/DL
WBC # BLD AUTO: 9.3 10E3/UL (ref 4–11)

## 2022-05-04 PROCEDURE — 250N000011 HC RX IP 250 OP 636: Performed by: INTERNAL MEDICINE

## 2022-05-04 PROCEDURE — 272N000001 HC OR GENERAL SUPPLY STERILE: Performed by: INTERNAL MEDICINE

## 2022-05-04 PROCEDURE — C1874 STENT, COATED/COV W/DEL SYS: HCPCS | Performed by: INTERNAL MEDICINE

## 2022-05-04 PROCEDURE — 93454 CORONARY ARTERY ANGIO S&I: CPT | Performed by: INTERNAL MEDICINE

## 2022-05-04 PROCEDURE — 85730 THROMBOPLASTIN TIME PARTIAL: CPT | Performed by: INTERNAL MEDICINE

## 2022-05-04 PROCEDURE — 93571 IV DOP VEL&/PRESS C FLO 1ST: CPT | Performed by: INTERNAL MEDICINE

## 2022-05-04 PROCEDURE — 258N000003 HC RX IP 258 OP 636: Performed by: INTERNAL MEDICINE

## 2022-05-04 PROCEDURE — 93454 CORONARY ARTERY ANGIO S&I: CPT | Mod: 26 | Performed by: INTERNAL MEDICINE

## 2022-05-04 PROCEDURE — 80061 LIPID PANEL: CPT

## 2022-05-04 PROCEDURE — 99152 MOD SED SAME PHYS/QHP 5/>YRS: CPT | Performed by: INTERNAL MEDICINE

## 2022-05-04 PROCEDURE — C1725 CATH, TRANSLUMIN NON-LASER: HCPCS | Performed by: INTERNAL MEDICINE

## 2022-05-04 PROCEDURE — 80048 BASIC METABOLIC PNL TOTAL CA: CPT | Performed by: INTERNAL MEDICINE

## 2022-05-04 PROCEDURE — 92928 PRQ TCAT PLMT NTRAC ST 1 LES: CPT | Mod: RC | Performed by: INTERNAL MEDICINE

## 2022-05-04 PROCEDURE — 85610 PROTHROMBIN TIME: CPT | Performed by: INTERNAL MEDICINE

## 2022-05-04 PROCEDURE — 83036 HEMOGLOBIN GLYCOSYLATED A1C: CPT | Performed by: INTERNAL MEDICINE

## 2022-05-04 PROCEDURE — C1769 GUIDE WIRE: HCPCS | Performed by: INTERNAL MEDICINE

## 2022-05-04 PROCEDURE — 250N000013 HC RX MED GY IP 250 OP 250 PS 637: Performed by: INTERNAL MEDICINE

## 2022-05-04 PROCEDURE — 99153 MOD SED SAME PHYS/QHP EA: CPT | Performed by: INTERNAL MEDICINE

## 2022-05-04 PROCEDURE — 250N000009 HC RX 250: Performed by: INTERNAL MEDICINE

## 2022-05-04 PROCEDURE — 85027 COMPLETE CBC AUTOMATED: CPT | Performed by: INTERNAL MEDICINE

## 2022-05-04 PROCEDURE — 93571 IV DOP VEL&/PRESS C FLO 1ST: CPT | Mod: 26 | Performed by: INTERNAL MEDICINE

## 2022-05-04 PROCEDURE — 85347 COAGULATION TIME ACTIVATED: CPT

## 2022-05-04 PROCEDURE — C1760 CLOSURE DEV, VASC: HCPCS | Performed by: INTERNAL MEDICINE

## 2022-05-04 PROCEDURE — C1887 CATHETER, GUIDING: HCPCS | Performed by: INTERNAL MEDICINE

## 2022-05-04 PROCEDURE — 999N000071 HC STATISTIC HEART CATH LAB OR EP LAB

## 2022-05-04 PROCEDURE — 93010 ELECTROCARDIOGRAM REPORT: CPT | Mod: 76 | Performed by: INTERNAL MEDICINE

## 2022-05-04 PROCEDURE — C9600 PERC DRUG-EL COR STENT SING: HCPCS | Performed by: INTERNAL MEDICINE

## 2022-05-04 PROCEDURE — 93005 ELECTROCARDIOGRAM TRACING: CPT

## 2022-05-04 PROCEDURE — 99152 MOD SED SAME PHYS/QHP 5/>YRS: CPT | Mod: GC | Performed by: INTERNAL MEDICINE

## 2022-05-04 PROCEDURE — 36415 COLL VENOUS BLD VENIPUNCTURE: CPT | Performed by: INTERNAL MEDICINE

## 2022-05-04 PROCEDURE — 999N000184 HC STATISTIC TELEMETRY

## 2022-05-04 DEVICE — CLOSURE ANGIOSEAL 6FR 610130: Type: IMPLANTABLE DEVICE | Status: FUNCTIONAL

## 2022-05-04 DEVICE — IMPLANTABLE DEVICE: Type: IMPLANTABLE DEVICE | Status: FUNCTIONAL

## 2022-05-04 RX ORDER — POTASSIUM CHLORIDE 1500 MG/1
20 TABLET, EXTENDED RELEASE ORAL
Status: DISCONTINUED | OUTPATIENT
Start: 2022-05-04 | End: 2022-05-04 | Stop reason: HOSPADM

## 2022-05-04 RX ORDER — FENTANYL CITRATE 50 UG/ML
25 INJECTION, SOLUTION INTRAMUSCULAR; INTRAVENOUS
Status: DISCONTINUED | OUTPATIENT
Start: 2022-05-04 | End: 2022-05-04 | Stop reason: HOSPADM

## 2022-05-04 RX ORDER — NALOXONE HYDROCHLORIDE 0.4 MG/ML
0.4 INJECTION, SOLUTION INTRAMUSCULAR; INTRAVENOUS; SUBCUTANEOUS
Status: DISCONTINUED | OUTPATIENT
Start: 2022-05-04 | End: 2022-05-04 | Stop reason: HOSPADM

## 2022-05-04 RX ORDER — ASPIRIN 325 MG
325 TABLET ORAL ONCE
Status: DISCONTINUED | OUTPATIENT
Start: 2022-05-04 | End: 2022-05-04 | Stop reason: HOSPADM

## 2022-05-04 RX ORDER — DEXTROSE MONOHYDRATE 25 G/50ML
25-50 INJECTION, SOLUTION INTRAVENOUS
Status: DISCONTINUED | OUTPATIENT
Start: 2022-05-04 | End: 2022-05-04 | Stop reason: HOSPADM

## 2022-05-04 RX ORDER — NALOXONE HYDROCHLORIDE 0.4 MG/ML
0.2 INJECTION, SOLUTION INTRAMUSCULAR; INTRAVENOUS; SUBCUTANEOUS
Status: DISCONTINUED | OUTPATIENT
Start: 2022-05-04 | End: 2022-05-04 | Stop reason: HOSPADM

## 2022-05-04 RX ORDER — ASPIRIN 81 MG/1
81 TABLET, CHEWABLE ORAL DAILY
Qty: 30 TABLET | Refills: 3 | Status: SHIPPED | OUTPATIENT
Start: 2022-05-05

## 2022-05-04 RX ORDER — METOPROLOL TARTRATE 1 MG/ML
5 INJECTION, SOLUTION INTRAVENOUS
Status: DISCONTINUED | OUTPATIENT
Start: 2022-05-04 | End: 2022-05-04 | Stop reason: HOSPADM

## 2022-05-04 RX ORDER — ASPIRIN 81 MG/1
243 TABLET, CHEWABLE ORAL ONCE
Status: DISCONTINUED | OUTPATIENT
Start: 2022-05-04 | End: 2022-05-04 | Stop reason: HOSPADM

## 2022-05-04 RX ORDER — ASPIRIN 81 MG/1
81 TABLET ORAL DAILY
Status: DISCONTINUED | OUTPATIENT
Start: 2022-05-05 | End: 2022-05-04 | Stop reason: HOSPADM

## 2022-05-04 RX ORDER — SODIUM CHLORIDE 9 MG/ML
INJECTION, SOLUTION INTRAVENOUS CONTINUOUS
Status: DISCONTINUED | OUTPATIENT
Start: 2022-05-04 | End: 2022-05-04 | Stop reason: HOSPADM

## 2022-05-04 RX ORDER — NICOTINE POLACRILEX 4 MG
15-30 LOZENGE BUCCAL
Status: DISCONTINUED | OUTPATIENT
Start: 2022-05-04 | End: 2022-05-04 | Stop reason: HOSPADM

## 2022-05-04 RX ORDER — HYDRALAZINE HYDROCHLORIDE 20 MG/ML
10 INJECTION INTRAMUSCULAR; INTRAVENOUS EVERY 4 HOURS PRN
Status: DISCONTINUED | OUTPATIENT
Start: 2022-05-04 | End: 2022-05-04 | Stop reason: HOSPADM

## 2022-05-04 RX ORDER — HEPARIN SODIUM 1000 [USP'U]/ML
INJECTION, SOLUTION INTRAVENOUS; SUBCUTANEOUS
Status: DISCONTINUED | OUTPATIENT
Start: 2022-05-04 | End: 2022-05-04 | Stop reason: HOSPADM

## 2022-05-04 RX ORDER — FENTANYL CITRATE 50 UG/ML
INJECTION, SOLUTION INTRAMUSCULAR; INTRAVENOUS
Status: DISCONTINUED | OUTPATIENT
Start: 2022-05-04 | End: 2022-05-04 | Stop reason: HOSPADM

## 2022-05-04 RX ORDER — ATROPINE SULFATE 0.1 MG/ML
0.5 INJECTION INTRAVENOUS
Status: DISCONTINUED | OUTPATIENT
Start: 2022-05-04 | End: 2022-05-04 | Stop reason: HOSPADM

## 2022-05-04 RX ORDER — ROSUVASTATIN CALCIUM 20 MG/1
20 TABLET, COATED ORAL DAILY
Qty: 90 TABLET | Refills: 3 | Status: SHIPPED | OUTPATIENT
Start: 2022-05-04 | End: 2022-05-12

## 2022-05-04 RX ORDER — NITROGLYCERIN 0.4 MG/1
0.4 TABLET SUBLINGUAL EVERY 5 MIN PRN
Status: DISCONTINUED | OUTPATIENT
Start: 2022-05-04 | End: 2022-05-04 | Stop reason: HOSPADM

## 2022-05-04 RX ORDER — OXYCODONE HYDROCHLORIDE 5 MG/1
5 TABLET ORAL EVERY 4 HOURS PRN
Status: DISCONTINUED | OUTPATIENT
Start: 2022-05-04 | End: 2022-05-04 | Stop reason: HOSPADM

## 2022-05-04 RX ORDER — FLUMAZENIL 0.1 MG/ML
0.2 INJECTION, SOLUTION INTRAVENOUS
Status: DISCONTINUED | OUTPATIENT
Start: 2022-05-04 | End: 2022-05-04 | Stop reason: HOSPADM

## 2022-05-04 RX ORDER — OXYCODONE HYDROCHLORIDE 5 MG/1
10 TABLET ORAL EVERY 4 HOURS PRN
Status: DISCONTINUED | OUTPATIENT
Start: 2022-05-04 | End: 2022-05-04 | Stop reason: HOSPADM

## 2022-05-04 RX ORDER — ACETAMINOPHEN 325 MG/1
650 TABLET ORAL EVERY 4 HOURS PRN
Status: DISCONTINUED | OUTPATIENT
Start: 2022-05-04 | End: 2022-05-04 | Stop reason: HOSPADM

## 2022-05-04 RX ORDER — LIDOCAINE 40 MG/G
CREAM TOPICAL
Status: DISCONTINUED | OUTPATIENT
Start: 2022-05-04 | End: 2022-05-04 | Stop reason: HOSPADM

## 2022-05-04 RX ORDER — ONDANSETRON 4 MG/1
4 TABLET, ORALLY DISINTEGRATING ORAL EVERY 6 HOURS PRN
Status: DISCONTINUED | OUTPATIENT
Start: 2022-05-04 | End: 2022-05-04 | Stop reason: HOSPADM

## 2022-05-04 RX ORDER — ONDANSETRON 2 MG/ML
4 INJECTION INTRAMUSCULAR; INTRAVENOUS EVERY 6 HOURS PRN
Status: DISCONTINUED | OUTPATIENT
Start: 2022-05-04 | End: 2022-05-04 | Stop reason: HOSPADM

## 2022-05-04 RX ORDER — ASPIRIN 81 MG/1
81 TABLET, CHEWABLE ORAL ONCE
Status: DISCONTINUED | OUTPATIENT
Start: 2022-05-04 | End: 2022-05-04 | Stop reason: HOSPADM

## 2022-05-04 RX ADMIN — SODIUM CHLORIDE: 9 INJECTION, SOLUTION INTRAVENOUS at 08:31

## 2022-05-04 RX ADMIN — DEXTROSE 15 G: 15 GEL ORAL at 08:35

## 2022-05-04 NOTE — PROVIDER NOTIFICATION
PT blood sugar 89, pt felt lightheaded, Dr. Heltonified, glucose gel given, blood sugar up to 114 per pt's implanted glucose monitor.

## 2022-05-04 NOTE — DISCHARGE INSTRUCTIONS
Cardiac Angioplasty/Stent Discharge Instructions - Femoral    After you go home:    Have an adult stay with you until tomorrow.  Drink extra fluids for 2 days.  You may resume your normal diet.  No smoking       For 24 hours - due to the sedation you received:  Relax and take it easy.  Do NOT make any important or legal decisions.  Do NOT drive or operate machines at home or at work.  Do NOT drink alcohol.    Care of Groin Puncture Site:    For the first 24 hrs - check the puncture site every 1-2 hours while awake.  For 2 days, when you cough, sneeze, laugh or move your bowels, hold your hand over the puncture site and press firmly.  Remove the bandaid after 24 hours. If there is minor oozing, apply another bandaid and remove it after 12 hours.  It is normal to have a small bruise or pea size lump at the site.  You may shower tomorrow. Do NOT take a bath, or use a hot tub or pool for at least 3 days. Do NOT scrub the site. Do not use lotion or powder near the puncture site.     Activity:            For 2 days:  No stooping or squatting  Do NOT do any heavy activity such as exercise, lifting, or straining.   No housework, yard work or any activity that make you sweat  Do NOT lift more than 10 pounds    Bleeding:    If you start bleeding from the site in your groin, lie down flat and press firmly on/above the site for 10 minutes.   Once bleeding stops, lay flat for 2 hours.   Call [facility] as soon as you can.       Call 911 right away if you have heavy bleeding or bleeding that does not stop.      Medicines:    If you are taking an antiplatelet medication such as Plavix, Brilinta or Effient, do not stop taking it until you talk to your cardiologist.      If you are on Metformin (Glucophage), do not restart it until you have blood tests (within 2 to 3 days after discharge).  After you have your blood drawn, you may restart the Metformin.   Take your medications, including blood thinners, unless your provider tells  you not to.    If you have stopped any medicines, check with your provider about when to restart them.    Follow Up Appointments:    Follow up with [facility] Nurse Practitioner at [facility] of patient preference in 7-10 days.  Cardiac Rehab will contact you for follow up care.    Call the clinic if:    You have increased pain or a large or growing hard lump around the site.  The site is red, swollen, hot or tender.  Blood or fluid is draining from the site.  You have chills or a fever greater than 101 F (38 C).  Your leg feels numb, cool or changes color.  You have hives, a rash or unusual itching.  New pain in the back or belly that you cannot control with Tylenol.  Any questions or concerns.      Other Instructions:    If you received a stent - carry your stent card with you at all times.      [facility] [place]:    [phone] [facility] (7 days a week)

## 2022-05-04 NOTE — PROGRESS NOTES
PATIENT/VISITOR WELLNESS SCREENING    Step 1 Patient Screening    1. In the last month, have you been in contact with someone who was confirmed or suspected to have Coronavirus/COVID-19? No    2. Do you have the following symptoms?  Fever/Chills? Yes: no   Cough? No   Shortness of breath? No   New loss of taste or smell? No  Sore throat? No  Muscle or body aches? No  Headaches? No  Fatigue? No  Vomiting or diarrhea? No    Step 2 Visitor Screening    1. Name of Visitor (1 visitor per patient): Audra    2. In the last month, have you been in contact with someone who was confirmed or suspected to have Coronavirus/COVID-19? No    3. Do you have the following symptoms?  Fever/Chills? No   Cough? No   Shortness of breath? No   Skin rash? No   Loss of taste or smell? No  Sore throat? No  Runny or stuffy nose? No  Muscle or body aches? No  Headaches? No  Fatigue? No  Vomiting or diarrhea? No    If the visitor has positive symptoms, notify supervisor/manger  Per policy, the visitor will need to leave the facility     Step 3 Refer to logic grid below for actions    NO SYMPTOM(S)    ACTIONS:  1. Standard rooming process  2. Provider to assess per normal protocol  3. Implement precautions as needed and per guidelines     POSITIVE SYMPTOM(S)  If positive for ANY of the following symptoms: fever, cough, shortness of breath, rash    ACTION:  1. Continue to have the patient wear a mask   2. Room patient as soon as possible  3. Don appropriate PPE when entering room  4. Provider evaluation

## 2022-05-04 NOTE — Clinical Note
Stent deployed in the ostium right coronary artery. Max pressure = 11 desi. Total duration = 25 seconds.

## 2022-05-04 NOTE — PROGRESS NOTES
[facility] Post Procedure Note    Patient Information  Name: Ade Wilkins  Age: 71 year old    Post Procedure  Time patient returned to [facility]: 1045  Concerns/abnormal assessment: none  If abnormal assessment, provider notified: N/A  Plan/Other: bedrest x 2 hours    Beth Nazario RN

## 2022-05-04 NOTE — PROGRESS NOTES
[facility] Admission Nursing Note    Patient Information  Name: Ade Wilkins  Age: 71 year old  Reason for admission: [facility]  [facility] arrival time: 0730    Visitor Information  Name: Audra  Informed of visitor restrictions: Yes  1 visitor allowed per patient   Visitor must screen negative for COVID symptoms   Visitor must wear a mask  Waiting rooms closed to visitors    Patient Admission/Assessment   Pre-procedure assessment complete: Yes  If abnormal assessment/labs, provider notified: yes  NPO: Yes  Medications held per instructions/orders: Yes  Consent: obtained  If applicable, pregnancy test status: deferred  Patient oriented to room: Yes  Education/questions answered: Yes  Plan/other: scheduled for    Discharge Planning  Discharge name/phone number: vannessa Zhu [phone]  Overnight post sedation caregiver: vannessa Zhu  Discharge location: [place]    Beth Nazario RN

## 2022-05-04 NOTE — PRE-PROCEDURE
GENERAL PRE-PROCEDURE:   Procedure:  Coronary angiogram, possible intervention  Date/Time:  5/4/2022 8:44 AM    Verbal consent obtained?: Yes    Risks and benefits: Risks, benefits and alternatives were discussed    Consent given by:  Patient  Patient states understanding of procedure being performed: Yes    Patient's understanding of procedure matches consent: Yes    Procedure consent matches procedure scheduled: Yes    Expected level of sedation:  Moderate  Appropriately NPO:  Yes  ASA Class:  2  Mallampati  :  Grade 1- soft palate, uvula, tonsillar pillars, and posterior pharyngeal wall visible  Lungs:  Lungs clear with good breath sounds bilaterally  Heart:  Normal heart sounds and rate  History & Physical reviewed:  History and physical reviewed and no updates needed  Statement of review:  I have reviewed the lab findings, diagnostic data, medications, and the plan for sedation

## 2022-05-04 NOTE — Clinical Note
Max pressure = 14 desi. Total duration = 14 seconds.     Max pressure = 14 desi. Total duration = 20 seconds.    Balloon reinflated a second time: Max pressure = 14 desi. Total duration = 20 seconds.  Balloon reinflated a third time: Max pressure = 16 desi. Total duration = 10 seconds.

## 2022-05-04 NOTE — PROGRESS NOTES
[facility] Discharge Nursing Note    Patient Information  Name: Ade Wilkins  Age: 71 year old    Discharge Education:  Discharge instructions reviewed: Yes  Additional education/resources provided: yes  Patient/patient representative verbalizes understanding: Yes  Patient discharging on new medications: Yes  Medication education completed: Yes    Discharge Plans:   Discharge location: home  Discharge ride contacted: Yes  Approximate discharge time: 1350    Discharge Criteria:  Discharge criteria met and vital signs stable: Yes    Patient Belongs:  Patient belongings returned to patient: Yes    Beth Nazario RN

## 2022-05-04 NOTE — Clinical Note
DPR/IFR measurement completed.  0.85 IFR MEASUREMENT   Azathioprine Pregnancy And Lactation Text: This medication is Pregnancy Category D and isn't considered safe during pregnancy. It is unknown if this medication is excreted in breast milk.

## 2022-05-04 NOTE — TELEPHONE ENCOUNTER
[facility] Call Center    Phone Message    May a detailed message be left on voicemail: no     Reason for Call: Other: María called from [facility] requesting a lab order for a BMP to be scheduled on Friday 5/6/2022 due to Metformin and heart cath procedure done on 5/4/2022.     Action Taken: Other: [facility]    Travel Screening: Not Applicable

## 2022-05-04 NOTE — Clinical Note
The first balloon was inserted into the right coronary artery and ostium right coronary artery.Max pressure = 14 desi. Total duration = 14 seconds.     Max pressure = 14 desi. Total duration = 20 seconds.    Balloon reinflated a second time: Max pressure = 14 desi. Total duration = 20 seconds.

## 2022-05-05 NOTE — TELEPHONE ENCOUNTER
Called patient post discharge who stated she is feeling well and denied any pain or bleeding from incision site. Discussed metformin hold for 48 hours post angiogram. Patient is setup for labs and follow-up with Latasha Calderon on 05/13/2022. Encouraged patient to call with any additional questions or concerns.

## 2022-05-10 NOTE — PROGRESS NOTES
"Outcome for 05/10/22 8:47 AM :Sent patient 2NGageU message asking them to upload their BG data    HPI:   Rachael is a 71 year old female with history morbid obesity S/P Gastric bypass surgery, hypothyroidism, CKD here for follow up of type 2 diabetes.  Her diabetes mellitus is complicated by diabetic neuropathy, mild nonproliferative retinopathy and nephropathy.  She also follows with . She has had type 2 Diabetes since her early 20s.  Glucose has been up and down lately.    She will be having surgery for severe aortic stenosis.  She is waiting to find out if it needs to be open heart surgery. She is feeling tired.      Rachael is currently taking the following for her diabetes:     Tresiba 44 units at night.   Humalog- depends on what she is eating 2-3 units/15g carb.    Dulaglutide 3 mg weekly.   Metformin 1000 mg daily.   Jardiance 10 mg daily.     She had her \"days and nights mixed up.\"  She is up until 2-3am.  Up at 9am to take her meds and eats breakfast (cream of wheat or oatmeal), pumpernickel rye toast then falls back asleep in her chair at noon. She wakes up and eats lunch- usually a sandwich or can of soup.  Dinner- around 5-6pm, a lot of takeout. midnight snack- 11pm- toast    She has noticed her glucose drifts down when she isn't eating.       Recent glucose is as follows:                 She otherwise is generally feeling well and has no other concerns.       Past Medical History:   Diagnosis Date     Benign essential hypertension      Chronic kidney disease, stage 3b (H)      Diabetic retinopathy of both eyes (H)      Obesity (BMI 30-39.9)      Osteopenia      Pure hypercholesterolemia      Type 2 diabetes mellitus (H)      Type 2 diabetes mellitus with diabetic nephropathy (H)      Type 2 diabetes mellitus with diabetic neuropathy (H)        Past Surgical History:   Procedure Laterality Date     APPENDECTOMY       CATARACT IOL, RT/LT Bilateral      CV CORONARY ANGIOGRAM N/A 5/4/2022    Procedure: " Coronary Angiogram;  Surgeon: Hector Lewis MD;  Location:  [facility]     CV LEFT HEART CATH N/A 5/4/2022    Procedure: Left Heart Catheterization;  Surgeon: Hector Lewis MD;  Location:  [facility]     CV PCI N/A 5/4/2022    Procedure: Percutaneous Coronary Intervention;  Surgeon: Hector Lewis MD;  Location:  [facility]     GASTRIC BYPASS  2004     TONSILLECTOMY & ADENOIDECTOMY         Family History   Problem Relation Age of Onset     Diabetes Type 2  Mother      Glaucoma Mother      Coronary Artery Disease Mother      Abdominal Aortic Aneurysm Father      Myocardial Infarction Father      Breast Cancer Sister      Abdominal Aortic Aneurysm Brother      Bladder Cancer Brother      Diabetes Type 2  Brother      Liver Cancer Brother      Myocardial Infarction Brother      Alzheimer Disease Paternal Grandmother      Cerebrovascular Disease Paternal Grandfather      Ovarian Cancer No family hx of      Colon Cancer No family hx of        Social History     Socioeconomic History     Marital status:      Spouse name: Not on file     Number of children: 0     Years of education: Not on file     Highest education level: Not on file   Occupational History     Employer: [employer],[address] [place] [address]   Social Needs     Financial resource strain: Not on file     Food insecurity:     Worry: Not on file     Inability: Not on file     Transportation needs:     Medical: Not on file     Non-medical: Not on file   Tobacco Use     Smoking status: Never Smoker     Smokeless tobacco: Never Used   Substance and Sexual Activity     Alcohol use: No     Drug use: No     Sexual activity: Never     Partners: Male   Lifestyle     Physical activity:     Days per week: Not on file     Minutes per session: Not on file     Stress: Not on file   Relationships     Social connections:     Talks on phone: Not on file     Gets together: Not on file     Attends  [employer] service: Not on file     Active member of club or organization: Not on file     Attends meetings of clubs or organizations: Not on file     Relationship status: Not on file     Intimate partner violence:     Fear of current or ex partner: Not on file     Emotionally abused: Not on file     Physically abused: Not on file     Forced sexual activity: Not on file   Other Topics Concern      Service Not Asked     Blood Transfusions Not Asked     Caffeine Concern Yes     Comment: 20 oz daily     Occupational Exposure Not Asked     Hobby Hazards Not Asked     Sleep Concern Not Asked     Stress Concern Not Asked     Weight Concern Not Asked     Special Diet Not Asked     Back Care Not Asked     Exercise No     Bike Helmet Not Asked     Seat Belt Yes     Self-Exams Yes     Parent/sibling w/ CABG, MI or angioplasty before 65F 55M? Not Asked   Social History Narrative    Living arrangements - the patient lives alone.   Social Hx: Lives with her sister and her sister's boyfriend.  . 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     Current Outpatient Medications   Medication     aspirin (ASA) 81 MG chewable tablet     augmented betamethasone dipropionate (DIPROLENE-AF) 0.05 % external ointment     calcium carbonate 600 mg-vitamin D 400 units (CALTRATE) 600-400 MG-UNIT per tablet     cetirizine (ZYRTEC) 10 MG tablet     Cholecalciferol (VITAMIN D) 2000 UNITS tablet     Continuous Blood Gluc  (FREESTYLE BALWINDER 14 DAY READER) HENNA     Continuous Blood Gluc Sensor (FREESTYLE BALWINDER 14 DAY SENSOR) MISC     cyanocolbalamin (VITAMIN B-12) 1000 MCG tablet     Dulaglutide 3 MG/0.5ML SOPN     empagliflozin (JARDIANCE) 10 MG TABS tablet     ezetimibe (ZETIA) 10 MG tablet     fluticasone (FLONASE) 50 MCG/ACT nasal spray     furosemide (LASIX) 40 MG tablet     HUMALOG KWIKPEN 100 UNIT/ML soln     insulin degludec (TRESIBA) 200 UNIT/ML pen     insulin pen needle (BD FCO U/F) 32G X 4 MM miscellaneous      irbesartan (AVAPRO) 150 MG tablet     levothyroxine (SYNTHROID/LEVOTHROID) 50 MCG tablet     metFORMIN (GLUCOPHAGE) 1000 MG tablet     potassium chloride ER (KLOR-CON M) 10 MEQ CR tablet     rosuvastatin (CRESTOR) 20 MG tablet     spironolactone (ALDACTONE) 25 MG tablet     ticagrelor (BRILINTA) 90 MG tablet     topiramate (TOPAMAX) 25 MG tablet     traZODone (DESYREL) 50 MG tablet     triamcinolone (KENALOG) 0.1 % external ointment     verapamil ER (CALAN-SR) 180 MG CR tablet     VITRON-C  MG TABS tablet     No current facility-administered medications for this visit.          Allergies   Allergen Reactions     Norvasc [Amlodipine] Other (See Comments)     hairloss     Clonidine Headache     Doxazosin Mesylate Rash     Fexofenadine Hydrochloride Headache     Gemfibrozil Rash     Lisinopril Angioedema     Pioglitazone Hydrochloride Swelling     ankle edema     Physical Exam  LMP  (LMP Unknown)   GENERAL: healthy, alert and no distress  RESP: no audible wheeze, cough, or visible cyanosis.  No visible retractions or increased work of breathing.  Able to speak fully in complete sentences.  PSYCH: mentation appears normal, affect normal/bright, judgement and insight intact, normal speech and appearance well-groomed  EXTREMITIES: Reduced monofilament sensation on the right foot, intact on the left.      RESULTS  Lab Results   Component Value Date    A1C 7.9 (H) 05/04/2022    A1C 7.6 (H) 11/24/2021    A1C 8.0 (H) 08/27/2021    A1C 8.1 (H) 01/08/2021    A1C 8.1 (H) 10/09/2020    A1C 8.7 (A) 08/07/2020    A1C 8.4 (H) 03/27/2020    A1C 8.7 (H) 04/23/2019    HEMOGLOBINA1 8.1 (A) 01/07/2020    HEMOGLOBINA1 8.7 (A) 04/22/2019    HEMOGLOBINA1 8.1 (A) 12/10/2018    HEMOGLOBINA1 8.1 (A) 08/30/2017       TSH   Date Value Ref Range Status   08/27/2021 2.37 0.40 - 4.00 mU/L Final   03/27/2020 3.47 0.40 - 4.00 mU/L Final   10/14/2019 2.54 0.40 - 4.00 mU/L Final   04/23/2019 4.06 (H) 0.40 - 4.00 mU/L Final   05/24/2018 2.54  0.40 - 4.00 mU/L Final   03/12/2018 4.78 (H) 0.40 - 4.00 mU/L Final     T4 Free   Date Value Ref Range Status   04/23/2019 1.10 0.76 - 1.46 ng/dL Final   03/12/2018 1.17 0.76 - 1.46 ng/dL Final   10/17/2016 1.17 0.76 - 1.46 ng/dL Final   05/05/2010 1.05 0.70 - 1.85 ng/dL Final   09/13/2007 1.00 0.70 - 1.85 ng/dL Final       ALT   Date Value Ref Range Status   04/21/2022 94 (H) 0 - 50 U/L Final   04/13/2021 38 0 - 50 U/L Final   03/27/2020 28 0 - 50 U/L Final   ]    Recent Labs   Lab Test 08/27/21  0815 03/27/20  1023 06/17/16  0750 05/22/15  0848 05/12/14  0000 05/03/14  1018   CHOL 115 115   < > 175   < > 161   HDL 36* 34*   < > 32*   < > 39*   LDL 49 59   < > 76   < > 76   TRIG 148 112   < > 334*   < > 231*   CHOLHDLRATIO  --   --   --  5.5*  --  4.2    < > = values in this interval not displayed.       Lab Results   Component Value Date     04/13/2021      Lab Results   Component Value Date    POTASSIUM 4.4 09/13/2021    POTASSIUM 4.2 04/13/2021     Lab Results   Component Value Date    CHLORIDE 110 04/13/2021     Lab Results   Component Value Date    RASHEEDA 9.2 04/13/2021     Lab Results   Component Value Date    CO2 28 04/13/2021     Lab Results   Component Value Date    BUN 37 09/13/2021    BUN 29 04/13/2021     Lab Results   Component Value Date    CR 1.49 09/13/2021    CR 1.38 04/13/2021       GFR Estimate   Date Value Ref Range Status   05/13/2022 39 (L) >60 mL/min/1.73m2 Final     Comment:     Effective December 21, 2021 eGFRcr in adults is calculated using the 2021 CKD-EPI creatinine equation which includes age and gender (Nicholas arriola al., NEJ, DOI: 10.1056/QLSPry2592608)   05/04/2022 40 (L) >60 mL/min/1.73m2 Final     Comment:     Effective December 21, 2021 eGFRcr in adults is calculated using the 2021 CKD-EPI creatinine equation which includes age and gender (Nicholas arriola al., NEJ, DOI: 10.1056/NTRYfg1500023)   04/21/2022 41 (L) >60 mL/min/1.73m2 Final     Comment:     Effective December 21, 2021 eGFRcr  in adults is calculated using the 2021 CKD-EPI creatinine equation which includes age and gender (Nicholas et al., NE, DOI: 10.1056/TTOOsc1020000)   04/13/2021 38 (L) >60 mL/min/[1.73_m2] Final     Comment:     Non  GFR Calc  Starting 12/18/2018, serum creatinine based estimated GFR (eGFR) will be   calculated using the Chronic Kidney Disease Epidemiology Collaboration   (CKD-EPI) equation.     03/24/2021 38 (L) >60 mL/min/[1.73_m2] Final     Comment:     Non  GFR Calc  Starting 12/18/2018, serum creatinine based estimated GFR (eGFR) will be   calculated using the Chronic Kidney Disease Epidemiology Collaboration   (CKD-EPI) equation.     11/13/2020 45 (L) >60 mL/min/[1.73_m2] Final     Comment:     Non  GFR Calc  Starting 12/18/2018, serum creatinine based estimated GFR (eGFR) will be   calculated using the Chronic Kidney Disease Epidemiology Collaboration   (CKD-EPI) equation.       GFR Estimate If Black   Date Value Ref Range Status   04/13/2021 45 (L) >60 mL/min/[1.73_m2] Final     Comment:      GFR Calc  Starting 12/18/2018, serum creatinine based estimated GFR (eGFR) will be   calculated using the Chronic Kidney Disease Epidemiology Collaboration   (CKD-EPI) equation.     03/24/2021 44 (L) >60 mL/min/[1.73_m2] Final     Comment:      GFR Calc  Starting 12/18/2018, serum creatinine based estimated GFR (eGFR) will be   calculated using the Chronic Kidney Disease Epidemiology Collaboration   (CKD-EPI) equation.     11/13/2020 52 (L) >60 mL/min/[1.73_m2] Final     Comment:      GFR Calc  Starting 12/18/2018, serum creatinine based estimated GFR (eGFR) will be   calculated using the Chronic Kidney Disease Epidemiology Collaboration   (CKD-EPI) equation.         Lab Results   Component Value Date    MICROL 6 08/27/2021    MICROL 35 10/09/2020     No results found for: MICROALBUMIN  No results found for: CPEPT, GADAB,  ISCAB    Vitamin B12   Date Value Ref Range Status   10/17/2016 2,075 (H) 193 - 986 pg/mL Final     Comment:     Interp: 247-911 = Normal   05/12/2012 742 >210 pg/mL Final     Comment:     Interp: 247-911 = Normal   ]    Most recent eye exam date: : Not Found     Assessment/Plan:     1.  Type 2 diabetes- Kaia's glucose is well controlled.  She is drifting down when she is not eating, setting her up for hypoglycemia.  Will reduce Tresiba from 44 units to 40 units.  I did warn her that it may take several days before she sees the effect of the reduction, as Tresiba is so long acting. She is climbing too high after eating.  She mentioned that she often takes her insulin after eating, rather than before.  She does not think she could do it 15 minutes before eating, but feels she would be ok with taking it at the start of the meal.  She has been nervous to take it because she has not been as hungry.  I did not ask her to change her carb coverage now, but will instead focus on timing her insulin better.  Discussed In-pen bluetooth insulin pen for dosing calculations.  She is not sure about No other changes today.  2. Risk factors:     Retinopathy:  No.  Had eye exam within last year. She does have macular edema and follows with ophthalmologist regularly.   Nephropathy:  BP well controlled.  No microalbuminuria.  She does have CKD  Neuropathy: Some tingling, numbness.  Tolerable.    Feet: OK, no ulcers.   Lipids:  On high dose statin    3.  F/U in 3 months as planned with Dr. Hoover, and see me in 6 months.      32 minutes spent on the date of the encounter doing chart review, review of test results, review and interpretation of glucose sensor data, patient visit and documentation, counseling/coordination of care, and discussion of follow up plan for worsening hyper and hypoglycemia.  The patient understood and is satisfied with today's visit.        Roxanne Masterson PA-C, MPAS   [facility]  Department of  Medicine  Division of Endocrinology and Diabetes        Answers for HPI/ROS submitted by the patient on 5/11/2022  General Symptoms: No  Skin Symptoms: Yes  HENT Symptoms: Yes  EYE SYMPTOMS: No  HEART SYMPTOMS: Yes  LUNG SYMPTOMS: Yes  INTESTINAL SYMPTOMS: No  URINARY SYMPTOMS: No  GYNECOLOGIC SYMPTOMS: No  BREAST SYMPTOMS: No  SKELETAL SYMPTOMS: No  BLOOD SYMPTOMS: Yes  NERVOUS SYSTEM SYMPTOMS: Yes  MENTAL HEALTH SYMPTOMS: No  Ear pain: Yes  Ear discharge: No  Hearing loss: No  Tinnitus: Yes  Nosebleeds: Yes  Congestion: Yes  Sinus pain: Yes  Trouble swallowing: No   Voice hoarseness: No  Mouth sores: Yes  Sore throat: Yes  Tooth pain: No  Gum tenderness: No  Bleeding gums: No  Change in taste: Yes  Change in sense of smell: No  Dry mouth: Yes  Hearing aid used: No  Neck lump: No  Changes in hair: No  Changes in moles/birth marks: No  Itching: No  Rashes: No  Changes in nails: Yes  Acne: No  Hair in places you don't want it: No  Change in facial hair: No  Warts: No  Non-healing sores: Yes  Scarring: No  Flaking of skin: Yes  Color changes of hands/feet in cold : No  Sun sensitivity: Yes  Skin thickening: No  Chest pain or pressure: No  Fast or irregular heartbeat: No  Pain in legs with walking: No  Trouble breathing while lying down: Yes  Fingers or toes appear blue: No  High blood pressure: No  Low blood pressure: No  Fainting: No  Murmurs: Yes  Pacemaker: No  Varicose veins: No  Wake up at night with shortness of breath: No  Light-headedness: Yes  Exercise intolerance: Yes  Cough: No  Sputum or phlegm: Yes  Coughing up blood: No  Difficulty breating or shortness of breath: Yes  Snoring: No  Wheezing: No  Difficulty breathing on exertion: Yes  Nighttime Cough: No  Difficulty breathing when lying flat: Yes  Edema or swelling: Yes  Anemia: Yes  Swollen glands: No  Easy bleeding or bruising: Yes  Trouble with coordination: No  Dizziness or trouble with balance: Yes  Fainting or black-out spells: No  Memory loss:  No  Headache: No  Seizures: No  Speech problems: No  Tingling: Yes  Tremor: No  Weakness: No  Difficulty walking: No  Paralysis: No  Numbness: Yes

## 2022-05-11 LAB
ATRIAL RATE - MUSE: 68 BPM
ATRIAL RATE - MUSE: 78 BPM
DIASTOLIC BLOOD PRESSURE - MUSE: NORMAL MMHG
DIASTOLIC BLOOD PRESSURE - MUSE: NORMAL MMHG
INTERPRETATION ECG - MUSE: NORMAL
INTERPRETATION ECG - MUSE: NORMAL
P AXIS - MUSE: 58 DEGREES
P AXIS - MUSE: 67 DEGREES
PR INTERVAL - MUSE: 182 MS
PR INTERVAL - MUSE: 194 MS
QRS DURATION - MUSE: 86 MS
QRS DURATION - MUSE: 88 MS
QT - MUSE: 378 MS
QT - MUSE: 406 MS
QTC - MUSE: 430 MS
QTC - MUSE: 431 MS
R AXIS - MUSE: -10 DEGREES
R AXIS - MUSE: 13 DEGREES
SYSTOLIC BLOOD PRESSURE - MUSE: NORMAL MMHG
SYSTOLIC BLOOD PRESSURE - MUSE: NORMAL MMHG
T AXIS - MUSE: 59 DEGREES
T AXIS - MUSE: 68 DEGREES
VENTRICULAR RATE- MUSE: 68 BPM
VENTRICULAR RATE- MUSE: 78 BPM

## 2022-05-11 ASSESSMENT — ENCOUNTER SYMPTOMS
COUGH: 0
SPEECH CHANGE: 0
DIZZINESS: 1
SINUS PAIN: 1
SHORTNESS OF BREATH: 1
SKIN CHANGES: 0
COUGH DISTURBING SLEEP: 0
WEAKNESS: 0
SWOLLEN GLANDS: 0
EXERCISE INTOLERANCE: 1
TREMORS: 0
TROUBLE SWALLOWING: 0
DYSPNEA ON EXERTION: 1
PARALYSIS: 0
BRUISES/BLEEDS EASILY: 1
SNORES LOUDLY: 0
DISTURBANCES IN COORDINATION: 0
NUMBNESS: 1
HOARSE VOICE: 0
HYPERTENSION: 0
SMELL DISTURBANCE: 0
TINGLING: 1
MEMORY LOSS: 0
HEMOPTYSIS: 0
SORE THROAT: 1
ORTHOPNEA: 1
LEG PAIN: 0
LOSS OF CONSCIOUSNESS: 0
SPUTUM PRODUCTION: 1
SINUS CONGESTION: 1
LIGHT-HEADEDNESS: 1
SLEEP DISTURBANCES DUE TO BREATHING: 0
NAIL CHANGES: 1
POSTURAL DYSPNEA: 1
SYNCOPE: 0
HYPOTENSION: 0
PALPITATIONS: 0
NECK MASS: 0
HEADACHES: 0
SEIZURES: 0
TASTE DISTURBANCE: 1
WHEEZING: 0
POOR WOUND HEALING: 1

## 2022-05-12 ENCOUNTER — VIRTUAL VISIT (OUTPATIENT)
Dept: PHARMACY | Facility: CLINIC | Age: 72
End: 2022-05-12
Payer: COMMERCIAL

## 2022-05-12 DIAGNOSIS — G43.719 INTRACTABLE CHRONIC MIGRAINE WITHOUT AURA AND WITHOUT STATUS MIGRAINOSUS: ICD-10-CM

## 2022-05-12 DIAGNOSIS — Z98.890 STATUS POST CORONARY ANGIOGRAM: ICD-10-CM

## 2022-05-12 DIAGNOSIS — E11.40 TYPE 2 DIABETES MELLITUS WITH DIABETIC NEUROPATHY, WITH LONG-TERM CURRENT USE OF INSULIN (H): ICD-10-CM

## 2022-05-12 DIAGNOSIS — Z79.4 TYPE 2 DIABETES MELLITUS WITH DIABETIC NEUROPATHY, WITH LONG-TERM CURRENT USE OF INSULIN (H): ICD-10-CM

## 2022-05-12 DIAGNOSIS — R60.9 EDEMA, UNSPECIFIED TYPE: ICD-10-CM

## 2022-05-12 DIAGNOSIS — I10 BENIGN ESSENTIAL HYPERTENSION: ICD-10-CM

## 2022-05-12 DIAGNOSIS — E78.00 PURE HYPERCHOLESTEROLEMIA: Primary | ICD-10-CM

## 2022-05-12 DIAGNOSIS — R01.1 HEART MURMUR: ICD-10-CM

## 2022-05-12 DIAGNOSIS — Z98.84 H/O GASTRIC BYPASS: ICD-10-CM

## 2022-05-12 PROCEDURE — 99607 MTMS BY PHARM ADDL 15 MIN: CPT | Performed by: PHARMACIST

## 2022-05-12 PROCEDURE — 99606 MTMS BY PHARM EST 15 MIN: CPT | Performed by: PHARMACIST

## 2022-05-12 NOTE — PROGRESS NOTES
Medication Therapy Management (MTM) Encounter    ASSESSMENT:                            Medication Adherence/Access: No issues identified    Hypertension/Edema/CAD: follow-up with Cardiology as planned.  She will discuss nose bleeds since restarting aspirin with them.  Encourage follow-up with ENT as discussed.    Heart Murmur:  Follow-up as planned with imaging and provider visit    Hyperlipidemia: stable    Chronic Migraine without Aura: stable    H/O Gastric Bypass: stable    PLAN:                            1.  Schedule follow-up with ENT if nose bleeds continue on the aspirin    Follow-up: Return in about 3 months (around 8/12/2022) for Medication Therapy Management.    SUBJECTIVE/OBJECTIVE:                          Ade Wilkins is a 71 year old female called for a follow-up visit.  Today's visit is a follow-up MTM visit from 4/11/22     Reason for visit: medication follow-up      Allergies/ADRs: Reviewed in chart  Tobacco: She reports that she has never smoked. She has never used smokeless tobacco.  Alcohol: not currently using    Medication Adherence/Access: no issues reported      Hypertension/Edema/CAD: Current medications include irbesartan 150 mg daily, spironolactone 12.5 mg daily, verapamil 180 mg daily and furosemide 40 mg daily.    Taking potassium 10 meq twice daily.  Wears compression stockings.  Patient does self-monitor blood pressure. Home BP monitoring in range of 108-130/57-60's.  Patient reports the following medication side effects: dizziness.    Angiogram 5/4 and stent placed last week.  Started Brilinta 90 mg twice daily and aspirin 81 mg daily.  Started to have nose bleeds again.  Plans to schedule with ENT to have vein cauterized.    Has f/u with Cardiology tomorrow.      BP Readings from Last 3 Encounters:   05/04/22 139/53   04/14/22 137/60   03/28/22 (!) 142/58       Potassium   Date Value Ref Range Status   05/04/2022 4.2 3.4 - 5.3 mmol/L Final   04/13/2021 4.2 3.4 - 5.3 mmol/L Final            Heart Murmur:  Has been told she needs valve replacement:  Feels cold all the time and dizzy when standing.  Has CT next week and consult with surgeon afterwards.      Hyperlipidemia: Current therapy includes rosuvastatin 20 mg daily and Ezetimibe (Zetia) 10 mg once daily.  Patient reports no significant myalgias or other side effects.    Recent Labs   Lab Test 05/04/22  0828 08/27/21  0815 06/17/16  0750 05/22/15  0848 05/12/14  0000 05/03/14  1018   CHOL 83 115   < > 175   < > 161   HDL 41* 36*   < > 32*   < > 39*   LDL 30 49   < > 76   < > 76   TRIG 62 148   < > 334*   < > 231*   CHOLHDLRATIO  --   --   --  5.5*  --  4.2    < > = values in this interval not displayed.       Chronic Migraine without Aura: Current preventive medications include: Topiramate 75 mg once daily (reduced last month due to side effects).  Patient's triggers include: Sleep disturbances and associated symptoms include: nagging headache.   Word finding challenges have improved with decreased dose of topiramate.  The medication was held prior to recent procedure and she did have a migraine start.  When she takes it daily it has been effects.   Patient reports having 0 headache days per month.       H/O Gastric Bypass: s/p Bypass in 2004.  Currently taking calcium 1 tablets twice daily, vitamin D 2000 units, magnesium, potassium, B12 1000 mcg every other day and Vitron-C 2 tablets twice daily. No reported issues at this time.  Has labs ordered for Aug.    Sees oncology Dr. Art and questions if more recent labs have been done there (8/21/22).   Labs from [facility]:  Vitamin B12 2100   Ferritin 68.9  TSAT 31%  Hgb 11.3    Potassium   Date Value Ref Range Status   11/24/2021 4.3 3.4 - 5.3 mmol/L Final   04/13/2021 4.2 3.4 - 5.3 mmol/L Final     Hemoglobin   Date Value Ref Range Status   01/08/2021 11.6 (L) 11.7 - 15.7 g/dL Final   ]      Vitamin D Deficiency Screening Results:  Lab Results   Component Value Date    VITDT 37  03/28/2017    VITDT 10 (L) 02/10/2017               Today's Vitals: LMP  (LMP Unknown)   ----------------  Post Discharge Medication Reconciliation Status: discharge medications reconciled, continue medications without change.    I spent 20 minutes with this patient today. All changes were made via collaborative practice agreement with Judith Aviles MD. A copy of the visit note was provided to the patient's provider(s).    The patient was sent via [employer] a summary of these recommendations.     Joanen Gamble , Pharm D  [phone] (phone)  Medication Therapy Management Pharmacist     Telemedicine Visit Details  Type of service:  Telephone visit  Start Time: 1031am  End Time: 10:51 AM  Originating Location (patient location): [place]  Distant Location (provider location):  [facility]     Medication Therapy Recommendations  No medication therapy recommendations to display

## 2022-05-12 NOTE — PATIENT INSTRUCTIONS
"Recommendations from today's MTM visit:                                                         1.  Schedule follow-up with ENT as planned if nose bleeds continue on the aspirin    Follow-up: Return in about 3 months (around 8/12/2022) for Medication Therapy Management.    It was great speaking with you today.  I value your experience and would be very thankful for your time in providing feedback in our clinic survey. In the next few days, you may receive an email or text message from [employer] with a link to a survey related to your  clinical pharmacist.\"     To schedule another MTM appointment, please call the clinic directly or you may call the MTM scheduling line at [phone] or toll-free at [phone].     My Clinical Pharmacist's contact information:                                                      Please feel free to contact me with any questions or concerns you have.      Joanne Gamble , Pharm D  [phone] (phone)  Medication Therapy Management Pharmacist     "

## 2022-05-13 ENCOUNTER — LAB (OUTPATIENT)
Dept: LAB | Facility: CLINIC | Age: 72
End: 2022-05-13
Payer: COMMERCIAL

## 2022-05-13 ENCOUNTER — OFFICE VISIT (OUTPATIENT)
Dept: CARDIOLOGY | Facility: CLINIC | Age: 72
End: 2022-05-13

## 2022-05-13 VITALS
HEART RATE: 62 BPM | HEIGHT: 63 IN | WEIGHT: 201.3 LBS | SYSTOLIC BLOOD PRESSURE: 116 MMHG | BODY MASS INDEX: 35.67 KG/M2 | DIASTOLIC BLOOD PRESSURE: 72 MMHG

## 2022-05-13 DIAGNOSIS — I35.0 SEVERE AORTIC STENOSIS: ICD-10-CM

## 2022-05-13 LAB
ANION GAP SERPL CALCULATED.3IONS-SCNC: 7 MMOL/L (ref 3–14)
BUN SERPL-MCNC: 29 MG/DL (ref 7–30)
CALCIUM SERPL-MCNC: 9.3 MG/DL (ref 8.5–10.1)
CHLORIDE BLD-SCNC: 108 MMOL/L (ref 94–109)
CO2 SERPL-SCNC: 24 MMOL/L (ref 20–32)
CREAT SERPL-MCNC: 1.44 MG/DL (ref 0.52–1.04)
GFR SERPL CREATININE-BSD FRML MDRD: 39 ML/MIN/1.73M2
GLUCOSE BLD-MCNC: 183 MG/DL (ref 70–99)
POTASSIUM BLD-SCNC: 4.4 MMOL/L (ref 3.4–5.3)
SODIUM SERPL-SCNC: 139 MMOL/L (ref 133–144)

## 2022-05-13 PROCEDURE — 80048 BASIC METABOLIC PNL TOTAL CA: CPT

## 2022-05-13 PROCEDURE — 36415 COLL VENOUS BLD VENIPUNCTURE: CPT

## 2022-05-13 PROCEDURE — 99215 OFFICE O/P EST HI 40 MIN: CPT | Performed by: PHYSICIAN ASSISTANT

## 2022-05-13 NOTE — PROGRESS NOTES
Primary Cardiologist: Dr. Lewis    Reason for Visit: Post Angiogram follow up     History of Present Illness:   Rachael is a very pleasant 71 year old female with past medical history notable for severe symptomatic aortic valve stenosis (mean gradient of 46 mmHg with valve area of 0.9 cm  and V-max of 4.4 m/s in the setting of LVEF of 65%), CAD (recent stent placement to proximal RCA for 70% lesion with positive FFR; on aspirin and Brilinta), hypertension, chronic HFpEF (on furosemide 40 mg daily; NYHA class III), lymphedema, CKD, type 2 diabetes mellitus (insulin-dependent; peripheral neuropathy), and mixed hyperlipidemia (on rosuvastatin 20 mg and ezetimibe 10 mg daily with LDL of 30).    Rachael had recent PCI to significant lesion in proximal RCA.  She is doing well on Brilinta and aspirin.  She does report mild pleuritic chest pain since her procedure but this is already improving.  She thinks the pannus strap was quite too tight during the procedure causing some discomfort.  She denies true chest pain.  She is on furosemide 40 mg daily for her history of HFpEF as well as lymphedema.  She thinks that her peripheral edema is better than usual as she has been elevating her legs more consistently.  She does urinate frequently with furosemide 40 mg daily.  She tells me she has difficulty lying flat at night so she usually sleeps in her chair.  She tells me this is how she has been sleeping for a long time.  She has both tinnitus and claustrophobia that prevents her from lying flat on her back.  She does not appear to have orthopnea as a cause of it.  She is scheduled for CT TAVR angiogram.  She will be discussed during interdisciplinary structural meeting.  She is hoping to have her valve procedure soon.  She denies any bleeding issues on DAPT.    Assessment and Plan:  Rachael is a very pleasant 71 year old female with past medical history notable for severe symptomatic aortic valve stenosis (mean gradient of 46 mmHg  with valve area of 0.9 cm  and V-max of 4.4 m/s in the setting of LVEF of 65%), CAD (recent stent placement to proximal RCA for 70% lesion with positive FFR; on aspirin and Brilinta), hypertension, chronic HFpEF (on furosemide 40 mg daily; NYHA class III), lymphedema, CKD, type 2 diabetes mellitus (insulin-dependent; peripheral neuropathy), and mixed hyperlipidemia (on rosuvastatin 20 mg and ezetimibe 10 mg daily with LDL of 30).    She is doing well from a cardiac standpoint.  Her pleuritic chest pain is improving already.  She is not hypoxic or tachycardic today.  Since its markedly better today we will continue to observe this.  If she has any persistent shortness of breath she will contact one of her TAVR nurses next week.  It could be that she may be having slight side effect from Brilinta.  She otherwise appears to be euvolemic.  She does have some peripheral edema but this is likely from her lymphedema.  She will continue with furosemide 40 mg daily.  She will hear more from her TAVR team once there is further plan on the timeline of her TAVR.    40 minutes spent on the date of the encounter with chart review, patient visit, care coordination, and documentation.      This note was completed in part using Dragon voice recognition software. Although reviewed after completion, some word and grammatical errors may occur.    Orders this Visit:  No orders of the defined types were placed in this encounter.    No orders of the defined types were placed in this encounter.    There are no discontinued medications.      Encounter Diagnosis   Name Primary?     Severe aortic stenosis        CURRENT MEDICATIONS:  Current Outpatient Medications   Medication Sig Dispense Refill     aspirin (ASA) 81 MG chewable tablet Take 1 tablet (81 mg) by mouth daily Starting tomorrow. 30 tablet 3     calcium carbonate 600 mg-vitamin D 400 units (CALTRATE) 600-400 MG-UNIT per tablet Take 1 tablet by mouth in the morning and 1 tablet in  the evening.       cetirizine (ZYRTEC) 10 MG tablet Take 1 tablet (10 mg) by mouth daily       Cholecalciferol (VITAMIN D) 2000 UNITS tablet Take 2,000 Units by mouth daily       cyanocolbalamin (VITAMIN B-12) 1000 MCG tablet Take 1,000 mcg by mouth in the morning.       Dulaglutide 3 MG/0.5ML SOPN Inject 3 mg Subcutaneous once a week 6 mL 3     empagliflozin (JARDIANCE) 10 MG TABS tablet Take 1 tablet (10 mg) by mouth daily 30 tablet 5     ezetimibe (ZETIA) 10 MG tablet Take 1 tablet (10 mg) by mouth daily 90 tablet 3     fluticasone (FLONASE) 50 MCG/ACT nasal spray SHAKE LIQUID AND USE 2 SPRAYS IN EACH NOSTRIL DAILY 48 g 2     furosemide (LASIX) 40 MG tablet TAKE ONE TABLET BY MOUTH ONE TIME DAILY 90 tablet 0     HUMALOG KWIKPEN 100 UNIT/ML soln INJECT  Per sliding scale UP TO 60 UNITS UNDER THE SKIN DAILY. 60 mL 3     insulin degludec (TRESIBA) 200 UNIT/ML pen Inject 44  units subcu daily 40 mL 3     irbesartan (AVAPRO) 150 MG tablet Take 1 tablet (150 mg) by mouth daily (Patient taking differently: Take 75 mg by mouth daily) 90 tablet 3     levothyroxine (SYNTHROID/LEVOTHROID) 50 MCG tablet Take 1 tablet (50 mcg) by mouth daily 90 tablet 3     metFORMIN (GLUCOPHAGE) 1000 MG tablet Take 1 tablet (1,000 mg) by mouth daily (with dinner) 180 tablet      potassium chloride ER (KLOR-CON M) 10 MEQ CR tablet Take 1 tablet (10 mEq) by mouth 2 times daily 360 tablet 3     rosuvastatin (CRESTOR) 20 MG tablet Take 1 tablet (20 mg) by mouth daily 90 tablet 1     spironolactone (ALDACTONE) 25 MG tablet Take 0.5 tablets (12.5 mg) by mouth in the morning. 90 tablet 3     ticagrelor (BRILINTA) 90 MG tablet Take 1 tablet (90 mg) by mouth 2 times daily Start this evening. 180 tablet 3     topiramate (TOPAMAX) 25 MG tablet Take 3 tablets (75 mg) by mouth At Bedtime 270 tablet 1     traZODone (DESYREL) 50 MG tablet take 1 to 2 tablets (50 to 100mg) by mouth nightly as needed 180 tablet PRN     triamcinolone (KENALOG) 0.1 % external  ointment Apply topically 2 times daily To left foot rash 30 g 2     verapamil ER (CALAN-SR) 180 MG CR tablet Take 1 tablet (180 mg) by mouth in the morning. 180 tablet 0     VITRON-C  MG TABS tablet TAKE TWO TABLETS BY MOUTH TWICE DAILY  360 tablet 1     augmented betamethasone dipropionate (DIPROLENE-AF) 0.05 % external ointment Apply to AA BID x 3-4 weeks then PRN 45 g 1     Continuous Blood Gluc  (FREESTYLE BALWINDER 14 DAY READER) HENNA 1 each 3 times daily 1 each 1     Continuous Blood Gluc Sensor (FREESTYLE BALWINDER 14 DAY SENSOR) MISC 1 each every 14 days 2 each 11     insulin pen needle (BD FCO U/F) 32G X 4 MM miscellaneous Use 4 pen needles daily or as directed. 400 each 0       ALLERGIES     Allergies   Allergen Reactions     Norvasc [Amlodipine] Other (See Comments)     hairloss     Clonidine Headache     Doxazosin Mesylate Rash     Fexofenadine Hydrochloride Headache     Gemfibrozil Rash     Lisinopril Angioedema     Pioglitazone Hydrochloride Swelling     ankle edema       PAST MEDICAL HISTORY:  Past Medical History:   Diagnosis Date     Benign essential hypertension      Chronic kidney disease, stage 3b (H)      Diabetic retinopathy of both eyes (H)      Obesity (BMI 30-39.9)      Osteopenia      Pure hypercholesterolemia      Type 2 diabetes mellitus (H)      Type 2 diabetes mellitus with diabetic nephropathy (H)      Type 2 diabetes mellitus with diabetic neuropathy (H)        PAST SURGICAL HISTORY:  Past Surgical History:   Procedure Laterality Date     APPENDECTOMY       CATARACT IOL, RT/LT Bilateral      CV CORONARY ANGIOGRAM N/A 5/4/2022    Procedure: Coronary Angiogram;  Surgeon: Hector Lewis MD;  Location:  [facility]     CV LEFT HEART CATH N/A 5/4/2022    Procedure: Left Heart Catheterization;  Surgeon: Hector Lewis MD;  Location:  [facility]     CV PCI N/A 5/4/2022    Procedure: Percutaneous Coronary Intervention;  Surgeon: Joshua  "Hector العراقي MD;  Location:  [facility]     GASTRIC BYPASS  2004     TONSILLECTOMY & ADENOIDECTOMY         FAMILY HISTORY:  Family History   Problem Relation Age of Onset     Diabetes Type 2  Mother      Glaucoma Mother      Coronary Artery Disease Mother      Abdominal Aortic Aneurysm Father      Myocardial Infarction Father      Breast Cancer Sister      Abdominal Aortic Aneurysm Brother      Bladder Cancer Brother      Diabetes Type 2  Brother      Liver Cancer Brother      Myocardial Infarction Brother      Alzheimer Disease Paternal Grandmother      Cerebrovascular Disease Paternal Grandfather      Ovarian Cancer No family hx of      Colon Cancer No family hx of        SOCIAL HISTORY:  Social History     Socioeconomic History     Marital status:      Spouse name: None     Number of children: 0     Years of education: None     Highest education level: None   Occupational History     Occupation: Retired -    Tobacco Use     Smoking status: Never Smoker     Smokeless tobacco: Never Used   Substance and Sexual Activity     Alcohol use: No     Drug use: No     Sexual activity: Not Currently   Other Topics Concern     Caffeine Concern Yes     Comment: 20 oz daily     Exercise No     Seat Belt Yes     Self-Exams Yes   Social History Narrative    . Single.    No kids.    No formal exercise.        Review of Systems:  Skin:  Negative     Eyes:  Negative    ENT:  Negative    Respiratory:  Positive for shortness of breath;cough;dyspnea on exertion  Cardiovascular:    Positive for;chest pain;palpitations;dizziness;lightheadedness;edema  Gastroenterology: Negative    Genitourinary:  Negative    Musculoskeletal:  Negative    Neurologic:  Negative    Psychiatric:  Negative    Heme/Lymph/Imm:  Positive for allergies  Endocrine:  Positive for thyroid disorder;diabetes    Physical Exam:  Vitals: /72   Pulse 62   Ht 1.588 m (5' 2.5\")   Wt 91.3 kg (201 lb 4.8 oz)   LMP  " (LMP Unknown)   BMI 36.23 kg/m       GEN:  NAD  NECK: Unable to assess JVP.  C/V:  Regular rate and rhythm; 3/6 LYNDON at RUSB.  RESP: Clear to auscultation bilaterally without wheezing, rales, or rhonchi.  GI: Abdomen soft, nontender, nondistended.   EXTREM: 2+ pitting LE edema just below her knees.   NEURO: Alert and oriented, cooperative. No obvious focal deficits.   PSYCH: Normal affect.  SKIN: Warm and dry.       Recent Lab Results:  LIPID RESULTS:  Lab Results   Component Value Date    CHOL 83 05/04/2022    CHOL 115 03/27/2020    HDL 41 (L) 05/04/2022    HDL 34 (L) 03/27/2020    LDL 30 05/04/2022    LDL 59 03/27/2020    TRIG 62 05/04/2022    TRIG 112 03/27/2020    CHOLHDLRATIO 5.5 (H) 05/22/2015       LIVER ENZYME RESULTS:  Lab Results   Component Value Date    AST 49 (H) 04/21/2022    AST 20 04/13/2021    ALT 94 (H) 04/21/2022    ALT 38 04/13/2021       CBC RESULTS:  Lab Results   Component Value Date    WBC 9.3 05/04/2022    WBC 8.3 01/08/2021    RBC 3.97 05/04/2022    RBC 4.12 01/08/2021    HGB 11.1 (L) 05/04/2022    HGB 11.6 (L) 01/08/2021    HCT 35.5 05/04/2022    HCT 38.2 01/08/2021    MCV 89 05/04/2022    MCV 93 01/08/2021    MCH 28.0 05/04/2022    MCH 28.2 01/08/2021    MCHC 31.3 (L) 05/04/2022    MCHC 30.4 (L) 01/08/2021    RDW 13.4 05/04/2022    RDW 13.5 01/08/2021     05/04/2022     01/08/2021       BMP RESULTS:  Lab Results   Component Value Date     05/13/2022     04/13/2021    POTASSIUM 4.4 05/13/2022    POTASSIUM 4.2 04/13/2021    CHLORIDE 108 05/13/2022    CHLORIDE 110 (H) 04/13/2021    CO2 24 05/13/2022    CO2 28 04/13/2021    ANIONGAP 7 05/13/2022    ANIONGAP 3 04/13/2021     (H) 05/13/2022     (H) 04/13/2021    BUN 29 05/13/2022    BUN 29 04/13/2021    CR 1.44 (H) 05/13/2022    CR 1.38 (H) 04/13/2021    GFRESTIMATED 39 (L) 05/13/2022    GFRESTIMATED 38 (L) 04/13/2021    GFRESTBLACK 45 (L) 04/13/2021    RASHEEDA 9.3 05/13/2022    RASHEEDA 9.2 04/13/2021        A1C  RESULTS:  Lab Results   Component Value Date    A1C 7.9 (H) 05/04/2022    A1C 8.1 (H) 01/08/2021       INR RESULTS:  Lab Results   Component Value Date    INR 0.97 05/04/2022           Latasha Calderon PA-C  May 13, 2022

## 2022-05-13 NOTE — PATIENT INSTRUCTIONS
IMPORTANT PHONE NUMBERS:  Cardiology Scheduling (Kelly)~[phone]  Cardiology Clinic Nurse Aileen ~[phone]  Cardiology Clinic Nurse Beth ~[phone]  Cardiology Clinic Nurse Anais ~[phone]

## 2022-05-16 ENCOUNTER — OFFICE VISIT (OUTPATIENT)
Dept: ENDOCRINOLOGY | Facility: CLINIC | Age: 72
End: 2022-05-16
Payer: COMMERCIAL

## 2022-05-16 VITALS
SYSTOLIC BLOOD PRESSURE: 133 MMHG | BODY MASS INDEX: 37.58 KG/M2 | HEART RATE: 71 BPM | WEIGHT: 204.2 LBS | DIASTOLIC BLOOD PRESSURE: 71 MMHG | HEIGHT: 62 IN

## 2022-05-16 DIAGNOSIS — E11.21 WELL CONTROLLED TYPE 2 DIABETES MELLITUS WITH NEPHROPATHY (H): Primary | ICD-10-CM

## 2022-05-16 PROCEDURE — 99214 OFFICE O/P EST MOD 30 MIN: CPT | Performed by: PHYSICIAN ASSISTANT

## 2022-05-16 ASSESSMENT — PAIN SCALES - GENERAL: PAINLEVEL: NO PAIN (0)

## 2022-05-16 NOTE — PATIENT INSTRUCTIONS
Lower Tresiba to 40 units daily.  You are drifting low when you are fasting.     Work on taking Novolog right at the start of your meal.  This will prevent the large spike after you eat.      [email or web address].  Inpen bluetooth insulin pen.     Please let me know if you are having low blood sugars less than 70 or over 250 mg/dL.      If you have concerns, please send me a [employer] message M-Th, call the clinic at [phone], or call [phone] after hours/weekends and ask to speak with the endocrinologist on call.

## 2022-05-16 NOTE — LETTER
"5/16/2022       RE: Ade Wilkins  [address] [facility] [id number]     Dear Colleague,    Thank you for referring your patient, Ade Wilkins, to the [facility] [place] at [facility]. Please see a copy of my visit note below.    Outcome for 05/10/22 8:47 AM :Sent patient [facility] message asking them to upload their BG data    HPI:   Rachael is a 71 year old female with history morbid obesity S/P Gastric bypass surgery, hypothyroidism, CKD here for follow up of type 2 diabetes.  Her diabetes mellitus is complicated by diabetic neuropathy, mild nonproliferative retinopathy and nephropathy.  She also follows with . She has had type 2 Diabetes since her early 20s.  Glucose has been up and down lately.    She will be having surgery for severe aortic stenosis.  She is waiting to find out if it needs to be open heart surgery. She is feeling tired.      Rachael is currently taking the following for her diabetes:     Tresiba 44 units at night.   Humalog- depends on what she is eating 2-3 units/15g carb.    Dulaglutide 3 mg weekly.   Metformin 1000 mg daily.   Jardiance 10 mg daily.     She had her \"days and nights mixed up.\"  She is up until 2-3am.  Up at 9am to take her meds and eats breakfast (cream of wheat or oatmeal), pumpernickel rye toast then falls back asleep in her chair at noon. She wakes up and eats lunch- usually a sandwich or can of soup.  Dinner- around 5-6pm, a lot of takeout. midnight snack- 11pm- toast    She has noticed her glucose drifts down when she isn't eating.       Recent glucose is as follows:                 She otherwise is generally feeling well and has no other concerns.       Past Medical History:   Diagnosis Date     Benign essential hypertension      Chronic kidney disease, stage 3b (H)      Diabetic retinopathy of both eyes (H)      Obesity (BMI 30-39.9)      Osteopenia      Pure " hypercholesterolemia      Type 2 diabetes mellitus (H)      Type 2 diabetes mellitus with diabetic nephropathy (H)      Type 2 diabetes mellitus with diabetic neuropathy (H)        Past Surgical History:   Procedure Laterality Date     APPENDECTOMY       CATARACT IOL, RT/LT Bilateral      CV CORONARY ANGIOGRAM N/A 5/4/2022    Procedure: Coronary Angiogram;  Surgeon: Hector Lewis MD;  Location:  HEART CARDIAC CATH LAB     CV LEFT HEART CATH N/A 5/4/2022    Procedure: Left Heart Catheterization;  Surgeon: Hector Lewis MD;  Location:  HEART CARDIAC CATH LAB     CV PCI N/A 5/4/2022    Procedure: Percutaneous Coronary Intervention;  Surgeon: Hector Lewis MD;  Location:  HEART CARDIAC CATH LAB     GASTRIC BYPASS  2004     TONSILLECTOMY & ADENOIDECTOMY         Family History   Problem Relation Age of Onset     Diabetes Type 2  Mother      Glaucoma Mother      Coronary Artery Disease Mother      Abdominal Aortic Aneurysm Father      Myocardial Infarction Father      Breast Cancer Sister      Abdominal Aortic Aneurysm Brother      Bladder Cancer Brother      Diabetes Type 2  Brother      Liver Cancer Brother      Myocardial Infarction Brother      Alzheimer Disease Paternal Grandmother      Cerebrovascular Disease Paternal Grandfather      Ovarian Cancer No family hx of      Colon Cancer No family hx of        Social History     Socioeconomic History     Marital status:      Spouse name: Not on file     Number of children: 0     Years of education: Not on file     Highest education level: Not on file   Occupational History     Employer: [employer],[address]   Social Needs     Financial resource strain: Not on file     Food insecurity:     Worry: Not on file     Inability: Not on file     Transportation needs:     Medical: Not on file     Non-medical: Not on file   Tobacco Use     Smoking status: Never Smoker     Smokeless tobacco: Never Used   Substance and  Sexual Activity     Alcohol use: No     Drug use: No     Sexual activity: Never     Partners: Male   Lifestyle     Physical activity:     Days per week: Not on file     Minutes per session: Not on file     Stress: Not on file   Relationships     Social connections:     Talks on phone: Not on file     Gets together: Not on file     Attends [employer]ism service: Not on file     Active member of club or organization: Not on file     Attends meetings of clubs or organizations: Not on file     Relationship status: Not on file     Intimate partner violence:     Fear of current or ex partner: Not on file     Emotionally abused: Not on file     Physically abused: Not on file     Forced sexual activity: Not on file   Other Topics Concern      Service Not Asked     Blood Transfusions Not Asked     Caffeine Concern Yes     Comment: 20 oz daily     Occupational Exposure Not Asked     Hobby Hazards Not Asked     Sleep Concern Not Asked     Stress Concern Not Asked     Weight Concern Not Asked     Special Diet Not Asked     Back Care Not Asked     Exercise No     Bike Helmet Not Asked     Seat Belt Yes     Self-Exams Yes     Parent/sibling w/ CABG, MI or angioplasty before 65F 55M? Not Asked   Social History Narrative    Living arrangements - the patient lives alone.   Social Hx: Lives with her sister and her sister's boyfriend.  . 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     Current Outpatient Medications   Medication     aspirin (ASA) 81 MG chewable tablet     augmented betamethasone dipropionate (DIPROLENE-AF) 0.05 % external ointment     calcium carbonate 600 mg-vitamin D 400 units (CALTRATE) 600-400 MG-UNIT per tablet     cetirizine (ZYRTEC) 10 MG tablet     Cholecalciferol (VITAMIN D) 2000 UNITS tablet     Continuous Blood Gluc  (FREESTYLE BALWINDER 14 DAY READER) HENNA     Continuous Blood Gluc Sensor (FREESTYLE BALWINDER 14 DAY SENSOR) MISC     cyanocolbalamin (VITAMIN B-12) 1000 MCG tablet      Dulaglutide 3 MG/0.5ML SOPN     empagliflozin (JARDIANCE) 10 MG TABS tablet     ezetimibe (ZETIA) 10 MG tablet     fluticasone (FLONASE) 50 MCG/ACT nasal spray     furosemide (LASIX) 40 MG tablet     HUMALOG KWIKPEN 100 UNIT/ML soln     insulin degludec (TRESIBA) 200 UNIT/ML pen     insulin pen needle (BD FCO U/F) 32G X 4 MM miscellaneous     irbesartan (AVAPRO) 150 MG tablet     levothyroxine (SYNTHROID/LEVOTHROID) 50 MCG tablet     metFORMIN (GLUCOPHAGE) 1000 MG tablet     potassium chloride ER (KLOR-CON M) 10 MEQ CR tablet     rosuvastatin (CRESTOR) 20 MG tablet     spironolactone (ALDACTONE) 25 MG tablet     ticagrelor (BRILINTA) 90 MG tablet     topiramate (TOPAMAX) 25 MG tablet     traZODone (DESYREL) 50 MG tablet     triamcinolone (KENALOG) 0.1 % external ointment     verapamil ER (CALAN-SR) 180 MG CR tablet     VITRON-C  MG TABS tablet     No current facility-administered medications for this visit.          Allergies   Allergen Reactions     Norvasc [Amlodipine] Other (See Comments)     hairloss     Clonidine Headache     Doxazosin Mesylate Rash     Fexofenadine Hydrochloride Headache     Gemfibrozil Rash     Lisinopril Angioedema     Pioglitazone Hydrochloride Swelling     ankle edema     Physical Exam  LMP  (LMP Unknown)   GENERAL: healthy, alert and no distress  RESP: no audible wheeze, cough, or visible cyanosis.  No visible retractions or increased work of breathing.  Able to speak fully in complete sentences.  PSYCH: mentation appears normal, affect normal/bright, judgement and insight intact, normal speech and appearance well-groomed  EXTREMITIES: Reduced monofilament sensation on the right foot, intact on the left.      RESULTS  Lab Results   Component Value Date    A1C 7.9 (H) 05/04/2022    A1C 7.6 (H) 11/24/2021    A1C 8.0 (H) 08/27/2021    A1C 8.1 (H) 01/08/2021    A1C 8.1 (H) 10/09/2020    A1C 8.7 (A) 08/07/2020    A1C 8.4 (H) 03/27/2020    A1C 8.7 (H) 04/23/2019    HEMOGLOBINA1 8.1 (A)  01/07/2020    HEMOGLOBINA1 8.7 (A) 04/22/2019    HEMOGLOBINA1 8.1 (A) 12/10/2018    HEMOGLOBINA1 8.1 (A) 08/30/2017       TSH   Date Value Ref Range Status   08/27/2021 2.37 0.40 - 4.00 mU/L Final   03/27/2020 3.47 0.40 - 4.00 mU/L Final   10/14/2019 2.54 0.40 - 4.00 mU/L Final   04/23/2019 4.06 (H) 0.40 - 4.00 mU/L Final   05/24/2018 2.54 0.40 - 4.00 mU/L Final   03/12/2018 4.78 (H) 0.40 - 4.00 mU/L Final     T4 Free   Date Value Ref Range Status   04/23/2019 1.10 0.76 - 1.46 ng/dL Final   03/12/2018 1.17 0.76 - 1.46 ng/dL Final   10/17/2016 1.17 0.76 - 1.46 ng/dL Final   05/05/2010 1.05 0.70 - 1.85 ng/dL Final   09/13/2007 1.00 0.70 - 1.85 ng/dL Final       ALT   Date Value Ref Range Status   04/21/2022 94 (H) 0 - 50 U/L Final   04/13/2021 38 0 - 50 U/L Final   03/27/2020 28 0 - 50 U/L Final   ]    Recent Labs   Lab Test 08/27/21  0815 03/27/20  1023 06/17/16  0750 05/22/15  0848 05/12/14  0000 05/03/14  1018   CHOL 115 115   < > 175   < > 161   HDL 36* 34*   < > 32*   < > 39*   LDL 49 59   < > 76   < > 76   TRIG 148 112   < > 334*   < > 231*   CHOLHDLRATIO  --   --   --  5.5*  --  4.2    < > = values in this interval not displayed.       Lab Results   Component Value Date     04/13/2021      Lab Results   Component Value Date    POTASSIUM 4.4 09/13/2021    POTASSIUM 4.2 04/13/2021     Lab Results   Component Value Date    CHLORIDE 110 04/13/2021     Lab Results   Component Value Date    RASHEEDA 9.2 04/13/2021     Lab Results   Component Value Date    CO2 28 04/13/2021     Lab Results   Component Value Date    BUN 37 09/13/2021    BUN 29 04/13/2021     Lab Results   Component Value Date    CR 1.49 09/13/2021    CR 1.38 04/13/2021       GFR Estimate   Date Value Ref Range Status   05/13/2022 39 (L) >60 mL/min/1.73m2 Final     Comment:     Effective December 21, 2021 eGFRcr in adults is calculated using the 2021 CKD-EPI creatinine equation which includes age and gender (Nicholas et al., NEJM, DOI:  10.1056/STFRqn6814406)   05/04/2022 40 (L) >60 mL/min/1.73m2 Final     Comment:     Effective December 21, 2021 eGFRcr in adults is calculated using the 2021 CKD-EPI creatinine equation which includes age and gender (Nicholas arriola al., [facility], DOI: 10.1056/INVHmt0725841)   04/21/2022 41 (L) >60 mL/min/1.73m2 Final     Comment:     Effective December 21, 2021 eGFRcr in adults is calculated using the 2021 CKD-EPI creatinine equation which includes age and gender (Nicholas et al., [facility], DOI: 10.1056/OZPHhj4318361)   04/13/2021 38 (L) >60 mL/min/[1.73_m2] Final     Comment:     Non  GFR Calc  Starting 12/18/2018, serum creatinine based estimated GFR (eGFR) will be   calculated using the Chronic Kidney Disease Epidemiology Collaboration   (CKD-EPI) equation.     03/24/2021 38 (L) >60 mL/min/[1.73_m2] Final     Comment:     Non  GFR Calc  Starting 12/18/2018, serum creatinine based estimated GFR (eGFR) will be   calculated using the Chronic Kidney Disease Epidemiology Collaboration   (CKD-EPI) equation.     11/13/2020 45 (L) >60 mL/min/[1.73_m2] Final     Comment:     Non  GFR Calc  Starting 12/18/2018, serum creatinine based estimated GFR (eGFR) will be   calculated using the Chronic Kidney Disease Epidemiology Collaboration   (CKD-EPI) equation.       GFR Estimate If Black   Date Value Ref Range Status   04/13/2021 45 (L) >60 mL/min/[1.73_m2] Final     Comment:      GFR Calc  Starting 12/18/2018, serum creatinine based estimated GFR (eGFR) will be   calculated using the Chronic Kidney Disease Epidemiology Collaboration   (CKD-EPI) equation.     03/24/2021 44 (L) >60 mL/min/[1.73_m2] Final     Comment:      GFR Calc  Starting 12/18/2018, serum creatinine based estimated GFR (eGFR) will be   calculated using the Chronic Kidney Disease Epidemiology Collaboration   (CKD-EPI) equation.     11/13/2020 52 (L) >60 mL/min/[1.73_m2] Final     Comment:       GFR Calc  Starting 12/18/2018, serum creatinine based estimated GFR (eGFR) will be   calculated using the Chronic Kidney Disease Epidemiology Collaboration   (CKD-EPI) equation.         Lab Results   Component Value Date    MICROL 6 08/27/2021    MICROL 35 10/09/2020     No results found for: MICROALBUMIN  No results found for: CPEPT, GADAB, ISCAB    Vitamin B12   Date Value Ref Range Status   10/17/2016 2,075 (H) 193 - 986 pg/mL Final     Comment:     Interp: 247-911 = Normal   05/12/2012 742 >210 pg/mL Final     Comment:     Interp: 247-911 = Normal   ]    Most recent eye exam date: : Not Found     Assessment/Plan:     1.  Type 2 diabetes- Kaia's glucose is well controlled.  She is drifting down when she is not eating, setting her up for hypoglycemia.  Will reduce Tresiba from 44 units to 40 units.  I did warn her that it may take several days before she sees the effect of the reduction, as Tresiba is so long acting. She is climbing too high after eating.  She mentioned that she often takes her insulin after eating, rather than before.  She does not think she could do it 15 minutes before eating, but feels she would be ok with taking it at the start of the meal.  She has been nervous to take it because she has not been as hungry.  I did not ask her to change her carb coverage now, but will instead focus on timing her insulin better.  Discussed In-pen bluetooth insulin pen for dosing calculations.  She is not sure about No other changes today.  2. Risk factors:     Retinopathy:  No.  Had eye exam within last year. She does have macular edema and follows with ophthalmologist regularly.   Nephropathy:  BP well controlled.  No microalbuminuria.  She does have CKD  Neuropathy: Some tingling, numbness.  Tolerable.    Feet: OK, no ulcers.   Lipids:  On high dose statin    3.  F/U in 3 months as planned with Dr. Hoover, and see me in 6 months.      32 minutes spent on the date of the encounter doing chart  review, review of test results, review and interpretation of glucose sensor data, patient visit and documentation, counseling/coordination of care, and discussion of follow up plan for worsening hyper and hypoglycemia.  The patient understood and is satisfied with today's visit.        Roxanne Masterson PA-C, MPAS   [facility]  Department of Medicine  Division of Endocrinology and Diabetes        Answers for HPI/ROS submitted by the patient on 5/11/2022  General Symptoms: No  Skin Symptoms: Yes  HENT Symptoms: Yes  EYE SYMPTOMS: No  HEART SYMPTOMS: Yes  LUNG SYMPTOMS: Yes  INTESTINAL SYMPTOMS: No  URINARY SYMPTOMS: No  GYNECOLOGIC SYMPTOMS: No  BREAST SYMPTOMS: No  SKELETAL SYMPTOMS: No  BLOOD SYMPTOMS: Yes  NERVOUS SYSTEM SYMPTOMS: Yes  MENTAL HEALTH SYMPTOMS: No  Ear pain: Yes  Ear discharge: No  Hearing loss: No  Tinnitus: Yes  Nosebleeds: Yes  Congestion: Yes  Sinus pain: Yes  Trouble swallowing: No   Voice hoarseness: No  Mouth sores: Yes  Sore throat: Yes  Tooth pain: No  Gum tenderness: No  Bleeding gums: No  Change in taste: Yes  Change in sense of smell: No  Dry mouth: Yes  Hearing aid used: No  Neck lump: No  Changes in hair: No  Changes in moles/birth marks: No  Itching: No  Rashes: No  Changes in nails: Yes  Acne: No  Hair in places you don't want it: No  Change in facial hair: No  Warts: No  Non-healing sores: Yes  Scarring: No  Flaking of skin: Yes  Color changes of hands/feet in cold : No  Sun sensitivity: Yes  Skin thickening: No  Chest pain or pressure: No  Fast or irregular heartbeat: No  Pain in legs with walking: No  Trouble breathing while lying down: Yes  Fingers or toes appear blue: No  High blood pressure: No  Low blood pressure: No  Fainting: No  Murmurs: Yes  Pacemaker: No  Varicose veins: No  Wake up at night with shortness of breath: No  Light-headedness: Yes  Exercise intolerance: Yes  Cough: No  Sputum or phlegm: Yes  Coughing up blood: No  Difficulty breating or shortness of  breath: Yes  Snoring: No  Wheezing: No  Difficulty breathing on exertion: Yes  Nighttime Cough: No  Difficulty breathing when lying flat: Yes  Edema or swelling: Yes  Anemia: Yes  Swollen glands: No  Easy bleeding or bruising: Yes  Trouble with coordination: No  Dizziness or trouble with balance: Yes  Fainting or black-out spells: No  Memory loss: No  Headache: No  Seizures: No  Speech problems: No  Tingling: Yes  Tremor: No  Weakness: No  Difficulty walking: No  Paralysis: No  Numbness: Yes

## 2022-05-16 NOTE — NURSING NOTE
"Chief Complaint   Patient presents with     Diabetes     Vital signs:      BP: 133/71 Pulse: 71           Height: 157.5 cm (5' 2\") Weight: 92.6 kg (204 lb 3.2 oz)  Estimated body mass index is 37.35 kg/m  as calculated from the following:    Height as of this encounter: 1.575 m (5' 2\").    Weight as of this encounter: 92.6 kg (204 lb 3.2 oz).          "

## 2022-05-17 ENCOUNTER — HOSPITAL ENCOUNTER (OUTPATIENT)
Dept: CARDIOLOGY | Facility: CLINIC | Age: 72
Discharge: HOME | End: 2022-05-17
Attending: INTERNAL MEDICINE | Admitting: INTERNAL MEDICINE
Payer: COMMERCIAL

## 2022-05-17 LAB
CREAT BLD-MCNC: 1.7 MG/DL (ref 0.5–1)
GFR SERPL CREATININE-BSD FRML MDRD: 32 ML/MIN/1.73M2

## 2022-05-17 PROCEDURE — 71275 CT ANGIOGRAPHY CHEST: CPT | Mod: 26 | Performed by: INTERNAL MEDICINE

## 2022-05-17 PROCEDURE — 82565 ASSAY OF CREATININE: CPT

## 2022-05-17 PROCEDURE — 74174 CTA ABD&PLVS W/CONTRAST: CPT | Mod: 26 | Performed by: INTERNAL MEDICINE

## 2022-05-17 PROCEDURE — 258N000003 HC RX IP 258 OP 636: Performed by: INTERNAL MEDICINE

## 2022-05-17 PROCEDURE — 250N000011 HC RX IP 250 OP 636: Performed by: INTERNAL MEDICINE

## 2022-05-17 PROCEDURE — 74174 CTA ABD&PLVS W/CONTRAST: CPT

## 2022-05-17 RX ORDER — METHYLPREDNISOLONE SODIUM SUCCINATE 125 MG/2ML
125 INJECTION, POWDER, LYOPHILIZED, FOR SOLUTION INTRAMUSCULAR; INTRAVENOUS
Status: DISCONTINUED | OUTPATIENT
Start: 2022-05-17 | End: 2022-05-18 | Stop reason: HOSPADM

## 2022-05-17 RX ORDER — ONDANSETRON 2 MG/ML
4 INJECTION INTRAMUSCULAR; INTRAVENOUS
Status: DISCONTINUED | OUTPATIENT
Start: 2022-05-17 | End: 2022-05-18 | Stop reason: HOSPADM

## 2022-05-17 RX ORDER — DIPHENHYDRAMINE HCL 25 MG
25 CAPSULE ORAL
Status: DISCONTINUED | OUTPATIENT
Start: 2022-05-17 | End: 2022-05-18 | Stop reason: HOSPADM

## 2022-05-17 RX ORDER — IOPAMIDOL 755 MG/ML
200 INJECTION, SOLUTION INTRAVASCULAR ONCE
Status: COMPLETED | OUTPATIENT
Start: 2022-05-17 | End: 2022-05-17

## 2022-05-17 RX ORDER — ACYCLOVIR 200 MG/1
0-1 CAPSULE ORAL
Status: DISCONTINUED | OUTPATIENT
Start: 2022-05-17 | End: 2022-05-18 | Stop reason: HOSPADM

## 2022-05-17 RX ORDER — DIPHENHYDRAMINE HYDROCHLORIDE 50 MG/ML
25-50 INJECTION INTRAMUSCULAR; INTRAVENOUS
Status: DISCONTINUED | OUTPATIENT
Start: 2022-05-17 | End: 2022-05-18 | Stop reason: HOSPADM

## 2022-05-17 RX ADMIN — SODIUM CHLORIDE 150 ML: 9 INJECTION, SOLUTION INTRAVENOUS at 09:50

## 2022-05-17 RX ADMIN — IOPAMIDOL 55 ML: 755 INJECTION, SOLUTION INTRAVENOUS at 12:09

## 2022-05-18 ENCOUNTER — MYC MEDICAL ADVICE (OUTPATIENT)
Dept: INTERNAL MEDICINE | Facility: CLINIC | Age: 72
End: 2022-05-18
Payer: COMMERCIAL

## 2022-05-18 DIAGNOSIS — R91.8 PULMONARY NODULES: Primary | ICD-10-CM

## 2022-05-24 NOTE — TELEPHONE ENCOUNTER
Spoke to patient to relay CT results and providers recommendations.     Patient verbalized understanding and agrees to do repeat CT scan as recommended.

## 2022-05-25 ENCOUNTER — TELEPHONE (OUTPATIENT)
Dept: CARDIOLOGY | Facility: CLINIC | Age: 72
End: 2022-05-25
Payer: COMMERCIAL

## 2022-05-25 DIAGNOSIS — I35.0 SEVERE AORTIC STENOSIS: Primary | ICD-10-CM

## 2022-05-25 DIAGNOSIS — R06.02 SHORTNESS OF BREATH: ICD-10-CM

## 2022-05-25 NOTE — TELEPHONE ENCOUNTER
Called patient and updated her that we will plan to present her in TAVR clinic conference if able. Informed patient I will call her tomorrow regardless to update her. Reviewed timelines for potential TAVR dates with patient who stated she would prefer to go with Dr. Lewis on 06/07/2022 if that's possible.    Did scheduled patient for required CV surgeon visit with Dr. Mejias on 05/31/2022 at 1500.

## 2022-05-26 NOTE — TELEPHONE ENCOUNTER
Patient was reviewed with Dr. Lewis today who stated we will proceed with TAVR moving forward. Patient will be formally presented at TAVR conference on 06/02/2022.      Above information was called out to the patient and message was left asking for a callback. Patient is already scheduled for required CV surgery visit on 05/31/2022 and will get scheduled for H&P and labs the week prior to procedure as well. Will send a message to our , Kelly, to get this arranged.     Anais Pablo RN  Structural Heart Coordinator  [facility]

## 2022-05-31 NOTE — PROGRESS NOTES
STRUCTURAL HEART CLINIC  H&P    Referring Provider: Dr. Lewis      History of Present Illness  Ade Wilkins who presents for pre-operative H&P in preparation for transcatheter aortic valve replacement on 6/7/2022 at [facility].     Patient with a history of severe aortic stenosis, characterized with an ARMIDA 0.9 cm2, MG 46 mmHg, peak V 4.4 m/s and EF 65%. She reports symptoms of progressive fatigue and dyspnea. Patient was evaluated in structural heart clinic where she was deemed an appropriate TAVR candidate. TAVR guided CT showed favorable anatomy for transfemoral approach.  Aortic valve calcium score 1773.  Pre-TAVR coronary angiogram showed single-vessel coronary disease of the ostial RCA status post successful PCI with drug-eluting stent x1 . She was counseled for the above procedure.      Her history is otherwise significant for hypertension, chronic diastolic heart failure, type 2 diabetes, CKD, lymphedema, and hyperlipidemia.    Patient reports doing well from a cardiovascular standpoint.  She endorses ongoing dyspnea with exertion and worsening fatigue.  She otherwise denies chest pain, syncope or near syncope, palpitations, PND, or orthopnea.  She has no infectious symptoms. EKG shows normal sinus rhythm.     Assessment and Plan     Ade Wilkins presents for pre-operative H&P in preparation for a transcatheter aortic valve replacement on 6/7/2022 at [facility].       1. Severe aortic valve stenosis: Patient reports progressive exertional dyspnea and fatigue. Her echo shows severe aortic stenosis. She has been evaluated by the our structural heart team and has been deemed an appropriate candidate for transcatheter aortic valve replacement. We discussed the procedure in detail, including pre and post-procedure care, restrictions and follow-up. She understands risks including 5-10% risk of heart block leading to permanent pacemaker placement, 3% risk of bleeding, infection,  vascular complication, 1-3% risk of stroke and very low risk (<1%) of serious complications such as cardiac perforation, aortic root rupture, dissection or death.     All questions answered  Type and screen orders complete  COVID test ordered  Supplies for scrubbing provided  No known contrast dye allergy  No trouble with anesthesia in the past  Labs today WNL, no s/s of infection  Allergy to chlorhexidine, patient should be prepped with iodine    2. Chronic diastolic heart failure, NYHA class III, Stage B: Secondary to valvular heart disease. No acute volume overload on exam, though patient does endorse significant exertional dyspnea. On lasix 40 mg daily.     3. CAD: Pre-TAVR coronary angiogram showed single-vessel coronary disease of the ostial RCA status post PCI with 1 drug-eluting stent. She denies angina. Plan to continue aspirin and Brilinta.    4. Hyperlipidemia: LDL 30 on rosuvastatin 20 mg daily and ezetimibe 10 mg daily.    5. Hypertension: Well-controlled on currently on irbesartan and spironolactone.     6. Type II DM: Insulin dependent. Patient also takes metformin. Not currently on Jardiance. Hold oral antidiabetics and short acting insulin. Take 1/2 dose long acting insulin. Follows closely with endocrinologist.     Medication Recommendations:  Diuretic: Hold lasix AM of procedure   Antiplatelet: Continue ASA 81 mg and Brilinta 90 mg BID perioperatively   Antidiabetics: Hold metformin and short acting insulin AM of procedure. Take 1/2 dose lantus.    Supplements: Hold morning of procedure    Patient is optimized and is acceptable candidate for the proposed procedure. No further diagnostic evaluation is needed. Pre-procedure instructions provided in written format.     Yahaira Qureshi, KERI, APRN, CNP  Structural Heart Nurse Practitioner  [facility]   Pager: [phone]    Current Medications:  Current Outpatient Medications   Medication Sig Dispense Refill     aspirin (ASA) 81 MG chewable  tablet Take 1 tablet (81 mg) by mouth daily Starting tomorrow. 30 tablet 3     augmented betamethasone dipropionate (DIPROLENE-AF) 0.05 % external ointment Apply to AA BID x 3-4 weeks then PRN 45 g 1     calcium carbonate 600 mg-vitamin D 400 units (CALTRATE) 600-400 MG-UNIT per tablet Take 1 tablet by mouth in the morning and 1 tablet in the evening.       cetirizine (ZYRTEC) 10 MG tablet Take 1 tablet (10 mg) by mouth daily       Cholecalciferol (VITAMIN D) 2000 UNITS tablet Take 2,000 Units by mouth daily       Continuous Blood Gluc  (FREESTYLE BALWINDER 14 DAY READER) HENNA 1 each 3 times daily 1 each 1     Continuous Blood Gluc Sensor (FREESTYLE BALWINDER 14 DAY SENSOR) MISC 1 each every 14 days 2 each 11     cyanocolbalamin (VITAMIN B-12) 1000 MCG tablet Take 1,000 mcg by mouth in the morning.       Dulaglutide 3 MG/0.5ML SOPN Inject 3 mg Subcutaneous once a week 6 mL 3     empagliflozin (JARDIANCE) 10 MG TABS tablet Take 1 tablet (10 mg) by mouth daily 30 tablet 5     ezetimibe (ZETIA) 10 MG tablet Take 1 tablet (10 mg) by mouth daily 90 tablet 3     fluticasone (FLONASE) 50 MCG/ACT nasal spray SHAKE LIQUID AND USE 2 SPRAYS IN EACH NOSTRIL DAILY 48 g 2     furosemide (LASIX) 40 MG tablet TAKE ONE TABLET BY MOUTH ONE TIME DAILY 90 tablet 0     HUMALOG KWIKPEN 100 UNIT/ML soln INJECT  Per sliding scale UP TO 60 UNITS UNDER THE SKIN DAILY. 60 mL 3     insulin degludec (TRESIBA) 200 UNIT/ML pen Inject 44  units subcu daily 40 mL 3     insulin pen needle (BD FCO U/F) 32G X 4 MM miscellaneous Use 4 pen needles daily or as directed. 400 each 0     irbesartan (AVAPRO) 150 MG tablet Take 1 tablet (150 mg) by mouth daily (Patient taking differently: Take 75 mg by mouth daily) 90 tablet 3     levothyroxine (SYNTHROID/LEVOTHROID) 50 MCG tablet Take 1 tablet (50 mcg) by mouth daily 90 tablet 3     metFORMIN (GLUCOPHAGE) 1000 MG tablet Take 1 tablet (1,000 mg) by mouth daily (with dinner) 180 tablet      potassium chloride  ER (KLOR-CON M) 10 MEQ CR tablet Take 1 tablet (10 mEq) by mouth 2 times daily 360 tablet 3     rosuvastatin (CRESTOR) 20 MG tablet Take 1 tablet (20 mg) by mouth daily 90 tablet 1     spironolactone (ALDACTONE) 25 MG tablet Take 0.5 tablets (12.5 mg) by mouth in the morning. 90 tablet 3     ticagrelor (BRILINTA) 90 MG tablet Take 1 tablet (90 mg) by mouth 2 times daily Start this evening. 180 tablet 3     topiramate (TOPAMAX) 25 MG tablet Take 3 tablets (75 mg) by mouth At Bedtime 270 tablet 1     traZODone (DESYREL) 50 MG tablet take 1 to 2 tablets (50 to 100mg) by mouth nightly as needed 180 tablet PRN     triamcinolone (KENALOG) 0.1 % external ointment Apply topically 2 times daily To left foot rash 30 g 2     verapamil ER (CALAN-SR) 180 MG CR tablet Take 1 tablet (180 mg) by mouth in the morning. 180 tablet 0     VITRON-C  MG TABS tablet TAKE TWO TABLETS BY MOUTH TWICE DAILY  360 tablet 1       Allergies:     Allergies   Allergen Reactions     Norvasc [Amlodipine] Other (See Comments)     hairloss     Clonidine Headache     Doxazosin Mesylate Rash     Fexofenadine Hydrochloride Headache     Gemfibrozil Rash     Lisinopril Angioedema     Pioglitazone Hydrochloride Swelling     ankle edema       Past Medical History:  Past Medical History:   Diagnosis Date     Benign essential hypertension      Chronic kidney disease, stage 3b (H)      Diabetic retinopathy of both eyes (H)      Obesity (BMI 30-39.9)      Osteopenia      Pure hypercholesterolemia      Type 2 diabetes mellitus (H)      Type 2 diabetes mellitus with diabetic nephropathy (H)      Type 2 diabetes mellitus with diabetic neuropathy (H)        Past Surgical History:  Past Surgical History:   Procedure Laterality Date     APPENDECTOMY       CATARACT IOL, RT/LT Bilateral      CV CORONARY ANGIOGRAM N/A 5/4/2022    Procedure: Coronary Angiogram;  Surgeon: Hector Lewis MD;  Location:  HEART CARDIAC CATH LAB     CV LEFT HEART CATH N/A  5/4/2022    Procedure: Left Heart Catheterization;  Surgeon: Hector Lewis MD;  Location:  HEART CARDIAC CATH LAB     CV PCI N/A 5/4/2022    Procedure: Percutaneous Coronary Intervention;  Surgeon: Hector Leiws MD;  Location: [facility] CARDIAC CATH LAB     GASTRIC BYPASS  2004     TONSILLECTOMY & ADENOIDECTOMY         Family History:  Family History   Problem Relation Age of Onset     Diabetes Type 2  Mother      Glaucoma Mother      Coronary Artery Disease Mother      Abdominal Aortic Aneurysm Father      Myocardial Infarction Father      Breast Cancer Sister      Abdominal Aortic Aneurysm Brother      Bladder Cancer Brother      Diabetes Type 2  Brother      Liver Cancer Brother      Myocardial Infarction Brother      Alzheimer Disease Paternal Grandmother      Cerebrovascular Disease Paternal Grandfather      Ovarian Cancer No family hx of      Colon Cancer No family hx of        Social History:  Social History     Socioeconomic History     Marital status:      Number of children: 0   Occupational History     Occupation: Retired -    Tobacco Use     Smoking status: Never Smoker     Smokeless tobacco: Never Used   Substance and Sexual Activity     Alcohol use: No     Drug use: No     Sexual activity: Not Currently   Other Topics Concern     Caffeine Concern Yes     Comment: 20 oz daily     Exercise No     Seat Belt Yes     Self-Exams Yes   Social History Narrative    . Single.    No kids.    No formal exercise.        Review of Systems:  Constitutional: No fever, chills, or sweats. No weight gain/loss   ENT: No visual disturbance, ear ache, epistaxis, sore throat  Allergies/Immunologic: Negative.   Respiratory: No cough, hemoptysia  Cardiovascular: As per HPI  GI: No nausea, vomiting, hematemesis, melena, or hematochezia  : No urinary frequency, dysuria, or hematuria  Integument: Negative  Psychiatric: Negative  Neuro: Negative  Endocrinology: Negative    Musculoskeletal: Negative      Physical Exam:  Vitals: LMP  (LMP Unknown)    General: NAD  HEENT:  Dentition intact.    Neck: No jugular venous distension.   Heart: RRR with grade II/III LYNDON at RUSB  Lungs: CTA.    Abdomen: Soft, nontender, nondistended.   Extremities: No clubbing, cyanosis, or edema.  The pulses are +4/4 at the radial, brachial, femoral, popliteal, DP, and PT sites bilaterally.  No bruits are noted.  Neurologic: Alert and oriented to person/place/time, normal speech, gait and affect  Skin: No petechiae, purpura or rash.      Diagnostic Studies:  ECG: Normal sinus rhythm   Personally reviewed and interpreted by me.    Coronary Angiogram: 5/4/2022  Conclusion         Ost RCA to Prox RCA lesion is 70% stenosed.    Prox LAD to Mid LAD lesion is 25% stenosed.     Single Vessel CAD involving the ostial RCA.  Abnormal IFR of the ostial RCA measuring 0.85.  PCI of the RCA with a 4.5x20 MARISOL.           Plan      Follow bedrest per protocol    Continued medical management and lifestyle modifications for cardiovascular risk factor optimizations.    Continuation of dual antiplatelet therapy for 12 months     Continue high dose statin therapy         Echocardiogram: 4/2022  Interpretation Summary     1. The left ventricle is normal in structure, function and size. The visual  ejection fraction is estimated at 65%.  2. The right ventricle is normal in structure, function and size.  3. There is mild mitral stenosis. The mean mitral valve gradient is 5mmHg.  4. Severe valvular aortic stenosis. Mean 46mmHg, Vmax 4.4m/s, ARMIDA 0.9cm2, DI  0.28.     Echo from 2016 showed EF 70%, AS with mean 12mmHg, Vmax 2.6m/s, ARMIDA 1.5cm2.      Laboratory Studies:  Personally reviewed and interpreted by me.    LIPID RESULTS:  Lab Results   Component Value Date    CHOL 83 05/04/2022    CHOL 115 03/27/2020    HDL 41 (L) 05/04/2022    HDL 34 (L) 03/27/2020    LDL 30 05/04/2022    LDL 59 03/27/2020    TRIG 62 05/04/2022    TRIG 112  03/27/2020    CHOLHDLRATIO 5.5 (H) 05/22/2015       LIVER ENZYME RESULTS:  Lab Results   Component Value Date    AST 49 (H) 04/21/2022    AST 20 04/13/2021    ALT 94 (H) 04/21/2022    ALT 38 04/13/2021       CBC RESULTS:  Lab Results   Component Value Date    WBC 9.3 05/04/2022    WBC 8.3 01/08/2021    RBC 3.97 05/04/2022    RBC 4.12 01/08/2021    HGB 11.1 (L) 05/04/2022    HGB 11.6 (L) 01/08/2021    HCT 35.5 05/04/2022    HCT 38.2 01/08/2021    MCV 89 05/04/2022    MCV 93 01/08/2021    MCH 28.0 05/04/2022    MCH 28.2 01/08/2021    MCHC 31.3 (L) 05/04/2022    MCHC 30.4 (L) 01/08/2021    RDW 13.4 05/04/2022    RDW 13.5 01/08/2021     05/04/2022     01/08/2021       BMP RESULTS:  Lab Results   Component Value Date     05/13/2022     04/13/2021    POTASSIUM 4.4 05/13/2022    POTASSIUM 4.2 04/13/2021    CHLORIDE 108 05/13/2022    CHLORIDE 110 (H) 04/13/2021    CO2 24 05/13/2022    CO2 28 04/13/2021    ANIONGAP 7 05/13/2022    ANIONGAP 3 04/13/2021     (H) 05/13/2022     (H) 04/13/2021    BUN 29 05/13/2022    BUN 29 04/13/2021    CR 1.7 (H) 05/17/2022    CR 1.44 (H) 05/13/2022    CR 1.38 (H) 04/13/2021    GFRESTIMATED 32 (L) 05/17/2022    GFRESTIMATED 38 (L) 04/13/2021    GFRESTBLACK 45 (L) 04/13/2021    RASHEEDA 9.3 05/13/2022    RASHEEDA 9.2 04/13/2021        A1C RESULTS:  Lab Results   Component Value Date    A1C 7.9 (H) 05/04/2022    A1C 8.1 (H) 01/08/2021       INR RESULTS:  Lab Results   Component Value Date    INR 0.97 05/04/2022

## 2022-05-31 NOTE — PATIENT INSTRUCTIONS
TAVR PRE-PROCEDURE INSTRUCTIONS:    - Your TAVR is scheduled Tuesday 06/07/2022, 3rd case. Please check-in at 11:00AM at the Registration Desk in the [facility] at [facility].    - Use the Hibiclens soap I have provided you the night before and morning of the procedure. Use from chin to toes, please avoid your face and eyes.    - Nothing to eat or drink the morning of your TAVR.     Medication instructions:  - You make take your morning medications with sips of water.   - Hold your metformin, furosemide (lasix), the morning of your TAVR.  - Please review your Insulin dosing with your primary care provider for day of TAVR.  - Please hold all vitamins and supplements the morning of your procedure.  - You should continue taking 81mg aspirin daily and Brilinta 90 mg BID.    - COVID-19 restrictions: You will need a COVID test prior to procedure, this is scheduled on 06/04/2022. Please do your best to quarantine/isolate between this test and your procedure day. On the day of the procedure, you may have one visitor in the pre-operative area. Patient and visitor must wear face masks. Two visitors may see you when you get to your room in the Heart Center (2nd floor of [facility]).    - You will stay overnight on Tuesday night. We will monitor you overnight for signs of bleeding, stroke, as well as need for pacemaker. On Wednesday morning, you will have an echo of your new valve and work with cardiac rehab.     It was nice to see you today! Please call with any other questions, concerns, or any changes in your health: [phone]    Anais Pablo RN  Structural Heart Coordinator  [facility]

## 2022-06-01 ENCOUNTER — OFFICE VISIT (OUTPATIENT)
Dept: CARDIOLOGY | Facility: CLINIC | Age: 72
End: 2022-06-01
Payer: COMMERCIAL

## 2022-06-01 ENCOUNTER — LAB (OUTPATIENT)
Dept: LAB | Facility: CLINIC | Age: 72
End: 2022-06-01
Payer: COMMERCIAL

## 2022-06-01 VITALS
OXYGEN SATURATION: 99 % | SYSTOLIC BLOOD PRESSURE: 122 MMHG | BODY MASS INDEX: 36.07 KG/M2 | HEART RATE: 85 BPM | WEIGHT: 196 LBS | DIASTOLIC BLOOD PRESSURE: 69 MMHG | HEIGHT: 62 IN

## 2022-06-01 DIAGNOSIS — R06.02 SHORTNESS OF BREATH: ICD-10-CM

## 2022-06-01 DIAGNOSIS — I35.0 SEVERE AORTIC STENOSIS: ICD-10-CM

## 2022-06-01 DIAGNOSIS — I35.0 NONRHEUMATIC AORTIC VALVE STENOSIS: Primary | ICD-10-CM

## 2022-06-01 DIAGNOSIS — E11.21 WELL CONTROLLED TYPE 2 DIABETES MELLITUS WITH NEPHROPATHY (H): ICD-10-CM

## 2022-06-01 LAB
ABO/RH(D): NORMAL
ALBUMIN SERPL-MCNC: 3.5 G/DL (ref 3.4–5)
ALP SERPL-CCNC: 111 U/L (ref 40–150)
ALT SERPL W P-5'-P-CCNC: 160 U/L (ref 0–50)
ANION GAP SERPL CALCULATED.3IONS-SCNC: 9 MMOL/L (ref 3–14)
ANTIBODY SCREEN: NEGATIVE
AST SERPL W P-5'-P-CCNC: 87 U/L (ref 0–45)
BILIRUB SERPL-MCNC: 0.7 MG/DL (ref 0.2–1.3)
BUN SERPL-MCNC: 29 MG/DL (ref 7–30)
CALCIUM SERPL-MCNC: 9.1 MG/DL (ref 8.5–10.1)
CHLORIDE BLD-SCNC: 109 MMOL/L (ref 94–109)
CO2 SERPL-SCNC: 21 MMOL/L (ref 20–32)
CREAT SERPL-MCNC: 1.36 MG/DL (ref 0.52–1.04)
ERYTHROCYTE [DISTWIDTH] IN BLOOD BY AUTOMATED COUNT: 13.6 % (ref 10–15)
GFR SERPL CREATININE-BSD FRML MDRD: 41 ML/MIN/1.73M2
GLUCOSE BLD-MCNC: 192 MG/DL (ref 70–99)
HCT VFR BLD AUTO: 36 % (ref 35–47)
HGB BLD-MCNC: 11.7 G/DL (ref 11.7–15.7)
INR PPP: 1.09 (ref 0.85–1.15)
MCH RBC QN AUTO: 27.9 PG (ref 26.5–33)
MCHC RBC AUTO-ENTMCNC: 32.5 G/DL (ref 31.5–36.5)
MCV RBC AUTO: 86 FL (ref 78–100)
NT-PROBNP SERPL-MCNC: 346 PG/ML (ref 0–900)
PLATELET # BLD AUTO: 307 10E3/UL (ref 150–450)
POTASSIUM BLD-SCNC: 4 MMOL/L (ref 3.4–5.3)
PROT SERPL-MCNC: 6.9 G/DL (ref 6.8–8.8)
RBC # BLD AUTO: 4.2 10E6/UL (ref 3.8–5.2)
SODIUM SERPL-SCNC: 139 MMOL/L (ref 133–144)
SPECIMEN EXPIRATION DATE: NORMAL
WBC # BLD AUTO: 12.2 10E3/UL (ref 4–11)

## 2022-06-01 PROCEDURE — 86901 BLOOD TYPING SEROLOGIC RH(D): CPT

## 2022-06-01 PROCEDURE — 85610 PROTHROMBIN TIME: CPT

## 2022-06-01 PROCEDURE — 85014 HEMATOCRIT: CPT

## 2022-06-01 PROCEDURE — 86923 COMPATIBILITY TEST ELECTRIC: CPT | Performed by: INTERNAL MEDICINE

## 2022-06-01 PROCEDURE — 93000 ELECTROCARDIOGRAM COMPLETE: CPT | Performed by: NURSE PRACTITIONER

## 2022-06-01 PROCEDURE — 80053 COMPREHEN METABOLIC PANEL: CPT

## 2022-06-01 PROCEDURE — 99215 OFFICE O/P EST HI 40 MIN: CPT | Performed by: NURSE PRACTITIONER

## 2022-06-01 PROCEDURE — 86850 RBC ANTIBODY SCREEN: CPT

## 2022-06-01 PROCEDURE — 36415 COLL VENOUS BLD VENIPUNCTURE: CPT

## 2022-06-01 PROCEDURE — 83880 ASSAY OF NATRIURETIC PEPTIDE: CPT

## 2022-06-01 NOTE — LETTER
6/1/2022    Judith Aviles MD  [address] [facility][1] [id number]    RE: Ade Wilkins       Dear Colleague,     I had the pleasure of seeing Ade Wilkins in the [facility].      STRUCTURAL HEART CLINIC  H&P    Referring Provider: Dr. Lewis      History of Present Illness  Ade Wilkins who presents for pre-operative H&P in preparation for transcatheter aortic valve replacement on 6/7/2022 at [facility].     Patient with a history of severe aortic stenosis, characterized with an ARMIDA 0.9 cm2, MG 46 mmHg, peak V 4.4 m/s and EF 65%. She reports symptoms of progressive fatigue and dyspnea. Patient was evaluated in structural heart clinic where she was deemed an appropriate TAVR candidate. TAVR guided CT showed favorable anatomy for transfemoral approach.  Aortic valve calcium score 1773.  Pre-TAVR coronary angiogram showed single-vessel coronary disease of the ostial RCA status post successful PCI with drug-eluting stent x1 . She was counseled for the above procedure.      Her history is otherwise significant for hypertension, chronic diastolic heart failure, type 2 diabetes, CKD, lymphedema, and hyperlipidemia.    Patient reports doing well from a cardiovascular standpoint.  She endorses ongoing dyspnea with exertion and worsening fatigue.  She otherwise denies chest pain, syncope or near syncope, palpitations, PND, or orthopnea.  She has no infectious symptoms. EKG shows normal sinus rhythm.     Assessment and Plan     Ade Wilkins presents for pre-operative H&P in preparation for a transcatheter aortic valve replacement on 6/7/2022 at [facility].       1. Severe aortic valve stenosis: Patient reports progressive exertional dyspnea and fatigue. Her echo shows severe aortic stenosis. She has been evaluated by the our structural heart team and has been deemed an appropriate candidate for transcatheter aortic valve replacement. We discussed the procedure in detail,  including pre and post-procedure care, restrictions and follow-up. She understands risks including 5-10% risk of heart block leading to permanent pacemaker placement, 3% risk of bleeding, infection, vascular complication, 1-3% risk of stroke and very low risk (<1%) of serious complications such as cardiac perforation, aortic root rupture, dissection or death.     All questions answered  Type and screen orders complete  COVID test ordered  Supplies for scrubbing provided  No known contrast dye allergy  No trouble with anesthesia in the past  Labs today WNL, no s/s of infection  Allergy to chlorhexidine, patient should be prepped with iodine    2. Chronic diastolic heart failure, NYHA class III, Stage B: Secondary to valvular heart disease. No acute volume overload on exam, though patient does endorse significant exertional dyspnea. On lasix 40 mg daily.     3. CAD: Pre-TAVR coronary angiogram showed single-vessel coronary disease of the ostial RCA status post PCI with 1 drug-eluting stent. She denies angina. Plan to continue aspirin and Brilinta.    4. Hyperlipidemia: LDL 30 on rosuvastatin 20 mg daily and ezetimibe 10 mg daily.    5. Hypertension: Well-controlled on currently on irbesartan and spironolactone.     6. Type II DM: Insulin dependent. Patient also takes metformin. Not currently on Jardiance. Hold oral antidiabetics and short acting insulin. Take 1/2 dose long acting insulin. Follows closely with endocrinologist.     Medication Recommendations:  Diuretic: Hold lasix AM of procedure   Antiplatelet: Continue ASA 81 mg and Brilinta 90 mg BID perioperatively   Antidiabetics: Hold metformin and short acting insulin AM of procedure. Take 1/2 dose lantus.    Supplements: Hold morning of procedure    Patient is optimized and is acceptable candidate for the proposed procedure. No further diagnostic evaluation is needed. Pre-procedure instructions provided in written format.     Yahaira Qureshi, KERI, APRN,  CNP  Structural Heart Nurse Practitioner  [facility]   Pager: [phone]    Current Medications:  Current Outpatient Medications   Medication Sig Dispense Refill     aspirin (ASA) 81 MG chewable tablet Take 1 tablet (81 mg) by mouth daily Starting tomorrow. 30 tablet 3     augmented betamethasone dipropionate (DIPROLENE-AF) 0.05 % external ointment Apply to AA BID x 3-4 weeks then PRN 45 g 1     calcium carbonate 600 mg-vitamin D 400 units (CALTRATE) 600-400 MG-UNIT per tablet Take 1 tablet by mouth in the morning and 1 tablet in the evening.       cetirizine (ZYRTEC) 10 MG tablet Take 1 tablet (10 mg) by mouth daily       Cholecalciferol (VITAMIN D) 2000 UNITS tablet Take 2,000 Units by mouth daily       Continuous Blood Gluc  (FREESTYLE BALWINDER 14 DAY READER) HENNA 1 each 3 times daily 1 each 1     Continuous Blood Gluc Sensor (FREESTYLE BALWINDER 14 DAY SENSOR) MISC 1 each every 14 days 2 each 11     cyanocolbalamin (VITAMIN B-12) 1000 MCG tablet Take 1,000 mcg by mouth in the morning.       Dulaglutide 3 MG/0.5ML SOPN Inject 3 mg Subcutaneous once a week 6 mL 3     empagliflozin (JARDIANCE) 10 MG TABS tablet Take 1 tablet (10 mg) by mouth daily 30 tablet 5     ezetimibe (ZETIA) 10 MG tablet Take 1 tablet (10 mg) by mouth daily 90 tablet 3     fluticasone (FLONASE) 50 MCG/ACT nasal spray SHAKE LIQUID AND USE 2 SPRAYS IN EACH NOSTRIL DAILY 48 g 2     furosemide (LASIX) 40 MG tablet TAKE ONE TABLET BY MOUTH ONE TIME DAILY 90 tablet 0     HUMALOG KWIKPEN 100 UNIT/ML soln INJECT  Per sliding scale UP TO 60 UNITS UNDER THE SKIN DAILY. 60 mL 3     insulin degludec (TRESIBA) 200 UNIT/ML pen Inject 44  units subcu daily 40 mL 3     insulin pen needle (BD FCO U/F) 32G X 4 MM miscellaneous Use 4 pen needles daily or as directed. 400 each 0     irbesartan (AVAPRO) 150 MG tablet Take 1 tablet (150 mg) by mouth daily (Patient taking differently: Take 75 mg by mouth daily) 90 tablet 3     levothyroxine  (SYNTHROID/LEVOTHROID) 50 MCG tablet Take 1 tablet (50 mcg) by mouth daily 90 tablet 3     metFORMIN (GLUCOPHAGE) 1000 MG tablet Take 1 tablet (1,000 mg) by mouth daily (with dinner) 180 tablet      potassium chloride ER (KLOR-CON M) 10 MEQ CR tablet Take 1 tablet (10 mEq) by mouth 2 times daily 360 tablet 3     rosuvastatin (CRESTOR) 20 MG tablet Take 1 tablet (20 mg) by mouth daily 90 tablet 1     spironolactone (ALDACTONE) 25 MG tablet Take 0.5 tablets (12.5 mg) by mouth in the morning. 90 tablet 3     ticagrelor (BRILINTA) 90 MG tablet Take 1 tablet (90 mg) by mouth 2 times daily Start this evening. 180 tablet 3     topiramate (TOPAMAX) 25 MG tablet Take 3 tablets (75 mg) by mouth At Bedtime 270 tablet 1     traZODone (DESYREL) 50 MG tablet take 1 to 2 tablets (50 to 100mg) by mouth nightly as needed 180 tablet PRN     triamcinolone (KENALOG) 0.1 % external ointment Apply topically 2 times daily To left foot rash 30 g 2     verapamil ER (CALAN-SR) 180 MG CR tablet Take 1 tablet (180 mg) by mouth in the morning. 180 tablet 0     VITRON-C  MG TABS tablet TAKE TWO TABLETS BY MOUTH TWICE DAILY  360 tablet 1       Allergies:     Allergies   Allergen Reactions     Norvasc [Amlodipine] Other (See Comments)     hairloss     Clonidine Headache     Doxazosin Mesylate Rash     Fexofenadine Hydrochloride Headache     Gemfibrozil Rash     Lisinopril Angioedema     Pioglitazone Hydrochloride Swelling     ankle edema       Past Medical History:  Past Medical History:   Diagnosis Date     Benign essential hypertension      Chronic kidney disease, stage 3b (H)      Diabetic retinopathy of both eyes (H)      Obesity (BMI 30-39.9)      Osteopenia      Pure hypercholesterolemia      Type 2 diabetes mellitus (H)      Type 2 diabetes mellitus with diabetic nephropathy (H)      Type 2 diabetes mellitus with diabetic neuropathy (H)        Past Surgical History:  Past Surgical History:   Procedure Laterality Date     APPENDECTOMY        CATARACT IOL, RT/LT Bilateral      CV CORONARY ANGIOGRAM N/A 5/4/2022    Procedure: Coronary Angiogram;  Surgeon: Hector Lewis MD;  Location:  HEART CARDIAC CATH LAB     CV LEFT HEART CATH N/A 5/4/2022    Procedure: Left Heart Catheterization;  Surgeon: Hector Lewis MD;  Location:  HEART CARDIAC CATH LAB     CV PCI N/A 5/4/2022    Procedure: Percutaneous Coronary Intervention;  Surgeon: Hector Lewis MD;  Location:  HEART CARDIAC CATH LAB     GASTRIC BYPASS  2004     TONSILLECTOMY & ADENOIDECTOMY         Family History:  Family History   Problem Relation Age of Onset     Diabetes Type 2  Mother      Glaucoma Mother      Coronary Artery Disease Mother      Abdominal Aortic Aneurysm Father      Myocardial Infarction Father      Breast Cancer Sister      Abdominal Aortic Aneurysm Brother      Bladder Cancer Brother      Diabetes Type 2  Brother      Liver Cancer Brother      Myocardial Infarction Brother      Alzheimer Disease Paternal Grandmother      Cerebrovascular Disease Paternal Grandfather      Ovarian Cancer No family hx of      Colon Cancer No family hx of        Social History:  Social History     Socioeconomic History     Marital status:      Number of children: 0   Occupational History     Occupation: Retired -    Tobacco Use     Smoking status: Never Smoker     Smokeless tobacco: Never Used   Substance and Sexual Activity     Alcohol use: No     Drug use: No     Sexual activity: Not Currently   Other Topics Concern     Caffeine Concern Yes     Comment: 20 oz daily     Exercise No     Seat Belt Yes     Self-Exams Yes   Social History Narrative    . Single.    No kids.    No formal exercise.        Review of Systems:  Constitutional: No fever, chills, or sweats. No weight gain/loss   ENT: No visual disturbance, ear ache, epistaxis, sore throat  Allergies/Immunologic: Negative.   Respiratory: No cough, hemoptysia  Cardiovascular: As  per HPI  GI: No nausea, vomiting, hematemesis, melena, or hematochezia  : No urinary frequency, dysuria, or hematuria  Integument: Negative  Psychiatric: Negative  Neuro: Negative  Endocrinology: Negative   Musculoskeletal: Negative      Physical Exam:  Vitals: LMP  (LMP Unknown)    General: NAD  HEENT:  Dentition intact.    Neck: No jugular venous distension.   Heart: RRR with grade II/III LYNDON at RUSB  Lungs: CTA.    Abdomen: Soft, nontender, nondistended.   Extremities: No clubbing, cyanosis, or edema.  The pulses are +4/4 at the radial, brachial, femoral, popliteal, DP, and PT sites bilaterally.  No bruits are noted.  Neurologic: Alert and oriented to person/place/time, normal speech, gait and affect  Skin: No petechiae, purpura or rash.      Diagnostic Studies:  ECG: Normal sinus rhythm   Personally reviewed and interpreted by me.    Coronary Angiogram: 5/4/2022  Conclusion         Ost RCA to Prox RCA lesion is 70% stenosed.    Prox LAD to Mid LAD lesion is 25% stenosed.     Single Vessel CAD involving the ostial RCA.  Abnormal IFR of the ostial RCA measuring 0.85.  PCI of the RCA with a 4.5x20 MARISOL.           Plan      Follow bedrest per protocol    Continued medical management and lifestyle modifications for cardiovascular risk factor optimizations.    Continuation of dual antiplatelet therapy for 12 months     Continue high dose statin therapy         Echocardiogram: 4/2022  Interpretation Summary     1. The left ventricle is normal in structure, function and size. The visual  ejection fraction is estimated at 65%.  2. The right ventricle is normal in structure, function and size.  3. There is mild mitral stenosis. The mean mitral valve gradient is 5mmHg.  4. Severe valvular aortic stenosis. Mean 46mmHg, Vmax 4.4m/s, ARMIDA 0.9cm2, DI  0.28.     Echo from 2016 showed EF 70%, AS with mean 12mmHg, Vmax 2.6m/s, ARMIDA 1.5cm2.      Laboratory Studies:  Personally reviewed and interpreted by me.    LIPID RESULTS:  Lab  Results   Component Value Date    CHOL 83 05/04/2022    CHOL 115 03/27/2020    HDL 41 (L) 05/04/2022    HDL 34 (L) 03/27/2020    LDL 30 05/04/2022    LDL 59 03/27/2020    TRIG 62 05/04/2022    TRIG 112 03/27/2020    CHOLHDLRATIO 5.5 (H) 05/22/2015       LIVER ENZYME RESULTS:  Lab Results   Component Value Date    AST 49 (H) 04/21/2022    AST 20 04/13/2021    ALT 94 (H) 04/21/2022    ALT 38 04/13/2021       CBC RESULTS:  Lab Results   Component Value Date    WBC 9.3 05/04/2022    WBC 8.3 01/08/2021    RBC 3.97 05/04/2022    RBC 4.12 01/08/2021    HGB 11.1 (L) 05/04/2022    HGB 11.6 (L) 01/08/2021    HCT 35.5 05/04/2022    HCT 38.2 01/08/2021    MCV 89 05/04/2022    MCV 93 01/08/2021    MCH 28.0 05/04/2022    MCH 28.2 01/08/2021    MCHC 31.3 (L) 05/04/2022    MCHC 30.4 (L) 01/08/2021    RDW 13.4 05/04/2022    RDW 13.5 01/08/2021     05/04/2022     01/08/2021       BMP RESULTS:  Lab Results   Component Value Date     05/13/2022     04/13/2021    POTASSIUM 4.4 05/13/2022    POTASSIUM 4.2 04/13/2021    CHLORIDE 108 05/13/2022    CHLORIDE 110 (H) 04/13/2021    CO2 24 05/13/2022    CO2 28 04/13/2021    ANIONGAP 7 05/13/2022    ANIONGAP 3 04/13/2021     (H) 05/13/2022     (H) 04/13/2021    BUN 29 05/13/2022    BUN 29 04/13/2021    CR 1.7 (H) 05/17/2022    CR 1.44 (H) 05/13/2022    CR 1.38 (H) 04/13/2021    GFRESTIMATED 32 (L) 05/17/2022    GFRESTIMATED 38 (L) 04/13/2021    GFRESTBLACK 45 (L) 04/13/2021    RASHEEDA 9.3 05/13/2022    RASHEEDA 9.2 04/13/2021        A1C RESULTS:  Lab Results   Component Value Date    A1C 7.9 (H) 05/04/2022    A1C 8.1 (H) 01/08/2021       INR RESULTS:  Lab Results   Component Value Date    INR 0.97 05/04/2022       Thank you for allowing me to participate in the care of your patient.      Sincerely,     Yahaira Qureshi, [facility]  cc:   No referring provider defined for this encounter.

## 2022-06-02 DIAGNOSIS — I35.0 SEVERE AORTIC STENOSIS: Primary | ICD-10-CM

## 2022-06-02 RX ORDER — SODIUM CHLORIDE 9 MG/ML
INJECTION, SOLUTION INTRAVENOUS CONTINUOUS
Status: CANCELLED | OUTPATIENT
Start: 2022-06-02

## 2022-06-02 RX ORDER — CEFAZOLIN SODIUM 2 G/100ML
2 INJECTION, SOLUTION INTRAVENOUS
Status: CANCELLED | OUTPATIENT
Start: 2022-06-02

## 2022-06-02 RX ORDER — LIDOCAINE 40 MG/G
CREAM TOPICAL
Status: CANCELLED | OUTPATIENT
Start: 2022-06-02

## 2022-06-02 RX ORDER — ASPIRIN 81 MG/1
81 TABLET ORAL DAILY
Status: CANCELLED | OUTPATIENT
Start: 2022-06-02

## 2022-06-02 NOTE — TELEPHONE ENCOUNTER
ezetimibe (ZETIA) 10 MG tablet      Last Written Prescription Date:  3-7-21  Last Fill Quantity: 90,   # refills: 3  Last Office Visit : 5-16-22  Future Office visit:  8-16-22      Lab Results   Component Value Date     06/01/2022    ALT 38 04/13/2021       Routing refill request to provider for review/approval because:  Abnormal ALT

## 2022-06-02 NOTE — PROGRESS NOTES
Patient has an allergy to chlorhexidine so patient was instructed to use antibacterial soap at home during her shower and Yahaira Qureshi NP has asked that patient received Iodine for TAVR prep as well.

## 2022-06-02 NOTE — PROGRESS NOTES
Additionally called patient and verified that patient would be able to go 2nd case for TAVR on 06/07/2022, with a new check-in time of 0800 and procedure time of 1000. Patient stated that would not be a problem. Will have  Kelly update that as well.     Additionally verified that patient DOES NOT take Jardiance.

## 2022-06-04 ENCOUNTER — LAB (OUTPATIENT)
Dept: URGENT CARE | Facility: URGENT CARE | Age: 72
End: 2022-06-04
Payer: COMMERCIAL

## 2022-06-04 DIAGNOSIS — I35.0 SEVERE AORTIC STENOSIS: ICD-10-CM

## 2022-06-04 DIAGNOSIS — I87.2 VENOUS STASIS DERMATITIS OF BOTH LOWER EXTREMITIES: ICD-10-CM

## 2022-06-04 PROCEDURE — U0003 INFECTIOUS AGENT DETECTION BY NUCLEIC ACID (DNA OR RNA); SEVERE ACUTE RESPIRATORY SYNDROME CORONAVIRUS 2 (SARS-COV-2) (CORONAVIRUS DISEASE [COVID-19]), AMPLIFIED PROBE TECHNIQUE, MAKING USE OF HIGH THROUGHPUT TECHNOLOGIES AS DESCRIBED BY CMS-2020-01-R: HCPCS

## 2022-06-04 PROCEDURE — U0005 INFEC AGEN DETEC AMPLI PROBE: HCPCS

## 2022-06-05 LAB — SARS-COV-2 RNA RESP QL NAA+PROBE: NEGATIVE

## 2022-06-06 ENCOUNTER — ANESTHESIA EVENT (OUTPATIENT)
Dept: CARDIOLOGY | Facility: CLINIC | Age: 72
DRG: 266 | End: 2022-06-06
Payer: COMMERCIAL

## 2022-06-06 RX ORDER — EZETIMIBE 10 MG/1
10 TABLET ORAL DAILY
Qty: 90 TABLET | Refills: 3 | Status: SHIPPED | OUTPATIENT
Start: 2022-06-06 | End: 2022-06-09

## 2022-06-06 RX ORDER — BETAMETHASONE DIPROPIONATE 0.5 MG/G
OINTMENT, AUGMENTED TOPICAL
Qty: 45 G | Refills: 0 | Status: SHIPPED | OUTPATIENT
Start: 2022-06-06 | End: 2023-11-27

## 2022-06-06 RX ORDER — MEPERIDINE HYDROCHLORIDE 25 MG/ML
12.5 INJECTION INTRAMUSCULAR; INTRAVENOUS; SUBCUTANEOUS EVERY 5 MIN PRN
Status: CANCELLED | OUTPATIENT
Start: 2022-06-06

## 2022-06-06 RX ORDER — VITAMIN B COMPLEX
1 TABLET ORAL 2 TIMES DAILY
COMMUNITY
End: 2023-03-20

## 2022-06-06 RX ORDER — KETOROLAC TROMETHAMINE 30 MG/ML
15 INJECTION, SOLUTION INTRAMUSCULAR; INTRAVENOUS
Status: CANCELLED | OUTPATIENT
Start: 2022-06-06

## 2022-06-06 ASSESSMENT — NEW YORK HEART ASSOCIATION (NYHA) CLASSIFICATION: NYHA FUNCTIONAL CLASS: II

## 2022-06-06 NOTE — PROGRESS NOTES
PTA medications updated by Medication Scribe prior to surgery via phone call with patient (last doses completed by Nurse)     Medication history sources: Patient, Surescripts and H&P  In the past week, patient estimated taking medication this percent of the time: Greater than 90%  Adherence assessment: N/A Not Observed    Significant changes made to the medication list:  None      Additional medication history information:   None    Medication reconciliation completed by provider prior to medication history? No    Time spent in this activity: 50 MINUTES    The information provided in this note is only as accurate as the sources available at the time of update(s)      Prior to Admission medications    Medication Sig Last Dose Taking? Auth Provider   aspirin (ASA) 81 MG chewable tablet Take 1 tablet (81 mg) by mouth daily Starting tomorrow.  at PM Yes Cammy Worrell MD   augmented betamethasone dipropionate (DIPROLENE-AF) 0.05 % external ointment Apply to AA BID x 3-4 weeks then PRN  at PRN Yes Judith Aviles MD   calcium carbonate 600 mg-vitamin D 400 units (CALTRATE) 600-400 MG-UNIT per tablet Take 1 tablet by mouth in the morning and 1 tablet in the evening.  at PM Yes Reported, Patient   cetirizine (ZYRTEC) 10 MG tablet Take 1 tablet (10 mg) by mouth daily  at AM Yes Dimitry Howard MD   cyanocolbalamin (VITAMIN B-12) 1000 MCG tablet Take 1,000 mcg by mouth in the morning.  at PM Yes Dimitry Howard MD   Dulaglutide 3 MG/0.5ML SOPN Inject 3 mg Subcutaneous once a week  at PM Yes Roxanne Masterson PA-C   empagliflozin (JARDIANCE) 10 MG TABS tablet Take 1 tablet (10 mg) by mouth daily  at PM Yes Olga Lidia Hoover MD   ezetimibe (ZETIA) 10 MG tablet Take 1 tablet (10 mg) by mouth daily  at PM Yes Roxanne Masterson PA-C   fluticasone (FLONASE) 50 MCG/ACT nasal spray SHAKE LIQUID AND USE 2 SPRAYS IN EACH NOSTRIL DAILY  at PRN Yes Judith Aviles MD   furosemide (LASIX) 40 MG tablet TAKE ONE TABLET BY MOUTH  ONE TIME DAILY  at AM Yes Judith Aviles MD   HUMALOG KWIKPEN 100 UNIT/ML soln INJECT  Per sliding scale UP TO 60 UNITS UNDER THE SKIN DAILY.  at PM Yes Roxanne Masterson PA-C   insulin degludec (TRESIBA) 200 UNIT/ML pen Inject 44 Units Subcutaneous daily  at PM Yes Judith Aviles MD   irbesartan (AVAPRO) 150 MG tablet Take 1 tablet (150 mg) by mouth daily  Patient taking differently: Take 75 mg by mouth daily (150MG X 0.5 = 75MG)  at PM Yes Judith Aviles MD   levothyroxine (SYNTHROID/LEVOTHROID) 50 MCG tablet Take 1 tablet (50 mcg) by mouth daily  at AM Yes Olga Lidia Hoover MD   metFORMIN (GLUCOPHAGE) 1000 MG tablet Take 1,000 mg by mouth daily  at AM Yes Judith Aviles MD   potassium chloride ER (KLOR-CON M) 10 MEQ CR tablet Take 1 tablet (10 mEq) by mouth 2 times daily  at PM Yes Olga Lidia Hoover MD   rosuvastatin (CRESTOR) 20 MG tablet Take 1 tablet (20 mg) by mouth daily  at PM Yes Judith Aviles MD   spironolactone (ALDACTONE) 25 MG tablet Take 0.5 tablets (12.5 mg) by mouth in the morning.  at AM Yes Judith Aviles MD   ticagrelor (BRILINTA) 90 MG tablet Take 1 tablet (90 mg) by mouth 2 times daily Start this evening.  at PM Yes Cammy Worrell MD   topiramate (TOPAMAX) 25 MG tablet Take 3 tablets (75 mg) by mouth At Bedtime  at PM Yes Olga Lidia Hoover MD   traZODone (DESYREL) 50 MG tablet take 1 to 2 tablets (50 to 100mg) by mouth nightly as needed  at PM Yes Dimitry Howard MD   triamcinolone (KENALOG) 0.1 % external ointment Apply topically 2 times daily To left foot rash  Patient taking differently: Apply topically once a week To left foot rash  at PRN Yes Dimitry Howard MD   verapamil ER (CALAN-SR) 180 MG CR tablet Take 1 tablet (180 mg) by mouth in the morning.  at AM Yes Judith Aviles MD   Vitamin D3 (CHOLECALCIFEROL) 25 mcg (1000 units) tablet Take 1 tablet by mouth 2 times daily  at PM Yes Reported, Patient   VITRON-C  MG TABS tablet TAKE TWO TABLETS BY MOUTH  TWICE DAILY   at PM Yes Dimitry Howard MD   Continuous Blood Gluc  (FREESTYLE BALWINDER 14 DAY READER) HENNA 1 each 3 times daily   Roxanne Masterson PA-C   Continuous Blood Gluc Sensor (FREESTYLE BALWINDER 14 DAY SENSOR) MISC 1 each every 14 days   Roxanne Masterson PA-C   insulin pen needle (BD FCO U/F) 32G X 4 MM miscellaneous Use 4 pen needles daily or as directed.   Dimitry Howard MD

## 2022-06-07 ENCOUNTER — TELEPHONE (OUTPATIENT)
Dept: INTERNAL MEDICINE | Facility: CLINIC | Age: 72
End: 2022-06-07

## 2022-06-07 ENCOUNTER — ANESTHESIA (OUTPATIENT)
Dept: CARDIOLOGY | Facility: CLINIC | Age: 72
DRG: 266 | End: 2022-06-07
Payer: COMMERCIAL

## 2022-06-07 ENCOUNTER — HOSPITAL ENCOUNTER (INPATIENT)
Facility: CLINIC | Age: 72
LOS: 3 days | Discharge: HOME | DRG: 266 | End: 2022-06-10
Attending: INTERNAL MEDICINE | Admitting: THORACIC SURGERY (CARDIOTHORACIC VASCULAR SURGERY)
Payer: COMMERCIAL

## 2022-06-07 ENCOUNTER — HOSPITAL ENCOUNTER (OUTPATIENT)
Dept: CARDIOLOGY | Facility: CLINIC | Age: 72
Discharge: HOME | DRG: 266 | End: 2022-06-07
Attending: INTERNAL MEDICINE | Admitting: INTERNAL MEDICINE
Payer: COMMERCIAL

## 2022-06-07 DIAGNOSIS — I35.0 SEVERE AORTIC STENOSIS: ICD-10-CM

## 2022-06-07 DIAGNOSIS — E11.65 TYPE 2 DIABETES MELLITUS WITH HYPERGLYCEMIA, WITHOUT LONG-TERM CURRENT USE OF INSULIN (H): ICD-10-CM

## 2022-06-07 DIAGNOSIS — I63.10 CEREBROVASCULAR ACCIDENT (CVA) DUE TO EMBOLISM OF PRECEREBRAL ARTERY (H): ICD-10-CM

## 2022-06-07 DIAGNOSIS — I10 BENIGN ESSENTIAL HYPERTENSION: ICD-10-CM

## 2022-06-07 DIAGNOSIS — Z95.2 S/P TAVR (TRANSCATHETER AORTIC VALVE REPLACEMENT): ICD-10-CM

## 2022-06-07 DIAGNOSIS — I35.0 SEVERE AORTIC STENOSIS: Primary | ICD-10-CM

## 2022-06-07 DIAGNOSIS — Z95.2 S/P TAVR (TRANSCATHETER AORTIC VALVE REPLACEMENT): Primary | ICD-10-CM

## 2022-06-07 DIAGNOSIS — E78.00 PURE HYPERCHOLESTEROLEMIA: ICD-10-CM

## 2022-06-07 DIAGNOSIS — I35.0 AORTIC STENOSIS, SEVERE: ICD-10-CM

## 2022-06-07 LAB
ACT BLD: 139 SECONDS (ref 74–150)
ACT BLD: 382 SECONDS (ref 74–150)
BLD PROD TYP BPU: NORMAL
BLD PROD TYP BPU: NORMAL
BLOOD COMPONENT TYPE: NORMAL
BLOOD COMPONENT TYPE: NORMAL
CODING SYSTEM: NORMAL
CODING SYSTEM: NORMAL
CREAT SERPL-MCNC: 1.62 MG/DL (ref 0.52–1.04)
CROSSMATCH: NORMAL
CROSSMATCH: NORMAL
ERYTHROCYTE [DISTWIDTH] IN BLOOD BY AUTOMATED COUNT: 14 % (ref 10–15)
GFR SERPL CREATININE-BSD FRML MDRD: 34 ML/MIN/1.73M2
GLUCOSE BLD-MCNC: 146 MG/DL (ref 70–99)
HCT VFR BLD AUTO: 37.6 % (ref 35–47)
HGB BLD-MCNC: 12 G/DL (ref 11.7–15.7)
ISSUE DATE AND TIME: NORMAL
ISSUE DATE AND TIME: NORMAL
LVEF ECHO: NORMAL
MAGNESIUM SERPL-MCNC: 2.5 MG/DL (ref 1.6–2.3)
MCH RBC QN AUTO: 27.6 PG (ref 26.5–33)
MCHC RBC AUTO-ENTMCNC: 31.9 G/DL (ref 31.5–36.5)
MCV RBC AUTO: 87 FL (ref 78–100)
PLATELET # BLD AUTO: 346 10E3/UL (ref 150–450)
POTASSIUM BLD-SCNC: 3.9 MMOL/L (ref 3.4–5.3)
POTASSIUM BLD-SCNC: 4.1 MMOL/L (ref 3.4–5.3)
RBC # BLD AUTO: 4.34 10E6/UL (ref 3.8–5.2)
UNIT ABO/RH: NORMAL
UNIT ABO/RH: NORMAL
UNIT NUMBER: NORMAL
UNIT NUMBER: NORMAL
UNIT STATUS: NORMAL
UNIT STATUS: NORMAL
UNIT TYPE ISBT: 5100
UNIT TYPE ISBT: 5100
WBC # BLD AUTO: 12.2 10E3/UL (ref 4–11)

## 2022-06-07 PROCEDURE — 272N000001 HC OR GENERAL SUPPLY STERILE: Performed by: INTERNAL MEDICINE

## 2022-06-07 PROCEDURE — C1894 INTRO/SHEATH, NON-LASER: HCPCS | Performed by: INTERNAL MEDICINE

## 2022-06-07 PROCEDURE — 250N000011 HC RX IP 250 OP 636: Performed by: NURSE ANESTHETIST, CERTIFIED REGISTERED

## 2022-06-07 PROCEDURE — 33361 REPLACE AORTIC VALVE PERQ: CPT | Performed by: INTERNAL MEDICINE

## 2022-06-07 PROCEDURE — 85027 COMPLETE CBC AUTOMATED: CPT | Performed by: INTERNAL MEDICINE

## 2022-06-07 PROCEDURE — 3E043XZ INTRODUCTION OF VASOPRESSOR INTO CENTRAL VEIN, PERCUTANEOUS APPROACH: ICD-10-PCS | Performed by: THORACIC SURGERY (CARDIOTHORACIC VASCULAR SURGERY)

## 2022-06-07 PROCEDURE — 258N000003 HC RX IP 258 OP 636: Performed by: NURSE ANESTHETIST, CERTIFIED REGISTERED

## 2022-06-07 PROCEDURE — 99223 1ST HOSP IP/OBS HIGH 75: CPT | Mod: AI | Performed by: HOSPITALIST

## 2022-06-07 PROCEDURE — 99207 PR MOONLIGHTING INDICATOR: CPT | Performed by: HOSPITALIST

## 2022-06-07 PROCEDURE — C1769 GUIDE WIRE: HCPCS | Performed by: INTERNAL MEDICINE

## 2022-06-07 PROCEDURE — 82565 ASSAY OF CREATININE: CPT | Performed by: HOSPITALIST

## 2022-06-07 PROCEDURE — 370N000017 HC ANESTHESIA TECHNICAL FEE, PER MIN: Performed by: INTERNAL MEDICINE

## 2022-06-07 PROCEDURE — 250N000009 HC RX 250: Performed by: INTERNAL MEDICINE

## 2022-06-07 PROCEDURE — 36415 COLL VENOUS BLD VENIPUNCTURE: CPT | Performed by: HOSPITALIST

## 2022-06-07 PROCEDURE — 36415 COLL VENOUS BLD VENIPUNCTURE: CPT | Performed by: ANESTHESIOLOGY

## 2022-06-07 PROCEDURE — 82947 ASSAY GLUCOSE BLOOD QUANT: CPT | Performed by: ANESTHESIOLOGY

## 2022-06-07 PROCEDURE — 210N000002 HC R&B HEART CARE

## 2022-06-07 PROCEDURE — 83735 ASSAY OF MAGNESIUM: CPT | Performed by: HOSPITALIST

## 2022-06-07 PROCEDURE — 258N000003 HC RX IP 258 OP 636: Performed by: INTERNAL MEDICINE

## 2022-06-07 PROCEDURE — C1730 CATH, EP, 19 OR FEW ELECT: HCPCS | Performed by: INTERNAL MEDICINE

## 2022-06-07 PROCEDURE — 250N000013 HC RX MED GY IP 250 OP 250 PS 637: Performed by: HOSPITALIST

## 2022-06-07 PROCEDURE — 02RF38Z REPLACEMENT OF AORTIC VALVE WITH ZOOPLASTIC TISSUE, PERCUTANEOUS APPROACH: ICD-10-PCS | Performed by: INTERNAL MEDICINE

## 2022-06-07 PROCEDURE — 250N000009 HC RX 250: Performed by: NURSE ANESTHETIST, CERTIFIED REGISTERED

## 2022-06-07 PROCEDURE — 33361 REPLACE AORTIC VALVE PERQ: CPT | Mod: 62 | Performed by: THORACIC SURGERY (CARDIOTHORACIC VASCULAR SURGERY)

## 2022-06-07 PROCEDURE — 93306 TTE W/DOPPLER COMPLETE: CPT | Mod: 26 | Performed by: INTERNAL MEDICINE

## 2022-06-07 PROCEDURE — 250N000013 HC RX MED GY IP 250 OP 250 PS 637: Performed by: STUDENT IN AN ORGANIZED HEALTH CARE EDUCATION/TRAINING PROGRAM

## 2022-06-07 PROCEDURE — 85347 COAGULATION TIME ACTIVATED: CPT

## 2022-06-07 PROCEDURE — 93306 TTE W/DOPPLER COMPLETE: CPT

## 2022-06-07 PROCEDURE — C1760 CLOSURE DEV, VASC: HCPCS | Performed by: INTERNAL MEDICINE

## 2022-06-07 PROCEDURE — P9041 ALBUMIN (HUMAN),5%, 50ML: HCPCS | Performed by: NURSE ANESTHETIST, CERTIFIED REGISTERED

## 2022-06-07 PROCEDURE — 250N000011 HC RX IP 250 OP 636: Performed by: INTERNAL MEDICINE

## 2022-06-07 PROCEDURE — 33361 REPLACE AORTIC VALVE PERQ: CPT | Mod: 62 | Performed by: INTERNAL MEDICINE

## 2022-06-07 PROCEDURE — 93005 ELECTROCARDIOGRAM TRACING: CPT

## 2022-06-07 PROCEDURE — 278N000051 HC OR IMPLANT GENERAL: Performed by: INTERNAL MEDICINE

## 2022-06-07 PROCEDURE — 84132 ASSAY OF SERUM POTASSIUM: CPT | Performed by: ANESTHESIOLOGY

## 2022-06-07 PROCEDURE — P9016 RBC LEUKOCYTES REDUCED: HCPCS | Performed by: INTERNAL MEDICINE

## 2022-06-07 PROCEDURE — 93010 ELECTROCARDIOGRAM REPORT: CPT | Performed by: INTERNAL MEDICINE

## 2022-06-07 PROCEDURE — 84132 ASSAY OF SERUM POTASSIUM: CPT | Performed by: HOSPITALIST

## 2022-06-07 DEVICE — DEVICE CLOSURE VASCULAR MANTA 18FR 2115: Type: IMPLANTABLE DEVICE | Status: FUNCTIONAL

## 2022-06-07 DEVICE — TAVR 26MM EVOLUT PRO+: Type: IMPLANTABLE DEVICE | Status: FUNCTIONAL

## 2022-06-07 RX ORDER — ASPIRIN 81 MG/1
81 TABLET ORAL DAILY
Status: DISCONTINUED | OUTPATIENT
Start: 2022-06-08 | End: 2022-06-07

## 2022-06-07 RX ORDER — SODIUM CHLORIDE, SODIUM LACTATE, POTASSIUM CHLORIDE, CALCIUM CHLORIDE 600; 310; 30; 20 MG/100ML; MG/100ML; MG/100ML; MG/100ML
INJECTION, SOLUTION INTRAVENOUS CONTINUOUS
Status: DISCONTINUED | OUTPATIENT
Start: 2022-06-07 | End: 2022-06-07 | Stop reason: HOSPADM

## 2022-06-07 RX ORDER — LEVOTHYROXINE SODIUM 50 UG/1
50 TABLET ORAL DAILY
Status: DISCONTINUED | OUTPATIENT
Start: 2022-06-08 | End: 2022-06-10 | Stop reason: HOSPADM

## 2022-06-07 RX ORDER — PROPOFOL 10 MG/ML
INJECTION, EMULSION INTRAVENOUS CONTINUOUS PRN
Status: DISCONTINUED | OUTPATIENT
Start: 2022-06-07 | End: 2022-06-07

## 2022-06-07 RX ORDER — LABETALOL 20 MG/4 ML (5 MG/ML) INTRAVENOUS SYRINGE
10
Status: DISCONTINUED | OUTPATIENT
Start: 2022-06-07 | End: 2022-06-07 | Stop reason: HOSPADM

## 2022-06-07 RX ORDER — ONDANSETRON 2 MG/ML
INJECTION INTRAMUSCULAR; INTRAVENOUS PRN
Status: DISCONTINUED | OUTPATIENT
Start: 2022-06-07 | End: 2022-06-07

## 2022-06-07 RX ORDER — ONDANSETRON 2 MG/ML
4 INJECTION INTRAMUSCULAR; INTRAVENOUS EVERY 30 MIN PRN
Status: DISCONTINUED | OUTPATIENT
Start: 2022-06-07 | End: 2022-06-07 | Stop reason: HOSPADM

## 2022-06-07 RX ORDER — SPIRONOLACTONE 25 MG
12.5 TABLET ORAL DAILY
Status: DISCONTINUED | OUTPATIENT
Start: 2022-06-08 | End: 2022-06-10 | Stop reason: HOSPADM

## 2022-06-07 RX ORDER — PROCHLORPERAZINE 25 MG
12.5 SUPPOSITORY, RECTAL RECTAL EVERY 12 HOURS PRN
Status: DISCONTINUED | OUTPATIENT
Start: 2022-06-07 | End: 2022-06-10 | Stop reason: HOSPADM

## 2022-06-07 RX ORDER — ACETAMINOPHEN 650 MG/1
650 SUPPOSITORY RECTAL EVERY 6 HOURS PRN
Status: DISCONTINUED | OUTPATIENT
Start: 2022-06-07 | End: 2022-06-10 | Stop reason: HOSPADM

## 2022-06-07 RX ORDER — ASPIRIN 81 MG/1
81 TABLET, CHEWABLE ORAL DAILY
Status: DISCONTINUED | OUTPATIENT
Start: 2022-06-07 | End: 2022-06-10 | Stop reason: HOSPADM

## 2022-06-07 RX ORDER — ENOXAPARIN SODIUM 100 MG/ML
40 INJECTION SUBCUTANEOUS EVERY 24 HOURS
Status: DISCONTINUED | OUTPATIENT
Start: 2022-06-07 | End: 2022-06-09

## 2022-06-07 RX ORDER — ASPIRIN 81 MG/1
81 TABLET ORAL DAILY
Status: DISCONTINUED | OUTPATIENT
Start: 2022-06-07 | End: 2022-06-07 | Stop reason: HOSPADM

## 2022-06-07 RX ORDER — DEXTROSE MONOHYDRATE 25 G/50ML
25-50 INJECTION, SOLUTION INTRAVENOUS
Status: DISCONTINUED | OUTPATIENT
Start: 2022-06-07 | End: 2022-06-10 | Stop reason: HOSPADM

## 2022-06-07 RX ORDER — CEFAZOLIN SODIUM/WATER 2 G/20 ML
2 SYRINGE (ML) INTRAVENOUS
Status: COMPLETED | OUTPATIENT
Start: 2022-06-07 | End: 2022-06-07

## 2022-06-07 RX ORDER — LIDOCAINE HYDROCHLORIDE 20 MG/ML
INJECTION, SOLUTION INFILTRATION; PERINEURAL PRN
Status: DISCONTINUED | OUTPATIENT
Start: 2022-06-07 | End: 2022-06-07

## 2022-06-07 RX ORDER — LIDOCAINE 40 MG/G
CREAM TOPICAL
Status: DISCONTINUED | OUTPATIENT
Start: 2022-06-07 | End: 2022-06-07 | Stop reason: HOSPADM

## 2022-06-07 RX ORDER — ONDANSETRON 4 MG/1
4 TABLET, ORALLY DISINTEGRATING ORAL EVERY 6 HOURS PRN
Status: DISCONTINUED | OUTPATIENT
Start: 2022-06-07 | End: 2022-06-10 | Stop reason: HOSPADM

## 2022-06-07 RX ORDER — POTASSIUM CHLORIDE 750 MG/1
10 TABLET, EXTENDED RELEASE ORAL 2 TIMES DAILY
Status: DISCONTINUED | OUTPATIENT
Start: 2022-06-07 | End: 2022-06-10 | Stop reason: HOSPADM

## 2022-06-07 RX ORDER — VERAPAMIL HYDROCHLORIDE 180 MG/1
180 TABLET, EXTENDED RELEASE ORAL DAILY
Status: DISCONTINUED | OUTPATIENT
Start: 2022-06-08 | End: 2022-06-10 | Stop reason: HOSPADM

## 2022-06-07 RX ORDER — ACETAMINOPHEN 325 MG/1
650 TABLET ORAL EVERY 4 HOURS PRN
Status: DISCONTINUED | OUTPATIENT
Start: 2022-06-07 | End: 2022-06-07

## 2022-06-07 RX ORDER — ONDANSETRON 2 MG/ML
4 INJECTION INTRAMUSCULAR; INTRAVENOUS EVERY 6 HOURS PRN
Status: DISCONTINUED | OUTPATIENT
Start: 2022-06-07 | End: 2022-06-10 | Stop reason: HOSPADM

## 2022-06-07 RX ORDER — HEPARIN SODIUM 1000 [USP'U]/ML
INJECTION, SOLUTION INTRAVENOUS; SUBCUTANEOUS PRN
Status: DISCONTINUED | OUTPATIENT
Start: 2022-06-07 | End: 2022-06-07

## 2022-06-07 RX ORDER — SODIUM CHLORIDE, SODIUM LACTATE, POTASSIUM CHLORIDE, CALCIUM CHLORIDE 600; 310; 30; 20 MG/100ML; MG/100ML; MG/100ML; MG/100ML
INJECTION, SOLUTION INTRAVENOUS CONTINUOUS PRN
Status: DISCONTINUED | OUTPATIENT
Start: 2022-06-07 | End: 2022-06-07

## 2022-06-07 RX ORDER — PROPOFOL 10 MG/ML
INJECTION, EMULSION INTRAVENOUS PRN
Status: DISCONTINUED | OUTPATIENT
Start: 2022-06-07 | End: 2022-06-07

## 2022-06-07 RX ORDER — FUROSEMIDE 40 MG
40 TABLET ORAL DAILY
Status: DISCONTINUED | OUTPATIENT
Start: 2022-06-08 | End: 2022-06-10 | Stop reason: HOSPADM

## 2022-06-07 RX ORDER — FENTANYL CITRATE 50 UG/ML
50 INJECTION, SOLUTION INTRAMUSCULAR; INTRAVENOUS EVERY 5 MIN PRN
Status: DISCONTINUED | OUTPATIENT
Start: 2022-06-07 | End: 2022-06-07 | Stop reason: HOSPADM

## 2022-06-07 RX ORDER — ONDANSETRON 4 MG/1
4 TABLET, ORALLY DISINTEGRATING ORAL EVERY 30 MIN PRN
Status: DISCONTINUED | OUTPATIENT
Start: 2022-06-07 | End: 2022-06-07 | Stop reason: HOSPADM

## 2022-06-07 RX ORDER — OXYCODONE HYDROCHLORIDE 5 MG/1
10 TABLET ORAL EVERY 4 HOURS PRN
Status: DISCONTINUED | OUTPATIENT
Start: 2022-06-07 | End: 2022-06-07 | Stop reason: HOSPADM

## 2022-06-07 RX ORDER — NICOTINE POLACRILEX 4 MG
15-30 LOZENGE BUCCAL
Status: DISCONTINUED | OUTPATIENT
Start: 2022-06-07 | End: 2022-06-10 | Stop reason: HOSPADM

## 2022-06-07 RX ORDER — LIDOCAINE 40 MG/G
CREAM TOPICAL
Status: DISCONTINUED | OUTPATIENT
Start: 2022-06-07 | End: 2022-06-10 | Stop reason: HOSPADM

## 2022-06-07 RX ORDER — INSULIN LISPRO 100 [IU]/ML
2 INJECTION, SOLUTION INTRAVENOUS; SUBCUTANEOUS
Status: DISCONTINUED | OUTPATIENT
Start: 2022-06-07 | End: 2022-06-07

## 2022-06-07 RX ORDER — EPHEDRINE SULFATE 50 MG/ML
INJECTION, SOLUTION INTRAMUSCULAR; INTRAVENOUS; SUBCUTANEOUS PRN
Status: DISCONTINUED | OUTPATIENT
Start: 2022-06-07 | End: 2022-06-07

## 2022-06-07 RX ORDER — IRBESARTAN 75 MG/1
75 TABLET ORAL EVERY EVENING
Status: DISCONTINUED | OUTPATIENT
Start: 2022-06-07 | End: 2022-06-09

## 2022-06-07 RX ORDER — PROCHLORPERAZINE MALEATE 5 MG
5 TABLET ORAL EVERY 6 HOURS PRN
Status: DISCONTINUED | OUTPATIENT
Start: 2022-06-07 | End: 2022-06-10 | Stop reason: HOSPADM

## 2022-06-07 RX ORDER — PROTAMINE SULFATE 10 MG/ML
INJECTION, SOLUTION INTRAVENOUS PRN
Status: DISCONTINUED | OUTPATIENT
Start: 2022-06-07 | End: 2022-06-07

## 2022-06-07 RX ORDER — SODIUM CHLORIDE 9 MG/ML
INJECTION, SOLUTION INTRAVENOUS CONTINUOUS
Status: DISCONTINUED | OUTPATIENT
Start: 2022-06-07 | End: 2022-06-07 | Stop reason: HOSPADM

## 2022-06-07 RX ORDER — ROSUVASTATIN CALCIUM 20 MG/1
20 TABLET, COATED ORAL EVERY EVENING
Status: DISCONTINUED | OUTPATIENT
Start: 2022-06-07 | End: 2022-06-08

## 2022-06-07 RX ORDER — NITROGLYCERIN 0.4 MG/1
0.4 TABLET SUBLINGUAL EVERY 5 MIN PRN
Status: DISCONTINUED | OUTPATIENT
Start: 2022-06-07 | End: 2022-06-10 | Stop reason: HOSPADM

## 2022-06-07 RX ORDER — ALBUMIN, HUMAN INJ 5% 5 %
SOLUTION INTRAVENOUS CONTINUOUS PRN
Status: DISCONTINUED | OUTPATIENT
Start: 2022-06-07 | End: 2022-06-07

## 2022-06-07 RX ORDER — HYDROMORPHONE HYDROCHLORIDE 1 MG/ML
0.4 INJECTION, SOLUTION INTRAMUSCULAR; INTRAVENOUS; SUBCUTANEOUS EVERY 5 MIN PRN
Status: DISCONTINUED | OUTPATIENT
Start: 2022-06-07 | End: 2022-06-07 | Stop reason: HOSPADM

## 2022-06-07 RX ORDER — LANOLIN ALCOHOL/MO/W.PET/CERES
1000 CREAM (GRAM) TOPICAL EVERY EVENING
Status: DISCONTINUED | OUTPATIENT
Start: 2022-06-07 | End: 2022-06-10 | Stop reason: HOSPADM

## 2022-06-07 RX ORDER — TRAZODONE HYDROCHLORIDE 50 MG/1
50-100 TABLET, FILM COATED ORAL
Status: DISCONTINUED | OUTPATIENT
Start: 2022-06-07 | End: 2022-06-10 | Stop reason: HOSPADM

## 2022-06-07 RX ORDER — EZETIMIBE 10 MG/1
10 TABLET ORAL EVERY EVENING
Status: DISCONTINUED | OUTPATIENT
Start: 2022-06-07 | End: 2022-06-08

## 2022-06-07 RX ORDER — HYDRALAZINE HYDROCHLORIDE 20 MG/ML
10 INJECTION INTRAMUSCULAR; INTRAVENOUS
Status: DISCONTINUED | OUTPATIENT
Start: 2022-06-07 | End: 2022-06-08

## 2022-06-07 RX ORDER — ACETAMINOPHEN 325 MG/1
650 TABLET ORAL EVERY 6 HOURS PRN
Status: DISCONTINUED | OUTPATIENT
Start: 2022-06-07 | End: 2022-06-10 | Stop reason: HOSPADM

## 2022-06-07 RX ADMIN — ALBUMIN (HUMAN): 12.5 SOLUTION INTRAVENOUS at 11:04

## 2022-06-07 RX ADMIN — IRBESARTAN 75 MG: 75 TABLET ORAL at 20:57

## 2022-06-07 RX ADMIN — TICAGRELOR 90 MG: 90 TABLET ORAL at 20:57

## 2022-06-07 RX ADMIN — PROPOFOL 20 MG: 10 INJECTION, EMULSION INTRAVENOUS at 11:10

## 2022-06-07 RX ADMIN — SODIUM CHLORIDE: 9 INJECTION, SOLUTION INTRAVENOUS at 09:16

## 2022-06-07 RX ADMIN — POTASSIUM CHLORIDE 10 MEQ: 750 TABLET, EXTENDED RELEASE ORAL at 20:56

## 2022-06-07 RX ADMIN — ACETAMINOPHEN 650 MG: 325 TABLET ORAL at 16:33

## 2022-06-07 RX ADMIN — PHENYLEPHRINE HYDROCHLORIDE 150 MCG: 10 INJECTION INTRAVENOUS at 10:53

## 2022-06-07 RX ADMIN — HEPARIN SODIUM 14000 UNITS: 1000 INJECTION INTRAVENOUS; SUBCUTANEOUS at 10:42

## 2022-06-07 RX ADMIN — PHENYLEPHRINE HYDROCHLORIDE 100 MCG: 10 INJECTION INTRAVENOUS at 10:50

## 2022-06-07 RX ADMIN — DEXMEDETOMIDINE HYDROCHLORIDE 12 MCG: 100 INJECTION, SOLUTION INTRAVENOUS at 10:13

## 2022-06-07 RX ADMIN — CYANOCOBALAMIN TAB 1000 MCG 1000 MCG: 1000 TAB at 20:57

## 2022-06-07 RX ADMIN — Medication 10 MG: at 11:04

## 2022-06-07 RX ADMIN — SODIUM CHLORIDE, SODIUM LACTATE, POTASSIUM CHLORIDE, CALCIUM CHLORIDE: 600; 310; 30; 20 INJECTION, SOLUTION INTRAVENOUS at 10:35

## 2022-06-07 RX ADMIN — PROTAMINE SULFATE 100 MG: 10 INJECTION, SOLUTION INTRAVENOUS at 11:12

## 2022-06-07 RX ADMIN — ONDANSETRON 4 MG: 2 INJECTION INTRAMUSCULAR; INTRAVENOUS at 10:00

## 2022-06-07 RX ADMIN — PHENYLEPHRINE HYDROCHLORIDE 100 MCG: 10 INJECTION INTRAVENOUS at 10:47

## 2022-06-07 RX ADMIN — Medication 2 G: at 09:50

## 2022-06-07 RX ADMIN — ROSUVASTATIN CALCIUM 20 MG: 20 TABLET, FILM COATED ORAL at 20:57

## 2022-06-07 RX ADMIN — PROPOFOL 20 MG: 10 INJECTION, EMULSION INTRAVENOUS at 10:27

## 2022-06-07 RX ADMIN — EZETIMIBE 10 MG: 10 TABLET ORAL at 20:57

## 2022-06-07 RX ADMIN — LIDOCAINE HYDROCHLORIDE 40 MG: 20 INJECTION, SOLUTION INFILTRATION; PERINEURAL at 10:00

## 2022-06-07 RX ADMIN — TOPIRAMATE 75 MG: 50 TABLET, FILM COATED ORAL at 20:56

## 2022-06-07 RX ADMIN — PHENYLEPHRINE HYDROCHLORIDE 0.2 MCG/KG/MIN: 10 INJECTION INTRAVENOUS at 10:44

## 2022-06-07 RX ADMIN — PROPOFOL 30 MCG/KG/MIN: 10 INJECTION, EMULSION INTRAVENOUS at 10:00

## 2022-06-07 RX ADMIN — DEXMEDETOMIDINE HYDROCHLORIDE 8 MCG: 100 INJECTION, SOLUTION INTRAVENOUS at 10:17

## 2022-06-07 RX ADMIN — DEXMEDETOMIDINE HYDROCHLORIDE 0.3 MCG/KG/HR: 100 INJECTION, SOLUTION INTRAVENOUS at 10:00

## 2022-06-07 ASSESSMENT — ACTIVITIES OF DAILY LIVING (ADL)
ADLS_ACUITY_SCORE: 20
ADLS_ACUITY_SCORE: 35
ADLS_ACUITY_SCORE: 20

## 2022-06-07 ASSESSMENT — ENCOUNTER SYMPTOMS
DYSRHYTHMIAS: 0
SEIZURES: 0

## 2022-06-07 NOTE — PROGRESS NOTES
Dr. Fu approved pt to Tx to .  Nueros intact, no deficits, 2L NC--lungs clear/dimin in bases, Tele: SR 1st AVB c/ LBBB (see previous note), bladder scanned for 54cc, bilat groin sites WDL, no brui, 1+ pedals, doppler tibs, denies pain, pt baseline numbness/tingling in Rt foot/leg d/t pinched nerve in lumbar.  Tolerating ice chips.  Friend Audra paez.

## 2022-06-07 NOTE — Clinical Note
Hemodynamic equipment used: 5 lead ECG, manual BP cuff, LIKEPAK Hands Off Patches, LIFEPAK With 3 Leads, Machine BP Cuff and pulse oximeter probe.

## 2022-06-07 NOTE — PROVIDER NOTIFICATION
Dr. Cotto informed at bedside of pt's post-procedure EKG showing SR c/ 1st AVB c/ new LBBB.  EP consult to be ordered for pt.

## 2022-06-07 NOTE — ANESTHESIA CARE TRANSFER NOTE
Patient: Ade Wilkins    Procedure: Procedure(s):  Transcatheter Aortic Valve Replacement-Femoral Approach       Diagnosis: valvular disease  Diagnosis Additional Information: No value filed.    Anesthesia Type:   MAC     Note:    Oropharynx: oropharynx clear of all foreign objects and spontaneously breathing  Level of Consciousness: drowsy  Oxygen Supplementation: face mask  Level of Supplemental Oxygen (L/min / FiO2): 8  Independent Airway: airway patency satisfactory and stable  Dentition: dentition unchanged  Vital Signs Stable: post-procedure vital signs reviewed and stable  Report to RN Given: handoff report given  Patient transferred to: PACU    Handoff Report: Identifed the Patient, Identified the Reponsible Provider, Reviewed the pertinent medical history, Discussed the surgical course, Reviewed Intra-OP anesthesia mangement and issues during anesthesia, Set expectations for post-procedure period and Allowed opportunity for questions and acknowledgement of understanding      Vitals:  Vitals Value Taken Time   /46    Temp     Pulse 72    Resp 16    SpO2 99        Electronically Signed By: LEONEL Vivar CRNA  June 7, 2022  11:39 AM

## 2022-06-07 NOTE — ANESTHESIA POSTPROCEDURE EVALUATION
Patient: Ade Wilkins    Procedure: Procedure(s):  Transcatheter Aortic Valve Replacement-Femoral Approach       Anesthesia Type:  MAC    Note:  Disposition: Inpatient   Postop Pain Control: Uneventful            Sign Out: Well controlled pain   PONV: No   Neuro/Psych: Uneventful            Sign Out: Acceptable/Baseline neuro status   Airway/Respiratory: Uneventful            Sign Out: Acceptable/Baseline resp. status   CV/Hemodynamics: Uneventful            Sign Out: Acceptable CV status; No obvious hypovolemia; No obvious fluid overload   Other NRE: NONE   DID A NON-ROUTINE EVENT OCCUR? No           Last vitals:  Vitals Value Taken Time   /50 06/07/22 1310   Temp 36.2  C (97.2  F) 06/07/22 1240   Pulse 74 06/07/22 1315   Resp 17 06/07/22 1315   SpO2 94 % 06/07/22 1316   Vitals shown include unvalidated device data.    Electronically Signed By: Joe Fu DO  June 7, 2022  5:58 PM

## 2022-06-07 NOTE — PRE-PROCEDURE
GENERAL PRE-PROCEDURE:   Procedure:  Transcatheter aortic valve replacement  Date/Time:  6/7/2022 10:12 AM    Written consent obtained?: Yes    Risks and benefits: Risks, benefits and alternatives were discussed    Consent given by:  Patient  Patient states understanding of procedure being performed: Yes    Patient's understanding of procedure matches consent: Yes    Procedure consent matches procedure scheduled: Yes    Expected level of sedation:  Moderate  Appropriately NPO:  Yes  ASA Class:  2  Mallampati  :  Grade 2- soft palate, base of uvula, tonsillar pillars, and portion of posterior pharyngeal wall visible  Lungs:  Lungs clear with good breath sounds bilaterally  Heart:  Normal heart sounds and rate and systolic murmur  History & Physical reviewed:  History and physical reviewed and no updates needed  Statement of review:  I have reviewed the lab findings, diagnostic data, medications, and the plan for sedation

## 2022-06-07 NOTE — ANESTHESIA PREPROCEDURE EVALUATION
Anesthesia Pre-Procedure Evaluation    Patient: Ade Wilkins   MRN: [id number] : 1950        Procedure : Procedure(s):  Transcatheter Aortic Valve Replacement-Femoral Approach          Past Medical History:   Diagnosis Date     Benign essential hypertension      Chronic kidney disease, stage 3b (H)      Diabetic retinopathy of both eyes (H)      Obesity (BMI 30-39.9)      Osteopenia      Pure hypercholesterolemia      Type 2 diabetes mellitus (H)      Type 2 diabetes mellitus with diabetic nephropathy (H)      Type 2 diabetes mellitus with diabetic neuropathy (H)       Past Surgical History:   Procedure Laterality Date     APPENDECTOMY       CATARACT IOL, RT/LT Bilateral      CV CORONARY ANGIOGRAM N/A 2022    Procedure: Coronary Angiogram;  Surgeon: Hector Lewis MD;  Location:  HEART CARDIAC CATH LAB     CV LEFT HEART CATH N/A 2022    Procedure: Left Heart Catheterization;  Surgeon: Hector Lewis MD;  Location: [facility] CARDIAC CATH LAB     CV PCI N/A 2022    Procedure: Percutaneous Coronary Intervention;  Surgeon: Hector Lewis MD;  Location:  HEART CARDIAC CATH LAB     GASTRIC BYPASS  2004     TONSILLECTOMY & ADENOIDECTOMY        Allergies   Allergen Reactions     Norvasc [Amlodipine] Other (See Comments)     hairloss     Chlorhexidine Rash     Clonidine Headache     Doxazosin Mesylate Rash     Fexofenadine Hydrochloride Headache     Gemfibrozil Rash     Lisinopril Angioedema     Pioglitazone Hydrochloride Swelling     ankle edema      Social History     Tobacco Use     Smoking status: Never Smoker     Smokeless tobacco: Never Used   Substance Use Topics     Alcohol use: No      Wt Readings from Last 1 Encounters:   22 88.9 kg (196 lb)        Anesthesia Evaluation   Pt has had prior anesthetic.     History of anesthetic complications   woke up combative as a child after appy.    ROS/MED HX  ENT/Pulmonary:     (+) SAHIL risk factors, hypertension, obese,  (-)  asthma and sleep apnea   Neurologic:    (-) no seizures, no CVA and migraines   Cardiovascular: Comment: Chronic diastolic heart failure, NYHA class III, Stage B    Pre-TAVR coronary angiogram showed single-vessel coronary disease of the ostial RCA status post PCI with 1 drug-eluting stent    (+) Dyslipidemia hypertension--CAD --stent-2022. Drug Eluting Stent. CHF NYHA classification: II. DENG. valvular problems/murmurs type: AS Previous cardiac testing   Echo: Date: Results:  Echocardiogram: 4/2022  Interpretation Summary     1. The left ventricle is normal in structure, function and size. The visual  ejection fraction is estimated at 65%.  2. The right ventricle is normal in structure, function and size.  3. There is mild mitral stenosis. The mean mitral valve gradient is 5mmHg.  4. Severe valvular aortic stenosis. Mean 46mmHg, Vmax 4.4m/s, ARMIDA 0.9cm2, DI  0.28.     Echo from 2016 showed EF 70%, AS with mean 12mmHg, Vmax 2.6m/s, ARMIDA 1.5cm2.  Stress Test: Date: Results:    ECG Reviewed: Date: Results:    Cath: Date: Results:  Ost RCA to Prox RCA lesion is 70% stenosed.  Prox LAD to Mid LAD lesion is 25% stenosed.     Single Vessel CAD involving the ostial RCA.  Abnormal IFR of the ostial RCA measuring 0.85.  PCI of the RCA with a 4.5x20 MARISOL. (-) arrhythmias and irregular heartbeat/palpitations   METS/Exercise Tolerance:     Hematologic:       Musculoskeletal:       GI/Hepatic: Comment: History of gastric bypass   (-) GERD and liver disease   Renal/Genitourinary:     (+) renal disease, type: CRI,     Endo:     (+) type II DM, Obesity,  (-) thyroid disease   Psychiatric/Substance Use:       Infectious Disease:       Malignancy:       Other:            Physical Exam    Airway        Mallampati: III       Respiratory Devices and Support         Dental           Cardiovascular           (+) murmur       Pulmonary                   OUTSIDE LABS:  CBC:   Lab Results   Component Value Date    WBC 12.2 (H) 06/01/2022    WBC  9.3 05/04/2022    HGB 11.7 06/01/2022    HGB 11.1 (L) 05/04/2022    HCT 36.0 06/01/2022    HCT 35.5 05/04/2022     06/01/2022     05/04/2022     BMP:   Lab Results   Component Value Date     06/01/2022     05/13/2022    POTASSIUM 4.0 06/01/2022    POTASSIUM 4.4 05/13/2022    CHLORIDE 109 06/01/2022    CHLORIDE 108 05/13/2022    CO2 21 06/01/2022    CO2 24 05/13/2022    BUN 29 06/01/2022    BUN 29 05/13/2022    CR 1.36 (H) 06/01/2022    CR 1.7 (H) 05/17/2022     (H) 06/01/2022     (H) 05/13/2022     COAGS:   Lab Results   Component Value Date    PTT 27 05/04/2022    INR 1.09 06/01/2022     POC:   Lab Results   Component Value Date     (H) 12/12/2008     HEPATIC:   Lab Results   Component Value Date    ALBUMIN 3.5 06/01/2022    PROTTOTAL 6.9 06/01/2022     (H) 06/01/2022    AST 87 (H) 06/01/2022    ALKPHOS 111 06/01/2022    BILITOTAL 0.7 06/01/2022     OTHER:   Lab Results   Component Value Date    A1C 7.9 (H) 05/04/2022    RASHEEDA 9.1 06/01/2022    PHOS 3.1 08/25/2017    TSH 2.37 08/27/2021    T4 1.10 04/23/2019       Anesthesia Plan    ASA Status:  4   NPO Status:  NPO Appropriate    Anesthesia Type: MAC.     - Reason for MAC: immobility needed, straight local not clinically adequate         Techniques and Equipment:       - Drips/Meds: Phenylephrine, Dexmed. infusion     Consents    Anesthesia Plan(s) and associated risks, benefits, and realistic alternatives discussed. Questions answered and patient/representative(s) expressed understanding.    - Discussed:     - Discussed with:  Patient      - Extended Intubation/Ventilatory Support Discussed: No.      - Patient is DNR/DNI Status: No    Use of blood products discussed: Yes.     - Discussed with: Patient.     - Consented: consented to blood products            Reason for refusal: other.     Postoperative Care    Pain management: IV analgesics, Oral pain medications, Multi-modal analgesia.   PONV prophylaxis:  Ondansetron (or other 5HT-3), Dexamethasone or Solumedrol     Comments:                Joe Fu, DO

## 2022-06-07 NOTE — H&P
[facility]    History and Physical - Hospitalist Service       Date of Admission:  6/7/2022    Assessment & Plan      Ade Wilkins is a 71 year old female admitted on 6/7/2022. She has a PMH most remarkable for DM2, hypothyroidism, severe aortic stenosis, who presented to the hospital today electively for aortic valve replacement via TAVR. She is being admitted post procedural for further cares.    1. Severe Aortic Stenosis status post TAVR with 26 mm Medtronic Evolut Pro+ Valve. Procedure was complicated by temporary heart block that resolved prior to case being over; temporary pacemaker wire was therefore pulled. In the PACU she developed prolonged ME and LBBB.  --Admit to cardiology with telemetry  --Cardiology Consult, recommendations appreciated  --Electrophysiology Consult, recommendations appreciated  --Bedrest for 6 hours, per cardiology. Can eat after this is over. I have placed a diet for 5:15PM. NPO at midnight unless EP says otherwise  --Antiplatelet: Aspirin, Brilinta   --Echo in the AM    2. History of DM2; Insulin Dependent  --Held home Metformin, Jardiance  --I decreased patients home long acting insulin for tomorrow morning from her regular dose (44 units) to 30 units, just in case she needs a device tomorrow and will be NPO  --Home insulin is 2U per unit carb; I have ordered this along with sliding scale insulin as her oral antihyperglycemic meds are held  --Continue home Zetia    3. Coronary Artery Disease with PCI to Ostial RCA  --Continue Aspirin, Brilinta, Crestor, Zetia    4. Essential HTN  --Continue home irbesartan and spironolactone    5. HFpEF. Patient appears euvolemic  --Continue home dose of lasix starting tomorrow, lasix was held this morning in anticipation of procedure today    Chronic Issues: Awaiting pharmacy med rec  1. Hypothyroidism. Continue home synthroid  2. Migraines. Continue home topamax  3. CKD. Baseline Cr is about 1.3 - 1.4     Diet: NPO for  "Medical/Clinical Reasons Except for: Other; Specify: NPO after midnight OR 8 hours prior to the procedure.  Combination Diet Regular Diet Adult at 6:15PM  DVT Prophylaxis: Enoxaparin (Lovenox) SQ  Macias Catheter: Not present  Central Lines: PRESENT     Cardiac Monitoring: ACTIVE order. Indication: post TAVR  Code Status: Full Code      Clinically Significant Risk Factors Present on Admission                # Platelet Defect: home medication list includes an antiplatelet medication   # Diabetes, type II: last A1C 7.9 % (Ref range: 0.0 - 5.6 %)  # Obesity: Estimated body mass index is 36.36 kg/m  as calculated from the following:    Height as of this encounter: 1.575 m (5' 2\").    Weight as of this encounter: 90.2 kg (198 lb 12.8 oz).      Disposition Plan   Expected Discharge:  Likely tomorrow pending cardiac stability  Anticipated discharge location:  Awaiting care coordination huddle  Delays:     TAVR follow up studies       The patient's care was discussed with the Bedside Nurse, Patient, Patient's Family and Cardiology Consultant.    Kishore Bright MD PhD  Hospitalist Service  [facility]  Securely message with the Vocera Web Console (learn more here)  Text page via [facility] Paging/Directory         ______________________________________________________________________    Chief Complaint   Post TAVR    History is obtained from the patient    History of Present Illness   Ade Wilkins is a 71 year old female admitted on 6/7/2022. She has a PMH most remarkable for DM2, hypothyroidism, severe aortic stenosis, who presented to the hospital today electively for aortic valve replacement via TAVR. She is being admitted post procedural for further cares.    Patient was evaluated as an outpatient for progressive fatigue and DENG. She was found to have severe aortic stenosis and was a TAVR candidate. During her evaluation she was found to have coronary artery disease of the right ostial coronary " artery. She had her procedure this morning which was complicated by temporary heart block requiring about 10 minute of pacing during the procedure which has since resolved.    She is now feeling well, she has no fevers, chills, chest pain, SOB, abdominal pain, nausea, vomiting, diarrhea. No dizziness, light-headedness, syncope, or presyncope.    Review of Systems    The 10 point Review of Systems is negative other than noted in the HPI or here.    Past Medical History    I have reviewed this patient's medical history and updated it with pertinent information if needed.   Past Medical History:   Diagnosis Date     Benign essential hypertension      Chronic kidney disease, stage 3b (H)      Diabetic retinopathy of both eyes (H)      Obesity (BMI 30-39.9)      Osteopenia      Pure hypercholesterolemia      Type 2 diabetes mellitus (H)      Type 2 diabetes mellitus with diabetic nephropathy (H)      Type 2 diabetes mellitus with diabetic neuropathy (H)        Past Surgical History   I have reviewed this patient's surgical history and updated it with pertinent information if needed.  Past Surgical History:   Procedure Laterality Date     APPENDECTOMY       CATARACT IOL, RT/LT Bilateral      CV CORONARY ANGIOGRAM N/A 5/4/2022    Procedure: Coronary Angiogram;  Surgeon: Hector Lewis MD;  Location: [facility] CARDIAC CATH LAB     CV LEFT HEART CATH N/A 5/4/2022    Procedure: Left Heart Catheterization;  Surgeon: Hector Lewis MD;  Location: [facility] CARDIAC CATH LAB     CV PCI N/A 5/4/2022    Procedure: Percutaneous Coronary Intervention;  Surgeon: Hector Lewis MD;  Location: [facility] CARDIAC CATH LAB     GASTRIC BYPASS  2004     TONSILLECTOMY & ADENOIDECTOMY         Social History   I have reviewed this patient's social history and updated it with pertinent information if needed.  Social History     Tobacco Use     Smoking status: Never Smoker     Smokeless tobacco: Never Used   Substance Use  Topics     Alcohol use: No     Drug use: No       Family History   I have reviewed this patient's family history and updated it with pertinent information if needed.  Family History   Problem Relation Age of Onset     Diabetes Type 2  Mother      Glaucoma Mother      Coronary Artery Disease Mother      Abdominal Aortic Aneurysm Father      Myocardial Infarction Father      Breast Cancer Sister      Abdominal Aortic Aneurysm Brother      Bladder Cancer Brother      Diabetes Type 2  Brother      Liver Cancer Brother      Myocardial Infarction Brother      Alzheimer Disease Paternal Grandmother      Cerebrovascular Disease Paternal Grandfather      Ovarian Cancer No family hx of      Colon Cancer No family hx of        Prior to Admission Medications   Prior to Admission Medications   Prescriptions Last Dose Informant Patient Reported? Taking?   Continuous Blood Gluc  (FREESTYLE BALWINDER 14 DAY READER) HENNA  Self No No   Si each 3 times daily   Continuous Blood Gluc Sensor (FREESTYLE BALWINDER 14 DAY SENSOR) MISC  Self No No   Si each every 14 days   Dulaglutide 3 MG/0.5ML SOPN Past Week at PM Self No Yes   Sig: Inject 3 mg Subcutaneous once a week   HUMALOG KWIKPEN 100 UNIT/ML soln 2022 at Unknown time Self No Yes   Sig: INJECT  Per sliding scale UP TO 60 UNITS UNDER THE SKIN DAILY.   VITRON-C  MG TABS tablet 2022 at PM Self No Yes   Sig: TAKE TWO TABLETS BY MOUTH TWICE DAILY    Vitamin D3 (CHOLECALCIFEROL) 25 mcg (1000 units) tablet 2022 at PM Self Yes Yes   Sig: Take 1 tablet by mouth 2 times daily   aspirin (ASA) 81 MG chewable tablet 2022 at PM Self No Yes   Sig: Take 1 tablet (81 mg) by mouth daily Starting tomorrow.   augmented betamethasone dipropionate (DIPROLENE-AF) 0.05 % external ointment 2022 at Unknown time  No Yes   Sig: Apply topically to affected area twice daily for 3-4 weeks then use as needed   calcium carbonate 600 mg-vitamin D 400 units (CALTRATE) 600-400 MG-UNIT  per tablet 6/6/2022 at PM Self Yes Yes   Sig: Take 1 tablet by mouth in the morning and 1 tablet in the evening.   cetirizine (ZYRTEC) 10 MG tablet 6/7/2022 at AM Self Yes Yes   Sig: Take 1 tablet (10 mg) by mouth daily   cyanocolbalamin (VITAMIN B-12) 1000 MCG tablet 6/6/2022 at PM Self Yes Yes   Sig: Take 1,000 mcg by mouth in the morning.   empagliflozin (JARDIANCE) 10 MG TABS tablet More than a month at PM Self No Yes   Sig: Take 1 tablet (10 mg) by mouth daily   ezetimibe (ZETIA) 10 MG tablet 6/6/2022 at PM Self No Yes   Sig: Take 1 tablet (10 mg) by mouth daily   fluticasone (FLONASE) 50 MCG/ACT nasal spray More than a month at PRN Self No Yes   Sig: SHAKE LIQUID AND USE 2 SPRAYS IN EACH NOSTRIL DAILY   furosemide (LASIX) 40 MG tablet 6/6/2022 at AM Self No Yes   Sig: TAKE ONE TABLET BY MOUTH ONE TIME DAILY   insulin degludec (TRESIBA) 200 UNIT/ML pen 6/6/2022 at PM Self Yes Yes   Sig: Inject 44 Units Subcutaneous daily   insulin pen needle (BD FCO U/F) 32G X 4 MM miscellaneous  Self No No   Sig: Use 4 pen needles daily or as directed.   irbesartan (AVAPRO) 150 MG tablet 6/6/2022 at PM  No Yes   Sig: Take 1 tablet (150 mg) by mouth daily   Patient taking differently: Take 75 mg by mouth daily (150MG X 0.5 = 75MG)   levothyroxine (SYNTHROID/LEVOTHROID) 50 MCG tablet 6/6/2022 at AM Self No Yes   Sig: Take 1 tablet (50 mcg) by mouth daily   metFORMIN (GLUCOPHAGE) 1000 MG tablet 6/6/2022 at AM Self Yes Yes   Sig: Take 1,000 mg by mouth daily   potassium chloride ER (KLOR-CON M) 10 MEQ CR tablet 6/6/2022 at PM Self No Yes   Sig: Take 1 tablet (10 mEq) by mouth 2 times daily   rosuvastatin (CRESTOR) 20 MG tablet 6/6/2022 at PM Self No Yes   Sig: Take 1 tablet (20 mg) by mouth daily   spironolactone (ALDACTONE) 25 MG tablet 6/7/2022 at AM Self Yes Yes   Sig: Take 0.5 tablets (12.5 mg) by mouth in the morning.   ticagrelor (BRILINTA) 90 MG tablet 6/7/2022 at PM Self No Yes   Sig: Take 1 tablet (90 mg) by mouth 2  times daily Start this evening.   topiramate (TOPAMAX) 25 MG tablet 6/6/2022 at PM Self No Yes   Sig: Take 3 tablets (75 mg) by mouth At Bedtime   traZODone (DESYREL) 50 MG tablet 6/6/2022 at PM Self No Yes   Sig: take 1 to 2 tablets (50 to 100mg) by mouth nightly as needed   triamcinolone (KENALOG) 0.1 % external ointment  at PRN Self No Yes   Sig: Apply topically 2 times daily To left foot rash   Patient taking differently: Apply topically once a week To left foot rash   verapamil ER (CALAN-SR) 180 MG CR tablet 6/7/2022 at AM Self Yes Yes   Sig: Take 1 tablet (180 mg) by mouth in the morning.      Facility-Administered Medications: None     Allergies   Allergies   Allergen Reactions     Norvasc [Amlodipine] Other (See Comments)     hairloss     Chlorhexidine Rash     Clonidine Headache     Doxazosin Mesylate Rash     Fexofenadine Hydrochloride Headache     Gemfibrozil Rash     Lisinopril Angioedema     Pioglitazone Hydrochloride Swelling     ankle edema       Physical Exam   Vital Signs: Temp: 97.8  F (36.6  C)   BP: 101/51 Pulse: 73   Resp: 20 SpO2: 98 % O2 Device: None (Room air)    Weight: 198 lbs 12.8 oz    General Appearance: Well appearing, no distress  Eyes: REGINA, EOMI  HEENT: NC, AT. MMM.   Respiratory: CTAB, normal work of breathing  Cardiovascular: Regular Rate and Rhythm, normal S1, S2. No murmurs, rubs, gallops  GI: Soft, non-tender, non-distended  Lymph/Hematologic: No asymetric swelling, edema. Fermoral access sites C, D, I  Genitourinary: Deferred  Skin: No rashes, lesions, wounds.  Musculoskeletal: Warm, well perfused  Neurologic: AOx4, CNII-XII intact.  Psychiatric: Euthymic. Mood appropriate.     Data   Data reviewed today: I reviewed all medications, new labs and imaging results over the last 24 hours. I personally reviewed the EKG tracing showing sinus rhythm. I have also reviewed a 2nd ECG that shows prolonged IN interval and new LBBB.    Recent Labs   Lab 06/07/22  0811 06/01/22  1223   WBC  12.2* 12.2*   HGB 12.0 11.7   MCV 87 86    307   INR  --  1.09   NA  --  139   POTASSIUM 4.1 4.0   CHLORIDE  --  109   CO2  --  21   BUN  --  29   CR  --  1.36*   ANIONGAP  --  9   RASHEEDA  --  9.1   * 192*   ALBUMIN  --  3.5   PROTTOTAL  --  6.9   BILITOTAL  --  0.7   ALKPHOS  --  111   ALT  --  160*   AST  --  87*

## 2022-06-07 NOTE — BRIEF OP NOTE
[facility]    Brief Operative Note    Pre-operative diagnosis:  Aortic stenosis  Post-operative diagnosis:  Same    Procedure: Right transfemoral transcatheter aortic valve replacement using a 26 mm Medtronic Evolut Pro+  valve  Surgeon: Surgeon(s) and Role:     * Hector Lewis MD - Primary     * Guevara Cotto MD - Fellow - Assisting     * Jen Varghese MD  Anesthesia: Monitor Anesthesia Care   Estimated Blood Loss: 50 mL    Drains:  None  Specimens: None  Findings: No perivalvular leak or pericardial effusion after valve deployment.  Transient complete heart block that resolved after about 10 minutes.  Complications: None  Implants:   Implant Name Type Inv. Item Serial No.  Lot No. LRB No. Used Action   TAVR 26MM EVOLUT PRO+ - [id number]  TAVR 26MM EVOLUT PRO+ [id number] [facility] [id number]  1 Implanted   DEVICE CLOSURE VASCULAR MANTA 18FR 2115 - [id number] Occlusion Device DEVICE CLOSURE VASCULAR MANTA 18FR 2115  [employer] [facility] [id number]  1 Implanted

## 2022-06-07 NOTE — PROGRESS NOTES
[id number] A&O x4. Pt denied pain. VSS, on RA. Bedrest till 1715. Tele: SR w/ 1st degree AVB and new LBBB. BL groins WDL. No neuro deficits. BL numbness and tinglin gin R leg d/t lumbar nerve problems. Pulses weak but stable. Alarms on. EP consult for new LBBB post TAVR, NPO at midnight. Plan for ECHO and CR tomorrow. Continue to monitor.

## 2022-06-07 NOTE — OP NOTE
Procedure Date: 06/07/2022    OPERATING AREA:  Room 1 in the Cath Lab.    PROCEDURE PERFORMED:  Right transfemoral transcatheter aortic valve replacement using a 26 mm Medtronic Evolut PRO+ valve.    ATTENDING SURGEON:  Hui Varghese MD    INTERVENTIONAL CARDIOLOGIST:  Hector Lewis MD    STRUCTURAL HEART FELLOW:  Guevara Cotto MD    ANESTHESIA:  MAC with sedation.    SKIN PREP:  Betadine and DuraPrep.    INCISIONS:  None.    DRAINS:  None.    SPECIMEN:  None.    CULTURE:  None.    CLOSURE:  MANTA device.    PREOPERATIVE DIAGNOSES:     1.  Severe aortic stenosis.  2.  Coronary artery disease.  3.  Obesity.  4.  Moderate to severe calcification of the ascending aorta.    POSTOPERATIVE DIAGNOSES:    1.  Severe aortic stenosis.  2.  Coronary artery disease.  3.  Obesity.  4.  Moderate to severe calcification of the ascending aorta.     BRIEF HISTORY:  Ms. Wilkins is a 71-year-old woman who has noted progressive shortness of breath over the past several years.  Her health issues include diabetes, hypertension and obesity.  Her echocardiogram revealed a 0.9 cm2 aortic valve area with a mean gradient of 46 mmHg.  More recently, she underwent a MARISOL to the RCA for a 70% stenosis, this was done on 05/04/2022.  Her TAVR CT scan is remarkable for mild to moderate calcification of her ascending aorta and the above procedure was planned.    FINDINGS AT OPERATION:  Once the valve was deployed, there was no perivalvular leak and no pericardial effusion.  The patient had transient complete heart block and this resolved after about 10 minutes.  DSA showed no extravasation or narrowing of the right femoral artery.    DESCRIPTION OF PROCEDURE:  The patient arrived in the operating room and MAC anesthesia was induced.  The patient's neck, chest, abdomen and both groins were prepped and draped in standard sterile fashion.  A local anesthetic was applied to both groin areas by Dr. Cotto.  The left femoral artery and vein were  cannulated. Through the left femoral vein, a temporary pacing wire was placed in the right ventricle.  It was tested and captured well.  The right femoral artery was cannulated and the necessary intracardiac wires placed.  The Lomeli eSheath was then placed in the right femoral artery.  Attention was then turned to the right wrist; however, the patient's hand was pronated and, therefore, a [place] device was not inserted.  Heparin was administered.  On the back table, a Medtronic 26 mm Medtronic Evolut PRO+ valve was prepared.  When the ACT was appropriate, it was brought onto the operative field and placed up the Lomeli eSheath.  It had been previously checked. Once it was appropriately positioned, rapid pacing was done and the valve was deployed. The first time the valve appeared to be not in the proper position; therefore, it was recaptured and once more deployed with rapid pacing. The second time it was in good position.  The patient did require some pressor support during this period.  Once the valve was deployed, it was clear the patient was in complete heart block and she was paced at a rate of 60.  Slowly but surely over the course of the next 10 minutes, her own rhythm returned in sinus.  A transthoracic echo was done with the above findings.  The valve deployment apparatus and intracardiac wires were removed and then the Lomeli eSheath was removed and the MANTA device deployed.  DSA was then performed.  The estimated blood loss was 50 mL. Ms. Wilkins was brought to the recovery area in satisfactory condition.    Jen Varghese MD        D: 2022   T: 2022   MT: YANN    Name:     RENE WILKINS  MRN:      [id number]        Account:        [id number]   :      1950           Procedure Date: 2022     Document: [id number]

## 2022-06-07 NOTE — TELEPHONE ENCOUNTER
Pharmacy called for clarification on augmented betamethasone dipropionate (DIPROLENE-AF) 0.05 % external.       They need to know:  1.  Where on the body the topical is to be applied.  2.  How long 45 g should last.  Example 30 days/?    Please advise and call back to pharmacy or resend updated script.      Laurel Carrion RN

## 2022-06-07 NOTE — INTERVAL H&P NOTE
"I have reviewed the surgical (or preoperative) H&P that is linked to this encounter, and examined the patient. There are no significant changes    Clinical Conditions Present on Arrival:  Clinically Significant Risk Factors Present on Admission                  # Platelet Defect: home medication list includes an antiplatelet medication  # Diabetes, type II: last A1C 7.9 % (Ref range: 0.0 - 5.6 %)  # Obesity: Estimated body mass index is 36.36 kg/m  as calculated from the following:    Height as of this encounter: 1.575 m (5' 2\").    Weight as of this encounter: 90.2 kg (198 lb 12.8 oz).       "

## 2022-06-07 NOTE — Clinical Note
Prepped: groin, right radial, chest, abdomen and neck. Prepped with: Betadine. The patient was draped. .

## 2022-06-08 ENCOUNTER — APPOINTMENT (OUTPATIENT)
Dept: CARDIOLOGY | Facility: CLINIC | Age: 72
DRG: 266 | End: 2022-06-08
Attending: STUDENT IN AN ORGANIZED HEALTH CARE EDUCATION/TRAINING PROGRAM
Payer: COMMERCIAL

## 2022-06-08 ENCOUNTER — APPOINTMENT (OUTPATIENT)
Dept: MRI IMAGING | Facility: CLINIC | Age: 72
DRG: 266 | End: 2022-06-08
Attending: INTERNAL MEDICINE
Payer: COMMERCIAL

## 2022-06-08 LAB
ALBUMIN SERPL-MCNC: 3.3 G/DL (ref 3.4–5)
ALP SERPL-CCNC: 89 U/L (ref 40–150)
ALT SERPL W P-5'-P-CCNC: 63 U/L (ref 0–50)
ANION GAP SERPL CALCULATED.3IONS-SCNC: 5 MMOL/L (ref 3–14)
AST SERPL W P-5'-P-CCNC: 42 U/L (ref 0–45)
BILIRUB SERPL-MCNC: 0.4 MG/DL (ref 0.2–1.3)
BUN SERPL-MCNC: 35 MG/DL (ref 7–30)
CALCIUM SERPL-MCNC: 9.2 MG/DL (ref 8.5–10.1)
CHLORIDE BLD-SCNC: 112 MMOL/L (ref 94–109)
CHOLEST SERPL-MCNC: 67 MG/DL
CO2 SERPL-SCNC: 22 MMOL/L (ref 20–32)
CREAT SERPL-MCNC: 1.55 MG/DL (ref 0.52–1.04)
ERYTHROCYTE [DISTWIDTH] IN BLOOD BY AUTOMATED COUNT: 13.9 % (ref 10–15)
GFR SERPL CREATININE-BSD FRML MDRD: 35 ML/MIN/1.73M2
GLUCOSE BLD-MCNC: 216 MG/DL (ref 70–99)
GLUCOSE BLDC GLUCOMTR-MCNC: 168 MG/DL (ref 70–99)
GLUCOSE BLDC GLUCOMTR-MCNC: 186 MG/DL (ref 70–99)
GLUCOSE BLDC GLUCOMTR-MCNC: 192 MG/DL (ref 70–99)
GLUCOSE BLDC GLUCOMTR-MCNC: 245 MG/DL (ref 70–99)
HBA1C MFR BLD: 8 % (ref 0–5.6)
HCT VFR BLD AUTO: 32.6 % (ref 35–47)
HDLC SERPL-MCNC: 28 MG/DL
HGB BLD-MCNC: 10.4 G/DL (ref 11.7–15.7)
LACTATE SERPL-SCNC: 0.7 MMOL/L (ref 0.7–2)
LDLC SERPL CALC-MCNC: 17 MG/DL
LVEF ECHO: NORMAL
MAGNESIUM SERPL-MCNC: 2.5 MG/DL (ref 1.6–2.3)
MCH RBC QN AUTO: 28.4 PG (ref 26.5–33)
MCHC RBC AUTO-ENTMCNC: 31.9 G/DL (ref 31.5–36.5)
MCV RBC AUTO: 89 FL (ref 78–100)
NONHDLC SERPL-MCNC: 39 MG/DL
PHOSPHATE SERPL-MCNC: 3.2 MG/DL (ref 2.5–4.5)
PLATELET # BLD AUTO: 274 10E3/UL (ref 150–450)
POTASSIUM BLD-SCNC: 4.2 MMOL/L (ref 3.4–5.3)
PROT SERPL-MCNC: 6.2 G/DL (ref 6.8–8.8)
RBC # BLD AUTO: 3.66 10E6/UL (ref 3.8–5.2)
SODIUM SERPL-SCNC: 139 MMOL/L (ref 133–144)
TRIGL SERPL-MCNC: 109 MG/DL
WBC # BLD AUTO: 9.5 10E3/UL (ref 4–11)

## 2022-06-08 PROCEDURE — 255N000002 HC RX 255 OP 636: Performed by: INTERNAL MEDICINE

## 2022-06-08 PROCEDURE — 99232 SBSQ HOSP IP/OBS MODERATE 35: CPT | Mod: 25 | Performed by: INTERNAL MEDICINE

## 2022-06-08 PROCEDURE — 210N000002 HC R&B HEART CARE

## 2022-06-08 PROCEDURE — 83036 HEMOGLOBIN GLYCOSYLATED A1C: CPT | Performed by: HOSPITALIST

## 2022-06-08 PROCEDURE — 70544 MR ANGIOGRAPHY HEAD W/O DYE: CPT

## 2022-06-08 PROCEDURE — 70553 MRI BRAIN STEM W/O & W/DYE: CPT

## 2022-06-08 PROCEDURE — 85027 COMPLETE CBC AUTOMATED: CPT | Performed by: HOSPITALIST

## 2022-06-08 PROCEDURE — 93306 TTE W/DOPPLER COMPLETE: CPT | Mod: 26 | Performed by: INTERNAL MEDICINE

## 2022-06-08 PROCEDURE — 93005 ELECTROCARDIOGRAM TRACING: CPT

## 2022-06-08 PROCEDURE — 250N000013 HC RX MED GY IP 250 OP 250 PS 637: Performed by: STUDENT IN AN ORGANIZED HEALTH CARE EDUCATION/TRAINING PROGRAM

## 2022-06-08 PROCEDURE — 99233 SBSQ HOSP IP/OBS HIGH 50: CPT | Performed by: HOSPITALIST

## 2022-06-08 PROCEDURE — 250N000013 HC RX MED GY IP 250 OP 250 PS 637: Performed by: HOSPITALIST

## 2022-06-08 PROCEDURE — 999N000226 HC STATISTIC SLP IP EVAL DEFER

## 2022-06-08 PROCEDURE — 250N000011 HC RX IP 250 OP 636: Performed by: INTERNAL MEDICINE

## 2022-06-08 PROCEDURE — 93010 ELECTROCARDIOGRAM REPORT: CPT | Performed by: INTERNAL MEDICINE

## 2022-06-08 PROCEDURE — 36415 COLL VENOUS BLD VENIPUNCTURE: CPT | Performed by: HOSPITALIST

## 2022-06-08 PROCEDURE — 80053 COMPREHEN METABOLIC PANEL: CPT | Performed by: HOSPITALIST

## 2022-06-08 PROCEDURE — 99232 SBSQ HOSP IP/OBS MODERATE 35: CPT | Mod: GC

## 2022-06-08 PROCEDURE — 70549 MR ANGIOGRAPH NECK W/O&W/DYE: CPT

## 2022-06-08 PROCEDURE — A9585 GADOBUTROL INJECTION: HCPCS | Performed by: INTERNAL MEDICINE

## 2022-06-08 PROCEDURE — 93306 TTE W/DOPPLER COMPLETE: CPT

## 2022-06-08 PROCEDURE — 83605 ASSAY OF LACTIC ACID: CPT | Performed by: INTERNAL MEDICINE

## 2022-06-08 PROCEDURE — 80061 LIPID PANEL: CPT | Performed by: HOSPITALIST

## 2022-06-08 PROCEDURE — 99222 1ST HOSP IP/OBS MODERATE 55: CPT | Mod: FS | Performed by: STUDENT IN AN ORGANIZED HEALTH CARE EDUCATION/TRAINING PROGRAM

## 2022-06-08 PROCEDURE — 250N000012 HC RX MED GY IP 250 OP 636 PS 637: Performed by: HOSPITALIST

## 2022-06-08 PROCEDURE — 83735 ASSAY OF MAGNESIUM: CPT | Performed by: STUDENT IN AN ORGANIZED HEALTH CARE EDUCATION/TRAINING PROGRAM

## 2022-06-08 PROCEDURE — 84100 ASSAY OF PHOSPHORUS: CPT | Performed by: STUDENT IN AN ORGANIZED HEALTH CARE EDUCATION/TRAINING PROGRAM

## 2022-06-08 PROCEDURE — 250N000011 HC RX IP 250 OP 636: Performed by: STUDENT IN AN ORGANIZED HEALTH CARE EDUCATION/TRAINING PROGRAM

## 2022-06-08 PROCEDURE — 250N000011 HC RX IP 250 OP 636: Performed by: HOSPITALIST

## 2022-06-08 PROCEDURE — 250N000011 HC RX IP 250 OP 636

## 2022-06-08 RX ORDER — LORAZEPAM 2 MG/ML
2 INJECTION INTRAMUSCULAR ONCE
Status: COMPLETED | OUTPATIENT
Start: 2022-06-08 | End: 2022-06-08

## 2022-06-08 RX ORDER — ROSUVASTATIN CALCIUM 10 MG/1
10 TABLET, COATED ORAL EVERY EVENING
Status: DISCONTINUED | OUTPATIENT
Start: 2022-06-08 | End: 2022-06-10 | Stop reason: HOSPADM

## 2022-06-08 RX ORDER — HYDRALAZINE HYDROCHLORIDE 20 MG/ML
10-20 INJECTION INTRAMUSCULAR; INTRAVENOUS
Status: DISCONTINUED | OUTPATIENT
Start: 2022-06-08 | End: 2022-06-10 | Stop reason: HOSPADM

## 2022-06-08 RX ORDER — LIDOCAINE 40 MG/G
CREAM TOPICAL
Status: DISCONTINUED | OUTPATIENT
Start: 2022-06-08 | End: 2022-06-08

## 2022-06-08 RX ORDER — GADOBUTROL 604.72 MG/ML
10 INJECTION INTRAVENOUS ONCE
Status: COMPLETED | OUTPATIENT
Start: 2022-06-08 | End: 2022-06-08

## 2022-06-08 RX ADMIN — ENOXAPARIN SODIUM 40 MG: 40 INJECTION SUBCUTANEOUS at 12:08

## 2022-06-08 RX ADMIN — VERAPAMIL HYDROCHLORIDE 180 MG: 180 TABLET, FILM COATED, EXTENDED RELEASE ORAL at 10:49

## 2022-06-08 RX ADMIN — HYDRALAZINE HYDROCHLORIDE 10 MG: 20 INJECTION INTRAMUSCULAR; INTRAVENOUS at 03:08

## 2022-06-08 RX ADMIN — ROSUVASTATIN CALCIUM 10 MG: 10 TABLET, FILM COATED ORAL at 20:31

## 2022-06-08 RX ADMIN — POTASSIUM CHLORIDE 10 MEQ: 750 TABLET, EXTENDED RELEASE ORAL at 20:31

## 2022-06-08 RX ADMIN — LORAZEPAM 2 MG: 2 INJECTION INTRAMUSCULAR; INTRAVENOUS at 08:42

## 2022-06-08 RX ADMIN — ASPIRIN 81 MG CHEWABLE TABLET 81 MG: 81 TABLET CHEWABLE at 10:48

## 2022-06-08 RX ADMIN — INSULIN ASPART 1 UNITS: 100 INJECTION, SOLUTION INTRAVENOUS; SUBCUTANEOUS at 13:44

## 2022-06-08 RX ADMIN — TICAGRELOR 90 MG: 90 TABLET ORAL at 20:31

## 2022-06-08 RX ADMIN — TOPIRAMATE 75 MG: 50 TABLET, FILM COATED ORAL at 22:15

## 2022-06-08 RX ADMIN — POTASSIUM CHLORIDE 10 MEQ: 750 TABLET, EXTENDED RELEASE ORAL at 10:49

## 2022-06-08 RX ADMIN — HYDRALAZINE HYDROCHLORIDE 20 MG: 20 INJECTION INTRAMUSCULAR; INTRAVENOUS at 06:16

## 2022-06-08 RX ADMIN — GADOBUTROL 10 ML: 604.72 INJECTION INTRAVENOUS at 09:11

## 2022-06-08 RX ADMIN — INSULIN DEGLUDEC INJECTION 30 UNITS: 100 INJECTION, SOLUTION SUBCUTANEOUS at 12:10

## 2022-06-08 RX ADMIN — TICAGRELOR 90 MG: 90 TABLET ORAL at 10:49

## 2022-06-08 RX ADMIN — LEVOTHYROXINE SODIUM 50 MCG: 50 TABLET ORAL at 06:17

## 2022-06-08 RX ADMIN — INSULIN ASPART 2 UNITS: 100 INJECTION, SOLUTION INTRAVENOUS; SUBCUTANEOUS at 17:18

## 2022-06-08 RX ADMIN — CYANOCOBALAMIN TAB 1000 MCG 1000 MCG: 1000 TAB at 20:31

## 2022-06-08 RX ADMIN — HYDRALAZINE HYDROCHLORIDE 10 MG: 20 INJECTION INTRAMUSCULAR; INTRAVENOUS at 02:06

## 2022-06-08 ASSESSMENT — ACTIVITIES OF DAILY LIVING (ADL)
ADLS_ACUITY_SCORE: 22
ADLS_ACUITY_SCORE: 23
ADLS_ACUITY_SCORE: 22
ADLS_ACUITY_SCORE: 22
ADLS_ACUITY_SCORE: 23
ADLS_ACUITY_SCORE: 22

## 2022-06-08 NOTE — PROGRESS NOTES
5179- RRT paged for neuro changes. RUE ataxia and pronator drift, R hemiparesis and dizziness.   Terra Oneill RN

## 2022-06-08 NOTE — PLAN OF CARE
Pt stable over night post TAVR.  No c/o pain or SOB.  Bilateral groin sites c/d/I, CMS WNL's.  Neuro's are intact except her c/o coordination problems with her fingers on her right hand and the poor coordination when performing finger to nose exam.  She is up to the BR with SBA and a cane, she is alert and oriented and able to make her needs known.  She has been hypertensive with BP's in the 150's over night and received multiple doses of Hydralazine(Dr Shetty made aware)with very little change.  Tele:SR, no BBB noted.  She is NPO this morning.

## 2022-06-08 NOTE — PROGRESS NOTES
Pt is currently out of room due to possible CVA.   Will follow up with consult for EP on 06/09/22.  Per ECG and telemetry pt was in sinus rhythm in the 80's with no LBBB did have a 5 beat run of NSVT.

## 2022-06-08 NOTE — PROGRESS NOTES
[facility]  Cardiology Progress Note    Date of Service (when I saw the patient): 06/08/2022       Interval History:   Continues to have mild right sided weakness. No other issues or concerns.     ----------------------------------------------------------------------------------------    Assessment:  Ade Wilkins is a 71 year old female who was admitted on 6/7/2022 for elective TAVR.    # S/p TAVR  - Received 26 mm evolute valve with no complications  - [place] device could not be used  - Postprocedure echocardiogram showed well-seated valve with mean gradient of 5 mmHg with only trivial AI; no evidence of pericardial effusion  - Per notes and nurse no groin issues  - CBC shows anemia of acute blood loss; BMP shows stable renal function    # Probable CVA  - First noted ataxia overnight (12:30 AM)-hospitalist was paged and MRI was ordered but per notes patient declined this due to claustrophobia  - RRT was called this morning for same symptoms as well as right-sided weakness and CODE stroke was called  - MRA normal; MRI shows multiple ischemic infarcts in left frontal and left cerebellar hemisphere suggesting embolic infarcts  - Of note, pta regimen includes Brilinta and aspirin; no sentinel device was used during the procedure    # Conduction abnormality post TAVR (resolved)  - Had transient complete heart block (for 7 minutes) during procedure; developed left bundle branch block postprocedure  - EP team was consulted for further evaluation  - Today's (6/8) ECG shows NSR with narrow QRS complex    # Acute on chronic anemia  - PTA regimen includes B12 supplement  - CBC shows slightly lower hemoglobin than her baseline likely related to recent procedure    # CAD  - Stent placement to RCA in 4/2022  - On DAPT    # HFpEF  - PTA regimen includes furosemide 40 mg    # HLD  - On high intensity statin therapy    # HTN  - This morning  controlled    ----------------------------------------------------------------------------------------    Plan:  -- Final recommendations from neuro    -- Pt can eat- no indication for pacemaker  --Continue with DAPT   --Resume spironolactone and furosemide when appropriate (held by hospitalist team)  - 30-day Mobile cardiac telemetry on discharge  - Outpatient cardiac rehab referral  - Discussed SBE prophylaxis  - Reviewed timeline for follow-up  -- Likely discharge to home tomorrow (6/9)    ----------------------------------------------------------------------------------------  Physical Exam   Temp: 98.2  F (36.8  C) Temp src: Oral BP: 139/57 Pulse: 91   Resp: 16 SpO2: 97 % O2 Device: None (Room air) Oxygen Delivery: 2 LPM  Vitals:    06/07/22 0835 06/08/22 0641   Weight: 90.2 kg (198 lb 12.8 oz) 89.3 kg (196 lb 12.8 oz)     GEN:  NAD. Oxygen on nasal cannula.  HEENT: Mucous membranes moist.  NECK:  No JVD.  C/V:  Regular rate and rhythm; there is 2/6 LYNDON at RUSB.  RESP: CTA, nick.  GI: Abdomen soft, nontender, nondistended.    EXTREM: 1+ pitting LE edema.   PSYCH: Normal affect.  SKIN: Warm and dry.   VASC: 2+ radial and dorsalis pedis pulses bilaterally.      Medications       aspirin  81 mg Oral Daily     cyanocobalamin  1,000 mcg Oral QPM     enoxaparin ANTICOAGULANT  40 mg Subcutaneous Q24H     ezetimibe  10 mg Oral QPM     furosemide  40 mg Oral Daily     insulin aspart  1-7 Units Subcutaneous TID AC     insulin aspart  1-5 Units Subcutaneous At Bedtime     insulin aspart   Subcutaneous TID AC     insulin degludec  30 Units Subcutaneous Daily     irbesartan  75 mg Oral QPM     levothyroxine  50 mcg Oral Daily     potassium chloride ER  10 mEq Oral BID     rosuvastatin  20 mg Oral QPM     sodium chloride (PF)  3 mL Intracatheter Q8H     spironolactone  12.5 mg Oral Daily     ticagrelor  90 mg Oral BID     topiramate  75 mg Oral At Bedtime     verapamil ER  180 mg Oral Daily       Data   Reviewed    Telemetry:  NSR with narrow QRS complex; 1 episode of NSVT noted    Latasha Calderon PA-C   6/8/2022  Pager: [phone]

## 2022-06-08 NOTE — CODE/RAPID RESPONSE
[facility]    RRT Note  6/8/2022   Time Called:  7:45 AM    Code Status: Full Code    I was called to evaluate Ade Wilkins, who is a 71 year old female who was admitted on 6/7/2022 for TAVR. PMH includes severe aortic stenosis, HFpEF, CAD with PCI to ostial RCA, essential hypertension, hypothyroidism, migraines, insulin dependent DM2, and CKD  (baseline Cr 1.3-1.4).    Assessment & Plan      Ataxia, Dizziness, and Right Sided Weakness 2/2 suspected Acute Ischemic Stroke  Was called to evaluate patient for right-sided ataxia, dizziness, right-sided weakness.  RN reports that hospitalist was paged 12:30 AM for new right upper extremity ataxia. There was not an RRT or Code stroke initiated. An MRI was ordered, however patient refused due to claustrophobia. This morning RN reports patient felt dizzy when getting up to go to bathroom. She also noticed right sided weakness on neuro exam, prompting her to call RRT.    On exam patient is laying in bed, nontoxic-appearing, not in acute distress.  She is alert and oriented x3.  Neuro exam is remarkable for right upper extremity ataxia and right upper extremity pronator drift.  She has a total NIHSS of 2.  Lungs are clear to auscultation.  Systolic murmur appreciated, no rubs or gallops.  No peripheral edema.  Abdomen is soft, nontender.  Patient denies feeling lightheaded, chest pain, palpitations, shortness of breath. She does endorse a mild headache.       [employer] Stroke Scale  Exam Interval: Baseline   Score    Level of consciousness: (0)   Alert, keenly responsive    LOC questions: (0)   Answers both questions correctly    LOC commands: (0)   Performs both tasks correctly    Best gaze: (0)   Normal    Visual: (0)   No visual loss    Facial palsy: (0)   Normal symmetrical movements    Motor arm (left): (0)   No drift    Motor arm (right): (1)   Drift    Motor leg (left): (0)   No drift    Motor leg (right): (0)   No drift     Limb ataxia: (1)   Present in one limb    Sensory: (0)   Normal- no sensory loss    Best language: (0)   Normal- no aphasia    Dysarthria: (0)   Normal    Extinction and inattention: (0)   No abnormality        Total Score:  2     INTERVENTIONS:  -   - STAT MRI w/o and w/contrast, MRA brain w/contrast, MRA neck w and w/o contrast  - [facility] consult; will see patient after MRI complete  - 2 mg IV Ativan for claustrophobia and MRI    Spoke with Dr. Cota from [facility] regarding patients symtpoms and imaging recommendations. Given patient has CKD, and her creatinine is above her baseline, Dr. Cota recommends STAT MRI with sedation for claustrophobia.    Addendum:  MRI results back which are remarkable recent ischemic infarcts in left frontal lobe and left cerebellar hemisphere suggesting embolic infarcts from central embolic source.     Discussed with and defer further cares to Hospitalist Dr. Alyse Horan.     LEONEL Granado [facility]  Hospitalist Service - [facility]  Pager: [phone] (7a - 6p)      Physical Exam   Vital Signs with Ranges:  Temp:  [96.8  F (36  C)-98.2  F (36.8  C)] 98.2  F (36.8  C)  Pulse:  [66-95] 92  Resp:  [8-30] 14  BP: (101-163)/(41-76) 158/54  SpO2:  [91 %-99 %] 97 %  I/O last 3 completed shifts:  In: 800 [I.V.:800]  Out: -           Physical Exam   General:  Appears stated age, no acute distress. A&O x 3.  Skin:  Warm, dry. No rashes or lesions on exposed skin. Bilateral groin sites with small oozing, soft, no hematoma.  HEENT:  Normocephalic, atraumatic; EOMs grossly intact.  Neck:  Supple. Trachea midline.  Chest:  Breath sounds CTA and no increased work of breathing on room air.  Cardiovascular:  RRR, systolic murmur. No rub or gallop. No peripheral edema.  Abdomen:  Soft, non-tender, non-distended.  Musculoskeletal:  Right upper extremity weakness, and pronator drift.  Neurological:  CN 2-12 grossly intact.  Psychiatric:  Affect and mood congruent.    Data       IMAGING: (X-ray/CT/MRI)   Recent Results (from the past 24 hour(s))   Cardiac Catheterization    Narrative      Successful transfemoral transcatheter aortic valve replacement with a   26mm Medtronic Evolut PRO+ valve.    Acute on chronic diastolic heart failure. LVEDP 18 mmHg.    Post-procedure conduction delay (1st deg AV block  msec, LBBB QRS   150 msec)      Echocardiogram Complete   Result Value    LVEF  >70%    [facility]    [id number]  [id number]  [id number]  [id number]^AYDIN^CALEB     [facility]  Echocardiography Laboratory  [address] [place] [id number]     Name: RENE MCCARTNEY  MRN: [id number]  : 1950  Study Date: 2022 09:12 AM  Age: 71 yrs  Gender: Female  Patient Location: [facility]  Reason For Study: 26 mm Medtronic TAVR  Ordering Physician: AYDIN BELL  Referring Physician: Judith Aviles  Performed By: Lee Thrasher RDCS     BSA: 1.9 m2  Height: 62 in  Weight: 196 lb  HR: 76  BP: 145/70 mmHg  ______________________________________________________________________________  Procedure  Complete Portable Echo Adult.  ______________________________________________________________________________  Interpretation Summary     PROCEDURE  Intra-procedural transthoracic echocardiogram for transfemoral transcatheter  aortic valve replacement with a 26-mm Medtronic Evolut PRO+ valve.  Image acquisition by sonographer.     PRE-PROCEDURAL FINDINGS:  1. Severe calcific aortic valve stenosis with trace aortic regurgitation.  2. Moderate concentric left ventricular hypertrophy.  3. Hyperdynamic left ventricular function. The visual ejection fraction is  >70%. An intracavitary gradient is present  4. Trace mitral valve regurgitation with mild stenosis. Mean diastolic  gradient 5 mmHg (heart rate 76 bpm).     POST-PROCEDURAL SURVEY:  1. The valve was successfully deployed.  2. Valve is well seated. Trivial lilly-prosthetic regurgitation. Mean systolic  gradient 5  mmHg.  3. No pericardial effusion.     ______________________________________________________________________________  Left Ventricle  The left ventricle is normal in size. There is moderate concentric left  ventricular hypertrophy. Hyperdynamic left ventricular function. The visual  ejection fraction is >70%. An intracavitary gradient is present. Left  ventricular diastolic function is indeterminate. No regional wall motion  abnormalities noted.     Right Ventricle  The right ventricle is normal in size and function.     Atria  The left atrium is moderately dilated. Right atrial size is normal.     Mitral Valve  The mitral valve leaflets are moderately thickened. There is moderate mitral  annular calcification. There is trace mitral regurgitation. There is mild  mitral stenosis.     Tricuspid Valve  The tricuspid valve is not well visualized. There is trace tricuspid  regurgitation.     Aortic Valve  The aortic valve is trileaflet. There is trace aortic regurgitation. Severe  valvular aortic stenosis.     Pulmonic Valve  The pulmonic valve is not well seen, but is grossly normal. There is trace  pulmonic valvular regurgitation.     Vessels  The aortic root is normal size. Normal size ascending aorta. The inferior vena  cava was normal in size with preserved respiratory variability.     Pericardium  There is no pericardial effusion.     Rhythm  Sinus rhythm was noted.  ______________________________________________________________________________  MMode/2D Measurements & Calculations  IVSd: 1.3 cm     LVIDd: 3.7 cm  LVIDs: 2.3 cm  LVPWd: 1.3 cm  FS: 37.5 %  LV mass(C)d: 165.6 grams  LV mass(C)dI: 87.4 grams/m2  Ao root diam: 2.9 cm  asc Aorta Diam: 3.2 cm  RWT: 0.70     Doppler Measurements & Calculations  Ao V2 max: 177.9 cm/sec  Ao max P.0 mmHg  Ao V2 mean: 107.4 cm/sec  Ao mean P.6 mmHg  Ao V2 VTI: 35.7 cm  LV V1 max P.9 mmHg  LV V1 max: 148.8 cm/sec  LV V1 VTI: 31.3 cm  AV Alexandro Ratio (DI): 0.84      ______________________________________________________________________________  Report approved by: Dr Chey Cobb 06/07/2022 02:16 PM             CBC with Diff:  Recent Labs   Lab Test 06/08/22  0454 06/07/22  0811 06/01/22  1223   WBC 9.5   < > 12.2*   HGB 10.4*   < > 11.7   MCV 89   < > 86      < > 307   INR  --   --  1.09    < > = values in this interval not displayed.      No results found for: RETICABSCT  No results found for: RETP    Comprehensive Metabolic Panel:  Recent Labs   Lab 06/08/22  0454      POTASSIUM 4.2   CHLORIDE 112*   CO2 22   ANIONGAP 5   *   BUN 35*   CR 1.55*   GFRESTIMATED 35*   RASHEEDA 9.2   MAG 2.5*   PHOS 3.2   PROTTOTAL 6.2*   ALBUMIN 3.3*   BILITOTAL 0.4   ALKPHOS 89   AST 42   ALT 63*         Time Spent on this Encounter   I spent 30 minutes on the unit/floor managing the care of Ade STONE Claudette. Over 50% of my time was spent counseling the patient and/or coordinating care regarding services listed in this note.

## 2022-06-08 NOTE — CONSULTS
"      [facility]    Stroke Consult Note    Reason for Consult:  stroke    Chief Complaint: No chief complaint on file.       HPI  Ade Wilkins is a 71 year old female with PMH of DM2, hypothyroidism and severe aortic stenosis. She was admitted to [facility] 6/7/2022 for aortic valve replacement via TAVR. Intra procedurally she had temporary heart block which resolved prior to the case being over. Around 0030 6/8 she developed ataxia and right hand dis coordination; MRI was advised but decline by patient due to claustrophobia. RRT was called at 0745 for ongoing ataxia, dizziness and right sided weakness; stat MRI revealed left frontal and left cerebellar infarcts.     Today, Rachael reports she is not feeling very well. She is experiencing vertigo (\"room spinning\") when she sits up and attempts to walk. She has had significant difficulty with mobility today stating \"today is not a good day for that.\" She also notes ongoing incoordination of the right hand and possibly the right leg (she notes chronic RLE radiculopathy so she has had some trouble on this side for a long time).     Stroke Evaluation Summarized    MRI/Head CT MRI: acute infarct of left frontal lobe and left cerebellar hemisphere    Intracranial Vasculature MRA: No LVO or significant stenosis   Cervical Vasculature MRA: No LVO, significant stenosis or dissection      Echocardiogram LVEF >70%, mild concentric LV hypertrophy, hyperdynamic LV function, left atrium is mildly dilated    EKG/Telemetry Sinus rhythm    Other Testing Not Applicable     LDL  6/8/2022: 17 mg/dL   A1C  6/8/2022: 8.0 %   Troponin No lab value available in past 48 hrs     Impression  Acute infarct of left frontal and cerebellar regions, workup pending but infarcts appear embolic (ESUS), etiology related to intravascular instrumentation vs less likely paroxysmal atrial fibrillation.      Recommendations  -continue ASA + Brillinta; defer longterm DAPT management " "to cardiology- from stroke perspective DAPT indicated for the first 21 days   -permissive HTN x24 hours with long term BP goal 130/80 with tighter control associated with improved cardiovascular outcomes   -Blood glucose monitoring, Hgb A1c 8.0%, (goal <7% for secondary stroke prevention), follow-up with PCP  -LDL 17, continue PTA Crestor. Consider ongoing titration, LDL <40 is associated with increased risk of intracranial hemorrhage   -therapies  -stroke education  -30 day cardiac event monitor upon discharge to assess for paroxysmal atrial fibrillation given enlarged left atrium (ordered)     Patient Follow-up    - in the next 1-2 week(s) with PCP  - in 6-8 weeks with [email or web address] of Neurology or other local neurologist of patient's choice    Thank you for this consult. No further stroke evaluation is recommended, so we will sign off. Please contact us with any additional questions.    Sena CASTANEDA, CNP  Vascular Neurology  To page me or covering stroke neurology team member, click here: [facility]   Choose \"On Call\" tab at top, then search dropdown box for \"Neurology Adult\", select location, press Enter, then look for stroke/neuro ICU/telestroke.    _____________________________________________________    Clinically Significant Risk Factors Present on Admission                 Past Medical History   Past Medical History:   Diagnosis Date     Benign essential hypertension      Chronic kidney disease, stage 3b (H)      Diabetic retinopathy of both eyes (H)      Obesity (BMI 30-39.9)      Osteopenia      S/P TAVR (transcatheter aortic valve replacement) 06/07/2022     Type 2 diabetes mellitus with diabetic nephropathy (H)      Past Surgical History   Past Surgical History:   Procedure Laterality Date     APPENDECTOMY       CATARACT IOL, RT/LT Bilateral      CV CORONARY ANGIOGRAM N/A 5/4/2022    Procedure: Coronary Angiogram;  Surgeon: Hector Lewis MD;  Location:  HEART CARDIAC CATH LAB     CV " LEFT HEART CATH N/A 5/4/2022    Procedure: Left Heart Catheterization;  Surgeon: Hector Lewis MD;  Location:  HEART CARDIAC CATH LAB     CV PCI N/A 5/4/2022    Procedure: Percutaneous Coronary Intervention;  Surgeon: Hector Lewis MD;  Location: [facility] CARDIAC CATH LAB     CV TRANSCATHETER AORTIC VALVE REPLACEMENT-FEMORAL APPROACH N/A 6/7/2022    Procedure: Transcatheter Aortic Valve Replacement-Femoral Approach;  Surgeon: Hector Lewis MD;  Location:  HEART CARDIAC CATH LAB     GASTRIC BYPASS  2004     TONSILLECTOMY & ADENOIDECTOMY       Medications   Home Meds  Prior to Admission medications    Medication Sig Start Date End Date Taking? Authorizing Provider   aspirin (ASA) 81 MG chewable tablet Take 1 tablet (81 mg) by mouth daily Starting tomorrow. 5/5/22  Yes Cammy Worrell MD   augmented betamethasone dipropionate (DIPROLENE-AF) 0.05 % external ointment Apply topically to affected area twice daily for 3-4 weeks then use as needed 6/6/22  Yes Judith Aviles MD   calcium carbonate 600 mg-vitamin D 400 units (CALTRATE) 600-400 MG-UNIT per tablet Take 1 tablet by mouth in the morning and 1 tablet in the evening.   Yes Reported, Patient   cetirizine (ZYRTEC) 10 MG tablet Take 1 tablet (10 mg) by mouth daily 12/6/13  Yes Dimitry Howard MD   cyanocolbalamin (VITAMIN B-12) 1000 MCG tablet Take 1,000 mcg by mouth in the morning. 5/18/11  Yes Dimitry Howard MD   Dulaglutide 3 MG/0.5ML SOPN Inject 3 mg Subcutaneous once a week 11/22/21  Yes Roxanne Masterson PA-C   empagliflozin (JARDIANCE) 10 MG TABS tablet Take 1 tablet (10 mg) by mouth daily 3/3/22  Yes Olga Lidia Hoover MD   ezetimibe (ZETIA) 10 MG tablet Take 1 tablet (10 mg) by mouth daily 6/6/22  Yes Roxanne Masterson PA-C   fluticasone (FLONASE) 50 MCG/ACT nasal spray SHAKE LIQUID AND USE 2 SPRAYS IN EACH NOSTRIL DAILY 10/19/21  Yes Judith Aviles MD   furosemide (LASIX) 40 MG tablet TAKE ONE TABLET BY MOUTH ONE TIME  DAILY 3/11/22  Yes Judith Aviles MD   HUMALOG KWIKPEN 100 UNIT/ML soln INJECT  Per sliding scale UP TO 60 UNITS UNDER THE SKIN DAILY. 12/13/21  Yes Roxanne Masterson PA-C   insulin degludec (TRESIBA) 200 UNIT/ML pen Inject 44 Units Subcutaneous daily 5/12/22  Yes Judith Aviles MD   irbesartan (AVAPRO) 150 MG tablet Take 1 tablet (150 mg) by mouth daily  Patient taking differently: Take 75 mg by mouth daily (150MG X 0.5 = 75MG) 4/4/22  Yes Judith Aviles MD   levothyroxine (SYNTHROID/LEVOTHROID) 50 MCG tablet Take 1 tablet (50 mcg) by mouth daily 2/22/22  Yes Olga Lidia Hoover MD   metFORMIN (GLUCOPHAGE) 1000 MG tablet Take 1,000 mg by mouth daily 5/12/22  Yes Judith Aviles MD   potassium chloride ER (KLOR-CON M) 10 MEQ CR tablet Take 1 tablet (10 mEq) by mouth 2 times daily 2/22/22  Yes Olga Lidia Hoover MD   rosuvastatin (CRESTOR) 20 MG tablet Take 1 tablet (20 mg) by mouth daily 4/14/22  Yes Judith Aviles MD   spironolactone (ALDACTONE) 25 MG tablet Take 0.5 tablets (12.5 mg) by mouth in the morning. 4/11/22  Yes Judith Aviles MD   ticagrelor (BRILINTA) 90 MG tablet Take 1 tablet (90 mg) by mouth 2 times daily Start this evening. 5/4/22  Yes Cammy Worrell MD   topiramate (TOPAMAX) 25 MG tablet Take 3 tablets (75 mg) by mouth At Bedtime 4/14/22  Yes Olga Lidia Hoover MD   traZODone (DESYREL) 50 MG tablet take 1 to 2 tablets (50 to 100mg) by mouth nightly as needed 10/25/19  Yes Dimitry Howard MD   triamcinolone (KENALOG) 0.1 % external ointment Apply topically 2 times daily To left foot rash  Patient taking differently: Apply topically once a week To left foot rash 3/24/21  Yes Dimitry Howard MD   verapamil ER (CALAN-SR) 180 MG CR tablet Take 1 tablet (180 mg) by mouth in the morning. 4/11/22  Yes Judith Aviles MD   Vitamin D3 (CHOLECALCIFEROL) 25 mcg (1000 units) tablet Take 1 tablet by mouth 2 times daily   Yes Reported, Patient   VITRON-C  MG TABS tablet TAKE TWO  TABLETS BY MOUTH TWICE DAILY  12/8/17  Yes Dimitry Howard MD   Continuous Blood Gluc  (FREESTYLE BALWINDER 14 DAY READER) HENNA 1 each 3 times daily 11/22/21   Roxanne Masterson PA-C   Continuous Blood Gluc Sensor (FREESTYLE BALWINDER 14 DAY SENSOR) MISC 1 each every 14 days 11/22/21   Roxanne Masterson PA-C   insulin pen needle (BD FCO U/F) 32G X 4 MM miscellaneous Use 4 pen needles daily or as directed. 7/28/20   Dimitry Howard MD       Scheduled Meds    aspirin  81 mg Oral Daily     cyanocobalamin  1,000 mcg Oral QPM     enoxaparin ANTICOAGULANT  40 mg Subcutaneous Q24H     ezetimibe  10 mg Oral QPM     [Held by provider] furosemide  40 mg Oral Daily     insulin aspart  1-7 Units Subcutaneous TID AC     insulin aspart  1-5 Units Subcutaneous At Bedtime     insulin aspart   Subcutaneous TID AC     insulin degludec  30 Units Subcutaneous Daily     [Held by provider] irbesartan  75 mg Oral QPM     levothyroxine  50 mcg Oral Daily     potassium chloride ER  10 mEq Oral BID     rosuvastatin  20 mg Oral QPM     sodium chloride (PF)  3 mL Intracatheter Q8H     sodium chloride (PF)  3 mL Intracatheter Q8H     [Held by provider] spironolactone  12.5 mg Oral Daily     ticagrelor  90 mg Oral BID     topiramate  75 mg Oral At Bedtime     verapamil ER  180 mg Oral Daily       Infusion Meds    - MEDICATION INSTRUCTIONS -       - MEDICATION INSTRUCTIONS -         PRN Meds  acetaminophen **OR** acetaminophen, glucose **OR** dextrose **OR** glucagon, HOLD MEDICATION, hydrALAZINE, lidocaine 4%, lidocaine (buffered or not buffered), - MEDICATION INSTRUCTIONS -, - MEDICATION INSTRUCTIONS -, melatonin, nitroGLYcerin, ondansetron **OR** ondansetron, prochlorperazine **OR** prochlorperazine **OR** prochlorperazine, sodium chloride (PF), sodium chloride (PF), traZODone    Allergies   Allergies   Allergen Reactions     Norvasc [Amlodipine] Other (See Comments)     hairloss     Chlorhexidine Rash     Clonidine Headache     Doxazosin  Mesylate Rash     Fexofenadine Hydrochloride Headache     Gemfibrozil Rash     Lisinopril Angioedema     Pioglitazone Hydrochloride Swelling     ankle edema     Family History   Family History   Problem Relation Age of Onset     Diabetes Type 2  Mother      Glaucoma Mother      Coronary Artery Disease Mother      Abdominal Aortic Aneurysm Father      Myocardial Infarction Father      Breast Cancer Sister      Abdominal Aortic Aneurysm Brother      Bladder Cancer Brother      Diabetes Type 2  Brother      Liver Cancer Brother      Myocardial Infarction Brother      Alzheimer Disease Paternal Grandmother      Cerebrovascular Disease Paternal Grandfather      Ovarian Cancer No family hx of      Colon Cancer No family hx of      Social History   Social History     Tobacco Use     Smoking status: Never Smoker     Smokeless tobacco: Never Used   Substance Use Topics     Alcohol use: No     Drug use: No       Review of Systems   The 10 point Review of Systems is negative other than noted in the HPI or here.        PHYSICAL EXAMINATION   Temp:  [97.9  F (36.6  C)-98.2  F (36.8  C)] 98.2  F (36.8  C)  Pulse:  [66-95] 86  Resp:  [8-30] 27  BP: (130-163)/(44-76) 137/53  SpO2:  [94 %-99 %] 96 %    General Exam  General:  patient lying in bed without any acute distress    HEENT:  normocephalic/atraumatic  Pulmonary:  no respiratory distress    Neuro Exam  Mental Status:  alert, oriented x 3, follows commands, naming and repetition normal, minimal loss of fluency with intermittent word finding delays   Cranial Nerves:  visual fields intact, PERRL, EOMI with normal smooth pursuit, facial sensation intact and symmetric, facial movements symmetric, hearing not formally tested but intact to conversation, palate elevation symmetric and uvula midline, no dysarthria, shoulder shrug strong bilaterally, tongue protrusion midline  Motor:  normal muscle tone and bulk, no abnormal movements, able to move all limbs spontaneously, mild  weakness of right  and tricep, otherwise strength normal throughout   Sensory:  light touch sensation intact and symmetric throughout upper and lower extremities, no extinction on double simultaneous stimulation   Coordination: significant difficulty with FNF and mild to moderate difficulty with heel shin on right; coordination normal on left  Station/Gait: attempted, but patient noted significant vertigo sitting at edge of the bed and standing and declined further assessment     Dysphagia Screen  Per Nursing    Stroke Scales    NIHSS  1a. Level of Consciousness 0-->Alert, keenly responsive   1b. LOC Questions 0-->Answers both questions correctly   1c. LOC Commands 0-->Performs both tasks correctly   2.   Best Gaze 0-->Normal   3.   Visual 0-->No visual loss   4.   Facial Palsy 0-->Normal symmetrical movements   5a. Motor Arm, Left 0-->No drift, limb holds 90 (or 45) degrees for full 10 secs   5b. Motor Arm, Right 0-->No drift, limb holds 90 (or 45) degrees for full 10 secs   6a. Motor Leg, Left 0-->No drift, leg holds 30 degree position for full 5 secs   6b. Motor Leg, right 0-->No drift, leg holds 30 degree position for full 5 secs   7.   Limb Ataxia 2-->Present in two limbs   8.   Sensory 0-->Normal, no sensory loss   9.   Best Language 1-->Mild-to-moderate aphasia, some obvious loss of fluency or facility of comprehension, without significant limitation on ideas expressed or form of expression. Reduction of speech and/or comprehension, however, makes conversation. . . (see row details) (pt reports is medication side effect)   10. Dysarthria 0-->Normal   11. Extinction and Inattention  0-->No abnormality   Total 3 (06/08/22 1238)       Imaging  I personally reviewed all imaging; relevant findings per HPI.    Labs Data   CBC  Recent Labs   Lab 06/08/22  0454 06/07/22  0811   WBC 9.5 12.2*   RBC 3.66* 4.34   HGB 10.4* 12.0   HCT 32.6* 37.6    346     Basic Metabolic Panel   Recent Labs   Lab  06/08/22  1427 06/08/22  1206 06/08/22  0454 06/07/22  1440 06/07/22  0811   NA  --   --  139  --   --    POTASSIUM  --   --  4.2 3.9 4.1   CHLORIDE  --   --  112*  --   --    CO2  --   --  22  --   --    BUN  --   --  35*  --   --    CR  --   --  1.55* 1.62*  --    * 168* 216*  --  146*   RASHEEDA  --   --  9.2  --   --      Liver Panel  Recent Labs   Lab 06/08/22  0454   PROTTOTAL 6.2*   ALBUMIN 3.3*   BILITOTAL 0.4   ALKPHOS 89   AST 42   ALT 63*     INR    Recent Labs   Lab Test 06/01/22  1223 05/04/22  0828   INR 1.09 0.97      Lipid Profile    Recent Labs   Lab Test 06/08/22  1056 05/04/22  0828 08/27/21  0815 06/17/16  0750 05/22/15  0848 05/12/14  0000 05/03/14  1018   CHOL 67 83 115   < > 175   < > 161   HDL 28* 41* 36*   < > 32*   < > 39*   LDL 17 30 49   < > 76   < > 76   TRIG 109 62 148   < > 334*   < > 231*   CHOLHDLRATIO  --   --   --   --  5.5*  --  4.2    < > = values in this interval not displayed.     A1C    Recent Labs   Lab Test 06/08/22  1056 05/04/22  0828 11/24/21  1508   A1C 8.0* 7.9* 7.6*     Troponin I  No results for input(s): TROPONINIS, TROPONINI, GHTROP in the last 168 hours.     Stroke Consult Data Data   This was a non-emergent, non-telestroke consult.

## 2022-06-08 NOTE — PROGRESS NOTES
[facility]    Hospitalist Progress Note      Assessment & Plan   Ade Wilkins is a 71 year old female who was admitted on 6/7/2022 after elective aortic valve replacement via TAVR.     Severe Aortic Stenosis status post TAVR with 26 mm Medtronic Evolut Pro+ Valve.   Conduction abnormality post procedure  Procedure was complicated by temporary heart block that resolved prior to case being over; temporary pacemaker wire was therefore pulled. In the PACU she developed prolonged MN and LBBB. Post procedure echo showed well seated valve, no pericardial effusion. LVEF >70%, mild concentric LVH, left atrium mildly dilated.  --Cardiology appreciated  --no need for pacemaker at this time  --30 day event monitor on discharge  --Aspirin, Brilinta     Acute infarct of left frontal and cerebellar region, likely embolic  Symptoms started post procedure, had difficulty with using tablet and eating with her dominant right hand. Suspect related to recent procedure and conduction abnormality post procedure. Imaging delayed, but MRI Brain showed acute infarcts in left frontal and left cerebellar regions. MRA head and neck showed no significant stenosis  - continue ASA/brilinta as above  - permissive HTN until 6/9, then will add back meds, with eventual goal <130/80  - continue PTA crestor reduce to 10mg from 20mg, discontinue PTA zetia (cholesterol very well controlled with LDL only 17)  - therapies to evaluate  - plan 30 day event monitor on discharge  - follow up in 6-8 weeks with [facility] or other local neurology team    History of DM2; Insulin Dependent  HgbA1C is 8. PTA Metformin, Jardiance both held  --tresiba 40 units daily  --2 units per 15g carbs, medium resistance sliding scale insulin     Coronary Artery Disease with PCI to Ostial RCA  --Continue Aspirin, Brilinta, reduced dose crestor given very low LDL (increased risk of ICH with LDL<40)     Essential HTN  --PTA irbesartan and spironolactone held  "for permissive HTN  --PTA verapamil continued     HFpEF.  --PTA lasix, spironolactone held for permissive HTN     Chronic Issues  Hypothyroidism. Continue home synthroid  Migraines. Continue home topamax  CKD. Baseline Cr is about 1.3 - 1.4    Clinically Significant Risk Factors Present on Admission              # Diabetes, type II: last A1C 8.0 % (Ref range: 0.0 - 5.6 %)  # Obesity: Estimated body mass index is 36 kg/m  as calculated from the following:    Height as of this encounter: 1.575 m (5' 2\").    Weight as of this encounter: 89.3 kg (196 lb 12.8 oz).        DVT Prophylaxis: Pneumatic Compression Devices  Code Status: Full Code  Expected Discharge: 06/09/2022  Anticipated discharge location:  Awaiting care coordination huddle  Delays:     await therapy evaluation      Alyse Horan DO  Hospitalist Service  [facility]  Securely message with the Vocera Web Console (learn more here)  Text Page (7am - 6pm) via [employer] Paging/Directory      Interval History   Patient seen and examined. She has some right sided weakness and felt dizzy when she sat up earlier. Discussed results of MRI and further work-up. Right leg is a little weaker. Right hand has difficulty with finger to nose. RRT this morning for stroke symptoms. Discussed with ev Apple NP  Spent 35 minutes with >50% time spent evaluating patient, reviewing chart, reviewing overnight events and follow-up imaging, reviewing specialist recommendations.    -Data reviewed today: I reviewed all new labs and imaging results over the last 24 hours. I personally reviewed MRI Brain showed acute infarcts in left frontal and left cerebellar regions. MRA head and neck showed no significant stenosis    Physical Exam   Temp: 98.8  F (37.1  C) Temp src: Oral BP: 132/48 Pulse: 64   Resp: 20 SpO2: 95 % O2 Device: None (Room air) Oxygen Delivery: 2 LPM  Vitals:    06/07/22 0835 06/08/22 0641   Weight: 90.2 kg (198 lb 12.8 oz) 89.3 kg (196 lb " 12.8 oz)     Vital Signs with Ranges  Temp:  [98.1  F (36.7  C)-98.8  F (37.1  C)] 98.8  F (37.1  C)  Pulse:  [64-95] 64  Resp:  [11-30] 20  BP: (130-163)/(44-76) 132/48  SpO2:  [94 %-99 %] 95 %  I/O last 3 completed shifts:  In: -   Out: 575 [Urine:575]    Constitutional: Awake, alert, cooperative, no apparent distress  Respiratory: Clear to auscultation bilaterally, no crackles or wheezing  Cardiovascular: Regular rate and rhythm, normal S1 and S2, and + systolic murmur  GI: Normal bowel sounds, soft, non-distended, non-tender  Skin/Integumen: No rashes, no cyanosis, +1 lower extremity edema  Other:     Medications     - MEDICATION INSTRUCTIONS -       - MEDICATION INSTRUCTIONS -         aspirin  81 mg Oral Daily     cyanocobalamin  1,000 mcg Oral QPM     enoxaparin ANTICOAGULANT  40 mg Subcutaneous Q24H     [Held by provider] furosemide  40 mg Oral Daily     insulin aspart  1-7 Units Subcutaneous TID AC     insulin aspart  1-5 Units Subcutaneous At Bedtime     insulin aspart   Subcutaneous TID AC     [START ON 6/9/2022] insulin degludec  40 Units Subcutaneous Daily     [Held by provider] irbesartan  75 mg Oral QPM     levothyroxine  50 mcg Oral Daily     potassium chloride ER  10 mEq Oral BID     rosuvastatin  10 mg Oral QPM     sodium chloride (PF)  3 mL Intracatheter Q8H     sodium chloride (PF)  3 mL Intracatheter Q8H     [Held by provider] spironolactone  12.5 mg Oral Daily     ticagrelor  90 mg Oral BID     topiramate  75 mg Oral At Bedtime     verapamil ER  180 mg Oral Daily       Data   Recent Labs   Lab 06/08/22  1427 06/08/22  1206 06/08/22  0454 06/07/22  1440 06/07/22  0811   WBC  --   --  9.5  --  12.2*   HGB  --   --  10.4*  --  12.0   MCV  --   --  89  --  87   PLT  --   --  274  --  346   NA  --   --  139  --   --    POTASSIUM  --   --  4.2 3.9 4.1   CHLORIDE  --   --  112*  --   --    CO2  --   --  22  --   --    BUN  --   --  35*  --   --    CR  --   --  1.55* 1.62*  --    ANIONGAP  --   --  5   --   --    RASHEEDA  --   --  9.2  --   --    * 168* 216*  --  146*   ALBUMIN  --   --  3.3*  --   --    PROTTOTAL  --   --  6.2*  --   --    BILITOTAL  --   --  0.4  --   --    ALKPHOS  --   --  89  --   --    ALT  --   --  63*  --   --    AST  --   --  42  --   --        Recent Results (from the past 24 hour(s))   MR Brain w/o & w Contrast    Narrative    MRI OF THE BRAIN WITHOUT AND WITH CONTRAST 6/8/2022 10:14 AM     COMPARISON: None.    HISTORY:  Neuro deficit, acute, stroke suspected.     TECHNIQUE: Axial diffusion-weighted with ADC map, axial T2-weighted  with fat saturation, axial T1-weighted, axial turboFLAIR and coronal  T1-weighted images of the brain were acquired without intravenous  contrast.  Following intravenous administration of gadolinium  (gadobutrol (GADAVIST) injection 10 mL), axial T1-weighted images of  the brain were acquired.     FINDINGS: Evaluation is mildly limited by patient motion.    There are scattered recent ischemic infarcts in the posterolateral  left frontal lobe, deep white matter of the anterior left frontal lobe  and in the left cerebellar hemisphere suggesting embolic infarcts from  a central embolic source.    There is mild diffuse cerebral volume loss. There are minimal patchy  periventricular areas of abnormal T2 signal hyperintensity in the  cerebral white matter bilaterally that are consistent with sequela of  chronic small vessel ischemic disease. The ventricles and basal  cisterns are within normal limits in configuration given the degree of  cerebral volume loss.  There is no midline shift.  There are no  extra-axial fluid collections.  There is no evidence for acute  intracranial hemorrhage.  There is no abnormal contrast enhancement in  the brain or its coverings.     There is no sinusitis or mastoiditis.      Impression    IMPRESSION:   1. Recent ischemic infarcts in the left frontal lobe and left  cerebellar hemisphere suggesting embolic infarcts from a  central  embolic source.  2. Diffuse cerebral volume loss and cerebral white matter changes  consistent with chronic small vessel ischemic disease.     MIKEY WALTON MD         SYSTEM ID:  [id number]   MRA Neck (Carotids) wo & w Contrast    Narrative    MRA NECK WITHOUT AND WITH CONTRAST June 8, 2022 10:15 AM     HISTORY: Neuro deficit, acute, stroke suspected.    TECHNIQUE: 2D time-of-flight MR angiogram of the neck without contrast  and 3D MR angiogram of the neck with gadobutrol (Gadavist) injection  10 mL gadolinium IV contrast. MIP reconstruction of all MR  angiographic data was performed. Estimates of carotid stenoses are  made relative to the distal internal carotid artery diameters except  as noted.    COMPARISON: None.    FINDINGS:    Carotids: The common carotid arteries bilaterally are widely patent.  The cervical internal carotid arteries bilaterally are patent without  stenosis.    Vertebrals: The vertebral arteries bilaterally are patent without  stenosis and demonstrate antegrade flow.    Aortic arch: The arteries as they arise from the aortic arch are  normally arranged with no evidence for stenosis.      Impression    IMPRESSION: Normal MR angiogram of the neck.    MIKEY WALTON MD         SYSTEM ID:  [id number]   MRA Brain ([place]) wo Contrast    Narrative    MR ANGIOGRAM OF THE HEAD WITHOUT CONTRAST June 8, 2022 10:15 AM     HISTORY: Neuro deficit, acute, stroke suspected.    TECHNIQUE: 3D time-of-flight MR angiogram of the head without  contrast. MIP reconstruction of all MR angiographic data was  performed.    COMPARISON: None.    FINDINGS: This study is mildly limited by patient motion. The  bilateral distal internal carotid, basilar, bilateral anterior  cerebral, bilateral middle cerebral and bilateral posterior cerebral  arteries are patent and unremarkable with no evidence for cerebral  artery stenosis or aneurysm.       Impression    IMPRESSION:  1. Mildly limited study due to  patient motion.  2. Grossly normal [place] MRA.    MIKEY WALTON MD         SYSTEM ID:  [id number]   Echocardiogram Complete   Result Value    LVEF  >70%    [facility]    [id number]  [id number]  [id number]  [id number]^LUIS^DALI     [facility]  Echocardiography Laboratory  [address] [place] [id number]     Name: RENE MCCARTNEY  MRN: [id number]  : 1950  Study Date: 2022 11:09 AM  Age: 71 yrs  Gender: Female  Patient Location: [facility]  Reason For Study: Aortic Valve Replacement  Ordering Physician: DALI SMITH  Referring Physician: AYDIN BELL  Performed By: Suyapa Douglas     BSA: 1.9 m2  Height: 62 in  Weight: 198 lb  HR: 88  BP: 143/54 mmHg  ______________________________________________________________________________  Procedure  Complete Echo Adult.  ______________________________________________________________________________  Interpretation Summary     Hyperdynamic left ventricular function  The visual ejection fraction is >70%. The left ventricular cavity is small.  The peak gradient intracavitary is 17 mmHg.  The right ventricle is normal in structure, function and size.  Medtronic Evolut Pro+ valve in AV position. Mean gradient is 15 mmHg. No  valvular or perivalvular AI. The vmax is 2.5 m/sec and mean gradient 15 mmHg,  DVI 0.53. Elevated gradient likely seen in setting of high stroke volume index  and hyperdynamic left ventricular function.  The inferior vena cava was normal in size with preserved respiratory  variability.  There is no pericardial effusion.     Compared to post procedural TIKI 2022, the gradient is slightly elevated  however this can be seen in the setting of hyperdynamic left ventricular  function.  ______________________________________________________________________________  Left Ventricle  The left ventricular cavity is small. There is mild concentric left  ventricular hypertrophy. Peak gradient intracavitary is 17 mmHg.  Hyperdynamic  left ventricular function. The visual ejection fraction is >70%. Grade I or  early diastolic dysfunction. Normal left ventricular wall motion.     Right Ventricle  The right ventricle is normal in structure, function and size.     Atria  The left atrium is mildly dilated. Right atrial size is normal.     Mitral Valve  There is moderate mitral annular calcification.     Tricuspid Valve  The tricuspid valve is normal in structure and function. Right ventricular  systolic pressure could not be approximated due to inadequate tricuspid  regurgitation.     Aortic Valve  The prosthetic aortic valve is well-seated. Medtronic Evolut Pro+ valve in AV  position. Mean gradient is 15 mmHg. No valvular or perivalvular AI. The vmax  is 2.5 m/sec and mean gradient 15 mmHg, DVI 0.53. Elevated gradient likely  seen in setting of high stroke volume index and hyperdynamic left ventricular  function.     Pulmonic Valve  The pulmonic valve is normal in structure and function.     Vessels  The aortic root is normal size. Normal size ascending aorta. The inferior vena  cava was normal in size with preserved respiratory variability.     Pericardium  There is no pericardial effusion.     ______________________________________________________________________________  MMode/2D Measurements & Calculations  IVSd: 1.1 cm  LVIDd: 4.5 cm  LVIDs: 2.8 cm  LVPWd: 0.99 cm  FS: 38.9 %  LV mass(C)d: 163.9 grams  LV mass(C)dI: 86.1 grams/m2     asc Aorta Diam: 3.2 cm  LVOT diam: 1.9 cm  LVOT area: 2.7 cm2  LA Volume (BP): 75.7 ml  LA Volume Index (BP): 39.8 ml/m2  RWT: 0.44     Doppler Measurements & Calculations  MV E max miguel: 85.2 cm/sec  MV A max miguel: 148.8 cm/sec  MV E/A: 0.57  MV max PG: 10.6 mmHg  MV mean P.4 mmHg  MV V2 VTI: 29.3 cm  MVA(VTI): 2.5 cm2  Ao V2 max: 262.0 cm/sec  Ao max P.0 mmHg  Ao V2 mean: 182.9 cm/sec  Ao mean PG: 15.0 mmHg  Ao V2 VTI: 49.3 cm  ARMIDA(I,D): 1.5 cm2  ARMIDA(V,D): 1.4 cm2     LV V1 max P.2  mmHg  LV V1 max: 134.6 cm/sec  LV V1 VTI: 27.3 cm  SV(LVOT): 74.0 ml  SI(LVOT): 38.9 ml/m2  PA V2 max: 157.5 cm/sec  PA max P.9 mmHg  PA acc time: 0.08 sec  AV Alexandro Ratio (DI): 0.51  ARMIDA Index (cm2/m2): 0.79  Lateral E/e': 10.0     ______________________________________________________________________________  Report approved by: Ebony Green 2022 01:58 PM

## 2022-06-08 NOTE — PLAN OF CARE
"SLP: Orders received. Chart reviewed and discussed with care team.  SLP not indicated as pt has passed the nurse swallow screen, per discussion with RN. I also talked with patient about her word finding. She reports some word finding over the last year that has occurred as a side effect to a specific medication. She does not feel her word finding is an worse at this time and a friend who is present, agrees. Pt requested a \"2 week follow up\" after all of this is over to look into her word finding issues.     There is not an inpatient SLP need at this time, OP SLP could considered if ongoing word finding issues occur and are different that her difficulty related to the medication.  Defer discharge recommendations to PT/OT and medical team.  Will complete orders.    "

## 2022-06-08 NOTE — PROGRESS NOTES
Assumed care of patient at 1600 today.  Pt A&O x4, logical and cooperative with cares. Neuro's in tact through  Assessments x3. Bilateral groin sites with tegaderm dressings in place with no ooze, bruit, or hematoma noted.  Pedal pulse present and palpable.  Pt compliant with bedrest and immobility of LE's through 1715 when bedrest was over.  Ambulated with SBA to BR for void.  Pt has home cane in room for which she states uses for longer distances. Pt to be NPO at [facility] for EP consult for reported new L BBB noted during case today

## 2022-06-08 NOTE — PROVIDER NOTIFICATION
MD Notification    Notified Person: MD    Notified Person Name:Dr Shetty    Notification Date/Time:  6/8/2022  0030  Notification Interaction:    Purpose of Notification:Pt c/o right had coordination problems in her fingers with some ataxia.    Orders Received:MRI, Pt declined this r/t claustrophobia, Dr Shetty informed.    Comments:

## 2022-06-09 ENCOUNTER — APPOINTMENT (OUTPATIENT)
Dept: CT IMAGING | Facility: CLINIC | Age: 72
DRG: 266 | End: 2022-06-09
Attending: NURSE PRACTITIONER
Payer: COMMERCIAL

## 2022-06-09 ENCOUNTER — TELEPHONE (OUTPATIENT)
Dept: INTERNAL MEDICINE | Facility: CLINIC | Age: 72
End: 2022-06-09
Payer: COMMERCIAL

## 2022-06-09 LAB
ANION GAP SERPL CALCULATED.3IONS-SCNC: 5 MMOL/L (ref 3–14)
BUN SERPL-MCNC: 30 MG/DL (ref 7–30)
CALCIUM SERPL-MCNC: 9.1 MG/DL (ref 8.5–10.1)
CHLORIDE BLD-SCNC: 113 MMOL/L (ref 94–109)
CO2 SERPL-SCNC: 24 MMOL/L (ref 20–32)
CREAT SERPL-MCNC: 1.48 MG/DL (ref 0.52–1.04)
ERYTHROCYTE [DISTWIDTH] IN BLOOD BY AUTOMATED COUNT: 14 % (ref 10–15)
GFR SERPL CREATININE-BSD FRML MDRD: 37 ML/MIN/1.73M2
GLUCOSE BLD-MCNC: 171 MG/DL (ref 70–99)
GLUCOSE BLDC GLUCOMTR-MCNC: 148 MG/DL (ref 70–99)
GLUCOSE BLDC GLUCOMTR-MCNC: 167 MG/DL (ref 70–99)
GLUCOSE BLDC GLUCOMTR-MCNC: 186 MG/DL (ref 70–99)
GLUCOSE BLDC GLUCOMTR-MCNC: 189 MG/DL (ref 70–99)
GLUCOSE BLDC GLUCOMTR-MCNC: 254 MG/DL (ref 70–99)
HCT VFR BLD AUTO: 33 % (ref 35–47)
HGB BLD-MCNC: 10.5 G/DL (ref 11.7–15.7)
LACTATE SERPL-SCNC: 0.7 MMOL/L (ref 0.7–2)
MAGNESIUM SERPL-MCNC: 2.6 MG/DL (ref 1.6–2.3)
MCH RBC QN AUTO: 27.8 PG (ref 26.5–33)
MCHC RBC AUTO-ENTMCNC: 31.8 G/DL (ref 31.5–36.5)
MCV RBC AUTO: 87 FL (ref 78–100)
PHOSPHATE SERPL-MCNC: 2.9 MG/DL (ref 2.5–4.5)
PLATELET # BLD AUTO: 283 10E3/UL (ref 150–450)
POTASSIUM BLD-SCNC: 4.3 MMOL/L (ref 3.4–5.3)
RBC # BLD AUTO: 3.78 10E6/UL (ref 3.8–5.2)
SODIUM SERPL-SCNC: 142 MMOL/L (ref 133–144)
WBC # BLD AUTO: 9 10E3/UL (ref 4–11)

## 2022-06-09 PROCEDURE — 99222 1ST HOSP IP/OBS MODERATE 55: CPT | Mod: 25 | Performed by: INTERNAL MEDICINE

## 2022-06-09 PROCEDURE — 70496 CT ANGIOGRAPHY HEAD: CPT

## 2022-06-09 PROCEDURE — 83735 ASSAY OF MAGNESIUM: CPT | Performed by: STUDENT IN AN ORGANIZED HEALTH CARE EDUCATION/TRAINING PROGRAM

## 2022-06-09 PROCEDURE — 250N000009 HC RX 250: Performed by: HOSPITALIST

## 2022-06-09 PROCEDURE — 99232 SBSQ HOSP IP/OBS MODERATE 35: CPT | Mod: FS | Performed by: INTERNAL MEDICINE

## 2022-06-09 PROCEDURE — 250N000011 HC RX IP 250 OP 636: Performed by: INTERNAL MEDICINE

## 2022-06-09 PROCEDURE — 85027 COMPLETE CBC AUTOMATED: CPT | Performed by: STUDENT IN AN ORGANIZED HEALTH CARE EDUCATION/TRAINING PROGRAM

## 2022-06-09 PROCEDURE — 70498 CT ANGIOGRAPHY NECK: CPT

## 2022-06-09 PROCEDURE — 210N000002 HC R&B HEART CARE

## 2022-06-09 PROCEDURE — 99291 CRITICAL CARE FIRST HOUR: CPT | Performed by: NURSE PRACTITIONER

## 2022-06-09 PROCEDURE — 83605 ASSAY OF LACTIC ACID: CPT | Performed by: HOSPITALIST

## 2022-06-09 PROCEDURE — 36415 COLL VENOUS BLD VENIPUNCTURE: CPT | Performed by: STUDENT IN AN ORGANIZED HEALTH CARE EDUCATION/TRAINING PROGRAM

## 2022-06-09 PROCEDURE — 250N000013 HC RX MED GY IP 250 OP 250 PS 637: Performed by: STUDENT IN AN ORGANIZED HEALTH CARE EDUCATION/TRAINING PROGRAM

## 2022-06-09 PROCEDURE — 93010 ELECTROCARDIOGRAM REPORT: CPT | Performed by: INTERNAL MEDICINE

## 2022-06-09 PROCEDURE — 93005 ELECTROCARDIOGRAM TRACING: CPT

## 2022-06-09 PROCEDURE — 80048 BASIC METABOLIC PNL TOTAL CA: CPT | Performed by: STUDENT IN AN ORGANIZED HEALTH CARE EDUCATION/TRAINING PROGRAM

## 2022-06-09 PROCEDURE — 99233 SBSQ HOSP IP/OBS HIGH 50: CPT | Performed by: HOSPITALIST

## 2022-06-09 PROCEDURE — 250N000013 HC RX MED GY IP 250 OP 250 PS 637: Performed by: HOSPITALIST

## 2022-06-09 PROCEDURE — 84100 ASSAY OF PHOSPHORUS: CPT | Performed by: STUDENT IN AN ORGANIZED HEALTH CARE EDUCATION/TRAINING PROGRAM

## 2022-06-09 PROCEDURE — 250N000013 HC RX MED GY IP 250 OP 250 PS 637: Performed by: NURSE PRACTITIONER

## 2022-06-09 PROCEDURE — 36415 COLL VENOUS BLD VENIPUNCTURE: CPT | Performed by: HOSPITALIST

## 2022-06-09 PROCEDURE — 70450 CT HEAD/BRAIN W/O DYE: CPT

## 2022-06-09 PROCEDURE — 250N000011 HC RX IP 250 OP 636: Performed by: HOSPITALIST

## 2022-06-09 RX ORDER — ROSUVASTATIN CALCIUM 20 MG/1
10 TABLET, COATED ORAL DAILY
Qty: 90 TABLET | Refills: 1 | Status: SHIPPED | OUTPATIENT
Start: 2022-06-09 | End: 2023-02-27

## 2022-06-09 RX ORDER — IOPAMIDOL 755 MG/ML
75 INJECTION, SOLUTION INTRAVASCULAR ONCE
Status: COMPLETED | OUTPATIENT
Start: 2022-06-09 | End: 2022-06-09

## 2022-06-09 RX ORDER — ACETAMINOPHEN 325 MG/1
975 TABLET ORAL ONCE
Status: COMPLETED | OUTPATIENT
Start: 2022-06-09 | End: 2022-06-09

## 2022-06-09 RX ORDER — IRBESARTAN 150 MG/1
75 TABLET ORAL DAILY
Start: 2022-06-09 | End: 2022-06-10

## 2022-06-09 RX ORDER — IRBESARTAN 75 MG/1
75 TABLET ORAL AT BEDTIME
Status: DISCONTINUED | OUTPATIENT
Start: 2022-06-09 | End: 2022-06-10

## 2022-06-09 RX ORDER — CYCLOBENZAPRINE HCL 5 MG
5 TABLET ORAL EVERY 8 HOURS PRN
Status: DISCONTINUED | OUTPATIENT
Start: 2022-06-09 | End: 2022-06-10 | Stop reason: HOSPADM

## 2022-06-09 RX ORDER — IRBESARTAN 75 MG/1
75 TABLET ORAL DAILY
Status: DISCONTINUED | OUTPATIENT
Start: 2022-06-10 | End: 2022-06-09

## 2022-06-09 RX ADMIN — SPIRONOLACTONE 12.5 MG: 25 TABLET ORAL at 08:08

## 2022-06-09 RX ADMIN — IOPAMIDOL 75 ML: 755 INJECTION, SOLUTION INTRAVENOUS at 09:01

## 2022-06-09 RX ADMIN — FUROSEMIDE 40 MG: 40 TABLET ORAL at 09:44

## 2022-06-09 RX ADMIN — IRBESARTAN 75 MG: 75 TABLET ORAL at 21:54

## 2022-06-09 RX ADMIN — ACETAMINOPHEN 975 MG: 325 TABLET ORAL at 09:42

## 2022-06-09 RX ADMIN — HYDRALAZINE HYDROCHLORIDE 20 MG: 20 INJECTION INTRAMUSCULAR; INTRAVENOUS at 06:14

## 2022-06-09 RX ADMIN — LEVOTHYROXINE SODIUM 50 MCG: 50 TABLET ORAL at 06:14

## 2022-06-09 RX ADMIN — VERAPAMIL HYDROCHLORIDE 180 MG: 180 TABLET, FILM COATED, EXTENDED RELEASE ORAL at 08:08

## 2022-06-09 RX ADMIN — HYDRALAZINE HYDROCHLORIDE 10 MG: 20 INJECTION INTRAMUSCULAR; INTRAVENOUS at 06:59

## 2022-06-09 RX ADMIN — INSULIN ASPART 1 UNITS: 100 INJECTION, SOLUTION INTRAVENOUS; SUBCUTANEOUS at 19:08

## 2022-06-09 RX ADMIN — ASPIRIN 81 MG CHEWABLE TABLET 81 MG: 81 TABLET CHEWABLE at 09:40

## 2022-06-09 RX ADMIN — TICAGRELOR 90 MG: 90 TABLET ORAL at 09:45

## 2022-06-09 RX ADMIN — TICAGRELOR 90 MG: 90 TABLET ORAL at 19:55

## 2022-06-09 RX ADMIN — TOPIRAMATE 75 MG: 50 TABLET, FILM COATED ORAL at 21:54

## 2022-06-09 RX ADMIN — INSULIN ASPART 3 UNITS: 100 INJECTION, SOLUTION INTRAVENOUS; SUBCUTANEOUS at 13:55

## 2022-06-09 RX ADMIN — ROSUVASTATIN CALCIUM 10 MG: 10 TABLET, FILM COATED ORAL at 19:54

## 2022-06-09 RX ADMIN — POTASSIUM CHLORIDE 10 MEQ: 750 TABLET, EXTENDED RELEASE ORAL at 09:44

## 2022-06-09 RX ADMIN — INSULIN ASPART 1 UNITS: 100 INJECTION, SOLUTION INTRAVENOUS; SUBCUTANEOUS at 08:11

## 2022-06-09 RX ADMIN — POTASSIUM CHLORIDE 10 MEQ: 750 TABLET, EXTENDED RELEASE ORAL at 19:54

## 2022-06-09 RX ADMIN — CYANOCOBALAMIN TAB 1000 MCG 1000 MCG: 1000 TAB at 19:55

## 2022-06-09 RX ADMIN — SODIUM CHLORIDE 100 ML: 9 INJECTION, SOLUTION INTRAVENOUS at 09:09

## 2022-06-09 ASSESSMENT — ACTIVITIES OF DAILY LIVING (ADL)
ADLS_ACUITY_SCORE: 23
ADLS_ACUITY_SCORE: 29
ADLS_ACUITY_SCORE: 29
ADLS_ACUITY_SCORE: 23
ADLS_ACUITY_SCORE: 23
ADLS_ACUITY_SCORE: 29
ADLS_ACUITY_SCORE: 29
ADLS_ACUITY_SCORE: 23
ADLS_ACUITY_SCORE: 29
ADLS_ACUITY_SCORE: 29
ADLS_ACUITY_SCORE: 23
ADLS_ACUITY_SCORE: 23

## 2022-06-09 NOTE — PROGRESS NOTES
[facility]    Hospitalist Progress Note      Assessment & Plan   Ade Wilkins is a 71 year old female who was admitted on 6/7/2022 after elective aortic valve replacement via TAVR.     Severe Aortic Stenosis status post TAVR with 26 mm Medtronic Evolut Pro+ Valve.   Conduction abnormality post procedure  Procedure was complicated by temporary heart block that resolved prior to case being over; temporary pacemaker wire was therefore pulled. In the PACU she developed prolonged MO and LBBB. Post procedure echo showed well seated valve, no pericardial effusion. LVEF >70%, mild concentric LVH, left atrium mildly dilated.  --Cardiology appreciated  --no need for pacemaker at this time  --30 day event monitor on discharge  --Aspirin, Brilinta   --follow up cardiology in one week    Acute infarct of left frontal and cerebellar region, likely embolic  Symptoms started post procedure, had difficulty with using tablet and eating with her dominant right hand. Suspect related to recent procedure and conduction abnormality post procedure. Imaging delayed, but MRI Brain showed acute infarcts in left frontal and left cerebellar regions. MRA head and neck showed no significant stenosis  - continue ASA/brilinta as above  - Eventual goal BP <130/80  - continue PTA crestor reduce to 10mg from 20mg, discontinue PTA zetia (cholesterol very well controlled with LDL only 17)  - therapies to evaluate  - plan 30 day event monitor on discharge  - follow up in 6-8 weeks with [facility] or other local neurology team    History of DM2; Insulin Dependent  HgbA1C is 8. PTA Metformin, Jardiance both held  --tresiba 40 units daily  --2 units per 15g carbs, medium resistance sliding scale insulin     Coronary Artery Disease with PCI to Ostial RCA  --Continue Aspirin, Brilinta, reduced dose crestor given very low LDL (increased risk of ICH with LDL<40)     Essential HTN  --PTA irbesartan and spironolactone resumed  --PTA verapamil  "continued     HFpEF.  --PTA lasix, spironolactone resumed    Headache  Muscle spasm  PRN tylenol, flexeril available     Chronic Issues  Hypothyroidism. Continue home synthroid  Migraines. Continue home topamax  CKD Stage 3b. Baseline Cr is about 1.3 - 1.4    Clinically Significant Risk Factors Present on Admission              # Diabetes, type II: last A1C 8.0 % (Ref range: 0.0 - 5.6 %)  # Obesity: Estimated body mass index is 35.68 kg/m  as calculated from the following:    Height as of this encounter: 1.575 m (5' 2\").    Weight as of this encounter: 88.5 kg (195 lb 1.6 oz).        DVT Prophylaxis: Pneumatic Compression Devices  Code Status: Full Code  Expected Discharge: 06/10/2022  Anticipated discharge location:  Awaiting care coordination huddle  Delays:     await therapy evaluation      Alyse Horan DO  Hospitalist Service  [facility]  Securely message with the Vocera Web Console (learn more here)  Text Page (7am - 6pm) via Section [employer] Paging/Directory      Interval History   Patient seen and examined. She had a headache this morning and some blurred vision. She thought might be secondary to back spasm that was coming up into her neck with start of a migraine. Felt better with ice pack. BP also getting a little better. Plan to monitor overnight and then likely discharge tomorrow, which works for her. She tolerated diet.  Spent 35 minutes with >50% time spent evaluating patient, reviewing chart, reviewing AM events, discussing with house NP and follow-up imaging, reviewing specialist recommendations.    -Data reviewed today: I reviewed all new labs and imaging results over the last 24 hours. I personally reviewed MRI Brain showed acute infarcts in left frontal and left cerebellar regions. MRA head and neck showed no significant stenosis    Physical Exam   Temp: 98.6  F (37  C) Temp src: Oral BP: (!) 153/59 Pulse: 76   Resp: 20 SpO2: 96 % O2 Device: None (Room air)    Vitals:    06/07/22 " 0835 06/08/22 0641 06/09/22 0455   Weight: 90.2 kg (198 lb 12.8 oz) 89.3 kg (196 lb 12.8 oz) 88.5 kg (195 lb 1.6 oz)     Vital Signs with Ranges  Temp:  [98.2  F (36.8  C)-98.8  F (37.1  C)] 98.6  F (37  C)  Pulse:  [64-98] 76  Resp:  [13-24] 20  BP: (124-189)/(48-77) 153/59  SpO2:  [94 %-97 %] 96 %  I/O last 3 completed shifts:  In: 240 [P.O.:240]  Out: 1675 [Urine:1675]    Constitutional: Awake, alert, cooperative, no apparent distress  Respiratory: Clear to auscultation bilaterally, no crackles or wheezing  Cardiovascular: Regular rate and rhythm, normal S1 and S2, and + systolic murmur  GI: Normal bowel sounds, soft, non-distended, non-tender  Skin/Integumen: No rashes, no cyanosis, +1 lower extremity edema  Other: Bilateral groin sites intact without hematoma    Medications     - MEDICATION INSTRUCTIONS -       - MEDICATION INSTRUCTIONS -         aspirin  81 mg Oral Daily     cyanocobalamin  1,000 mcg Oral QPM     furosemide  40 mg Oral Daily     insulin aspart  1-7 Units Subcutaneous TID AC     insulin aspart  1-5 Units Subcutaneous At Bedtime     insulin aspart   Subcutaneous TID AC     insulin degludec  40 Units Subcutaneous Daily     [START ON 6/10/2022] irbesartan  75 mg Oral Daily     levothyroxine  50 mcg Oral Daily     potassium chloride ER  10 mEq Oral BID     rosuvastatin  10 mg Oral QPM     sodium chloride (PF)  3 mL Intracatheter Q8H     spironolactone  12.5 mg Oral Daily     ticagrelor  90 mg Oral BID     topiramate  75 mg Oral At Bedtime     verapamil ER  180 mg Oral Daily       Data   Recent Labs   Lab 06/09/22  1326 06/09/22  0810 06/09/22  0541 06/08/22  1206 06/08/22  0454 06/07/22  1440 06/07/22  0811   WBC  --   --  9.0  --  9.5  --  12.2*   HGB  --   --  10.5*  --  10.4*  --  12.0   MCV  --   --  87  --  89  --  87   PLT  --   --  283  --  274  --  346   NA  --   --  142  --  139  --   --    POTASSIUM  --   --  4.3  --  4.2 3.9 4.1   CHLORIDE  --   --  113*  --  112*  --   --    CO2  --    --  24  --  22  --   --    BUN  --   --  30  --  35*  --   --    CR  --   --  1.48*  --  1.55* 1.62*  --    ANIONGAP  --   --  5  --  5  --   --    RASHEEDA  --   --  9.1  --  9.2  --   --    * 167* 171*   < > 216*  --  146*   ALBUMIN  --   --   --   --  3.3*  --   --    PROTTOTAL  --   --   --   --  6.2*  --   --    BILITOTAL  --   --   --   --  0.4  --   --    ALKPHOS  --   --   --   --  89  --   --    ALT  --   --   --   --  63*  --   --    AST  --   --   --   --  42  --   --     < > = values in this interval not displayed.       Recent Results (from the past 24 hour(s))   CT Head w/o Contrast    Narrative    CT SCAN OF THE HEAD WITHOUT CONTRAST June 9, 2022 8:32 AM     HISTORY: Altered mental status. Recent strokes.    TECHNIQUE: Axial images of the head and coronal reformations without  IV contrast material. Radiation dose for this scan was reduced using  automated exposure control, adjustment of the mA and/or kV according  to patient size, or iterative reconstruction technique.    COMPARISON: Brain MR 6/8/2022.    FINDINGS: Cortical hypodensity in the left perirolandic region  corresponding with area of infarct seen on prior brain MR. Additional  smaller infarcts on previous brain MRI are not well appreciated by  head CT. No evidence of hemorrhagic transformation of infarct. No  significant mass effect or midline shift. Ventricular size is within  normal limits without evidence of hydrocephalus. No abnormal  extra-axial fluid collection.    The visualized portions of the sinuses and mastoids appear normal. The  bony calvarium and bones of the skull base appear intact.       Impression    IMPRESSION:     1. No evidence of hemorrhagic transformation of recent infarcts. No  mass effect, midline shift, or herniation.  2. Hypodensity in the left perirolandic region corresponds to the area  of infarct seen on brain MR. Additional smaller infarcts are not well  appreciated by this head CT.      MARCIN CLEARY MD          SYSTEM ID:  [id number]   CTA Head Neck with Contrast    Narrative    CT ANGIOGRAM OF THE HEAD AND NECK WITH CONTRAST 6/9/2022 9:16 AM     HISTORY: Neurological deficit, acute, stroke suspected.    TECHNIQUE: CT angiography with an injection of 75 mL [id number]-370 IV  with scans through the head and neck. Images were transferred to a  separate 3-D workstation where multiplanar reformations and 3-D images  were created. Estimates of carotid stenoses are made relative to the  distal internal carotid artery diameters except as noted. Radiation  dose for this scan was reduced using automated exposure control,  adjustment of the mA and/or kV according to patient size, or iterative  reconstruction technique.     COMPARISON: MRA head and neck 6/8/2022.     CT HEAD FINDINGS: No contrast enhancing lesions. Cerebral blood flow  is grossly normal.     CT ANGIOGRAM HEAD FINDINGS: The major intracranial arteries including  the proximal branches of the anterior cerebral, middle cerebral, and  posterior cerebral arteries appear patent without vascular cutoff. No  aneurysm identified. No significant stenosis. Venous circulation is  unremarkable.     CT ANGIOGRAM NECK FINDINGS: Scattered atherosclerotic calcification in  the aortic arch without significant stenosis at the origins of the  great vessels.     Right carotid artery: The right common and internal carotid arteries  are patent. Mild atherosclerotic disease at the carotid bifurcation  and proximal internal carotid artery without significant stenosis by  NASCET criteria.     Left carotid artery: The left common and internal carotid arteries are  patent. Mild atherosclerotic disease at the carotid bifurcation and  proximal internal carotid artery without significant stenosis by  NASCET criteria.     Vertebral arteries: Vertebral arteries are patent without evidence of  dissection. No significant stenosis.     Other findings: Degenerative changes in the spine particularly  at  C5-C6.       Impression    IMPRESSION:   1. Patent arteries in the neck without evidence of dissection. Mild  atherosclerotic disease in the carotid arteries bilaterally without  significant stenosis by NASCET criteria.  2. Patent proximal major intracranial arteries without vascular  cutoff. No significant stenosis. No aneurysm identified.       MARCIN CLEARY MD         SYSTEM ID:  [id number]

## 2022-06-09 NOTE — CONSULTS
Consult Date: 06/09/2022    REQUESTING PHYSICIAN:  Dr. Bright.    REASON FOR CONSULTATION:  AV block.    HISTORY OF PRESENT ILLNESS:  Ms. Wilkins is a 71-year-old white female with a history of severe aortic valve stenosis.  She underwent a TAVR procedure on 06/07/2022.  During the procedure, she developed transient AV block.  She received a Medtronic device.  After the procedure, she had left bundle branch block.  The EKG has recovered back to normal QRS complex with intact AV conduction.  So far, there has been no recurrence of AV block.  The patient did not have history of AV block previously.     The patient's hospital stay has been delayed because of headache yesterday and she went for brain MRI without remarkable findings.  This morning, she had some visual disturbance and went for CT scan of the head.  So far, there is no evidence of bleeding.    PAST MEDICAL HISTORY:  Hypertension, chronic renal insufficiency, type 2 diabetes.    FAMILY HISTORY:  Noncontributory to AV block or aortic stenosis.    SOCIAL HISTORY:  SHE IS FULL CODE.  She does not smoke, abuse alcohol.    REVIEW OF SYSTEMS:  Comprehensive review of the systems showed no fever, cough or diarrhea.  She had no previous syncope or near syncope.    MEDICATIONS:     1.  Aspirin 81 mg p.o. daily.  2.  Vitamin B12 1000 mcg p.o. daily.  3.  Lasix 40 mg p.o. daily.   4.  Insulin as needed.  5.  Levothyroxine 50 mcg p.o. daily.   6.  K-Dur 10 mEq p.o. daily.  7.  Crestor 10 mg p.o. daily.  8.  Spironolactone 12.5 mg p.o. daily.  9.  Brilinta 90 mg p.o. b.i.d.  10.  Topamax 75 mg p.o. daily.  11.  Verapamil 180 mg p.o. daily.    ALLERGIES:  AMLODIPINE, CHLORHEXIDINE, CLONIDINE, DOXAZOSIN, FEXOFENADINE, GEMFIBROZIL, LISINOPRIL, PIOGLITAZONE.    PHYSICAL EXAMINATION:  GENERAL:  She is alert and oriented with no acute distress.  VITAL SIGNS:  Blood pressure 173/62, heart rate 95 beats per minute, temperature 98.6 degrees, oxygen saturation 95% on room  air.  HEENT:  The eyes and ENT were unremarkable.  LUNGS:  Clear.   HEART:  Rhythm was regular.  Heart sounds were normal.  She had a second-degree systolic murmur in the aortic valve area.  ABDOMEN:  Moderate obesity.  EXTREMITIES:  There was no pedal edema.  NEUROLOGIC:  Showed no focal deficit.  SKIN:  There was no skin rash or lymph node enlargement.    ASSESSMENT RECOMMENDATIONS:  Ms. Wilkins is post-TAVR procedure.  She had a transient AV block during the procedure and postop left bundle branch block.  The QRS has recovered back to normal.  There is no recurrence of AV block or left bundle branch block.  At this point, I do not recommend pacemaker implantation; however, if the patient has bradycardia symptoms in the near future, we can reevaluate to determine if she has an indication for pacemaker.    The patient's blood pressure is quite high and further adjustment of her blood pressure medication may be beneficial.    She is on verapamil, which may not be a great drug in case of AV block; however, I do understand SHE HAS MULTIPLE ALLERGIES TO MEDICATIONS, which may limit the choice of blood pressure medications.    Luz Maria Andrea MD        D: 2022   T: 2022   MT: KRISTY    Name:     RENE WILKINS  MRN:      [id number]        Account:      [id number]   :      1950           Consult Date: 2022     Document: [id number]

## 2022-06-09 NOTE — CODE/RAPID RESPONSE
"[facility]    RRT Note  6/9/2022   Time Called: 0755    RRT called for: headache and vision changes     Assessment & Plan:   IMPRESSION & PLAN:    Ms. Wilkins is a 71 year old female admitted on 06/07/2022 for aortic valve replacement via TAVR. PMH of insulin dependant DM2, hypothyroidism, CAD w/ PCI to ostial RCA, severe aortic stenosis s/p TAVR, HFpEF, migraines, and CKD (baseline Cr 1.3-1.4). On 06/08/2022 code stroke was called for RUE ataxia, pronator drift, R hemiparesis and dizziness. MRI confirmed acute infarct of left frontal and cerebellar regions.     RRT activated the morning of 06/09/2022 for worsening headache and vision changes.      On my arrival patient is laying in bed, nontoxic appearing, not in acute distress. She is oriented x4. She has pain/tension in her neck and describes a headache that starts at the base of the neck and travels up that back of her skull. Additionally, she feels her back is on the verge of spasm and some nausea. She states theses symptoms feel like her typical tension headache which she typically controls with ice and daily topiramate. There is no double vision, her vision is \"fuzzy\" around the edges of visual fields bilaterally, corrects when right or left eye is covered. She has a history of right eye retinal occlusion, vision conitnes to be blurred (poor central vision baseline). Mild right upper limb ataxia and mild right sided weakness, which is unchanged from previous assessments.      Noticed vision changes around 1853-4952 this morning with associated worsening headache she rates 5/10.     [employer] Stroke Scale  Exam Interval: 0813 am for worsening headache and blurred vision    Score    Level of consciousness: (0)   Alert, keenly responsive    LOC questions: (0)   Answers both questions correctly    LOC commands: (0)   Performs both tasks correctly    Best gaze: (0)   Normal    Visual: (0)   No hemianopia     Facial palsy: " "(0)   Normal symmetrical movements    Motor arm (left): (0)   No drift    Motor arm (right): (0)   No drift    Motor leg (left): (0)   No drift    Motor leg (right): (0)   No drift    Limb ataxia: (1)   Present in one limb (right upper extremity baseline)     Sensory: (1)   Mild to moderate sensory loss (right leg numbness baseline)     Best language: (0)   Normal- no aphasia    Dysarthria: (0)   Normal    Extinction and inattention: (0)   No abnormality        Total Score:  2       Dx   Differentials include:   -intercranial hemorrhage, on DAPT  -ischemic stroke etiology from procedural instrumentation  -migraine headache    -tension headache   -cluster headache       I personally reviewed the radiographs     VS:BP (!) 173/62   Pulse 95   Temp 98.6  F (37  C) (Oral)   Resp 14   Ht 1.575 m (5' 2\")   Wt 88.5 kg (195 lb 1.6 oz)   LMP  (LMP Unknown)   SpO2 95%   BMI 35.68 kg/m      INTERVENTIONS:  -stat CT head w/o, no evidence of hemorrhagic transformation   -stat CTA - no LVO or dissection  -discussed with Neurology fellow Dr. Goodwin recommended CTA   -give morning verapamil dose (HTN management, has hydralazine as well)  -BG   -ice pack to neck   -975 mg Tylenol   -Continue conservative approach with ice, repositioning, stimulus reduction, and cool compress     Working diagnosis: tension headache     At the end of the RRT patients vision has improved, not as fuzzy or double. Headache remains the same 5/10 but patients feels comfortable, nausea has resolved. Encourage conservative management (tylenol, ice, repositioning) but is headache persists could consider mucsle relaxant mediation and will defer to attending Hospitalist.     Disposition CCU     Discussed with and defer further cares to hospitalist attending physician Dr. Horan Internal Medicine Hospitalist    Code Status: FULL   Allergies:      LEONEL Lala [facility]  Hospitalist Service  House Officer  Pager: [phone] (7a - 6p)      PHYSICAL " EXAM:  Vital Signs with Ranges:    I/O:   Intake/Output Summary (Last 24 hours) at 6/9/2022 0900  Last data filed at 6/9/2022 0455  Gross per 24 hour   Intake 240 ml   Output 1100 ml   Net -860 ml     Constitutional: vs  General:  adult pt lying in bed without acute distress  Neuro: NIH 2, +follows commands, face symmetric, tongue midline, speech fluent    Eyes pupils equal round 4 mm briskly reactive bilat, sclera nonicteric, noninjected, conjunctiva pink,  Head, ENT & mouth: NC/AT, mouth moist oral mucosa  Neck: supple  CV: S1S2  Resp: CTAB upper and lower lobes  Gi:normoactive bowel sounds, soft, nontender, nondisteded  Ext: no edema  Skin: no rashes on exposed skin  Lymph: no supraclavicular lymphadenopathy  Musculoskeletal: RLE 4/5 strength, other extremities 5/5 strength, no bony joint deformities    I spent 70 minutes (6445 - 9143) of critical care time on the unit/floor managing the care of Ade Wilkins. Upon evaluation, this patient had a high probability of imminent or life-threatening deterioration due to acute neurological change which required my direct attention, intervention, and personal management. 100% of my time was spent at the bedside counseling the patient and/or coordinating care regarding services listed in this note.

## 2022-06-09 NOTE — PLAN OF CARE
Transfer of care to writer at 16:00.   Pt. A&O. Ax-1 walker. VSS. NSR. No neurological deficits. Pt. Denies headache and blurry vision. +1/2 nick LE edema. Fine crackles to bases of lungs, room air. Plan for discharge home tomorrow with event monitor after PT/OT eval is completed. Cardiology and Neurology signed off.

## 2022-06-09 NOTE — DISCHARGE INSTRUCTIONS
TAVR Discharge Instructions  You have just had your aortic valve replaced with a new biological tissue valve. You should feel better after your surgery, but complete recovery may take several weeks. Please follow these instructions carefully and please call your valve coordinator with any questions or concerns.      CONTACT INFORMATION:   [facility]:  [phone] (7 days a week)  Scheduling and general questions - Kelly ([phone])  Valve Coordinators - Anais RN ([phone]), Aileen RN ([phone]), and Beth RN ([phone])    AFTER YOU GO HOME:  Drink extra fluids for 2 days.  You may resume your normal diet.  Do not smoke.  Do not drink alcohol.  Relax and take it easy.  Do not make any important or legal decisions.    FOR 1 WEEK AFTER TAVR:  Do not drive or operate machines at home or at work. No driving until you return for your 1 week follow-up appointment. You and the provider will discuss this at the appointment.   Refrain from sexual intercourse for 1 week.  You may shower the day after your procedure. Do NOT take a bath, or use a hot tub or pool for at least 1 week. Do NOT scrub the site. Do not use lotion or powder near the puncture site.  Do not lift more than 10 pounds.   Do not do any heavy activity such as exercise, lifting, or straining.  No housework, yard work, or any activities that make you sweat.    CARE OF WRIST AND GROIN SITES:  For 2 days, when you cough, sneeze, laugh or move your bowels, hold your hand over the puncture site and press firmly on/above the site.  Remove the bandage after 24 hours. If there is minor oozing, apply another bandage and remove it after 12 hours.  It is normal to have bruising or pea size lump at the site.  If you have a wrist site puncture: Do not use your hand or arm to support your weight (such as rising from a chair) or bend your wrist (such as lifting a garage door) for 1 week.  No stooping or squatting for 1 week.      BLEEDING:  If you start bleeding from the site in your wrist:  Sit down and press firmly on/above the site with your fingers for 10 minutes.   Once bleeding stops, keep your arm still for 2 hours.   Call [facility] or valve coordinator as soon as you can.  If you start bleeding from the site in your groin:  Lie down flat and press firmly on/above the site for 10 minutes.  Once bleeding stops lay flat for 2 hours.   Call [facility] or valve coordinator as soon as you can.  Call 911 right away if you have heavy bleeding or bleeding that does not stop.    MEDICINES:  You may be started on an anti-platelet medication, such as Plavix or aspirin. Please call the [facility] with any questions regarding this medication.  If you have stopped any medicines, check with your provider about when to restart them.  You will require lifetime prophylactic antibiotics for dental cleanings and dental work after your TAVR, please inquire with the Heart Clinic prior to your scheduled cleanings.     FOLLOW-UP APPOINTMENTS:  Follow-up with a Structural Heart advanced practice provider (NP or PA) at [facility] in 7-10 days after your procedure.  You will also follow-up at [facility] 1 month after your procedure which will include a visit with an advanced practice provider an echocardiogram (echo), an electrocardiogram (ECG), and lab work. This follow-up should be scheduled for you prior to you leaving the hospital.  Cardiac Rehab will contact you for follow up care. You can enroll at a site convenient to you.  We will have you see your primary cardiologist about 6 months after your TAVR.  We will see you 1 year after your TAVR with a visit with an advanced practice provider (NP or PA) an echocardiogram (echo), electrocardiogram (ECG), and lab work.     CALL THE CLINIC IF:   You have increased pain or a large or growing hard lump  around the site.  The site is red, swollen, hot, or tender.  Blood or fluid is draining from the site.  You have chills or a fever greater than 101 F (38 C).  Your arm or leg feels numb, cool, or changes color.  You have hives, a rash, or unusual itching.  New pain in the back or belly that you cannot control with Tylenol.  Any questions or concerns.    OTHER INSTRUCTIONS:  You will receive a card with your new valve information in the mail, directly from the . Retain this card with your health care records.    DENTAL WORK:  You must have antibiotics before all dental work to prevent endocarditis, or an infection on your new heart valve. Please call the valve coordinators to get a prescription for this. Please do not schedule any dental cleanings for at least 3-6 months after your TAVR.

## 2022-06-09 NOTE — PHARMACY-CONSULT NOTE
Pharmacy Consult to evaluate for medication related stroke core measures    Ade Wilkins, 71 year old female admitted for TAVR procedure on 6/7/2022.    Thrombolytic was not given because of Clinical contraindications    VTE Prophylaxis Enoxaparin given on 6/7/22 as appropriate prior to end of hospital day 2.    Antithrombotic: aspirin and ticagrelor started on 6/7/22, as appropriate by end of hospital day 2. Continue antithrombotic therapy on discharge to meet quality measures, unless contraindicated.    Anticoagulation if history of A-fib/flutter: Patient does not have history of A-fib/flutter - anticoagulation not required for medication related stroke core measures.     LDL Cholesterol Calculated   Date Value Ref Range Status   06/08/2022 17 <=100 mg/dL Final   03/27/2020 59 <100 mg/dL Final     Comment:     Desirable:       <100 mg/dl       Patient's home statin, Crestor (rosuvastatin) restarted at reduced dose; continue statin on discharge to meet quality measures, unless contraindicated.     Recommendations: None at this time    Thank you for the consult.    Sabi Abraham, PharmD 6/9/2022 9:25 AM

## 2022-06-09 NOTE — PROVIDER NOTIFICATION
Pt's -180's systolic this AM. Neuro's note says to allow permissive HTN x24 hours but parameters for TAVR is <140 systolic. Dr. Shetty paged to see if PRN Hydralazine should be given.

## 2022-06-09 NOTE — TELEPHONE ENCOUNTER
Antonina, Pharmacy Intern is calling back to inquire regarding rx. Informed that patient is hospitalized and clinic triage will call back with response when able. America Elmore RN

## 2022-06-09 NOTE — PLAN OF CARE
"A&Ox4. BP (!) 189/77 (BP Location: Right arm)   Pulse 82   Temp 98.4  F (36.9  C) (Oral)   Resp 14   Ht 1.575 m (5' 2\")   Wt 88.5 kg (195 lb 1.6 oz)   LMP  (LMP Unknown)   SpO2 96%   BMI 35.68 kg/m   Tele SR. Pt reports headache overnight but declined intervention. Up with A1 walker and GB to BS. Q4H neuro checks. Q8H NIH scales. R side ataxia and pronator drift present. Weakness noted to R side. Bilat groin sites CDI, CMS intact. BG checked. PT HTN this AM. PRN Hydralazine IVP given. Plan for PT/OT, cardiac rehab and EP consult.         "

## 2022-06-09 NOTE — PROGRESS NOTES
[facility]  Cardiology Progress Note    Date of Service (when I saw the patient): 06/09/2022     Assessment & Plan   Ade Wilkins is a 71 year old female who was admitted on 6/7/2022 for elective TAVR.     1.  : Severe aortic Stenosis   - s/p TAVR using a 26 mm Medtronic Evolut pro valve   - Echocardiogram showed hyperdynamic EF > 70%, small LV cavity with peak gradient intracavitary 17 mmHg. Mean AoV pressure gradient of 15 mmHg, no AI.   - DAPT  - Bilateral groin sites S/F/D, no evidence of hematoma or bruit.     2.  : Transient CHB intra procedure/ LBBB  - resolved   - Evaluated by EP. No need for PPM  - EKG shows NSR w/ narrow QRS complex   - Home with 30 day event monitor.     3.  : Neurological changes s/p TAVR, concerning for CVA   - MRA neck/brain - normal   - CTA head/neck showed no dissection. Mild atherosclerotic disease in the carotid arteries. No other significant intracranial arterial stenosis.   - Neurology consult, felt to be embolic r/t procedure.   - RRT today d/t headache and vision changes, resolved after ~ 30 minutes.   - CT head/neck negative for dissection, mild atherosclerotic disease. No significant stenosis.   - Follow up with neurology outpatient.     4. CAD  - s/p PCI to RCA 4/2022  - Continue DAPT     5. HFpEF  - PTA lasix, spironolactone, verapamil.   - Net neg 1.4 L, Wt stable.     6. Hypertension  - Mildly elevated today  - PTA irbesartan resumed   7. Hyperlipidemia  - On statin     8. CKD  - Creatinine 1.48 ( baseline)     Plan  1. RRT this AM for headache w/ visual changes - now resolved. CT negative  2. Blood pressures mildly elevated. PTA irbesartan resumed.   3. Faint crackles to LLL, Wt. Stable. Continue lasix, spironolactone, and verapamil.   4. EKG shows NSR, no recurrent arrhythmias.   5. Continue Brilinta, aspirin, and statin   6. Home with MCOT monitor.   7. Monitor overnight with discharge likely in AM  8. Follow up with cardiology in 1 week,  scheduled.   9. Cardiology to sign off.       LEONEL Holloway CNP  Text Page  (M-F, 7:30 am - 4:00 pm)    Interval History    RRT this AM for visual changes, now resolved. CT neg. No cardiac complaint, denies CP, shortness of breath, palpitation, PND, orthopnea or leg edema. BP mildly elevated, PTA antihypertensives resumed. Tele stable.     Physical Exam   Temp: 98.6  F (37  C) Temp src: Oral BP: (!) 173/62 (scheduled BP meds given) Pulse: 95   Resp: 14 SpO2: 95 % O2 Device: None (Room air)    Vitals:    06/07/22 0835 06/08/22 0641 06/09/22 0455   Weight: 90.2 kg (198 lb 12.8 oz) 89.3 kg (196 lb 12.8 oz) 88.5 kg (195 lb 1.6 oz)     Vital Signs with Ranges  Temp:  [98.2  F (36.8  C)-98.8  F (37.1  C)] 98.6  F (37  C)  Pulse:  [64-95] 95  Resp:  [13-27] 14  BP: (124-189)/(48-77) 173/62  SpO2:  [94 %-97 %] 95 %  I/O last 3 completed shifts:  In: 240 [P.O.:240]  Out: 1675 [Urine:1675]    GEN:  In general, this is a overweight female in no acute distress. Patient ambulatory.  HEENT:  Pupils equal, round. Sclerae nonicteric. Clear oropharynx. Mucous membranes moist.  NECK: Supple, no masses appreciated. Trachea midline. Difficult to assess JVD  C/V:  Regular rate and rhythm, systolic murmur, no rub or gallop. No S3 or RV heave.   RESP: Respirations are unlabored. No use of accessory muscles. Fine crackles to LLL.   GI: Obese Abdomen soft, nontender, nondistended. No HSM appreciated.   EXTREM: No LE edema. No cyanosis or clubbing..   NEURO: Alert and oriented, cooperative.  No obvious focal deficits.   PSYCH: Normal affect.  SKIN: Warm and dry. No rashes or petechiae appreciated.       Medications     - MEDICATION INSTRUCTIONS -       - MEDICATION INSTRUCTIONS -         aspirin  81 mg Oral Daily     cyanocobalamin  1,000 mcg Oral QPM     furosemide  40 mg Oral Daily     insulin aspart  1-7 Units Subcutaneous TID AC     insulin aspart  1-5 Units Subcutaneous At Bedtime     insulin aspart   Subcutaneous TID AC      insulin degludec  40 Units Subcutaneous Daily     [START ON 6/10/2022] irbesartan  75 mg Oral Daily     levothyroxine  50 mcg Oral Daily     potassium chloride ER  10 mEq Oral BID     rosuvastatin  10 mg Oral QPM     sodium chloride (PF)  3 mL Intracatheter Q8H     spironolactone  12.5 mg Oral Daily     ticagrelor  90 mg Oral BID     topiramate  75 mg Oral At Bedtime     verapamil ER  180 mg Oral Daily       Data   Reviewed       Ashlie Rothman, LEONEL CNP 6/9/2022

## 2022-06-09 NOTE — PROGRESS NOTES
71 year-old white female, Medtronic TAVR on 6/7/2022.  Transient AV block during the procedure.  Post op LBBB, resolved.  No recurrence of AV block.  Pt had headache on 6/8/2022. Head MRI unremarkable.  She had visual disturbance this am. Went to head CT scan, results pending.  History of hypertension, chronic renal insufficiency, type II diabetes.    No need for pacemaker at this point unless pt has recurrent AV block or LBBB.  Sign off

## 2022-06-09 NOTE — PLAN OF CARE
Neuro:  Lethargic, Ox4. Neuros unchanged. Purposefully moving all 4 extremities but slightly weaker on R side w/ pronator drift. Ataxic with finger-to-nose on right. Possible R-sided vision deficit.   Fever/Pain: Afebrile. No c/o @ pain.   CV:  SR. Stable BPs, -130s.   Pulm: Lung sounds clear/diminished.   : Voiding adequately.  GI: Active bowel sounds. Mod carb diet, fair appetite, too sleepy for dinner. Glu levels high-100s, given sliding scale insulin.   Skin: Intact w/o blanchable redness. Aj BLE. Scattered bruising.  Plan: Continue to monitor neuros q4hr, watch for s/s of new heart block. Family at bedside updated on POC.       Goal Outcome Evaluation: improving

## 2022-06-10 ENCOUNTER — APPOINTMENT (OUTPATIENT)
Dept: CARDIOLOGY | Facility: CLINIC | Age: 72
DRG: 266 | End: 2022-06-10
Attending: NURSE PRACTITIONER
Payer: COMMERCIAL

## 2022-06-10 ENCOUNTER — APPOINTMENT (OUTPATIENT)
Dept: PHYSICAL THERAPY | Facility: CLINIC | Age: 72
DRG: 266 | End: 2022-06-10
Attending: HOSPITALIST
Payer: COMMERCIAL

## 2022-06-10 VITALS
OXYGEN SATURATION: 98 % | WEIGHT: 193.3 LBS | HEIGHT: 62 IN | RESPIRATION RATE: 24 BRPM | BODY MASS INDEX: 35.57 KG/M2 | TEMPERATURE: 97.6 F | SYSTOLIC BLOOD PRESSURE: 182 MMHG | DIASTOLIC BLOOD PRESSURE: 66 MMHG | HEART RATE: 81 BPM

## 2022-06-10 LAB
ANION GAP SERPL CALCULATED.3IONS-SCNC: 4 MMOL/L (ref 3–14)
ATRIAL RATE - MUSE: 74 BPM
ATRIAL RATE - MUSE: 86 BPM
BUN SERPL-MCNC: 26 MG/DL (ref 7–30)
CALCIUM SERPL-MCNC: 9.3 MG/DL (ref 8.5–10.1)
CHLORIDE BLD-SCNC: 110 MMOL/L (ref 94–109)
CO2 SERPL-SCNC: 24 MMOL/L (ref 20–32)
CREAT SERPL-MCNC: 1.48 MG/DL (ref 0.52–1.04)
DIASTOLIC BLOOD PRESSURE - MUSE: NORMAL MMHG
DIASTOLIC BLOOD PRESSURE - MUSE: NORMAL MMHG
ERYTHROCYTE [DISTWIDTH] IN BLOOD BY AUTOMATED COUNT: 14.2 % (ref 10–15)
GFR SERPL CREATININE-BSD FRML MDRD: 37 ML/MIN/1.73M2
GLUCOSE BLD-MCNC: 195 MG/DL (ref 70–99)
GLUCOSE BLDC GLUCOMTR-MCNC: 138 MG/DL (ref 70–99)
HCT VFR BLD AUTO: 35.2 % (ref 35–47)
HGB BLD-MCNC: 11.2 G/DL (ref 11.7–15.7)
INTERPRETATION ECG - MUSE: NORMAL
INTERPRETATION ECG - MUSE: NORMAL
MCH RBC QN AUTO: 27.5 PG (ref 26.5–33)
MCHC RBC AUTO-ENTMCNC: 31.8 G/DL (ref 31.5–36.5)
MCV RBC AUTO: 87 FL (ref 78–100)
P AXIS - MUSE: 67 DEGREES
P AXIS - MUSE: 68 DEGREES
PLATELET # BLD AUTO: 298 10E3/UL (ref 150–450)
POTASSIUM BLD-SCNC: 4.3 MMOL/L (ref 3.4–5.3)
PR INTERVAL - MUSE: 162 MS
PR INTERVAL - MUSE: 234 MS
QRS DURATION - MUSE: 150 MS
QRS DURATION - MUSE: 80 MS
QT - MUSE: 374 MS
QT - MUSE: 482 MS
QTC - MUSE: 447 MS
QTC - MUSE: 535 MS
R AXIS - MUSE: -20 DEGREES
R AXIS - MUSE: -41 DEGREES
RBC # BLD AUTO: 4.07 10E6/UL (ref 3.8–5.2)
SODIUM SERPL-SCNC: 138 MMOL/L (ref 133–144)
SYSTOLIC BLOOD PRESSURE - MUSE: NORMAL MMHG
SYSTOLIC BLOOD PRESSURE - MUSE: NORMAL MMHG
T AXIS - MUSE: 106 DEGREES
T AXIS - MUSE: 68 DEGREES
VENTRICULAR RATE- MUSE: 74 BPM
VENTRICULAR RATE- MUSE: 86 BPM
WBC # BLD AUTO: 10.1 10E3/UL (ref 4–11)

## 2022-06-10 PROCEDURE — 250N000013 HC RX MED GY IP 250 OP 250 PS 637: Performed by: HOSPITALIST

## 2022-06-10 PROCEDURE — 36415 COLL VENOUS BLD VENIPUNCTURE: CPT | Performed by: STUDENT IN AN ORGANIZED HEALTH CARE EDUCATION/TRAINING PROGRAM

## 2022-06-10 PROCEDURE — 97162 PT EVAL MOD COMPLEX 30 MIN: CPT | Mod: GP

## 2022-06-10 PROCEDURE — 93005 ELECTROCARDIOGRAM TRACING: CPT

## 2022-06-10 PROCEDURE — 97116 GAIT TRAINING THERAPY: CPT | Mod: GP

## 2022-06-10 PROCEDURE — 97530 THERAPEUTIC ACTIVITIES: CPT | Mod: GP

## 2022-06-10 PROCEDURE — 99239 HOSP IP/OBS DSCHRG MGMT >30: CPT | Performed by: HOSPITALIST

## 2022-06-10 PROCEDURE — 82310 ASSAY OF CALCIUM: CPT | Performed by: STUDENT IN AN ORGANIZED HEALTH CARE EDUCATION/TRAINING PROGRAM

## 2022-06-10 PROCEDURE — 250N000013 HC RX MED GY IP 250 OP 250 PS 637: Performed by: STUDENT IN AN ORGANIZED HEALTH CARE EDUCATION/TRAINING PROGRAM

## 2022-06-10 PROCEDURE — 93270 REMOTE 30 DAY ECG REV/REPORT: CPT

## 2022-06-10 PROCEDURE — 93010 ELECTROCARDIOGRAM REPORT: CPT | Performed by: INTERNAL MEDICINE

## 2022-06-10 PROCEDURE — 85027 COMPLETE CBC AUTOMATED: CPT | Performed by: STUDENT IN AN ORGANIZED HEALTH CARE EDUCATION/TRAINING PROGRAM

## 2022-06-10 PROCEDURE — 93272 ECG/REVIEW INTERPRET ONLY: CPT | Performed by: INTERNAL MEDICINE

## 2022-06-10 RX ORDER — IRBESARTAN 75 MG/1
75 TABLET ORAL 2 TIMES DAILY
Status: DISCONTINUED | OUTPATIENT
Start: 2022-06-10 | End: 2022-06-10 | Stop reason: HOSPADM

## 2022-06-10 RX ORDER — IRBESARTAN 150 MG/1
75 TABLET ORAL 2 TIMES DAILY
Start: 2022-06-10 | End: 2022-06-28

## 2022-06-10 RX ADMIN — VERAPAMIL HYDROCHLORIDE 180 MG: 180 TABLET, FILM COATED, EXTENDED RELEASE ORAL at 08:17

## 2022-06-10 RX ADMIN — TICAGRELOR 90 MG: 90 TABLET ORAL at 08:17

## 2022-06-10 RX ADMIN — ASPIRIN 81 MG CHEWABLE TABLET 81 MG: 81 TABLET CHEWABLE at 08:16

## 2022-06-10 RX ADMIN — LEVOTHYROXINE SODIUM 50 MCG: 50 TABLET ORAL at 08:17

## 2022-06-10 RX ADMIN — FUROSEMIDE 40 MG: 40 TABLET ORAL at 08:17

## 2022-06-10 RX ADMIN — SPIRONOLACTONE 12.5 MG: 25 TABLET ORAL at 08:16

## 2022-06-10 RX ADMIN — POTASSIUM CHLORIDE 10 MEQ: 750 TABLET, EXTENDED RELEASE ORAL at 08:17

## 2022-06-10 RX ADMIN — IRBESARTAN 75 MG: 75 TABLET ORAL at 10:46

## 2022-06-10 ASSESSMENT — ACTIVITIES OF DAILY LIVING (ADL)
ADLS_ACUITY_SCORE: 26
ADLS_ACUITY_SCORE: 29
ADLS_ACUITY_SCORE: 29
ADLS_ACUITY_SCORE: 26
ADLS_ACUITY_SCORE: 29
ADLS_ACUITY_SCORE: 29

## 2022-06-10 NOTE — PLAN OF CARE
Goal Outcome Evaluation:    VSS. Tele SR. Neuro intact. Bilateral groin sites CDI, good pulses and no bruit noted. IV out and monitor off. BP elevated, BP medication adjusted. Cardiac monitor placed by EKG tech. Medication filled here and sent home. Reviewed discharge orders, all questions and concerns addressed. Pt has all of her belongings. Pt was brought down to door 6 where her ride picked her up.

## 2022-06-10 NOTE — PLAN OF CARE
OT orders received, chart reviewed and then in consult with PT who had completed cardiac rehab/PT assessment it was determined that any skilled OT intervention needed could be addressed on OP basis as pt was discharging from hospital this AM. She had some mild residual UE deficits but did not appear to be interfering with performance of basic functional tasks. Therefore, OT to defer to OP OT and inpatient OT to sign off and complete order.

## 2022-06-10 NOTE — PROGRESS NOTES
06/10/22 0903   Quick Adds   Type of Visit Initial PT Evaluation   Living Environment   People in Home other (see comments)   Current Living Arrangements house   Home Accessibility stairs to enter home   Number of Stairs, Main Entrance 1   Stair Railings, Main Entrance none   Transportation Anticipated family or friend will provide   Living Environment Comments pt reports alone from 6AM to 2PM, otherwise sister or pt's sister's boyfriend at the home, all needs on main level, laundry in basement, pt does not drive-pt reports friend Audra Kothari drives her to appointments, does delivery grocery shopping   Self-Care   Usual Activity Tolerance good   Current Activity Tolerance fair   Equipment Currently Used at Home none   Fall history within last six months no   Activity/Exercise/Self-Care Comment pt reports able to walk around her block without difficulty   General Information   Onset of Illness/Injury or Date of Surgery 06/07/22   Referring Physician Alyse Horan, DO   Patient/Family Therapy Goals Statement (PT) to return home today, pt interested in outpt PT/OT   Pertinent History of Current Problem (include personal factors and/or comorbidities that impact the POC) 72y/o s/p TAVR, conduction abnormality post procedure, 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 PMHx: CAD with PCI to ostial RCA, DM II, insulin dependent, HFpEF, headache, muscle spasm, migraines, CKD, hypothyroidism.   General Observations resting in recliner, NAD   Cognition   Affect/Mental Status (Cognition) WNL   Orientation Status (Cognition) oriented x 4   Follows Commands (Cognition) WNL   Cognitive Status Comments appears appropriate throughout session and verbalizing good understanding of her current mobility limitations   Pain Assessment   Patient Currently in Pain No   Integumentary/Edema   Integumentary/Edema Comments pt with pitting edema B LEs-pt reports chronic LE swelling-reports typically wears  "compression socks   Range of Motion (ROM)   ROM Comment appears grossly intact   Strength (Manual Muscle Testing)   Strength Comments 5/5 B LEs, symmetrical in LEs. left UE: 5/5. right UE: 4/5 shoulder flexion, 4/5 shoulder abduction, 4/5 bicep and tricep, 4+/5 wrist flex/ext, decreased right UE -able to functionally use to  walker   Bed Mobility   Comment, (Bed Mobility) pt denies concerns-NT- up sitting up in recliner during session.   Transfers   Comment, (Transfers) CGA without FWW due to impaired balance.   Gait/Stairs (Locomotion)   Carver Level (Gait) minimum assist (75% patient effort)   Distance in Feet (Required for LE Total Joints) 10ft   Comment, (Gait/Stairs) amb in room with min A without AD, pt reaching for external support in room for balance, UE in high guard position, decreased step length and step height.   Balance   Balance Comments WBOS: 2 min, WBOS with eyes closed: 10 sec, NBOS eyes open: 30 sec with SBA-required assist to get in and out of position.   Sensory Examination   Sensory Perception Comments pt reports hx of \"pinched nerve in back\" reports when she lies flat her right LE (hip/leg/ankle) goes to sleep (x10 years)   Coordination   Coordination Comments WNL bilateral HTS, WNL alternate toe tapping, WNL left UE: KERON, finger precision tapping and finger to nose. right UE: impaired finger precision tapping, impaired finger to nose and impaired KERON. Demos right UE drift when seated with eyes closed, mild ataxia right UE with open kinetic chain tasks.   Muscle Tone   Muscle Tone no deficits were identified   Oculomotor Exam   Smooth Pursuit Normal   Clinical Impression   Criteria for Skilled Therapeutic Intervention Yes, treatment indicated   PT Diagnosis (PT) impaired gait, impaired right UE functional use   Influenced by the following impairments impaired balance, impaired activity tolerance, impaired right UE coordination and strength   Functional limitations due to " impairments impaired IND with functional mobility without use of AD   Clinical Presentation (PT Evaluation Complexity) Evolving/Changing   Clinical Presentation Rationale clinical judgement, medical status   Clinical Decision Making (Complexity) moderate complexity   Planned Therapy Interventions (PT) balance training;bed mobility training;gait training;home exercise program;home program guidelines;progressive activity/exercise;risk factor education;transfer training;strengthening;patient/family education   PT Discharge Planning   PT Discharge Recommendation (DC Rec) home with outpatient cardiac rehab;home with outpatient physical therapy;home with assist   PT Rationale for DC Rec Anticipate pt able to return home with current assist level based on currently mobility, however does demo balance impairments therefore recommend FWW issued prior to d/c. Recommend outpt PT for balance as pt typically IND without use of an AD, recommend outpt OT for follow up on pt's right UE impaired strength and coordination-kj as pt is right hand dominant. Also recommend outpt CR phase II to optimize heart health and improve endurance due to recent TAVR.   Plan of Care Review   Plan of Care Reviewed With patient   Total Evaluation Time   Total Evaluation Time (Minutes) 15   Physical Therapy Goals   PT Frequency One time eval and treatment only   PT Predicted Duration/Target Date for Goal Attainment 06/10/22   PT Goals Transfers;Gait;PT Goal 1;Cardiac Phase 1   PT: Transfers Modified independent;Sit to/from stand;Assistive device   PT: Gait 100 feet;Rolling walker;Modified independent   PT: Understanding of cardiac education to maximize quality of life, condition management, and health outcomes Patient;Verbalize   PT: Perform aerobic activity with stable cardiovascular response continuous;5 minutes;ambulation   PT: Goal 1 Pt will verbalized understanding of d/c recommendations

## 2022-06-10 NOTE — DISCHARGE SUMMARY
[facility]    Discharge Summary  Hospitalist    Date of Admission:  6/7/2022  Date of Discharge:  6/10/2022  Discharging Provider: Alyse Horan DO  Date of Service (when I saw the patient): 06/10/22    Discharge Diagnoses   Severe Aortic Stenosis status post TAVR with 26 mm Medtronic Evolut Pro+ Valve.   Conduction abnormality post procedure   Acute infarct of left frontal and cerebellar region, likely embolic  History of DM2; Insulin Dependent  Coronary Artery Disease with PCI to Ostial RCA  Essential HTN  HFpEF.  Headache  Muscle spasm  Hypothyroidism. Continue home synthroid  Migraines. Continue home topamax  CKD Stage 3b. Baseline Cr is about 1.3 - 1.4    History of Present Illness   Ade Wilkins is an 71 year old female who presented after elective aortic valve replacement via TAVR. Complicated by post-procedure related acute infarct or left frontal and cerebellar region and headache.    Hospital Course   Ade Wilkins was admitted on 6/7/2022.  The following problems were addressed during her hospitalization:    Severe Aortic Stenosis status post TAVR with 26 mm Medtronic Evolut Pro+ Valve.   Conduction abnormality post procedure  Procedure was complicated by temporary heart block that resolved prior to case being over; temporary pacemaker wire was therefore pulled. In the PACU she developed prolonged ID and LBBB. Post procedure echo showed well seated valve, no pericardial effusion. LVEF >70%, mild concentric LVH, left atrium mildly dilated.  --Cardiology appreciated, follow up on 6/16 with Ashlie Rothman  --30 day event monitor on discharge  --Aspirin, Brilinta   --recheck CBC in 1 week  --outpatient therapies  --follow up with PCP within 30 days     Acute infarct of left frontal and cerebellar region, likely embolic  Symptoms started post procedure, had difficulty with using tablet and eating with her dominant right hand. Suspect related to recent procedure and conduction  abnormality post procedure. Imaging delayed, but MRI Brain showed acute infarcts in left frontal and left cerebellar regions. MRA head and neck showed no significant stenosis  - continue ASA/brilinta as above  - Eventual goal BP <130/80  - continue PTA crestor reduce to 10mg from 20mg, discontinue PTA zetia (cholesterol very well controlled with LDL only 17)  - therapies recommend outpatient PT/OT  - 30 day event monitor on discharge  - follow up in 6-8 weeks with [facility] or other local neurology team     History of DM2; Insulin Dependent  HgbA1C is 8. PTA Metformin, Jardiance, lantus, insulin  --resume PTA meds     Coronary Artery Disease with PCI to Ostial RCA  --Continue Aspirin, Brilinta, reduced dose crestor given very low LDL (increased risk of ICH with LDL<40)  - recheck lipids in 3mo     Essential HTN  --PTA irbesartan and spironolactone resumed, irbesartan increased to 75mg BID given elevated BPs  --PTA verapamil continued  - check BMP in one week    HFpEF.  --PTA lasix, spironolactone resumed     Headache- resolved  Muscle spasm- resolved  PRN tylenol     Chronic Issues  Hypothyroidism. Continue home synthroid  Migraines. Continue home topamax  CKD Stage 3b. Baseline Cr is about 1.3 - 1.4    Alyse Horan, DO    Significant Results and Procedures   6/7 TAVR, complicated by heart block and then acute stroke    Pending Results   NA    Code Status   Full Code       Primary Care Physician   Judith Aviles    Physical Exam   Temp: 97.6  F (36.4  C) Temp src: Oral BP: (!) 182/66 Pulse: 81   Resp: 24 SpO2: 98 % O2 Device: None (Room air)    Vitals:    06/08/22 0641 06/09/22 0455 06/10/22 0355   Weight: 89.3 kg (196 lb 12.8 oz) 88.5 kg (195 lb 1.6 oz) 87.7 kg (193 lb 4.8 oz)     Vital Signs with Ranges  Temp:  [97.6  F (36.4  C)-97.8  F (36.6  C)] 97.6  F (36.4  C)  Pulse:  [73-93] 81  Resp:  [9-25] 24  BP: (148-182)/(57-66) 182/66  SpO2:  [96 %-98 %] 98 %  I/O last 3 completed shifts:  In: 900 [P.O.:900]  Out:  1000 [Urine:1000]    Patient seen and examined. No chest pain, shortness of breath, nausea, vomiting, headache. BP a little high this morning, plan to increase irbesartan which she is amenable to. Worked with therapies and recommended outpatient therapy. Stable for discharge to home.    Constitutional: Awake, alert, cooperative, no apparent distress.  Eyes: Conjunctiva and pupils examined and normal.  HEENT: Moist mucous membranes, normal dentition.  Respiratory: Clear to auscultation bilaterally, no crackles or wheezing.  Cardiovascular: Regular rate and rhythm, normal S1 and S2, and + systolic murmur  GI: Soft, non-distended, non-tender, normal bowel sounds.  Lymph/Hematologic: No anterior cervical or supraclavicular adenopathy.  Skin: No rashes, no cyanosis, +1 lower extremity edema.  Musculoskeletal: No joint swelling, erythema or tenderness.  Neurologic: Cranial nerves 2-12 intact, normal strength and sensation.  Psychiatric: Alert, oriented to person, place and time, no obvious anxiety or depression.    Discharge Disposition   Discharged to home  Condition at discharge: Stable    Consultations This Hospital Stay   CARDIOLOGY IP CONSULT  CARE MANAGEMENT / SOCIAL WORK IP CONSULT  PHYSICAL THERAPY ADULT IP CONSULT  ELECTROPHYSIOLOGY IP CONSULT  CARDIAC REHAB IP CONSULT  CARE MANAGEMENT / SOCIAL WORK IP CONSULT  HOSPITALIST IP CONSULT  NEURO STROKE VIDEO CONNECTION  PATIENT LEARNING CENTER IP CONSULT  SPEECH LANGUAGE PATH ADULT IP CONSULT  PHARMACY IP CONSULT  PHARMACY IP CONSULT  PHARMACY IP CONSULT  PHYSICAL THERAPY ADULT IP CONSULT  OCCUPATIONAL THERAPY ADULT IP CONSULT  REHAB ADMISSIONS LIAISON IP CONSULT  CARE MANAGEMENT / SOCIAL WORK IP CONSULT  SMOKING CESSATION PROGRAM IP CONSULT    Time Spent on this Encounter   Alyse LIMA DO, personally saw the patient today and spent greater than 30 minutes discharging this patient.    Discharge Orders      CBC with platelets     Basic metabolic panel     Lipid  panel     CARDIAC REHAB REFERRAL      Physical Therapy Referral      Occupational Therapy Referral      Adult Neurology  Referral      Reason for your hospital stay    Came in for elective TAVR for aortic stenosis. Complicated by acute stroke later that night, which prolonged hospitalization     Follow-up and recommended labs and tests     Follow up with primary care provider, Judith Aviles, within 7-30 days for hospital follow- up.  The following labs/tests are recommended: CBC, BMP during follow-up with cardiology. Lipid profile in 3mo    Follow up with cardiology on 6/16 for post TAVR follow-up    Follow up with neurology in 6-8 weeks, [facility].     Activity    Your activity upon discharge: activity as tolerated     Monitor and record    blood pressure daily, keep a log of blood pressure. Eventual goal is <130/80     Discharge Instructions    1. Your cholesterol is quite low. Sometimes this can be too low in setting of stroke and increase your risk for brain bleed. Stop your zetia and decrease your dose of atorvastatin to 10mg once daily instead of 20mg.    2. Increase your irbesartan to 75mg TWICE daily. If you have low blood pressure during the day or feel lightheaded, you can change the irbesartan so you are taking 150mg once at bedtime.  Bring a log of blood pressures to your next cardiology appointment as there may be more adjustments made.     Cardiac Event Monitor Adult/Pediatric     Walker Order    DME Documentation:   Describe the reason for need to support medical necessity: impaired gait, impaired balance.     I, the undersigned, certify that the above prescribed supplies are medically necessary for this patient and is both reasonable and necessary in reference to accepted standards of medical and necessary in reference to accepted standards of medical practice in the treatment of this patient's condition and is not prescribed as a convenience.     Diet    Follow  this diet upon discharge: Moderate Consistent Carb (60 g CHO per Meal) Diet     Discharge Medications   Current Discharge Medication List      CONTINUE these medications which have CHANGED    Details   irbesartan (AVAPRO) 150 MG tablet Take 0.5 tablets (75 mg) by mouth 2 times daily (150MG X 0.5 = 75MG)    Associated Diagnoses: Benign essential hypertension      rosuvastatin (CRESTOR) 20 MG tablet Take 0.5 tablets (10 mg) by mouth daily  Qty: 90 tablet, Refills: 1    Associated Diagnoses: Pure hypercholesterolemia; Type 2 diabetes mellitus with hyperglycemia, without long-term current use of insulin (H)         CONTINUE these medications which have NOT CHANGED    Details   aspirin (ASA) 81 MG chewable tablet Take 1 tablet (81 mg) by mouth daily Starting tomorrow.  Qty: 30 tablet, Refills: 3    Associated Diagnoses: Coronary artery disease involving native coronary artery of native heart with angina pectoris (H)      augmented betamethasone dipropionate (DIPROLENE-AF) 0.05 % external ointment Apply topically to affected area twice daily for 3-4 weeks then use as needed  Qty: 45 g, Refills: 0    Associated Diagnoses: Venous stasis dermatitis of both lower extremities      calcium carbonate 600 mg-vitamin D 400 units (CALTRATE) 600-400 MG-UNIT per tablet Take 1 tablet by mouth in the morning and 1 tablet in the evening.      cetirizine (ZYRTEC) 10 MG tablet Take 1 tablet (10 mg) by mouth daily    Associated Diagnoses: Allergic rhinitis, cause unspecified      cyanocolbalamin (VITAMIN B-12) 1000 MCG tablet Take 1,000 mcg by mouth in the morning.      Dulaglutide 3 MG/0.5ML SOPN Inject 3 mg Subcutaneous once a week  Qty: 6 mL, Refills: 3    Associated Diagnoses: Poorly controlled type 2 diabetes mellitus with neuropathy (H)      empagliflozin (JARDIANCE) 10 MG TABS tablet Take 1 tablet (10 mg) by mouth daily  Qty: 30 tablet, Refills: 5    Associated Diagnoses: Type 2 diabetes mellitus with diabetic neuropathy, with  long-term current use of insulin (H)      fluticasone (FLONASE) 50 MCG/ACT nasal spray SHAKE LIQUID AND USE 2 SPRAYS IN EACH NOSTRIL DAILY  Qty: 48 g, Refills: 2    Associated Diagnoses: Upper respiratory tract infection, unspecified type      furosemide (LASIX) 40 MG tablet TAKE ONE TABLET BY MOUTH ONE TIME DAILY  Qty: 90 tablet, Refills: 0    Associated Diagnoses: Aortic valve stenosis, etiology of cardiac valve disease unspecified      HUMALOG KWIKPEN 100 UNIT/ML soln INJECT  Per sliding scale UP TO 60 UNITS UNDER THE SKIN DAILY.  Qty: 60 mL, Refills: 3    Associated Diagnoses: Poorly controlled type 2 diabetes mellitus with neuropathy (H)      insulin degludec (TRESIBA) 200 UNIT/ML pen Inject 44 Units Subcutaneous daily  Qty: 40 mL, Refills: 3    Associated Diagnoses: Type 2 diabetes mellitus with diabetic neuropathy, with long-term current use of insulin (H)      levothyroxine (SYNTHROID/LEVOTHROID) 50 MCG tablet Take 1 tablet (50 mcg) by mouth daily  Qty: 90 tablet, Refills: 3    Associated Diagnoses: Hypothyroidism, unspecified type      metFORMIN (GLUCOPHAGE) 1000 MG tablet Take 1,000 mg by mouth daily  Qty: 180 tablet      potassium chloride ER (KLOR-CON M) 10 MEQ CR tablet Take 1 tablet (10 mEq) by mouth 2 times daily  Qty: 360 tablet, Refills: 3    Associated Diagnoses: Essential hypertension      spironolactone (ALDACTONE) 25 MG tablet Take 0.5 tablets (12.5 mg) by mouth in the morning.  Qty: 90 tablet, Refills: 3    Associated Diagnoses: Benign essential hypertension      ticagrelor (BRILINTA) 90 MG tablet Take 1 tablet (90 mg) by mouth 2 times daily Start this evening.  Qty: 180 tablet, Refills: 3    Associated Diagnoses: Coronary artery disease involving native coronary artery of native heart with angina pectoris (H)      topiramate (TOPAMAX) 25 MG tablet Take 3 tablets (75 mg) by mouth At Bedtime  Qty: 270 tablet, Refills: 1    Associated Diagnoses: Intractable chronic migraine without aura and  without status migrainosus      traZODone (DESYREL) 50 MG tablet take 1 to 2 tablets (50 to 100mg) by mouth nightly as needed  Qty: 180 tablet, Refills: PRN    Associated Diagnoses: Bariatric surgery status      triamcinolone (KENALOG) 0.1 % external ointment Apply topically 2 times daily To left foot rash  Qty: 30 g, Refills: 2    Associated Diagnoses: Dermatitis      verapamil ER (CALAN-SR) 180 MG CR tablet Take 1 tablet (180 mg) by mouth in the morning.  Qty: 180 tablet, Refills: 0      Vitamin D3 (CHOLECALCIFEROL) 25 mcg (1000 units) tablet Take 1 tablet by mouth 2 times daily      VITRON-C  MG TABS tablet TAKE TWO TABLETS BY MOUTH TWICE DAILY   Qty: 360 tablet, Refills: 1    Associated Diagnoses: Iron deficiency anemia, unspecified iron deficiency anemia type      Continuous Blood Gluc  (FREESTYLE BALWINDER 14 DAY READER) HENNA 1 each 3 times daily  Qty: 1 each, Refills: 1      Continuous Blood Gluc Sensor (FREESTYLE BALWINDER 14 DAY SENSOR) MISC 1 each every 14 days  Qty: 2 each, Refills: 11    Associated Diagnoses: Well controlled type 2 diabetes mellitus with nephropathy (H)      insulin pen needle (BD FCO U/F) 32G X 4 MM miscellaneous Use 4 pen needles daily or as directed.  Qty: 400 each, Refills: 0    Associated Diagnoses: Type 2 diabetes mellitus with diabetic neuropathy, with long-term current use of insulin (H)         STOP taking these medications       ezetimibe (ZETIA) 10 MG tablet Comments:   Reason for Stopping:             Allergies   Allergies   Allergen Reactions     Norvasc [Amlodipine] Other (See Comments)     hairloss     Chlorhexidine Rash     Clonidine Headache     Doxazosin Mesylate Rash     Fexofenadine Hydrochloride Headache     Gemfibrozil Rash     Lisinopril Angioedema     Pioglitazone Hydrochloride Swelling     ankle edema     Data   Most Recent 3 CBC's:  Recent Labs   Lab Test 06/10/22  0608 06/09/22  0541 06/08/22  0454   WBC 10.1 9.0 9.5   HGB 11.2* 10.5* 10.4*   MCV 87 87  89    283 274      Most Recent 3 BMP's:  Recent Labs   Lab Test 06/10/22  0608 06/10/22  0222 06/09/22 2057 06/09/22  0810 06/09/22  0541 06/08/22  1206 06/08/22  0454     --   --   --  142  --  139   POTASSIUM 4.3  --   --   --  4.3  --  4.2   CHLORIDE 110*  --   --   --  113*  --  112*   CO2 24  --   --   --  24  --  22   BUN 26  --   --   --  30  --  35*   CR 1.48*  --   --   --  1.48*  --  1.55*   ANIONGAP 4  --   --   --  5  --  5   RASHEEDA 9.3  --   --   --  9.1  --  9.2   * 138* 186*   < > 171*   < > 216*    < > = values in this interval not displayed.     Most Recent 2 LFT's:  Recent Labs   Lab Test 06/08/22  0454 06/01/22  1223   AST 42 87*   ALT 63* 160*   ALKPHOS 89 111   BILITOTAL 0.4 0.7     Most Recent INR's and Anticoagulation Dosing History:  Anticoagulation Dose History     Recent Dosing and Labs Latest Ref Rng & Units 5/4/2022 6/1/2022    INR 0.85 - 1.15 0.97 1.09        Most Recent 3 Troponin's:No lab results found.  Most Recent Cholesterol Panel:  Recent Labs   Lab Test 06/08/22  1056   CHOL 67   LDL 17   HDL 28*   TRIG 109     Most Recent 6 Bacteria Isolates From Any Culture (See EPIC Reports for Culture Details):No lab results found.  Most Recent TSH, T4 and A1c Labs:  Recent Labs   Lab Test 06/08/22  1056 11/24/21  1508 08/27/21  0815 10/14/19  1315 04/23/19  1029   TSH  --   --  2.37   < > 4.06*   T4  --   --   --   --  1.10   A1C 8.0*   < > 8.0*   < > 8.7*    < > = values in this interval not displayed.     Results for orders placed or performed during the hospital encounter of 06/07/22   MR Brain w/o & w Contrast    Narrative    MRI OF THE BRAIN WITHOUT AND WITH CONTRAST 6/8/2022 10:14 AM     COMPARISON: None.    HISTORY:  Neuro deficit, acute, stroke suspected.     TECHNIQUE: Axial diffusion-weighted with ADC map, axial T2-weighted  with fat saturation, axial T1-weighted, axial turboFLAIR and coronal  T1-weighted images of the brain were acquired without  intravenous  contrast.  Following intravenous administration of gadolinium  (gadobutrol (GADAVIST) injection 10 mL), axial T1-weighted images of  the brain were acquired.     FINDINGS: Evaluation is mildly limited by patient motion.    There are scattered recent ischemic infarcts in the posterolateral  left frontal lobe, deep white matter of the anterior left frontal lobe  and in the left cerebellar hemisphere suggesting embolic infarcts from  a central embolic source.    There is mild diffuse cerebral volume loss. There are minimal patchy  periventricular areas of abnormal T2 signal hyperintensity in the  cerebral white matter bilaterally that are consistent with sequela of  chronic small vessel ischemic disease. The ventricles and basal  cisterns are within normal limits in configuration given the degree of  cerebral volume loss.  There is no midline shift.  There are no  extra-axial fluid collections.  There is no evidence for acute  intracranial hemorrhage.  There is no abnormal contrast enhancement in  the brain or its coverings.     There is no sinusitis or mastoiditis.      Impression    IMPRESSION:   1. Recent ischemic infarcts in the left frontal lobe and left  cerebellar hemisphere suggesting embolic infarcts from a central  embolic source.  2. Diffuse cerebral volume loss and cerebral white matter changes  consistent with chronic small vessel ischemic disease.     MIKEY WALTON MD         SYSTEM ID:  [id number]   MRA Neck (Carotids) wo & w Contrast    Narrative    MRA NECK WITHOUT AND WITH CONTRAST June 8, 2022 10:15 AM     HISTORY: Neuro deficit, acute, stroke suspected.    TECHNIQUE: 2D time-of-flight MR angiogram of the neck without contrast  and 3D MR angiogram of the neck with gadobutrol (Gadavist) injection  10 mL gadolinium IV contrast. MIP reconstruction of all MR  angiographic data was performed. Estimates of carotid stenoses are  made relative to the distal internal carotid artery diameters  except  as noted.    COMPARISON: None.    FINDINGS:    Carotids: The common carotid arteries bilaterally are widely patent.  The cervical internal carotid arteries bilaterally are patent without  stenosis.    Vertebrals: The vertebral arteries bilaterally are patent without  stenosis and demonstrate antegrade flow.    Aortic arch: The arteries as they arise from the aortic arch are  normally arranged with no evidence for stenosis.      Impression    IMPRESSION: Normal MR angiogram of the neck.    MIKEY WALTON MD         SYSTEM ID:  [id number]   MRA Brain ([place]) wo Contrast    Narrative    MR ANGIOGRAM OF THE HEAD WITHOUT CONTRAST June 8, 2022 10:15 AM     HISTORY: Neuro deficit, acute, stroke suspected.    TECHNIQUE: 3D time-of-flight MR angiogram of the head without  contrast. MIP reconstruction of all MR angiographic data was  performed.    COMPARISON: None.    FINDINGS: This study is mildly limited by patient motion. The  bilateral distal internal carotid, basilar, bilateral anterior  cerebral, bilateral middle cerebral and bilateral posterior cerebral  arteries are patent and unremarkable with no evidence for cerebral  artery stenosis or aneurysm.       Impression    IMPRESSION:  1. Mildly limited study due to patient motion.  2. Grossly normal [place] MRA.    MIKEY WALTON MD         SYSTEM ID:  [id number]   CT Head w/o Contrast    Narrative    CT SCAN OF THE HEAD WITHOUT CONTRAST June 9, 2022 8:32 AM     HISTORY: Altered mental status. Recent strokes.    TECHNIQUE: Axial images of the head and coronal reformations without  IV contrast material. Radiation dose for this scan was reduced using  automated exposure control, adjustment of the mA and/or kV according  to patient size, or iterative reconstruction technique.    COMPARISON: Brain MR 6/8/2022.    FINDINGS: Cortical hypodensity in the left perirolandic region  corresponding with area of infarct seen on prior brain MR.  Additional  smaller infarcts on previous brain MRI are not well appreciated by  head CT. No evidence of hemorrhagic transformation of infarct. No  significant mass effect or midline shift. Ventricular size is within  normal limits without evidence of hydrocephalus. No abnormal  extra-axial fluid collection.    The visualized portions of the sinuses and mastoids appear normal. The  bony calvarium and bones of the skull base appear intact.       Impression    IMPRESSION:     1. No evidence of hemorrhagic transformation of recent infarcts. No  mass effect, midline shift, or herniation.  2. Hypodensity in the left perirolandic region corresponds to the area  of infarct seen on brain MR. Additional smaller infarcts are not well  appreciated by this head CT.      MARCIN CLEARY MD         SYSTEM ID:  [id number]   CTA Head Neck with Contrast    Narrative    CT ANGIOGRAM OF THE HEAD AND NECK WITH CONTRAST 6/9/2022 9:16 AM     HISTORY: Neurological deficit, acute, stroke suspected.    TECHNIQUE: CT angiography with an injection of 75 mL [id number] IV  with scans through the head and neck. Images were transferred to a  separate 3-D workstation where multiplanar reformations and 3-D images  were created. Estimates of carotid stenoses are made relative to the  distal internal carotid artery diameters except as noted. Radiation  dose for this scan was reduced using automated exposure control,  adjustment of the mA and/or kV according to patient size, or iterative  reconstruction technique.     COMPARISON: MRA head and neck 6/8/2022.     CT HEAD FINDINGS: No contrast enhancing lesions. Cerebral blood flow  is grossly normal.     CT ANGIOGRAM HEAD FINDINGS: The major intracranial arteries including  the proximal branches of the anterior cerebral, middle cerebral, and  posterior cerebral arteries appear patent without vascular cutoff. No  aneurysm identified. No significant stenosis. Venous circulation is  unremarkable.     CT  ANGIOGRAM NECK FINDINGS: Scattered atherosclerotic calcification in  the aortic arch without significant stenosis at the origins of the  great vessels.     Right carotid artery: The right common and internal carotid arteries  are patent. Mild atherosclerotic disease at the carotid bifurcation  and proximal internal carotid artery without significant stenosis by  NASCET criteria.     Left carotid artery: The left common and internal carotid arteries are  patent. Mild atherosclerotic disease at the carotid bifurcation and  proximal internal carotid artery without significant stenosis by  NASCET criteria.     Vertebral arteries: Vertebral arteries are patent without evidence of  dissection. No significant stenosis.     Other findings: Degenerative changes in the spine particularly at  C5-C6.       Impression    IMPRESSION:   1. Patent arteries in the neck without evidence of dissection. Mild  atherosclerotic disease in the carotid arteries bilaterally without  significant stenosis by NASCET criteria.  2. Patent proximal major intracranial arteries without vascular  cutoff. No significant stenosis. No aneurysm identified.       MARCIN CLEARY MD         SYSTEM ID:  [id number]   Echocardiogram Complete     Value    LVEF  >70%    [facility]    [id number]  [id number]  [id number]  [id number]^DALI     [facility]  Echocardiography Laboratory  [address] [place] [address]     Name: RENE MCCARTNEY  MRN: [id number]  : 1950  Study Date: 2022 11:09 AM  Age: 71 yrs  Gender: Female  Patient Location: [facility]  Reason For Study: Aortic Valve Replacement  Ordering Physician: DALI SMITH  Referring Physician: AYDIN BELL  Performed By: Suyapa Douglas     BSA: 1.9 m2  Height: 62 in  Weight: 198 lb  HR: 88  BP: 143/54 mmHg  ______________________________________________________________________________  Procedure  Complete Echo  Adult.  ______________________________________________________________________________  Interpretation Summary     Hyperdynamic left ventricular function  The visual ejection fraction is >70%. The left ventricular cavity is small.  The peak gradient intracavitary is 17 mmHg.  The right ventricle is normal in structure, function and size.  Medtronic Evolut Pro+ valve in AV position. Mean gradient is 15 mmHg. No  valvular or perivalvular AI. The vmax is 2.5 m/sec and mean gradient 15 mmHg,  DVI 0.53. Elevated gradient likely seen in setting of high stroke volume index  and hyperdynamic left ventricular function.  The inferior vena cava was normal in size with preserved respiratory  variability.  There is no pericardial effusion.     Compared to post procedural TIKI 6/7/2022, the gradient is slightly elevated  however this can be seen in the setting of hyperdynamic left ventricular  function.  ______________________________________________________________________________  Left Ventricle  The left ventricular cavity is small. There is mild concentric left  ventricular hypertrophy. Peak gradient intracavitary is 17 mmHg. Hyperdynamic  left ventricular function. The visual ejection fraction is >70%. Grade I or  early diastolic dysfunction. Normal left ventricular wall motion.     Right Ventricle  The right ventricle is normal in structure, function and size.     Atria  The left atrium is mildly dilated. Right atrial size is normal.     Mitral Valve  There is moderate mitral annular calcification.     Tricuspid Valve  The tricuspid valve is normal in structure and function. Right ventricular  systolic pressure could not be approximated due to inadequate tricuspid  regurgitation.     Aortic Valve  The prosthetic aortic valve is well-seated. Medtronic Evolut Pro+ valve in AV  position. Mean gradient is 15 mmHg. No valvular or perivalvular AI. The vmax  is 2.5 m/sec and mean gradient 15 mmHg, DVI 0.53. Elevated gradient  likely  seen in setting of high stroke volume index and hyperdynamic left ventricular  function.     Pulmonic Valve  The pulmonic valve is normal in structure and function.     Vessels  The aortic root is normal size. Normal size ascending aorta. The inferior vena  cava was normal in size with preserved respiratory variability.     Pericardium  There is no pericardial effusion.     ______________________________________________________________________________  MMode/2D Measurements & Calculations  IVSd: 1.1 cm  LVIDd: 4.5 cm  LVIDs: 2.8 cm  LVPWd: 0.99 cm  FS: 38.9 %  LV mass(C)d: 163.9 grams  LV mass(C)dI: 86.1 grams/m2     asc Aorta Diam: 3.2 cm  LVOT diam: 1.9 cm  LVOT area: 2.7 cm2  LA Volume (BP): 75.7 ml  LA Volume Index (BP): 39.8 ml/m2  RWT: 0.44     Doppler Measurements & Calculations  MV E max alexandro: 85.2 cm/sec  MV A max alexandro: 148.8 cm/sec  MV E/A: 0.57  MV max PG: 10.6 mmHg  MV mean P.4 mmHg  MV V2 VTI: 29.3 cm  MVA(VTI): 2.5 cm2  Ao V2 max: 262.0 cm/sec  Ao max P.0 mmHg  Ao V2 mean: 182.9 cm/sec  Ao mean PG: 15.0 mmHg  Ao V2 VTI: 49.3 cm  ARMIDA(I,D): 1.5 cm2  ARMIDA(V,D): 1.4 cm2     LV V1 max P.2 mmHg  LV V1 max: 134.6 cm/sec  LV V1 VTI: 27.3 cm  SV(LVOT): 74.0 ml  SI(LVOT): 38.9 ml/m2  PA V2 max: 157.5 cm/sec  PA max P.9 mmHg  PA acc time: 0.08 sec  AV Alexandro Ratio (DI): 0.51  ARMIDA Index (cm2/m2): 0.79  Lateral E/e': 10.0     ______________________________________________________________________________  Report approved by: Ebony Green 2022 01:58 PM         Cardiac Catheterization    Narrative      Successful transfemoral transcatheter aortic valve replacement with a   26mm Medtronic Evolut PRO+ valve.    Acute on chronic diastolic heart failure. LVEDP 18 mmHg.    Post-procedure conduction delay (1st deg AV block  msec, LBBB QRS   150 msec)

## 2022-06-11 DIAGNOSIS — Z71.89 OTHER SPECIFIED COUNSELING: ICD-10-CM

## 2022-06-12 ENCOUNTER — PATIENT OUTREACH (OUTPATIENT)
Dept: CARE COORDINATION | Facility: CLINIC | Age: 72
End: 2022-06-12
Payer: COMMERCIAL

## 2022-06-12 DIAGNOSIS — I35.0 AORTIC VALVE STENOSIS, ETIOLOGY OF CARDIAC VALVE DISEASE UNSPECIFIED: ICD-10-CM

## 2022-06-12 LAB
ATRIAL RATE - MUSE: 82 BPM
ATRIAL RATE - MUSE: 88 BPM
DIASTOLIC BLOOD PRESSURE - MUSE: NORMAL MMHG
DIASTOLIC BLOOD PRESSURE - MUSE: NORMAL MMHG
INTERPRETATION ECG - MUSE: NORMAL
INTERPRETATION ECG - MUSE: NORMAL
P AXIS - MUSE: 45 DEGREES
P AXIS - MUSE: 57 DEGREES
PR INTERVAL - MUSE: 174 MS
PR INTERVAL - MUSE: 174 MS
QRS DURATION - MUSE: 84 MS
QRS DURATION - MUSE: 84 MS
QT - MUSE: 374 MS
QT - MUSE: 384 MS
QTC - MUSE: 448 MS
QTC - MUSE: 452 MS
R AXIS - MUSE: -10 DEGREES
R AXIS - MUSE: -13 DEGREES
SYSTOLIC BLOOD PRESSURE - MUSE: NORMAL MMHG
SYSTOLIC BLOOD PRESSURE - MUSE: NORMAL MMHG
T AXIS - MUSE: 68 DEGREES
T AXIS - MUSE: 75 DEGREES
VENTRICULAR RATE- MUSE: 82 BPM
VENTRICULAR RATE- MUSE: 88 BPM

## 2022-06-14 ENCOUNTER — HOSPITAL ENCOUNTER (OUTPATIENT)
Dept: EDUCATION SERVICES | Facility: CLINIC | Age: 72
Discharge: HOME | End: 2022-06-14
Payer: COMMERCIAL

## 2022-06-14 RX ORDER — FUROSEMIDE 40 MG
TABLET ORAL
Qty: 90 TABLET | Refills: 0 | Status: SHIPPED | OUTPATIENT
Start: 2022-06-14 | End: 2022-10-10

## 2022-06-14 NOTE — CONSULTS
06/14/22 1429 Klisch, Christine M, RN     Stroke Education Note     The following information has been reviewed with the patient:     1. Warning signs of stroke     2. Calling 911 if having warning signs of stroke     3. All modifiable risk factors: hypertension, CAD, atrial fib, diabetes, hypercholesterolemia, smoking, substance abuse, diet, physical inactivity, obesity, sleep apnea.     4. Patient's risk factors for stroke which include: HTN,HLD,DM,CAD,diet,obesity     5. Follow-up plan for after discharge     6. Discharge medications which include: Verapamil,avapro,aldactone,insulin,crestor,aspirin,brilinta     In addition, the above information was given to the patient in writing as a part of the [facility] Stroke Class Handout.     Learner's response to risk factors / lifestyle modification education: Taking steps      Christine M Klisch, RN

## 2022-06-14 NOTE — TELEPHONE ENCOUNTER
Routing refill request to provider for review/approval because:  Labs out of range:    Creatinine   Date Value Ref Range Status   06/10/2022 1.48 (H) 0.52 - 1.04 mg/dL Final   04/13/2021 1.38 (H) 0.52 - 1.04 mg/dL Final     BP Readings from Last 3 Encounters:   06/10/22 (!) 182/66   06/01/22 122/69   05/16/22 133/71       Ronda Story RN

## 2022-06-16 ENCOUNTER — OFFICE VISIT (OUTPATIENT)
Dept: CARDIOLOGY | Facility: CLINIC | Age: 72
End: 2022-06-16
Attending: INTERNAL MEDICINE
Payer: COMMERCIAL

## 2022-06-16 VITALS
HEART RATE: 82 BPM | SYSTOLIC BLOOD PRESSURE: 113 MMHG | WEIGHT: 192 LBS | DIASTOLIC BLOOD PRESSURE: 62 MMHG | BODY MASS INDEX: 34.02 KG/M2 | HEIGHT: 63 IN

## 2022-06-16 DIAGNOSIS — H53.9 VISION CHANGES: ICD-10-CM

## 2022-06-16 DIAGNOSIS — E78.00 PURE HYPERCHOLESTEROLEMIA: ICD-10-CM

## 2022-06-16 DIAGNOSIS — E66.9 OBESITY (BMI 30-39.9): ICD-10-CM

## 2022-06-16 DIAGNOSIS — I10 BENIGN ESSENTIAL HYPERTENSION: ICD-10-CM

## 2022-06-16 DIAGNOSIS — I35.0 SEVERE AORTIC STENOSIS: Primary | ICD-10-CM

## 2022-06-16 DIAGNOSIS — E11.40 TYPE 2 DIABETES MELLITUS WITH DIABETIC NEUROPATHY, UNSPECIFIED WHETHER LONG TERM INSULIN USE (H): ICD-10-CM

## 2022-06-16 DIAGNOSIS — Z95.2 S/P TAVR (TRANSCATHETER AORTIC VALVE REPLACEMENT): ICD-10-CM

## 2022-06-16 PROCEDURE — 99214 OFFICE O/P EST MOD 30 MIN: CPT | Performed by: NURSE PRACTITIONER

## 2022-06-16 NOTE — PROGRESS NOTES
Cardiology Clinic Progress Note  Ade Wilkins MRN# [id number]   YOB: 1950 Age: 71 year old     Reason For Visit:  1 week post TAVR   Primary Cardiologist:   Dr. Lewis           History of Presenting Illness:      Ade Wilkins is a pleasant 71 year old patient who carries a past medical history significant for severe aortic stenosis (mean gradient of 46 mmHg, aortic valve area of 0.9 cm ), coronary artery disease status post PCI to the RCA (5/4/2022), hypertension, chronic HFpEF, chronic lymphedema, chronic kidney disease, type 2 diabetes, hyperlipidemia.    On 6/7/2022, she underwent elective trans catheter aortic valve replacement using a 26 mm Medtronic Evolut pro valve. Her procedure was complicated by transient CHB/ LBBB which resolved by the end of the procedure. She was evaluated by EP who did not feel a pacemaker was indicated but recommended a 30 day event monitor at discharge. Unfortunately, she developed visual changes, concerning for CVA. She was evaluated by neurology and underwent a normal brain/ neck MRI and CT. Symptoms were felt to be embolic r/t procedure.  Post procedure echocardiogram showed a hyperdynamic LV estimated at greater than 70%,small LV cavity with peak gradient intracavitary 17 mmHg, normal RV size and function, a well-seated bioprosthetic aortic valve with a mean gradient of 15 mmHg and no paravalvular or valvular AI.    She returns to the office today for a 1 week post TAVR follow up. Her primary complaint is intermittent exertional shortness of breath. She denies chest pain,  PND, orthopnea, presyncope, syncope,  heart racing, or palpitations. Upon exam, lungs are clear bilaterally, heart rate and rhythm regular, no palpitations, and chronic bilateral LE edema managed with compression stockings and diuretics. Bilateral groin sites, S/F/D without evidence of hematoma or bruit.     Blood pressure is well controlled at 113/62  BMP showed a sodium of 138, potassium  4.3, BUN 26, creatinine 1.48, GFR 37  Hemoglobin 11.2, hematocrit 35.2, WBC 10.1, and platelet 298  Lipid panel is excellent with a total cholesterol of 67, HDL 28, LDL 17, and triglycerides 109  BMI 34.56    Activity is primarily sedentary.  She is scheduled for cardiac rehab beginning next week  Remains compliant with all medications.                   Assessment and Plan:     1.  Severe aortic stenosis -status post TAVR using a 26 mm Medtronic evolute pro valve.  Echocardiogram showed hyperdynamic LV with ejection fraction estimated at greater than 70%, small LV cavity with peak gradient intracavitary 17 mmHg.  Mean AOV pressure gradient of 15 mmHg, no paravalvular or valvular AI.  Continue dual antiplatelet therapy.  Cardiac rehab beginning next week.  Encourage walking 15 minutes twice daily.  Follow-up echocardiogram in 3 weeks.    2.  Transient complete heart block intra procedure -   Currently wearing 30-day event monitor  3.  Neurological changes status post TAVR -negative MRA neck and brain and negative CTA head and neck.  Follow-up with neurology outpatient.  4.  Coronary artery disease -status post PCI to RCA on  5/4/2022.  Continue dual antiplatelet therapy x12 months uninterrupted post PCI and statin.   5.  HFpEF -weight stable at 192 pounds (baseline 200 pounds).  Clear lung sounds bilaterally.  Intermittent exertional dyspnea.  Chronic bilateral ankle edema unchanged from baseline.  Continue furosemide, irbesartan, spironolactone, and verapamil. Ok to take additional lasix for worsening shortness of breath, weight gain of 3 pounds overnight or 5 pounds in 1 week.     6.  Hypertension -well controlled  7.  Hyperlipidemia-on statin         Thank you for allowing me to participate in this delightful patient's care.   Follow-up in 3 weeks for a 1 month post-TAVR follow-up with echo and labs prior.       LEONEL Holloway CNP         Review of Systems:     Review of Systems:  Skin:  Negative     Eyes:   Negative    ENT:  Negative    Respiratory:    shortness of breath;dyspnea on exertion  Cardiovascular:  Negative    Gastroenterology: Negative    Genitourinary:    urinary frequency  Musculoskeletal:  Positive for muscular weakness  Neurologic:    migraine headaches  Psychiatric:  Negative    Heme/Lymph/Imm:  Negative    Endocrine:    diabetes              Physical Exam:     GEN:  In general, this is a morbid obese female in no acute distress.  HEENT:  Pupils equal, round. Sclerae nonicteric. Clear oropharynx. Mucous membranes moist.  NECK: Supple, no masses appreciated. Trachea midline. No JVD   C/V:  Regular rate and rhythm, systolic murmur, no rub or gallop. No S3 or RV heave.   RESP: Respirations are unlabored. No use of accessory muscles. Clear to auscultation bilaterally without wheezing, rales, or rhonchi.  GI: obese abdomen soft, nontender, nondistended. No HSM appreciated.   EXTREM: Chronic bilateral LE edema. No cyanosis or clubbing.  NEURO: Alert and oriented, cooperative. Gait stable with wheeled walker. No obvious focal deficits.   PSYCH: Normal affect.  SKIN: Warm and dry. No rashes or petechiae appreciated.          Past Medical History:     Past Medical History:   Diagnosis Date     Benign essential hypertension      Chronic kidney disease, stage 3b (H)      Diabetic retinopathy of both eyes (H)      Obesity (BMI 30-39.9)      Osteopenia      S/P TAVR (transcatheter aortic valve replacement) 06/07/2022     Type 2 diabetes mellitus with diabetic nephropathy (H)               Past Surgical History:     Past Surgical History:   Procedure Laterality Date     APPENDECTOMY       CATARACT IOL, RT/LT Bilateral      CV CORONARY ANGIOGRAM N/A 5/4/2022    Procedure: Coronary Angiogram;  Surgeon: Hector Lewis MD;  Location:  HEART CARDIAC CATH LAB     CV LEFT HEART CATH N/A 5/4/2022    Procedure: Left Heart Catheterization;  Surgeon: Hector Lewis MD;  Location:  HEART CARDIAC CATH LAB      CV PCI N/A 5/4/2022    Procedure: Percutaneous Coronary Intervention;  Surgeon: Hector Lewis MD;  Location:  HEART CARDIAC CATH LAB     CV TRANSCATHETER AORTIC VALVE REPLACEMENT-FEMORAL APPROACH N/A 6/7/2022    Procedure: Transcatheter Aortic Valve Replacement-Femoral Approach;  Surgeon: Hector Lewis MD;  Location:  HEART CARDIAC CATH LAB     GASTRIC BYPASS  2004     TONSILLECTOMY & ADENOIDECTOMY                Allergies:   Norvasc [amlodipine], Chlorhexidine, Clonidine, Doxazosin mesylate, Fexofenadine hydrochloride, Gemfibrozil, Lisinopril, and Pioglitazone hydrochloride       Data:   All laboratory data reviewed:    LAST CHOLESTEROL:  Lab Results   Component Value Date    CHOL 67 06/08/2022    CHOL 115 03/27/2020     Lab Results   Component Value Date    HDL 28 06/08/2022    HDL 34 03/27/2020     Lab Results   Component Value Date    LDL 17 06/08/2022    LDL 59 03/27/2020     Lab Results   Component Value Date    TRIG 109 06/08/2022    TRIG 112 03/27/2020     Lab Results   Component Value Date    CHOLHDLRATIO 5.5 05/22/2015       LAST BMP:  Lab Results   Component Value Date     06/10/2022     04/13/2021      Lab Results   Component Value Date    POTASSIUM 4.3 06/10/2022    POTASSIUM 4.2 04/13/2021     Lab Results   Component Value Date    CHLORIDE 110 06/10/2022    CHLORIDE 110 04/13/2021     Lab Results   Component Value Date    RASHEEDA 9.3 06/10/2022    RASHEEDA 9.2 04/13/2021     Lab Results   Component Value Date    CO2 24 06/10/2022    CO2 28 04/13/2021     Lab Results   Component Value Date    BUN 26 06/10/2022    BUN 29 04/13/2021     Lab Results   Component Value Date    CR 1.48 06/10/2022    CR 1.38 04/13/2021     Lab Results   Component Value Date     06/10/2022     06/10/2022     04/13/2021       LAST CBC:  Lab Results   Component Value Date    WBC 10.1 06/10/2022    WBC 8.3 01/08/2021     Lab Results   Component Value Date    RBC 4.07 06/10/2022     RBC 4.12 01/08/2021     Lab Results   Component Value Date    HGB 11.2 06/10/2022    HGB 11.6 01/08/2021     Lab Results   Component Value Date    HCT 35.2 06/10/2022    HCT 38.2 01/08/2021     Lab Results   Component Value Date    MCV 87 06/10/2022    MCV 93 01/08/2021     Lab Results   Component Value Date    MCH 27.5 06/10/2022    MCH 28.2 01/08/2021     Lab Results   Component Value Date    MCHC 31.8 06/10/2022    MCHC 30.4 01/08/2021     Lab Results   Component Value Date    RDW 14.2 06/10/2022    RDW 13.5 01/08/2021     Lab Results   Component Value Date     06/10/2022     01/08/2021

## 2022-06-16 NOTE — LETTER
6/16/2022    Judith Aviles MD  [address] [facility] [address]    RE: Ade Wilkins       Dear Colleague,     I had the pleasure of seeing Ade Wilkins in the [facility].  Cardiology Clinic Progress Note  Ade Wilkins MRN# [id number]   YOB: 1950 Age: 71 year old     Reason For Visit:  1 week post TAVR   Primary Cardiologist:   Dr. Lewis           History of Presenting Illness:      Ade Wilkins is a pleasant 71 year old patient who carries a past medical history significant for severe aortic stenosis (mean gradient of 46 mmHg, aortic valve area of 0.9 cm ), coronary artery disease status post PCI to the RCA (5/4/2022), hypertension, chronic HFpEF, chronic lymphedema, chronic kidney disease, type 2 diabetes, hyperlipidemia.    On 6/7/2022, she underwent elective trans catheter aortic valve replacement using a 26 mm Medtronic Evolut pro valve. Her procedure was complicated by transient CHB/ LBBB which resolved by the end of the procedure. She was evaluated by EP who did not feel a pacemaker was indicated but recommended a 30 day event monitor at discharge. Unfortunately, she developed visual changes, concerning for CVA. She was evaluated by neurology and underwent a normal brain/ neck MRI and CT. Symptoms were felt to be embolic r/t procedure.  Post procedure echocardiogram showed a hyperdynamic LV estimated at greater than 70%,small LV cavity with peak gradient intracavitary 17 mmHg, normal RV size and function, a well-seated bioprosthetic aortic valve with a mean gradient of 15 mmHg and no paravalvular or valvular AI.    She returns to the office today for a 1 week post TAVR follow up. Her primary complaint is intermittent exertional shortness of breath. She denies chest pain,  PND, orthopnea, presyncope, syncope,  heart racing, or palpitations. Upon exam, lungs are clear bilaterally, heart rate and rhythm regular, no palpitations, and chronic bilateral LE edema  managed with compression stockings and diuretics. Bilateral groin sites, S/F/D without evidence of hematoma or bruit.     Blood pressure is well controlled at 113/62  BMP showed a sodium of 138, potassium 4.3, BUN 26, creatinine 1.48, GFR 37  Hemoglobin 11.2, hematocrit 35.2, WBC 10.1, and platelet 298  Lipid panel is excellent with a total cholesterol of 67, HDL 28, LDL 17, and triglycerides 109  BMI 34.56    Activity is primarily sedentary.  She is scheduled for cardiac rehab beginning next week  Remains compliant with all medications.                   Assessment and Plan:     1.  Severe aortic stenosis -status post TAVR using a 26 mm Medtronic evolute pro valve.  Echocardiogram showed hyperdynamic LV with ejection fraction estimated at greater than 70%, small LV cavity with peak gradient intracavitary 17 mmHg.  Mean AOV pressure gradient of 15 mmHg, no paravalvular or valvular AI.  Continue dual antiplatelet therapy.  Cardiac rehab beginning next week.  Encourage walking 15 minutes twice daily.  Follow-up echocardiogram in 3 weeks.    2.  Transient complete heart block intra procedure -   Currently wearing 30-day event monitor  3.  Neurological changes status post TAVR -negative MRA neck and brain and negative CTA head and neck.  Follow-up with neurology outpatient.  4.  Coronary artery disease -status post PCI to RCA on  5/4/2022.  Continue dual antiplatelet therapy x12 months uninterrupted post PCI and statin.   5.  HFpEF -weight stable at 192 pounds (baseline 200 pounds).  Clear lung sounds bilaterally.  Intermittent exertional dyspnea.  Chronic bilateral ankle edema unchanged from baseline.  Continue furosemide, irbesartan, spironolactone, and verapamil. Ok to take additional lasix for worsening shortness of breath, weight gain of 3 pounds overnight or 5 pounds in 1 week.     6.  Hypertension -well controlled  7.  Hyperlipidemia-on statin         Thank you for allowing me to participate in this delightful  patient's care.   Follow-up in 3 weeks for a 1 month post-TAVR follow-up with echo and labs prior.       LEONEL Holloway CNP         Review of Systems:     Review of Systems:  Skin:  Negative     Eyes:  Negative    ENT:  Negative    Respiratory:    shortness of breath;dyspnea on exertion  Cardiovascular:  Negative    Gastroenterology: Negative    Genitourinary:    urinary frequency  Musculoskeletal:  Positive for muscular weakness  Neurologic:    migraine headaches  Psychiatric:  Negative    Heme/Lymph/Imm:  Negative    Endocrine:    diabetes              Physical Exam:     GEN:  In general, this is a morbid obese female in no acute distress.  HEENT:  Pupils equal, round. Sclerae nonicteric. Clear oropharynx. Mucous membranes moist.  NECK: Supple, no masses appreciated. Trachea midline. No JVD   C/V:  Regular rate and rhythm, systolic murmur, no rub or gallop. No S3 or RV heave.   RESP: Respirations are unlabored. No use of accessory muscles. Clear to auscultation bilaterally without wheezing, rales, or rhonchi.  GI: obese abdomen soft, nontender, nondistended. No HSM appreciated.   EXTREM: Chronic bilateral LE edema. No cyanosis or clubbing.  NEURO: Alert and oriented, cooperative. Gait stable with wheeled walker. No obvious focal deficits.   PSYCH: Normal affect.  SKIN: Warm and dry. No rashes or petechiae appreciated.          Past Medical History:     Past Medical History:   Diagnosis Date     Benign essential hypertension      Chronic kidney disease, stage 3b (H)      Diabetic retinopathy of both eyes (H)      Obesity (BMI 30-39.9)      Osteopenia      S/P TAVR (transcatheter aortic valve replacement) 06/07/2022     Type 2 diabetes mellitus with diabetic nephropathy (H)               Past Surgical History:     Past Surgical History:   Procedure Laterality Date     APPENDECTOMY       CATARACT IOL, RT/LT Bilateral      CV CORONARY ANGIOGRAM N/A 5/4/2022    Procedure: Coronary Angiogram;  Surgeon: Joshua  Hector العراقي MD;  Location:  HEART CARDIAC CATH LAB     CV LEFT HEART CATH N/A 5/4/2022    Procedure: Left Heart Catheterization;  Surgeon: Hector Lewis MD;  Location:  HEART CARDIAC CATH LAB     CV PCI N/A 5/4/2022    Procedure: Percutaneous Coronary Intervention;  Surgeon: Hector Lewis MD;  Location:  HEART CARDIAC CATH LAB     CV TRANSCATHETER AORTIC VALVE REPLACEMENT-FEMORAL APPROACH N/A 6/7/2022    Procedure: Transcatheter Aortic Valve Replacement-Femoral Approach;  Surgeon: Hector Lewis MD;  Location:  HEART CARDIAC CATH LAB     GASTRIC BYPASS  2004     TONSILLECTOMY & ADENOIDECTOMY                Allergies:   Norvasc [amlodipine], Chlorhexidine, Clonidine, Doxazosin mesylate, Fexofenadine hydrochloride, Gemfibrozil, Lisinopril, and Pioglitazone hydrochloride       Data:   All laboratory data reviewed:    LAST CHOLESTEROL:  Lab Results   Component Value Date    CHOL 67 06/08/2022    CHOL 115 03/27/2020     Lab Results   Component Value Date    HDL 28 06/08/2022    HDL 34 03/27/2020     Lab Results   Component Value Date    LDL 17 06/08/2022    LDL 59 03/27/2020     Lab Results   Component Value Date    TRIG 109 06/08/2022    TRIG 112 03/27/2020     Lab Results   Component Value Date    CHOLHDLRATIO 5.5 05/22/2015       LAST BMP:  Lab Results   Component Value Date     06/10/2022     04/13/2021      Lab Results   Component Value Date    POTASSIUM 4.3 06/10/2022    POTASSIUM 4.2 04/13/2021     Lab Results   Component Value Date    CHLORIDE 110 06/10/2022    CHLORIDE 110 04/13/2021     Lab Results   Component Value Date    RASHEEDA 9.3 06/10/2022    RASHEEDA 9.2 04/13/2021     Lab Results   Component Value Date    CO2 24 06/10/2022    CO2 28 04/13/2021     Lab Results   Component Value Date    BUN 26 06/10/2022    BUN 29 04/13/2021     Lab Results   Component Value Date    CR 1.48 06/10/2022    CR 1.38 04/13/2021     Lab Results   Component Value Date      06/10/2022     06/10/2022     04/13/2021       LAST CBC:  Lab Results   Component Value Date    WBC 10.1 06/10/2022    WBC 8.3 01/08/2021     Lab Results   Component Value Date    RBC 4.07 06/10/2022    RBC 4.12 01/08/2021     Lab Results   Component Value Date    HGB 11.2 06/10/2022    HGB 11.6 01/08/2021     Lab Results   Component Value Date    HCT 35.2 06/10/2022    HCT 38.2 01/08/2021     Lab Results   Component Value Date    MCV 87 06/10/2022    MCV 93 01/08/2021     Lab Results   Component Value Date    MCH 27.5 06/10/2022    MCH 28.2 01/08/2021     Lab Results   Component Value Date    MCHC 31.8 06/10/2022    MCHC 30.4 01/08/2021     Lab Results   Component Value Date    RDW 14.2 06/10/2022    RDW 13.5 01/08/2021     Lab Results   Component Value Date     06/10/2022     01/08/2021       Thank you for allowing me to participate in the care of your patient.      Sincerely,     LEONEL Holloway [facility]  cc:   Hector Lewis MD  [address]  [place] [phone]

## 2022-06-16 NOTE — PATIENT INSTRUCTIONS
Thanks for participating in a office visit with the [facility] clinic today.    Occasional episodes of shortness of breath with exertion. Should shortness of breath worsen, ok to take an additional lasix tomorrow.   Continue with current medical therapy.   Blood pressures well controlled.   Encourage walking 15 min twice daily  Start cardiac rehab next week.    Follow up in 3 weeks with WILTON with echo and labs prior.     Please call my nurse at  [phone] with any questions or concerns.    Scheduling phone number: [phone]  Reminder: Please bring in all current medications, over the counter supplements and vitamin bottles to your next appointment.

## 2022-06-23 ENCOUNTER — HOSPITAL ENCOUNTER (OUTPATIENT)
Dept: CARDIAC REHAB | Facility: CLINIC | Age: 72
Discharge: HOME | End: 2022-06-23
Attending: STUDENT IN AN ORGANIZED HEALTH CARE EDUCATION/TRAINING PROGRAM
Payer: COMMERCIAL

## 2022-06-23 DIAGNOSIS — Z95.2 S/P TAVR (TRANSCATHETER AORTIC VALVE REPLACEMENT): ICD-10-CM

## 2022-06-23 PROCEDURE — 93798 PHYS/QHP OP CAR RHAB W/ECG: CPT

## 2022-06-23 PROCEDURE — 93797 PHYS/QHP OP CAR RHAB WO ECG: CPT

## 2022-06-28 ENCOUNTER — TELEPHONE (OUTPATIENT)
Dept: CARDIOLOGY | Facility: CLINIC | Age: 72
End: 2022-06-28

## 2022-06-28 DIAGNOSIS — E11.40 TYPE 2 DIABETES MELLITUS WITH DIABETIC NEUROPATHY, WITH LONG-TERM CURRENT USE OF INSULIN (H): ICD-10-CM

## 2022-06-28 DIAGNOSIS — I10 BENIGN ESSENTIAL HYPERTENSION: ICD-10-CM

## 2022-06-28 DIAGNOSIS — Z79.4 TYPE 2 DIABETES MELLITUS WITH DIABETIC NEUROPATHY, WITH LONG-TERM CURRENT USE OF INSULIN (H): ICD-10-CM

## 2022-06-28 RX ORDER — IRBESARTAN 150 MG/1
75 TABLET ORAL DAILY
Qty: 90 TABLET | Refills: 3 | COMMUNITY
Start: 2022-06-28 | End: 2022-07-05

## 2022-06-28 NOTE — TELEPHONE ENCOUNTER
"Patient called the heat clinic stating since 06/25/22 she has felt incredibly dizzy and lightheaded every time she stands up. Patient stated she is unable to do household things and is needing to sleep during the day which is all new in the past few days.    Patient reported no new changes in medications in the past week or changes in her day. Patient stated her blood pressures in the evening after taking her evening medications read:  06/25/22  114/44  06/26/22  96/48  06/27/22  119/56 recheck 15 minutes later, 114/52    Patient stated the only change is she discharged from the hospital with \"irbesartan increased to 75mg BID given elevated Bps.\"     Informed patient I will send this off to our structural team that is present in the clinic today as Ashlie is off and would call her back.     Addendum: Reviewed with Yahaira as well as reviewed available heart monitor strips. Recommendation is for patient to decrease her irbesartan back to 75mg daily to see if that helps improve her symptoms. Additionally patient was encouraged to document the date and time when her symptoms are the worse to compare with her heart monitor. Patient stated she understood.     "

## 2022-06-30 ENCOUNTER — TELEPHONE (OUTPATIENT)
Dept: CARDIOLOGY | Facility: CLINIC | Age: 72
End: 2022-06-30

## 2022-06-30 ENCOUNTER — E-VISIT (OUTPATIENT)
Dept: INTERNAL MEDICINE | Facility: CLINIC | Age: 72
End: 2022-06-30
Payer: COMMERCIAL

## 2022-06-30 DIAGNOSIS — M54.50 ACUTE LEFT-SIDED LOW BACK PAIN WITHOUT SCIATICA: Primary | ICD-10-CM

## 2022-06-30 PROCEDURE — 99421 OL DIG E/M SVC 5-10 MIN: CPT | Performed by: INTERNAL MEDICINE

## 2022-06-30 NOTE — TELEPHONE ENCOUNTER
VM received from patient to report some pain in her side, concerned she may have a bladder infection. Requested call back to discuss.    Telephoned patient to request she follow-up with her PCP on this concern. She states she would call them today.    Beth Noble RN  Structural Heart Coordinator  [facility]  6/30/2022  10:10 AM

## 2022-07-05 ENCOUNTER — TELEPHONE (OUTPATIENT)
Dept: INTERNAL MEDICINE | Facility: CLINIC | Age: 72
End: 2022-07-05

## 2022-07-05 DIAGNOSIS — I10 BENIGN ESSENTIAL HYPERTENSION: ICD-10-CM

## 2022-07-05 RX ORDER — IRBESARTAN 150 MG/1
75 TABLET ORAL DAILY
Qty: 90 TABLET | Refills: 0 | Status: SHIPPED | OUTPATIENT
Start: 2022-07-05 | End: 2022-12-30

## 2022-07-05 NOTE — TELEPHONE ENCOUNTER
Pharmacy is requesting updated RX.  Please resend script.      irbesartan (AVAPRO) 150 MG tablet.  Take 1/2 tablet (75 MG) by mouth daily.    Laurel Carrion RN

## 2022-07-06 ENCOUNTER — HOSPITAL ENCOUNTER (OUTPATIENT)
Dept: CARDIAC REHAB | Facility: CLINIC | Age: 72
Discharge: HOME | End: 2022-07-06
Attending: STUDENT IN AN ORGANIZED HEALTH CARE EDUCATION/TRAINING PROGRAM
Payer: COMMERCIAL

## 2022-07-06 PROCEDURE — 93798 PHYS/QHP OP CAR RHAB W/ECG: CPT | Performed by: REHABILITATION PRACTITIONER

## 2022-07-07 ENCOUNTER — OFFICE VISIT (OUTPATIENT)
Dept: CARDIOLOGY | Facility: CLINIC | Age: 72
End: 2022-07-07
Attending: INTERNAL MEDICINE
Payer: COMMERCIAL

## 2022-07-07 ENCOUNTER — LAB (OUTPATIENT)
Dept: LAB | Facility: CLINIC | Age: 72
End: 2022-07-07
Attending: INTERNAL MEDICINE
Payer: COMMERCIAL

## 2022-07-07 ENCOUNTER — HOSPITAL ENCOUNTER (OUTPATIENT)
Dept: CARDIOLOGY | Facility: CLINIC | Age: 72
Discharge: HOME | End: 2022-07-07
Attending: INTERNAL MEDICINE | Admitting: INTERNAL MEDICINE
Payer: COMMERCIAL

## 2022-07-07 VITALS
DIASTOLIC BLOOD PRESSURE: 67 MMHG | SYSTOLIC BLOOD PRESSURE: 114 MMHG | HEIGHT: 62 IN | WEIGHT: 187 LBS | BODY MASS INDEX: 34.41 KG/M2 | OXYGEN SATURATION: 98 % | HEART RATE: 76 BPM

## 2022-07-07 DIAGNOSIS — Z95.2 S/P TAVR (TRANSCATHETER AORTIC VALVE REPLACEMENT): ICD-10-CM

## 2022-07-07 DIAGNOSIS — Z95.2 S/P TAVR (TRANSCATHETER AORTIC VALVE REPLACEMENT): Primary | ICD-10-CM

## 2022-07-07 DIAGNOSIS — I10 BENIGN ESSENTIAL HYPERTENSION: ICD-10-CM

## 2022-07-07 DIAGNOSIS — I35.0 SEVERE AORTIC STENOSIS: ICD-10-CM

## 2022-07-07 DIAGNOSIS — E78.00 PURE HYPERCHOLESTEROLEMIA: ICD-10-CM

## 2022-07-07 LAB
ANION GAP SERPL CALCULATED.3IONS-SCNC: 9 MMOL/L (ref 3–14)
BUN SERPL-MCNC: 42 MG/DL (ref 7–30)
CALCIUM SERPL-MCNC: 9.5 MG/DL (ref 8.5–10.1)
CHLORIDE BLD-SCNC: 107 MMOL/L (ref 94–109)
CO2 SERPL-SCNC: 23 MMOL/L (ref 20–32)
CREAT SERPL-MCNC: 1.69 MG/DL (ref 0.52–1.04)
ERYTHROCYTE [DISTWIDTH] IN BLOOD BY AUTOMATED COUNT: 14.2 % (ref 10–15)
GFR SERPL CREATININE-BSD FRML MDRD: 32 ML/MIN/1.73M2
GLUCOSE BLD-MCNC: 196 MG/DL (ref 70–99)
HCT VFR BLD AUTO: 36.9 % (ref 35–47)
HGB BLD-MCNC: 11.6 G/DL (ref 11.7–15.7)
LVEF ECHO: NORMAL
MCH RBC QN AUTO: 28.2 PG (ref 26.5–33)
MCHC RBC AUTO-ENTMCNC: 31.4 G/DL (ref 31.5–36.5)
MCV RBC AUTO: 90 FL (ref 78–100)
PLATELET # BLD AUTO: 311 10E3/UL (ref 150–450)
POTASSIUM BLD-SCNC: 4 MMOL/L (ref 3.4–5.3)
RBC # BLD AUTO: 4.11 10E6/UL (ref 3.8–5.2)
SODIUM SERPL-SCNC: 139 MMOL/L (ref 133–144)
WBC # BLD AUTO: 9.8 10E3/UL (ref 4–11)

## 2022-07-07 PROCEDURE — 36415 COLL VENOUS BLD VENIPUNCTURE: CPT | Performed by: INTERNAL MEDICINE

## 2022-07-07 PROCEDURE — 93308 TTE F-UP OR LMTD: CPT | Mod: 26 | Performed by: INTERNAL MEDICINE

## 2022-07-07 PROCEDURE — 93325 DOPPLER ECHO COLOR FLOW MAPG: CPT | Mod: 26 | Performed by: INTERNAL MEDICINE

## 2022-07-07 PROCEDURE — 93000 ELECTROCARDIOGRAM COMPLETE: CPT | Performed by: INTERNAL MEDICINE

## 2022-07-07 PROCEDURE — 99215 OFFICE O/P EST HI 40 MIN: CPT | Mod: 25 | Performed by: PHYSICIAN ASSISTANT

## 2022-07-07 PROCEDURE — 80048 BASIC METABOLIC PNL TOTAL CA: CPT | Performed by: INTERNAL MEDICINE

## 2022-07-07 PROCEDURE — 93321 DOPPLER ECHO F-UP/LMTD STD: CPT | Mod: 26 | Performed by: INTERNAL MEDICINE

## 2022-07-07 PROCEDURE — 85027 COMPLETE CBC AUTOMATED: CPT | Performed by: INTERNAL MEDICINE

## 2022-07-07 PROCEDURE — 93325 DOPPLER ECHO COLOR FLOW MAPG: CPT

## 2022-07-07 NOTE — PROGRESS NOTES
[facility] Results:   1a. 4  1b. 2  1c. 1  2. 4  3. 2  4. 2  5. 4  6. 4  7. 3  8a. 2  8b. 3  8c. 3      Anais Pablo RN  Structural Heart Coordinator  [facility]

## 2022-07-07 NOTE — LETTER
7/7/2022    Judith Aviles MD  [address] [facility] [address]    RE: Ade Wilkins       Dear Colleague,     I had the pleasure of seeing Ade Wilkins in the [facility].  Primary Cardiologist: Dr. Lewis    Reason for Visit: 1 month post TAVR follow up    History of Present Illness:   Rachael is a pleasant 71 year old female with past medical history notable for     #  Hx of Severe aortic stenosis   - status post TAVR using a 26 mm Medtronic evolute pro valve.  Echocardiogram showed hyperdynamic LV with ejection fraction estimated at greater than 70%, small LV cavity with peak gradient intracavitary 17 mmHg.  Mean AOV pressure gradient of 15 mmHg, no paravalvular or valvular AI.  Continue DAPT therapy.      #  Transient complete heart block intra procedure   - Currently wearing 30-day event monitor    # Neurological changes status post TAVR   - negative MRA neck and brain and negative CTA head and neck.  Follow-up with neurology outpatient    #  Coronary artery disease   - status post PCI to RCA on  5/4/2022.  Continue dual antiplatelet therapy x12 months uninterrupted post PCI and statin    # HFpEF  - weight stable at 192 pounds (baseline 200 pounds).    - on furosemide 40 mg and spironolactone 12.5 mg daily     # Hypertension  - had a few episodes of hypotension in the setting of small LV size with significant LVH  - valsartan was reduced in dose recently    #  Hyperlipidemia  - on statin; rosuvastatin was decreased to 10 mg about 4 weeks ago    Rachael returns to clinic today with her friend stating that she continues to have dyspnea on exertion and peripheral edema though this seems to be much better since her TAVR procedure.  She also had recent episodes of lightheadedness requiring her to contact her clinic.  She was recommended to reduce her irbesartan to 75 mg daily.  She thinks this improved her lightheadedness.  She denies syncope, bleeding issues, orthopnea, PND, abdominal  distention, chest discomfort.    Assessment and Plan:  Rachael is a pleasant 71 year old female with past medical history notable for     #  Hx of Severe aortic stenosis   - status post TAVR using a 26 mm Medtronic evolute pro valve.  Echocardiogram showed hyperdynamic LV with ejection fraction estimated at greater than 70%, small LV cavity with peak gradient intracavitary 17 mmHg.  Mean AOV pressure gradient of 15 mmHg, no paravalvular or valvular AI.  Continue DAPT therapy.      #  Transient complete heart block intra procedure   - Currently wearing 30-day event monitor    # Neurological changes status post TAVR   - negative MRA neck and brain and negative CTA head and neck.  Follow-up with neurology outpatient    #  Coronary artery disease   - status post PCI to RCA on  5/4/2022.  Continue dual antiplatelet therapy x12 months uninterrupted post PCI and statin    # HFpEF  - weight stable at 192 pounds (baseline 200 pounds).    - on furosemide 40 mg and spironolactone 12.5 mg daily     # Hypertension  - had a few episodes of hypotension in the setting of small LV size with significant LVH  - valsartan was reduced in dose recently    #  Hyperlipidemia  - on statin; rosuvastatin was decreased to 10 mg about 4 weeks ago    CBC shows mild anemia but her hemoglobin seems to be improving gradually.  Her platelet counts remain stable.  BMP demonstrates chronic kidney disease with creatinine of 1.6 which is her baseline.  BUN is slightly higher than her prior values.  ECG shows normal sinus rhythm with narrow QRS complex.  She was noted to have new left bundle branch block post-TAVR but based on today's evaluation this seems to have improved.  She has sent back her M cot 2 days ago and this is still in process.  We will review the results once available and contact her if we need to change her regimen or order further testing.    Her 1 month echocardiogram shows well-seated bioprosthetic aortic valve with normal mean  gradient and no significant aortic regurgitation.  She is noted to have a very small LV cavity with increase mid cavitary gradient causing LVOT obstruction during Valsalva.  This was noted immediately post TAVR as well.  Her LVEF is greater than 70%.  It was felt that this may represent a mild degree of suicide LV.  She is on calcium channel blocker in the form of verapamil at 180 mg once a day.  She was not initiated or placed on a higher dose of AV margo blocking agent given her new onset of left bundle branch block post-TAVR.  I will wait for her M cot results and depending on her rhythm evaluation we may further increase verapamil or consider adding low-dose metoprolol to minimize hypovolemia given her significantly reduced LV cavity size.  In the meantime I will have her discontinue spironolactone to allow her blood pressure to come up with that as well as minimize diuresis as she is also on a loop diuretic in the form of furosemide.  She will continue to check her weight daily and let me know if she has any worsening her shortness of breath or peripheral edema.    I will see her back in 3 months for follow-up.  In the interim if she has recurrent lightheadedness, increase shortness of breath, or peripheral edema she will contact her clinic.  We will repeat an echocardiogram at that point as well.    50 minutes spent on the date of the encounter with chart review, patient visit, care coordination, and documentation.      This note was completed in part using Dragon voice recognition software. Although reviewed after completion, some word and grammatical errors may occur.    Orders this Visit:  No orders of the defined types were placed in this encounter.    No orders of the defined types were placed in this encounter.    There are no discontinued medications.      Encounter Diagnoses   Name Primary?     Severe aortic stenosis      S/P TAVR (transcatheter aortic valve replacement)        CURRENT  MEDICATIONS:  Current Outpatient Medications   Medication Sig Dispense Refill     aspirin (ASA) 81 MG chewable tablet Take 1 tablet (81 mg) by mouth daily Starting tomorrow. 30 tablet 3     augmented betamethasone dipropionate (DIPROLENE-AF) 0.05 % external ointment Apply topically to affected area twice daily for 3-4 weeks then use as needed 45 g 0     calcium carbonate 600 mg-vitamin D 400 units (CALTRATE) 600-400 MG-UNIT per tablet Take 1 tablet by mouth in the morning and 1 tablet in the evening.       cetirizine (ZYRTEC) 10 MG tablet Take 1 tablet (10 mg) by mouth daily       Continuous Blood Gluc  (FREESTYLE BALWINDER 14 DAY READER) HENNA 1 each 3 times daily 1 each 1     Continuous Blood Gluc Sensor (FREESTYLE BALWINDER 14 DAY SENSOR) MISC 1 each every 14 days 2 each 11     cyanocolbalamin (VITAMIN B-12) 1000 MCG tablet Take 1,000 mcg by mouth in the morning.       Dulaglutide 3 MG/0.5ML SOPN Inject 3 mg Subcutaneous once a week 6 mL 3     empagliflozin (JARDIANCE) 10 MG TABS tablet Take 1 tablet (10 mg) by mouth daily 30 tablet 5     fluticasone (FLONASE) 50 MCG/ACT nasal spray SHAKE LIQUID AND USE 2 SPRAYS IN EACH NOSTRIL DAILY 48 g 2     furosemide (LASIX) 40 MG tablet TAKE ONE TABLET BY MOUTH ONE TIME DAILY 90 tablet 0     HUMALOG KWIKPEN 100 UNIT/ML soln INJECT  Per sliding scale UP TO 60 UNITS UNDER THE SKIN DAILY. 60 mL 3     insulin degludec (TRESIBA) 200 UNIT/ML pen Inject 34 Units Subcutaneous daily 30 mL 1     insulin pen needle (BD FCO U/F) 32G X 4 MM miscellaneous Use 4 pen needles daily or as directed. 400 each 0     irbesartan (AVAPRO) 150 MG tablet Take 0.5 tablets (75 mg) by mouth daily (150MG X 0.5 = 75MG) 90 tablet 0     levothyroxine (SYNTHROID/LEVOTHROID) 50 MCG tablet Take 1 tablet (50 mcg) by mouth daily 90 tablet 3     metFORMIN (GLUCOPHAGE) 1000 MG tablet Take 1,000 mg by mouth daily 180 tablet      potassium chloride ER (KLOR-CON M) 10 MEQ CR tablet Take 1 tablet (10 mEq) by mouth 2  times daily 360 tablet 3     rosuvastatin (CRESTOR) 20 MG tablet Take 0.5 tablets (10 mg) by mouth daily 90 tablet 1     spironolactone (ALDACTONE) 25 MG tablet Take 0.5 tablets (12.5 mg) by mouth in the morning. 90 tablet 3     ticagrelor (BRILINTA) 90 MG tablet Take 1 tablet (90 mg) by mouth 2 times daily Start this evening. 180 tablet 3     topiramate (TOPAMAX) 25 MG tablet Take 3 tablets (75 mg) by mouth At Bedtime 270 tablet 1     traZODone (DESYREL) 50 MG tablet take 1 to 2 tablets (50 to 100mg) by mouth nightly as needed 180 tablet PRN     triamcinolone (KENALOG) 0.1 % external ointment Apply topically 2 times daily To left foot rash (Patient taking differently: Apply topically once a week To left foot rash) 30 g 2     verapamil ER (CALAN-SR) 180 MG CR tablet Take 1 tablet (180 mg) by mouth in the morning. 180 tablet 0     Vitamin D3 (CHOLECALCIFEROL) 25 mcg (1000 units) tablet Take 1 tablet by mouth 2 times daily       VITRON-C  MG TABS tablet TAKE TWO TABLETS BY MOUTH TWICE DAILY  360 tablet 1       ALLERGIES     Allergies   Allergen Reactions     Norvasc [Amlodipine] Other (See Comments)     hairloss     Chlorhexidine Rash     Clonidine Headache     Doxazosin Mesylate Rash     Fexofenadine Hydrochloride Headache     Gemfibrozil Rash     Lisinopril Angioedema     Pioglitazone Hydrochloride Swelling     ankle edema       PAST MEDICAL HISTORY:  Past Medical History:   Diagnosis Date     Benign essential hypertension      Chronic kidney disease, stage 3b (H)      Diabetic retinopathy of both eyes (H)      Obesity (BMI 30-39.9)      Osteopenia      S/P TAVR (transcatheter aortic valve replacement) 06/07/2022     Type 2 diabetes mellitus with diabetic nephropathy (H)        PAST SURGICAL HISTORY:  Past Surgical History:   Procedure Laterality Date     APPENDECTOMY       CATARACT IOL, RT/LT Bilateral      CV CORONARY ANGIOGRAM N/A 5/4/2022    Procedure: Coronary Angiogram;  Surgeon: Hector Lewis,  MD;  Location:  HEART CARDIAC CATH LAB     CV LEFT HEART CATH N/A 5/4/2022    Procedure: Left Heart Catheterization;  Surgeon: Hector Lewis MD;  Location:  HEART [facility] CATH LAB     CV PCI N/A 5/4/2022    Procedure: Percutaneous Coronary Intervention;  Surgeon: Hector Lewis MD;  Location:  HEART [facility]ARD[facility] CATH LAB     CV TRANSCATHETER AORTIC VALVE REPLACEMENT-FEMORAL APPROACH N/A 6/7/2022    Procedure: Transcatheter Aortic Valve Replacement-Femoral Approach;  Surgeon: Hector Lewis MD;  Location:  HEART CARDIAC CATH LAB     GASTRIC BYPASS  2004     TONSILLECTOMY & ADENOIDECTOMY         FAMILY HISTORY:  Family History   Problem Relation Age of Onset     Diabetes Type 2  Mother      Glaucoma Mother      Coronary Artery Disease Mother      Abdominal Aortic Aneurysm Father      Myocardial Infarction Father      Breast Cancer Sister      Abdominal Aortic Aneurysm Brother      Bladder Cancer Brother      Diabetes Type 2  Brother      Liver Cancer Brother      Myocardial Infarction Brother      Alzheimer Disease Paternal Grandmother      Cerebrovascular Disease Paternal Grandfather      Ovarian Cancer No family hx of      Colon Cancer No family hx of        SOCIAL HISTORY:  Social History     Socioeconomic History     Marital status:      Number of children: 0   Occupational History     Occupation: Retired -    Tobacco Use     Smoking status: Never Smoker     Smokeless tobacco: Never Used   Substance and Sexual Activity     Alcohol use: No     Drug use: No     Sexual activity: Not Currently   Other Topics Concern     Caffeine Concern Yes     Comment: 20 oz daily     Exercise No     Seat Belt Yes     Self-Exams Yes   Social History Narrative    . Single.    No kids.    No formal exercise.        Review of Systems:  Skin:  Negative     Eyes:  Negative    ENT:  Negative    Respiratory:  Positive for dyspnea on exertion  Cardiovascular:    Positive  "for;fatigue  Gastroenterology: Negative    Genitourinary:    urinary frequency  Musculoskeletal:  Positive for muscular weakness  Neurologic:    migraine headaches  Psychiatric:  Negative    Heme/Lymph/Imm:  Negative    Endocrine:    diabetes    Physical Exam:  Vitals: /67   Pulse 76   Ht 1.575 m (5' 2\")   Wt 84.8 kg (187 lb)   LMP  (LMP Unknown)   SpO2 98%   BMI 34.20 kg/m       GEN:  NAD  NECK: No JVD  C/V:  Regular rate and rhythm, no murmur, rub or gallop.  RESP: Clear to auscultation bilaterally without wheezing, rales, or rhonchi.  GI: Abdomen soft, nontender, nondistended.   EXTREM: No pitting LE edema.   NEURO: Alert and oriented, cooperative. No obvious focal deficits.   PSYCH: Normal affect.  SKIN: Warm and dry.       Recent Lab Results:  LIPID RESULTS:  Lab Results   Component Value Date    CHOL 67 06/08/2022    CHOL 115 03/27/2020    HDL 28 (L) 06/08/2022    HDL 34 (L) 03/27/2020    LDL 17 06/08/2022    LDL 59 03/27/2020    TRIG 109 06/08/2022    TRIG 112 03/27/2020    CHOLHDLRATIO 5.5 (H) 05/22/2015       LIVER ENZYME RESULTS:  Lab Results   Component Value Date    AST 42 06/08/2022    AST 20 04/13/2021    ALT 63 (H) 06/08/2022    ALT 38 04/13/2021       CBC RESULTS:  Lab Results   Component Value Date    WBC 9.8 07/07/2022    WBC 8.3 01/08/2021    RBC 4.11 07/07/2022    RBC 4.12 01/08/2021    HGB 11.6 (L) 07/07/2022    HGB 11.6 (L) 01/08/2021    HCT 36.9 07/07/2022    HCT 38.2 01/08/2021    MCV 90 07/07/2022    MCV 93 01/08/2021    MCH 28.2 07/07/2022    MCH 28.2 01/08/2021    MCHC 31.4 (L) 07/07/2022    MCHC 30.4 (L) 01/08/2021    RDW 14.2 07/07/2022    RDW 13.5 01/08/2021     07/07/2022     01/08/2021       BMP RESULTS:  Lab Results   Component Value Date     07/07/2022     04/13/2021    POTASSIUM 4.0 07/07/2022    POTASSIUM 4.2 04/13/2021    CHLORIDE 107 07/07/2022    CHLORIDE 110 (H) 04/13/2021    CO2 23 07/07/2022    CO2 28 04/13/2021    ANIONGAP 9 07/07/2022 "    ANIONGAP 3 04/13/2021     (H) 07/07/2022     (H) 04/13/2021    BUN 42 (H) 07/07/2022    BUN 29 04/13/2021    CR 1.69 (H) 07/07/2022    CR 1.38 (H) 04/13/2021    GFRESTIMATED 32 (L) 07/07/2022    GFRESTIMATED 32 (L) 05/17/2022    GFRESTIMATED 38 (L) 04/13/2021    GFRESTBLACK 45 (L) 04/13/2021    RASHEEDA 9.5 07/07/2022    RASHEEDA 9.2 04/13/2021        A1C RESULTS:  Lab Results   Component Value Date    A1C 8.0 (H) 06/08/2022    A1C 8.1 (H) 01/08/2021       INR RESULTS:  Lab Results   Component Value Date    INR 1.09 06/01/2022    INR 0.97 05/04/2022           Latasha Calderon PA-C  July 7, 2022       KCCQ Results:   1a. 4  1b. 2  1c. 1  2. 4  3. 2  4. 2  5. 4  6. 4  7. 3  8a. 2  8b. 3  8c. 3      Anais Pablo, RN  Structural Heart Coordinator  [facility]    Thank you for allowing me to participate in the care of your patient.      Sincerely,     Latasha Calderon PA-C     [facility]  cc:   Hector Lewis MD  [address]  [place] [phone]

## 2022-07-07 NOTE — PROGRESS NOTES
Primary Cardiologist: Dr. Lewis    Reason for Visit: 1 month post TAVR follow up    History of Present Illness:   Rachael is a pleasant 71 year old female with past medical history notable for     #  Hx of Severe aortic stenosis   - status post TAVR using a 26 mm Medtronic evolute pro valve.  Echocardiogram showed hyperdynamic LV with ejection fraction estimated at greater than 70%, small LV cavity with peak gradient intracavitary 17 mmHg.  Mean AOV pressure gradient of 15 mmHg, no paravalvular or valvular AI.  Continue DAPT therapy.      #  Transient complete heart block intra procedure   - Currently wearing 30-day event monitor    # Neurological changes status post TAVR   - negative MRA neck and brain and negative CTA head and neck.  Follow-up with neurology outpatient    #  Coronary artery disease   - status post PCI to RCA on  5/4/2022.  Continue dual antiplatelet therapy x12 months uninterrupted post PCI and statin    # HFpEF  - weight stable at 192 pounds (baseline 200 pounds).    - on furosemide 40 mg and spironolactone 12.5 mg daily     # Hypertension  - had a few episodes of hypotension in the setting of small LV size with significant LVH  - valsartan was reduced in dose recently    #  Hyperlipidemia  - on statin; rosuvastatin was decreased to 10 mg about 4 weeks ago    Rachael returns to clinic today with her friend stating that she continues to have dyspnea on exertion and peripheral edema though this seems to be much better since her TAVR procedure.  She also had recent episodes of lightheadedness requiring her to contact her clinic.  She was recommended to reduce her irbesartan to 75 mg daily.  She thinks this improved her lightheadedness.  She denies syncope, bleeding issues, orthopnea, PND, abdominal distention, chest discomfort.    Assessment and Plan:  Rachael is a pleasant 71 year old female with past medical history notable for     #  Hx of Severe aortic stenosis   - status post TAVR using a 26 mm  Medtronic evolute pro valve.  Echocardiogram showed hyperdynamic LV with ejection fraction estimated at greater than 70%, small LV cavity with peak gradient intracavitary 17 mmHg.  Mean AOV pressure gradient of 15 mmHg, no paravalvular or valvular AI.  Continue DAPT therapy.      #  Transient complete heart block intra procedure   - Currently wearing 30-day event monitor    # Neurological changes status post TAVR   - negative MRA neck and brain and negative CTA head and neck.  Follow-up with neurology outpatient    #  Coronary artery disease   - status post PCI to RCA on  5/4/2022.  Continue dual antiplatelet therapy x12 months uninterrupted post PCI and statin    # HFpEF  - weight stable at 192 pounds (baseline 200 pounds).    - on furosemide 40 mg and spironolactone 12.5 mg daily     # Hypertension  - had a few episodes of hypotension in the setting of small LV size with significant LVH  - valsartan was reduced in dose recently    #  Hyperlipidemia  - on statin; rosuvastatin was decreased to 10 mg about 4 weeks ago    CBC shows mild anemia but her hemoglobin seems to be improving gradually.  Her platelet counts remain stable.  BMP demonstrates chronic kidney disease with creatinine of 1.6 which is her baseline.  BUN is slightly higher than her prior values.  ECG shows normal sinus rhythm with narrow QRS complex.  She was noted to have new left bundle branch block post-TAVR but based on today's evaluation this seems to have improved.  She has sent back her M cot 2 days ago and this is still in process.  We will review the results once available and contact her if we need to change her regimen or order further testing.    Her 1 month echocardiogram shows well-seated bioprosthetic aortic valve with normal mean gradient and no significant aortic regurgitation.  She is noted to have a very small LV cavity with increase mid cavitary gradient causing LVOT obstruction during Valsalva.  This was noted immediately post TAVR  as well.  Her LVEF is greater than 70%.  It was felt that this may represent a mild degree of suicide LV.  She is on calcium channel blocker in the form of verapamil at 180 mg once a day.  She was not initiated or placed on a higher dose of AV margo blocking agent given her new onset of left bundle branch block post-TAVR.  I will wait for her M cot results and depending on her rhythm evaluation we may further increase verapamil or consider adding low-dose metoprolol to minimize hypovolemia given her significantly reduced LV cavity size.  In the meantime I will have her discontinue spironolactone to allow her blood pressure to come up with that as well as minimize diuresis as she is also on a loop diuretic in the form of furosemide.  She will continue to check her weight daily and let me know if she has any worsening her shortness of breath or peripheral edema.    I will see her back in 3 months for follow-up.  In the interim if she has recurrent lightheadedness, increase shortness of breath, or peripheral edema she will contact her clinic.  We will repeat an echocardiogram at that point as well.    50 minutes spent on the date of the encounter with chart review, patient visit, care coordination, and documentation.      This note was completed in part using Dragon voice recognition software. Although reviewed after completion, some word and grammatical errors may occur.    Orders this Visit:  No orders of the defined types were placed in this encounter.    No orders of the defined types were placed in this encounter.    There are no discontinued medications.      Encounter Diagnoses   Name Primary?     Severe aortic stenosis      S/P TAVR (transcatheter aortic valve replacement)        CURRENT MEDICATIONS:  Current Outpatient Medications   Medication Sig Dispense Refill     aspirin (ASA) 81 MG chewable tablet Take 1 tablet (81 mg) by mouth daily Starting tomorrow. 30 tablet 3     augmented betamethasone dipropionate  (DIPROLENE-AF) 0.05 % external ointment Apply topically to affected area twice daily for 3-4 weeks then use as needed 45 g 0     calcium carbonate 600 mg-vitamin D 400 units (CALTRATE) 600-400 MG-UNIT per tablet Take 1 tablet by mouth in the morning and 1 tablet in the evening.       cetirizine (ZYRTEC) 10 MG tablet Take 1 tablet (10 mg) by mouth daily       Continuous Blood Gluc  (FREESTYLE BALWINDER 14 DAY READER) HENNA 1 each 3 times daily 1 each 1     Continuous Blood Gluc Sensor (FREESTYLE BALWINDER 14 DAY SENSOR) MISC 1 each every 14 days 2 each 11     cyanocolbalamin (VITAMIN B-12) 1000 MCG tablet Take 1,000 mcg by mouth in the morning.       Dulaglutide 3 MG/0.5ML SOPN Inject 3 mg Subcutaneous once a week 6 mL 3     empagliflozin (JARDIANCE) 10 MG TABS tablet Take 1 tablet (10 mg) by mouth daily 30 tablet 5     fluticasone (FLONASE) 50 MCG/ACT nasal spray SHAKE LIQUID AND USE 2 SPRAYS IN EACH NOSTRIL DAILY 48 g 2     furosemide (LASIX) 40 MG tablet TAKE ONE TABLET BY MOUTH ONE TIME DAILY 90 tablet 0     HUMALOG KWIKPEN 100 UNIT/ML soln INJECT  Per sliding scale UP TO 60 UNITS UNDER THE SKIN DAILY. 60 mL 3     insulin degludec (TRESIBA) 200 UNIT/ML pen Inject 34 Units Subcutaneous daily 30 mL 1     insulin pen needle (BD FCO U/F) 32G X 4 MM miscellaneous Use 4 pen needles daily or as directed. 400 each 0     irbesartan (AVAPRO) 150 MG tablet Take 0.5 tablets (75 mg) by mouth daily (150MG X 0.5 = 75MG) 90 tablet 0     levothyroxine (SYNTHROID/LEVOTHROID) 50 MCG tablet Take 1 tablet (50 mcg) by mouth daily 90 tablet 3     metFORMIN (GLUCOPHAGE) 1000 MG tablet Take 1,000 mg by mouth daily 180 tablet      potassium chloride ER (KLOR-CON M) 10 MEQ CR tablet Take 1 tablet (10 mEq) by mouth 2 times daily 360 tablet 3     rosuvastatin (CRESTOR) 20 MG tablet Take 0.5 tablets (10 mg) by mouth daily 90 tablet 1     spironolactone (ALDACTONE) 25 MG tablet Take 0.5 tablets (12.5 mg) by mouth in the morning. 90 tablet 3      ticagrelor (BRILINTA) 90 MG tablet Take 1 tablet (90 mg) by mouth 2 times daily Start this evening. 180 tablet 3     topiramate (TOPAMAX) 25 MG tablet Take 3 tablets (75 mg) by mouth At Bedtime 270 tablet 1     traZODone (DESYREL) 50 MG tablet take 1 to 2 tablets (50 to 100mg) by mouth nightly as needed 180 tablet PRN     triamcinolone (KENALOG) 0.1 % external ointment Apply topically 2 times daily To left foot rash (Patient taking differently: Apply topically once a week To left foot rash) 30 g 2     verapamil ER (CALAN-SR) 180 MG CR tablet Take 1 tablet (180 mg) by mouth in the morning. 180 tablet 0     Vitamin D3 (CHOLECALCIFEROL) 25 mcg (1000 units) tablet Take 1 tablet by mouth 2 times daily       VITRON-C  MG TABS tablet TAKE TWO TABLETS BY MOUTH TWICE DAILY  360 tablet 1       ALLERGIES     Allergies   Allergen Reactions     Norvasc [Amlodipine] Other (See Comments)     hairloss     Chlorhexidine Rash     Clonidine Headache     Doxazosin Mesylate Rash     Fexofenadine Hydrochloride Headache     Gemfibrozil Rash     Lisinopril Angioedema     Pioglitazone Hydrochloride Swelling     ankle edema       PAST MEDICAL HISTORY:  Past Medical History:   Diagnosis Date     Benign essential hypertension      Chronic kidney disease, stage 3b (H)      Diabetic retinopathy of both eyes (H)      Obesity (BMI 30-39.9)      Osteopenia      S/P TAVR (transcatheter aortic valve replacement) 06/07/2022     Type 2 diabetes mellitus with diabetic nephropathy (H)        PAST SURGICAL HISTORY:  Past Surgical History:   Procedure Laterality Date     APPENDECTOMY       CATARACT IOL, RT/LT Bilateral      CV CORONARY ANGIOGRAM N/A 5/4/2022    Procedure: Coronary Angiogram;  Surgeon: Hector Lewis MD;  Location:  [facility]     CV LEFT HEART CATH N/A 5/4/2022    Procedure: Left Heart Catheterization;  Surgeon: Hector Lewis MD;  Location:  [facility]     CV PCI N/A 5/4/2022     Procedure: Percutaneous Coronary Intervention;  Surgeon: Hector Lewis MD;  Location:  [facility]     CV TRANSCATHETER AORTIC VALVE REPLACEMENT-FEMORAL APPROACH N/A 6/7/2022    Procedure: Transcatheter Aortic Valve Replacement-Femoral Approach;  Surgeon: Hector Lewis MD;  Location:  [facility]     GASTRIC BYPASS  2004     TONSILLECTOMY & ADENOIDECTOMY         FAMILY HISTORY:  Family History   Problem Relation Age of Onset     Diabetes Type 2  Mother      Glaucoma Mother      Coronary Artery Disease Mother      Abdominal Aortic Aneurysm Father      Myocardial Infarction Father      Breast Cancer Sister      Abdominal Aortic Aneurysm Brother      Bladder Cancer Brother      Diabetes Type 2  Brother      Liver Cancer Brother      Myocardial Infarction Brother      Alzheimer Disease Paternal Grandmother      Cerebrovascular Disease Paternal Grandfather      Ovarian Cancer No family hx of      Colon Cancer No family hx of        SOCIAL HISTORY:  Social History     Socioeconomic History     Marital status:      Number of children: 0   Occupational History     Occupation: Retired -    Tobacco Use     Smoking status: Never Smoker     Smokeless tobacco: Never Used   Substance and Sexual Activity     Alcohol use: No     Drug use: No     Sexual activity: Not Currently   Other Topics Concern     Caffeine Concern Yes     Comment: 20 oz daily     Exercise No     Seat Belt Yes     Self-Exams Yes   Social History Narrative    . Single.    No kids.    No formal exercise.        Review of Systems:  Skin:  Negative     Eyes:  Negative    ENT:  Negative    Respiratory:  Positive for dyspnea on exertion  Cardiovascular:    Positive for;fatigue  Gastroenterology: Negative    Genitourinary:    urinary frequency  Musculoskeletal:  Positive for muscular weakness  Neurologic:    migraine headaches  Psychiatric:  Negative    Heme/Lymph/Imm:  Negative   "  Endocrine:    diabetes    Physical Exam:  Vitals: /67   Pulse 76   Ht 1.575 m (5' 2\")   Wt 84.8 kg (187 lb)   LMP  (LMP Unknown)   SpO2 98%   BMI 34.20 kg/m       GEN:  NAD  NECK: No JVD  C/V:  Regular rate and rhythm, no murmur, rub or gallop.  RESP: Clear to auscultation bilaterally without wheezing, rales, or rhonchi.  GI: Abdomen soft, nontender, nondistended.   EXTREM: No pitting LE edema.   NEURO: Alert and oriented, cooperative. No obvious focal deficits.   PSYCH: Normal affect.  SKIN: Warm and dry.       Recent Lab Results:  LIPID RESULTS:  Lab Results   Component Value Date    CHOL 67 06/08/2022    CHOL 115 03/27/2020    HDL 28 (L) 06/08/2022    HDL 34 (L) 03/27/2020    LDL 17 06/08/2022    LDL 59 03/27/2020    TRIG 109 06/08/2022    TRIG 112 03/27/2020    CHOLHDLRATIO 5.5 (H) 05/22/2015       LIVER ENZYME RESULTS:  Lab Results   Component Value Date    AST 42 06/08/2022    AST 20 04/13/2021    ALT 63 (H) 06/08/2022    ALT 38 04/13/2021       CBC RESULTS:  Lab Results   Component Value Date    WBC 9.8 07/07/2022    WBC 8.3 01/08/2021    RBC 4.11 07/07/2022    RBC 4.12 01/08/2021    HGB 11.6 (L) 07/07/2022    HGB 11.6 (L) 01/08/2021    HCT 36.9 07/07/2022    HCT 38.2 01/08/2021    MCV 90 07/07/2022    MCV 93 01/08/2021    MCH 28.2 07/07/2022    MCH 28.2 01/08/2021    MCHC 31.4 (L) 07/07/2022    MCHC 30.4 (L) 01/08/2021    RDW 14.2 07/07/2022    RDW 13.5 01/08/2021     07/07/2022     01/08/2021       BMP RESULTS:  Lab Results   Component Value Date     07/07/2022     04/13/2021    POTASSIUM 4.0 07/07/2022    POTASSIUM 4.2 04/13/2021    CHLORIDE 107 07/07/2022    CHLORIDE 110 (H) 04/13/2021    CO2 23 07/07/2022    CO2 28 04/13/2021    ANIONGAP 9 07/07/2022    ANIONGAP 3 04/13/2021     (H) 07/07/2022     (H) 04/13/2021    BUN 42 (H) 07/07/2022    BUN 29 04/13/2021    CR 1.69 (H) 07/07/2022    CR 1.38 (H) 04/13/2021    GFRESTIMATED 32 (L) 07/07/2022    " GFRESTIMATED 32 (L) 05/17/2022    GFRESTIMATED 38 (L) 04/13/2021    GFRESTBLACK 45 (L) 04/13/2021    RASHEEDA 9.5 07/07/2022    RASHEEDA 9.2 04/13/2021        A1C RESULTS:  Lab Results   Component Value Date    A1C 8.0 (H) 06/08/2022    A1C 8.1 (H) 01/08/2021       INR RESULTS:  Lab Results   Component Value Date    INR 1.09 06/01/2022    INR 0.97 05/04/2022           Latasha Calderon PA-C  July 7, 2022

## 2022-07-07 NOTE — PATIENT INSTRUCTIONS
CARDIOLOGY CLINIC INSTRUCTIONS:   -- Stop spironolactone to help increase your blood pressure a bit more.   -- I will review heart ultrasound results with Dr. Lewis.   -- Come back in 5 months with repeat heart ultrasound (echo).   -- Remember you will need antibiotic medication prior to ANY dental cleaning/work.      IMPORTANT PHONE NUMBERS:  Cardiology Scheduling (Kelly)~[phone]  Cardiology Clinic Nurse Aileen ~[phone]  Cardiology Clinic Nurse Beth ~[phone]  Cardiology Clinic Nurse Anais ~[phone]

## 2022-07-08 ENCOUNTER — HOSPITAL ENCOUNTER (OUTPATIENT)
Dept: CARDIAC REHAB | Facility: CLINIC | Age: 72
Discharge: HOME | End: 2022-07-08
Attending: STUDENT IN AN ORGANIZED HEALTH CARE EDUCATION/TRAINING PROGRAM
Payer: COMMERCIAL

## 2022-07-08 PROCEDURE — 93798 PHYS/QHP OP CAR RHAB W/ECG: CPT | Performed by: REHABILITATION PRACTITIONER

## 2022-07-11 ENCOUNTER — HOSPITAL ENCOUNTER (OUTPATIENT)
Dept: CARDIAC REHAB | Facility: CLINIC | Age: 72
Discharge: HOME | End: 2022-07-11
Attending: STUDENT IN AN ORGANIZED HEALTH CARE EDUCATION/TRAINING PROGRAM
Payer: COMMERCIAL

## 2022-07-11 PROCEDURE — 93798 PHYS/QHP OP CAR RHAB W/ECG: CPT | Performed by: REHABILITATION PRACTITIONER

## 2022-07-13 ENCOUNTER — HOSPITAL ENCOUNTER (OUTPATIENT)
Dept: CARDIAC REHAB | Facility: CLINIC | Age: 72
Discharge: HOME | End: 2022-07-13
Attending: STUDENT IN AN ORGANIZED HEALTH CARE EDUCATION/TRAINING PROGRAM
Payer: COMMERCIAL

## 2022-07-13 PROCEDURE — 93798 PHYS/QHP OP CAR RHAB W/ECG: CPT | Performed by: REHABILITATION PRACTITIONER

## 2022-07-18 ENCOUNTER — TELEPHONE (OUTPATIENT)
Dept: NEUROLOGY | Facility: CLINIC | Age: 72
End: 2022-07-18

## 2022-07-18 NOTE — TELEPHONE ENCOUNTER
----- Message from LEONEL Ruiz CNP sent at 7/18/2022  7:07 AM CDT -----  Please let Rachael know there was no evidence of atrial fibrillation on her study. However, there was some sinus bradycardia with pauses up to 2 seconds and I would suggest she review this with her PCP. Thanks!

## 2022-07-18 NOTE — TELEPHONE ENCOUNTER
Pt not discharged until 10/31/20. Seen by provider. No call required per pp protocol.     Reason for Disposition   Patient already left for the hospital/clinic.     Pt not discharged until 10/31/20. Seen by provider. No call required per pp protocol.    Additional Information   Negative: Caller is angry or rude (e.g., hangs up, verbally abusive, yelling)   Negative: Caller hangs up   Negative: Caller has already spoken with the PCP and has no further questions.   Negative: Caller has already spoken with another triager and has no further questions.   Negative: Caller has already spoken with another triager or PCP AND has further questions AND triager able to answer questions.    Protocols used: NO CONTACT OR DUPLICATE CONTACT CALL-A-       Spoke with pt and informed her of the results and recommendation to review bradycardia and pauses with PCP. Noted that PCP is within our system so will route to her directly as a FYI.    Theresa Peterson BS, RN, SCRN  RN Stroke Neurology Care Coordinator  [facility] Neuroscience Service Line

## 2022-07-19 ENCOUNTER — TELEPHONE (OUTPATIENT)
Dept: CARDIOLOGY | Facility: CLINIC | Age: 72
End: 2022-07-19

## 2022-07-19 DIAGNOSIS — I35.0 AORTIC STENOSIS, SEVERE: Primary | ICD-10-CM

## 2022-07-19 RX ORDER — METOPROLOL SUCCINATE 25 MG/1
25 TABLET, EXTENDED RELEASE ORAL DAILY
Qty: 30 TABLET | Refills: 3 | Status: SHIPPED | OUTPATIENT
Start: 2022-07-19 | End: 2022-09-15

## 2022-07-19 NOTE — TELEPHONE ENCOUNTER
Message from WILTON Latasha Calderon:  Latasha Calderon PA-C [facility] Heart Team 2  Please let patient know I have reviewed her recent echo with Dr. Lewis who recommends switching from verapamil to metoprolol succinate. Let's switch her to 25 mg daily and return to clinic with me in 3-4 weeks with repeat 24 hour holter. We will decide on the timing of the echo once we are able to get her on the highest dose of metoprolol.     Thanks,     Latasha     Spoke with patient to review medication change recommendation from WILTON Latasha Calderon. Reviewed with patient to stop verapamil and start metoprolol succinate 25mg daily.  Reviewed that 24 hr holter should be done in 3-4 weeks and then OV with WILTON Latasha Calderon. Patient verbalized understanding and agreed with plan.

## 2022-07-20 ENCOUNTER — HOSPITAL ENCOUNTER (OUTPATIENT)
Dept: CARDIAC REHAB | Facility: CLINIC | Age: 72
Discharge: HOME | End: 2022-07-20
Attending: STUDENT IN AN ORGANIZED HEALTH CARE EDUCATION/TRAINING PROGRAM
Payer: COMMERCIAL

## 2022-07-20 PROCEDURE — 93798 PHYS/QHP OP CAR RHAB W/ECG: CPT | Performed by: REHABILITATION PRACTITIONER

## 2022-07-21 ENCOUNTER — ANCILLARY PROCEDURE (OUTPATIENT)
Dept: BONE DENSITY | Facility: CLINIC | Age: 72
End: 2022-07-21
Attending: INTERNAL MEDICINE
Payer: COMMERCIAL

## 2022-07-21 DIAGNOSIS — Z78.0 ASYMPTOMATIC MENOPAUSE: ICD-10-CM

## 2022-07-21 PROCEDURE — 77080 DXA BONE DENSITY AXIAL: CPT | Performed by: INTERNAL MEDICINE

## 2022-07-22 ENCOUNTER — HOSPITAL ENCOUNTER (OUTPATIENT)
Dept: CARDIAC REHAB | Facility: CLINIC | Age: 72
Discharge: HOME | End: 2022-07-22
Attending: STUDENT IN AN ORGANIZED HEALTH CARE EDUCATION/TRAINING PROGRAM
Payer: COMMERCIAL

## 2022-07-22 PROCEDURE — 93798 PHYS/QHP OP CAR RHAB W/ECG: CPT | Performed by: OCCUPATIONAL THERAPIST

## 2022-07-25 ENCOUNTER — HOSPITAL ENCOUNTER (OUTPATIENT)
Dept: CARDIAC REHAB | Facility: CLINIC | Age: 72
Discharge: HOME | End: 2022-07-25
Attending: STUDENT IN AN ORGANIZED HEALTH CARE EDUCATION/TRAINING PROGRAM
Payer: COMMERCIAL

## 2022-07-25 PROCEDURE — 93798 PHYS/QHP OP CAR RHAB W/ECG: CPT | Performed by: REHABILITATION PRACTITIONER

## 2022-07-27 ENCOUNTER — HOSPITAL ENCOUNTER (OUTPATIENT)
Dept: CARDIAC REHAB | Facility: CLINIC | Age: 72
Discharge: HOME | End: 2022-07-27
Attending: STUDENT IN AN ORGANIZED HEALTH CARE EDUCATION/TRAINING PROGRAM
Payer: COMMERCIAL

## 2022-07-27 PROCEDURE — 93798 PHYS/QHP OP CAR RHAB W/ECG: CPT | Performed by: CLINICAL EXERCISE PHYSIOLOGIST

## 2022-07-29 ENCOUNTER — HOSPITAL ENCOUNTER (OUTPATIENT)
Dept: CARDIAC REHAB | Facility: CLINIC | Age: 72
Discharge: HOME | End: 2022-07-29
Attending: STUDENT IN AN ORGANIZED HEALTH CARE EDUCATION/TRAINING PROGRAM
Payer: COMMERCIAL

## 2022-07-29 ENCOUNTER — TELEPHONE (OUTPATIENT)
Dept: CARDIAC REHAB | Facility: CLINIC | Age: 72
End: 2022-07-29

## 2022-07-29 PROCEDURE — 93798 PHYS/QHP OP CAR RHAB W/ECG: CPT | Performed by: CLINICAL EXERCISE PHYSIOLOGIST

## 2022-07-29 NOTE — TELEPHONE ENCOUNTER
"PT noted to have sinus pause x 2 at rest (pre exercise) in rehab today. PT denied sx at that time, however she does note occasional episodes of dizziness but states \" I always have that.\"   PT did have similar pauses noted in cardiac rehab on 6/23 and 7/22. Today BP elevated at rest (134/58) with blunted exercise response. HR WNL's. See session notes for EKG and details. Plan to continue to monitor closely.   "

## 2022-08-01 ENCOUNTER — HOSPITAL ENCOUNTER (OUTPATIENT)
Dept: CARDIAC REHAB | Facility: CLINIC | Age: 72
Discharge: HOME | End: 2022-08-01
Attending: STUDENT IN AN ORGANIZED HEALTH CARE EDUCATION/TRAINING PROGRAM
Payer: COMMERCIAL

## 2022-08-01 PROCEDURE — 93798 PHYS/QHP OP CAR RHAB W/ECG: CPT | Performed by: OCCUPATIONAL THERAPIST

## 2022-08-03 ENCOUNTER — HOSPITAL ENCOUNTER (OUTPATIENT)
Dept: CARDIAC REHAB | Facility: CLINIC | Age: 72
Discharge: HOME | End: 2022-08-03
Attending: STUDENT IN AN ORGANIZED HEALTH CARE EDUCATION/TRAINING PROGRAM
Payer: COMMERCIAL

## 2022-08-03 PROCEDURE — 93798 PHYS/QHP OP CAR RHAB W/ECG: CPT | Performed by: CLINICAL EXERCISE PHYSIOLOGIST

## 2022-08-08 ENCOUNTER — HOSPITAL ENCOUNTER (OUTPATIENT)
Dept: CARDIAC REHAB | Facility: CLINIC | Age: 72
Discharge: HOME | End: 2022-08-08
Attending: STUDENT IN AN ORGANIZED HEALTH CARE EDUCATION/TRAINING PROGRAM
Payer: COMMERCIAL

## 2022-08-08 PROCEDURE — 93798 PHYS/QHP OP CAR RHAB W/ECG: CPT | Performed by: CLINICAL EXERCISE PHYSIOLOGIST

## 2022-08-09 ENCOUNTER — MYC MEDICAL ADVICE (OUTPATIENT)
Dept: ENDOCRINOLOGY | Facility: CLINIC | Age: 72
End: 2022-08-09

## 2022-08-09 ENCOUNTER — LAB (OUTPATIENT)
Dept: LAB | Facility: CLINIC | Age: 72
End: 2022-08-09
Payer: COMMERCIAL

## 2022-08-09 DIAGNOSIS — Z98.84 BARIATRIC SURGERY STATUS: ICD-10-CM

## 2022-08-09 DIAGNOSIS — E66.89 OTHER OBESITY: ICD-10-CM

## 2022-08-09 DIAGNOSIS — Z79.4 TYPE 2 DIABETES MELLITUS WITH DIABETIC NEUROPATHY, WITH LONG-TERM CURRENT USE OF INSULIN (H): ICD-10-CM

## 2022-08-09 DIAGNOSIS — I10 ESSENTIAL HYPERTENSION: ICD-10-CM

## 2022-08-09 DIAGNOSIS — E11.40 TYPE 2 DIABETES MELLITUS WITH DIABETIC NEUROPATHY, WITH LONG-TERM CURRENT USE OF INSULIN (H): ICD-10-CM

## 2022-08-09 DIAGNOSIS — E08.22 DIABETES MELLITUS DUE TO UNDERLYING CONDITION WITH DIABETIC CHRONIC KIDNEY DISEASE, UNSPECIFIED CKD STAGE, UNSPECIFIED WHETHER LONG TERM INSULIN USE (H): ICD-10-CM

## 2022-08-09 LAB
HBA1C MFR BLD: 7.9 % (ref 0–5.6)
VIT B12 SERPL-MCNC: 2687 PG/ML (ref 232–1245)

## 2022-08-09 PROCEDURE — 82607 VITAMIN B-12: CPT

## 2022-08-09 PROCEDURE — 84443 ASSAY THYROID STIM HORMONE: CPT

## 2022-08-09 PROCEDURE — 83036 HEMOGLOBIN GLYCOSYLATED A1C: CPT

## 2022-08-09 PROCEDURE — 36415 COLL VENOUS BLD VENIPUNCTURE: CPT

## 2022-08-09 PROCEDURE — 82947 ASSAY GLUCOSE BLOOD QUANT: CPT

## 2022-08-09 PROCEDURE — 84244 ASSAY OF RENIN: CPT | Mod: 90

## 2022-08-09 PROCEDURE — 84520 ASSAY OF UREA NITROGEN: CPT

## 2022-08-09 PROCEDURE — 82043 UR ALBUMIN QUANTITATIVE: CPT

## 2022-08-09 PROCEDURE — 80061 LIPID PANEL: CPT

## 2022-08-09 PROCEDURE — 82088 ASSAY OF ALDOSTERONE: CPT

## 2022-08-09 PROCEDURE — 99000 SPECIMEN HANDLING OFFICE-LAB: CPT

## 2022-08-09 PROCEDURE — 82565 ASSAY OF CREATININE: CPT

## 2022-08-09 PROCEDURE — 82728 ASSAY OF FERRITIN: CPT

## 2022-08-09 PROCEDURE — 82306 VITAMIN D 25 HYDROXY: CPT

## 2022-08-09 PROCEDURE — 84132 ASSAY OF SERUM POTASSIUM: CPT

## 2022-08-09 ASSESSMENT — ENCOUNTER SYMPTOMS
MYALGIAS: 0
CHILLS: 0
SEIZURES: 0
TASTE DISTURBANCE: 1
FATIGUE: 1
NECK MASS: 0
SINUS CONGESTION: 0
NUMBNESS: 0
HYPERTENSION: 0
TREMORS: 0
HOARSE VOICE: 1
DECREASED APPETITE: 1
MUSCLE CRAMPS: 0
NECK PAIN: 1
SYNCOPE: 0
HEADACHES: 1
NIGHT SWEATS: 0
CONSTIPATION: 1
EXERCISE INTOLERANCE: 0
SORE THROAT: 0
JOINT SWELLING: 1
BOWEL INCONTINENCE: 0
VOMITING: 0
DISTURBANCES IN COORDINATION: 1
JAUNDICE: 0
DIARRHEA: 0
MEMORY LOSS: 0
TINGLING: 0
BLOATING: 0
LOSS OF CONSCIOUSNESS: 0
BLOOD IN STOOL: 0
ABDOMINAL PAIN: 0
PALPITATIONS: 0
PARALYSIS: 0
TROUBLE SWALLOWING: 0
POLYPHAGIA: 0
SINUS PAIN: 1
HALLUCINATIONS: 0
LIGHT-HEADEDNESS: 1
MUSCLE WEAKNESS: 1
WEIGHT LOSS: 1
FEVER: 0
RECTAL PAIN: 0
WEIGHT GAIN: 0
SLEEP DISTURBANCES DUE TO BREATHING: 0
NAUSEA: 0
BACK PAIN: 0
SPEECH CHANGE: 0
ORTHOPNEA: 0
DIZZINESS: 1
HEARTBURN: 0
LEG PAIN: 0
ARTHRALGIAS: 1
STIFFNESS: 1
INCREASED ENERGY: 1
POLYDIPSIA: 0
ALTERED TEMPERATURE REGULATION: 1
SMELL DISTURBANCE: 0
HYPOTENSION: 1

## 2022-08-09 NOTE — PROGRESS NOTES
Outcome for 08/09/22 3:04 PM: [employer] message sent  ASHLEIGH Spencer  Outcome for 08/12/22 9:55 AM: [facility]att emailed to provider  ASHLEIGH Aguayo     [facility]      Ade Wilkins is a 71 year old female who is being evaluated via a billable video visit.        Video Start Time: 9:30am  Video End Time: 9:52am    Follow up for T2Dm, hypothyroidism    Interval history  LV 2/22  Saw Roxanne Masterson.diabetes clinic in 5/22  Had TAVR in 6/7/22  Had PCI to RCA in 5/4/22    Post op had decrease in appetite and hypoglyemia, pt decreased Tresiba to 30 units daily an Novolog to 3 units with food intake;    Novolog 3 units with meals  Tresiba 30 units daily    Meals: not formal meals,  snacks, not hungry, does sandwich with turkey or ham, cream of wheat ceral, not cooked meals, does not do a lot of cooking, some fish and rice, rare vegetables,              Saw Roxanne Masterson in 5/2022  At that visit decreased Tresiba from 44 -> 40 unit(s)  Takes novolog after eating, forget before  Currently staying up throughout the night.       Diabetes medication  Empagliflozin 10 mg  Dulaglutide 3 mg (trulicity)  Tresiba 30 units  Novolog 3 units with for 3 meals a day, not with breakfast as preprandial BGs are good.        [facility] has been doing labs there    Obesity  Status post Noe-en-Y bariatric surgery,  Patient is on topiramate    Complications  Eyes; R eye proliereative retinopathy, is receiving injection q 10w  Feet: peripheral edema improved, left stil some swelling  CRI: Renal function actually improved on recent labs compared to years before!  Mild albuminuria    HTN: Irbesartan 300 mg, Spironolactone 25 mg daily, Furosemide, Metoprool  Lipids: Rosuvastatin 5 mg Zetia 10 mg daily    Assessment:  1. Elderly woman with T2DM longstanding insulin with moderate blood glucose control on tresiba, Novolog, Trulicity and Empagliflozin complication of retinopathy and CRI  with hypertension, hypokalemia.    Blood glucose control  In moderate control, similar to prior visits. Recent TAVR and PCI during which time she decreased insulin dose. For intake is mostly with snacks. Discussed to see diabetes educator to help with insulin coverage for meals. Continue Empagliflozin 10 mg, Dulaglutide 3 mg (trulicity), Tresiba 30 units  Increase Novolog 3-> 6 units with most snacks (a sandwich etc)    HTN: Irbesartan 300 mg, Spironolactone 25 mg daily, Furosemide, Metoprolol  Micha/Renin ratio nl    Dyslipidemia: Atorvastatin was switched to Rosuvastatin, stopped Ezetimide per cardiology,      St post bariatric surgery: doing well on supplements, on Topiramate for weight maintenance and migraines     Hypothyroidism doing well on Levothyroxine 150 mcg daily    decrease VitB12: change to qod  Cont VitD  Cont Iron   Cont Calcium    Cont VitD    Follow up with Roxanne Masterson in 3 months  Follow up in 6 months      30 minutes spent on the date of the encounter doing chart review, review of test results, interpretation of tests, patient visit and documentation       Olga Lidia Hoover MD  Endocrinology and Diabetes  Telephone contact:  [facility] [phone]  [facility] [phone]            Medications:   Current Outpatient Prescriptions          Current Outpatient Medications   Medication Sig Dispense Refill     atorvastatin (LIPITOR) 80 MG tablet TAKE ONE TABLET BY MOUTH ONE TIME DAILY 90 tablet 3     atorvastatin (LIPITOR) 80 MG tablet Take 1 tablet (80 mg) by mouth daily 30 tablet 1     augmented betamethasone dipropionate (DIPROLENE-AF) 0.05 % ointment Apply to AA BID x 3-4 weeks then PRN 45 g 0     BD FCO U/F 32G X 4 MM insulin pen needle USE 4 TIMES A DAY WITH INSULIN AND VICTOZA INJECTIONS 400 each 1     blood glucose (MARTITA CONTOUR NEXT) test strip Use to test blood sugar 5 times daily or as directed. 100 each 5     blood glucose (NO BRAND  SPECIFIED) test strip Use to test blood sugar 5 times daily. 500 each 3     blood glucose monitoring (MARTITA CONTOUR NEXT) test strip Use to test blood sugar 4 times daily or as directed. 1 Box 9     calcium carbonate-vitamin D (CALTRATE 600+D) 600-400 MG-UNIT CHEW Take 2 chew tab by mouth every evening         cefPROZIL (CEFZIL) 500 MG tablet Take 1 tablet (500 mg) by mouth 2 times daily 20 tablet 0     cetirizine (ZYRTEC) 10 MG tablet Take 1 tablet (10 mg) by mouth daily         Cholecalciferol (VITAMIN D) 2000 UNITS tablet Take 2,000 Units by mouth daily         Continuous Blood Gluc  (FREESTYLE BALWINDER 14 DAY READER) HENNA 1 each 3 times daily 1 Device 1     Continuous Blood Gluc Sensor (FREESTYLE BALWINDER 14 DAY SENSOR) MISC 1 each every 14 days 2 each 11     CONTOUR NEXT TEST test strip USE TO TEST BLOOD SUGAR 4 TIMES DAILY OR AS DIRECTED. 100 each 8     cyanocolbalamin (VITAMIN B-12) 1000 MCG tablet Take 1,000 mcg by mouth every other day          dulaglutide (TRULICITY) 1.5 MG/0.5ML pen Inject 1.5 mg Subcutaneous every 7 days 6 mL 3     fenofibrate (TRIGLIDE/LOFIBRA) 160 MG tablet TAKE ONE TABLET BY MOUTH ONE TIME DAILY 90 tablet 3     fluticasone (FLONASE) 50 MCG/ACT nasal spray Spray 2 sprays into both nostrils daily 16 mL 1     fluticasone (FLONASE) 50 MCG/ACT nasal spray Spray 2 sprays into both nostrils daily 16 g 1     furosemide (LASIX) 20 MG tablet Take 1 tablet (20 mg) by mouth daily 90 tablet 3     HUMALOG KWIKPEN 100 UNIT/ML soln Use as directed up to 60 units per day. 15 mL 11      hydrocortisone (WESTCORT) 0.2 % cream Apply topically 2 times daily Apply sparingly to affected area 2 times daily as needed. 45 g 2     insulin degludec (TRESIBA) 200 UNIT/ML pen Inject 72units subcu daily 80 mL 3     irbesartan (AVAPRO) 300 MG tablet TAKE ONE TABLET BY MOUTH ONE TIME DAILY  90 tablet 3     KLOR-CON 10 MEQ CR tablet TAKE ONE TABLET BY MOUTH ONE TIME DAILY  90 tablet 0     levothyroxine  (SYNTHROID/LEVOTHROID) 50 MCG tablet TAKE 1 TABLET BY MOUTH DAILY. 90 tablet 2     Magnesium 500 MG CAPS Take 1 capsule by mouth daily.         metFORMIN (GLUCOPHAGE) 1000 MG tablet 1 tab twice daily 180 tablet 1     ONE TOUCH DELICA LANCETS MISC 1 Device 4 times daily. 100 Stick prn     potassium chloride ER (K-TAB/KLOR-CON) 10 MEQ CR tablet TAKE ONE TABLET BY MOUTH ONE TIME DAILY  90 tablet 0     topiramate (TOPAMAX) 25 MG tablet take 1 tablet (25 mg) at bedtime for 1 week, then take 2 tablets (50 mg) at bedtime for 1 week, and then 3 tablet (75 mg) daily at bedtime t 90 tablet 2     traZODone (DESYREL) 50 MG tablet take 1 to 2 tablets (50 to 100mg) by mouth nightly as needed 180 tablet PRN     traZODone (DESYREL) 50 MG tablet take 1 to 2 tablets (50 to 100mg) by mouth nightly as needed 180 tablet 0     verapamil ER (CALAN-SR) 180 MG CR tablet TAKE ONE TABLET BY MOUTH TWICE DAILY  180 tablet 3     VITRON-C  MG TABS tablet TAKE TWO TABLETS BY MOUTH TWICE DAILY  360 tablet 1           Review of Systems: in addition to stated above  GENERAL: Negative  SKIN: Negative  HENT: Negative   EYE: Negative  HEART: Negative  RESPIRATORY: Negative   GI: Negative  : Negative  MSK: Negative  BLOOD/LYMPH: Negative  NEUROLOGIC: Negative   PSYCH: Negative     Physical Examination:     Wt Readings from Last 4 Encounters:   07/07/22 84.8 kg (187 lb)   06/16/22 87.1 kg (192 lb)   06/10/22 87.7 kg (193 lb 4.8 oz)   06/01/22 88.9 kg (196 lb)      Reported vitals:  There were no vitals taken for this visit.   healthy, alert and no distress  PSYCH: Alert and oriented times 3; coherent speech, normal   rate and volume, able to articulate logical thoughts, able   to abstract reason, no tangential thoughts, no hallucinations   or delusions  Her affect is normal and pleasant  RESP: No cough, no audible wheezing, able to talk in full sentences  Remainder of exam unable to be completed due to telephone visits           Wt Readings from  Last 4 Encounters:   01/07/20 101.2 kg (223 lb)   10/14/19 100.7 kg (222 lb 1.6 oz)   06/10/19 100.4 kg (221 lb 4.8 oz)   04/22/19 104.7 kg (230 lb 14.4 oz)     Lab Results   Component Value Date    CR 1.08 (H) 08/09/2022    CR 1.69 (H) 07/07/2022    CR 1.48 (H) 06/10/2022    CR 1.48 (H) 06/09/2022    CR 1.55 (H) 06/08/2022    CR 1.62 (H) 06/07/2022    CR 1.36 (H) 06/01/2022    CR 1.7 (H) 05/17/2022    CR 1.44 (H) 05/13/2022    CR 1.39 (H) 05/04/2022          Lab Results   Component Value Date     07/07/2022    CHLORIDE 107 07/07/2022    CO2 23 07/07/2022     (H) 08/09/2022    CR 1.08 (H) 08/09/2022    CR 1.69 (H) 07/07/2022    CR 1.48 (H) 06/10/2022    CR 1.48 (H) 06/09/2022    CR 1.55 (H) 06/08/2022    RASHEEDA 9.5 07/07/2022    MAG 2.6 (H) 06/09/2022    ALBUMIN 3.3 (L) 06/08/2022    ALKPHOS 89 06/08/2022    LDL 42 08/09/2022    HDL 46 (L) 08/09/2022    TRIG 81 08/09/2022     Lab Results   Component Value Date    TSH 1.83 08/09/2022    TSH 2.37 08/27/2021    TSH 3.47 03/27/2020     Lab Results   Component Value Date    POTASSIUM 4.1 08/09/2022     Lab Results   Component Value Date    POTASSIUM 4.1 08/09/2022    POTASSIUM 4.0 07/07/2022    POTASSIUM 4.3 06/10/2022         Lab Results   Component Value Date    MICROL 11 08/09/2022    MICROL 6 08/27/2021    MICROL 35 10/09/2020    MICROL 10 01/07/2020    MICROL 13 12/10/2018     Lab Results   Component Value Date    A1C 7.9 (H) 08/09/2022    A1C 8.0 (H) 06/08/2022    A1C 7.9 (H) 05/04/2022    A1C 7.6 (H) 11/24/2021    A1C 8.0 (H) 08/27/2021       Lab Results   Component Value Date    HGB 11.6 (L) 07/07/2022 8/9/22 VitD 77  Aldosterone to renin normal  Ferritin 106  B12 2667 (high)     Negative

## 2022-08-10 ENCOUNTER — HOSPITAL ENCOUNTER (OUTPATIENT)
Dept: CARDIAC REHAB | Facility: CLINIC | Age: 72
Discharge: HOME | End: 2022-08-10
Attending: STUDENT IN AN ORGANIZED HEALTH CARE EDUCATION/TRAINING PROGRAM
Payer: COMMERCIAL

## 2022-08-10 LAB
BUN SERPL-MCNC: 20 MG/DL (ref 7–30)
CHOLEST SERPL-MCNC: 104 MG/DL
CREAT SERPL-MCNC: 1.08 MG/DL (ref 0.52–1.04)
CREAT UR-MCNC: 21 MG/DL
FASTING STATUS PATIENT QL REPORTED: YES
FASTING STATUS PATIENT QL REPORTED: YES
FERRITIN SERPL-MCNC: 106 NG/ML (ref 8–252)
GFR SERPL CREATININE-BSD FRML MDRD: 55 ML/MIN/1.73M2
GLUCOSE BLD-MCNC: 105 MG/DL (ref 70–99)
HDLC SERPL-MCNC: 46 MG/DL
LDLC SERPL CALC-MCNC: 42 MG/DL
MICROALBUMIN UR-MCNC: 11 MG/L
MICROALBUMIN/CREAT UR: 52.38 MG/G CR (ref 0–25)
NONHDLC SERPL-MCNC: 58 MG/DL
POTASSIUM BLD-SCNC: 4.1 MMOL/L (ref 3.4–5.3)
TRIGL SERPL-MCNC: 81 MG/DL
TSH SERPL DL<=0.005 MIU/L-ACNC: 1.83 MU/L (ref 0.4–4)

## 2022-08-10 PROCEDURE — 93798 PHYS/QHP OP CAR RHAB W/ECG: CPT | Performed by: OCCUPATIONAL THERAPIST

## 2022-08-11 LAB — RENIN PLAS-CCNC: 1.4 NG/ML/HR

## 2022-08-12 ENCOUNTER — HOSPITAL ENCOUNTER (OUTPATIENT)
Dept: CARDIAC REHAB | Facility: CLINIC | Age: 72
Discharge: HOME | End: 2022-08-12
Attending: STUDENT IN AN ORGANIZED HEALTH CARE EDUCATION/TRAINING PROGRAM
Payer: COMMERCIAL

## 2022-08-12 LAB — ALDOST SERPL-MCNC: 18.2 NG/DL (ref 0–31)

## 2022-08-12 PROCEDURE — 93798 PHYS/QHP OP CAR RHAB W/ECG: CPT | Performed by: CLINICAL EXERCISE PHYSIOLOGIST

## 2022-08-15 ENCOUNTER — PATIENT OUTREACH (OUTPATIENT)
Dept: NURSING | Facility: CLINIC | Age: 72
End: 2022-08-15

## 2022-08-15 ENCOUNTER — HOSPITAL ENCOUNTER (OUTPATIENT)
Dept: CARDIAC REHAB | Facility: CLINIC | Age: 72
Discharge: HOME | End: 2022-08-15
Attending: STUDENT IN AN ORGANIZED HEALTH CARE EDUCATION/TRAINING PROGRAM
Payer: COMMERCIAL

## 2022-08-15 LAB
DEPRECATED CALCIDIOL+CALCIFEROL SERPL-MC: <82 UG/L (ref 20–75)
VITAMIN D2 SERPL-MCNC: <5 UG/L
VITAMIN D3 SERPL-MCNC: 77 UG/L

## 2022-08-15 PROCEDURE — 93798 PHYS/QHP OP CAR RHAB W/ECG: CPT

## 2022-08-15 ASSESSMENT — ACTIVITIES OF DAILY LIVING (ADL): DEPENDENT_IADLS:: TRANSPORTATION;COOKING;SHOPPING;MEAL PREPARATION

## 2022-08-15 NOTE — PROGRESS NOTES
"Clinic Care Coordination Contact    Follow Up Progress Note      Assessment:   Patient states she \"got a ride\" to her Cardiac Rehab appointment today by her friend and this will be her means of transportation for medical appointments and grocery store.  The transportation goal has been completed.    Patient states hes getting \"stronger\" every day and that Cardiac Rehab is \"helping\" her recovery.    Patient is aware of the ACP/HCD that was sent to patient via [employer] but hasn't had a chance to review.  RNCC will reach out to patient in one month and if patient has not addressed the ACP/HCD, RNCC will disenroll patient from care coordination.    Care Gaps:    Health Maintenance Due   Topic Date Due     FALL RISK ASSESSMENT  03/24/2022     Care Gaps Last addressed on 8/15/2022    Goals addressed this encounter:    Goals Addressed                    This Visit's Progress       1. Transportation (pt-stated)   90%      Goal Statement: I would like assistance and support to find transportation for my medical appointments.  Date Goal set: 6/14/2022  Barriers: Patient doesn't drive; friend drives her to all appointments  Strengths: Strong advocate for self; supportive sister  Date to Achieve By: 9/14/2022  Patient expressed understanding of goal: Yes  Action steps to achieve this goal:  1. I will call my insurance and see if my insurance benefits provide medical appointment transportation  2. I will call the Senior Linkage Line ([phone]) for transportation resources  3. I will call [facility] [employer] ([phone]) for transportation resources  4. I will contact my care team with questions, concerns, support needs   5. I will use the clinic as a resource, and I understand I can contact my clinic with 24/7 after hours services available   6.  Care coordination will remain available as needed    Annual Assessment Due: 6/2023           2. Psychosocial (pt-stated)   30%      Goal Statement: I will complete a Health Care " Directive and have this scanned into my Medical Record.  Date Goal set: 6/14/2022  Barriers: Patient doesn't have a Health Care Directive  Strengths: Strong advocate for self  Date to Achieve By: 12/14/2022  Patient expressed understanding of goal: yes  Action steps to achieve this goal:  1. Care Coordination will mail me a Health Care Directive and any requested resources (goals worksheets, education sheets, class flyer).   2. I will complete the Health Care Directive. My signature must be either notarized or witnessed by two adults who are not named as health care agents in the document. Additionally only one of the witnesses can be employed by my health care system. If I need a notary I can check with my bank, my place of [employer], [employer] stores, or look online at [email or web address] .  3. I will provide a copy of my Health Care Directive to my care team and/or email directly to [email or web address].   4. I can visit [email or web address] website, email [email or web address] or call [facility] [employer] at [phone] (M-Friday 6a to 5p) for more information including free classes on completing a Health Care Directive.  5. I will contact my care team with any barriers, questions or assistance needed with this goal. Care coordinator will remain available as needed.               Intervention/Education provided during outreach:   RNCC called and spoke with patient; introduced self, discussed role of Care Coordination and explained reason for call    Plan:   -Patient will contact the care team with questions, concerns, support needs   -Patient will use the clinic as a resource and understands (s)he can contact the [place] clinic with 24/7 after hours services available  -Care Coordinator will remain available as needed  -RNCC will follow up in one month for follow up appointments/recommendations and goal progression     Leanne Humphreys RN, BSN, PHN  Primary Care / Care Coordinator   [facility]  [facility]  [email or web address]   [phone]

## 2022-08-16 ENCOUNTER — VIRTUAL VISIT (OUTPATIENT)
Dept: ENDOCRINOLOGY | Facility: CLINIC | Age: 72
End: 2022-08-16
Payer: COMMERCIAL

## 2022-08-16 ENCOUNTER — TELEPHONE (OUTPATIENT)
Dept: ENDOCRINOLOGY | Facility: CLINIC | Age: 72
End: 2022-08-16

## 2022-08-16 DIAGNOSIS — Z79.4 TYPE 2 DIABETES MELLITUS WITH DIABETIC NEUROPATHY, WITH LONG-TERM CURRENT USE OF INSULIN (H): Primary | ICD-10-CM

## 2022-08-16 DIAGNOSIS — E11.40 TYPE 2 DIABETES MELLITUS WITH DIABETIC NEUROPATHY, WITH LONG-TERM CURRENT USE OF INSULIN (H): Primary | ICD-10-CM

## 2022-08-16 PROCEDURE — 99214 OFFICE O/P EST MOD 30 MIN: CPT | Mod: 95 | Performed by: INTERNAL MEDICINE

## 2022-08-16 NOTE — NURSING NOTE
Handly notified of elevated Bp, new orders noted. Will continue to monitor.   Patient denies any changes since echeck-in regarding medication and allergies and states all information entered during echeck-in remains accurate.  Taylor Myhre,VF

## 2022-08-16 NOTE — TELEPHONE ENCOUNTER
CDE appt already scheduled. Just needs f/u with  scheduled.   LVM with endo number to call and schedule that.   Taylor Myhre,VF

## 2022-08-16 NOTE — PROGRESS NOTES
Ade Wilkins is being evaluated via a billable video visit.        How would you like to obtain your AVS? MyChart  For the video visit, send the invitation by: Send to e-mail at: [email or web address]  Will anyone else be joining your video visit? No

## 2022-08-16 NOTE — TELEPHONE ENCOUNTER
Attempted to reach patient to schedule follow up in the Endocrinology Clinic. No answer, LM on VM to call office back.    Schedule with Olga Lidia Hoover MD in about 6 months (around 2/16/2023) . Pt to keep appt with Roxanne Masterson in 11/22 and to schedule with diabetes education.     Nila Rowell, VF

## 2022-08-16 NOTE — LETTER
8/16/2022       RE: Ade Wilkins  [address]  [place] [address]     Dear Colleague,    Thank you for referring your patient, Ade Wilkins, to the [facility] [place] at [facility]. Please see a copy of my visit note below.    Outcome for 08/09/22 3:04 PM: [employer] message sent  ASHLEIGH Spencer  Outcome for 08/12/22 9:55 AM: Wyatt emailed to provider  ASHLEIGH Aguayo     [facility]inology [facility]      Ade Wilkins is a 71 year old female who is being evaluated via a billable video visit.        Video Start Time: 9:30am  Video End Time: 9:52am    Follow up for T2Dm, hypothyroidism    Interval history  LV 2/22  Saw Roxanne Masterson.diabetes clinic in 5/22  Had TAVR in 6/7/22  Had PCI to RCA in 5/4/22    Post op had decrease in appetite and hypoglyemia, pt decreased Tresiba to 30 units daily an Novolog to 3 units with food intake;    Novolog 3 units with meals  Tresiba 30 units daily    Meals: not formal meals,  snacks, not hungry, does sandwich with turkey or ham, cream of wheat ceral, not cooked meals, does not do a lot of cooking, some fish and rice, rare vegetables,              Saw Roxanne Masterson in 5/2022  At that visit decreased Tresiba from 44 -> 40 unit(s)  Takes novolog after eating, forget before  Currently staying up throughout the night.       Diabetes medication  Empagliflozin 10 mg  Dulaglutide 3 mg (trulicity)  Tresiba 30 units  Novolog 3 units with for 3 meals a day, not with breakfast as preprandial BGs are good.        Has seen Oncologist Dr Art at [facility], [place]; has been doing labs there    Obesity  Status post Noe-en-Y bariatric surgery,  Patient is on topiramate    Complications  Eyes; R eye proliereative retinopathy, is receiving injection q 10w  Feet: peripheral edema improved, left stil some swelling  CRI: Renal function actually improved on recent labs compared to years  before!  Mild albuminuria    HTN: Irbesartan 300 mg, Spironolactone 25 mg daily, Furosemide, Metoprool  Lipids: Rosuvastatin 5 mg Zetia 10 mg daily    Assessment:  1. Elderly woman with T2DM longstanding insulin with moderate blood glucose control on tresiba, Novolog, Trulicity and Empagliflozin complication of retinopathy and CRI with hypertension, hypokalemia.    Blood glucose control  In moderate control, similar to prior visits. Recent TAVR and PCI during which time she decreased insulin dose. For intake is mostly with snacks. Discussed to see diabetes educator to help with insulin coverage for meals. Continue Empagliflozin 10 mg, Dulaglutide 3 mg (trulicity), Tresiba 30 units  Increase Novolog 3-> 6 units with most snacks (a sandwich etc)    HTN: Irbesartan 300 mg, Spironolactone 25 mg daily, Furosemide, Metoprolol  Micha/Renin ratio nl    Dyslipidemia: Atorvastatin was switched to Rosuvastatin, stopped Ezetimide per cardiology,      St post bariatric surgery: doing well on supplements, on Topiramate for weight maintenance and migraines     Hypothyroidism doing well on Levothyroxine 150 mcg daily    decrease VitB12: change to qod  Cont VitD  Cont Iron   Cont Calcium    Cont VitD    Follow up with Roxanne Masterson in 3 months  Follow up in 6 months      30 minutes spent on the date of the encounter doing chart review, review of test results, interpretation of tests, patient visit and documentation       Olga Lidia Hoover MD  Endocrinology and Diabetes  Telephone contact:  [facility] [phone]  [facility] [phone]            Medications:   Current Outpatient Prescriptions          Current Outpatient Medications   Medication Sig Dispense Refill     atorvastatin (LIPITOR) 80 MG tablet TAKE ONE TABLET BY MOUTH ONE TIME DAILY 90 tablet 3     atorvastatin (LIPITOR) 80 MG tablet Take 1 tablet (80 mg) by mouth daily 30 tablet 1     augmented betamethasone  dipropionate (DIPROLENE-AF) 0.05 % ointment Apply to AA BID x 3-4 weeks then PRN 45 g 0     BD FCO U/F 32G X 4 MM insulin pen needle USE 4 TIMES A DAY WITH INSULIN AND VICTOZA INJECTIONS 400 each 1     blood glucose (MARTITA CONTOUR NEXT) test strip Use to test blood sugar 5 times daily or as directed. 100 each 5     blood glucose (NO BRAND SPECIFIED) test strip Use to test blood sugar 5 times daily. 500 each 3     blood glucose monitoring (MARTITA CONTOUR NEXT) test strip Use to test blood sugar 4 times daily or as directed. 1 Box 9     calcium carbonate-vitamin D (CALTRATE 600+D) 600-400 MG-UNIT CHEW Take 2 chew tab by mouth every evening         cefPROZIL (CEFZIL) 500 MG tablet Take 1 tablet (500 mg) by mouth 2 times daily 20 tablet 0     cetirizine (ZYRTEC) 10 MG tablet Take 1 tablet (10 mg) by mouth daily         Cholecalciferol (VITAMIN D) 2000 UNITS tablet Take 2,000 Units by mouth daily         Continuous Blood Gluc  (FREESTDÃ³nde BALWINDER 14 DAY READER) HENNA 1 each 3 times daily 1 Device 1     Continuous Blood Gluc Sensor (TalentwiseSTYLE BALWINDER 14 DAY SENSOR) MISC 1 each every 14 days 2 each 11     CONTOUR NEXT TEST test strip USE TO TEST BLOOD SUGAR 4 TIMES DAILY OR AS DIRECTED. 100 each 8     cyanocolbalamin (VITAMIN B-12) 1000 MCG tablet Take 1,000 mcg by mouth every other day          dulaglutide (TRULICITY) 1.5 MG/0.5ML pen Inject 1.5 mg Subcutaneous every 7 days 6 mL 3     fenofibrate (TRIGLIDE/LOFIBRA) 160 MG tablet TAKE ONE TABLET BY MOUTH ONE TIME DAILY 90 tablet 3     fluticasone (FLONASE) 50 MCG/ACT nasal spray Spray 2 sprays into both nostrils daily 16 mL 1     fluticasone (FLONASE) 50 MCG/ACT nasal spray Spray 2 sprays into both nostrils daily 16 g 1     furosemide (LASIX) 20 MG tablet Take 1 tablet (20 mg) by mouth daily 90 tablet 3     HUMALOG KWIKPEN 100 UNIT/ML soln Use as directed up to 60 units per day. 15 mL 11      hydrocortisone (WESTCORT) 0.2 % cream Apply topically 2 times daily Apply  sparingly to affected area 2 times daily as needed. 45 g 2     insulin degludec (TRESIBA) 200 UNIT/ML pen Inject 72units subcu daily 80 mL 3     irbesartan (AVAPRO) 300 MG tablet TAKE ONE TABLET BY MOUTH ONE TIME DAILY  90 tablet 3     KLOR-CON 10 MEQ CR tablet TAKE ONE TABLET BY MOUTH ONE TIME DAILY  90 tablet 0     levothyroxine (SYNTHROID/LEVOTHROID) 50 MCG tablet TAKE 1 TABLET BY MOUTH DAILY. 90 tablet 2     Magnesium 500 MG CAPS Take 1 capsule by mouth daily.         metFORMIN (GLUCOPHAGE) 1000 MG tablet 1 tab twice daily 180 tablet 1     ONE TOUCH DELICA LANCETS MISC 1 Device 4 times daily. 100 Stick prn     potassium chloride ER (K-TAB/KLOR-CON) 10 MEQ CR tablet TAKE ONE TABLET BY MOUTH ONE TIME DAILY  90 tablet 0     topiramate (TOPAMAX) 25 MG tablet take 1 tablet (25 mg) at bedtime for 1 week, then take 2 tablets (50 mg) at bedtime for 1 week, and then 3 tablet (75 mg) daily at bedtime t 90 tablet 2     traZODone (DESYREL) 50 MG tablet take 1 to 2 tablets (50 to 100mg) by mouth nightly as needed 180 tablet PRN     traZODone (DESYREL) 50 MG tablet take 1 to 2 tablets (50 to 100mg) by mouth nightly as needed 180 tablet 0     verapamil ER (CALAN-SR) 180 MG CR tablet TAKE ONE TABLET BY MOUTH TWICE DAILY  180 tablet 3     VITRON-C  MG TABS tablet TAKE TWO TABLETS BY MOUTH TWICE DAILY  360 tablet 1           Review of Systems: in addition to stated above  GENERAL: Negative  SKIN: Negative  HENT: Negative   EYE: Negative  HEART: Negative  RESPIRATORY: Negative   GI: Negative  : Negative  MSK: Negative  BLOOD/LYMPH: Negative  NEUROLOGIC: Negative   PSYCH: Negative     Physical Examination:     Wt Readings from Last 4 Encounters:   07/07/22 84.8 kg (187 lb)   06/16/22 87.1 kg (192 lb)   06/10/22 87.7 kg (193 lb 4.8 oz)   06/01/22 88.9 kg (196 lb)      Reported vitals:  There were no vitals taken for this visit.   healthy, alert and no distress  PSYCH: Alert and oriented times 3; coherent speech, normal    rate and volume, able to articulate logical thoughts, able   to abstract reason, no tangential thoughts, no hallucinations   or delusions  Her affect is normal and pleasant  RESP: No cough, no audible wheezing, able to talk in full sentences  Remainder of exam unable to be completed due to telephone visits           Wt Readings from Last 4 Encounters:   01/07/20 101.2 kg (223 lb)   10/14/19 100.7 kg (222 lb 1.6 oz)   06/10/19 100.4 kg (221 lb 4.8 oz)   04/22/19 104.7 kg (230 lb 14.4 oz)     Lab Results   Component Value Date    CR 1.08 (H) 08/09/2022    CR 1.69 (H) 07/07/2022    CR 1.48 (H) 06/10/2022    CR 1.48 (H) 06/09/2022    CR 1.55 (H) 06/08/2022    CR 1.62 (H) 06/07/2022    CR 1.36 (H) 06/01/2022    CR 1.7 (H) 05/17/2022    CR 1.44 (H) 05/13/2022    CR 1.39 (H) 05/04/2022          Lab Results   Component Value Date     07/07/2022    CHLORIDE 107 07/07/2022    CO2 23 07/07/2022     (H) 08/09/2022    CR 1.08 (H) 08/09/2022    CR 1.69 (H) 07/07/2022    CR 1.48 (H) 06/10/2022    CR 1.48 (H) 06/09/2022    CR 1.55 (H) 06/08/2022    RASHEEDA 9.5 07/07/2022    MAG 2.6 (H) 06/09/2022    ALBUMIN 3.3 (L) 06/08/2022    ALKPHOS 89 06/08/2022    LDL 42 08/09/2022    HDL 46 (L) 08/09/2022    TRIG 81 08/09/2022     Lab Results   Component Value Date    TSH 1.83 08/09/2022    TSH 2.37 08/27/2021    TSH 3.47 03/27/2020     Lab Results   Component Value Date    POTASSIUM 4.1 08/09/2022     Lab Results   Component Value Date    POTASSIUM 4.1 08/09/2022    POTASSIUM 4.0 07/07/2022    POTASSIUM 4.3 06/10/2022         Lab Results   Component Value Date    MICROL 11 08/09/2022    MICROL 6 08/27/2021    MICROL 35 10/09/2020    MICROL 10 01/07/2020    MICROL 13 12/10/2018     Lab Results   Component Value Date    A1C 7.9 (H) 08/09/2022    A1C 8.0 (H) 06/08/2022    A1C 7.9 (H) 05/04/2022    A1C 7.6 (H) 11/24/2021    A1C 8.0 (H) 08/27/2021       Lab Results   Component Value Date    HGB 11.6 (L) 07/07/2022 8/9/22 VitD  77  Aldosterone to renin normal  Ferritin 106  B12 2667 (high)      Ade STONE Claudette is being evaluated via a billable video visit.        How would you like to obtain your AVS? MyChart  For the video visit, send the invitation by: Send to e-mail at: [email or web address]  Will anyone else be joining your video visit? No

## 2022-08-16 NOTE — PATIENT INSTRUCTIONS
Tresiba 30 units daily  Increase Novolog to 6 units with a meal  such as a sandwich or cream of wheat

## 2022-08-17 ENCOUNTER — HOSPITAL ENCOUNTER (OUTPATIENT)
Dept: CARDIAC REHAB | Facility: CLINIC | Age: 72
Discharge: HOME | End: 2022-08-17
Attending: STUDENT IN AN ORGANIZED HEALTH CARE EDUCATION/TRAINING PROGRAM
Payer: COMMERCIAL

## 2022-08-17 PROCEDURE — 93798 PHYS/QHP OP CAR RHAB W/ECG: CPT

## 2022-08-19 ENCOUNTER — HOSPITAL ENCOUNTER (OUTPATIENT)
Dept: CARDIAC REHAB | Facility: CLINIC | Age: 72
Discharge: HOME | End: 2022-08-19
Attending: STUDENT IN AN ORGANIZED HEALTH CARE EDUCATION/TRAINING PROGRAM
Payer: COMMERCIAL

## 2022-08-19 PROCEDURE — 93798 PHYS/QHP OP CAR RHAB W/ECG: CPT | Performed by: CLINICAL EXERCISE PHYSIOLOGIST

## 2022-08-23 ENCOUNTER — TRANSFERRED RECORDS (OUTPATIENT)
Dept: HEALTH INFORMATION MANAGEMENT | Facility: CLINIC | Age: 72
End: 2022-08-23

## 2022-08-25 ENCOUNTER — HOSPITAL ENCOUNTER (OUTPATIENT)
Dept: CARDIAC REHAB | Facility: CLINIC | Age: 72
Discharge: HOME | End: 2022-08-25
Attending: STUDENT IN AN ORGANIZED HEALTH CARE EDUCATION/TRAINING PROGRAM
Payer: COMMERCIAL

## 2022-08-25 PROCEDURE — 93798 PHYS/QHP OP CAR RHAB W/ECG: CPT | Performed by: CLINICAL EXERCISE PHYSIOLOGIST

## 2022-08-26 ENCOUNTER — HOSPITAL ENCOUNTER (OUTPATIENT)
Dept: CARDIOLOGY | Facility: CLINIC | Age: 72
Discharge: HOME | End: 2022-08-26
Attending: PHYSICIAN ASSISTANT | Admitting: PHYSICIAN ASSISTANT
Payer: COMMERCIAL

## 2022-08-26 DIAGNOSIS — I35.0 AORTIC STENOSIS, SEVERE: ICD-10-CM

## 2022-08-26 PROCEDURE — 93227 XTRNL ECG REC<48 HR R&I: CPT | Performed by: INTERNAL MEDICINE

## 2022-08-26 PROCEDURE — 93226 XTRNL ECG REC<48 HR SCAN A/R: CPT

## 2022-08-29 ENCOUNTER — HOSPITAL ENCOUNTER (OUTPATIENT)
Dept: CARDIAC REHAB | Facility: CLINIC | Age: 72
Discharge: HOME | End: 2022-08-29
Attending: STUDENT IN AN ORGANIZED HEALTH CARE EDUCATION/TRAINING PROGRAM
Payer: COMMERCIAL

## 2022-08-29 PROCEDURE — 93798 PHYS/QHP OP CAR RHAB W/ECG: CPT | Performed by: REHABILITATION PRACTITIONER

## 2022-08-31 ENCOUNTER — HOSPITAL ENCOUNTER (OUTPATIENT)
Dept: CARDIAC REHAB | Facility: CLINIC | Age: 72
Discharge: HOME | End: 2022-08-31
Attending: STUDENT IN AN ORGANIZED HEALTH CARE EDUCATION/TRAINING PROGRAM
Payer: COMMERCIAL

## 2022-08-31 PROCEDURE — 93797 PHYS/QHP OP CAR RHAB WO ECG: CPT | Mod: 59

## 2022-08-31 PROCEDURE — 93798 PHYS/QHP OP CAR RHAB W/ECG: CPT

## 2022-09-07 ENCOUNTER — HOSPITAL ENCOUNTER (OUTPATIENT)
Dept: CARDIAC REHAB | Facility: CLINIC | Age: 72
Discharge: HOME | End: 2022-09-07
Attending: STUDENT IN AN ORGANIZED HEALTH CARE EDUCATION/TRAINING PROGRAM
Payer: COMMERCIAL

## 2022-09-07 PROCEDURE — 93798 PHYS/QHP OP CAR RHAB W/ECG: CPT | Performed by: REHABILITATION PRACTITIONER

## 2022-09-09 ENCOUNTER — HOSPITAL ENCOUNTER (OUTPATIENT)
Dept: CARDIAC REHAB | Facility: CLINIC | Age: 72
Discharge: HOME | End: 2022-09-09
Attending: STUDENT IN AN ORGANIZED HEALTH CARE EDUCATION/TRAINING PROGRAM
Payer: COMMERCIAL

## 2022-09-09 PROCEDURE — 93798 PHYS/QHP OP CAR RHAB W/ECG: CPT | Performed by: REHABILITATION PRACTITIONER

## 2022-09-12 ENCOUNTER — HOSPITAL ENCOUNTER (OUTPATIENT)
Dept: CARDIAC REHAB | Facility: CLINIC | Age: 72
Discharge: HOME | End: 2022-09-12
Attending: STUDENT IN AN ORGANIZED HEALTH CARE EDUCATION/TRAINING PROGRAM
Payer: COMMERCIAL

## 2022-09-12 PROCEDURE — 93798 PHYS/QHP OP CAR RHAB W/ECG: CPT | Performed by: REHABILITATION PRACTITIONER

## 2022-09-14 ENCOUNTER — HOSPITAL ENCOUNTER (OUTPATIENT)
Dept: CARDIAC REHAB | Facility: CLINIC | Age: 72
Discharge: HOME | End: 2022-09-14
Attending: STUDENT IN AN ORGANIZED HEALTH CARE EDUCATION/TRAINING PROGRAM
Payer: COMMERCIAL

## 2022-09-14 PROCEDURE — 93798 PHYS/QHP OP CAR RHAB W/ECG: CPT | Performed by: REHABILITATION PRACTITIONER

## 2022-09-15 ENCOUNTER — OFFICE VISIT (OUTPATIENT)
Dept: CARDIOLOGY | Facility: CLINIC | Age: 72
End: 2022-09-15
Attending: PHYSICIAN ASSISTANT
Payer: COMMERCIAL

## 2022-09-15 VITALS
HEIGHT: 62 IN | DIASTOLIC BLOOD PRESSURE: 77 MMHG | BODY MASS INDEX: 34.78 KG/M2 | OXYGEN SATURATION: 97 % | HEART RATE: 68 BPM | WEIGHT: 189 LBS | SYSTOLIC BLOOD PRESSURE: 144 MMHG

## 2022-09-15 DIAGNOSIS — I25.10 CORONARY ARTERY DISEASE INVOLVING NATIVE CORONARY ARTERY OF NATIVE HEART WITHOUT ANGINA PECTORIS: ICD-10-CM

## 2022-09-15 DIAGNOSIS — E66.9 OBESITY (BMI 30-39.9): ICD-10-CM

## 2022-09-15 DIAGNOSIS — I10 BENIGN ESSENTIAL HYPERTENSION: ICD-10-CM

## 2022-09-15 DIAGNOSIS — Z95.2 S/P TAVR (TRANSCATHETER AORTIC VALVE REPLACEMENT): Primary | ICD-10-CM

## 2022-09-15 DIAGNOSIS — I51.7 LVH (LEFT VENTRICULAR HYPERTROPHY): ICD-10-CM

## 2022-09-15 DIAGNOSIS — E78.00 PURE HYPERCHOLESTEROLEMIA: ICD-10-CM

## 2022-09-15 DIAGNOSIS — I35.0 AORTIC STENOSIS, SEVERE: ICD-10-CM

## 2022-09-15 PROCEDURE — 99214 OFFICE O/P EST MOD 30 MIN: CPT | Performed by: PHYSICIAN ASSISTANT

## 2022-09-15 RX ORDER — METOPROLOL SUCCINATE 25 MG/1
25 TABLET, EXTENDED RELEASE ORAL 2 TIMES DAILY
Qty: 180 TABLET | Refills: 3 | Status: SHIPPED | OUTPATIENT
Start: 2022-09-15 | End: 2023-10-19

## 2022-09-15 NOTE — PATIENT INSTRUCTIONS
Today's Plan:   1) Increase metoprolol 1 pill two times daily.   2) Come back in 1-2 months with repeat echocardiogram and follow up with Dr. Lewis.     If you have questions or concerns please call my nurse team at [phone].     Scheduling phone number: [phone].  Reminder: Please bring in all current medications, over the counter supplements and vitamin bottles to your next appointment.    It was a pleasure seeing you today!

## 2022-09-15 NOTE — PROGRESS NOTES
Primary Cardiologist: Dr. Lewis    Reason for Visit: follow up on 24 hr holter monitor     History of Present Illness:   Rachael is a pleasant 71 year old female with past medical history notable for:      #  Hx of Severe aortic stenosis   - status post TAVR using a 26 mm Medtronic evolute pro valve in 6/2022.  Echocardiogram showed hyperdynamic LV with ejection fraction estimated at greater than 70%, small LV cavity with peak gradient intracavitary 17 mmHg.  Mean AOV pressure gradient of 15 mmHg, no paravalvular or valvular AI.  Continued on DAPT therapy.   - her echo was reviewed with Dr. Lewis who recommended stopping CCB and switching to metoprolol succinate   - she was set up for holter monitor for further evaluation     #  Transient complete heart block intra procedure   - event monitor showed no advanced AVB or other significant arrhythmias   - recently transitioned from CCB to BB with repeat 24 hr holter monitor      # Neurological changes status post TAVR (resolved)  - negative MRA neck and brain and negative CTA head and neck.  Follow-up with neurology outpatient     #  Coronary artery disease   - status post PCI to RCA on  5/4/2022.  Continue dual antiplatelet therapy x12 months uninterrupted post PCI and statin     # HFpEF  - weight stable at 192 pounds (baseline 200 pounds).    - on furosemide 40 mg daily     # Hypertension  - had a few episodes of hypotension in the setting of small LV size with significant LVH  - valsartan was reduced in dose recently; spironolactone was stopped; she was also switched from CCB to BB     #  Hyperlipidemia  - on rosuvastatin 10 mg daily     # Peripheral edema  -Multifactorial in etiology including lymphedema    # Weight loss    I have reviewed her last echocardiogram with Dr. Lewis who recommended stopping verapamil and switching her to metoprolol succinate.  We then obtain 24-hour Holter monitor to ensure she did not have significant bradycardia or high degree AVB.   Her monitor showed average heart rate to be 73 bpm.  There appeared to be no pauses or any other arrhythmias.    Rachael returns to clinic today, stating she is doing well but continues to have peripheral edema and overall feeling of heat.  She denies shortness of breath, orthopnea, persistent lightheadedness, bleeding issues, abdominal distention, or PND.  She continues to participate in cardiac rehab and this is going well.  She thinks her blood pressure is a bit higher than her baseline now.  She is compliant with her diuretic regimen.  She has lost nearly 30 pounds over the last year.    Assessment and Plan:  Rachael is a pleasant 71 year old female with past medical history notable for:      #  Hx of Severe aortic stenosis   - status post TAVR using a 26 mm Medtronic evolute pro valve in 6/2022.  Echocardiogram showed hyperdynamic LV with ejection fraction estimated at greater than 70%, small LV cavity with peak gradient intracavitary 17 mmHg.  Mean AOV pressure gradient of 15 mmHg, no paravalvular or valvular AI.  Continued on DAPT therapy.   - her echo was reviewed with Dr. Lewis who recommended stopping CCB and switching to metoprolol succinate   - she was set up for holter monitor for further evaluation     #  Transient complete heart block intra procedure   - event monitor showed no advanced AVB or other significant arrhythmias   - recently transitioned from CCB to BB with repeat 24 hr holter monitor      # Neurological changes status post TAVR (resolved)  - negative MRA neck and brain and negative CTA head and neck.  Follow-up with neurology outpatient     #  Coronary artery disease   - status post PCI to RCA on  5/4/2022.  Continue dual antiplatelet therapy x12 months uninterrupted post PCI and statin     # HFpEF  - weight stable at 192 pounds (baseline 200 pounds).    - on furosemide 40 mg daily     # Hypertension  - had a few episodes of hypotension in the setting of small LV size with significant LVH  -  valsartan was reduced in dose recently; spironolactone was stopped; she was also switched from CCB to BB     #  Hyperlipidemia  - on rosuvastatin 10 mg daily     # Peripheral edema  -Multifactorial in etiology including lymphedema    # Weight loss    We will further increase her metoprolol to 25 mg twice daily.  She will continue with the same diuretic regimen.  We can consider further increasing irbesartan if her blood pressure continues to be elevated but she tells me that this creates significant tinnitus that she prefers to not.  We may consider adding a different medication if her blood pressure continues to be high despite the increase in metoprolol.  Given her high sensitivity to medications would not want to make more than one medication change at this time.    I will have her follow-up with Dr. Lewis in 1 to 2 months with repeat echocardiogram.  In the interim if she has any lightheadedness, presyncope, syncope, shortness of breath, worsening peripheral edema, orthopnea, or PND she will let us know.    45 minutes spent on the date of the encounter with chart review, patient visit, care coordination, and documentation.      This note was completed in part using Dragon voice recognition software. Although reviewed after completion, some word and grammatical errors may occur.    Orders this Visit:  No orders of the defined types were placed in this encounter.    No orders of the defined types were placed in this encounter.    Medications Discontinued During This Encounter   Medication Reason     empagliflozin (JARDIANCE) 10 MG TABS tablet Stopped by Patient         Encounter Diagnosis   Name Primary?     Aortic stenosis, severe        CURRENT MEDICATIONS:  Current Outpatient Medications   Medication Sig Dispense Refill     aspirin (ASA) 81 MG chewable tablet Take 1 tablet (81 mg) by mouth daily Starting tomorrow. 30 tablet 3     augmented betamethasone dipropionate (DIPROLENE-AF) 0.05 % external ointment  Apply topically to affected area twice daily for 3-4 weeks then use as needed 45 g 0     calcium carbonate 600 mg-vitamin D 400 units (CALTRATE) 600-400 MG-UNIT per tablet Take 1 tablet by mouth in the morning and 1 tablet in the evening.       cetirizine (ZYRTEC) 10 MG tablet Take 1 tablet (10 mg) by mouth daily       Continuous Blood Gluc  (FREESTYLE BALWINDER 14 DAY READER) HENNA 1 each 3 times daily 1 each 1     Continuous Blood Gluc Sensor (FREESTYLE BALWINDER 14 DAY SENSOR) MISC 1 each every 14 days 2 each 11     cyanocolbalamin (VITAMIN B-12) 1000 MCG tablet Take 1,000 mcg by mouth in the morning.       Dulaglutide 3 MG/0.5ML SOPN Inject 3 mg Subcutaneous once a week 6 mL 3     fluticasone (FLONASE) 50 MCG/ACT nasal spray SHAKE LIQUID AND USE 2 SPRAYS IN EACH NOSTRIL DAILY 48 g 2     furosemide (LASIX) 40 MG tablet TAKE ONE TABLET BY MOUTH ONE TIME DAILY 90 tablet 0     HUMALOG KWIKPEN 100 UNIT/ML soln INJECT  Per sliding scale UP TO 60 UNITS UNDER THE SKIN DAILY. 60 mL 3     insulin degludec (TRESIBA) 200 UNIT/ML pen Inject 34 Units Subcutaneous daily 30 mL 1     insulin pen needle (BD FCO U/F) 32G X 4 MM miscellaneous Use 4 pen needles daily or as directed. 400 each 0     irbesartan (AVAPRO) 150 MG tablet Take 0.5 tablets (75 mg) by mouth daily (150MG X 0.5 = 75MG) 90 tablet 0     levothyroxine (SYNTHROID/LEVOTHROID) 50 MCG tablet Take 1 tablet (50 mcg) by mouth daily 90 tablet 3     metFORMIN (GLUCOPHAGE) 1000 MG tablet Take 1,000 mg by mouth daily 180 tablet      metoprolol succinate ER (TOPROL XL) 25 MG 24 hr tablet Take 1 tablet (25 mg) by mouth daily 30 tablet 3     potassium chloride ER (KLOR-CON M) 10 MEQ CR tablet Take 1 tablet (10 mEq) by mouth 2 times daily 360 tablet 3     rosuvastatin (CRESTOR) 20 MG tablet Take 0.5 tablets (10 mg) by mouth daily 90 tablet 1     ticagrelor (BRILINTA) 90 MG tablet Take 1 tablet (90 mg) by mouth 2 times daily Start this evening. 180 tablet 3     topiramate  (TOPAMAX) 25 MG tablet Take 3 tablets (75 mg) by mouth At Bedtime 270 tablet 1     traZODone (DESYREL) 50 MG tablet take 1 to 2 tablets (50 to 100mg) by mouth nightly as needed 180 tablet PRN     triamcinolone (KENALOG) 0.1 % external ointment Apply topically 2 times daily To left foot rash (Patient taking differently: Apply topically once a week To left foot rash) 30 g 2     Vitamin D3 (CHOLECALCIFEROL) 25 mcg (1000 units) tablet Take 1 tablet by mouth 2 times daily       VITRON-C  MG TABS tablet TAKE TWO TABLETS BY MOUTH TWICE DAILY  360 tablet 1       ALLERGIES     Allergies   Allergen Reactions     Norvasc [Amlodipine] Other (See Comments)     hairloss     Chlorhexidine Rash     Clonidine Headache     Doxazosin Mesylate Rash     Fexofenadine Hydrochloride Headache     Gemfibrozil Rash     Lisinopril Angioedema     Pioglitazone Hydrochloride Swelling     ankle edema       PAST MEDICAL HISTORY:  Past Medical History:   Diagnosis Date     Benign essential hypertension      Chronic kidney disease, stage 3b (H)      Diabetic retinopathy of both eyes (H)      Obesity (BMI 30-39.9)      Osteopenia      S/P TAVR (transcatheter aortic valve replacement) 06/07/2022     Type 2 diabetes mellitus with diabetic nephropathy (H)        PAST SURGICAL HISTORY:  Past Surgical History:   Procedure Laterality Date     APPENDECTOMY       CATARACT IOL, RT/LT Bilateral      CV CORONARY ANGIOGRAM N/A 5/4/2022    Procedure: Coronary Angiogram;  Surgeon: Hector Lewis MD;  [facility]     CV LEFT HEART CATH N/A 5/4/2022    Procedure: Left Heart Catheterization;  Surgeon: Hector Lewis MD;  Location:  [facility]     CV PCI N/A 5/4/2022    Procedure: Percutaneous Coronary Intervention;  Surgeon: Hector Lewis MD;  Location:  [facility]     CV TRANSCATHETER AORTIC VALVE REPLACEMENT-FEMORAL APPROACH N/A 6/7/2022    Procedure: Transcatheter Aortic Valve  "Replacement-Femoral Approach;  Surgeon: Hector Lewis MD;  Location:  [facility]     GASTRIC BYPASS  2004     TONSILLECTOMY & ADENOIDECTOMY         FAMILY HISTORY:  Family History   Problem Relation Age of Onset     Diabetes Type 2  Mother      Glaucoma Mother      Coronary Artery Disease Mother      Abdominal Aortic Aneurysm Father      Myocardial Infarction Father      Breast Cancer Sister      Abdominal Aortic Aneurysm Brother      Bladder Cancer Brother      Diabetes Type 2  Brother      Liver Cancer Brother      Myocardial Infarction Brother      Alzheimer Disease Paternal Grandmother      Cerebrovascular Disease Paternal Grandfather      Ovarian Cancer No family hx of      Colon Cancer No family hx of        SOCIAL HISTORY:  Social History     Socioeconomic History     Marital status:      Spouse name: None     Number of children: 0     Years of education: None     Highest education level: None   Occupational History     Occupation: Retired -    Tobacco Use     Smoking status: Never Smoker     Smokeless tobacco: Never Used   Substance and Sexual Activity     Alcohol use: No     Drug use: No     Sexual activity: Not Currently   Other Topics Concern     Caffeine Concern Yes     Comment: 20 oz daily     Exercise No     Seat Belt Yes     Self-Exams Yes   Social History Narrative    . Single.    No kids.    No formal exercise.        Review of Systems:  Skin:        Eyes:       ENT:       Respiratory:       Cardiovascular:       Gastroenterology:      Genitourinary:       Musculoskeletal:       Neurologic:       Psychiatric:       Heme/Lymph/Imm:       Endocrine:         Physical Exam:  Vitals: BP (!) 153/73   Pulse 68   Ht 1.562 m (5' 1.5\")   Wt 85.7 kg (189 lb)   LMP  (LMP Unknown)   SpO2 97%   BMI 35.13 kg/m       GEN:  NAD  NECK: Unable to assess JVP.   C/V:  Regular rate and rhythm; 2/6 LYNDON at RUSB.  RESP: Clear to auscultation bilaterally without " wheezing, rales, or rhonchi.  GI: Abdomen soft, nontender, nondistended.   EXTREM: 2+ pitting LE edema.   NEURO: Alert and oriented, cooperative. No obvious focal deficits.   PSYCH: Normal affect.  SKIN: Warm and dry.       Recent Lab Results:  LIPID RESULTS:  Lab Results   Component Value Date    CHOL 104 08/09/2022    CHOL 115 03/27/2020    HDL 46 (L) 08/09/2022    HDL 34 (L) 03/27/2020    LDL 42 08/09/2022    LDL 59 03/27/2020    TRIG 81 08/09/2022    TRIG 112 03/27/2020    CHOLHDLRATIO 5.5 (H) 05/22/2015       LIVER ENZYME RESULTS:  Lab Results   Component Value Date    AST 42 06/08/2022    AST 20 04/13/2021    ALT 63 (H) 06/08/2022    ALT 38 04/13/2021       CBC RESULTS:  Lab Results   Component Value Date    WBC 9.8 07/07/2022    WBC 8.3 01/08/2021    RBC 4.11 07/07/2022    RBC 4.12 01/08/2021    HGB 11.6 (L) 07/07/2022    HGB 11.6 (L) 01/08/2021    HCT 36.9 07/07/2022    HCT 38.2 01/08/2021    MCV 90 07/07/2022    MCV 93 01/08/2021    MCH 28.2 07/07/2022    MCH 28.2 01/08/2021    MCHC 31.4 (L) 07/07/2022    MCHC 30.4 (L) 01/08/2021    RDW 14.2 07/07/2022    RDW 13.5 01/08/2021     07/07/2022     01/08/2021       BMP RESULTS:  Lab Results   Component Value Date     07/07/2022     04/13/2021    POTASSIUM 4.1 08/09/2022    POTASSIUM 4.2 04/13/2021    CHLORIDE 107 07/07/2022    CHLORIDE 110 (H) 04/13/2021    CO2 23 07/07/2022    CO2 28 04/13/2021    ANIONGAP 9 07/07/2022    ANIONGAP 3 04/13/2021     (H) 08/09/2022     (H) 04/13/2021    BUN 20 08/09/2022    BUN 29 04/13/2021    CR 1.08 (H) 08/09/2022    CR 1.38 (H) 04/13/2021    GFRESTIMATED 55 (L) 08/09/2022    GFRESTIMATED 32 (L) 05/17/2022    GFRESTIMATED 38 (L) 04/13/2021    GFRESTBLACK 45 (L) 04/13/2021    RASHEEDA 9.5 07/07/2022    RASHEEDA 9.2 04/13/2021        A1C RESULTS:  Lab Results   Component Value Date    A1C 7.9 (H) 08/09/2022    A1C 8.1 (H) 01/08/2021       INR RESULTS:  Lab Results   Component Value Date    INR 1.09  06/01/2022    INR 0.97 05/04/2022           Latasha Calderon PA-C  September 15, 2022

## 2022-09-15 NOTE — LETTER
9/15/2022    Judith Aviles MD  [address] [facility] [address]    RE: Ade Wilkins       Dear Colleague,     I had the pleasure of seeing Ade Wilkins in the [facility].  Primary Cardiologist: Dr. Lewis    Reason for Visit: follow up on 24 hr holter monitor     History of Present Illness:   Rachael is a pleasant 71 year old female with past medical history notable for:      #  Hx of Severe aortic stenosis   - status post TAVR using a 26 mm Medtronic evolute pro valve in 6/2022.  Echocardiogram showed hyperdynamic LV with ejection fraction estimated at greater than 70%, small LV cavity with peak gradient intracavitary 17 mmHg.  Mean AOV pressure gradient of 15 mmHg, no paravalvular or valvular AI.  Continued on DAPT therapy.   - her echo was reviewed with Dr. Lewis who recommended stopping CCB and switching to metoprolol succinate   - she was set up for holter monitor for further evaluation     #  Transient complete heart block intra procedure   - event monitor showed no advanced AVB or other significant arrhythmias   - recently transitioned from CCB to BB with repeat 24 hr holter monitor      # Neurological changes status post TAVR (resolved)  - negative MRA neck and brain and negative CTA head and neck.  Follow-up with neurology outpatient     #  Coronary artery disease   - status post PCI to RCA on  5/4/2022.  Continue dual antiplatelet therapy x12 months uninterrupted post PCI and statin     # HFpEF  - weight stable at 192 pounds (baseline 200 pounds).    - on furosemide 40 mg daily     # Hypertension  - had a few episodes of hypotension in the setting of small LV size with significant LVH  - valsartan was reduced in dose recently; spironolactone was stopped; she was also switched from CCB to BB     #  Hyperlipidemia  - on rosuvastatin 10 mg daily     # Peripheral edema  -Multifactorial in etiology including lymphedema    # Weight loss    I have reviewed her last echocardiogram  with Dr. Lewis who recommended stopping verapamil and switching her to metoprolol succinate.  We then obtain 24-hour Holter monitor to ensure she did not have significant bradycardia or high degree AVB.  Her monitor showed average heart rate to be 73 bpm.  There appeared to be no pauses or any other arrhythmias.    Rachael returns to clinic today, stating she is doing well but continues to have peripheral edema and overall feeling of heat.  She denies shortness of breath, orthopnea, persistent lightheadedness, bleeding issues, abdominal distention, or PND.  She continues to participate in cardiac rehab and this is going well.  She thinks her blood pressure is a bit higher than her baseline now.  She is compliant with her diuretic regimen.  She has lost nearly 30 pounds over the last year.    Assessment and Plan:  Rachael is a pleasant 71 year old female with past medical history notable for:      #  Hx of Severe aortic stenosis   - status post TAVR using a 26 mm Medtronic evolute pro valve in 6/2022.  Echocardiogram showed hyperdynamic LV with ejection fraction estimated at greater than 70%, small LV cavity with peak gradient intracavitary 17 mmHg.  Mean AOV pressure gradient of 15 mmHg, no paravalvular or valvular AI.  Continued on DAPT therapy.   - her echo was reviewed with Dr. Lewis who recommended stopping CCB and switching to metoprolol succinate   - she was set up for holter monitor for further evaluation     #  Transient complete heart block intra procedure   - event monitor showed no advanced AVB or other significant arrhythmias   - recently transitioned from CCB to BB with repeat 24 hr holter monitor      # Neurological changes status post TAVR (resolved)  - negative MRA neck and brain and negative CTA head and neck.  Follow-up with neurology outpatient     #  Coronary artery disease   - status post PCI to RCA on  5/4/2022.  Continue dual antiplatelet therapy x12 months uninterrupted post PCI and  statin     # HFpEF  - weight stable at 192 pounds (baseline 200 pounds).    - on furosemide 40 mg daily     # Hypertension  - had a few episodes of hypotension in the setting of small LV size with significant LVH  - valsartan was reduced in dose recently; spironolactone was stopped; she was also switched from CCB to BB     #  Hyperlipidemia  - on rosuvastatin 10 mg daily     # Peripheral edema  -Multifactorial in etiology including lymphedema    # Weight loss    We will further increase her metoprolol to 25 mg twice daily.  She will continue with the same diuretic regimen.  We can consider further increasing irbesartan if her blood pressure continues to be elevated but she tells me that this creates significant tinnitus that she prefers to not.  We may consider adding a different medication if her blood pressure continues to be high despite the increase in metoprolol.  Given her high sensitivity to medications would not want to make more than one medication change at this time.    I will have her follow-up with Dr. Lewis in 1 to 2 months with repeat echocardiogram.  In the interim if she has any lightheadedness, presyncope, syncope, shortness of breath, worsening peripheral edema, orthopnea, or PND she will let us know.    45 minutes spent on the date of the encounter with chart review, patient visit, care coordination, and documentation.      This note was completed in part using Dragon voice recognition software. Although reviewed after completion, some word and grammatical errors may occur.    Orders this Visit:  No orders of the defined types were placed in this encounter.    No orders of the defined types were placed in this encounter.    Medications Discontinued During This Encounter   Medication Reason     empagliflozin (JARDIANCE) 10 MG TABS tablet Stopped by Patient         Encounter Diagnosis   Name Primary?     Aortic stenosis, severe        CURRENT MEDICATIONS:  Current Outpatient Medications    Medication Sig Dispense Refill     aspirin (ASA) 81 MG chewable tablet Take 1 tablet (81 mg) by mouth daily Starting tomorrow. 30 tablet 3     augmented betamethasone dipropionate (DIPROLENE-AF) 0.05 % external ointment Apply topically to affected area twice daily for 3-4 weeks then use as needed 45 g 0     calcium carbonate 600 mg-vitamin D 400 units (CALTRATE) 600-400 MG-UNIT per tablet Take 1 tablet by mouth in the morning and 1 tablet in the evening.       cetirizine (ZYRTEC) 10 MG tablet Take 1 tablet (10 mg) by mouth daily       Continuous Blood Gluc  (FREESTYLE BALWINDER 14 DAY READER) HENNA 1 each 3 times daily 1 each 1     Continuous Blood Gluc Sensor (FREESTYLE BALWINDER 14 DAY SENSOR) MISC 1 each every 14 days 2 each 11     cyanocolbalamin (VITAMIN B-12) 1000 MCG tablet Take 1,000 mcg by mouth in the morning.       Dulaglutide 3 MG/0.5ML SOPN Inject 3 mg Subcutaneous once a week 6 mL 3     fluticasone (FLONASE) 50 MCG/ACT nasal spray SHAKE LIQUID AND USE 2 SPRAYS IN EACH NOSTRIL DAILY 48 g 2     furosemide (LASIX) 40 MG tablet TAKE ONE TABLET BY MOUTH ONE TIME DAILY 90 tablet 0     HUMALOG KWIKPEN 100 UNIT/ML soln INJECT  Per sliding scale UP TO 60 UNITS UNDER THE SKIN DAILY. 60 mL 3     insulin degludec (TRESIBA) 200 UNIT/ML pen Inject 34 Units Subcutaneous daily 30 mL 1     insulin pen needle (BD FCO U/F) 32G X 4 MM miscellaneous Use 4 pen needles daily or as directed. 400 each 0     irbesartan (AVAPRO) 150 MG tablet Take 0.5 tablets (75 mg) by mouth daily (150MG X 0.5 = 75MG) 90 tablet 0     levothyroxine (SYNTHROID/LEVOTHROID) 50 MCG tablet Take 1 tablet (50 mcg) by mouth daily 90 tablet 3     metFORMIN (GLUCOPHAGE) 1000 MG tablet Take 1,000 mg by mouth daily 180 tablet      metoprolol succinate ER (TOPROL XL) 25 MG 24 hr tablet Take 1 tablet (25 mg) by mouth daily 30 tablet 3     potassium chloride ER (KLOR-CON M) 10 MEQ CR tablet Take 1 tablet (10 mEq) by mouth 2 times daily 360 tablet 3      rosuvastatin (CRESTOR) 20 MG tablet Take 0.5 tablets (10 mg) by mouth daily 90 tablet 1     ticagrelor (BRILINTA) 90 MG tablet Take 1 tablet (90 mg) by mouth 2 times daily Start this evening. 180 tablet 3     topiramate (TOPAMAX) 25 MG tablet Take 3 tablets (75 mg) by mouth At Bedtime 270 tablet 1     traZODone (DESYREL) 50 MG tablet take 1 to 2 tablets (50 to 100mg) by mouth nightly as needed 180 tablet PRN     triamcinolone (KENALOG) 0.1 % external ointment Apply topically 2 times daily To left foot rash (Patient taking differently: Apply topically once a week To left foot rash) 30 g 2     Vitamin D3 (CHOLECALCIFEROL) 25 mcg (1000 units) tablet Take 1 tablet by mouth 2 times daily       VITRON-C  MG TABS tablet TAKE TWO TABLETS BY MOUTH TWICE DAILY  360 tablet 1       ALLERGIES     Allergies   Allergen Reactions     Norvasc [Amlodipine] Other (See Comments)     hairloss     Chlorhexidine Rash     Clonidine Headache     Doxazosin Mesylate Rash     Fexofenadine Hydrochloride Headache     Gemfibrozil Rash     Lisinopril Angioedema     Pioglitazone Hydrochloride Swelling     ankle edema       PAST MEDICAL HISTORY:  Past Medical History:   Diagnosis Date     Benign essential hypertension      Chronic kidney disease, stage 3b (H)      Diabetic retinopathy of both eyes (H)      Obesity (BMI 30-39.9)      Osteopenia      S/P TAVR (transcatheter aortic valve replacement) 06/07/2022     Type 2 diabetes mellitus with diabetic nephropathy (H)        PAST SURGICAL HISTORY:  Past Surgical History:   Procedure Laterality Date     APPENDECTOMY       CATARACT IOL, RT/LT Bilateral      CV CORONARY ANGIOGRAM N/A 5/4/2022    Procedure: Coronary Angiogram;  Surgeon: Hector Lewis MD;  Location:  HEART CARDIAC CATH LAB     CV LEFT HEART CATH N/A 5/4/2022    Procedure: Left Heart Catheterization;  Surgeon: Hector Lewis MD;  Location:  HEART CARDIAC CATH LAB     CV PCI N/A 5/4/2022    Procedure: Percutaneous  "Coronary Intervention;  Surgeon: Hector Lewis MD;  Location: [facility] CARDIAC CATH LAB     CV TRANSCATHETER AORTIC VALVE REPLACEMENT-FEMORAL APPROACH N/A 6/7/2022    Procedure: Transcatheter Aortic Valve Replacement-Femoral Approach;  Surgeon: Hector Lewis MD;  Location: [facility] CARDIAC CATH LAB     GASTRIC BYPASS  2004     TONSILLECTOMY & ADENOIDECTOMY         FAMILY HISTORY:  Family History   Problem Relation Age of Onset     Diabetes Type 2  Mother      Glaucoma Mother      Coronary Artery Disease Mother      Abdominal Aortic Aneurysm Father      Myocardial Infarction Father      Breast Cancer Sister      Abdominal Aortic Aneurysm Brother      Bladder Cancer Brother      Diabetes Type 2  Brother      Liver Cancer Brother      Myocardial Infarction Brother      Alzheimer Disease Paternal Grandmother      Cerebrovascular Disease Paternal Grandfather      Ovarian Cancer No family hx of      Colon Cancer No family hx of        SOCIAL HISTORY:  Social History     Socioeconomic History     Marital status:      Spouse name: None     Number of children: 0     Years of education: None     Highest education level: None   Occupational History     Occupation: Retired -    Tobacco Use     Smoking status: Never Smoker     Smokeless tobacco: Never Used   Substance and Sexual Activity     Alcohol use: No     Drug use: No     Sexual activity: Not Currently   Other Topics Concern     Caffeine Concern Yes     Comment: 20 oz daily     Exercise No     Seat Belt Yes     Self-Exams Yes   Social History Narrative    . Single.    No kids.    No formal exercise.        Review of Systems:  Skin:        Eyes:       ENT:       Respiratory:       Cardiovascular:       Gastroenterology:      Genitourinary:       Musculoskeletal:       Neurologic:       Psychiatric:       Heme/Lymph/Imm:       Endocrine:         Physical Exam:  Vitals: BP (!) 153/73   Pulse 68   Ht 1.562 m (5' 1.5\")   " Wt 85.7 kg (189 lb)   LMP  (LMP Unknown)   SpO2 97%   BMI 35.13 kg/m       GEN:  NAD  NECK: Unable to assess JVP.   C/V:  Regular rate and rhythm; 2/6 LYNDON at RUSB.  RESP: Clear to auscultation bilaterally without wheezing, rales, or rhonchi.  GI: Abdomen soft, nontender, nondistended.   EXTREM: 2+ pitting LE edema.   NEURO: Alert and oriented, cooperative. No obvious focal deficits.   PSYCH: Normal affect.  SKIN: Warm and dry.       Recent Lab Results:  LIPID RESULTS:  Lab Results   Component Value Date    CHOL 104 08/09/2022    CHOL 115 03/27/2020    HDL 46 (L) 08/09/2022    HDL 34 (L) 03/27/2020    LDL 42 08/09/2022    LDL 59 03/27/2020    TRIG 81 08/09/2022    TRIG 112 03/27/2020    CHOLHDLRATIO 5.5 (H) 05/22/2015       LIVER ENZYME RESULTS:  Lab Results   Component Value Date    AST 42 06/08/2022    AST 20 04/13/2021    ALT 63 (H) 06/08/2022    ALT 38 04/13/2021       CBC RESULTS:  Lab Results   Component Value Date    WBC 9.8 07/07/2022    WBC 8.3 01/08/2021    RBC 4.11 07/07/2022    RBC 4.12 01/08/2021    HGB 11.6 (L) 07/07/2022    HGB 11.6 (L) 01/08/2021    HCT 36.9 07/07/2022    HCT 38.2 01/08/2021    MCV 90 07/07/2022    MCV 93 01/08/2021    MCH 28.2 07/07/2022    MCH 28.2 01/08/2021    MCHC 31.4 (L) 07/07/2022    MCHC 30.4 (L) 01/08/2021    RDW 14.2 07/07/2022    RDW 13.5 01/08/2021     07/07/2022     01/08/2021       BMP RESULTS:  Lab Results   Component Value Date     07/07/2022     04/13/2021    POTASSIUM 4.1 08/09/2022    POTASSIUM 4.2 04/13/2021    CHLORIDE 107 07/07/2022    CHLORIDE 110 (H) 04/13/2021    CO2 23 07/07/2022    CO2 28 04/13/2021    ANIONGAP 9 07/07/2022    ANIONGAP 3 04/13/2021     (H) 08/09/2022     (H) 04/13/2021    BUN 20 08/09/2022    BUN 29 04/13/2021    CR 1.08 (H) 08/09/2022    CR 1.38 (H) 04/13/2021    GFRESTIMATED 55 (L) 08/09/2022    GFRESTIMATED 32 (L) 05/17/2022    GFRESTIMATED 38 (L) 04/13/2021    GFRESTBLACK 45 (L) 04/13/2021     RASHEEDA 9.5 07/07/2022    RASHEEDA 9.2 04/13/2021        A1C RESULTS:  Lab Results   Component Value Date    A1C 7.9 (H) 08/09/2022    A1C 8.1 (H) 01/08/2021       INR RESULTS:  Lab Results   Component Value Date    INR 1.09 06/01/2022    INR 0.97 05/04/2022           Latasha Calderon PA-C  September 15, 2022       Thank you for allowing me to participate in the care of your patient.      Sincerely,     Latasha Calderon PA-C     [facility]  cc:   Latasha Calderon PA-C  [address] [address]

## 2022-09-16 ENCOUNTER — IMMUNIZATION (OUTPATIENT)
Dept: NURSING | Facility: CLINIC | Age: 72
End: 2022-09-16
Payer: COMMERCIAL

## 2022-09-16 ENCOUNTER — HOSPITAL ENCOUNTER (OUTPATIENT)
Dept: CARDIAC REHAB | Facility: CLINIC | Age: 72
Discharge: HOME | End: 2022-09-16
Attending: STUDENT IN AN ORGANIZED HEALTH CARE EDUCATION/TRAINING PROGRAM
Payer: COMMERCIAL

## 2022-09-16 PROCEDURE — 91313 COVID-19,PF,MODERNA BIVALENT: CPT

## 2022-09-16 PROCEDURE — 0134A COVID-19,PF,MODERNA BIVALENT: CPT

## 2022-09-16 PROCEDURE — 93798 PHYS/QHP OP CAR RHAB W/ECG: CPT

## 2022-09-19 ENCOUNTER — HOSPITAL ENCOUNTER (OUTPATIENT)
Dept: CARDIAC REHAB | Facility: CLINIC | Age: 72
Discharge: HOME | End: 2022-09-19
Attending: STUDENT IN AN ORGANIZED HEALTH CARE EDUCATION/TRAINING PROGRAM
Payer: COMMERCIAL

## 2022-09-19 PROCEDURE — 93798 PHYS/QHP OP CAR RHAB W/ECG: CPT | Performed by: CLINICAL EXERCISE PHYSIOLOGIST

## 2022-09-22 ENCOUNTER — PATIENT OUTREACH (OUTPATIENT)
Dept: CARE COORDINATION | Facility: CLINIC | Age: 72
End: 2022-09-22

## 2022-09-22 ASSESSMENT — ACTIVITIES OF DAILY LIVING (ADL): DEPENDENT_IADLS:: TRANSPORTATION;COOKING;SHOPPING;MEAL PREPARATION

## 2022-09-22 NOTE — LETTER
[address] [place] [address]    [facility]  Patient Centered Plan of Care  About Me:        Patient Name:  Ade Wilkins    YOB: 1950  Age:         72 year old   Romulo MRN:    [id number] Telephone Information:  Home Phone [phone]   Mobile [phone]       Address:  [address] [address] Email address:  [email or web address]      Emergency Contact(s)    Name Relationship Lgl Grd Work Phone Home Phone Mobile Phone   1. NAUN WILKINS Sister  [phone]   2. MAXIMILIAN BONILLA Friend   [phone]           Primary language:  English     needed? No   [employer]:  [phone] op. 1  Other communication barriers:None    Preferred Method of Communication:  Dorie  Current living arrangement: I live in a private home with family (Sister and sister's boyfriend)    Mobility Status/ Medical Equipment: Independent w/Device    Health Maintenance  Health Maintenance Reviewed: Due/Overdue   Health Maintenance Due   Topic Date Due     FALL RISK ASSESSMENT  03/24/2022     My Access Plan  Medical Emergency 911   Primary Clinic Line [facility] - [phone]   24 Hour Appointment Line [phone] or  [phone]) (toll-free)   24 Hour Nurse Line [phone] (toll-free)   Preferred Urgent Care [facility], [phone]     [facility]  [phone]     Preferred Pharmacy [id number] [place], [place] - [address] Behavioral Health Crisis Line The National Suicide Prevention Lifeline at 1-298.761.6470 or Text/Call 988     My Care Team Members  Patient Care Team       Relationship Specialty Notifications Start End    Judith Aviles MD PCP - General Internal Medicine  3/28/22     Phone: [phone] Fax: [phone]         [address] [facility] [address]    Melvi Naik Dev  MD MIRTH[facility] Ponce INTERNAL MEDICINE - ENDOCRINOLOGY, DIABETES & METABOLISM  3/2/17     Phone: [phone] Fax: [phone]         [address] [place] [address]    Roxanne Masterson PAAlpaC Physician Assistant Endocrinology, Diabetes, and Metabolism  9/7/21     Phone: [phone] Fax: [phone]         [address] [address]    Judith Aviles MD Assigned PCP   10/31/21     Phone: [phone] Fax: [phone]         [address] [facility] [address]    Joanne Gamble, [facility] Pharmacist Pharmacist  4/11/22     Phone: [phone] Pager: [phone]         [address] [address]    Joanne Gamble [facility] Assigned MTM Pharmacist   5/28/22     Phone: [phone] Pager: [phone]         [address] [facility] [address] [place] [address]    Leanne Humphreys, RN Lead Care Coordinator  Admissions 6/14/22     Roney Lentz EP Cardiac Rehabilitation Therapist   6/23/22 6/23/23    Phone: [phone] [address] [facility], [address] [facility] [address]    Latasha Calderon PA-C Assigned Heart and Vascular Provider   7/16/22     Phone: [phone] Fax: [phone] [address] [facility] [address]    Carter Celis MD MD Dermatology  8/15/22     Phone: [phone] Fax: [phone]         [address] [place] [address]    Olamide Rothman, RN Diabetes Educator Diabetes Education  8/16/22     Phone: [phone] Fax: [phone]         [address] [address]    Olga Lidia Hoover MD Assigned Endocrinology Provider   8/20/22     Phone: [phone] Fax: [phone]         [address] [place] [address]    Zulma Steel RPH  Pharmacist  9/13/22 7/3/23    Phone: [phone] Fax: [phone]         [facility] [place] [address]            My Care Plans  Self Management and Treatment Plan  Care Plan  Care Plan: Advance Care Plan     Problem: Patient does not have a valid  Health Care Directive     Goal: Complete Health Care Directive     Start Date: 6/14/2022 Expected End Date: 12/14/2022    This Visit's Progress: 30%    Note:     Goal Statement: I will complete a Health Care Directive and have this scanned into my Medical Record.  Barriers: Patient doesn't have a Health Care Directive  Strengths: Strong advocate for self  Patient expressed understanding of goal: yes  Action steps to achieve this goal:  1. Care Coordination will mail me a Health Care Directive and any requested resources (goals worksheets, education sheets, class flyer).   2. I will complete the Health Care Directive. My signature must be either notarized or witnessed by two adults who are not named as health care agents in the document. Additionally only one of the witnesses can be employed by my health care system. If I need a notary I can check with my bank, my place of [employer], [employer] stores, or look online at [email or web address] .  3. I will provide a copy of my Health Care Directive to my care team and/or email directly to [email or web address].   4. I can visit [email or web address] website, email [email or web address] or call [facility] [employer] at [phone] (M-Friday 6a to 5p) for more information including free classes on completing a Health Care Directive.  5. I will contact my care team with any barriers, questions or assistance needed with this goal. Care coordinator will remain available as needed.                          Action Plans on File:     Advance Care Plans/Directives Type:   -- (Full Code)      My Medical and Care Information  Problem List   Patient Active Problem List   Diagnosis     Type 2 diabetes mellitus with diabetic neuropathy (H)     Type 2 diabetes mellitus with diabetic nephropathy (H)     Type 2 diabetes mellitus (H)     Benign essential hypertension     Diabetic retinopathy of both eyes (H)     Pure hypercholesterolemia     Osteopenia     Chronic  kidney disease, stage 3b (H)     Morbid obesity (H)     Obesity (BMI 30-39.9)     Status post coronary angiogram     Aortic stenosis, severe     Vision changes      Current Medications and Allergies:    Allergies   Allergen Reactions     Norvasc [Amlodipine] Other (See Comments)     hairloss     Chlorhexidine Rash     Clonidine Headache     Doxazosin Mesylate Rash     Fexofenadine Hydrochloride Headache     Gemfibrozil Rash     Lisinopril Angioedema     Pioglitazone Hydrochloride Swelling     ankle edema     Current Outpatient Medications   Medication     aspirin (ASA) 81 MG chewable tablet     augmented betamethasone dipropionate (DIPROLENE-AF) 0.05 % external ointment     calcium carbonate 600 mg-vitamin D 400 units (CALTRATE) 600-400 MG-UNIT per tablet     cetirizine (ZYRTEC) 10 MG tablet     Continuous Blood Gluc  (FREESTYLE BALWINDER 14 DAY READER) HENNA     Continuous Blood Gluc Sensor ([employer] Network[employer]YLE BALWINDER 14 DAY SENSOR) MISC     cyanocolbalamin (VITAMIN B-12) 1000 MCG tablet     Dulaglutide 3 MG/0.5ML SOPN     fluticasone (FLONASE) 50 MCG/ACT nasal spray     furosemide (LASIX) 40 MG tablet     HUMALOG KWIKPEN 100 UNIT/ML soln     insulin degludec (TRESIBA) 200 UNIT/ML pen     insulin pen needle (BD FCO U/F) 32G X 4 MM miscellaneous     irbesartan (AVAPRO) 150 MG tablet     levothyroxine (SYNTHROID/LEVOTHROID) 50 MCG tablet     metFORMIN (GLUCOPHAGE) 1000 MG tablet     metoprolol succinate ER (TOPROL XL) 25 MG 24 hr tablet     potassium chloride ER (KLOR-CON M) 10 MEQ CR tablet     rosuvastatin (CRESTOR) 20 MG tablet     ticagrelor (BRILINTA) 90 MG tablet     topiramate (TOPAMAX) 25 MG tablet     traZODone (DESYREL) 50 MG tablet     triamcinolone (KENALOG) 0.1 % external ointment     Vitamin D3 (CHOLECALCIFEROL) 25 mcg (1000 units) tablet     VITRON-C  MG TABS tablet     No current facility-administered medications for this visit.     Care Coordination Start Date: 6/14/2022   Frequency of Care  Coordination: monthly     Form Last Updated: 09/22/2022

## 2022-09-22 NOTE — PROGRESS NOTES
Clinic Care Coordination Contact  [facility]/Voicemail       Clinical Data: Care Coordinator Outreach  Outreach attempted x 1.  Left message on patient's voicemail with call back information and requested return call.    Plan: Care Coordinator will try to reach patient again in 1 month and if unable to reach patient RNCC will disenroll patient from care coordination or, if RNCC reaches patient and has not worked on Health Care Directive, RNCC will disenroll patient from care coordination.     Leanne Humphreys RN, BSN, PHN  Primary Care / Care Coordinator   [facility]  [email or web address]   [phone]

## 2022-09-23 ENCOUNTER — HOSPITAL ENCOUNTER (OUTPATIENT)
Dept: CARDIAC REHAB | Facility: CLINIC | Age: 72
Discharge: HOME | End: 2022-09-23
Attending: STUDENT IN AN ORGANIZED HEALTH CARE EDUCATION/TRAINING PROGRAM
Payer: COMMERCIAL

## 2022-09-23 PROCEDURE — 93798 PHYS/QHP OP CAR RHAB W/ECG: CPT | Performed by: REHABILITATION PRACTITIONER

## 2022-09-30 ENCOUNTER — HOSPITAL ENCOUNTER (OUTPATIENT)
Dept: CARDIAC REHAB | Facility: CLINIC | Age: 72
Discharge: HOME | End: 2022-09-30
Attending: STUDENT IN AN ORGANIZED HEALTH CARE EDUCATION/TRAINING PROGRAM
Payer: COMMERCIAL

## 2022-09-30 PROCEDURE — 93798 PHYS/QHP OP CAR RHAB W/ECG: CPT | Performed by: CLINICAL EXERCISE PHYSIOLOGIST

## 2022-10-06 DIAGNOSIS — I35.0 AORTIC VALVE STENOSIS, ETIOLOGY OF CARDIAC VALVE DISEASE UNSPECIFIED: ICD-10-CM

## 2022-10-07 ENCOUNTER — VIRTUAL VISIT (OUTPATIENT)
Dept: EDUCATION SERVICES | Facility: CLINIC | Age: 72
End: 2022-10-07
Attending: INTERNAL MEDICINE
Payer: COMMERCIAL

## 2022-10-07 DIAGNOSIS — G43.719 INTRACTABLE CHRONIC MIGRAINE WITHOUT AURA AND WITHOUT STATUS MIGRAINOSUS: ICD-10-CM

## 2022-10-07 DIAGNOSIS — Z79.4 TYPE 2 DIABETES MELLITUS WITH DIABETIC NEUROPATHY, WITH LONG-TERM CURRENT USE OF INSULIN (H): ICD-10-CM

## 2022-10-07 DIAGNOSIS — E11.40 TYPE 2 DIABETES MELLITUS WITH DIABETIC NEUROPATHY, WITH LONG-TERM CURRENT USE OF INSULIN (H): ICD-10-CM

## 2022-10-07 PROCEDURE — G0108 DIAB MANAGE TRN  PER INDIV: HCPCS | Mod: 95 | Performed by: REGISTERED NURSE

## 2022-10-08 DIAGNOSIS — G43.719 INTRACTABLE CHRONIC MIGRAINE WITHOUT AURA AND WITHOUT STATUS MIGRAINOSUS: ICD-10-CM

## 2022-10-10 RX ORDER — FUROSEMIDE 40 MG
TABLET ORAL
Qty: 90 TABLET | Refills: 0 | Status: SHIPPED | OUTPATIENT
Start: 2022-10-10 | End: 2022-12-19

## 2022-10-10 NOTE — PROGRESS NOTES
Video-Visit Details    Type of service:  Video Visit  Patient consented to video visit  Video Start Time:  1445  Video End Time:   1545  Originating Location (pt. Location): Home  Distant Location (provider location):  [facility] DIABETES EDUCATION [place]   Platform used for Video Visit: C[employer] Wallet     Diabetes Self-Management Education & Support    Ade Wilkins presents today for education related to Type 2 diabetes    Patient is being treated with:  oral agents and insulin  She is accompanied by self    Year of diagnosis: Dx'd in her early 20's, according to note by Roxanne Masterson 5/22  Referring provider:  Olga Lidia Hoover MD  Living Situation: Lives with her sister.  They have been room mates for many years.   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      PATIENT CONCERNS RELATED TO DIABETES SELF MANAGEMENT:     Wants better control.    Referred for comprehensive education.     ASSESSMENT:    Taking Medication:     Current Diabetes Management per Patient:  Taking diabetes medications?   yes:     Diabetes Medication(s)     Biguanides       metFORMIN (GLUCOPHAGE) 1000 MG tablet    Take 1,000 mg by mouth daily    Insulin       HUMALOG KWIKPEN 100 UNIT/ML soln    INJECT  Per sliding scale UP TO 60 UNITS UNDER THE SKIN DAILY.     insulin degludec (TRESIBA) 200 UNIT/ML pen    Inject 34 Units Subcutaneous daily    Incretin Mimetic Agents (GLP-1 Receptor Agonists)       Dulaglutide 3 MG/0.5ML SOPN    Inject 3 mg Subcutaneous once a week          Monitoring    Patient glucose self monitoring as follows: four times daily  BG meter: Freestyle Wyatt 2.  She uses the Wyatt  and manually downloads from home.  She did not do this prior to this appointment.   meter  BG results: not available to me.  She reads off her 24 hour averages for the following time intervals:   Past 07 days:  158 Mg/dL   Past 14 days: 176 Mg/dL  Past  30 days: 173 Mg/dL  Past 90 days:  181 Mg/dL    Patient's most recent   Lab Results   Component Value Date    A1C 7.9 08/09/2022    A1C 8.1 01/08/2021      Patient's A1C goal: 7.5%    Activity: no regular exercise program.  Recently finished six weeks of post-surgery cardiac rehab.  Plans to utilize her [employer] membership to continue either walking or using a recumbent stationery bike.        Healthy Eating:   Eats small meals throughout the day. She states that she knows how to count carbohydrates and knows how to use an insulin to carb ratio.   She had bariatric (Noe-n-Y) surgery in 2004, and lost 80-90 pounds initially.  She was able to maintain this and then lost another 30-40 lbs following her recent AVR surgery in June 2022.       Problem Solving:      Patient is at risk of hypoglycemia?: YES and Frequency is one to two times per week, most recently happening an random times throughout the day.    Hospitalizations for hyper or hypoglycemia: No    EDUCATION and INSTRUCTION PROVIDED AT THIS VISIT:       Discussed some of the recent challenges she has had with control.    She has been taking Tresiba 32 units daily but experiencing almost daily hypoglycemia, she states.  In addition she describes that her glucose post-prandially is quite high and does not return to target for 4-5 hours, and longer sometimes.  Her rapid acting insulin was changed about  from Novolog to Humalog in December 2021 and she correlates worsening control with this change, however she states that she was instructed to take 3 units of Novolog per 15 grams CHO instead of the  4 units per 15 grams she was taking prior to surgery.  According to Dr. Hoover's most recent note, she was taken a total of 3 units at each meal.      Discussed that frequent drops in glucose throughout the day often indicates that there is too much long acting insulin working, whereas her tendency to rise after meals and not coming to target within 2-4 hours  would indicate that she is not taking enough rapid acting insulin.  She states that she is trying to pre-bolus but this is not always possible.   She states that she counts carbs mostly by checking labels.     Because of what she describes as frequent hypoglycemia, suggest that she reduce her Tresiba dose from 32 to 30 units, at least until we have a chance to re-evaluate using her Wyatt data.  Also suggested that she try as much as possible to pre-bolus for food about 20 minutes before eating, and to use the ICR of 4 units/15 grams that she is used to using.      Patient-stated goal written and given to Ade Wilkins.  Verbalized and demonstrated understanding of instructions.     PLAN:  See patient instructions  AVS printed and given to patient    FOLLOW-UP:      Appointment on October 28 to review glucoses.  She will download her Wyatt data prior to that appointment.   Time spent with patient at today's visit was 60 minutes.      Any diabetes medication dose changes were made via the CDE Protocol and Collaborative Practice Agreement with [place] and  [facility].  A copy of this encounter was provided to patient's referring provider.

## 2022-10-10 NOTE — TELEPHONE ENCOUNTER
Routing refill request to provider for review/approval because:  Labs out of range:    Creatinine   Date Value Ref Range Status   08/09/2022 1.08 (H) 0.52 - 1.04 mg/dL Final   04/13/2021 1.38 (H) 0.52 - 1.04 mg/dL Final     BP Readings from Last 3 Encounters:   09/15/22 (!) 144/77   07/07/22 114/67   06/16/22 113/62     Negra JAMES RN  [facility]

## 2022-10-11 RX ORDER — TOPIRAMATE 25 MG/1
75 TABLET, FILM COATED ORAL AT BEDTIME
Qty: 270 TABLET | Refills: 0 | Status: SHIPPED | OUTPATIENT
Start: 2022-10-11 | End: 2023-01-09

## 2022-10-11 RX ORDER — TOPIRAMATE 25 MG/1
75 TABLET, FILM COATED ORAL AT BEDTIME
Qty: 270 TABLET | Refills: 1 | Status: SHIPPED | OUTPATIENT
Start: 2022-10-11 | End: 2023-05-08

## 2022-10-11 NOTE — TELEPHONE ENCOUNTER
topiramate (TOPAMAX) 25 MG tablet  Last Written Prescription Date:   4/14/2022  Last Fill Quantity: 270,   # refills: 1  Last Office Visit :  8/16/2022  Future Office visit:   1/16/2023    Routing refill request to provider for review/approval because:  Refer to Provider for review and refill.   Last filled by [facility].   But Pt seen at [facility] for this med.   Please review and fill per Providers orders.    Thank you       Chey Kumar RN  Central Triage Red Flags/Med Refills

## 2022-10-24 ENCOUNTER — PATIENT OUTREACH (OUTPATIENT)
Dept: CARE COORDINATION | Facility: CLINIC | Age: 72
End: 2022-10-24

## 2022-10-24 ASSESSMENT — ACTIVITIES OF DAILY LIVING (ADL): DEPENDENT_IADLS:: TRANSPORTATION;COOKING;SHOPPING;MEAL PREPARATION

## 2022-10-24 NOTE — PROGRESS NOTES
Clinic Care Coordination Contact  [facility]/Voicemail    Referral Source: Care Team  Clinical Data: Care Coordinator Outreach  Outreach attempted x 2.  Left message on patient's voicemail with call back information and requested return call.    Plan: Care Coordinator will try to reach patient again in 1 month.     Leanne Humphreys RN, BSN, PHN  Primary Care / Care Coordinator   [facility]  [email or web address]   [phone]

## 2022-10-28 ENCOUNTER — APPOINTMENT (OUTPATIENT)
Dept: EDUCATION SERVICES | Facility: CLINIC | Age: 72
End: 2022-10-28
Payer: COMMERCIAL

## 2022-11-01 DIAGNOSIS — L30.9 DERMATITIS: ICD-10-CM

## 2022-11-02 RX ORDER — TRIAMCINOLONE ACETONIDE 1 MG/G
OINTMENT TOPICAL WEEKLY
Qty: 30 G | Refills: 0 | Status: SHIPPED | OUTPATIENT
Start: 2022-11-02 | End: 2023-11-27

## 2022-11-02 NOTE — TELEPHONE ENCOUNTER
Routing refill request to provider for review/approval because:  Current script not signed by current provider.     Justice L. Phoenix, RN     Home

## 2022-11-06 DIAGNOSIS — E11.21 WELL CONTROLLED TYPE 2 DIABETES MELLITUS WITH NEPHROPATHY (H): ICD-10-CM

## 2022-11-09 RX ORDER — FLASH GLUCOSE SENSOR
KIT MISCELLANEOUS
Qty: 2 EACH | Refills: 11 | Status: SHIPPED | OUTPATIENT
Start: 2022-11-09 | End: 2023-03-20

## 2022-11-22 ENCOUNTER — PATIENT OUTREACH (OUTPATIENT)
Dept: CARE COORDINATION | Facility: CLINIC | Age: 72
End: 2022-11-22

## 2022-11-22 ASSESSMENT — ACTIVITIES OF DAILY LIVING (ADL): DEPENDENT_IADLS:: TRANSPORTATION;COOKING;SHOPPING;MEAL PREPARATION

## 2022-11-22 NOTE — PROGRESS NOTES
Clinic Care Coordination Contact  [facility]/Voicemail    Referral Source: Care Team  Clinical Data: Care Coordinator Outreach  Outreach attempted x 3.  Left message on patient's voicemail with call back information and requested return call.    Plan: Care Coordinator will try to reach patient again in 1 month. And if unable to reach patient, will disenroll patient from care coordination.     Leanne Humphreys RN, BSN, PHN  Primary Care / Care Coordinator   [facility]  [email or web address]   [phone]

## 2022-11-26 DIAGNOSIS — E11.65 POORLY CONTROLLED TYPE 2 DIABETES MELLITUS WITH NEUROPATHY (H): ICD-10-CM

## 2022-11-26 DIAGNOSIS — E11.40 POORLY CONTROLLED TYPE 2 DIABETES MELLITUS WITH NEUROPATHY (H): ICD-10-CM

## 2022-11-30 RX ORDER — DULAGLUTIDE 3 MG/.5ML
INJECTION, SOLUTION SUBCUTANEOUS
Qty: 6 ML | Refills: 3 | Status: SHIPPED | OUTPATIENT
Start: 2022-11-30 | End: 2023-01-16 | Stop reason: DRUGHIGH

## 2022-11-30 NOTE — TELEPHONE ENCOUNTER
Trulicity Subcutaneous Solution Pen-injector 3 MG/0.5ML  Last Written Prescription Date:   11/22/2021  Last Fill Quantity: 6,   # refills: 3  Last Office Visit :  8/16/2022  Future Office visit:   1/16/2023    Routing refill request to provider for review/approval because:  Abnormal Creatinine  Refer to Provider for review     Recent Labs   Lab Test 08/09/22  0844   CR 1.08*        Chey Kumar RN  Central Triage Red Flags/Med Refills

## 2022-12-02 ENCOUNTER — ANCILLARY PROCEDURE (OUTPATIENT)
Dept: CT IMAGING | Facility: CLINIC | Age: 72
End: 2022-12-02
Attending: INTERNAL MEDICINE
Payer: COMMERCIAL

## 2022-12-02 DIAGNOSIS — R91.8 PULMONARY NODULES: ICD-10-CM

## 2022-12-02 PROCEDURE — 71250 CT THORAX DX C-: CPT

## 2022-12-03 ENCOUNTER — HEALTH MAINTENANCE LETTER (OUTPATIENT)
Age: 72
End: 2022-12-03

## 2022-12-06 ENCOUNTER — TELEPHONE (OUTPATIENT)
Dept: INTERNAL MEDICINE | Facility: CLINIC | Age: 72
End: 2022-12-06

## 2022-12-06 NOTE — TELEPHONE ENCOUNTER
Francisca states that patient has been having deep bruising from wrist to elbow with normal activity, frequent bloody noses and she feels it is the aspirin.  Please call as she cannot reach cardiology.    Thank you.

## 2022-12-09 NOTE — TELEPHONE ENCOUNTER
Dual antiplatelet therapy is indicated for 12 months after her percutaneous intervention in May, 2022.    Yes, patient's increased bruising and more frequent bloody noses are due to her dual antiplatelet therapy, but without dual antiplatelet therapy, patient is at risk for in-stent restenosis which could result in a heart attack, stroke, disability, or death. Potential benefits clearly outweigh risks.

## 2022-12-09 NOTE — TELEPHONE ENCOUNTER
"Called Francisca ESTEVEZ CC- [facility]. Per Francisca ESTEVEZ CC- she is contracted through pts insurance for preventative care and to coordinate with providers.     Per Francisca-  Pt thinks the bruising and more frequent bloody noses (see note below) is from aspirin. Pt is taking 81mg of aspirin. Pt told Francisca she has taken aspirin in the past and this has happened in the past. Pt stated its a gradual symptoms.   Pt is also taking 90 mg of brilinta 2x/day.     Pt is concerned but is more on the end of \"wait and see until her appt.\"     Francisca tried 3 times to reach cardiology to discuss this but was unable to reach cardiology.       Routing to PCP for recommendations.     Please call Francisca back with PCPs recommendations.   No direct # to reach Francisca.     Can leave a detailed message with the  or after hours number.   "

## 2022-12-09 NOTE — TELEPHONE ENCOUNTER
Called and spoke with Montserrat from [facility] who sent the message to Francisca ESTEVEZ CC to call us back.     Upon callback please relay Dr. Aviles's message to Francisca and verify if Francisca wants us to call the pt to relay Dr. Larsen message or if Francisca will call the pt to relay Dr. Larsen message.       Patient Contact    Attempt # 1    Was call answered?  No.  Left message with Montserrat for Francisca to call us back.

## 2022-12-13 NOTE — TELEPHONE ENCOUNTER
Patient Contact    Attempt # 3    Was call answered?  No.  Left message with Sabi care Coordinator to relay to HERB Espinosa.      On callback please relay Dr. Aviles's message to Francisca and verify if Francisca wants us to call the pt to relay Dr. Larsen message or if Francisca will call the pt to relay Dr. Larsen message.

## 2022-12-17 DIAGNOSIS — I35.0 AORTIC VALVE STENOSIS, ETIOLOGY OF CARDIAC VALVE DISEASE UNSPECIFIED: ICD-10-CM

## 2022-12-19 RX ORDER — FUROSEMIDE 40 MG
TABLET ORAL
Qty: 90 TABLET | Refills: 0 | Status: SHIPPED | OUTPATIENT
Start: 2022-12-19 | End: 2023-04-10

## 2022-12-22 ENCOUNTER — PATIENT OUTREACH (OUTPATIENT)
Dept: NURSING | Facility: CLINIC | Age: 72
End: 2022-12-22
Payer: COMMERCIAL

## 2022-12-22 ASSESSMENT — ACTIVITIES OF DAILY LIVING (ADL): DEPENDENT_IADLS:: TRANSPORTATION;COOKING;SHOPPING;MEAL PREPARATION

## 2022-12-22 NOTE — PROGRESS NOTES
"Clinic Care Coordination Contact    Follow Up Progress Note     Assessment:   Patient states that she has the Health Care Directive/[place] Honoring Choices form and has read it but has not completed to date.  Patient states that \"life is busy\" and now with the holidays and the weather, patient \"hasn't thought about it\".  Patient is requesting for continued care coordination to encourage patient to complete Health Care Directive.    Care Gaps:    Health Maintenance Due   Topic Date Due     FALL RISK ASSESSMENT  03/24/2022     A1C  11/09/2022     BMP  01/07/2023       Care Gaps Last addressed on 12/22/2022    Care Plans  Care Plan: Advance Care Plan     Problem: Patient does not have a valid Health Care Directive     Goal: Complete Health Care Directive     Start Date: 6/14/2022 Expected End Date: 3/22/2023    This Visit's Progress: 40% Recent Progress: 20%    Note:     Goal Statement: I will complete a Health Care Directive and have this scanned into my Medical Record.  Barriers: Patient doesn't have a Health Care Directive  Strengths: Strong advocate for self  Patient expressed understanding of goal: yes  Action steps to achieve this goal:  1. Care Coordination will mail me a Health Care Directive and any requested resources (goals worksheets, education sheets, class flyer).   2. I will complete the Health Care Directive. My signature must be either notarized or witnessed by two adults who are not named as health care agents in the document. Additionally only one of the witnesses can be employed by my health care system. If I need a notary I can check with my bank, my place of [employer], [employer] stores, or look online at [email or web address] .  3. I will provide a copy of my Health Care Directive to my care team and/or email directly to [email or web address].   4. I can visit [email or web address] website, email [email or web address] or call [facility] [employer] at [phone] (M-Friday 6a to 5p) " for more information including free classes on completing a Health Care Directive.  5. I will contact my care team with any barriers, questions or assistance needed with this goal. Care coordinator will remain available as needed.                       Intervention/Education provided during outreach:   RNCC called and spoke with patient; introduced self, discussed role of Care Coordination and explained reason for call    Plan:   -Patient will contact the care team with questions, concerns, support needs   -Patient will use the clinic as a resource and understands (s)he can contact the [facility] with 24/7 after hours services available  -Care Coordinator will remain available as needed  -RNCC will follow up in one month for follow up appointments/recommendations and goal progression     Leanne Humphreys RN, BSN, PHN  Primary Care / Care Coordinator   [facility]  [email or web address]   [phone]

## 2022-12-22 NOTE — LETTER
[address] [facility] [address]    [facility]  Patient Centered Plan of Care  About Me:        Patient Name:  Ade Wilkins    YOB: 1950  Age:         72 year old   Romulo MRN:    [id number] Telephone Information:  Home Phone [phone]   Mobile [phone]       Address:  [address] [address] Email address:  [email or web address]      Emergency Contact(s)    Name Relationship Lgl Grd Work Phone Home Phone Mobile Phone   1. NAUN WILKINS Sister  [phone]   2. MAXIMILIAN BONILLA Friend   [phone]           Primary language:  English     needed? No   [employer]:  [phone] op. 1  Other communication barriers:None    Preferred Method of Communication:  Dorie  Current living arrangement: I live in a private home with family (Sister and sister's boyfriend)    Mobility Status/ Medical Equipment: Independent w/Device    Health Maintenance  Health Maintenance Reviewed: Due/Overdue   Health Maintenance Due   Topic Date Due     FALL RISK ASSESSMENT  03/24/2022     A1C  11/09/2022     BMP  01/07/2023             My Access Plan  Medical Emergency 911   Primary Clinic Line [facility] - [phone]   24 Hour Appointment Line [phone] or  [phone]) (toll-free)   24 Hour Nurse Line [phone] (toll-free)   Preferred Urgent Care [facility], [phone]     Preferred Hospital [facility]  [phone]     Preferred Pharmacy [id number] - [place], [place] - [address] Behavioral Health Crisis Line The National Suicide Prevention Lifeline at 1-805.402.1783 or Text/Call 988     My Care Team Members  Patient Care Team       Relationship Specialty Notifications Start End    Judith Aviles MD PCP - General Internal Medicine  3/28/22     Phone: [phone] Fax: [phone]         [address]  [facility] [address]    Melvi Naik MD MD INTERNAL MEDICINE - ENDOCRINOLOGY, DIABETES & METABOLISM  3/2/17     Phone: [phone] Fax: [phone]         [address] [place] [address]    Roxanne Masterson PA-C Physician Assistant Endocrinology, Diabetes, and Metabolism  9/7/21     Phone: [phone] Fax: [phone]         [address] [place] [address]    Judith Aviles MD Assigned PCP   10/31/21     Phone: [phone] Fax: [phone]         [address] [address]    Leanne Humphreys, RN Lead Care Coordinator  Admissions 6/14/22     Roney Lentz EP Cardiac Rehabilitation Therapist   6/23/22 6/23/23    Phone: [phone] Fax: [phone] [address] [facility], [address] [facility] [address]    Latasha Calderon, PA-C Assigned Heart and Vascular Provider   7/16/22     Phone: [phone] Fax: [phone] [address] [facility] [address]    Carter Celis MD MD Dermatology  8/15/22     Phone: [phone] Fax: [phone]         [address] [place] [address]    Olamide Rothman, RN Diabetes Educator Diabetes Education  8/16/22     Phone: [phone] Fax: [phone]         [address] [address]    Olga Lidia Hoover MD Assigned Endocrinology Provider   8/20/22     Phone: [phone] Fax: [phone]         [address] [place] [address]    Joanne Gamble, [facility] Assigned MTM Pharmacist   9/28/22     Phone: [phone] Pager: [phone]         [address] [place], [place] [address] [facility] [address]            My Care Plans  Self Management and Treatment Plan  Care Plan  Care Plan: Advance Care Plan     Problem: Patient does not have a valid Health Care Directive     Goal: Complete Health Care Directive     Start Date: 6/14/2022 Expected End Date: 3/22/2023    This Visit's Progress: 40% Recent Progress: 20%    Note:     Goal Statement: I will complete a Health Care Directive  and have this scanned into my Medical Record.  Barriers: Patient doesn't have a Health Care Directive  Strengths: Strong advocate for self  Patient expressed understanding of goal: yes  Action steps to achieve this goal:  1. Care Coordination will mail me a Health Care Directive and any requested resources (goals worksheets, education sheets, class flyer).   2. I will complete the Health Care Directive. My signature must be either notarized or witnessed by two adults who are not named as health care agents in the document. Additionally only one of the witnesses can be employed by my health care system. If I need a notary I can check with my bank, my place of [employer], [employer] stores, or look online at [email or web address] .  3. I will provide a copy of my Health Care Directive to my care team and/or email directly to [email or web address].   4. I can visit [email or web address] website, email [email or web address] or call [facility] [employer] at [phone] (M-Friday 6a to 5p) for more information including free classes on completing a Health Care Directive.  5. I will contact my care team with any barriers, questions or assistance needed with this goal. Care coordinator will remain available as needed.                          Action Plans on File:     Advance Care Plans/Directives Type:   -- (Full Code)      My Medical and Care Information  Problem List   Patient Active Problem List   Diagnosis     Type 2 diabetes mellitus with diabetic neuropathy (H)     Type 2 diabetes mellitus with diabetic nephropathy (H)     Type 2 diabetes mellitus (H)     Benign essential hypertension     Diabetic retinopathy of both eyes (H)     Pure hypercholesterolemia     Osteopenia     Chronic kidney disease, stage 3b (H)     Morbid obesity (H)     Obesity (BMI 30-39.9)     Status post coronary angiogram     Aortic stenosis, severe     Vision changes      Current Medications and Allergies:    Allergies    Allergen Reactions     Norvasc [Amlodipine] Other (See Comments)     hairloss     Chlorhexidine Rash     Clonidine Headache     Doxazosin Mesylate Rash     Fexofenadine Hydrochloride Headache     Gemfibrozil Rash     Lisinopril Angioedema     Pioglitazone Hydrochloride Swelling     ankle edema     Current Outpatient Medications   Medication     aspirin (ASA) 81 MG chewable tablet     augmented betamethasone dipropionate (DIPROLENE-AF) 0.05 % external ointment     calcium carbonate 600 mg-vitamin D 400 units (CALTRATE) 600-400 MG-UNIT per tablet     cetirizine (ZYRTEC) 10 MG tablet     Continuous Blood Gluc  (FREESTYLE BALWINDER 14 DAY READER) HENNA     Continuous Blood Gluc Sensor (FREESTYLE BALWINDER 14 DAY SENSOR) MISC     cyanocolbalamin (VITAMIN B-12) 1000 MCG tablet     fluticasone (FLONASE) 50 MCG/ACT nasal spray     furosemide (LASIX) 40 MG tablet     HUMALOG KWIKPEN 100 UNIT/ML soln     insulin degludec (TRESIBA) 200 UNIT/ML pen     insulin pen needle (BD FCO U/F) 32G X 4 MM miscellaneous     irbesartan (AVAPRO) 150 MG tablet     levothyroxine (SYNTHROID/LEVOTHROID) 50 MCG tablet     metFORMIN (GLUCOPHAGE) 1000 MG tablet     metoprolol succinate ER (TOPROL XL) 25 MG 24 hr tablet     potassium chloride ER (KLOR-CON M) 10 MEQ CR tablet     rosuvastatin (CRESTOR) 20 MG tablet     ticagrelor (BRILINTA) 90 MG tablet     topiramate (TOPAMAX) 25 MG tablet     topiramate (TOPAMAX) 25 MG tablet     traZODone (DESYREL) 50 MG tablet     triamcinolone (KENALOG) 0.1 % external ointment     TRULICITY 3 MG/0.5ML SOPN     Vitamin D3 (CHOLECALCIFEROL) 25 mcg (1000 units) tablet     VITRON-C  MG TABS tablet     No current facility-administered medications for this visit.     Care Coordination Start Date: 6/14/2022   Frequency of Care Coordination: monthly     Form Last Updated: 12/22/2022

## 2022-12-29 ENCOUNTER — HOSPITAL ENCOUNTER (OUTPATIENT)
Dept: CARDIOLOGY | Facility: CLINIC | Age: 72
Discharge: HOME | End: 2022-12-29
Attending: PHYSICIAN ASSISTANT | Admitting: PHYSICIAN ASSISTANT
Payer: COMMERCIAL

## 2022-12-29 DIAGNOSIS — E66.9 OBESITY (BMI 30-39.9): ICD-10-CM

## 2022-12-29 DIAGNOSIS — I25.10 CORONARY ARTERY DISEASE INVOLVING NATIVE CORONARY ARTERY OF NATIVE HEART WITHOUT ANGINA PECTORIS: ICD-10-CM

## 2022-12-29 DIAGNOSIS — I51.7 LVH (LEFT VENTRICULAR HYPERTROPHY): ICD-10-CM

## 2022-12-29 DIAGNOSIS — I10 BENIGN ESSENTIAL HYPERTENSION: ICD-10-CM

## 2022-12-29 DIAGNOSIS — E78.00 PURE HYPERCHOLESTEROLEMIA: ICD-10-CM

## 2022-12-29 DIAGNOSIS — Z95.2 S/P TAVR (TRANSCATHETER AORTIC VALVE REPLACEMENT): ICD-10-CM

## 2022-12-29 LAB — LVEF ECHO: NORMAL

## 2022-12-29 PROCEDURE — 999N000208 ECHOCARDIOGRAM COMPLETE

## 2022-12-29 PROCEDURE — 255N000002 HC RX 255 OP 636: Performed by: PHYSICIAN ASSISTANT

## 2022-12-29 PROCEDURE — 93306 TTE W/DOPPLER COMPLETE: CPT | Mod: 26 | Performed by: INTERNAL MEDICINE

## 2022-12-29 RX ADMIN — HUMAN ALBUMIN MICROSPHERES AND PERFLUTREN 9 ML: 10; .22 INJECTION, SOLUTION INTRAVENOUS at 09:32

## 2022-12-30 ENCOUNTER — VIRTUAL VISIT (OUTPATIENT)
Dept: CARDIOLOGY | Facility: CLINIC | Age: 72
End: 2022-12-30
Attending: PHYSICIAN ASSISTANT
Payer: COMMERCIAL

## 2022-12-30 DIAGNOSIS — I51.7 LVH (LEFT VENTRICULAR HYPERTROPHY): ICD-10-CM

## 2022-12-30 DIAGNOSIS — Z95.2 S/P TAVR (TRANSCATHETER AORTIC VALVE REPLACEMENT): Primary | ICD-10-CM

## 2022-12-30 DIAGNOSIS — E66.9 OBESITY (BMI 30-39.9): ICD-10-CM

## 2022-12-30 DIAGNOSIS — E78.00 PURE HYPERCHOLESTEROLEMIA: ICD-10-CM

## 2022-12-30 DIAGNOSIS — I25.10 CORONARY ARTERY DISEASE INVOLVING NATIVE CORONARY ARTERY OF NATIVE HEART WITHOUT ANGINA PECTORIS: ICD-10-CM

## 2022-12-30 DIAGNOSIS — I10 BENIGN ESSENTIAL HYPERTENSION: ICD-10-CM

## 2022-12-30 PROCEDURE — 99215 OFFICE O/P EST HI 40 MIN: CPT | Mod: 95 | Performed by: PHYSICIAN ASSISTANT

## 2022-12-30 RX ORDER — IRBESARTAN 150 MG/1
150 TABLET ORAL DAILY
Qty: 90 TABLET | Refills: 3 | Status: SHIPPED | OUTPATIENT
Start: 2022-12-30 | End: 2023-12-22

## 2022-12-30 NOTE — LETTER
"12/30/2022    Judith Aviles MD  [address] [facility] [address]    RE: Ade Wilkins       Dear Colleague,     I had the pleasure of seeing Ade Wilkins in the [facility].  Rachael is a 72 year old who is being evaluated via a billable video visit.      How would you like to obtain your AVS? MyChart  If the video visit is dropped, the invitation should be resent by: Send to e-mail at: [email or web address]  Will anyone else be joining your video visit? No     Review Of Systems  Skin: NEGATIVE  Eyes:Ears/Nose/Throat: NEGATIVE  Respiratory: a little more SOB with edema increase, hears wheezing  Cardiovascular:lightheadedness, feels off balance more often. Some edema.Fatigue. Not active  Gastrointestinal: NEGATIVE  Genitourinary:NEGATIVE   Musculoskeletal: NEGATIVE  Neurologic: hx of vertigo  Psychiatric: hx claustrophobia  Hematologic/Lymphatic/Immunologic: NEGATIVE  Endocrine:  NEGATIVE  Vitals - Patient Reported  Systolic (Patient Reported): (!) 152  Diastolic (Patient Reported): 67  Weight (Patient Reported): 85.7 kg (189 lb)  Height (Patient Reported): 157.5 cm (5' 2\")  BMI (Based on Pt Reported Ht/Wt): 34.57  Pulse (Patient Reported): 56    Telephone number of patient: [phone]    Massiel Mckeon LPN    Video-Visit Details    Type of service:  Video Visit   Video Start Time: 10:54 AM  Video End Time: 11:30 AM    Originating Location (pt. Location): Home    Distant Location (provider location):  On-site  Platform used for Video Visit: Kendra     -----------------------------------------------------------------------------------------------------------------------------    Primary Cardiologist: Dr. Lewis    Reason for Video Visit: 2 month follow up with repeat echo after increasing metoprolol     HPI:  Rachael is a pleasant 71 year old female with past medical history notable for:      #  Hx of Severe aortic stenosis   - status post TAVR using a 26 mm Medtronic evolute pro valve in " 6/2022.  Echocardiogram showed hyperdynamic LV with ejection fraction estimated at greater than 70%, small LV cavity with peak gradient intracavitary 17 mmHg.  Mean AOV pressure gradient of 15 mmHg, no paravalvular or valvular AI.  Continued on DAPT therapy.   - her echo was reviewed with Dr. Lewis who recommended stopping CCB and switching to metoprolol succinate which we later further increased      #  Transient complete heart block intra procedure   - event monitor showed no advanced AVB or other significant arrhythmias   - recently transitioned from CCB to BB with repeat 24 hr holter monitor      # Neurological changes status post TAVR (resolved)  - negative MRA neck and brain and negative CTA head and neck.  Follow-up with neurology outpatient     #  Coronary artery disease   - status post PCI to RCA on  5/4/2022.    - Continue dual antiplatelet therapy until 5/2023      # HFpEF  -Dry weight unclear as patient lost significant amount of caloric weight following TAVR  - on furosemide 40 mg daily     # Hypertension  - had a few episodes of hypotension in the setting of small LV size with significant LVH  - valsartan was reduced in dose recently; spironolactone was stopped; she was also switched from CCB to BB  -Now hypertensive again     #  Hyperlipidemia  - on rosuvastatin 10 mg daily      # Peripheral edema  -Multifactorial in etiology including lymphedema; was referred to lymphedema clinic but patient has not followed up with them     # Weight loss  -Lost 30 pounds following TAVR but she says she gained 6 to 8 pounds back since October 2022    Rachael has a virtual visit with me today.  She tells me she quit her part-time job recently and because of this she has been quite sedentary.  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  She wears her compression socks but she feels like her peripheral edema slightly worse with her knees now getting a  bit more swollen.  She denies orthopnea, PND, abdominal distention but does think that her dyspnea exertion is slightly worse as well.  She has gained some weight over the last several months but this seems to be a very gradual gain and more likely caloric than fluid.  She been compliant with her furosemide 40 mg daily.  She was referred to lymphedema clinic in the past but she did not get a chance to set up a visit with them yet.  She denies bleeding issues.  She continues to have tinnitus mostly from her right ear.  She has been following up with her primary regarding this.  Her blood pressure has been also on the higher side over the last few months.    ROS:  12-pt ROS is negative except for as noted above.     Physical Exam:  A limited exam was conducted via video.  Constitutional:  Patient is pleasant, alert, cooperative, and in NAD.  HEENT:  NCAT. EOM's intact.   Neck:  CVP appears normal.   Pulmonary: Normal respiratory effort.   Extremities: No edema, erythema, cyanosis appreciated.  Skin:  No rashes or lesions appreciated.   Neurological:  No gross motor or sensory deficits.   Psych: Appropriate affect.       A/P:   Rachael is a pleasant 71 year old female with past medical history notable for:      #  Hx of Severe aortic stenosis   - status post TAVR using a 26 mm Medtronic evolute pro valve in 6/2022.  Echocardiogram showed hyperdynamic LV with ejection fraction estimated at greater than 70%, small LV cavity with peak gradient intracavitary 17 mmHg.  Mean AOV pressure gradient of 15 mmHg, no paravalvular or valvular AI.  Continued on DAPT therapy.   - her echo was reviewed with Dr. Lewis who recommended stopping CCB and switching to metoprolol succinate which we later further increased      #  Transient complete heart block intra procedure   - event monitor showed no advanced AVB or other significant arrhythmias   - recently transitioned from CCB to BB with repeat 24 hr holter monitor      # Neurological  changes status post TAVR (resolved)  - negative MRA neck and brain and negative CTA head and neck.  Follow-up with neurology outpatient     #  Coronary artery disease   - status post PCI to RCA on  5/4/2022.    - Continue dual antiplatelet therapy until 5/2023      # HFpEF  -Dry weight unclear as patient lost significant amount of caloric weight following TAVR  - on furosemide 40 mg daily     # Hypertension  - had a few episodes of hypotension in the setting of small LV size with significant LVH  - valsartan was reduced in dose recently; spironolactone was stopped; she was also switched from CCB to BB  -Now hypertensive again     #  Hyperlipidemia  - on rosuvastatin 10 mg daily      # Peripheral edema  -Multifactorial in etiology including lymphedema; was referred to lymphedema clinic but patient has not followed up with them     # Weight loss  -Lost 30 pounds following TAVR but she says she gained 6 to 8 pounds back since October 2022    Rachael's blood pressure is suboptimal.  We will have her increase irbesartan from 75 mg 150 mg daily.  In regard to her peripheral edema we will have her increase Lasix to 60 mg for the next 3 days to see if this helps.  I have encouraged her to go for walks and be active as I believe sitting for prolonged period of times can be part of the culprit for her worsening peripheral edema.  I have placed another referral to lymphedema clinic.      Her echocardiogram shows stable valve function with no new changes.  Her LV function continues to be hyperdynamic.  We talked about avoiding hot tubs and saun she will keep her self hydrated.  If she has any sort of volume loss such as diarrhea she is okay to hold her irbesartan.    I have asked her to return to our clinic in 6 months  for follow-up.  She will contact our clinic in the interim with additional questions or concerns.  She will continue to monitor her blood pressure and if it remains suboptimal she will let us know.    50 minutes  spent on the date of the encounter with chart review, patient visit, care coordination, and documentation.      This visit is being conducted as a virtual visit due to the emphasis on mitigation of the COVID-19 virus pandemic. The clinician has decided that the risk of an in-office visit outweighs the benefit for this patient. The rest of the comprehensive physical examination is deferred due to public health emergency video visit restrictions.         Latasha Calderon PA-C   12/30/2022  Pager: [phone]          Thank you for allowing me to participate in the care of your patient.      Sincerely,     Latasha Calderon PA-C     [facility]  cc:   Latasha Calderon PA-C  [phone]  [phone]

## 2022-12-30 NOTE — PATIENT INSTRUCTIONS
Today's Plan:   1) increase irbesartan to 1 full pill once a day to improve your blood pressure.  2) take extra half pill of furosemide for the next 3 days to see if this helps with your knee swelling  3) I have placed another referral to lymphedema clinic which should help with leg swelling issues  4) ultrasound of your heart shows stable renal function; your heart pumping is still a bit strong which puts you at higher risk for lightheadedness and passing out--make sure you are hydrated and avoid hot tubs or saunas  5) follow-up with Dr. Lewis in 6 months    If you have questions or concerns please call my nurse team at [phone].     Scheduling phone number: [phone].  Reminder: Please bring in all current medications, over the counter supplements and vitamin bottles to your next appointment.    It was a pleasure seeing you today!

## 2022-12-30 NOTE — PROGRESS NOTES
"Rachael is a 72 year old who is being evaluated via a billable video visit.      How would you like to obtain your AVS? MyChart  If the video visit is dropped, the invitation should be resent by: Send to e-mail at: [email or web address]  Will anyone else be joining your video visit? No     Review Of Systems  Skin: NEGATIVE  Eyes:Ears/Nose/Throat: NEGATIVE  Respiratory: a little more SOB with edema increase, hears wheezing  Cardiovascular:lightheadedness, feels off balance more often. Some edema.Fatigue. Not active  Gastrointestinal: NEGATIVE  Genitourinary:NEGATIVE   Musculoskeletal: NEGATIVE  Neurologic: hx of vertigo  Psychiatric: hx claustrophobia  Hematologic/Lymphatic/Immunologic: NEGATIVE  Endocrine:  NEGATIVE  Vitals - Patient Reported  Systolic (Patient Reported): (!) 152  Diastolic (Patient Reported): 67  Weight (Patient Reported): 85.7 kg (189 lb)  Height (Patient Reported): 157.5 cm (5' 2\")  BMI (Based on Pt Reported Ht/Wt): 34.57  Pulse (Patient Reported): 56    Telephone number of patient: [phone]    Massiel Mckeon LPN    Video-Visit Details    Type of service:  Video Visit   Video Start Time: 10:54 AM  Video End Time: 11:30 AM    Originating Location (pt. Location): Home    Distant Location (provider location):  On-site  Platform used for Video Visit: Kendra     -----------------------------------------------------------------------------------------------------------------------------    Primary Cardiologist: Dr. Lewis    Reason for Video Visit: 2 month follow up with repeat echo after increasing metoprolol     HPI:  Rachael is a pleasant 71 year old female with past medical history notable for:      #  Hx of Severe aortic stenosis   - status post TAVR using a 26 mm Medtronic evolute pro valve in 6/2022.  Echocardiogram showed hyperdynamic LV with ejection fraction estimated at greater than 70%, small LV cavity with peak gradient intracavitary 17 mmHg.  Mean AOV pressure gradient of 15 mmHg, no " paravalvular or valvular AI.  Continued on DAPT therapy.   - her echo was reviewed with Dr. Lewis who recommended stopping CCB and switching to metoprolol succinate which we later further increased      #  Transient complete heart block intra procedure   - event monitor showed no advanced AVB or other significant arrhythmias   - recently transitioned from CCB to BB with repeat 24 hr holter monitor      # Neurological changes status post TAVR (resolved)  - negative MRA neck and brain and negative CTA head and neck.  Follow-up with neurology outpatient     #  Coronary artery disease   - status post PCI to RCA on  5/4/2022.    - Continue dual antiplatelet therapy until 5/2023      # HFpEF  -Dry weight unclear as patient lost significant amount of caloric weight following TAVR  - on furosemide 40 mg daily     # Hypertension  - had a few episodes of hypotension in the setting of small LV size with significant LVH  - valsartan was reduced in dose recently; spironolactone was stopped; she was also switched from CCB to BB  -Now hypertensive again     #  Hyperlipidemia  - on rosuvastatin 10 mg daily      # Peripheral edema  -Multifactorial in etiology including lymphedema; was referred to lymphedema clinic but patient has not followed up with them     # Weight loss  -Lost 30 pounds following TAVR but she says she gained 6 to 8 pounds back since October 2022    Rachael has a virtual visit with me today.  She tells me she quit her part-time job recently and because of this she has been quite sedentary.  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  She wears her compression socks but she feels like her peripheral edema slightly worse with her knees now getting a bit more swollen.  She denies orthopnea, PND, abdominal distention but does think that her dyspnea exertion is slightly worse as well.  She has gained some weight over the last several months but this  seems to be a very gradual gain and more likely caloric than fluid.  She been compliant with her furosemide 40 mg daily.  She was referred to lymphedema clinic in the past but she did not get a chance to set up a visit with them yet.  She denies bleeding issues.  She continues to have tinnitus mostly from her right ear.  She has been following up with her primary regarding this.  Her blood pressure has been also on the higher side over the last few months.    ROS:  12-pt ROS is negative except for as noted above.     Physical Exam:  A limited exam was conducted via video.  Constitutional:  Patient is pleasant, alert, cooperative, and in NAD.  HEENT:  NCAT. EOM's intact.   Neck:  CVP appears normal.   Pulmonary: Normal respiratory effort.   Extremities: No edema, erythema, cyanosis appreciated.  Skin:  No rashes or lesions appreciated.   Neurological:  No gross motor or sensory deficits.   Psych: Appropriate affect.       A/P:   Rachael is a pleasant 71 year old female with past medical history notable for:      #  Hx of Severe aortic stenosis   - status post TAVR using a 26 mm Medtronic evolute pro valve in 6/2022.  Echocardiogram showed hyperdynamic LV with ejection fraction estimated at greater than 70%, small LV cavity with peak gradient intracavitary 17 mmHg.  Mean AOV pressure gradient of 15 mmHg, no paravalvular or valvular AI.  Continued on DAPT therapy.   - her echo was reviewed with Dr. Lewis who recommended stopping CCB and switching to metoprolol succinate which we later further increased      #  Transient complete heart block intra procedure   - event monitor showed no advanced AVB or other significant arrhythmias   - recently transitioned from CCB to BB with repeat 24 hr holter monitor      # Neurological changes status post TAVR (resolved)  - negative MRA neck and brain and negative CTA head and neck.  Follow-up with neurology outpatient     #  Coronary artery disease   - status post PCI to RCA  on  5/4/2022.    - Continue dual antiplatelet therapy until 5/2023      # HFpEF  -Dry weight unclear as patient lost significant amount of caloric weight following TAVR  - on furosemide 40 mg daily     # Hypertension  - had a few episodes of hypotension in the setting of small LV size with significant LVH  - valsartan was reduced in dose recently; spironolactone was stopped; she was also switched from CCB to BB  -Now hypertensive again     #  Hyperlipidemia  - on rosuvastatin 10 mg daily      # Peripheral edema  -Multifactorial in etiology including lymphedema; was referred to lymphedema clinic but patient has not followed up with them     # Weight loss  -Lost 30 pounds following TAVR but she says she gained 6 to 8 pounds back since October 2022    Rachael's blood pressure is suboptimal.  We will have her increase irbesartan from 75 mg 150 mg daily.  In regard to her peripheral edema we will have her increase Lasix to 60 mg for the next 3 days to see if this helps.  I have encouraged her to go for walks and be active as I believe sitting for prolonged period of times can be part of the culprit for her worsening peripheral edema.  I have placed another referral to lymphedema clinic.      Her echocardiogram shows stable valve function with no new changes.  Her LV function continues to be hyperdynamic.  We talked about avoiding hot tubs and saun she will keep her self hydrated.  If she has any sort of volume loss such as diarrhea she is okay to hold her irbesartan.    I have asked her to return to our clinic in 6 months  for follow-up.  She will contact our clinic in the interim with additional questions or concerns.  She will continue to monitor her blood pressure and if it remains suboptimal she will let us know.    50 minutes spent on the date of the encounter with chart review, patient visit, care coordination, and documentation.      This visit is being conducted as a virtual visit due to the emphasis on mitigation  of the COVID-19 virus pandemic. The clinician has decided that the risk of an in-office visit outweighs the benefit for this patient. The rest of the comprehensive physical examination is deferred due to public health emergency video visit restrictions.         Latasha Calderon PA-C   12/30/2022  Pager: [phone]

## 2023-01-09 ENCOUNTER — OFFICE VISIT (OUTPATIENT)
Dept: DERMATOLOGY | Facility: CLINIC | Age: 73
End: 2023-01-09
Payer: COMMERCIAL

## 2023-01-09 DIAGNOSIS — I87.2 VENOUS STASIS DERMATITIS OF BOTH LOWER EXTREMITIES: ICD-10-CM

## 2023-01-09 DIAGNOSIS — L57.8 ACTINIC SKIN DAMAGE: Primary | ICD-10-CM

## 2023-01-09 DIAGNOSIS — L82.0 LICHEN PLANUS-LIKE KERATOSIS (LPLK): ICD-10-CM

## 2023-01-09 DIAGNOSIS — I89.0 LYMPHEDEMA: ICD-10-CM

## 2023-01-09 PROCEDURE — 99204 OFFICE O/P NEW MOD 45 MIN: CPT | Performed by: STUDENT IN AN ORGANIZED HEALTH CARE EDUCATION/TRAINING PROGRAM

## 2023-01-09 ASSESSMENT — PAIN SCALES - GENERAL: PAINLEVEL: NO PAIN (0)

## 2023-01-09 NOTE — PROGRESS NOTES
[facility] Dermatology Note    Encounter Date: Jan 9, 2023    Dermatology Problem List:  -   ______________________________________    Impression/Plan:  Ade was seen today for lesion.    Diagnoses and all orders for this visit:    Actinic skin damage  Lichen planus-like keratosis (LPLK)  - discussed sunprotective behaviors, clothing, and the use of sunscreen    Lymphedema  Venous stasis dermatitis of both lower extremities  - seeing lymphedema clinic tomorrow   -Has compression stockings that she is wearing today  - Has prescriptions for betamethasone and triamcinolone which she uses as needed, advised to continue to use as needed    Follow-up PRN.       Staff Involved:  Staff Only    Carter Celis MD   of Dermatology  Department of Dermatology  [facility]      CC:   Chief Complaint   Patient presents with     Lesion       History of Present Illness:  Ms. Ade Wilkins is a 72 year old female who presents as a new patient.    Spot on forearm was red hot and scaly and itchy, in late July. Has since calmed down.     Labs:      Physical exam:  Vitals: LMP  (LMP Unknown)   GEN: well developed, well-nourished, in no acute distress, in a pleasant mood.     SKIN: Holder phototype 1  - Focused examination of the arms and lower legs was performed.  - Flat brown macules and patches in a sun exposed areas on face and extremities, inflamed brown macule w/ resolving scale R forearm  -3+ pitting edema edema bilaterally, erythematous patch left shin and dorsal left distal foot, both of these erythematous patches are in areas of significant pitting edema  - No other lesions of concern on areas examined.     Past Medical History:   Past Medical History:   Diagnosis Date     Benign essential hypertension      Chronic kidney disease, stage 3b (H)      Diabetic retinopathy of both eyes (H)      Obesity (BMI 30-39.9)      Osteopenia      S/P TAVR  (transcatheter aortic valve replacement) 06/07/2022     Type 2 diabetes mellitus with diabetic nephropathy (H)      Past Surgical History:   Procedure Laterality Date     APPENDECTOMY       CATARACT IOL, RT/LT Bilateral      CV CORONARY ANGIOGRAM N/A 5/4/2022    Procedure: Coronary Angiogram;  Surgeon: Hector Lewis MD;  Location:  HEART CARDIAC CATH LAB     CV LEFT HEART CATH N/A 5/4/2022    Procedure: Left Heart Catheterization;  Surgeon: Hector Lewis MD;  Location:  HEART CARDIAC CATH LAB     CV PCI N/A 5/4/2022    Procedure: Percutaneous Coronary Intervention;  Surgeon: Hector Lewis MD;  Location:  HEART CARDIAC CATH LAB     CV TRANSCATHETER AORTIC VALVE REPLACEMENT-FEMORAL APPROACH N/A 6/7/2022    Procedure: Transcatheter Aortic Valve Replacement-Femoral Approach;  Surgeon: Hector Lewis MD;  Location:  HEART CARDIAC CATH LAB     GASTRIC BYPASS  2004     TONSILLECTOMY & ADENOIDECTOMY         Social History:   reports that she has never smoked. She has been exposed to tobacco smoke. She has never used smokeless tobacco. She reports that she does not drink alcohol and does not use drugs.    Family History:  Family History   Problem Relation Age of Onset     Diabetes Type 2  Mother      Glaucoma Mother      Coronary Artery Disease Mother      Abdominal Aortic Aneurysm Father      Myocardial Infarction Father      Breast Cancer Sister      Abdominal Aortic Aneurysm Brother      Bladder Cancer Brother      Diabetes Type 2  Brother      Liver Cancer Brother      Myocardial Infarction Brother      Alzheimer Disease Paternal Grandmother      Cerebrovascular Disease Paternal Grandfather      Ovarian Cancer No family hx of      Colon Cancer No family hx of        Medications:  Current Outpatient Medications   Medication Sig Dispense Refill     augmented betamethasone dipropionate (DIPROLENE-AF) 0.05 % external ointment Apply topically to affected area twice daily for 3-4  weeks then use as needed 45 g 0     topiramate (TOPAMAX) 25 MG tablet Take 3 tablets (75 mg) by mouth At Bedtime 270 tablet 1     triamcinolone (KENALOG) 0.1 % external ointment Apply topically once a week To left foot rash 30 g 0     aspirin (ASA) 81 MG chewable tablet Take 1 tablet (81 mg) by mouth daily Starting tomorrow. 30 tablet 3     calcium carbonate 600 mg-vitamin D 400 units (CALTRATE) 600-400 MG-UNIT per tablet Take 1 tablet by mouth in the morning and 1 tablet in the evening.       cetirizine (ZYRTEC) 10 MG tablet Take 1 tablet (10 mg) by mouth daily       Continuous Blood Gluc  (FREESTYLE BALWINDER 14 DAY READER) HENNA 1 each 3 times daily 1 each 1     Continuous Blood Gluc Sensor (FREESTYLE BALWINDER 14 DAY SENSOR) MISC use 1 each every 14 days 2 each 11     cyanocolbalamin (VITAMIN B-12) 1000 MCG tablet Take 1,000 mcg by mouth daily Taking one tablet 3 times per week       fluticasone (FLONASE) 50 MCG/ACT nasal spray SHAKE LIQUID AND USE 2 SPRAYS IN EACH NOSTRIL DAILY 48 g 2     furosemide (LASIX) 40 MG tablet TAKE ONE TABLET BY MOUTH ONE TIME DAILY 90 tablet 0     HUMALOG KWIKPEN 100 UNIT/ML soln INJECT  Per sliding scale UP TO 60 UNITS UNDER THE SKIN DAILY. 60 mL 3     insulin degludec (TRESIBA) 200 UNIT/ML pen Inject 34 Units Subcutaneous daily 30 mL 1     insulin pen needle (BD FCO U/F) 32G X 4 MM miscellaneous Use 4 pen needles daily or as directed. 400 each 0     irbesartan (AVAPRO) 150 MG tablet Take 1 tablet (150 mg) by mouth daily 90 tablet 3     levothyroxine (SYNTHROID/LEVOTHROID) 50 MCG tablet Take 1 tablet (50 mcg) by mouth daily 90 tablet 3     metFORMIN (GLUCOPHAGE) 1000 MG tablet TAKE ONE TABLET BY MOUTH TWICE DAILY (Patient taking differently: daily (with dinner)) 180 tablet 0     metoprolol succinate ER (TOPROL XL) 25 MG 24 hr tablet Take 1 tablet (25 mg) by mouth 2 times daily 180 tablet 3     potassium chloride ER (KLOR-CON M) 10 MEQ CR tablet Take 1 tablet (10 mEq) by mouth 2  times daily (Patient taking differently: Take 10 mEq by mouth daily) 360 tablet 3     rosuvastatin (CRESTOR) 20 MG tablet Take 0.5 tablets (10 mg) by mouth daily (Patient taking differently: Take 20 mg by mouth every other day) 90 tablet 1     ticagrelor (BRILINTA) 90 MG tablet Take 1 tablet (90 mg) by mouth 2 times daily Start this evening. 180 tablet 3     traZODone (DESYREL) 50 MG tablet take 1 to 2 tablets (50 to 100mg) by mouth nightly as needed 180 tablet 3     TRULICITY 3 MG/0.5ML SOPN Inject 3 mg Subcutaneous once a week 6 mL 3     Vitamin D3 (CHOLECALCIFEROL) 25 mcg (1000 units) tablet Take 1 tablet by mouth 2 times daily       VITRON-C  MG TABS tablet TAKE TWO TABLETS BY MOUTH TWICE DAILY  360 tablet 1     Allergies   Allergen Reactions     Norvasc [Amlodipine] Other (See Comments)     hairloss     Chlorhexidine Rash     Clonidine Headache     Doxazosin Mesylate Rash     Fexofenadine Hydrochloride Headache     Gemfibrozil Rash     Lisinopril Angioedema     Pioglitazone Hydrochloride Swelling     ankle edema

## 2023-01-09 NOTE — LETTER
1/9/2023         RE: Ade Wilkins  [address] [id number]        Dear Colleague,    Thank you for referring your patient, Ade Wilkins, to the [facility]. Please see a copy of my visit note below.    [facility] Dermatology Note    Encounter Date: Jan 9, 2023    Dermatology Problem List:  -   ______________________________________    Impression/Plan:  Ade was seen today for lesion.    Diagnoses and all orders for this visit:    Actinic skin damage  Lichen planus-like keratosis (LPLK)  - discussed sunprotective behaviors, clothing, and the use of sunscreen    Lymphedema  Venous stasis dermatitis of both lower extremities  - seeing lymphedema clinic tomorrow   -Has compression stockings that she is wearing today  - Has prescriptions for betamethasone and triamcinolone which she uses as needed, advised to continue to use as needed    Follow-up PRN.       Staff Involved:  Staff Only    Carter Celis MD   of Dermatology  Department of Dermatology  [facility]      CC:   Chief Complaint   Patient presents with     Lesion       History of Present Illness:  Ms. Ade Wilkins is a 72 year old female who presents as a new patient.    Spot on forearm was red hot and scaly and itchy, in late July. Has since calmed down.     Labs:      Physical exam:  Vitals: LMP  (LMP Unknown)   GEN: well developed, well-nourished, in no acute distress, in a pleasant mood.     SKIN: Holder phototype 1  - Focused examination of the arms and lower legs was performed.  - Flat brown macules and patches in a sun exposed areas on face and extremities, inflamed brown macule w/ resolving scale R forearm  -3+ pitting edema edema bilaterally, erythematous patch left shin and dorsal left distal foot, both of these erythematous patches are in areas of significant pitting edema  - No other lesions of concern on  areas examined.     Past Medical History:   Past Medical History:   Diagnosis Date     Benign essential hypertension      Chronic kidney disease, stage 3b (H)      Diabetic retinopathy of both eyes (H)      Obesity (BMI 30-39.9)      Osteopenia      S/P TAVR (transcatheter aortic valve replacement) 06/07/2022     Type 2 diabetes mellitus with diabetic nephropathy (H)      Past Surgical History:   Procedure Laterality Date     APPENDECTOMY       CATARACT IOL, RT/LT Bilateral      CV CORONARY ANGIOGRAM N/A 5/4/2022    Procedure: Coronary Angiogram;  Surgeon: Hector Lewis MD;  Location:  HEART CARDIAC CATH LAB     CV LEFT HEART CATH N/A 5/4/2022    Procedure: Left Heart Catheterization;  Surgeon: Hector Lewis MD;  Location: [facility] CARDIAC CATH LAB     CV PCI N/A 5/4/2022    Procedure: Percutaneous Coronary Intervention;  Surgeon: Hector Lewis MD;  Location: [facility] CARDIAC CATH LAB     CV TRANSCATHETER AORTIC VALVE REPLACEMENT-FEMORAL APPROACH N/A 6/7/2022    Procedure: Transcatheter Aortic Valve Replacement-Femoral Approach;  Surgeon: Hector Lewis MD;  Location: [facility] CARDIAC CATH LAB     GASTRIC BYPASS  2004     TONSILLECTOMY & ADENOIDECTOMY         Social History:   reports that she has never smoked. She has been exposed to tobacco smoke. She has never used smokeless tobacco. She reports that she does not drink alcohol and does not use drugs.    Family History:  Family History   Problem Relation Age of Onset     Diabetes Type 2  Mother      Glaucoma Mother      Coronary Artery Disease Mother      Abdominal Aortic Aneurysm Father      Myocardial Infarction Father      Breast Cancer Sister      Abdominal Aortic Aneurysm Brother      Bladder Cancer Brother      Diabetes Type 2  Brother      Liver Cancer Brother      Myocardial Infarction Brother      Alzheimer Disease Paternal Grandmother      Cerebrovascular Disease Paternal Grandfather      Ovarian Cancer No family hx  of      Colon Cancer No family hx of        Medications:  Current Outpatient Medications   Medication Sig Dispense Refill     augmented betamethasone dipropionate (DIPROLENE-AF) 0.05 % external ointment Apply topically to affected area twice daily for 3-4 weeks then use as needed 45 g 0     topiramate (TOPAMAX) 25 MG tablet Take 3 tablets (75 mg) by mouth At Bedtime 270 tablet 1     triamcinolone (KENALOG) 0.1 % external ointment Apply topically once a week To left foot rash 30 g 0     aspirin (ASA) 81 MG chewable tablet Take 1 tablet (81 mg) by mouth daily Starting tomorrow. 30 tablet 3     calcium carbonate 600 mg-vitamin D 400 units (CALTRATE) 600-400 MG-UNIT per tablet Take 1 tablet by mouth in the morning and 1 tablet in the evening.       cetirizine (ZYRTEC) 10 MG tablet Take 1 tablet (10 mg) by mouth daily       Continuous Blood Gluc  (FREESTYLE BALWINDER 14 DAY READER) HENNA 1 each 3 times daily 1 each 1     Continuous Blood Gluc Sensor ([facility] Retirement CommunitiesSTYLE BALWINDER 14 DAY SENSOR) MISC use 1 each every 14 days 2 each 11     cyanocolbalamin (VITAMIN B-12) 1000 MCG tablet Take 1,000 mcg by mouth daily Taking one tablet 3 times per week       fluticasone (FLONASE) 50 MCG/ACT nasal spray SHAKE LIQUID AND USE 2 SPRAYS IN EACH NOSTRIL DAILY 48 g 2     furosemide (LASIX) 40 MG tablet TAKE ONE TABLET BY MOUTH ONE TIME DAILY 90 tablet 0     HUMALOG KWIKPEN 100 UNIT/ML soln INJECT  Per sliding scale UP TO 60 UNITS UNDER THE SKIN DAILY. 60 mL 3     insulin degludec (TRESIBA) 200 UNIT/ML pen Inject 34 Units Subcutaneous daily 30 mL 1     insulin pen needle (BD FCO U/F) 32G X 4 MM miscellaneous Use 4 pen needles daily or as directed. 400 each 0     irbesartan (AVAPRO) 150 MG tablet Take 1 tablet (150 mg) by mouth daily 90 tablet 3     levothyroxine (SYNTHROID/LEVOTHROID) 50 MCG tablet Take 1 tablet (50 mcg) by mouth daily 90 tablet 3     metFORMIN (GLUCOPHAGE) 1000 MG tablet TAKE ONE TABLET BY MOUTH TWICE DAILY (Patient taking  differently: daily (with dinner)) 180 tablet 0     metoprolol succinate ER (TOPROL XL) 25 MG 24 hr tablet Take 1 tablet (25 mg) by mouth 2 times daily 180 tablet 3     potassium chloride ER (KLOR-CON M) 10 MEQ CR tablet Take 1 tablet (10 mEq) by mouth 2 times daily (Patient taking differently: Take 10 mEq by mouth daily) 360 tablet 3     rosuvastatin (CRESTOR) 20 MG tablet Take 0.5 tablets (10 mg) by mouth daily (Patient taking differently: Take 20 mg by mouth every other day) 90 tablet 1     ticagrelor (BRILINTA) 90 MG tablet Take 1 tablet (90 mg) by mouth 2 times daily Start this evening. 180 tablet 3     traZODone (DESYREL) 50 MG tablet take 1 to 2 tablets (50 to 100mg) by mouth nightly as needed 180 tablet 3     TRULICITY 3 MG/0.5ML SOPN Inject 3 mg Subcutaneous once a week 6 mL 3     Vitamin D3 (CHOLECALCIFEROL) 25 mcg (1000 units) tablet Take 1 tablet by mouth 2 times daily       VITRON-C  MG TABS tablet TAKE TWO TABLETS BY MOUTH TWICE DAILY  360 tablet 1     Allergies   Allergen Reactions     Norvasc [Amlodipine] Other (See Comments)     hairloss     Chlorhexidine Rash     Clonidine Headache     Doxazosin Mesylate Rash     Fexofenadine Hydrochloride Headache     Gemfibrozil Rash     Lisinopril Angioedema     Pioglitazone Hydrochloride Swelling     ankle edema                   Again, thank you for allowing me to participate in the care of your patient.        Sincerely,        Carter Celis MD

## 2023-01-10 NOTE — PROGRESS NOTES
"Outcome for 01/10/23 2:38 PM :Sent patient [employer] message asking them to upload their BG data   Kaia Kali  Outcome for 01/11/23 11:01 AM :Patient will upload her data to [facility] on Monday morning.  Kaia Bass    HPI:   Rachael is a 72 year old female with history morbid obesity S/P Gastric bypass surgery, hypothyroidism, CKD, CAD, s/p stent placement and valve replacement for aortic stenosis 2022. here for follow up of type 2 diabetes.  Her diabetes mellitus is complicated by diabetic neuropathy, mild nonproliferative retinopathy and nephropathy.  She also follows with . She has had type 2 Diabetes since her early 20s.  Recently she has been dealing with lymphedema and is being seen in the lymphedema clinic 3 times a week for 3 weeks.  She is retaining a lot of fluid in he knees.  She has trouble sleeping, so she often sleeps in a chair, which she feels worsens things.  Insomnia- can't fall asleep.  Has days and nights \"mixed up.\"  She gets no exercise in the winter. She has been dealing with a frozen shoulder for a few months. She did 4 weeks of PT. Glucose has been up and down lately.  She stopped jardiance since her last visit.  It was quite expensive.     Rachael is currently taking the following for her diabetes:   Tresiba 30 units at night.   Humalog- depends on what she is eating 2-3 units/15g carb.    Dulaglutide 3 mg weekly on Sundays. $1000 for a 3 mo supply copay.   Metformin 1000 mg daily.     Previous treatments:   Jardiance 10 mg daily- was expensive.    Blood pressure managed by cardiology. Very high today.     She had her \"days and nights mixed up.\"  She is up until 4am.  Up at 9am to take her meds and eats breakfast (cream of wheat or oatmeal), pumpernickel rye toast then falls back asleep in her chair and sleeps till 3pm then is up all night. She has a late lunch- usually a sandwich or can of soup. Eats a lot of sandwiches.   Dinner- around 5-6pm, a lot of takeout. <idnight snack- 11pm- " toast.  Hard to cook for just herself.     Recent glucose is as follows:                 She otherwise is generally feeling well and has no other concerns.       Past Medical History:   Diagnosis Date     Benign essential hypertension      Chronic kidney disease, stage 3b (H)      Diabetic retinopathy of both eyes (H)      Obesity (BMI 30-39.9)      Osteopenia      S/P TAVR (transcatheter aortic valve replacement) 06/07/2022     Type 2 diabetes mellitus with diabetic nephropathy (H)        Past Surgical History:   Procedure Laterality Date     APPENDECTOMY       CATARACT IOL, RT/LT Bilateral      CV CORONARY ANGIOGRAM N/A 5/4/2022    Procedure: Coronary Angiogram;  Surgeon: Hector Lewis MD;  Location:  HEART CARDIAC CATH LAB     CV LEFT HEART CATH N/A 5/4/2022    Procedure: Left Heart Catheterization;  Surgeon: Hector Lewis MD;  Location: [facility] CARDIAC CATH LAB     CV PCI N/A 5/4/2022    Procedure: Percutaneous Coronary Intervention;  Surgeon: Hector Lewis MD;  Location: [facility] CARDIAC CATH LAB     CV TRANSCATHETER AORTIC VALVE REPLACEMENT-FEMORAL APPROACH N/A 6/7/2022    Procedure: Transcatheter Aortic Valve Replacement-Femoral Approach;  Surgeon: Hector Lewis MD;  Location:  HEART CARDI[facility] CATH LAB     GASTRIC BYPASS  2004     TONSILLECTOMY & ADENOIDECTOMY         Family History   Problem Relation Age of Onset     Diabetes Type 2  Mother      Glaucoma Mother      Coronary Artery Disease Mother      Abdominal Aortic Aneurysm Father      Myocardial Infarction Father      Breast Cancer Sister      Abdominal Aortic Aneurysm Brother      Bladder Cancer Brother      Diabetes Type 2  Brother      Liver Cancer Brother      Myocardial Infarction Brother      Alzheimer Disease Paternal Grandmother      Cerebrovascular Disease Paternal Grandfather      Ovarian Cancer No family hx of      Colon Cancer No family hx of        Social History     Socioeconomic History     Marital  status:      Spouse name: Not on file     Number of children: 0     Years of education: Not on file     Highest education level: Not on file   Occupational History     Employer: [employer],[address] [employer]   Social Needs     Financial resource strain: Not on file     Food insecurity:     Worry: Not on file     Inability: Not on file     Transportation needs:     Medical: Not on file     Non-medical: Not on file   Tobacco Use     Smoking status: Never Smoker     Smokeless tobacco: Never Used   Substance and Sexual Activity     Alcohol use: No     Drug use: No     Sexual activity: Never     Partners: Male   Lifestyle     Physical activity:     Days per week: Not on file     Minutes per session: Not on file     Stress: Not on file   Relationships     Social connections:     Talks on phone: Not on file     Gets together: Not on file     Attends Taoist service: Not on file     Active member of club or organization: Not on file     Attends meetings of clubs or organizations: Not on file     Relationship status: Not on file     Intimate partner violence:     Fear of current or ex partner: Not on file     Emotionally abused: Not on file     Physically abused: Not on file     Forced sexual activity: Not on file   Other Topics Concern      Service Not Asked     Blood Transfusions Not Asked     Caffeine Concern Yes     Comment: 20 oz daily     Occupational Exposure Not Asked     Hobby Hazards Not Asked     Sleep Concern Not Asked     Stress Concern Not Asked     Weight Concern Not Asked     Special Diet Not Asked     Back Care Not Asked     Exercise No     Bike Helmet Not Asked     Seat Belt Yes     Self-Exams Yes     Parent/sibling w/ CABG, MI or angioplasty before 65F 55M? Not Asked   Social History Narrative    Living arrangements - the patient lives alone.   Social Hx: Lives with her sister and her sister's boyfriend.  . 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      Current Outpatient Medications   Medication     aspirin (ASA) 81 MG chewable tablet     augmented betamethasone dipropionate (DIPROLENE-AF) 0.05 % external ointment     calcium carbonate 600 mg-vitamin D 400 units (CALTRATE) 600-400 MG-UNIT per tablet     cetirizine (ZYRTEC) 10 MG tablet     Continuous Blood Gluc  (FREESTYLE BALWINDER 14 DAY READER) HENNA     Continuous Blood Gluc Sensor (FREESTYLE BALWINDER 14 DAY SENSOR) MISC     cyanocolbalamin (VITAMIN B-12) 1000 MCG tablet     fluticasone (FLONASE) 50 MCG/ACT nasal spray     furosemide (LASIX) 40 MG tablet     HUMALOG KWIKPEN 100 UNIT/ML soln     insulin degludec (TRESIBA) 200 UNIT/ML pen     insulin pen needle (BD FCO U/F) 32G X 4 MM miscellaneous     irbesartan (AVAPRO) 150 MG tablet     levothyroxine (SYNTHROID/LEVOTHROID) 50 MCG tablet     metFORMIN (GLUCOPHAGE) 1000 MG tablet     metoprolol succinate ER (TOPROL XL) 25 MG 24 hr tablet     potassium chloride ER (KLOR-CON M) 10 MEQ CR tablet     rosuvastatin (CRESTOR) 20 MG tablet     ticagrelor (BRILINTA) 90 MG tablet     topiramate (TOPAMAX) 25 MG tablet     traZODone (DESYREL) 50 MG tablet     triamcinolone (KENALOG) 0.1 % external ointment     TRULICITY 3 MG/0.5ML SOPN     Vitamin D3 (CHOLECALCIFEROL) 25 mcg (1000 units) tablet     VITRON-C  MG TABS tablet     No current facility-administered medications for this visit.          Allergies   Allergen Reactions     Norvasc [Amlodipine] Other (See Comments)     hairloss     Chlorhexidine Rash     Clonidine Headache     Doxazosin Mesylate Rash     Fexofenadine Hydrochloride Headache     Gemfibrozil Rash     Lisinopril Angioedema     Pioglitazone Hydrochloride Swelling     ankle edema     Physical Exam  BP (!) 193/54 (BP Location: Left arm, Patient Position: Sitting, Cuff Size: Adult Regular)   Pulse 69   Wt 90.3 kg (199 lb)   LMP  (LMP Unknown)   BMI 36.99 kg/m    GENERAL:  Alert and oriented X3, NAD, well dressed, answering questions  appropriately, appears stated age.  HEENT: OP clear, no lymphadenopathy, no thyromegaly, non-tender, no exophthalmus, no proptosis, EOMI, no lid lag, no retraction  EXTREMITIES: 2+ edema, up her shins  NEUROLOGY: reduced monofilament sensation    RESULTS  Lab Results   Component Value Date    A1C 7.9 (H) 08/09/2022    A1C 8.0 (H) 06/08/2022    A1C 7.9 (H) 05/04/2022    A1C 7.6 (H) 11/24/2021    A1C 8.0 (H) 08/27/2021    A1C 8.1 (H) 01/08/2021    A1C 8.1 (H) 10/09/2020    A1C 8.7 (A) 08/07/2020    A1C 8.4 (H) 03/27/2020    A1C 8.7 (H) 04/23/2019    HEMOGLOBINA1 8.6 01/16/2023    HEMOGLOBINA1 8.1 (A) 01/07/2020    HEMOGLOBINA1 8.7 (A) 04/22/2019    HEMOGLOBINA1 8.1 (A) 12/10/2018    HEMOGLOBINA1 8.1 (A) 08/30/2017       TSH   Date Value Ref Range Status   08/09/2022 1.83 0.40 - 4.00 mU/L Final   08/27/2021 2.37 0.40 - 4.00 mU/L Final   03/27/2020 3.47 0.40 - 4.00 mU/L Final   10/14/2019 2.54 0.40 - 4.00 mU/L Final   04/23/2019 4.06 (H) 0.40 - 4.00 mU/L Final   05/24/2018 2.54 0.40 - 4.00 mU/L Final   03/12/2018 4.78 (H) 0.40 - 4.00 mU/L Final     T4 Free   Date Value Ref Range Status   04/23/2019 1.10 0.76 - 1.46 ng/dL Final   03/12/2018 1.17 0.76 - 1.46 ng/dL Final   10/17/2016 1.17 0.76 - 1.46 ng/dL Final   05/05/2010 1.05 0.70 - 1.85 ng/dL Final   09/13/2007 1.00 0.70 - 1.85 ng/dL Final       ALT   Date Value Ref Range Status   06/08/2022 63 (H) 0 - 50 U/L Final   06/01/2022 160 (H) 0 - 50 U/L Final   04/13/2021 38 0 - 50 U/L Final   03/27/2020 28 0 - 50 U/L Final   ]    Recent Labs   Lab Test 08/09/22  0844 06/08/22  1056 06/17/16  0750 05/22/15  0848   CHOL 104 67   < > 175   HDL 46* 28*   < > 32*   LDL 42 17   < > 76   TRIG 81 109   < > 334*   CHOLHDLRATIO  --   --   --  5.5*    < > = values in this interval not displayed.       Lab Results   Component Value Date     07/07/2022     04/13/2021      Lab Results   Component Value Date    POTASSIUM 4.1 08/09/2022    POTASSIUM 4.2 04/13/2021     Lab  Results   Component Value Date    CHLORIDE 107 07/07/2022    CHLORIDE 110 04/13/2021     Lab Results   Component Value Date    RASHEEDA 9.5 07/07/2022    RASHEEDA 9.2 04/13/2021     Lab Results   Component Value Date    CO2 23 07/07/2022    CO2 28 04/13/2021     Lab Results   Component Value Date    BUN 20 08/09/2022    BUN 29 04/13/2021     Lab Results   Component Value Date    CR 1.08 08/09/2022    CR 1.38 04/13/2021       GFR Estimate   Date Value Ref Range Status   08/09/2022 55 (L) >60 mL/min/1.73m2 Final     Comment:     Effective December 21, 2021 eGFRcr in adults is calculated using the 2021 CKD-EPI creatinine equation which includes age and gender (Nicholas et al., [facility], DOI: 10.1056/RCDPan3674201)   07/07/2022 32 (L) >60 mL/min/1.73m2 Final     Comment:     Effective December 21, 2021 eGFRcr in adults is calculated using the 2021 CKD-EPI creatinine equation which includes age and gender (Nicholas et al., [place], DOI: 10.1056/HNEOuo1842002)   06/10/2022 37 (L) >60 mL/min/1.73m2 Final     Comment:     Effective December 21, 2021 eGFRcr in adults is calculated using the 2021 CKD-EPI creatinine equation which includes age and gender (Nicholas et al., [place], DOI: 10.1056/VANIsw7640416)   04/13/2021 38 (L) >60 mL/min/[1.73_m2] Final     Comment:     Non  GFR Calc  Starting 12/18/2018, serum creatinine based estimated GFR (eGFR) will be   calculated using the Chronic Kidney Disease Epidemiology Collaboration   (CKD-EPI) equation.     03/24/2021 38 (L) >60 mL/min/[1.73_m2] Final     Comment:     Non  GFR Calc  Starting 12/18/2018, serum creatinine based estimated GFR (eGFR) will be   calculated using the Chronic Kidney Disease Epidemiology Collaboration   (CKD-EPI) equation.     11/13/2020 45 (L) >60 mL/min/[1.73_m2] Final     Comment:     Non  GFR Calc  Starting 12/18/2018, serum creatinine based estimated GFR (eGFR) will be   calculated using the Chronic Kidney Disease Epidemiology  Collaboration   (CKD-EPI) equation.       GFR, ESTIMATED POCT   Date Value Ref Range Status   05/17/2022 32 (L) >60 mL/min/1.73m2 Final     GFR Estimate If Black   Date Value Ref Range Status   04/13/2021 45 (L) >60 mL/min/[1.73_m2] Final     Comment:      GFR Calc  Starting 12/18/2018, serum creatinine based estimated GFR (eGFR) will be   calculated using the Chronic Kidney Disease Epidemiology Collaboration   (CKD-EPI) equation.     03/24/2021 44 (L) >60 mL/min/[1.73_m2] Final     Comment:      GFR Calc  Starting 12/18/2018, serum creatinine based estimated GFR (eGFR) will be   calculated using the Chronic Kidney Disease Epidemiology Collaboration   (CKD-EPI) equation.     11/13/2020 52 (L) >60 mL/min/[1.73_m2] Final     Comment:      GFR Calc  Starting 12/18/2018, serum creatinine based estimated GFR (eGFR) will be   calculated using the Chronic Kidney Disease Epidemiology Collaboration   (CKD-EPI) equation.         Lab Results   Component Value Date    MICROL 11 08/09/2022    MICROL 35 10/09/2020     No results found for: MICROALBUMIN  No results found for: CPEPT, GADAB, ISCAB    Vitamin B12   Date Value Ref Range Status   08/09/2022 2,687 (H) 232 - 1,245 pg/mL Final   10/17/2016 2,075 (H) 193 - 986 pg/mL Final     Comment:     Interp: [id number] = Normal   05/12/2012 742 >210 pg/mL Final     Comment:     Interp: [id number] = Normal   ]    Most recent eye exam date: : Not Found   Assessment/Plan:     1.  Type 2 diabetes- Kaia's glucose is running high.  A1c is up to 8.6%.  She has been quite sedentary, which she hopes to change. She feels like she could  on her walking, and we discussed how this could improve her glucose control.  Also discussed maximizing trulicity to 4.5 mg weekly.  She is in favor of this.  No other changes today, but I did mention that she may need to increase her mealtime insulin in the future if glucose remains elevated post-meal.  Would  recommend f/u with DM education to review diet and diabetes.    2. Risk factors:     Retinopathy: Yes.  She also has macular edema and follows with ophthalmologist regularly.   Nephropathy:  BP is very high today.  Asked her to reach out to cardiologist for medication adjustments.  Microalbuminuria has resolved.  She does have CKD.  GFR is 55.   Neuropathy: Some tingling, numbness.  Tolerable.    Feet: OK, no ulcers.   Lipids:  On high dose statin    3. Health maintenance- encouraged her to schedule visit with PCP for annual check up.     4.  F/U in 3 months as planned with Dr. Hoover, and see me in 6 months.      42 minutes spent on the date of the encounter doing chart review, review of test results, review and interpretation of glucose sensor data, patient visit and documentation, counseling/coordination of care, and discussion of follow up plan for worsening hyper and hypoglycemia.  The patient understood and is satisfied with today's visit.        Roxanne Masterson PA-C, MPAS   [facility]  Department of Medicine  Division of Endocrinology and Diabetes

## 2023-01-12 ENCOUNTER — TELEPHONE (OUTPATIENT)
Dept: ENDOCRINOLOGY | Facility: CLINIC | Age: 73
End: 2023-01-12

## 2023-01-12 NOTE — TELEPHONE ENCOUNTER
Appointment reminder made via [employer] correspondence.  Patient will upload her [facility] data on Monday morning.  Kaia Bass

## 2023-01-13 ENCOUNTER — HOSPITAL ENCOUNTER (OUTPATIENT)
Dept: OCCUPATIONAL THERAPY | Facility: CLINIC | Age: 73
Discharge: HOME | End: 2023-01-13
Attending: PHYSICIAN ASSISTANT
Payer: COMMERCIAL

## 2023-01-13 DIAGNOSIS — I10 BENIGN ESSENTIAL HYPERTENSION: ICD-10-CM

## 2023-01-13 DIAGNOSIS — E66.9 OBESITY (BMI 30-39.9): ICD-10-CM

## 2023-01-13 DIAGNOSIS — E78.00 PURE HYPERCHOLESTEROLEMIA: ICD-10-CM

## 2023-01-13 DIAGNOSIS — I89.0 LYMPHEDEMA: Primary | ICD-10-CM

## 2023-01-13 DIAGNOSIS — I51.7 LVH (LEFT VENTRICULAR HYPERTROPHY): ICD-10-CM

## 2023-01-13 DIAGNOSIS — I25.10 CORONARY ARTERY DISEASE INVOLVING NATIVE CORONARY ARTERY OF NATIVE HEART WITHOUT ANGINA PECTORIS: ICD-10-CM

## 2023-01-13 DIAGNOSIS — Z95.2 S/P TAVR (TRANSCATHETER AORTIC VALVE REPLACEMENT): ICD-10-CM

## 2023-01-13 PROCEDURE — 97140 MANUAL THERAPY 1/> REGIONS: CPT | Mod: GO | Performed by: OCCUPATIONAL THERAPIST

## 2023-01-13 PROCEDURE — 97165 OT EVAL LOW COMPLEX 30 MIN: CPT | Mod: GO | Performed by: OCCUPATIONAL THERAPIST

## 2023-01-13 ASSESSMENT — ENCOUNTER SYMPTOMS
BACK PAIN: 1
NECK MASS: 0
HOARSE VOICE: 1
SPUTUM PRODUCTION: 1
COUGH DISTURBING SLEEP: 0
NECK PAIN: 1
POSTURAL DYSPNEA: 1
WHEEZING: 0
COUGH: 1
TROUBLE SWALLOWING: 1
SORE THROAT: 0
STIFFNESS: 1
SMELL DISTURBANCE: 0
JOINT SWELLING: 1
DYSPNEA ON EXERTION: 1
BRUISES/BLEEDS EASILY: 1
MUSCLE WEAKNESS: 1
SINUS CONGESTION: 1
SNORES LOUDLY: 0
TASTE DISTURBANCE: 0
MYALGIAS: 1
MUSCLE CRAMPS: 0
SHORTNESS OF BREATH: 1
SINUS PAIN: 1
ARTHRALGIAS: 1
HEMOPTYSIS: 0
SWOLLEN GLANDS: 0

## 2023-01-16 ENCOUNTER — OFFICE VISIT (OUTPATIENT)
Dept: ENDOCRINOLOGY | Facility: CLINIC | Age: 73
End: 2023-01-16
Payer: COMMERCIAL

## 2023-01-16 VITALS
SYSTOLIC BLOOD PRESSURE: 193 MMHG | WEIGHT: 199 LBS | DIASTOLIC BLOOD PRESSURE: 54 MMHG | BODY MASS INDEX: 36.99 KG/M2 | HEART RATE: 69 BPM

## 2023-01-16 DIAGNOSIS — E11.40 POORLY CONTROLLED TYPE 2 DIABETES MELLITUS WITH NEUROPATHY (H): Primary | ICD-10-CM

## 2023-01-16 DIAGNOSIS — G43.719 INTRACTABLE CHRONIC MIGRAINE WITHOUT AURA AND WITHOUT STATUS MIGRAINOSUS: ICD-10-CM

## 2023-01-16 DIAGNOSIS — E11.65 POORLY CONTROLLED TYPE 2 DIABETES MELLITUS WITH NEUROPATHY (H): Primary | ICD-10-CM

## 2023-01-16 LAB — HBA1C MFR BLD: 8.6 % (ref 4.3–?)

## 2023-01-16 PROCEDURE — 99215 OFFICE O/P EST HI 40 MIN: CPT | Performed by: PHYSICIAN ASSISTANT

## 2023-01-16 PROCEDURE — 83036 HEMOGLOBIN GLYCOSYLATED A1C: CPT | Performed by: PHYSICIAN ASSISTANT

## 2023-01-16 RX ORDER — DULAGLUTIDE 4.5 MG/.5ML
4.5 INJECTION, SOLUTION SUBCUTANEOUS WEEKLY
Qty: 6 ML | Refills: 3 | Status: SHIPPED | OUTPATIENT
Start: 2023-01-16 | End: 2024-03-24

## 2023-01-16 NOTE — PATIENT INSTRUCTIONS
Increase Trulicity to 4.5 mg weekly.     Increase walking- this will help you lower your glucose.     Please let me know if you are having low blood sugars less than 70 or over 250 mg/dL.      If you have concerns, please send me a [email or web address] message M-Th, call the clinic at [phone], or call [phone] after hours/weekends and ask to speak with the endocrinologist on call.

## 2023-01-16 NOTE — LETTER
"1/16/2023       RE: Ade Wilkins  [address] [facility] [id number]     Dear Colleague,    Thank you for referring your patient, Ade Wilkins, to the [facility] ENDOCRINOLOGY [facility]INIC [place] at [facility]. Please see a copy of my visit note below.    Outcome for 01/10/23 2:38 PM :Sent patient [employer] message asking them to upload their BG data   Kaia Bass  Outcome for 01/11/23 11:01 AM :Patient will upload her data to [facility] on Monday morning.  Kaia Bass    HPI:   Rachael is a 72 year old female with history morbid obesity S/P Gastric bypass surgery, hypothyroidism, CKD, CAD, s/p stent placement and valve replacement for aortic stenosis 2022. here for follow up of type 2 diabetes.  Her diabetes mellitus is complicated by diabetic neuropathy, mild nonproliferative retinopathy and nephropathy.  She also follows with . She has had type 2 Diabetes since her early 20s.  Recently she has been dealing with lymphedema and is being seen in the lymphedema clinic 3 times a week for 3 weeks.  She is retaining a lot of fluid in he knees.  She has trouble sleeping, so she often sleeps in a chair, which she feels worsens things.  Insomnia- can't fall asleep.  Has days and nights \"mixed up.\"  She gets no exercise in the winter. She has been dealing with a frozen shoulder for a few months. She did 4 weeks of PT. Glucose has been up and down lately.  She stopped jardiance since her last visit.  It was quite expensive.     Rachael is currently taking the following for her diabetes:   Tresiba 30 units at night.   Humalog- depends on what she is eating 2-3 units/15g carb.    Dulaglutide 3 mg weekly on Sundays. $1000 for a 3 mo supply copay.   Metformin 1000 mg daily.     Previous treatments:   Jardiance 10 mg daily- was expensive.    Blood pressure managed by cardiology. Very high today.     She had her \"days and nights mixed up.\"  She is up until 4am.  Up " at 9am to take her meds and eats breakfast (cream of wheat or oatmeal), pumpernickel rye toast then falls back asleep in her chair and sleeps till 3pm then is up all night. She has a late lunch- usually a sandwich or can of soup. Eats a lot of sandwiches.   Dinner- around 5-6pm, a lot of takeout. <idnight snack- 11pm- toast.  Hard to cook for just herself.     Recent glucose is as follows:                 She otherwise is generally feeling well and has no other concerns.       Past Medical History:   Diagnosis Date     Benign essential hypertension      Chronic kidney disease, stage 3b (H)      Diabetic retinopathy of both eyes (H)      Obesity (BMI 30-39.9)      Osteopenia      S/P TAVR (transcatheter aortic valve replacement) 06/07/2022     Type 2 diabetes mellitus with diabetic nephropathy (H)        Past Surgical History:   Procedure Laterality Date     APPENDECTOMY       CATARACT IOL, RT/LT Bilateral      CV CORONARY ANGIOGRAM N/A 5/4/2022    Procedure: Coronary Angiogram;  Surgeon: eHctor Lewis MD;  Location:  HEART CARDIAC CATH LAB     CV LEFT HEART CATH N/A 5/4/2022    Procedure: Left Heart Catheterization;  Surgeon: Hector Lewis MD;  Location: [facility] CARDIAC CATH LAB     CV PCI N/A 5/4/2022    Procedure: Percutaneous Coronary Intervention;  Surgeon: Hector Lewis MD;  Location: [facility] CARDIAC CATH LAB     CV TRANSCATHETER AORTIC VALVE REPLACEMENT-FEMORAL APPROACH N/A 6/7/2022    Procedure: Transcatheter Aortic Valve Replacement-Femoral Approach;  Surgeon: Hector Lewis MD;  Location:  HEART CARDIAC CATH LAB     GASTRIC BYPASS  2004     TONSILLECTOMY & ADENOIDECTOMY         Family History   Problem Relation Age of Onset     Diabetes Type 2  Mother      Glaucoma Mother      Coronary Artery Disease Mother      Abdominal Aortic Aneurysm Father      Myocardial Infarction Father      Breast Cancer Sister      Abdominal Aortic Aneurysm Brother      Bladder Cancer  Brother      Diabetes Type 2  Brother      Liver Cancer Brother      Myocardial Infarction Brother      Alzheimer Disease Paternal Grandmother      Cerebrovascular Disease Paternal Grandfather      Ovarian Cancer No family hx of      Colon Cancer No family hx of        Social History     Socioeconomic History     Marital status:      Spouse name: Not on file     Number of children: 0     Years of education: Not on file     Highest education level: Not on file   Occupational History     Employer: [employer],[address]   Social Needs     Financial resource strain: Not on file     Food insecurity:     Worry: Not on file     Inability: Not on file     Transportation needs:     Medical: Not on file     Non-medical: Not on file   Tobacco Use     Smoking status: Never Smoker     Smokeless tobacco: Never Used   Substance and Sexual Activity     Alcohol use: No     Drug use: No     Sexual activity: Never     Partners: Male   Lifestyle     Physical activity:     Days per week: Not on file     Minutes per session: Not on file     Stress: Not on file   Relationships     Social connections:     Talks on phone: Not on file     Gets together: Not on file     Attends Hindu service: Not on file     Active member of club or organization: Not on file     Attends meetings of clubs or organizations: Not on file     Relationship status: Not on file     Intimate partner violence:     Fear of current or ex partner: Not on file     Emotionally abused: Not on file     Physically abused: Not on file     Forced sexual activity: Not on file   Other Topics Concern      Service Not Asked     Blood Transfusions Not Asked     Caffeine Concern Yes     Comment: 20 oz daily     Occupational Exposure Not Asked     Hobby Hazards Not Asked     Sleep Concern Not Asked     Stress Concern Not Asked     Weight Concern Not Asked     Special Diet Not Asked     Back Care Not Asked     Exercise No     Bike Helmet Not  Asked     Seat Belt Yes     Self-Exams Yes     Parent/sibling w/ CABG, MI or angioplasty before 65F 55M? Not Asked   Social History Narrative    Living arrangements - the patient lives alone.   Social Hx: Lives with her sister and her sister's boyfriend.  . 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     Current Outpatient Medications   Medication     aspirin (ASA) 81 MG chewable tablet     augmented betamethasone dipropionate (DIPROLENE-AF) 0.05 % external ointment     calcium carbonate 600 mg-vitamin D 400 units (CALTRATE) 600-400 MG-UNIT per tablet     cetirizine (ZYRTEC) 10 MG tablet     Continuous Blood Gluc  (FREESTYLE BALWINDER 14 DAY READER) HENNA     Continuous Blood Gluc Sensor (FREESTYLE BALWINDER 14 DAY SENSOR) MISC     cyanocolbalamin (VITAMIN B-12) 1000 MCG tablet     fluticasone (FLONASE) 50 MCG/ACT nasal spray     furosemide (LASIX) 40 MG tablet     HUMALOG KWIKPEN 100 UNIT/ML soln     insulin degludec (TRESIBA) 200 UNIT/ML pen     insulin pen needle (BD FCO U/F) 32G X 4 MM miscellaneous     irbesartan (AVAPRO) 150 MG tablet     levothyroxine (SYNTHROID/LEVOTHROID) 50 MCG tablet     metFORMIN (GLUCOPHAGE) 1000 MG tablet     metoprolol succinate ER (TOPROL XL) 25 MG 24 hr tablet     potassium chloride ER (KLOR-CON M) 10 MEQ CR tablet     rosuvastatin (CRESTOR) 20 MG tablet     ticagrelor (BRILINTA) 90 MG tablet     topiramate (TOPAMAX) 25 MG tablet     traZODone (DESYREL) 50 MG tablet     triamcinolone (KENALOG) 0.1 % external ointment     TRULICITY 3 MG/0.5ML SOPN     Vitamin D3 (CHOLECALCIFEROL) 25 mcg (1000 units) tablet     VITRON-C  MG TABS tablet     No current facility-administered medications for this visit.          Allergies   Allergen Reactions     Norvasc [Amlodipine] Other (See Comments)     hairloss     Chlorhexidine Rash     Clonidine Headache     Doxazosin Mesylate Rash     Fexofenadine Hydrochloride Headache     Gemfibrozil Rash     Lisinopril Angioedema      Pioglitazone Hydrochloride Swelling     ankle edema     Physical Exam  BP (!) 193/54 (BP Location: Left arm, Patient Position: Sitting, Cuff Size: Adult Regular)   Pulse 69   Wt 90.3 kg (199 lb)   LMP  (LMP Unknown)   BMI 36.99 kg/m    GENERAL:  Alert and oriented X3, NAD, well dressed, answering questions appropriately, appears stated age.  HEENT: OP clear, no lymphadenopathy, no thyromegaly, non-tender, no exophthalmus, no proptosis, EOMI, no lid lag, no retraction  EXTREMITIES: 2+ edema, up her shins  NEUROLOGY: reduced monofilament sensation    RESULTS  Lab Results   Component Value Date    A1C 7.9 (H) 08/09/2022    A1C 8.0 (H) 06/08/2022    A1C 7.9 (H) 05/04/2022    A1C 7.6 (H) 11/24/2021    A1C 8.0 (H) 08/27/2021    A1C 8.1 (H) 01/08/2021    A1C 8.1 (H) 10/09/2020    A1C 8.7 (A) 08/07/2020    A1C 8.4 (H) 03/27/2020    A1C 8.7 (H) 04/23/2019    HEMOGLOBINA1 8.6 01/16/2023    HEMOGLOBINA1 8.1 (A) 01/07/2020    HEMOGLOBINA1 8.7 (A) 04/22/2019    HEMOGLOBINA1 8.1 (A) 12/10/2018    HEMOGLOBINA1 8.1 (A) 08/30/2017       TSH   Date Value Ref Range Status   08/09/2022 1.83 0.40 - 4.00 mU/L Final   08/27/2021 2.37 0.40 - 4.00 mU/L Final   03/27/2020 3.47 0.40 - 4.00 mU/L Final   10/14/2019 2.54 0.40 - 4.00 mU/L Final   04/23/2019 4.06 (H) 0.40 - 4.00 mU/L Final   05/24/2018 2.54 0.40 - 4.00 mU/L Final   03/12/2018 4.78 (H) 0.40 - 4.00 mU/L Final     T4 Free   Date Value Ref Range Status   04/23/2019 1.10 0.76 - 1.46 ng/dL Final   03/12/2018 1.17 0.76 - 1.46 ng/dL Final   10/17/2016 1.17 0.76 - 1.46 ng/dL Final   05/05/2010 1.05 0.70 - 1.85 ng/dL Final   09/13/2007 1.00 0.70 - 1.85 ng/dL Final       ALT   Date Value Ref Range Status   06/08/2022 63 (H) 0 - 50 U/L Final   06/01/2022 160 (H) 0 - 50 U/L Final   04/13/2021 38 0 - 50 U/L Final   03/27/2020 28 0 - 50 U/L Final   ]    Recent Labs   Lab Test 08/09/22  0844 06/08/22  1056 06/17/16  0750 05/22/15  0848   CHOL 104 67   < > 175   HDL 46* 28*   < > 32*   LDL 42  17   < > 76   TRIG 81 109   < > 334*   CHOLHDLRATIO  --   --   --  5.5*    < > = values in this interval not displayed.       Lab Results   Component Value Date     07/07/2022     04/13/2021      Lab Results   Component Value Date    POTASSIUM 4.1 08/09/2022    POTASSIUM 4.2 04/13/2021     Lab Results   Component Value Date    CHLORIDE 107 07/07/2022    CHLORIDE 110 04/13/2021     Lab Results   Component Value Date    RASHEEDA 9.5 07/07/2022    RASHEEDA 9.2 04/13/2021     Lab Results   Component Value Date    CO2 23 07/07/2022    CO2 28 04/13/2021     Lab Results   Component Value Date    BUN 20 08/09/2022    BUN 29 04/13/2021     Lab Results   Component Value Date    CR 1.08 08/09/2022    CR 1.38 04/13/2021       GFR Estimate   Date Value Ref Range Status   08/09/2022 55 (L) >60 mL/min/1.73m2 Final     Comment:     Effective December 21, 2021 eGFRcr in adults is calculated using the 2021 CKD-EPI creatinine equation which includes age and gender ( et al., NEJM, DOI: 10.1056/PTGSia3183594)   07/07/2022 32 (L) >60 mL/min/1.73m2 Final     Comment:     Effective December 21, 2021 eGFRcr in adults is calculated using the 2021 CKD-EPI creatinine equation which includes age and gender (Nicholas et al., NEJM, DOI: 10.1056/FEFRkf7806741)   06/10/2022 37 (L) >60 mL/min/1.73m2 Final     Comment:     Effective December 21, 2021 eGFRcr in adults is calculated using the 2021 CKD-EPI creatinine equation which includes age and gender ( et al., NEJ, DOI: 10.1056/CDYHuy1046965)   04/13/2021 38 (L) >60 mL/min/[1.73_m2] Final     Comment:     Non  GFR Calc  Starting 12/18/2018, serum creatinine based estimated GFR (eGFR) will be   calculated using the Chronic Kidney Disease Epidemiology Collaboration   (CKD-EPI) equation.     03/24/2021 38 (L) >60 mL/min/[1.73_m2] Final     Comment:     Non  GFR Calc  Starting 12/18/2018, serum creatinine based estimated GFR (eGFR) will be   calculated using  the Chronic Kidney Disease Epidemiology Collaboration   (CKD-EPI) equation.     11/13/2020 45 (L) >60 mL/min/[1.73_m2] Final     Comment:     Non  GFR Calc  Starting 12/18/2018, serum creatinine based estimated GFR (eGFR) will be   calculated using the Chronic Kidney Disease Epidemiology Collaboration   (CKD-EPI) equation.       GFR, ESTIMATED POCT   Date Value Ref Range Status   05/17/2022 32 (L) >60 mL/min/1.73m2 Final     GFR Estimate If Black   Date Value Ref Range Status   04/13/2021 45 (L) >60 mL/min/[1.73_m2] Final     Comment:      GFR Calc  Starting 12/18/2018, serum creatinine based estimated GFR (eGFR) will be   calculated using the Chronic Kidney Disease Epidemiology Collaboration   (CKD-EPI) equation.     03/24/2021 44 (L) >60 mL/min/[1.73_m2] Final     Comment:      GFR Calc  Starting 12/18/2018, serum creatinine based estimated GFR (eGFR) will be   calculated using the Chronic Kidney Disease Epidemiology Collaboration   (CKD-EPI) equation.     11/13/2020 52 (L) >60 mL/min/[1.73_m2] Final     Comment:      GFR Calc  Starting 12/18/2018, serum creatinine based estimated GFR (eGFR) will be   calculated using the Chronic Kidney Disease Epidemiology Collaboration   (CKD-EPI) equation.         Lab Results   Component Value Date    MICROL 11 08/09/2022    MICROL 35 10/09/2020     No results found for: MICROALBUMIN  No results found for: CPEPT, GADAB, ISCAB    Vitamin B12   Date Value Ref Range Status   08/09/2022 2,687 (H) 232 - 1,245 pg/mL Final   10/17/2016 2,075 (H) 193 - 986 pg/mL Final     Comment:     Interp: [id number] = Normal   05/12/2012 742 >210 pg/mL Final     Comment:     Interp: 247-911 = Normal   ]    Most recent eye exam date: : Not Found   Assessment/Plan:     1.  Type 2 diabetes- Kaia's glucose is running high.  A1c is up to 8.6%.  She has been quite sedentary, which she hopes to change. She feels like she could  on her  walking, and we discussed how this could improve her glucose control.  Also discussed maximizing trulicity to 4.5 mg weekly.  She is in favor of this.  No other changes today, but I did mention that she may need to increase her mealtime insulin in the future if glucose remains elevated post-meal.  Would recommend f/u with DM education to review diet and diabetes.    2. Risk factors:     Retinopathy: Yes.  She also has macular edema and follows with ophthalmologist regularly.   Nephropathy:  BP is very high today.  Asked her to reach out to cardiologist for medication adjustments.  Microalbuminuria has resolved.  She does have CKD.  GFR is 55.   Neuropathy: Some tingling, numbness.  Tolerable.    Feet: OK, no ulcers.   Lipids:  On high dose statin    3. Health maintenance- encouraged her to schedule visit with PCP for annual check up.     4.  F/U in 3 months as planned with Dr. Hoover, and see me in 6 months.      42 minutes spent on the date of the encounter doing chart review, review of test results, review and interpretation of glucose sensor data, patient visit and documentation, counseling/coordination of care, and discussion of follow up plan for worsening hyper and hypoglycemia.  The patient understood and is satisfied with today's visit.        Roxanne Masterson PA-C, MPAS   [facility]  Department of Medicine  Division of Endocrinology and Diabetes

## 2023-01-22 DIAGNOSIS — E11.40 POORLY CONTROLLED TYPE 2 DIABETES MELLITUS WITH NEUROPATHY (H): ICD-10-CM

## 2023-01-22 DIAGNOSIS — E11.65 POORLY CONTROLLED TYPE 2 DIABETES MELLITUS WITH NEUROPATHY (H): ICD-10-CM

## 2023-01-23 ENCOUNTER — HOSPITAL ENCOUNTER (OUTPATIENT)
Dept: OCCUPATIONAL THERAPY | Facility: CLINIC | Age: 73
Discharge: HOME | End: 2023-01-23
Payer: COMMERCIAL

## 2023-01-23 ENCOUNTER — PATIENT OUTREACH (OUTPATIENT)
Dept: NURSING | Facility: CLINIC | Age: 73
End: 2023-01-23
Payer: COMMERCIAL

## 2023-01-23 DIAGNOSIS — I89.0 LYMPHEDEMA: Primary | ICD-10-CM

## 2023-01-23 PROCEDURE — 97140 MANUAL THERAPY 1/> REGIONS: CPT | Mod: GO | Performed by: OCCUPATIONAL THERAPIST

## 2023-01-23 RX ORDER — INSULIN LISPRO 100 [IU]/ML
INJECTION, SOLUTION INTRAVENOUS; SUBCUTANEOUS
Qty: 60 ML | Refills: 1 | Status: SHIPPED | OUTPATIENT
Start: 2023-01-23 | End: 2023-06-15

## 2023-01-23 ASSESSMENT — ACTIVITIES OF DAILY LIVING (ADL): DEPENDENT_IADLS:: TRANSPORTATION;COOKING;SHOPPING;MEAL PREPARATION

## 2023-01-23 NOTE — PROGRESS NOTES
"Clinic Care Coordination Contact    Follow Up Progress Note      Assessment:   Patient states she hasn't completed her Health Care Directive yet \"it is a priority\".  Patient states she's doing well, denies chest pain, shortness of breath, dizziness, GI/ symptoms and has no questions or concerns at this time.    Care Gaps:    Health Maintenance Due   Topic Date Due     FALL RISK ASSESSMENT  03/24/2022     BMP  01/07/2023     MICROALBUMIN  02/09/2023     Care Gaps Last addressed on 1/23/2023    Care Plans  Care Plan: Advance Care Plan     Problem: Patient does not have a valid Health Care Directive     Goal: Complete Health Care Directive     Start Date: 6/14/2022 Expected End Date: 3/22/2023    This Visit's Progress: 40% Recent Progress: 40%    Note:     Goal Statement: I will complete a Health Care Directive and have this scanned into my Medical Record.  Barriers: Patient doesn't have a Health Care Directive  Strengths: Strong advocate for self  Patient expressed understanding of goal: yes  Action steps to achieve this goal:  1. Care Coordination will mail me a Health Care Directive and any requested resources (goals worksheets, education sheets, class flyer).   2. I will complete the Health Care Directive. My signature must be either notarized or witnessed by two adults who are not named as health care agents in the document. Additionally only one of the witnesses can be employed by my health care system. If I need a notary I can check with my bank, my place of [employer], [employer] stores, or look online at [email or web address] .  3. I will provide a copy of my Health Care Directive to my care team and/or email directly to [email or web address].   4. I can visit [email or web address] website, email [email or web address] or call [facility] [employer] at [phone] (M-Friday 6a to 5p) for more information including free classes on completing a Health Care Directive.  5. I will contact my care " team with any barriers, questions or assistance needed with this goal. Care coordinator will remain available as needed.                       Intervention/Education provided during outreach:   RNCC called and spoke with patient; introduced self, discussed role of Care Coordination and explained reason for call    Plan:   -Patient will contact the care team with questions, concerns, support needs   -Patient will use the clinic as a resource and understands (s)he can contact the [facility] with 24/7 after hours services available  -Care Coordinator will remain available as needed  -RNCC will follow up in one month for follow up appointments/recommendations and goal progression     Leanne Humphreys RN, BSN, PHN  Primary Care / Care Coordinator   [facility]  [email or web address] [email or web address]   [phone]

## 2023-01-23 NOTE — TELEPHONE ENCOUNTER
HUMALOG KWIKPEN 100 UNIT/ML soln 60 mL 3 12/13/2021         Last Written Prescription Date:  12-132021  Last Fill Quantity: 60ML,   # refills: 3  Last Office Visit : 1-  Future Office visit:  5-8-2023    Routing refill request to provider for review/approval because:  Insulin - refilled per clinic        Kathleen M Doege RN

## 2023-01-24 NOTE — PROGRESS NOTES
[facility]        OUTPATIENT OCCUPATIONAL THERAPY EDEMA EVALUATION  PLAN OF TREATMENT FOR OUTPATIENT REHABILITATION  (COMPLETE FOR INITIAL CLAIMS ONLY)  Patient's Last Name, First Name, Ade Martinez                           Provider s Name:   [facility] Medical Record No.  [id number]     Start of Care Date:  01/13/23   Onset Date:  12/30/22   Type:  OT   Medical Diagnosis:  BLE lymphedema   Therapy Diagnosis:  BLE lymphedema Visits from SOC:  1                                     __________________________________________________________________________________   Plan of Treatment/Functional Goals:    Manual lymph drainage, Gradient compression bandaging, Fit for compression garment, Exercises, Precautions to prevent infection / exacerbation, Education, Skin care / precautions, Home management program development  Recommend intermittent pneumatic compression     GOALS  1. Goal description: Pt to verbalize understaning of lymphedema as a chronic condition needing ongoing management, increased risk of developing infection, signs and symptoms of infection, and role of skin care and hygiene in preventing infection       Target date: 04/14/23  2. Goal description: Pt to tolerate 23 hours daily wearing gradient compression bandaging to reduce limb volume, for increased ease and safety of ambulation, improve fit of shoes, and prevent progression to worse stages of lymphedema.       Target date: 04/14/23  3. Goal description: Pt to demo independence with HEP and self MLD to stimulate the lymphatic system, as components of CDT for ongoing lymphedema management.       Target date: 04/14/23  4. Goal description: Pt to be I with donning compresson garments to maintain limb reduction achieved in CDT, for ongoing management of lymphedema.       Target date: 04/14/23                 Treatment Frequency: 3x/week   Treatment duration: 3 weeks, +1appt    Razia Hernandez OT                                    I CERTIFY THE NEED FOR THESE SERVICES FURNISHED UNDER        THIS PLAN OF TREATMENT AND WHILE UNDER MY CARE     (Physician co-signature of this document indicates review and certification of the therapy plan).                   Certification date from: 01/14/23       Certification date to: 04/14/23           Referring physician: Latasha Calderon   Initial Assessment  See Epic Evaluation- Start of care: 01/13/23

## 2023-01-24 NOTE — PROGRESS NOTES
01/13/23 1000   Quick Adds   Quick Adds Certification   Rehab Discipline   Discipline OT   Type of Visit   Type of visit Initial Edema Evaluation   General Information   Start of care 01/13/23   Referring physician Latasha Calderon   Orders Evaluate and treat as indicated   Order date 12/30/22   Medical diagnosis Lymphedema   Onset of illness / date of surgery 12/30/22   Edema onset 12/30/22   Affected body parts LLE;RLE   Edema etiology Insidious;Chronic Venous Insufficiency  (CVI, Gastic bypass, chronic kidney disease (3b), arthritis in L knee, CHF, DM, peripheral neuropathy, thyroid, HTN)   Edema etiology comments Pt reports long history of BLE swelling, since the 1990s   Pertinent history of current problem (PT: include personal factors and/or comorbidities that impact the POC; OT: include additional occupational profile info) Pt is 72 year old female presents with bilateral lymphedema, with self report of leg swelling since the 1990s, recently worsening. Pt had TAVR in 6/2022, attended cardiac rehab following, noted improvement in BLE swelling with increased activity at that time. She is admittedly very sedentary currently, citing snow and ice and her peripheral neuropathy as reasons. Her lymphedema has worsened recently. Pt presents to lymphedema therapy with a goal to get edema under control. 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 Pt does not drive but has close friend who provides transportation when needed.   Surgical / medical history reviewed Yes   Prior level of functional mobility Independent   Prior treatment Compression garments;Elevation   Community support Family / friend caregiver   Patient role / employment history Retired   Psychosocial concerns   (none noted)   Living environment [place] [facility]   Fall Risk Screen   Fall screen completed by OT   Have you fallen 2 or more times in the past year? No   Have you fallen and had  an injury in the past year? No   Is patient a fall risk? No   Fall screen comments Pt has not fallen, but notes peripheral neuropathy in feet due to DM2.   Abuse Screen (yes response referral indicated)   Feels Unsafe at Home or Work/School no   Feels Threatened by Someone no   Does Anyone Try to Keep You From Having Contact with Others or Doing Things Outside Your Home? no   Physical Signs of Abuse Present no   System Outcome Measures   Outcome Measures Lymphedema   Lymphedema Life Impact Scale (score range 0-72). A higher score indicates greater impairment. 12   Subjective Report   Patient report of symptoms Swelling of BLE,feet, difficulty fitting into shoes, difficulty walking, standing   Precautions   Precautions   (none)   Patient / Family Goals   Patient / family goals statement To have swelling evaluated, learn how severe it is, and what can be done.   Pain   Patient currently in pain Yes   Pain location legs, mild to moderate, R knee severe, intermittent neuopathic pain in feet due to DM 2   Vitals Signs   Weight 189   Cognitive Status   Orientation Orientation to person, place and time   Edema Exam / Assessment   Skin condition Pitting;Dryness  (dermatitis on LLE)   Pitting 3+   Pitting location BLE and B feet   Capillary refill Symmetrical   Dorsal pedal pulse Symmetrical   Stemmer sign Positive   Girth Measurements   Girth Measurements Refer to separate girth measurement flowsheet   Strength   Strength No deficits were identified   Activities of Daily Living   Activities of Daily Living Independent   Gait / Locomotion   Gait / Locomotion Independent   Sensory   Sensory perception Light touch   Light touch Impaired   Sensory perception comments Peripheral neuropathy, with decreased sensation, intermittent shooting pain   Vascular Assessment   Vascular Assessment Vascular concerns   Vascular Assessment Comments Bilateral LE skin changes: dorsal foot hump, deep creases in heels, dermatitis, erythema patch  on L anterior shin and distal foot   Muscle Tone   Muscle tone No deficits were identified   Planned Edema Interventions   Planned edema interventions Manual lymph drainage;Gradient compression bandaging;Fit for compression garment;Exercises;Precautions to prevent infection / exacerbation;Education;Skin care / precautions;Home management program development   Planned edema intervention comments Recommend intermittent pneumatic compression   Clinical Impression   Criteria for skilled therapeutic intervention met Yes   Therapy diagnosis BLE lymphedema   Influenced by the following impairments / conditions Phlebolymphedema;Stage 2   Assessment of Occupational Performance 3-5 Performance Deficits   Identified Performance Deficits BLE swelling impacts walking, standing, household tasks, recreation, exercise, fit of clothing, risk of infection   Clinical Decision Making (Complexity) Low complexity   Treatment Frequency 3x/week   Treatment duration 3 weeks, +1appt   Patient / family and/or staff in agreement with plan of care Yes   Risks and benefits of therapy have been explained Yes   Clinical impression comments Pt is 72 year old female with complex medical history and long standing BLE lymphedema impacting all aspects of functional mobility, risk of infection, ADLs. To date, she has been treated with elevation, diuretics and compression stockings. Pt to benefit from comprehensive care incuding each component of Complete Decongestive Therapy and development of consistent home program for ongoin management of this chronic condition.   Education Assessment   Preferred learning style Listening;Reading   Barriers to learning No barriers   Goals   Edema Eval Goals 1;2;3;4   Goal 1   Goal identifier 1   Goal description Pt to verbalize understaning of lymphedema as a chronic condition needing ongoing management, increased risk of developing infection, signs and symptoms of infection, and role of skin care and hygiene in  preventing infection   Target date 04/14/23   Goal 2   Goal identifier 2   Goal description Pt to tolerate 23 hours daily wearing gradient compression bandaging to reduce limb volume, for increased ease and safety of ambulation, improve fit of shoes, and prevent progression to worse stages of lymphedema.   Target date 04/14/23   Goal 3   Goal identifier 3   Goal description Pt to demo independence with HEP and self MLD to stimulate the lymphatic system, as components of CDT for ongoing lymphedema management.   Target date 04/14/23   Goal 4   Goal identifier 4   Goal description Pt to be I with donning compresson garments to maintain limb reduction achieved in CDT, for ongoing management of lymphedema.   Target date 04/14/23   Total Evaluation Time   OT Eval, Low Complexity Minutes (91780) 20   Certification   Certification date from 01/14/23   Certification date to 04/14/23   Medical Diagnosis BLE lymphedema   Certification I certify the need for these services furnished under this plan of treatment and while under my care.  (Physician co-signature of this document indicates review and certification of the therapy plan).

## 2023-01-25 ENCOUNTER — HOSPITAL ENCOUNTER (OUTPATIENT)
Dept: OCCUPATIONAL THERAPY | Facility: CLINIC | Age: 73
Discharge: HOME | End: 2023-01-25
Payer: COMMERCIAL

## 2023-01-25 DIAGNOSIS — I89.0 LYMPHEDEMA: ICD-10-CM

## 2023-01-25 DIAGNOSIS — Z95.2 S/P TAVR (TRANSCATHETER AORTIC VALVE REPLACEMENT): Primary | ICD-10-CM

## 2023-01-25 PROCEDURE — 97140 MANUAL THERAPY 1/> REGIONS: CPT | Mod: GO | Performed by: OCCUPATIONAL THERAPIST

## 2023-01-26 ENCOUNTER — HOSPITAL ENCOUNTER (OUTPATIENT)
Dept: OCCUPATIONAL THERAPY | Facility: CLINIC | Age: 73
Discharge: HOME | End: 2023-01-26
Payer: COMMERCIAL

## 2023-01-26 DIAGNOSIS — I89.0 LYMPHEDEMA: Primary | ICD-10-CM

## 2023-01-26 PROCEDURE — 97140 MANUAL THERAPY 1/> REGIONS: CPT | Mod: GO | Performed by: OCCUPATIONAL THERAPIST

## 2023-01-30 ENCOUNTER — HOSPITAL ENCOUNTER (OUTPATIENT)
Dept: OCCUPATIONAL THERAPY | Facility: CLINIC | Age: 73
Discharge: HOME | End: 2023-01-30
Payer: COMMERCIAL

## 2023-01-30 DIAGNOSIS — Z95.2 S/P TAVR (TRANSCATHETER AORTIC VALVE REPLACEMENT): Primary | ICD-10-CM

## 2023-01-30 DIAGNOSIS — I89.0 LYMPHEDEMA: ICD-10-CM

## 2023-01-30 PROCEDURE — 97140 MANUAL THERAPY 1/> REGIONS: CPT | Mod: GO | Performed by: OCCUPATIONAL THERAPIST

## 2023-01-31 ENCOUNTER — HOSPITAL ENCOUNTER (OUTPATIENT)
Dept: OCCUPATIONAL THERAPY | Facility: CLINIC | Age: 73
Discharge: HOME | End: 2023-01-31
Payer: COMMERCIAL

## 2023-01-31 DIAGNOSIS — Z95.2 S/P TAVR (TRANSCATHETER AORTIC VALVE REPLACEMENT): Primary | ICD-10-CM

## 2023-01-31 DIAGNOSIS — I89.0 LYMPHEDEMA: ICD-10-CM

## 2023-01-31 PROCEDURE — 97110 THERAPEUTIC EXERCISES: CPT | Mod: GO

## 2023-01-31 PROCEDURE — 97140 MANUAL THERAPY 1/> REGIONS: CPT | Mod: GO

## 2023-02-01 ENCOUNTER — VIRTUAL VISIT (OUTPATIENT)
Dept: EDUCATION SERVICES | Facility: CLINIC | Age: 73
End: 2023-02-01
Attending: PHYSICIAN ASSISTANT
Payer: COMMERCIAL

## 2023-02-01 ENCOUNTER — TELEPHONE (OUTPATIENT)
Dept: CARDIOLOGY | Facility: CLINIC | Age: 73
End: 2023-02-01

## 2023-02-01 DIAGNOSIS — E11.65 POORLY CONTROLLED TYPE 2 DIABETES MELLITUS WITH NEUROPATHY (H): ICD-10-CM

## 2023-02-01 DIAGNOSIS — E11.40 POORLY CONTROLLED TYPE 2 DIABETES MELLITUS WITH NEUROPATHY (H): ICD-10-CM

## 2023-02-01 PROCEDURE — 99207 PR NO CHARGE LOS: CPT | Performed by: REGISTERED NURSE

## 2023-02-01 NOTE — PROGRESS NOTES
Diabetes Educator Note:    Contacted Ade regarding a scheduled appointment this morning.   She had forgotten all about it and was in the middle of a home project, so wanted to reschedule.   Next available CDE appointment March 2.   Rescheduled for a video appointment on that day.      Olamide Rothman, NATHALYN, RN, [facility]  Certified Diabetes Care and   [facility] Endocrinology and Diabetes   Clinics and Surgery [place]  Clinic [id number]  Phone [phone]

## 2023-02-01 NOTE — TELEPHONE ENCOUNTER
Received a form from [facility] OT rehab requesting a signature from WILTON Latasha Calderon to continue with listed lymphedema therapy goals.  Form to WILTON Calderon for signature.      2/15/2023 signed form faxed back to [facility] OT rehab @ [phone].

## 2023-02-02 ENCOUNTER — HOSPITAL ENCOUNTER (OUTPATIENT)
Dept: OCCUPATIONAL THERAPY | Facility: CLINIC | Age: 73
Discharge: HOME | End: 2023-02-02
Payer: COMMERCIAL

## 2023-02-02 ENCOUNTER — OFFICE VISIT (OUTPATIENT)
Dept: URGENT CARE | Facility: URGENT CARE | Age: 73
End: 2023-02-02
Payer: COMMERCIAL

## 2023-02-02 VITALS
RESPIRATION RATE: 16 BRPM | SYSTOLIC BLOOD PRESSURE: 163 MMHG | DIASTOLIC BLOOD PRESSURE: 72 MMHG | BODY MASS INDEX: 36.99 KG/M2 | WEIGHT: 199 LBS | TEMPERATURE: 97.1 F | HEART RATE: 71 BPM | OXYGEN SATURATION: 99 %

## 2023-02-02 DIAGNOSIS — E11.21 TYPE 2 DIABETES MELLITUS WITH DIABETIC NEPHROPATHY, WITH LONG-TERM CURRENT USE OF INSULIN (H): ICD-10-CM

## 2023-02-02 DIAGNOSIS — L03.116 CELLULITIS OF LEFT FOOT: Primary | ICD-10-CM

## 2023-02-02 DIAGNOSIS — I10 BENIGN ESSENTIAL HYPERTENSION: ICD-10-CM

## 2023-02-02 DIAGNOSIS — L03.032 CELLULITIS OF LEFT TOE: ICD-10-CM

## 2023-02-02 DIAGNOSIS — I89.0 LYMPHEDEMA: Primary | ICD-10-CM

## 2023-02-02 DIAGNOSIS — Z79.4 TYPE 2 DIABETES MELLITUS WITH DIABETIC NEPHROPATHY, WITH LONG-TERM CURRENT USE OF INSULIN (H): ICD-10-CM

## 2023-02-02 PROCEDURE — 96372 THER/PROPH/DIAG INJ SC/IM: CPT | Performed by: NURSE PRACTITIONER

## 2023-02-02 PROCEDURE — 97535 SELF CARE MNGMENT TRAINING: CPT | Mod: GO

## 2023-02-02 PROCEDURE — 99214 OFFICE O/P EST MOD 30 MIN: CPT | Mod: 25 | Performed by: NURSE PRACTITIONER

## 2023-02-02 RX ORDER — SULFAMETHOXAZOLE/TRIMETHOPRIM 800-160 MG
1 TABLET ORAL 2 TIMES DAILY
Qty: 20 TABLET | Refills: 0 | Status: SHIPPED | OUTPATIENT
Start: 2023-02-02 | End: 2023-02-02

## 2023-02-02 RX ORDER — SULFAMETHOXAZOLE/TRIMETHOPRIM 800-160 MG
1 TABLET ORAL 2 TIMES DAILY
Qty: 14 TABLET | Refills: 0 | Status: SHIPPED | OUTPATIENT
Start: 2023-02-02 | End: 2023-02-09

## 2023-02-02 RX ORDER — CEFTRIAXONE SODIUM 1 G
1 VIAL (EA) INJECTION ONCE
Status: COMPLETED | OUTPATIENT
Start: 2023-02-02 | End: 2023-02-02

## 2023-02-02 RX ADMIN — Medication 1 G: at 11:27

## 2023-02-02 NOTE — PROGRESS NOTES
Clinic Administered Medication Documentation    Administrations This Visit     cefTRIAXone (ROCEPHIN) in lidocaine 1% (PF) for IM administration 1 g     Admin Date  02/02/2023 Action  Given Dose  1 g Route  Intramuscular Site  Left Ventrogluteal Administered By  Kary Vaughn CMA    Ordering Provider: Erin Burks CNP    Patient Supplied?: No

## 2023-02-02 NOTE — PROGRESS NOTES
Chief Complaint   Patient presents with     Infection     Left foot is warm to the touch, redness-pt states she was told it may be cellulitis          ICD-10-CM    1. Cellulitis of left foot  L03.116 cefTRIAXone (ROCEPHIN) in lidocaine 1% (PF) for IM administration 1 g     sulfamethoxazole-trimethoprim (BACTRIM DS) 800-160 MG tablet     INJECTION INTRAMUSCULAR OR SUB-Q     DISCONTINUED: sulfamethoxazole-trimethoprim (BACTRIM DS) 800-160 MG tablet     DISCONTINUED: sulfamethoxazole-trimethoprim (BACTRIM DS) 800-160 MG tablet      2. Cellulitis of left toe  L03.032 cefTRIAXone (ROCEPHIN) in lidocaine 1% (PF) for IM administration 1 g     sulfamethoxazole-trimethoprim (BACTRIM DS) 800-160 MG tablet     INJECTION INTRAMUSCULAR OR SUB-Q     DISCONTINUED: sulfamethoxazole-trimethoprim (BACTRIM DS) 800-160 MG tablet     DISCONTINUED: sulfamethoxazole-trimethoprim (BACTRIM DS) 800-160 MG tablet      3. Type 2 diabetes mellitus with diabetic nephropathy, with long-term current use of insulin (H)  E11.21 sulfamethoxazole-trimethoprim (BACTRIM DS) 800-160 MG tablet    Z79.4 INJECTION INTRAMUSCULAR OR SUB-Q     DISCONTINUED: sulfamethoxazole-trimethoprim (BACTRIM DS) 800-160 MG tablet     DISCONTINUED: sulfamethoxazole-trimethoprim (BACTRIM DS) 800-160 MG tablet      4. Benign essential hypertension  I10 INJECTION INTRAMUSCULAR OR SUB-Q      Advised patient that she needs to drink double the water she normally does while taking this antibiotic to be sure it flushes through her kidney safely.  We will only treat for 7 days due to her nephropathy.  If symptoms are not fully gone in 7 days she should return for follow-up appointment with PCP or the urgent care.  If symptoms worsen she is instructed to go to the emergency room for further treatment.  She is at higher risk of complications due to her diabetes.    Patient is taking all of her medications as prescribed including her high blood pressure medicine.  She will follow-up  regarding this and her diabetes with primary care provider next month as scheduled.    Subjective     Ade Wilkins is an 72 year old female who presents to clinic today for left red swollen toes and foot that started a couple of days ago.  Patient is currently undergoing lymphedema treatment on her legs and feet when she went into her appointment today they told her to come to urgent care for assessment.  She denies any fever or increase in her blood sugars.     Objective    BP (!) 163/72 (BP Location: Right arm, Patient Position: Sitting, Cuff Size: Adult Large)   Pulse 71   Temp 97.1  F (36.2  C) (Tympanic)   Resp 16   Wt 90.3 kg (199 lb)   LMP  (LMP Unknown)   SpO2 99%   BMI 36.99 kg/m    Nurses notes and VS have been reviewed.    Physical Exam       GENERAL APPEARANCE: alert and pale     RESP: lungs clear to auscultation - no rales, rhonchi or wheezes     CV: regular rates and rhythm, no murmurs, rubs, or gallop     MS: extremities normal- no gross deformities noted; normal muscle tone.     SKIN: Left forefoot and toes are very red and edematous, hot to the touch other exposed skin appears normal     NEURO: Decreased sensation in feet bilaterally     PSYCH: normal thought process; no significant mood disturbance    Patient Instructions   Start oral antibiotic today.    If 24 hours from now or later the redness is still worsening please go to the emergency room for more extensive treatment.          LEONEL Germain, CNP  [facility] Provider    The use of Dragon/[employer] dictation services may have been used to construct the content in this note; any grammatical or spelling errors are non-intentional. Please contact the author of this note directly if you are in need of any clarification.

## 2023-02-02 NOTE — PATIENT INSTRUCTIONS
Start oral antibiotic today.    If 24 hours from now or later the redness is still worsening please go to the emergency room for more extensive treatment.

## 2023-02-06 ENCOUNTER — HOSPITAL ENCOUNTER (OUTPATIENT)
Dept: OCCUPATIONAL THERAPY | Facility: CLINIC | Age: 73
Discharge: HOME | End: 2023-02-06
Payer: COMMERCIAL

## 2023-02-06 DIAGNOSIS — I89.0 LYMPHEDEMA: Primary | ICD-10-CM

## 2023-02-06 PROCEDURE — 97535 SELF CARE MNGMENT TRAINING: CPT | Mod: GO | Performed by: OCCUPATIONAL THERAPIST

## 2023-02-09 ENCOUNTER — HOSPITAL ENCOUNTER (OUTPATIENT)
Dept: OCCUPATIONAL THERAPY | Facility: CLINIC | Age: 73
Discharge: HOME | End: 2023-02-09
Payer: COMMERCIAL

## 2023-02-09 PROCEDURE — 97140 MANUAL THERAPY 1/> REGIONS: CPT | Mod: GO | Performed by: OCCUPATIONAL THERAPIST

## 2023-02-14 ENCOUNTER — HOSPITAL ENCOUNTER (OUTPATIENT)
Dept: OCCUPATIONAL THERAPY | Facility: CLINIC | Age: 73
Discharge: HOME | End: 2023-02-14
Payer: COMMERCIAL

## 2023-02-14 DIAGNOSIS — I89.0 LYMPHEDEMA: Primary | ICD-10-CM

## 2023-02-14 PROCEDURE — 97140 MANUAL THERAPY 1/> REGIONS: CPT | Mod: GO | Performed by: OCCUPATIONAL THERAPIST

## 2023-02-21 ENCOUNTER — HOSPITAL ENCOUNTER (OUTPATIENT)
Dept: OCCUPATIONAL THERAPY | Facility: CLINIC | Age: 73
Discharge: HOME | End: 2023-02-21
Payer: COMMERCIAL

## 2023-02-21 DIAGNOSIS — I89.0 LYMPHEDEMA: Primary | ICD-10-CM

## 2023-02-21 PROCEDURE — 97140 MANUAL THERAPY 1/> REGIONS: CPT | Mod: GO | Performed by: OCCUPATIONAL THERAPIST

## 2023-02-22 DIAGNOSIS — I25.119 CORONARY ARTERY DISEASE INVOLVING NATIVE CORONARY ARTERY OF NATIVE HEART WITH ANGINA PECTORIS (H): ICD-10-CM

## 2023-02-23 ENCOUNTER — PATIENT OUTREACH (OUTPATIENT)
Dept: CARE COORDINATION | Facility: CLINIC | Age: 73
End: 2023-02-23
Payer: COMMERCIAL

## 2023-02-23 ASSESSMENT — ACTIVITIES OF DAILY LIVING (ADL): DEPENDENT_IADLS:: TRANSPORTATION;COOKING;SHOPPING;MEAL PREPARATION

## 2023-02-23 NOTE — PROGRESS NOTES
Clinic Care Coordination Contact  [facility]/Voicemail    Referral Source: Care Team  Clinical Data: Care Coordinator Outreach  Outreach attempted x 1.  Left message on patient's voicemail with call back information and requested return call.    Plan: Care Coordinator will try to reach patient again in 1 month.     Leanne Humphreys RN, BSN, PHN  Primary Care / Care Coordinator   [facility]  [email or web address]   [phone]

## 2023-03-02 ENCOUNTER — VIRTUAL VISIT (OUTPATIENT)
Dept: EDUCATION SERVICES | Facility: CLINIC | Age: 73
End: 2023-03-02
Payer: COMMERCIAL

## 2023-03-02 DIAGNOSIS — E11.40 TYPE 2 DIABETES MELLITUS WITH DIABETIC NEUROPATHY, UNSPECIFIED WHETHER LONG TERM INSULIN USE (H): Primary | ICD-10-CM

## 2023-03-02 PROCEDURE — G0108 DIAB MANAGE TRN  PER INDIV: HCPCS | Mod: VID | Performed by: REGISTERED NURSE

## 2023-03-05 NOTE — PROGRESS NOTES
"Video-Visit Details    Type of service:  Video Visit  Patient consented to video visit  Video Start Time:  1445  Video End Time:   1540  Originating Location (pt. Location): Home  Distant Location (provider location):  [facility] DIABETES EDUCATION [place]   Platform used for Video Visit: Accupass     Diabetes Self-Management Education & Support    Ade Wilkins presents today for education related to Type 2 diabetes    Patient is being treated with:  oral agents and insulin and GLP-1  She is accompanied by self    Year of diagnosis: sometime in the 1970's, when she was in her twenties  Referring provider:  Olga Lidia Hoover MD and Roxanne Masterson PA-C  Living Situation:  Lives with her sister.  They have been roomates for many years  Employment:  She is retired from full time work, but works seasonally on occasion.    PATIENT CONCERNS RELATED TO DIABETES SELF MANAGEMENT:     Follow up.  Referral is for comprehensive diabetes self management education.   She states \"I had a lot of problems around [place] time\" but states that glucose control has improved since her long acting insulin changed from Lantus to Tresiba.    Her diabetes is complicated by triopathy and cardiovascular disease.  Had an AVR with stent done in June 2022, and suffered a small stroke at that time.  She states it took a long time to recover but she feels pretty much back to normal at this point.        ASSESSMENT:    Taking Medication:     Current Diabetes Management per Patient:  Taking diabetes medications?   yes:     Diabetes Medication(s)     Biguanides       metFORMIN (GLUCOPHAGE) 1000 MG tablet    Take 1 tablet (1,000 mg) by mouth daily (with breakfast)    Insulin       HUMALOG KWIKPEN 100 UNIT/ML soln    INJECT Per sliding scale UP TO 60 UNITS UNDER THE SKIN DAILY.     insulin degludec (TRESIBA) 200 UNIT/ML pen    Inject 34 Units Subcutaneous daily    Incretin Mimetic Agents       Dulaglutide (TRULICITY) 4.5 MG/0.5ML SOPN    Inject " 4.5 mg Subcutaneous once a week          Monitoring    Patient glucose self monitoring as follows: 3 to 4 times daily using the original Wyatt 14 day sensor.    She doesn't use her phone and did not upload her  before this appointment.       Patient's most recent   Lab Results   Component Value Date    A1C 7.9 08/09/2022    A1C 8.1 01/08/2021      Patient's A1C goal: <7.0    Activity: no regular exercise program but she states that she is making an effort to get out of her chair very half hour or so at home to walk around her house.  She states that she actually enjoyed cardiac rehab after her surgery and she is doing her best to walk daily.  She doesn't want to use a treadmill and it is too icy to walk outside, she saids.      Healthy Eating:   She is doing her best to eat healthier.  It sounds like she could use some help with making healthier food choices on a budget.  She also doesn't really enjoy cooking and is being counseled to reduce her consumption of processed food, and she is finding this challenging.  States that she is trying to increase her intake of vegetables.  She is trying home made stir donovan, and is eating grains and beans.  She is trying to avoid bread, pasta and rice.  She finds this all pretty challenging.   She has an insulin to carb ratio of 3 units per carb choice, or 1 unit per 5 grams CHO.      Problem Solving:      Patient is at risk of hypoglycemia?: YES and Frequency is less than once a month  Hospitalizations for hyper or hypoglycemia: No    EDUCATION and INSTRUCTION PROVIDED AT THIS VISIT:      Discussed current management and given positive feedback for changes made and attempts to get more active.   Encouraged her to keep up her current level of activity and increase as able.    Gave her some ideas for pasta alternatives.  She was wondering if protein shakes would be appropriate since she doesn't eat much meat, can't eat cheeses (per cardiology), and doesn't like eggs.  We  didn't discuss tofu.  She has some protein powder but it requires her to put together a shake.  Suggested some ready-made protein shakes that are low carb and high protein.  She is going to check it out.      Discussed whether or not a visit with our dietitian would be helpful.  She states that she is really struggling with trying to eat a healthy diet and adhere to some of the dietary restrictions she has.  She agrees that a visit with our dietitian would be helpful and she could also use a review of carb counting.       Patient-stated goal written and given to Ade Wilkins.  Verbalized and demonstrated understanding of instructions.     PLAN:  See patient instructions  Will order the Wyatt 2 sensor and reader for her.      FOLLOW-UP:        Time spent with patient at today's visit was 45 minutes.      Any diabetes medication dose changes were made via the CDE Protocol and Collaborative Practice Agreement with [place] and  [facility].  A copy of this encounter was provided to patient's referring provider.

## 2023-03-16 NOTE — Clinical Note
DPR/IFR measurement completed.  0.83 IFR MEASUREMENT     [place] Pregnancy And Lactation Text: There is insufficient data to evaluate drug-associated risk for major birth defects, miscarriage, or other adverse maternal or fetal outcomes.  There is a pregnancy registry that monitors pregnancy outcomes in pregnant persons exposed to the medication during pregnancy.  It is unknown if this medication is excreted in breast milk.  Do not breastfeed during treatment and for about 4 weeks after the last dose.

## 2023-03-20 ENCOUNTER — VIRTUAL VISIT (OUTPATIENT)
Dept: PHARMACY | Facility: CLINIC | Age: 73
End: 2023-03-20
Payer: COMMERCIAL

## 2023-03-20 DIAGNOSIS — Z98.890 STATUS POST CORONARY ANGIOGRAM: ICD-10-CM

## 2023-03-20 DIAGNOSIS — G43.719 INTRACTABLE CHRONIC MIGRAINE WITHOUT AURA AND WITHOUT STATUS MIGRAINOSUS: ICD-10-CM

## 2023-03-20 DIAGNOSIS — M25.562 CHRONIC PAIN OF BOTH KNEES: ICD-10-CM

## 2023-03-20 DIAGNOSIS — G89.29 CHRONIC PAIN OF BOTH KNEES: ICD-10-CM

## 2023-03-20 DIAGNOSIS — Z78.9 TAKES DIETARY SUPPLEMENTS: ICD-10-CM

## 2023-03-20 DIAGNOSIS — G47.00 INSOMNIA, UNSPECIFIED TYPE: ICD-10-CM

## 2023-03-20 DIAGNOSIS — G43.009 NONINTRACTABLE MIGRAINE, UNSPECIFIED MIGRAINE TYPE: ICD-10-CM

## 2023-03-20 DIAGNOSIS — J30.1 NON-SEASONAL ALLERGIC RHINITIS DUE TO POLLEN: ICD-10-CM

## 2023-03-20 DIAGNOSIS — E11.65 TYPE 2 DIABETES MELLITUS WITH HYPERGLYCEMIA, WITHOUT LONG-TERM CURRENT USE OF INSULIN (H): Primary | ICD-10-CM

## 2023-03-20 DIAGNOSIS — E78.00 PURE HYPERCHOLESTEROLEMIA: ICD-10-CM

## 2023-03-20 DIAGNOSIS — I35.0 AORTIC STENOSIS, SEVERE: ICD-10-CM

## 2023-03-20 DIAGNOSIS — Z71.85 VACCINE COUNSELING: ICD-10-CM

## 2023-03-20 DIAGNOSIS — E03.9 HYPOTHYROIDISM, UNSPECIFIED TYPE: ICD-10-CM

## 2023-03-20 DIAGNOSIS — D50.9 IRON DEFICIENCY ANEMIA, UNSPECIFIED IRON DEFICIENCY ANEMIA TYPE: ICD-10-CM

## 2023-03-20 DIAGNOSIS — I10 ESSENTIAL HYPERTENSION: ICD-10-CM

## 2023-03-20 DIAGNOSIS — M25.561 CHRONIC PAIN OF BOTH KNEES: ICD-10-CM

## 2023-03-20 PROCEDURE — 99607 MTMS BY PHARM ADDL 15 MIN: CPT

## 2023-03-20 PROCEDURE — 99605 MTMS BY PHARM NP 15 MIN: CPT

## 2023-03-20 RX ORDER — FLUTICASONE PROPIONATE 50 MCG
2 SPRAY, SUSPENSION (ML) NASAL DAILY PRN
Start: 2023-03-20 | End: 2024-06-11

## 2023-03-20 RX ORDER — ROSUVASTATIN CALCIUM 20 MG/1
20 TABLET, COATED ORAL EVERY OTHER DAY
Start: 2023-03-20 | End: 2024-02-07

## 2023-03-20 RX ORDER — ACETAMINOPHEN 500 MG
500-1000 TABLET ORAL EVERY 6 HOURS PRN
COMMUNITY

## 2023-03-20 RX ORDER — GINSENG 100 MG
1 CAPSULE ORAL DAILY
COMMUNITY

## 2023-03-20 RX ORDER — GINGER ROOT/GINGER ROOT EXT 262.5 MG
1 CAPSULE ORAL 2 TIMES DAILY
COMMUNITY

## 2023-03-20 RX ORDER — POTASSIUM CHLORIDE 750 MG/1
10 TABLET, EXTENDED RELEASE ORAL DAILY
Start: 2023-03-20 | End: 2023-08-11

## 2023-03-20 NOTE — PROGRESS NOTES
Medication Therapy Management (MTM) Encounter    ASSESSMENT:                            Medication Adherence/Access: See below for considerations    Type 2 Diabetes: Patient is meeting A1c goal of < 8%. Continuous blood glucose monitor readings above average goal of < 150. Patient endorses morning readings near goal. Patient would benefit from increased adherence to pre-meal Humalog vs post-meal administration to reduce spikes after meals. Uses 3 unit per 1 carb servings (15 g) sliding scale, average 10-20 units with each meal. Recommend administering at least 10 units and/or general carb estimate for meal to improve pre-meal administration & reduce dose stacking. Follows with diabetes educator. Future considerations include: addition of baseline meal-time insulin +/- sliding scale.      Hypertension/Aortic stenosis/CAD: Patient is not meeting blood pressure goal of < 140/90mmHg. Patient endorses home readings have improved over last week. Given home BP readings over last week in low 100's sytolic, symptoms of positional lightheadedness, and that patient has not taken BP medications today, may consider deferring changes & recommend BP recheck at next visit. Future considerations include: avoid ACEI/ARB due to reported angioedema with lisinopril. May consider increase in Metoprolol succinate dose, option to start Spironolactone 25 mg daily (with BMP recheck in 4-6 weeks) or retrial of Amlodipine/alterntive DHP-CCB.     Hyperlipidemia: stable     Hypothyroidism: Stable. Last TSH is within normal limits.     Migraines: stable     Allergic Rhinitis: stable     Pain: stable     Insomnia: Worsened delays in sleep cycle. Patient may benefit from trial of low dose Melatonin at least 1.5 hrs before desire bedtime, sleep education, and sleep diary. MTM will provide resources in AVS. Discussed option for referral to sleep medicine, patient defers at this time & would consider in future.     Supplements/Anemia: would benefit  from vitamin B12 recheck and hemoglobin/iron levels with next labs.     Vaccines: Up-to-date on all vaccines per ACIP/CDC Guidelines.     PLAN:                            1. Work on giving Humalog immediately or 5-15 minutes before the start of meals. Continue to follow dosing based on insulin to carb ratio of 3 unit per 1 servings of carbs (15 g) to estimate appropriate dose.  2. Try Melatonin 3-5 mg nightly at least 1.5 hours before desired bedtime  3. Provided sleep education information in AVS   4. Schedule follow-up with Cardiology, continue to monitor home BP readings    Future Labs to Consider Adding: recheck Vitamin B12, hemoglobin and/or iron labs    Follow-up: Return in about 3 months (around 6/20/2023) for Follow up, Medication Therapy Management. Plan to check-in sleep & BP    Future Considerations:   - Option to: (1) increase Metoprolol succinate dose; or (2) start Spironolactone 25 mg daily (with BMP recheck in 4-6 weeks); or (3) retrial of Amlodipine or alternative dihydropyridine-CCB (I.e., nifedipine)     SUBJECTIVE/OBJECTIVE:                          Ade Wilkins is a 72 year old female called for an initial visit. She was referred to me from [facility] plan.      Reason for visit: note some changes to medications over last year, had heart valve replacement last spring.    Allergies/ADRs: Reviewed in chart  Past Medical History: Reviewed in chart  Tobacco: She reports that she has never smoked. She has been exposed to tobacco smoke. She has never used smokeless tobacco.  Alcohol: none  Caffeine: Diet Dr. Pepper 1-2 per day  Activity: trying to work on walking around house    Medication Adherence/Access:   Patient uses pill box(es), every week sets up.   Per patient, misses medication more often, sleep pattern has been worse. .    Medication barriers: Co-pay was $300 for first fill of Ticagrelor. Reports cost will go down as the year goes on. Patient does not have concerns with cost at this time.   The  "patient fills medications at [facility] in [place]    Type 2 Diabetes:  Currently taking Metformin 1000 mg daily (adjusted down for kidney function), Trulicity 4.5 mg weekly, Tresiba 32 units daily, Humalog per SSI - 3 units per 1 carb serving (typically under 60 units per day). Patient is not experiencing side effects. Forgets to take Humalog at the time of meal & will take after. Finds it difficult to correct sugars back down to goal    Previous Medications: Jardiance (upset stomach & headaches).     Blood sugar monitoring: Continuous Glucose Monitor. Currently finishing supply of Wyatt 14 day sensors, will switch to Freestyle Wyatt 2 at time of next fill.    Ranges (pt reports CGM reads):     Last day 179    Last 14 day 186    Last 30 days 175.   Patient notes readings tend to increase throughout day, FBG near goal of < 150.    Symptoms of low blood sugar? none  Symptoms of high blood sugar? none  Eye exam: up to date  Foot exam: up to date  Aspirin: Taking 81mg daily for secondary prevention   Statin: Yes: rosuvastatin   ACEi/ARB: Yes: irbesartan.   Urine Albumin:   Lab Results   Component Value Date    UMALCR 52.38 (H) 08/09/2022      Lab Results   Component Value Date    A1C 7.9 08/09/2022    A1C 8.0 06/08/2022    A1C 7.9 05/04/2022    A1C 7.6 11/24/2021    A1C 8.0 08/27/2021    A1C 8.1 01/08/2021    A1C 8.1 10/09/2020    A1C 8.7 08/07/2020    A1C 8.4 03/27/2020    A1C 8.7 04/23/2019     Wt Readings from Last 5 Encounters:   02/02/23 199 lb (90.3 kg)   01/16/23 199 lb (90.3 kg)   09/15/22 189 lb (85.7 kg)   07/07/22 187 lb (84.8 kg)   06/16/22 192 lb (87.1 kg)     Estimated body mass index is 36.99 kg/m  as calculated from the following:    Height as of 9/15/22: 5' 1.5\" (1.562 m).    Weight as of 2/2/23: 199 lb (90.3 kg).    Creatinine   Date Value Ref Range Status   08/09/2022 1.08 (H) 0.52 - 1.04 mg/dL Final   04/13/2021 1.38 (H) 0.52 - 1.04 mg/dL Final     CrCl cannot be calculated (Patient's most recent lab " result is older than the maximum 30 days allowed.).    GFR Estimate   Date Value Ref Range Status   2022 55 (L) >60 mL/min/1.73m2 Final     Comment:     Effective 2021 eGFRcr in adults is calculated using the  CKD-EPI creatinine equation which includes age and gender (Nicholas arriola al., [place], DOI: 10.1056/BEMMkw1269625)   2021 38 (L) >60 mL/min/[1.73_m2] Final     Comment:     Hypertension/Aortic stenosis/CAD: Current medications include irbesartan 150 mg daily, Metoprolol succinate 25 mg daily, furosemide 40 mg daily. Takes ticagrelor 90 mg twice daily. Patient reports no concerns with bruising or bleeding.   Also takes Potassium 10 mEq once daily.    Side effects: some lightheadedness with changes in position, uses caution when standing (has a walker). Notes Cardio is aware of this.     Patient does self-monitor blood pressure with cuff.   Home BP monitorin/54, 105/49,132/53's. Sometimes will get into 170's systolic. Notes systolic has been more often reading in the 100-130 range over the last week. Can feel when BP is high, will get headaches. No concerns with low BP.  BP taken during today's telephone visit (3/20/23): 154/54 mmHg (added to pt reported vitals)     BP Readings from Last 3 Encounters:   23 (!) 163/72   23 (!) 193/54   09/15/22 (!) 144/77     Pulse Readings from Last 3 Encounters:   23 71   23 69   09/15/22 68     Potassium   Date Value Ref Range Status   2022 4.1 3.4 - 5.3 mmol/L Final   2021 4.2 3.4 - 5.3 mmol/L Final      Specialist: Dr. Calderon, Cardiology. Last visit on 2022.     Hyperlipidemia: Current therapy includes rosuvastatin 20 mg every other day. Per patient advised to reduce to every other day dose due to low LDL-C (2022). Patient reports no significant myalgias or other side effects.  The ASCVD Risk score (Sam DK, et al., 2019) failed to calculate for the following reasons:    The patient has a  prior MI or stroke diagnosis  Recent Labs   Lab Test 08/09/22  0844 06/08/22  1056 06/17/16  0750 05/22/15  0848   CHOL 104 67   < > 175   HDL 46* 28*   < > 32*   LDL 42 17   < > 76   TRIG 81 109   < > 334*   CHOLHDLRATIO  --   --   --  5.5*    < > = values in this interval not displayed.     Hypothyroidism: Patient is taking Levothyroxine 50 mcg daily. Patient is having the following symptoms: none.     TSH   Date Value Ref Range Status   08/09/2022 1.83 0.40 - 4.00 mU/L Final   03/27/2020 3.47 0.40 - 4.00 mU/L Final     Migraines: Currently preventive medications include: topiramate 75 mg nightly. Patient is not experiencing side effects. No concerns with migraines at this time.     Allergic Rhinitis: Current medications include cetirizine 10 mg once daily and fluticasone nasal spray 2 spray(s) once daily as needed. Patient feels that current therapy is somewhat effective. Some dry nose with Flonase, only uses when really needs.     Pain: Currently taking Acetaminophen 500-1000mg daily as needed, Menthol 7% topical gel as needed (Zim's Max Freeze). Feels Acetaminophen is somewhat effective, notes Aleve worked better but appropriately avoiding due to kidney & cardiovascular concerns. Topical gel effective for knee pain. Patient is not experiencing side effects.      Follows with Occupational therapy for Lymphedema treatment - has found this to be helpful     Insomnia: No medications at this time. Always 'been a night owl'. However notes delay in sleep schedule/patterns have worsened.  Bedtime around 4-5 am & sleeps until noon. Tries not to nap during the day.     Previous Medications: Self-stopped trazodone - due to next-day headaches & feeling of rapid/irregular heartbeat    No history of sleep study. Not interested in a sleep medicine referral at this time, but may consider in future.     Supplements/Iron Deficiency Anemia: Currently taking Vitron-C  mg two tablets twice daily, Vitamin B12 1000 mcg three  times weekly, Vitamin D3 1000 units twice daily, Calc 600 mg-Vitamin D3 20 mcg twice daily, Zinc 50 mg daily. No reported issues at this time.     Ferritin   Date Value Ref Range Status   08/09/2022 106 8 - 252 ng/mL Final   03/27/2010 36 10 - 300 ng/mL Final     Iron   Date Value Ref Range Status   02/10/2017 75 35 - 180 ug/dL Final     Iron Binding Cap   Date Value Ref Range Status   02/10/2017 444 (H) 240 - 430 ug/dL Final     Hemoglobin   Date Value Ref Range Status   07/07/2022 11.6 (L) 11.7 - 15.7 g/dL Final   01/08/2021 11.6 (L) 11.7 - 15.7 g/dL Final       Vitamin D Deficiency Screening Results:  Lab Results   Component Value Date    VITDT 37 03/28/2017    VITDT 10 (L) 02/10/2017         Vaccines:   Immunization History   Administered Date(s) Administered     COVID-19 Vaccine 12+ (Pfizer) 01/30/2021, 02/20/2021, 09/27/2021, 09/28/2021     COVID-19 Vaccine Bivalent Booster 18+ (Moderna) 09/16/2022     Flu, Unspecified 09/17/2019, 09/26/2020     Influenza (High Dose) 3 valent vaccine 10/09/2015, 09/22/2016, 09/21/2017     Influenza (IIV3) PF 12/03/2001, 11/25/2002, 10/22/2004, 11/03/2005, 11/07/2006, 10/25/2007, 10/18/2008, 09/21/2009, 10/01/2011, 11/23/2012, 08/06/2013, 11/14/2014     Influenza Vaccine 65+ (Fluzone HD) 09/13/2021     Pneumo Conj 13-V (2010&after) 06/20/2016     Pneumococcal 23 valent 12/03/2001, 09/21/2009, 05/21/2012, 08/25/2017     TD (ADULT, 7+) 01/15/1986, 09/12/2005     TDAP Vaccine (Adacel) 10/09/2015     Zoster vaccine recombinant adjuvanted (SHINGRIX) 03/16/2021, 06/26/2021     Zoster vaccine, live 09/24/2012     Today's Vitals: LMP  (LMP Unknown)   ----------------    I spent 60 minutes with this patient today. All changes were made via verbal approval with Judith Aviles MD. A copy of the visit note was provided to the patient's provider(s).    A summary of these recommendations was sent via [facility].    Ms. Wilkins was seen independently by Dr. Steel. I have reviewed and agree  with the resident note and plan of care.  Joanne Gamble, PharmD      Zulma Steel, PharmD  Medication Therapy Management Resident  Pager: [phone]    Telemedicine Visit Details  Type of service:  Telephone visit  Start Time: 1:05pm  End Time: 2:05pm     Medication Therapy Recommendations  Insomnia, unspecified type    Rationale: Untreated condition - Needs additional medication therapy - Indication   Recommendation: Start Medication - melatonin 3 MG tablet - May try Melatonin 3-5 mg nightly 1-2 hrs before desired bedtime   Status: Accepted - no CPA Needed         Type 2 diabetes mellitus (H)    Current Medication: HUMALOG KWIKPEN 100 UNIT/ML soln   Rationale: Incorrect administration - Dosage too low - Effectiveness   Recommendation: Change Administration Time - Give Humalog immediatley or 5-15 minutes before meals. Continue insulin:carb dosing.   Status: Accepted - no CPA Needed

## 2023-03-20 NOTE — LETTER
"Recommended To-Do List      Prepared on: Mar 20, 2023       You can get the best results from your medications by completing the items on this \"To-Do List.\"      Bring your To-Do List when you go to your doctor. And, share it with your family or caregivers.    My To-Do List:  What we talked about: What I should do:   A new medication that may be of benefit to you    May consider trying melatonin 3 to 5 mg tablet nightly 1-2 hours before desired bedtime          What we talked about: What I should do:   Your medication dosage being too low    Work on taking HumaLOG KWIKpen immediately or 5-15 minutes before meals.           What we talked about: What I should do:                       "

## 2023-03-20 NOTE — LETTER
March 21, 2023  Ade Wilkins  [address] [id number]    Dear Ms. Wilkins, [facility]     Thank you for talking with me on Mar 20, 2023 about your health and medications. As a follow-up to our conversation, I have included two documents:      1. Your Recommended To-Do List has steps you should take to get the best results from your medications.  2. Your Medication List will help you keep track of your medications and how to take them.    If you want to talk about these documents, please call Zulma Steel RPH at phone: [phone], Monday-Friday 8-4:30pm.    I look forward to working with you and your doctors to make sure your medications work well for you.    Sincerely,  Zulma Steel RPH  [facility] Pharmacist, [facility]

## 2023-03-20 NOTE — LETTER
_  Medication List        Prepared on: Mar 20, 2023     Bring your Medication List when you go to the doctor, hospital, or   emergency room. And, share it with your family or caregivers.     Note any changes to how you take your medications.  Cross out medications when you no longer use them.    Medication How I take it Why I use it Prescriber   acetaminophen (TYLENOL) 500 MG tablet Take 1-2 tablets (500-1,000 mg) by mouth every 6 hours as needed for mild pain Pain Patient Reported   aspirin (ASA) 81 MG chewable tablet Take 1 tablet (81 mg) by mouth daily  Coronary artery disease  Cammy Worrell MD   Calcium Carb-Cholecalciferol (CALCIUM 600+D) 600-20 MG-MCG TABS Take 1 tablet (600 MG-800 IU) by mouth 2 times daily General Health Supplement Patient Reported   cetirizine (ZYRTEC) 10 MG tablet Take 1 tablet (10 mg) by mouth daily Allergies Dimitry Howard MD   cyanocolbalamin (VITAMIN B-12) 1000 MCG tablet Take 1 tablet (1,000 mcg) by mouth three times a week  Vitamin D Deficiency  Dimitry Howard MD   Dulaglutide (TRULICITY) 4.5 MG/0.5ML SOPN Inject 4.5 mg Subcutaneous once a week Type 2 diabetes Roxanne Masterson PA-C   fluticasone (FLONASE) 50 MCG/ACT nasal spray Spray 2 sprays into both nostrils daily as needed for allergies  Allergies Judith Aviles MD   furosemide (LASIX) 40 MG tablet Take 1 tablet (40 mg) by mouth once daily  Edema/Swelling & Blood pressure Judith Aviles MD   HUMALOG KWIKPEN 100 UNIT/ML soln Inject per sliding scale (1 unit per 3 servings of carbs) three times daily immediately before meals. Up to 60 units total daily.  type 2 diabetes  Roxanne Masterson PA-C   insulin degludec (TRESIBA) 200 UNIT/ML pen Inject 32 Units Subcutaneous daily Type 2 diabetes  Olga Lidia Hoover MD   irbesartan (AVAPRO) 150 MG tablet Take 1 tablet (150 mg) by mouth daily Hypertension Latasha Calderon PA-C   levothyroxine (SYNTHROID/LEVOTHROID) 50 MCG tablet Take 1 tablet (50 mcg) by mouth daily  Hypothyroidism Olga Lidia Hoover MD   metFORMIN (GLUCOPHAGE) 1000 MG tablet Take 1 tablet (1,000 mg) by mouth daily with breakfast Diabetes Roxanne Masterson PA-C   metoprolol succinate ER (TOPROL XL) 25 MG 24 hr tablet Take 1 tablet (25 mg) by mouth 2 times daily Aortic stenosis/ Heart Machobike ANYA Calderon   potassium chloride ER (KLOR-CON M) 10 MEQ CR tablet Take 1 tablet (10 mEq) by mouth daily Potassium supplement Judith Aviles MD   rosuvastatin (CRESTOR) 20 MG tablet Take 1 tablet (20 mg) by mouth every other day Cholesterol Judith Aviles MD   ticagrelor (BRILINTA) 90 MG tablet Take 1 tablet (90 mg) by mouth 2 times daily  Coronary artery disease  Judith Aviles MD   topiramate (TOPAMAX) 25 MG tablet Take 3 tablets (75 mg) by mouth At Bedtime Migraine Prevention Olga Lidia Hoover MD   VITRON-C  MG TABS tablet Take 2 tablets by mouth twice daily  Iron deficiency anemia Dimitry Howard MD   zinc 50 MG TABS Take 1 tablet by mouth daily  Supplement  Patient Reported         Add new medications, over-the-counter drugs, herbals, vitamins, or  minerals in the blank rows below.    Medication How I take it Why I use it Prescriber                                      Allergies:      norvasc [amlodipine]; chlorhexidine; clonidine; doxazosin mesylate; fexofenadine hydrochloride; gemfibrozil; lisinopril; pioglitazone hydrochloride        Side effects I have had:               Other Information:              My notes and questions:

## 2023-03-21 NOTE — PATIENT INSTRUCTIONS
"Recommendations from today's MTM visit:                                                    MTM (medication therapy management) is a service provided by a clinical pharmacist designed to help you get the most of out of your medicines.   Today we reviewed what your medicines are for, how to know if they are working, that your medicines are safe and how to make your medicine regimen as easy as possible.      1. Work on giving Humalog immediately or 5-15 minutes before the start of meals. Continue to use your insulin to carb ratio of 3 units per 1 servings of carbs (15 g) to estimate appropriate dose.    2. May try Melatonin 3 to 5 mg nightly at least 1.5 hours before desired bedtime. May also consider keeping a sleep diary/journal to better track your sleep patterns & habits. See below.     3. See information below that I have included on \"delayed sleep cycle disorders\" and educational materials available online     4. Schedule follow-up with Cardiology for June 2023. Please continue to monitor home BP readings      Follow-up: Return in about 3 months (around 6/20/2023) for Follow up, Medication Therapy Management. I will reach out by MyChart or phone call to check-in on how your sleep and blood pressure are doing    It was great speaking with you today.  I value your experience and would be very thankful for your time in providing feedback in our clinic survey. In the next few days, you may receive an email or text message from [employer] with a link to a survey related to your  clinical pharmacist.\"     To schedule another MTM appointment, please call the clinic directly or you may call the MTM scheduling line at [phone] or toll-free at [phone].     My Clinical Pharmacist's contact information:                                                      Please feel free to contact me with any questions or concerns you have.      Zulma Steel, PharmD  Medication Therapy Management Resident  Pager: [phone] " "[phone]      Treating insomnia can be difficult because the medications we use can be harmful, especially in older adults. In addition, sleep medications do not tend to be very effective and should only be used short-term. Cognitive behavioral therapy (CBT) is the most effective treatment for long-term insomnia. The goal of CBT for insomnia is to address the underlying causes of the sleep problems, including your habits and how you think about sleep. You can do CBT with a trained psychologist, or from the comfort of your home by following an online program or workbook. Here are some great resources for at-home CBT:    1) 6-week online CBT course through [facility] for $40: [employer]/sleep  2) \"Say Tj to Insomnia\" by Ramos Fletcher, PhD at [employer]: 6-week program  3) \"Overcoming Insomnia: A Cognitive-Behavioral Therapy Approach Workbook\" by Mark Bragg and Elva Henriquez  4) \"Quiet Your Mind and Get to Sleep: Solutions to Insomnia for Those with Depression, Anxiety or Chronic Pain ([place] Self-Help Workbook) by Elva Henriquez and Yahaira Johnson    Sleep Diary/Journal:  To download & print a guided sleep diary/log search for \"https://www.sleepfoundation.org/sleep-diary\"      For Additional Sleep Education Resources: may go to \"www.sleepeducation.org\" or specifically  \"https://sleepeducation.org/sleep-disorders/delayed-sleep-wake-phase/\"       "

## 2023-03-23 ENCOUNTER — VIRTUAL VISIT (OUTPATIENT)
Dept: EDUCATION SERVICES | Facility: CLINIC | Age: 73
End: 2023-03-23
Payer: COMMERCIAL

## 2023-03-23 ENCOUNTER — PATIENT OUTREACH (OUTPATIENT)
Dept: NURSING | Facility: CLINIC | Age: 73
End: 2023-03-23
Payer: COMMERCIAL

## 2023-03-23 DIAGNOSIS — E11.40 TYPE 2 DIABETES MELLITUS WITH DIABETIC NEUROPATHY, UNSPECIFIED WHETHER LONG TERM INSULIN USE (H): Primary | ICD-10-CM

## 2023-03-23 PROCEDURE — 99207 PR NO BILLABLE SERVICE THIS VISIT: CPT | Mod: VID | Performed by: NUTRITIONIST

## 2023-03-23 ASSESSMENT — ACTIVITIES OF DAILY LIVING (ADL): DEPENDENT_IADLS:: TRANSPORTATION;COOKING;SHOPPING;MEAL PREPARATION

## 2023-03-23 NOTE — LETTER
[address] [facility] [address]    [facility]  Patient Centered Plan of Care  About Me:        Patient Name:  Ade Wilkins    YOB: 1950  Age:         72 year old   Romulo MRN:    [id number] Telephone Information:  Home Phone [phone]   Mobile [phone]       Address:  [address] [address] Email address:  [email or web address]      Emergency Contact(s)    Name Relationship Lgl Grd Work Phone Home Phone Mobile Phone   1. NAUN WILKINS Sister  [phone]   2. MAXIMILIAN BONILLA Friend   [phone]           Primary language:  English     needed? No   [employer]:  [phone] op. 1  Other communication barriers:None    Preferred Method of Communication:  Dorie  Current living arrangement: I live in a private home with family (Sister and sister's boyfriend)    Mobility Status/ Medical Equipment: Independent w/Device    Health Maintenance  Health Maintenance Reviewed: Due/Overdue   Health Maintenance Due   Topic Date Due     FALL RISK ASSESSMENT  03/24/2022     BMP  01/07/2023     MICROALBUMIN  02/09/2023     MEDICARE ANNUAL WELLNESS VISIT  03/28/2023     EYE EXAM  04/08/2023           My Access Plan  Medical Emergency 911   Primary Clinic Line [facility] - [phone]   24 Hour Appointment Line [phone] or  [phone]) (toll-free)   24 Hour Nurse Line [phone] (toll-free)   Preferred Urgent Care [facility], [phone]     Preferred Hospital [facility]  [phone]     Preferred Pharmacy [id number] - [place], [place] - [address] [facility] Crisis Line The National Suicide Prevention Lifeline at 1-183.586.4868 or Text/Call 238     My Care Team Members  Patient Care Team       Relationship Specialty Notifications Start End    Judith Aviles MD PCP - General Internal  Medicine  3/28/22     Phone: [phone] Fax: [phone]         [address] [place] [address]    Melvi Naik MD MD INTERNAL MEDICINE - ENDOCRINOLOGY, DIABETES & METABOLISM  3/2/17     Phone: [phone] Fax: [phone]         [facility] [address] [place] [address]    Roxanne Masterson PA-C Physician Assistant Endocrinology, Diabetes, and Metabolism  9/7/21     Phone: [phone] Fax: [phone]         [address] [facility] [address]    Judith Aviles MD Assigned PCP   10/31/21     Phone: [phone] Fax: [phone]         [address] [place] [address]    Leanne Humphreys RN Lead Care Coordinator  Admissions 6/14/22     Roney Lentz EP Cardiac Rehabilitation Therapist   6/23/22 6/23/23    Phone: [phone] Fax: [phone] [address] [facility], [address] [facility] [address]    Latasha Calderon PA-C Assigned Heart and Vascular Provider   7/16/22     Phone: [phone] Fax: [phone] [address] [facility] [address]    Carter Celis MD MD Dermatology  8/15/22     Phone: [phone] Fax: [phone]         [address] [place] [address]    Olamide Rothman, RN Diabetes Educator Diabetes Education  8/16/22     Phone: [phone] Fax: [phone]         [facility] [address] [address]    Joanne Gamble, [facility] Assigned MTM Pharmacist   9/28/22     Phone: [phone] Pager: [phone]         [address] [facility] [address]    Carter Celis MD Assigned Surgical Provider   1/14/23     Phone: [phone] Fax: [phone]         [address] [place] [address]    Roxanne Masterson PA-C Assigned Endocrinology Provider   1/21/23     Phone: [phone] Fax: [phone]         [address] [place] [address]            My Care Plans  Self Management and Treatment Plan  Care Plan  Care Plan: Advance Care Plan      Problem: Patient does not have a valid Health Care Directive     Goal: Complete Health Care Directive     Start Date: 6/14/2022 Expected End Date: 6/23/2023    This Visit's Progress: 40% Recent Progress: 40%    Note:     Goal Statement: I will complete a Health Care Directive and have this scanned into my Medical Record.  Barriers: Patient doesn't have a Health Care Directive  Strengths: Strong advocate for self  Patient expressed understanding of goal: yes  Action steps to achieve this goal:  1. Care Coordination will mail me a Health Care Directive and any requested resources (goals worksheets, education sheets, class flyer).   2. I will complete the Health Care Directive. My signature must be either notarized or witnessed by two adults who are not named as health care agents in the document. Additionally only one of the witnesses can be employed by my health care system. If I need a notary I can check with my bank, my place of [employer], [employer] stores, or look online at [email or web address] .  3. I will provide a copy of my Health Care Directive to my care team and/or email directly to [email or web address].   4. I can visit [email or web address] website, email [email or web address] or call [facility] [employer] at [phone] (M-Friday 6a to 5p) for more information including free classes on completing a Health Care Directive.  5. I will contact my care team with any barriers, questions or assistance needed with this goal. Care coordinator will remain available as needed.                          Action Plans on File:     Advance Care Plans/Directives Type:   -- (Full Code)      My Medical and Care Information  Problem List   Patient Active Problem List   Diagnosis     Type 2 diabetes mellitus with diabetic neuropathy (H)     Type 2 diabetes mellitus with diabetic nephropathy (H)     Type 2 diabetes mellitus (H)     Benign essential hypertension     Diabetic retinopathy of both eyes (H)      Pure hypercholesterolemia     Osteopenia     Chronic kidney disease, stage 3b (H)     Morbid obesity (H)     Obesity (BMI 30-39.9)     Status post coronary angiogram     Aortic stenosis, severe     Vision changes      Current Medications and Allergies:    Allergies   Allergen Reactions     Norvasc [Amlodipine] Other (See Comments)     hairloss     Chlorhexidine Rash     Clonidine Headache     Doxazosin Mesylate Rash     Fexofenadine Hydrochloride Headache     Gemfibrozil Rash     Lisinopril Angioedema     Pioglitazone Hydrochloride Swelling     ankle edema     Current Outpatient Medications   Medication     acetaminophen (TYLENOL) 500 MG tablet     aspirin (ASA) 81 MG chewable tablet     augmented betamethasone dipropionate (DIPROLENE-AF) 0.05 % external ointment     Calcium Carb-Cholecalciferol (CALCIUM 600+D) 600-20 MG-MCG TABS     cetirizine (ZYRTEC) 10 MG tablet     Continuous Blood Gluc  (FREESTYLE BALWINDER 2 READER) HENNA     Continuous Blood Gluc Sensor (FREESTYLE BALWINDER 2 SENSOR) MISC     cyanocolbalamin (VITAMIN B-12) 1000 MCG tablet     Dulaglutide (TRULICITY) 4.5 MG/0.5ML SOPN     fluticasone (FLONASE) 50 MCG/ACT nasal spray     furosemide (LASIX) 40 MG tablet     HUMALOG KWIKPEN 100 UNIT/ML soln     insulin degludec (TRESIBA) 200 UNIT/ML pen     insulin pen needle (BD FCO U/F) 32G X 4 MM miscellaneous     irbesartan (AVAPRO) 150 MG tablet     levothyroxine (SYNTHROID/LEVOTHROID) 50 MCG tablet     Menthol, Topical Analgesic, 7 % LIQD     metFORMIN (GLUCOPHAGE) 1000 MG tablet     metoprolol succinate ER (TOPROL XL) 25 MG 24 hr tablet     potassium chloride ER (KLOR-CON M) 10 MEQ CR tablet     rosuvastatin (CRESTOR) 20 MG tablet     ticagrelor (BRILINTA) 90 MG tablet     topiramate (TOPAMAX) 25 MG tablet     triamcinolone (KENALOG) 0.1 % external ointment     VITRON-C  MG TABS tablet     zinc 50 MG TABS     No current facility-administered medications for this visit.     Care Coordination  Start Date: 6/14/2022   Frequency of Care Coordination: monthly     Form Last Updated: 03/23/2023

## 2023-03-23 NOTE — PROGRESS NOTES
"Clinic Care Coordination Contact    Follow Up Progress Note      Assessment:   Patient reports her blood sugars are \"much better than in December\" due to patient \"kicking myself in the butt\" and sticking to a better diet.  Patient has had recent virtual visit with [facility] and with the Certified Diabetic Educator to help manage patients diabetes.    Patient states she has not had a chance to complete a Health Care Directive at this time and will \"take a look at it\" and will \"try\" to have this completed in the next month to 2 months.    Care Gaps:    Health Maintenance Due   Topic Date Due     FALL RISK ASSESSMENT  03/24/2022     BMP  01/07/2023     MICROALBUMIN  02/09/2023     MEDICARE ANNUAL WELLNESS VISIT  03/28/2023     EYE EXAM  04/08/2023     Care Gaps Last addressed on 3/23/2023    Care Plans  Care Plan: Advance Care Plan     Problem: Patient does not have a valid Health Care Directive     Goal: Complete Health Care Directive     Start Date: 6/14/2022 Expected End Date: 6/23/2023    This Visit's Progress: 40% Recent Progress: 40%    Note:     Goal Statement: I will complete a Health Care Directive and have this scanned into my Medical Record.  Barriers: Patient doesn't have a Health Care Directive  Strengths: Strong advocate for self  Patient expressed understanding of goal: yes  Action steps to achieve this goal:  1. Care Coordination will mail me a Health Care Directive and any requested resources (goals worksheets, education sheets, class flyer).   2. I will complete the Health Care Directive. My signature must be either notarized or witnessed by two adults who are not named as health care agents in the document. Additionally only one of the witnesses can be employed by my health care system. If I need a notary I can check with my bank, my place of Orthodox, [employer] stores, or look online at [email or web address] .  3. I will provide a copy of my Health Care Directive to my care team and/or email directly to " [email or web address].   4. I can visit [email or web address] website, email [email or web address] or call [facility] [employer] at [phone] (M-Friday 6a to 5p) for more information including free classes on completing a Health Care Directive.  5. I will contact my care team with any barriers, questions or assistance needed with this goal. Care coordinator will remain available as needed.                       Intervention/Education provided during outreach:   RNCC called and spoke with patient/caregiver; introduced self, discussed role of Care Coordination and explained reason for call    Plan:   -Patient will contact the care team with questions, concerns, support needs   -Patient will use the clinic as a resource and understands (s)he can contact the [facility] with 24/7 after hours services available  -Care Coordinator will remain available as needed  -RNCC will follow up in one month for follow up appointments/recommendations and goal progression     Leanne Humphreys RN, BSN, PHN  Primary Care / Care Coordinator   [facility]  E-mail [email or web address]   [phone]

## 2023-03-27 ENCOUNTER — TELEPHONE (OUTPATIENT)
Dept: CARDIOLOGY | Facility: CLINIC | Age: 73
End: 2023-03-27

## 2023-03-27 NOTE — TELEPHONE ENCOUNTER
Health Call Center    Phone Message    May a detailed message be left on voicemail: yes     Reason for Call: Other: Patient called in to schedule appt with Dr. Lewis, please call patient back to further coorinate appt. Thank you.      Action Taken: Other: cardiology    Travel Screening: Not Applicable     Thank you!  [facility]

## 2023-04-10 DIAGNOSIS — I35.0 AORTIC VALVE STENOSIS, ETIOLOGY OF CARDIAC VALVE DISEASE UNSPECIFIED: ICD-10-CM

## 2023-04-10 RX ORDER — FUROSEMIDE 40 MG
40 TABLET ORAL DAILY
Qty: 90 TABLET | Refills: 1 | Status: SHIPPED | OUTPATIENT
Start: 2023-04-10 | End: 2023-10-17

## 2023-04-14 ENCOUNTER — TRANSFERRED RECORDS (OUTPATIENT)
Dept: HEALTH INFORMATION MANAGEMENT | Facility: CLINIC | Age: 73
End: 2023-04-14
Payer: COMMERCIAL

## 2023-04-24 ENCOUNTER — PATIENT OUTREACH (OUTPATIENT)
Dept: NURSING | Facility: CLINIC | Age: 73
End: 2023-04-24
Payer: COMMERCIAL

## 2023-04-24 ASSESSMENT — ACTIVITIES OF DAILY LIVING (ADL): DEPENDENT_IADLS:: TRANSPORTATION;COOKING;SHOPPING;MEAL PREPARATION

## 2023-04-24 NOTE — PROGRESS NOTES
"Clinic Care Coordination Contact    Follow Up Progress Note      Assessment:   Patient reports she's had a FreeStyle Wyatt for \"3-4 years\" and is happy with the continuous glucose monitor.  Patient states her average blood sugar the last 30 days is 177 and the average blood sugar the last 90 days has been 173.  Patient denies any hypo/hyperglycemia episodes since we last spoke.    Patient reports she has received and reviewed her Health Care Directive yet has not completed.  Patient requesting to \"please keep after me\" to complete and have scanned in EMR.    Care Gaps:    Health Maintenance Due   Topic Date Due     FALL RISK ASSESSMENT  03/24/2022     BMP  01/07/2023     MICROALBUMIN  02/09/2023     EYE EXAM  04/08/2023     A1C  04/16/2023     MEDICARE ANNUAL WELLNESS VISIT  03/28/2023     Care Gaps Last addressed on 4/24/2023    Care Plans  Care Plan: Advance Care Plan     Problem: Patient does not have a valid Health Care Directive     Goal: Complete Health Care Directive     Start Date: 6/14/2022 Expected End Date: 6/23/2023    This Visit's Progress: 40% Recent Progress: 40%    Note:     Goal Statement: I will complete a Health Care Directive and have this scanned into my Medical Record.  Barriers: Patient doesn't have a Health Care Directive  Strengths: Strong advocate for self  Patient expressed understanding of goal: yes  Action steps to achieve this goal:  1. Care Coordination will mail me a Health Care Directive and any requested resources (goals worksheets, education sheets, class flyer).   2. I will complete the Health Care Directive. My signature must be either notarized or witnessed by two adults who are not named as health care agents in the document. Additionally only one of the witnesses can be employed by my health care system. If I need a notary I can check with my bank, my place of [employer], [employer] stores, or look online at [email or web address] .  3. I will provide a copy of my Health Care Directive to my " care team and/or email directly to [email or web address].   4. I can visit [email or web address] website, email [email or web address] or call [facility] at [phone] (M-Friday 6a to 5p) for more information including free classes on completing a Health Care Directive.  5. I will contact my care team with any barriers, questions or assistance needed with this goal. Care coordinator will remain available as needed.                       Intervention/Education provided during outreach:   RNCC called and spoke with patient/caregiver; introduced self, discussed role of Care Coordination and explained reason for call    Plan:   -Patient will contact the care team with questions, concerns, support needs   -Patient will use the clinic as a resource and understands (s)he can contact the [facility] with 24/7 after hours services available  -Care Coordinator will remain available as needed  -RNCC will follow up in one month for follow up appointments/recommendations and goal progression     Leanne Humphreys RN, BSN, PHN  Primary Care / Care Coordinator   [facility]  E-mail [email or web address]   [phone]

## 2023-04-25 DIAGNOSIS — Z95.2 S/P TAVR (TRANSCATHETER AORTIC VALVE REPLACEMENT): Primary | ICD-10-CM

## 2023-05-01 ASSESSMENT — ENCOUNTER SYMPTOMS
NECK MASS: 0
EXERCISE INTOLERANCE: 1
TROUBLE SWALLOWING: 0
TASTE DISTURBANCE: 0
ORTHOPNEA: 0
HYPERTENSION: 0
SMELL DISTURBANCE: 0
SINUS PAIN: 1
SINUS CONGESTION: 1
SORE THROAT: 0
SLEEP DISTURBANCES DUE TO BREATHING: 0
LEG PAIN: 1
HYPOTENSION: 0
PALPITATIONS: 0
SYNCOPE: 0
HOARSE VOICE: 0
LIGHT-HEADEDNESS: 1

## 2023-05-05 ENCOUNTER — TELEPHONE (OUTPATIENT)
Dept: ENDOCRINOLOGY | Facility: CLINIC | Age: 73
End: 2023-05-05
Payer: COMMERCIAL

## 2023-05-05 NOTE — PROGRESS NOTES
Patient is showing 3/5 MNCM met. A1c not in range   ASHLEIGH Wallace     HPI:   Rachael is a 72 year old female with history morbid obesity S/P Gastric bypass surgery, hypothyroidism, CKD, CAD, s/p stent placement and valve replacement for aortic stenosis 2022. here for follow up of type 2 diabetes.  Her diabetes mellitus is complicated by diabetic neuropathy, mild nonproliferative retinopathy and nephropathy.  She also follows with . She has had type 2 Diabetes since her early 20s.  Her lymphedema has improved.  She went to a lymphedema clinic 3 times a week for 3 weeks.  Her legs go to sleep at night.  Her sleep has been better- now going to bed around midnight and waking up at 9am.  Frozen shoulder is better.  Glucose has improved significantly.  Now using a laurie 2, which allows her to pay more attention to her glucose.      Rachael is currently taking the following for her diabetes:   Tresiba 32 units at night.   Humalog- depends on what she is eating 4 units/15g carb.  She is working on taking insulin before she eats.    Dulaglutide 4.5 mg weekly on Sundays. $1000 for a 3 mo supply copay.   Metformin 1000 mg daily.     Previous treatments:   Jardiance 10 mg daily stopped in 2022- was expensive.        Breakfast- breakfast bar or oatmeal or cream of wheat, rarely toast.   Lunch- sandwich- bagel with ham or chicken.   Evening- Riced cauliflower with green beans or cheese.  Fish or chicken or sometimes pork.    Snack before bed if glucose is lower.     Patient wears a laurie 2 sensor with an overall average of 170 mg/dL (CV 33%, TIR 64%, hypo 0%, severe hypo 0%) over the past 2 weeks. Recent glucose is as follows:                           She otherwise is generally feeling well and has no other concerns.       Past Medical History:   Diagnosis Date     Benign essential hypertension      Chronic kidney disease, stage 3b (H)      Diabetic retinopathy of both eyes (H)      Obesity (BMI 30-39.9)      Osteopenia       S/P TAVR (transcatheter aortic valve replacement) 06/07/2022     Type 2 diabetes mellitus with diabetic nephropathy (H)        Past Surgical History:   Procedure Laterality Date     APPENDECTOMY       CATARACT IOL, RT/LT Bilateral      CV CORONARY ANGIOGRAM N/A 5/4/2022    Procedure: Coronary Angiogram;  Surgeon: Hector Lewis MD;  Location:  [facility]     CV LEFT HEART CATH N/A 5/4/2022    Procedure: Left Heart Catheterization;  Surgeon: Hector Lewis MD;  Location:  [facility]     CV PCI N/A 5/4/2022    Procedure: Percutaneous Coronary Intervention;  Surgeon: Hector Lewis MD;  Location:  [facility]     CV TRANSCATHETER AORTIC VALVE REPLACEMENT-FEMORAL APPROACH N/A 6/7/2022    Procedure: Transcatheter Aortic Valve Replacement-Femoral Approach;  Surgeon: Hector Lewis MD;  Location:  [facility]     GASTRIC BYPASS  2004     TONSILLECTOMY & ADENOIDECTOMY         Family History   Problem Relation Age of Onset     Diabetes Type 2  Mother      Glaucoma Mother      Coronary Artery Disease Mother      Abdominal Aortic Aneurysm Father      Myocardial Infarction Father      Breast Cancer Sister      Abdominal Aortic Aneurysm Brother      Bladder Cancer Brother      Diabetes Type 2  Brother      Liver Cancer Brother      Myocardial Infarction Brother      Alzheimer Disease Paternal Grandmother      Cerebrovascular Disease Paternal Grandfather      Ovarian Cancer No family hx of      Colon Cancer No family hx of        Social History     Socioeconomic History     Marital status:      Spouse name: Not on file     Number of children: 0     Years of education: Not on file     Highest education level: Not on file   Occupational History     Employer: [employer],[address] [employer]   Social Needs     Financial resource strain: Not on file     Food insecurity:     Worry: Not on file     Inability: Not on file      Transportation needs:     Medical: Not on file     Non-medical: Not on file   Tobacco Use     Smoking status: Never Smoker     Smokeless tobacco: Never Used   Substance and Sexual Activity     Alcohol use: No     Drug use: No     Sexual activity: Never     Partners: Male   Lifestyle     Physical activity:     Days per week: Not on file     Minutes per session: Not on file     Stress: Not on file   Relationships     Social connections:     Talks on phone: Not on file     Gets together: Not on file     Attends [employer] service: Not on file     Active member of club or organization: Not on file     Attends meetings of clubs or organizations: Not on file     Relationship status: Not on file     Intimate partner violence:     Fear of current or ex partner: Not on file     Emotionally abused: Not on file     Physically abused: Not on file     Forced sexual activity: Not on file   Other Topics Concern      Service Not Asked     Blood Transfusions Not Asked     Caffeine Concern Yes     Comment: 20 oz daily     Occupational Exposure Not Asked     Hobby Hazards Not Asked     Sleep Concern Not Asked     Stress Concern Not Asked     Weight Concern Not Asked     Special Diet Not Asked     Back Care Not Asked     Exercise No     Bike Helmet Not Asked     Seat Belt Yes     Self-Exams Yes     Parent/sibling w/ CABG, MI or angioplasty before 65F 55M? Not Asked   Social History Narrative    Living arrangements - the patient lives alone.   Social Hx: Lives with her sister and her sister's boyfriend.  . 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     Current Outpatient Medications   Medication     acetaminophen (TYLENOL) 500 MG tablet     aspirin (ASA) 81 MG chewable tablet     augmented betamethasone dipropionate (DIPROLENE-AF) 0.05 % external ointment     Calcium Carb-Cholecalciferol (CALCIUM 600+D) 600-20 MG-MCG TABS     cetirizine (ZYRTEC) 10 MG tablet     Continuous Blood Gluc  (FREESTYLE BALWINDER  "2 READER) HENNA     Continuous Blood Gluc Sensor (FREESTYLE BALWINDER 2 SENSOR) MISC     cyanocolbalamin (VITAMIN B-12) 1000 MCG tablet     Dulaglutide (TRULICITY) 4.5 MG/0.5ML SOPN     fluticasone (FLONASE) 50 MCG/ACT nasal spray     furosemide (LASIX) 40 MG tablet     HUMALOG KWIKPEN 100 UNIT/ML soln     insulin degludec (TRESIBA) 200 UNIT/ML pen     insulin pen needle (BD FCO U/F) 32G X 4 MM miscellaneous     irbesartan (AVAPRO) 150 MG tablet     levothyroxine (SYNTHROID/LEVOTHROID) 50 MCG tablet     Menthol, Topical Analgesic, 7 % LIQD     metFORMIN (GLUCOPHAGE) 1000 MG tablet     metoprolol succinate ER (TOPROL XL) 25 MG 24 hr tablet     potassium chloride ER (KLOR-CON M) 10 MEQ CR tablet     rosuvastatin (CRESTOR) 20 MG tablet     ticagrelor (BRILINTA) 90 MG tablet     topiramate (TOPAMAX) 25 MG tablet     triamcinolone (KENALOG) 0.1 % external ointment     VITRON-C  MG TABS tablet     zinc 50 MG TABS     No current facility-administered medications for this visit.          Allergies   Allergen Reactions     Norvasc [Amlodipine] Other (See Comments)     hairloss     Chlorhexidine Rash     Clonidine Headache     Doxazosin Mesylate Rash     Fexofenadine Hydrochloride Headache     Gemfibrozil Rash     Lisinopril Angioedema     Pioglitazone Hydrochloride Swelling     ankle edema     Physical Exam  BP (!) 162/75 (BP Location: Left arm, Patient Position: Sitting, Cuff Size: Adult Large)   Pulse 77   Ht 1.549 m (5' 1\")   Wt 87.8 kg (193 lb 9.6 oz)   LMP  (LMP Unknown)   SpO2 97%   BMI 36.58 kg/m    GENERAL:  Alert and oriented X3, NAD, well dressed, answering questions appropriately, appears stated age.  HEENT: OP clear, no lymphadenopathy, no thyromegaly, non-tender, no exophthalmus, no proptosis, EOMI, no lid lag, no retraction  EXTREMITIES: 1+ edema, up her shins  NEUROLOGY: reduced monofilament sensation    RESULTS  Lab Results   Component Value Date    A1C 7.9 (H) 08/09/2022    A1C 8.0 (H) 06/08/2022    " A1C 7.9 (H) 05/04/2022    A1C 7.6 (H) 11/24/2021    A1C 8.0 (H) 08/27/2021    A1C 8.1 (H) 01/08/2021    A1C 8.1 (H) 10/09/2020    A1C 8.7 (A) 08/07/2020    A1C 8.4 (H) 03/27/2020    A1C 8.7 (H) 04/23/2019    HEMOGLOBINA1 7.6 05/08/2023    HEMOGLOBINA1 8.6 01/16/2023    HEMOGLOBINA1 8.1 (A) 01/07/2020    HEMOGLOBINA1 8.7 (A) 04/22/2019    HEMOGLOBINA1 8.1 (A) 12/10/2018       TSH   Date Value Ref Range Status   08/09/2022 1.83 0.40 - 4.00 mU/L Final   08/27/2021 2.37 0.40 - 4.00 mU/L Final   03/27/2020 3.47 0.40 - 4.00 mU/L Final   10/14/2019 2.54 0.40 - 4.00 mU/L Final   04/23/2019 4.06 (H) 0.40 - 4.00 mU/L Final   05/24/2018 2.54 0.40 - 4.00 mU/L Final   03/12/2018 4.78 (H) 0.40 - 4.00 mU/L Final     T4 Free   Date Value Ref Range Status   04/23/2019 1.10 0.76 - 1.46 ng/dL Final   03/12/2018 1.17 0.76 - 1.46 ng/dL Final   10/17/2016 1.17 0.76 - 1.46 ng/dL Final   05/05/2010 1.05 0.70 - 1.85 ng/dL Final   09/13/2007 1.00 0.70 - 1.85 ng/dL Final       ALT   Date Value Ref Range Status   06/08/2022 63 (H) 0 - 50 U/L Final   06/01/2022 160 (H) 0 - 50 U/L Final   04/13/2021 38 0 - 50 U/L Final   03/27/2020 28 0 - 50 U/L Final   ]    Recent Labs   Lab Test 08/09/22  0844 06/08/22  1056 06/17/16  0750 05/22/15  0848   CHOL 104 67   < > 175   HDL 46* 28*   < > 32*   LDL 42 17   < > 76   TRIG 81 109   < > 334*   CHOLHDLRATIO  --   --   --  5.5*    < > = values in this interval not displayed.       Lab Results   Component Value Date     07/07/2022     04/13/2021      Lab Results   Component Value Date    POTASSIUM 4.1 08/09/2022    POTASSIUM 4.2 04/13/2021     Lab Results   Component Value Date    CHLORIDE 107 07/07/2022    CHLORIDE 110 04/13/2021     Lab Results   Component Value Date    RASHEEDA 9.5 07/07/2022    RASHEEDA 9.2 04/13/2021     Lab Results   Component Value Date    CO2 23 07/07/2022    CO2 28 04/13/2021     Lab Results   Component Value Date    BUN 20 08/09/2022    BUN 29 04/13/2021     Lab Results    Component Value Date    CR 1.08 08/09/2022    CR 1.38 04/13/2021       GFR Estimate   Date Value Ref Range Status   08/09/2022 55 (L) >60 mL/min/1.73m2 Final     Comment:     Effective December 21, 2021 eGFRcr in adults is calculated using the 2021 CKD-EPI creatinine equation which includes age and gender (Nicholas et al., [facility], DOI: 10.1056/CSYXss0694492)   07/07/2022 32 (L) >60 mL/min/1.73m2 Final     Comment:     Effective December 21, 2021 eGFRcr in adults is calculated using the 2021 CKD-EPI creatinine equation which includes age and gender (Nicholas et al., [facility], DOI: 10.1056/HKMDbk2205990)   06/10/2022 37 (L) >60 mL/min/1.73m2 Final     Comment:     Effective December 21, 2021 eGFRcr in adults is calculated using the 2021 CKD-EPI creatinine equation which includes age and gender (Nicholas et al., [facility], DOI: 10.1056/VJOYgf1874566)   04/13/2021 38 (L) >60 mL/min/[1.73_m2] Final     Comment:     Non  GFR Calc  Starting 12/18/2018, serum creatinine based estimated GFR (eGFR) will be   calculated using the Chronic Kidney Disease Epidemiology Collaboration   (CKD-EPI) equation.     03/24/2021 38 (L) >60 mL/min/[1.73_m2] Final     Comment:     Non  GFR Calc  Starting 12/18/2018, serum creatinine based estimated GFR (eGFR) will be   calculated using the Chronic Kidney Disease Epidemiology Collaboration   (CKD-EPI) equation.     11/13/2020 45 (L) >60 mL/min/[1.73_m2] Final     Comment:     Non  GFR Calc  Starting 12/18/2018, serum creatinine based estimated GFR (eGFR) will be   calculated using the Chronic Kidney Disease Epidemiology Collaboration   (CKD-EPI) equation.       GFR, ESTIMATED POCT   Date Value Ref Range Status   05/17/2022 32 (L) >60 mL/min/1.73m2 Final     GFR Estimate If Black   Date Value Ref Range Status   04/13/2021 45 (L) >60 mL/min/[1.73_m2] Final     Comment:      GFR Calc  Starting 12/18/2018, serum creatinine based estimated GFR (eGFR)  will be   calculated using the Chronic Kidney Disease Epidemiology Collaboration   (CKD-EPI) equation.     03/24/2021 44 (L) >60 mL/min/[1.73_m2] Final     Comment:      GFR Calc  Starting 12/18/2018, serum creatinine based estimated GFR (eGFR) will be   calculated using the Chronic Kidney Disease Epidemiology Collaboration   (CKD-EPI) equation.     11/13/2020 52 (L) >60 mL/min/[1.73_m2] Final     Comment:      GFR Calc  Starting 12/18/2018, serum creatinine based estimated GFR (eGFR) will be   calculated using the Chronic Kidney Disease Epidemiology Collaboration   (CKD-EPI) equation.         Lab Results   Component Value Date    MICROL 11 08/09/2022    MICROL 35 10/09/2020     No results found for: MICROALBUMIN  No results found for: CPEPT, GADAB, ISCAB    Vitamin B12   Date Value Ref Range Status   08/09/2022 2,687 (H) 232 - 1,245 pg/mL Final   10/17/2016 2,075 (H) 193 - 986 pg/mL Final     Comment:     Interp: 247-911 = Normal   05/12/2012 742 >210 pg/mL Final     Comment:     Interp: 247-911 = Normal   ]    Most recent eye exam date: : Not Found     Assessment/Plan:     1.  Type 2 diabetes- Kaia's glucose has improved significantly.  A1c is down to 7.6% from 8.3%. Will switch to Lyumjev insulin from Humalog, as this has been hard to pre-bolus.      Please let me know if you are having low blood sugars less than 70 or over 250 mg/dL.      If you have concerns, please send me a [employer] message M-Th, call the clinic at [phone], or call [phone] after hours/weekends and ask to speak with the endocrinologist on call.      2. Risk factors:     Retinopathy: Yes.  She also has macular edema and follows with ophthalmologist regularly.   Nephropathy:  BP is high today.  Would recommend increasing irbesartan.  Wants to discuss with cardiologist. Asked her to reach out to cardiologist for medication adjustments.  Microalbuminuria has resolved.  She does have CKD.  GFR is 55.    Neuropathy: Some tingling, numbness.  Tolerable.    Feet: OK, no ulcers.   Lipids:  On high dose statin    3. F/U in 3 months as planned with Dr. Hoover, and see me in 6 months.      32 minutes spent on the date of the encounter doing chart review, review of test results, review and interpretation of glucose sensor data, patient visit and documentation, counseling/coordination of care, and discussion of follow up plan for worsening hyper and hypoglycemia.  The patient understood and is satisfied with today's visit.        Roxanne Masterson PA-C, MPAS   [facility]  Department of Medicine  Division of Endocrinology and Diabetes

## 2023-05-08 ENCOUNTER — OFFICE VISIT (OUTPATIENT)
Dept: ENDOCRINOLOGY | Facility: CLINIC | Age: 73
End: 2023-05-08
Payer: COMMERCIAL

## 2023-05-08 VITALS
OXYGEN SATURATION: 97 % | SYSTOLIC BLOOD PRESSURE: 131 MMHG | BODY MASS INDEX: 36.55 KG/M2 | DIASTOLIC BLOOD PRESSURE: 72 MMHG | HEART RATE: 66 BPM | WEIGHT: 193.6 LBS | HEIGHT: 61 IN

## 2023-05-08 DIAGNOSIS — Z79.4 TYPE 2 DIABETES MELLITUS WITH DIABETIC NEUROPATHY, WITH LONG-TERM CURRENT USE OF INSULIN (H): ICD-10-CM

## 2023-05-08 DIAGNOSIS — E11.21 WELL CONTROLLED TYPE 2 DIABETES MELLITUS WITH NEPHROPATHY (H): Primary | ICD-10-CM

## 2023-05-08 DIAGNOSIS — E03.9 HYPOTHYROIDISM, UNSPECIFIED TYPE: ICD-10-CM

## 2023-05-08 DIAGNOSIS — E11.40 TYPE 2 DIABETES MELLITUS WITH DIABETIC NEUROPATHY, WITH LONG-TERM CURRENT USE OF INSULIN (H): ICD-10-CM

## 2023-05-08 DIAGNOSIS — G43.719 INTRACTABLE CHRONIC MIGRAINE WITHOUT AURA AND WITHOUT STATUS MIGRAINOSUS: ICD-10-CM

## 2023-05-08 LAB — HBA1C MFR BLD: 7.6 % (ref 4.3–?)

## 2023-05-08 PROCEDURE — 99214 OFFICE O/P EST MOD 30 MIN: CPT | Performed by: PHYSICIAN ASSISTANT

## 2023-05-08 PROCEDURE — 83036 HEMOGLOBIN GLYCOSYLATED A1C: CPT | Performed by: PHYSICIAN ASSISTANT

## 2023-05-08 RX ORDER — TOPIRAMATE 25 MG/1
75 TABLET, FILM COATED ORAL AT BEDTIME
Qty: 270 TABLET | Refills: 1 | Status: SHIPPED | OUTPATIENT
Start: 2023-05-08 | End: 2024-04-24

## 2023-05-08 RX ORDER — INSULIN LISPRO-AABC 100 [IU]/ML
60 INJECTION, SOLUTION SUBCUTANEOUS DAILY
Qty: 60 ML | Refills: 3 | Status: SHIPPED | OUTPATIENT
Start: 2023-05-08 | End: 2024-03-24

## 2023-05-08 RX ORDER — LEVOTHYROXINE SODIUM 50 UG/1
50 TABLET ORAL DAILY
Qty: 90 TABLET | Refills: 3 | Status: SHIPPED | OUTPATIENT
Start: 2023-05-08

## 2023-05-08 ASSESSMENT — PAIN SCALES - GENERAL: PAINLEVEL: EXTREME PAIN (8)

## 2023-05-08 NOTE — LETTER
5/8/2023       RE: Ade Wilkins  [address] [facility] [address]     Dear Colleague,    Thank you for referring your patient, Ade Wilkins, to the [facility] ENDOCRINOLOG[facility] [place] [facility]. Please see a copy of my visit note below.    Patient is showing 3/5 MNCM met. A1c not in range   Kaia Bass, ASHLEIGH     HPI:   Rachael is a 72 year old female with history morbid obesity S/P Gastric bypass surgery, hypothyroidism, CKD, CAD, s/p stent placement and valve replacement for aortic stenosis 2022. here for follow up of type 2 diabetes.  Her diabetes mellitus is complicated by diabetic neuropathy, mild nonproliferative retinopathy and nephropathy.  She also follows with . She has had type 2 Diabetes since her early 20s.  Her lymphedema has improved.  She went to a lymphedema clinic 3 times a week for 3 weeks.  Her legs go to sleep at night.  Her sleep has been better- now going to bed around midnight and waking up at 9am.  Frozen shoulder is better.  Glucose has improved significantly.  Now using a laurie 2, which allows her to pay more attention to her glucose.      Rachael is currently taking the following for her diabetes:   Tresiba 32 units at night.   Humalog- depends on what she is eating 4 units/15g carb.  She is working on taking insulin before she eats.    Dulaglutide 4.5 mg weekly on Sundays. $1000 for a 3 mo supply copay.   Metformin 1000 mg daily.     Previous treatments:   Jardiance 10 mg daily stopped in 2022- was expensive.        Breakfast- breakfast bar or oatmeal or cream of wheat, rarely toast.   Lunch- sandwich- bagel with ham or chicken.   Evening- Riced cauliflower with green beans or cheese.  Fish or chicken or sometimes pork.    Snack before bed if glucose is lower.     Patient wears a laurie 2 sensor with an overall average of 170 mg/dL (CV 33%, TIR 64%, hypo 0%, severe hypo 0%) over the past 2 weeks. Recent  glucose is as follows:                           She otherwise is generally feeling well and has no other concerns.       Past Medical History:   Diagnosis Date    Benign essential hypertension     Chronic kidney disease, stage 3b (H)     Diabetic retinopathy of both eyes (H)     Obesity (BMI 30-39.9)     Osteopenia     S/P TAVR (transcatheter aortic valve replacement) 06/07/2022    Type 2 diabetes mellitus with diabetic nephropathy (H)        Past Surgical History:   Procedure Laterality Date    APPENDECTOMY      CATARACT IOL, RT/LT Bilateral     CV CORONARY ANGIOGRAM N/A 5/4/2022    Procedure: Coronary Angiogram;  Surgeon: Hector Lewis MD;  Location:  HEART CARDIAC CATH LAB    CV LEFT HEART CATH N/A 5/4/2022    Procedure: Left Heart Catheterization;  Surgeon: Hector Lewis MD;  Location: [facility] CARDIAC CATH LAB    CV PCI N/A 5/4/2022    Procedure: Percutaneous Coronary Intervention;  Surgeon: Hector Lewis MD;  Location:  HEART CARDIAC CATH LAB    CV TRANSCATHETER AORTIC VALVE REPLACEMENT-FEMORAL APPROACH N/A 6/7/2022    Procedure: Transcatheter Aortic Valve Replacement-Femoral Approach;  Surgeon: Hector Lewis MD;  Location:  HEART CARDIAC CATH LAB    GASTRIC BYPASS  2004    TONSILLECTOMY & ADENOIDECTOMY         Family History   Problem Relation Age of Onset    Diabetes Type 2  Mother     Glaucoma Mother     Coronary Artery Disease Mother     Abdominal Aortic Aneurysm Father     Myocardial Infarction Father     Breast Cancer Sister     Abdominal Aortic Aneurysm Brother     Bladder Cancer Brother     Diabetes Type 2  Brother     Liver Cancer Brother     Myocardial Infarction Brother     Alzheimer Disease Paternal Grandmother     Cerebrovascular Disease Paternal Grandfather     Ovarian Cancer No family hx of     Colon Cancer No family hx of        Social History     Socioeconomic History    Marital status:      Spouse name: Not on file    Number of children: 0     Years of education: Not on file    Highest education level: Not on file   Occupational History     Employer: [employer],[address]   Social Needs    Financial resource strain: Not on file    Food insecurity:     Worry: Not on file     Inability: Not on file    Transportation needs:     Medical: Not on file     Non-medical: Not on file   Tobacco Use    Smoking status: Never Smoker    Smokeless tobacco: Never Used   Substance and Sexual Activity    Alcohol use: No    Drug use: No    Sexual activity: Never     Partners: Male   Lifestyle    Physical activity:     Days per week: Not on file     Minutes per session: Not on file    Stress: Not on file   Relationships    Social connections:     Talks on phone: Not on file     Gets together: Not on file     Attends Cheondoism service: Not on file     Active member of club or organization: Not on file     Attends meetings of clubs or organizations: Not on file     Relationship status: Not on file    Intimate partner violence:     Fear of current or ex partner: Not on file     Emotionally abused: Not on file     Physically abused: Not on file     Forced sexual activity: Not on file   Other Topics Concern     Service Not Asked    Blood Transfusions Not Asked    Caffeine Concern Yes     Comment: 20 oz daily    Occupational Exposure Not Asked    Hobby Hazards Not Asked    Sleep Concern Not Asked    Stress Concern Not Asked    Weight Concern Not Asked    Special Diet Not Asked    Back Care Not Asked    Exercise No    Bike Helmet Not Asked    Seat Belt Yes    Self-Exams Yes    Parent/sibling w/ CABG, MI or angioplasty before 65F 55M? Not Asked   Social History Narrative    Living arrangements - the patient lives alone.   Social Hx: Lives with her sister and her sister's boyfriend.  . 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     Current Outpatient Medications   Medication    acetaminophen (TYLENOL) 500 MG tablet    aspirin (ASA) 81  "MG chewable tablet    augmented betamethasone dipropionate (DIPROLENE-AF) 0.05 % external ointment    Calcium Carb-Cholecalciferol (CALCIUM 600+D) 600-20 MG-MCG TABS    cetirizine (ZYRTEC) 10 MG tablet    Continuous Blood Gluc  (FREESTYLE BALWINDER 2 READER) HENNA    Continuous Blood Gluc Sensor (FREESTYLE BALWINDER 2 SENSOR) MISC    cyanocolbalamin (VITAMIN B-12) 1000 MCG tablet    Dulaglutide (TRULICITY) 4.5 MG/0.5ML SOPN    fluticasone (FLONASE) 50 MCG/ACT nasal spray    furosemide (LASIX) 40 MG tablet    HUMALOG KWIKPEN 100 UNIT/ML soln    insulin degludec (TRESIBA) 200 UNIT/ML pen    insulin pen needle (BD FCO U/F) 32G X 4 MM miscellaneous    irbesartan (AVAPRO) 150 MG tablet    levothyroxine (SYNTHROID/LEVOTHROID) 50 MCG tablet    Menthol, Topical Analgesic, 7 % LIQD    metFORMIN (GLUCOPHAGE) 1000 MG tablet    metoprolol succinate ER (TOPROL XL) 25 MG 24 hr tablet    potassium chloride ER (KLOR-CON M) 10 MEQ CR tablet    rosuvastatin (CRESTOR) 20 MG tablet    ticagrelor (BRILINTA) 90 MG tablet    topiramate (TOPAMAX) 25 MG tablet    triamcinolone (KENALOG) 0.1 % external ointment    VITRON-C  MG TABS tablet    zinc 50 MG TABS     No current facility-administered medications for this visit.          Allergies   Allergen Reactions    Norvasc [Amlodipine] Other (See Comments)     hairloss    Chlorhexidine Rash    Clonidine Headache    Doxazosin Mesylate Rash    Fexofenadine Hydrochloride Headache    Gemfibrozil Rash    Lisinopril Angioedema    Pioglitazone Hydrochloride Swelling     ankle edema     Physical Exam  BP (!) 162/75 (BP Location: Left arm, Patient Position: Sitting, Cuff Size: Adult Large)   Pulse 77   Ht 1.549 m (5' 1\")   Wt 87.8 kg (193 lb 9.6 oz)   LMP  (LMP Unknown)   SpO2 97%   BMI 36.58 kg/m    GENERAL:  Alert and oriented X3, NAD, well dressed, answering questions appropriately, appears stated age.  HEENT: OP clear, no lymphadenopathy, no thyromegaly, non-tender, no exophthalmus, no " proptosis, EOMI, no lid lag, no retraction  EXTREMITIES: 1+ edema, up her shins  NEUROLOGY: reduced monofilament sensation    RESULTS  Lab Results   Component Value Date    A1C 7.9 (H) 08/09/2022    A1C 8.0 (H) 06/08/2022    A1C 7.9 (H) 05/04/2022    A1C 7.6 (H) 11/24/2021    A1C 8.0 (H) 08/27/2021    A1C 8.1 (H) 01/08/2021    A1C 8.1 (H) 10/09/2020    A1C 8.7 (A) 08/07/2020    A1C 8.4 (H) 03/27/2020    A1C 8.7 (H) 04/23/2019    HEMOGLOBINA1 7.6 05/08/2023    HEMOGLOBINA1 8.6 01/16/2023    HEMOGLOBINA1 8.1 (A) 01/07/2020    HEMOGLOBINA1 8.7 (A) 04/22/2019    HEMOGLOBINA1 8.1 (A) 12/10/2018       TSH   Date Value Ref Range Status   08/09/2022 1.83 0.40 - 4.00 mU/L Final   08/27/2021 2.37 0.40 - 4.00 mU/L Final   03/27/2020 3.47 0.40 - 4.00 mU/L Final   10/14/2019 2.54 0.40 - 4.00 mU/L Final   04/23/2019 4.06 (H) 0.40 - 4.00 mU/L Final   05/24/2018 2.54 0.40 - 4.00 mU/L Final   03/12/2018 4.78 (H) 0.40 - 4.00 mU/L Final     T4 Free   Date Value Ref Range Status   04/23/2019 1.10 0.76 - 1.46 ng/dL Final   03/12/2018 1.17 0.76 - 1.46 ng/dL Final   10/17/2016 1.17 0.76 - 1.46 ng/dL Final   05/05/2010 1.05 0.70 - 1.85 ng/dL Final   09/13/2007 1.00 0.70 - 1.85 ng/dL Final       ALT   Date Value Ref Range Status   06/08/2022 63 (H) 0 - 50 U/L Final   06/01/2022 160 (H) 0 - 50 U/L Final   04/13/2021 38 0 - 50 U/L Final   03/27/2020 28 0 - 50 U/L Final   ]    Recent Labs   Lab Test 08/09/22  0844 06/08/22  1056 06/17/16  0750 05/22/15  0848   CHOL 104 67   < > 175   HDL 46* 28*   < > 32*   LDL 42 17   < > 76   TRIG 81 109   < > 334*   CHOLHDLRATIO  --   --   --  5.5*    < > = values in this interval not displayed.       Lab Results   Component Value Date     07/07/2022     04/13/2021      Lab Results   Component Value Date    POTASSIUM 4.1 08/09/2022    POTASSIUM 4.2 04/13/2021     Lab Results   Component Value Date    CHLORIDE 107 07/07/2022    CHLORIDE 110 04/13/2021     Lab Results   Component Value Date    RASHEEDA  9.5 07/07/2022    RASHEEDA 9.2 04/13/2021     Lab Results   Component Value Date    CO2 23 07/07/2022    CO2 28 04/13/2021     Lab Results   Component Value Date    BUN 20 08/09/2022    BUN 29 04/13/2021     Lab Results   Component Value Date    CR 1.08 08/09/2022    CR 1.38 04/13/2021       GFR Estimate   Date Value Ref Range Status   08/09/2022 55 (L) >60 mL/min/1.73m2 Final     Comment:     Effective December 21, 2021 eGFRcr in adults is calculated using the 2021 CKD-EPI creatinine equation which includes age and gender (Nicholas et al., NE, DOI: 10.1056/VFCZlq0133062)   07/07/2022 32 (L) >60 mL/min/1.73m2 Final     Comment:     Effective December 21, 2021 eGFRcr in adults is calculated using the 2021 CKD-EPI creatinine equation which includes age and gender (Nicholas et al., NE, DOI: 10.1056/MRZNil0838972)   06/10/2022 37 (L) >60 mL/min/1.73m2 Final     Comment:     Effective December 21, 2021 eGFRcr in adults is calculated using the 2021 CKD-EPI creatinine equation which includes age and gender (Nicholas et al., NE, DOI: 10.1056/RCWApp5164402)   04/13/2021 38 (L) >60 mL/min/[1.73_m2] Final     Comment:     Non  GFR Calc  Starting 12/18/2018, serum creatinine based estimated GFR (eGFR) will be   calculated using the Chronic Kidney Disease Epidemiology Collaboration   (CKD-EPI) equation.     03/24/2021 38 (L) >60 mL/min/[1.73_m2] Final     Comment:     Non  GFR Calc  Starting 12/18/2018, serum creatinine based estimated GFR (eGFR) will be   calculated using the Chronic Kidney Disease Epidemiology Collaboration   (CKD-EPI) equation.     11/13/2020 45 (L) >60 mL/min/[1.73_m2] Final     Comment:     Non  GFR Calc  Starting 12/18/2018, serum creatinine based estimated GFR (eGFR) will be   calculated using the Chronic Kidney Disease Epidemiology Collaboration   (CKD-EPI) equation.       GFR, ESTIMATED POCT   Date Value Ref Range Status   05/17/2022 32 (L) >60 mL/min/1.73m2 Final      GFR Estimate If Black   Date Value Ref Range Status   04/13/2021 45 (L) >60 mL/min/[1.73_m2] Final     Comment:      GFR Calc  Starting 12/18/2018, serum creatinine based estimated GFR (eGFR) will be   calculated using the Chronic Kidney Disease Epidemiology Collaboration   (CKD-EPI) equation.     03/24/2021 44 (L) >60 mL/min/[1.73_m2] Final     Comment:      GFR Calc  Starting 12/18/2018, serum creatinine based estimated GFR (eGFR) will be   calculated using the Chronic Kidney Disease Epidemiology Collaboration   (CKD-EPI) equation.     11/13/2020 52 (L) >60 mL/min/[1.73_m2] Final     Comment:      GFR Calc  Starting 12/18/2018, serum creatinine based estimated GFR (eGFR) will be   calculated using the Chronic Kidney Disease Epidemiology Collaboration   (CKD-EPI) equation.         Lab Results   Component Value Date    MICROL 11 08/09/2022    MICROL 35 10/09/2020     No results found for: MICROALBUMIN  No results found for: CPEPT, GADAB, ISCAB    Vitamin B12   Date Value Ref Range Status   08/09/2022 2,687 (H) 232 - 1,245 pg/mL Final   10/17/2016 2,075 (H) 193 - 986 pg/mL Final     Comment:     Interp: 247-911 = Normal   05/12/2012 742 >210 pg/mL Final     Comment:     Interp: 247-911 = Normal   ]    Most recent eye exam date: : Not Found     Assessment/Plan:     1.  Type 2 diabetes- Kaia's glucose has improved significantly.  A1c is down to 7.6% from 8.3%. Will switch to Lyumjev insulin from Humalog, as this has been hard to pre-bolus.      Please let me know if you are having low blood sugars less than 70 or over 250 mg/dL.      If you have concerns, please send me a Luxim message M-Th, call the clinic at [phone], or call [phone] after hours/weekends and ask to speak with the endocrinologist on call.      2. Risk factors:     Retinopathy: Yes.  She also has macular edema and follows with ophthalmologist regularly.   Nephropathy:  BP is high today.   Would recommend increasing irbesartan.  Wants to discuss with cardiologist. Asked her to reach out to cardiologist for medication adjustments.  Microalbuminuria has resolved.  She does have CKD.  GFR is 55.   Neuropathy: Some tingling, numbness.  Tolerable.    Feet: OK, no ulcers.   Lipids:  On high dose statin    3. F/U in 3 months as planned with Dr. Hoover, and see me in 6 months.      32 minutes spent on the date of the encounter doing chart review, review of test results, review and interpretation of glucose sensor data, patient visit and documentation, counseling/coordination of care, and discussion of follow up plan for worsening hyper and hypoglycemia.  The patient understood and is satisfied with today's visit.        Roxanne Masterson PA-C, MPAS   [facility]  Department of Medicine  Division of Endocrinology and Diabetes

## 2023-05-08 NOTE — PATIENT INSTRUCTIONS
Switch from humalog to lyumjev insulin.  Take this at the start of your meals.     [email or web address]    Discuss your high blood pressure with your primary care doctor or cardiology.     Please let me know if you are having low blood sugars less than 70 or over 250 mg/dL.      If you have concerns, please send me a Zivity message M-Th, call the clinic at [phone], or call [phone] after hours/weekends and ask to speak with the endocrinologist on call.

## 2023-05-08 NOTE — NURSING NOTE
"Chief Complaint   Patient presents with     Diabetes     Vital signs:      BP: (!) 162/75 Pulse: 77     SpO2: 97 %     Height: 154.9 cm (5' 1\") Weight: 87.8 kg (193 lb 9.6 oz)  Estimated body mass index is 36.58 kg/m  as calculated from the following:    Height as of this encounter: 1.549 m (5' 1\").    Weight as of this encounter: 87.8 kg (193 lb 9.6 oz).        "

## 2023-05-13 NOTE — PROGRESS NOTES
Discharge Note:  Pt discharged from Lymphedema therapy, with all goals met. See daily doc for details.  Pt to cont home program.  Razia Hernandez, OTR/l, ELIZ

## 2023-05-24 ENCOUNTER — PATIENT OUTREACH (OUTPATIENT)
Dept: NURSING | Facility: CLINIC | Age: 73
End: 2023-05-24
Payer: COMMERCIAL

## 2023-05-24 ASSESSMENT — ACTIVITIES OF DAILY LIVING (ADL): DEPENDENT_IADLS:: TRANSPORTATION;COOKING;SHOPPING;MEAL PREPARATION

## 2023-05-24 NOTE — PROGRESS NOTES
"Clinic Care Coordination Contact    Follow Up Progress Note      Assessment:   Patient has \"looked\" at the [facility] Choices Health Care Directive yet has not completed.  Per patient, she will try to complete this within the month.  Patient has a Medicare Annual Wellness visit scheduled with Primary Care Provider on 6/15/2023 and has transportation for the appointment.  Patient has no other questions or concerns at this time.    Care Gaps:    Health Maintenance Due   Topic Date Due     FALL RISK ASSESSMENT  03/24/2022     BMP  01/07/2023     COVID-19 Vaccine (6 - Pfizer series) 01/16/2023     MICROALBUMIN  02/09/2023     MEDICARE ANNUAL WELLNESS VISIT  03/28/2023       Care Gaps Last addressed on 5/24/2023    Care Plans  Care Plan: Advance Care Plan     Problem: Patient does not have a valid Health Care Directive     Goal: Complete Health Care Directive     Start Date: 6/14/2022 Expected End Date: 6/23/2023    This Visit's Progress: 40% Recent Progress: 40%    Note:     Goal Statement: I will complete a Health Care Directive and have this scanned into my Medical Record.  Barriers: Patient doesn't have a Health Care Directive  Strengths: Strong advocate for self  Patient expressed understanding of goal: yes  Action steps to achieve this goal:  1. Care Coordination will mail me a Health Care Directive and any requested resources (goals worksheets, education sheets, class flyer).   2. I will complete the Health Care Directive. My signature must be either notarized or witnessed by two adults who are not named as health care agents in the document. Additionally only one of the witnesses can be employed by my health care system. If I need a notary I can check with my bank, my place of [employer], [employer] stores, or look online at [email or web address] .  3. I will provide a copy of my Health Care Directive to my care team and/or email directly to [email or web address].   4. I can visit [email or web address] " website, email [email or web address] or call [facility] Luz Frost at [phone] (M-Friday 6a to 5p) for more information including free classes on completing a Health Care Directive.  5. I will contact my care team with any barriers, questions or assistance needed with this goal. Care coordinator will remain available as needed.                       Intervention/Education provided during outreach:   RNCC called and spoke with patient/caregiver; introduced self, discussed role of Care Coordination and explained reason for call    Plan:   -Patient will contact the care team with questions, concerns, support needs   -Patient will use the clinic as a resource and understands (s)he can contact the [facility] with 24/7 after hours services available  -Care Coordinator will remain available as needed  -RNCC will follow up in one month for follow up appointments/recommendations and goal progression     Leanne Humphreys RN, BSN, PHN  Primary Care / Care Coordinator   [facility]  E-mail [email or web address]   [phone]

## 2023-06-01 ENCOUNTER — HEALTH MAINTENANCE LETTER (OUTPATIENT)
Age: 73
End: 2023-06-01

## 2023-06-07 ENCOUNTER — HOSPITAL ENCOUNTER (OUTPATIENT)
Dept: CARDIOLOGY | Facility: CLINIC | Age: 73
Discharge: HOME | End: 2023-06-07
Attending: INTERNAL MEDICINE | Admitting: INTERNAL MEDICINE
Payer: COMMERCIAL

## 2023-06-07 DIAGNOSIS — I35.0 SEVERE AORTIC STENOSIS: ICD-10-CM

## 2023-06-07 DIAGNOSIS — Z95.2 S/P TAVR (TRANSCATHETER AORTIC VALVE REPLACEMENT): ICD-10-CM

## 2023-06-07 LAB — LVEF ECHO: NORMAL

## 2023-06-07 PROCEDURE — 93306 TTE W/DOPPLER COMPLETE: CPT

## 2023-06-07 PROCEDURE — 93306 TTE W/DOPPLER COMPLETE: CPT | Mod: 26 | Performed by: INTERNAL MEDICINE

## 2023-06-13 ASSESSMENT — ENCOUNTER SYMPTOMS
SORE THROAT: 0
ABDOMINAL PAIN: 1
BREAST MASS: 0
DIZZINESS: 1
HEMATOCHEZIA: 0
DYSURIA: 0
PARESTHESIAS: 0
HEMATURIA: 0
DIARRHEA: 0
MYALGIAS: 0
CHILLS: 1
COUGH: 0
ARTHRALGIAS: 1
JOINT SWELLING: 1
FEVER: 0
PALPITATIONS: 0
SHORTNESS OF BREATH: 1
NAUSEA: 0
NERVOUS/ANXIOUS: 0
HEADACHES: 0
EYE PAIN: 1
FREQUENCY: 0
CONSTIPATION: 0
HEARTBURN: 0
WEAKNESS: 0

## 2023-06-13 ASSESSMENT — ACTIVITIES OF DAILY LIVING (ADL): CURRENT_FUNCTION: NO ASSISTANCE NEEDED

## 2023-06-14 PROBLEM — Z98.84 S/P GASTRIC BYPASS: Status: ACTIVE | Noted: 2023-06-14

## 2023-06-14 PROBLEM — H53.9 VISION CHANGES: Status: RESOLVED | Noted: 2022-06-16 | Resolved: 2023-06-14

## 2023-06-14 PROBLEM — Z98.890 STATUS POST CORONARY ANGIOGRAM: Status: RESOLVED | Noted: 2022-05-04 | Resolved: 2023-06-14

## 2023-06-14 PROBLEM — Z95.2 S/P TAVR (TRANSCATHETER AORTIC VALVE REPLACEMENT): Status: ACTIVE | Noted: 2022-06-07

## 2023-06-14 PROBLEM — E11.21 TYPE 2 DIABETES MELLITUS WITH DIABETIC NEPHROPATHY (H): Status: RESOLVED | Noted: 2022-03-27 | Resolved: 2023-06-14

## 2023-06-14 PROBLEM — E66.01 MORBID OBESITY (H): Status: RESOLVED | Noted: 2022-03-28 | Resolved: 2023-06-14

## 2023-06-14 PROBLEM — I35.0 AORTIC STENOSIS, SEVERE: Status: RESOLVED | Noted: 2022-06-07 | Resolved: 2023-06-14

## 2023-06-14 PROBLEM — E11.40 TYPE 2 DIABETES MELLITUS WITH DIABETIC NEUROPATHY (H): Status: RESOLVED | Noted: 2022-03-27 | Resolved: 2023-06-14

## 2023-06-15 ENCOUNTER — OFFICE VISIT (OUTPATIENT)
Dept: INTERNAL MEDICINE | Facility: CLINIC | Age: 73
End: 2023-06-15
Payer: COMMERCIAL

## 2023-06-15 VITALS
DIASTOLIC BLOOD PRESSURE: 58 MMHG | SYSTOLIC BLOOD PRESSURE: 146 MMHG | WEIGHT: 198 LBS | HEART RATE: 58 BPM | BODY MASS INDEX: 37.38 KG/M2 | TEMPERATURE: 97.8 F | OXYGEN SATURATION: 97 % | RESPIRATION RATE: 18 BRPM | HEIGHT: 61 IN

## 2023-06-15 DIAGNOSIS — Z00.00 MEDICARE ANNUAL WELLNESS VISIT, SUBSEQUENT: Primary | ICD-10-CM

## 2023-06-15 DIAGNOSIS — I10 BENIGN ESSENTIAL HYPERTENSION: ICD-10-CM

## 2023-06-15 DIAGNOSIS — E11.319 TYPE 2 DIABETES MELLITUS WITH RETINOPATHY OF BOTH EYES, WITH LONG-TERM CURRENT USE OF INSULIN, MACULAR EDEMA PRESENCE UNSPECIFIED, UNSPECIFIED RETINOPATHY SEVERITY (H): ICD-10-CM

## 2023-06-15 DIAGNOSIS — E66.01 MORBID OBESITY (H): ICD-10-CM

## 2023-06-15 DIAGNOSIS — Z79.4 TYPE 2 DIABETES MELLITUS WITH RETINOPATHY OF BOTH EYES, WITH LONG-TERM CURRENT USE OF INSULIN, MACULAR EDEMA PRESENCE UNSPECIFIED, UNSPECIFIED RETINOPATHY SEVERITY (H): ICD-10-CM

## 2023-06-15 DIAGNOSIS — N18.32 CHRONIC KIDNEY DISEASE, STAGE 3B (H): ICD-10-CM

## 2023-06-15 DIAGNOSIS — E78.00 PURE HYPERCHOLESTEROLEMIA: ICD-10-CM

## 2023-06-15 PROCEDURE — G0439 PPPS, SUBSEQ VISIT: HCPCS | Performed by: INTERNAL MEDICINE

## 2023-06-15 PROCEDURE — 99214 OFFICE O/P EST MOD 30 MIN: CPT | Mod: 25 | Performed by: INTERNAL MEDICINE

## 2023-06-15 NOTE — PROGRESS NOTES
ASSESSMENT/PLAN                                                       (Z00.00) Medicare annual wellness visit, subsequent  (primary encounter diagnosis)  Comment: PMH, PSH, FH, SH, medications, allergies, immunizations, and preventative health measures reviewed and updated as appropriate.  Plan: see below for plans.      (N18.32) Chronic kidney disease, stage 3b (H)  Comment: known issue that I take into account for their medical decisions, no current exacerbations or new concerns.    (E11.319,  Z79.4) Type 2 diabetes mellitus with retinopathy of both eyes, with long-term current use of insulin, macular edema presence unspecified, unspecified retinopathy severity (H)  Comment: stable; followed by endocrinology.    (E78.00) Pure hypercholesterolemia  Comment: well-controlled on current regimen.      (I10) Benign essential hypertension  Comment: well-controlled (at home) on current regimen.      (E66.01) Morbid obesity (H)  Comment: obesity + comorbidities (DM2, HLD, HTN); known issue that I take into account for their medical decisions, no current exacerbations or new concerns.    Appropriate preventive services were discussed with this patient, including applicable screening as appropriate for cardiovascular disease, diabetes, osteopenia/osteoporosis, and glaucoma.  As appropriate for age/gender, discussed screening for colorectal cancer, prostate cancer, breast cancer, and cervical cancer. Checklist reviewing preventive services available has been given to the patient.    Reviewed patients plan of care. The Basic Care Plan (routine screening as documented in Health Maintenance) for Ade Wilkins meets the Care Plan requirement. This Care Plan has been established and reviewed with the Patient.    Judith Aviles MD   [address] [address]  T: [phone], F: [phone]    SUBJECTIVE                                                      Ade Wilkins is a very  pleasant 72 year old female who presents for her subsequent AWV:    Current providers (other than myself): Masterson (endocrinology), cardiology (s/p TAVR)    PMH, PSH, FH, , medications, allergies, immunizations, preventative health, and health risk assessment reviewed and updated as appropriate.    Past Medical History:   Diagnosis Date     Benign essential hypertension      Chronic kidney disease, stage 3b (H)      Diabetic retinopathy of both eyes (H)      Obesity (BMI 30-39.9)      Osteopenia      S/P gastric bypass      S/P TAVR (transcatheter aortic valve replacement) 06/07/2022     Type 2 diabetes mellitus (H)      Past Surgical History:   Procedure Laterality Date     APPENDECTOMY       CATARACT IOL, RT/LT Bilateral      CV CORONARY ANGIOGRAM N/A 05/04/2022    Procedure: Coronary Angiogram;  Surgeon: Hector Lewis MD;  Location:  [facility]     CV LEFT HEART CATH N/A 05/04/2022    Procedure: Left Heart Catheterization;  Surgeon: Hector Lewis MD;  Location:  [facility]     CV PCI N/A 05/04/2022    Procedure: Percutaneous Coronary Intervention;  Surgeon: Hector Lewis MD;  Location:  [facility]     CV TRANSCATHETER AORTIC VALVE REPLACEMENT-FEMORAL APPROACH N/A 06/07/2022    Procedure: Transcatheter Aortic Valve Replacement-Femoral Approach;  Surgeon: Hector Lewis MD;  Location:  [facility]ATH [facility]     JUREGN EN Y BOWEL  2004     TONSILLECTOMY & ADENOIDECTOMY       Family History   Problem Relation Age of Onset     Diabetes Type 2  Mother      Glaucoma Mother      Coronary Artery Disease Mother      Abdominal Aortic Aneurysm Father      Myocardial Infarction Father      Breast Cancer Sister      Abdominal Aortic Aneurysm Brother      Bladder Cancer Brother      Diabetes Type 2  Brother      Liver Cancer Brother      Myocardial Infarction Brother      Alzheimer Disease Paternal Grandmother      Cerebrovascular Disease Paternal  Grandfather      Ovarian Cancer No family hx of      Colon Cancer No family hx of      Social History     Occupational History     Occupation: Retired -    Tobacco Use     Smoking status: Never     Smokeless tobacco: Never   Vaping Use     Vaping status: Never Used   Substance and Sexual Activity     Alcohol use: No     Drug use: No     Sexual activity: Not Currently   Social History Narrative    . Single.    No kids.    No formal exercise.      Allergies   Allergen Reactions     Chlorhexidine Rash     Clonidine Headache     Doxazosin Mesylate Rash     Fexofenadine Hydrochloride Headache     Gemfibrozil Rash     Lisinopril Angioedema     Norvasc [Amlodipine] Other (See Comments)     hair loss     Pioglitazone Hydrochloride Swelling     ankle edema     Current Outpatient Medications   Medication Sig     acetaminophen (TYLENOL) 500 MG tablet Take 500-1,000 mg by mouth every 6 hours as needed for mild pain     aspirin (ASA) 81 MG chewable tablet Take 1 tablet (81 mg) by mouth daily Starting tomorrow.     augmented betamethasone dipropionate (DIPROLENE-AF) 0.05 % external ointment Apply topically to affected area twice daily for 3-4 weeks then use as needed     Calcium Carb-Cholecalciferol (CALCIUM 600+D) 600-20 MG-MCG TABS Take 1 tablet by mouth 2 times daily     cetirizine (ZYRTEC) 10 MG tablet Take 1 tablet (10 mg) by mouth daily     Continuous Blood Gluc  (DPSIYLE BALWINDER 2 READER) HENNA Use to read blood sugars as per 's instructions.     Continuous Blood Gluc Sensor (FREESTYLE BALWINDER 2 SENSOR) MISC Change every 14 days.     cyanocolbalamin (VITAMIN B-12) 1000 MCG tablet Take 1,000 mcg by mouth three times a week Taking one tablet 3 times per week     Dulaglutide (TRULICITY) 4.5 MG/0.5ML SOPN Inject 4.5 mg Subcutaneous once a week     fluticasone (FLONASE) 50 MCG/ACT nasal spray Spray 2 sprays into both nostrils daily as needed for allergies     furosemide (LASIX) 40 MG  tablet Take 1 tablet (40 mg) by mouth daily     insulin degludec (TRESIBA) 200 UNIT/ML pen Inject 34 Units Subcutaneous daily     Insulin Lispro-aabc, 1 U Dial, (LYUMJEV KWIKPEN) 100 UNIT/ML SOPN Inject 60 Units Subcutaneous daily Use as directed up to 60 units daily.     insulin pen needle (BD FCO U/F) 32G X 4 MM miscellaneous Use 4 pen needles daily or as directed.     irbesartan (AVAPRO) 150 MG tablet Take 1 tablet (150 mg) by mouth daily     levothyroxine (SYNTHROID/LEVOTHROID) 50 MCG tablet Take 1 tablet (50 mcg) by mouth daily     Menthol, Topical Analgesic, 7 % LIQD Apply 1 Application. topically 3 times daily as needed     metFORMIN (GLUCOPHAGE) 1000 MG tablet Take 1 tablet (1,000 mg) by mouth daily (with breakfast)     metoprolol succinate ER (TOPROL XL) 25 MG 24 hr tablet Take 1 tablet (25 mg) by mouth 2 times daily     potassium chloride ER (KLOR-CON M) 10 MEQ CR tablet Take 1 tablet (10 mEq) by mouth daily     rosuvastatin (CRESTOR) 20 MG tablet Take 1 tablet (20 mg) by mouth every other day     ticagrelor (BRILINTA) 90 MG tablet Take 1 tablet (90 mg) by mouth 2 times daily Start this evening.     topiramate (TOPAMAX) 25 MG tablet Take 3 tablets (75 mg) by mouth At Bedtime     triamcinolone (KENALOG) 0.1 % external ointment Apply topically once a week To left foot rash     VITRON-C  MG TABS tablet TAKE TWO TABLETS BY MOUTH TWICE DAILY      zinc 50 MG TABS Take 1 tablet by mouth daily     Immunization History   Administered Date(s) Administered     COVID-19 Bivalent 18+ (Moderna) 09/16/2022     COVID-19 MONOVALENT 12+ (Pfizer) 01/30/2021, 02/20/2021, 09/27/2021, 09/28/2021     Flu, Unspecified 09/17/2019, 09/26/2020     Influenza (High Dose) 3 valent vaccine 10/09/2015, 09/22/2016, 09/21/2017     Influenza (IIV3) PF 12/03/2001, 11/25/2002, 10/22/2004, 11/03/2005, 11/07/2006, 10/25/2007, 10/18/2008, 09/21/2009, 10/01/2011, 11/23/2012, 08/06/2013, 11/14/2014     Influenza Vaccine 65+ (Fluzone HD)  "09/13/2021     Pneumo Conj 13-V (2010&after) 06/20/2016     Pneumococcal 23 valent 12/03/2001, 09/21/2009, 05/21/2012, 08/25/2017     TD,PF 7+ (Tenivac) 01/15/1986, 09/12/2005     TDAP Vaccine (Adacel) 10/09/2015     Zoster recombinant adjuvanted (SHINGRIX) 03/16/2021, 06/26/2021     Zoster vaccine, live 09/24/2012     PREVENTATIVE HEALTH                                                      BMI: obese  Blood pressure: elevated on current regimen (normal at home)  Breast CA screening: up to date   Colon CA screening: up to date   Lung CA screening: up to date   Dexa: up to date   Screening cholesterol: n/a - already being treated for this condition  Screening diabetes: n/a - already being treated for this condition  Alcohol misuse screening: alcohol use reviewed - no intervention indicated at this time  Immunizations: reviewed; COVID-19 booster DUE    HEALTH RISK ASSESSMENT                                                      In general, how would you rate your overall physical health? good  Outside of work, how many days during the week do you exercise? 2-3 days/week  Outside of work, approximately how many minutes a day do you exercise? less than 15 minutes    If you drink alcohol do you typically have >3 drinks per day or >7 drinks per week? No  Do you usually eat at least 4 servings of fruit and vegetables a day, include whole grains & fiber and avoid regularly eating high fat or \"junk\" foods? No     Do you have any problems taking medications regularly? No  Do you have any side effects from medications? No    Assistance with daily activities: No    Safety concerns: No    Fall risk assessment: completed today (see ambulatory assessments)    Hearing concerns: YES - difficulty following a conversation in a noisy restaurant or crowded room, difficulty understanding soft or whispered speech    In the past 6 months, have you been bothered by leaking of urine: No    In general, how would you rate your overall mental or " "emotional health: good    PHQ-2/PHQ-9 assessment: completed today (see ambulatory assessments)    Additional concerns today: No    OBJECTIVE                                                      BP (!) 146/58   Pulse 58   Temp 97.8  F (36.6  C) (Temporal)   Resp 18   Ht 1.549 m (5' 1\")   Wt 89.8 kg (198 lb)   LMP  (LMP Unknown)   SpO2 97%   BMI 37.41 kg/m    Constitutional: well-appearing  Head, Ears, and Eyes: normocephalic; normal external auditory canal and pinna; tympanic membranes visualized and normal; normal lids and conjunctivae  Neck: supple, symmetric, no thyromegaly or lymphadenopathy  Respiratory: normal respiratory effort; clear to auscultation bilaterally  Cardiovascular: regular rate and rhythm; no edema  Gastrointestinal: soft, non-tender, and non-distended; no organomegaly or masses  Musculoskeletal: normal gait and station  Psych: normal judgment and insight; normal mood and affect; recent and remote memory intact    Cognitive impairment noted: No  ---  (Note was completed, in part, with Appature voice-recognition software. Documentation was reviewed, but some grammatical, spelling, and word errors may remain.)    "

## 2023-06-26 ENCOUNTER — PATIENT OUTREACH (OUTPATIENT)
Dept: NURSING | Facility: CLINIC | Age: 73
End: 2023-06-26
Payer: COMMERCIAL

## 2023-06-26 ASSESSMENT — ACTIVITIES OF DAILY LIVING (ADL): DEPENDENT_IADLS:: TRANSPORTATION;COOKING;SHOPPING;MEAL PREPARATION

## 2023-06-26 NOTE — LETTER
[facility]  Patient Centered Plan of Care  About Me:        Patient Name:  Ade Wilkins    YOB: 1950  Age:         72 year old   Romulo MRN:    [id number] Telephone Information:  Home Phone [phone]   Mobile [phone]       Address:  [address] [address] Email address:  [email or web address]      Emergency Contact(s)    Name Relationship Lgl Grd Work Phone Home Phone Mobile Phone   1. NAUN WILKINS Sister  [phone]   2. MAXIMILIAN BONILLA Friend   [phone]           Primary language:  English     needed? No   [employer]:  [phone] op. 1  Other communication barriers:None    Preferred Method of Communication:  Dorie  Current living arrangement: I live in a private home with family (Sister and sister's boyfriend)    Mobility Status/ Medical Equipment: Independent w/Device    Health Maintenance  Health Maintenance Reviewed: Due/Overdue   Health Maintenance Due   Topic Date Due     BMP  01/07/2023     COVID-19 Vaccine (6 - Pfizer series) 01/16/2023     My Access Plan  Medical Emergency 911   Primary Clinic Line [facility] [phone]   24 Hour Appointment Line [phone] or  [phone]) (toll-free)   24 Hour Nurse Line [phone] (toll-free)   Preferred Urgent Care [facility], [phone]     [facility]  [phone]     Preferred Pharmacy [facility] [address]     Behavioral Health Crisis Line The National Suicide Prevention Lifeline at 1-154.618.2253 or Text/Call 938     My Care Team Members  Patient Care Team         Relationship Specialty Notifications Start End    Judith Aviles MD PCP - General Internal Medicine  3/28/22     Phone: [phone] Fax: [phone]         [address] [address]    [place]  Melvi Ponce MD MD INTERNAL MEDICINE - ENDOCRINOLOGY, DIABETES & METABOLISM  3/2/17     Phone: [phone] Fax: [phone]         [facility] [address] [place] [address]    Roxanne Masterson PA-C Physician Assistant Endocrinology, Diabetes, and Metabolism  9/7/21     Phone: [phone] Fax: [phone]         [address] [place] [address]    Judith Aviles MD Assigned PCP   10/31/21     Phone: [phone] Fax: [phone]         [address] [place] [address]    Leanne Humphreys, RN Lead Care Coordinator  Admissions 6/14/22     Latasha Calderon PA-C Assigned Heart and Vascular Provider   7/16/22     Phone: [phone] Fax: [phone] [address] [facility] [address]    Carter Celis MD MD Dermatology  8/15/22     Phone: [phone] Fax: [phone]         [address] [facility] [address]    Olamide Rothman, RN Diabetes Educator Diabetes Education  8/16/22     Phone: [phone] Fax: [phone]         [address] [place] [address]    Joanne Gamble, [facility] Assigned MTM Pharmacist   9/28/22     Phone: [phone] Pager: [phone]         [address] [place], [place] [address] [facility] [address]    Carter Celis MD Assigned Surgical Provider   1/14/23     Phone: [phone] Fax: [phone]         [address] [facility] [address]    Roxanne Masterson PA-C Assigned Endocrinology Provider   5/6/23     Phone: [phone] Fax: [phone]         [address] [place] [address]              My Care Plans  Self Management and Treatment Plan  Care Plan  Care Plan: Advance Care Plan       Problem: Patient does not have a valid Health Care Directive       Goal: Complete Health Care Directive       Start Date: 6/14/2022 Expected End Date: 9/26/2023    This Visit's Progress: 40% Recent Progress: 40%    Note:     Goal Statement: I will complete a Health Care Directive and have this scanned into  my Medical Record.  Barriers: Patient doesn't have a Health Care Directive  Strengths: Strong advocate for self  Patient expressed understanding of goal: yes  Action steps to achieve this goal:  1. Care Coordination will mail me a Health Care Directive and any requested resources (goals worksheets, education sheets, class flyer).   2. I will complete the Health Care Directive. My signature must be either notarized or witnessed by two adults who are not named as health care agents in the document. Additionally only one of the witnesses can be employed by my health care system. If I need a notary I can check with my bank, my place of [employer], [employer] stores, or look online at [email or web address] .  3. I will provide a copy of my Health Care Directive to my care team and/or email directly to [email or web address].   4. I can visit [email or web address] website, email [email or web address] or call  [employer] at [phone] (M-Friday 6a to 5p) for more information including free classes on completing a Health Care Directive.  5. I will contact my care team with any barriers, questions or assistance needed with this goal. Care coordinator will remain available as needed.                                Action Plans on File:     Advance Care Plans/Directives Type:   -- (Full Code)      My Medical and Care Information  Problem List   Patient Active Problem List   Diagnosis     Type 2 diabetes mellitus (H)     Benign essential hypertension     Diabetic retinopathy of both eyes (H)     Pure hypercholesterolemia     Osteopenia     Chronic kidney disease, stage 3b (H)     Obesity (BMI 30-39.9)     S/P TAVR (transcatheter aortic valve replacement)     S/P gastric bypass     Morbid obesity (H)      Current Medications and Allergies:    Allergies   Allergen Reactions     Chlorhexidine Rash     Clonidine Headache     Doxazosin Mesylate Rash     Fexofenadine Hydrochloride Headache     Gemfibrozil  Rash     Lisinopril Angioedema     Norvasc [Amlodipine] Other (See Comments)     hair loss     Pioglitazone Hydrochloride Swelling     ankle edema     Current Outpatient Medications   Medication     acetaminophen (TYLENOL) 500 MG tablet     aspirin (ASA) 81 MG chewable tablet     augmented betamethasone dipropionate (DIPROLENE-AF) 0.05 % external ointment     Calcium Carb-Cholecalciferol (CALCIUM 600+D) 600-20 MG-MCG TABS     cetirizine (ZYRTEC) 10 MG tablet     Continuous Blood Gluc  (FREESTYLE BALWINDER 2 READER) HENNA     Continuous Blood Gluc Sensor (FREESTYLE BALWINDER 2 SENSOR) MISC     cyanocolbalamin (VITAMIN B-12) 1000 MCG tablet     Dulaglutide (TRULICITY) 4.5 MG/0.5ML SOPN     fluticasone (FLONASE) 50 MCG/ACT nasal spray     furosemide (LASIX) 40 MG tablet     insulin degludec (TRESIBA) 200 UNIT/ML pen     Insulin Lispro-aabc, 1 U Dial, (LYUMJEV KWIKPEN) 100 UNIT/ML SOPN     insulin pen needle (BD FCO U/F) 32G X 4 MM miscellaneous     irbesartan (AVAPRO) 150 MG tablet     levothyroxine (SYNTHROID/LEVOTHROID) 50 MCG tablet     Menthol, Topical Analgesic, 7 % LIQD     metFORMIN (GLUCOPHAGE) 1000 MG tablet     metoprolol succinate ER (TOPROL XL) 25 MG 24 hr tablet     potassium chloride ER (KLOR-CON M) 10 MEQ CR tablet     rosuvastatin (CRESTOR) 20 MG tablet     ticagrelor (BRILINTA) 90 MG tablet     topiramate (TOPAMAX) 25 MG tablet     triamcinolone (KENALOG) 0.1 % external ointment     VITRON-C  MG TABS tablet     zinc 50 MG TABS     No current facility-administered medications for this visit.         Care Coordination Start Date: 6/14/2022   Frequency of Care Coordination: monthly     Form Last Updated: 06/26/2023

## 2023-06-26 NOTE — PROGRESS NOTES
"Clinic Care Coordination Contact    Follow Up Progress Note      Assessment:   Patient states she read through the Health Care Directive and is still in the process of competing; patient states she \"just woke up\" and is asking RNCC to reach out next month as she is wanting to continue to take a nap.    Care Gaps:    Health Maintenance Due   Topic Date Due     BMP  01/07/2023     COVID-19 Vaccine (6 - Pfizer series) 01/16/2023     Care Gaps Last addressed on 6/26/2023    Care Plans  Care Plan: Advance Care Plan     Problem: Patient does not have a valid Health Care Directive     Goal: Complete Health Care Directive     Start Date: 6/14/2022 Expected End Date: 9/26/2023    This Visit's Progress: 40% Recent Progress: 40%    Note:     Goal Statement: I will complete a Health Care Directive and have this scanned into my Medical Record.  Barriers: Patient doesn't have a Health Care Directive  Strengths: Strong advocate for self  Patient expressed understanding of goal: yes  Action steps to achieve this goal:  1. Care Coordination will mail me a Health Care Directive and any requested resources (goals worksheets, education sheets, class flyer).   2. I will complete the Health Care Directive. My signature must be either notarized or witnessed by two adults who are not named as health care agents in the document. Additionally only one of the witnesses can be employed by my health care system. If I need a notary I can check with my bank, my place of Yazdan[employer], [employer] stores, or look online at [email or web address] .  3. I will provide a copy of my Health Care Directive to my care team and/or email directly to [email or web address].   4. I can visit [email or web address] website, email [email or web address] or call [facility] at [phone] (M-Friday 6a to 5p) for more information including free classes on completing a Health Care Directive.  5. I will contact my care team with any barriers, " questions or assistance needed with this goal. Care coordinator will remain available as needed.                       Intervention/Education provided during outreach:   RNCC called and spoke with patient/caregiver; introduced self, discussed role of Care Coordination and explained reason for call    Plan:   -Patient will contact the care team with questions, concerns, support needs   -Patient will use the clinic as a resource and understands (s)he can contact the [facility] with 24/7 after hours services available  -Care Coordinator will remain available as needed  -RNCC will follow up in one month for follow up appointments/recommendations and goal progression     Leanne Humphreys RN, BSN, PHN  Primary Care / Care Coordinator   [facility]  E-[email or web address]   [phone]

## 2023-07-12 NOTE — PROGRESS NOTES
Patient is showing 4/5 MNCM met. BP out range   Kaia Bass, VF  Outcome for 07/12/23 11:54 AM :Sent patient [employer] message asking them to upload their BG data   Kaia Bass  Outcome for 07/12/23 2:44 PM :Patient is sharing data via clinic device website and patient has been instructed to update data on website. Find login information by using .Snipi  Patient will upload to [employer] on 7/14.  Kaia Bass          [facility]       Follow up T2DM        Interval history  Seen by Delia Masterson and myself alternating q 3 m          Switched from humalog to lyumjev insulin.  That notes that postprandial blood sugars are much improved on new mealtime insulin.  Takes 4 units per 15 grams of carbs, about 14 units with breakfast. Covers breakfast lunch and dinner.    Currently is up at night and sleeps during the day starting at 6:30am.     Blood pressure well controlled, followed by Cardiology and PCP.    If you have concerns, please send me a [employer] message M-Th, call the clinic at [phone], or call [phone] after hours/weekends and ask to speak with the endocrinologist on call.        HPI:   Rachael is a elderly female with history morbid obesity S/P Gastric bypass surgery, hypothyroidism, CKD, CAD, s/p stent placement and valve replacement for aortic stenosis 2022. here for follow up of type 2 diabetes.  Her diabetes mellitus is complicated by diabetic neuropathy, mild nonproliferative retinopathy and nephropathy.  She has had type 2 Diabetes since her early 20s.  Her lymphedema has improved.  She went to a lymphedema clinic 3 times a week for 3 weeks.  Her legs go to sleep at night.  Her sleep has been better- now going to bed around midnight and waking up at 9am.  Frozen shoulder is better.  Glucose has improved significantly.  Now using a laurie 2, which allows her to pay more attention to her glucose.      Current diabetes medications  Tresiba 32 units at night.   LisproAabcHumalog-  depends on what she is eating 4 units/15g carb.  She is working on taking insulin before she eats.    Dulaglutide 4.5 mg weekly on Sundays. $1000 for a 3 mo supply copay.   Metformin 1000 mg daily.     Previous treatments:   Jardiance 10 mg daily stopped in 2022- was expensive.      Breakfast- breakfast bar or oatmeal or cream of wheat, rarely toast.   Lunch- sandwich- bagel with ham or chicken.   Evening- Riced cauliflower with green beans or cheese.  Fish or chicken or sometimes pork.    Snack before bed if glucose is lower.     She otherwise is generally feeling well and has no other concerns.     Past Medical History:   Diagnosis Date     Benign essential hypertension      Chronic kidney disease, stage 3b (H)      Diabetic retinopathy of both eyes (H)      Obesity (BMI 30-39.9)      Osteopenia      S/P gastric bypass      S/P TAVR (transcatheter aortic valve replacement) 06/07/2022     Type 2 diabetes mellitus (H)        Past Surgical History:   Procedure Laterality Date     APPENDECTOMY       CATARACT IOL, RT/LT Bilateral      CV CORONARY ANGIOGRAM N/A 05/04/2022    Procedure: Coronary Angiogram;  Surgeon: Hector Lewis MD;  [facility]     CV LEFT HEART CATH N/A 05/04/2022    Procedure: Left Heart Catheterization;  Surgeon: Hector Lewis MD;  [facility]     CV PCI N/A 05/04/2022    Procedure: Percutaneous Coronary Intervention;  Surgeon: Hector Lewis MD;  [facility]     CV TRANSCATHETER AORTIC VALVE REPLACEMENT-FEMORAL APPROACH N/A 06/07/2022    Procedure: Transcatheter Aortic Valve Replacement-Femoral Approach;  Surgeon: Hector Lewis MD;  [facility]     JURGEN EN Y BOWEL  2004     TONSILLECTOMY & ADENOIDECTOMY         Family History   Problem Relation Age of Onset     Diabetes Type 2  Mother      Glaucoma Mother      Coronary Artery Disease Mother      Abdominal Aortic  Aneurysm Father      Myocardial Infarction Father      Breast Cancer Sister      Abdominal Aortic Aneurysm Brother      Bladder Cancer Brother      Diabetes Type 2  Brother      Liver Cancer Brother      Myocardial Infarction Brother      Alzheimer Disease Paternal Grandmother      Cerebrovascular Disease Paternal Grandfather      Ovarian Cancer No family hx of      Colon Cancer No family hx of        Social History     Socioeconomic History     Marital status:      Spouse name: Not on file     Number of children: 0     Years of education: Not on file     Highest education level: Not on file   Occupational History     Employer: [employer]   Social Needs     Financial resource strain: Not on file     Food insecurity:     Worry: Not on file     Inability: Not on file     Transportation needs:     Medical: Not on file     Non-medical: Not on file   Tobacco Use     Smoking status: Never Smoker     Smokeless tobacco: Never Used   Substance and Sexual Activity     Alcohol use: No     Drug use: No     Sexual activity: Never     Partners: Male   Lifestyle     Physical activity:     Days per week: Not on file     Minutes per session: Not on file     Stress: Not on file   Relationships     Social connections:     Talks on phone: Not on file     Gets together: Not on file     Attends Yazidism service: Not on file     Active member of club or organization: Not on file     Attends meetings of clubs or organizations: Not on file     Relationship status: Not on file     Intimate partner violence:     Fear of current or ex partner: Not on file     Emotionally abused: Not on file     Physically abused: Not on file     Forced sexual activity: Not on file   Other Topics Concern      Service Not Asked     Blood Transfusions Not Asked     Caffeine Concern Yes     Comment: 20 oz daily     Occupational Exposure Not Asked     Hobby Hazards Not Asked     Sleep Concern Not Asked     Stress  Concern Not Asked     Weight Concern Not Asked     Special Diet Not Asked     Back Care Not Asked     Exercise No     Bike Helmet Not Asked     Seat Belt Yes     Self-Exams Yes     Parent/sibling w/ CABG, MI or angioplasty before 65F 55M? Not Asked   Social History Narrative    Living arrangements - the patient lives alone.     Social Hx: Lives with her sister and her sister's boyfriend.  . 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     Current Outpatient Medications   Medication     acetaminophen (TYLENOL) 500 MG tablet     aspirin (ASA) 81 MG chewable tablet     augmented betamethasone dipropionate (DIPROLENE-AF) 0.05 % external ointment     Calcium Carb-Cholecalciferol (CALCIUM 600+D) 600-20 MG-MCG TABS     cetirizine (ZYRTEC) 10 MG tablet     Continuous Blood Gluc  (FREESTYLE BALWINDER 2 READER) HENNA     Continuous Blood Gluc Sensor (FREESTYLE BALWINDER 2 SENSOR) MISC     cyanocolbalamin (VITAMIN B-12) 1000 MCG tablet     Dulaglutide (TRULICITY) 4.5 MG/0.5ML SOPN     fluticasone (FLONASE) 50 MCG/ACT nasal spray     furosemide (LASIX) 40 MG tablet     insulin degludec (TRESIBA) 200 UNIT/ML pen     Insulin Lispro-aabc, 1 U Dial, (LYUMJEV KWIKPEN) 100 UNIT/ML SOPN     insulin pen needle (BD FCO U/F) 32G X 4 MM miscellaneous     irbesartan (AVAPRO) 150 MG tablet     levothyroxine (SYNTHROID/LEVOTHROID) 50 MCG tablet     Menthol, Topical Analgesic, 7 % LIQD     metFORMIN (GLUCOPHAGE) 1000 MG tablet     metoprolol succinate ER (TOPROL XL) 25 MG 24 hr tablet     potassium chloride ER (KLOR-CON M) 10 MEQ CR tablet     rosuvastatin (CRESTOR) 20 MG tablet     ticagrelor (BRILINTA) 90 MG tablet     topiramate (TOPAMAX) 25 MG tablet     triamcinolone (KENALOG) 0.1 % external ointment     VITRON-C  MG TABS tablet     zinc 50 MG TABS     No current facility-administered medications for this visit.          Allergies   Allergen Reactions     Chlorhexidine Rash     Clonidine Headache     Doxazosin  Mesylate Rash     Fexofenadine Hydrochloride Headache     Gemfibrozil Rash     Lisinopril Angioedema     Norvasc [Amlodipine] Other (See Comments)     hair loss     Pioglitazone Hydrochloride Swelling     ankle edema     Physical Exam  /66 (BP Location: Right arm, Patient Position: Sitting, Cuff Size: Adult Large)   Pulse 72   Wt 88.9 kg (196 lb)   LMP  (LMP Unknown)   BMI 37.03 kg/m    Wt Readings from Last 4 Encounters:   07/18/23 88.9 kg (196 lb)   06/15/23 89.8 kg (198 lb)   05/08/23 87.8 kg (193 lb 9.6 oz)   02/02/23 90.3 kg (199 lb)     GENERAL:  Alert and oriented X3, NAD, well dressed, answering questions appropriately, appears stated age.  EXTREMITIES: 1+ edema, up her shins, has small erosions on top of right foot, 2+ bilateral pitting edema    RESULTS  A1c today 7.0%    Lab Results   Component Value Date     07/07/2022    CHLORIDE 107 07/07/2022    CO2 23 07/07/2022    GLC 90 07/13/2023    CR 1.52 (H) 07/13/2023    CR 1.08 (H) 08/09/2022    CR 1.69 (H) 07/07/2022    CR 1.48 (H) 06/10/2022    CR 1.48 (H) 06/09/2022    RASHEEDA 9.5 07/07/2022    MAG 2.6 (H) 06/09/2022    ALBUMIN 3.3 (L) 06/08/2022    ALKPHOS 89 06/08/2022    LDL 65 07/13/2023    HDL 46 (L) 07/13/2023    TRIG 100 07/13/2023    TSH 2.88 07/13/2023    TSH 1.83 08/09/2022    TSH 2.37 08/27/2021     Lab Results   Component Value Date    MICROL <12.0 07/13/2023    MICROL 11 08/09/2022    MICROL 6 08/27/2021    MICROL 35 10/09/2020    MICROL 10 01/07/2020     Lab Results   Component Value Date    A1C 7.9 (H) 08/09/2022    A1C 8.0 (H) 06/08/2022    A1C 7.9 (H) 05/04/2022    A1C 7.6 (H) 11/24/2021    A1C 8.0 (H) 08/27/2021       Lab Results   Component Value Date    HGB 11.6 (L) 07/07/2022       Lab Results   Component Value Date    A1C 7.9 (H) 08/09/2022    A1C 8.0 (H) 06/08/2022    A1C 7.9 (H) 05/04/2022    A1C 7.6 (H) 11/24/2021    A1C 8.0 (H) 08/27/2021    A1C 8.1 (H) 01/08/2021    A1C 8.1 (H) 10/09/2020    A1C 8.7 (A) 08/07/2020     A1C 8.4 (H) 03/27/2020    A1C 8.7 (H) 04/23/2019    HEMOGLOBINA1 7.6 05/08/2023    HEMOGLOBINA1 8.6 01/16/2023    HEMOGLOBINA1 8.1 (A) 01/07/2020    HEMOGLOBINA1 8.7 (A) 04/22/2019    HEMOGLOBINA1 8.1 (A) 12/10/2018       TSH   Date Value Ref Range Status   07/13/2023 2.88 0.30 - 4.20 uIU/mL Final   08/09/2022 1.83 0.40 - 4.00 mU/L Final   08/27/2021 2.37 0.40 - 4.00 mU/L Final   03/27/2020 3.47 0.40 - 4.00 mU/L Final   10/14/2019 2.54 0.40 - 4.00 mU/L Final   04/23/2019 4.06 (H) 0.40 - 4.00 mU/L Final   05/24/2018 2.54 0.40 - 4.00 mU/L Final   03/12/2018 4.78 (H) 0.40 - 4.00 mU/L Final     T4 Free   Date Value Ref Range Status   04/23/2019 1.10 0.76 - 1.46 ng/dL Final   03/12/2018 1.17 0.76 - 1.46 ng/dL Final   10/17/2016 1.17 0.76 - 1.46 ng/dL Final   05/05/2010 1.05 0.70 - 1.85 ng/dL Final   09/13/2007 1.00 0.70 - 1.85 ng/dL Final       ALT   Date Value Ref Range Status   06/08/2022 63 (H) 0 - 50 U/L Final   06/01/2022 160 (H) 0 - 50 U/L Final   04/13/2021 38 0 - 50 U/L Final   03/27/2020 28 0 - 50 U/L Final   ]  Recent Labs   Lab Test 08/09/22  0844 06/08/22  1056 06/17/16  0750 05/22/15  0848   CHOL 104 67   < > 175   HDL 46* 28*   < > 32*   LDL 42 17   < > 76   TRIG 81 109   < > 334*   CHOLHDLRATIO  --   --   --  5.5*    < > = values in this interval not displayed.           GFR Estimate   Date Value Ref Range Status   07/13/2023 36 (L) >60 mL/min/1.73m2 Final   08/09/2022 55 (L) >60 mL/min/1.73m2 Final     Comment:     Effective December 21, 2021 eGFRcr in adults is calculated using the 2021 CKD-EPI creatinine equation which includes age and gender (Nicholas et al., NE, DOI: 10.1056/IUMRnz0059401)   07/07/2022 32 (L) >60 mL/min/1.73m2 Final     Comment:     Effective December 21, 2021 eGFRcr in adults is calculated using the 2021 CKD-EPI creatinine equation which includes age and gender (Nicholas et al., NEJ, DOI: 10.1056/BDDWaa1374492)   04/13/2021 38 (L) >60 mL/min/[1.73_m2] Final     Comment:     Non   American GFR Calc  Starting 12/18/2018, serum creatinine based estimated GFR (eGFR) will be   calculated using the Chronic Kidney Disease Epidemiology Collaboration   (CKD-EPI) equation.     03/24/2021 38 (L) >60 mL/min/[1.73_m2] Final     Comment:     Non  GFR Calc  Starting 12/18/2018, serum creatinine based estimated GFR (eGFR) will be   calculated using the Chronic Kidney Disease Epidemiology Collaboration   (CKD-EPI) equation.     11/13/2020 45 (L) >60 mL/min/[1.73_m2] Final     Comment:     Non  GFR Calc  Starting 12/18/2018, serum creatinine based estimated GFR (eGFR) will be   calculated using the Chronic Kidney Disease Epidemiology Collaboration   (CKD-EPI) equation.       GFR, ESTIMATED POCT   Date Value Ref Range Status   05/17/2022 32 (L) >60 mL/min/1.73m2 Final     GFR Estimate If Black   Date Value Ref Range Status   04/13/2021 45 (L) >60 mL/min/[1.73_m2] Final     Comment:      GFR Calc  Starting 12/18/2018, serum creatinine based estimated GFR (eGFR) will be   calculated using the Chronic Kidney Disease Epidemiology Collaboration   (CKD-EPI) equation.     03/24/2021 44 (L) >60 mL/min/[1.73_m2] Final     Comment:      GFR Calc  Starting 12/18/2018, serum creatinine based estimated GFR (eGFR) will be   calculated using the Chronic Kidney Disease Epidemiology Collaboration   (CKD-EPI) equation.     11/13/2020 52 (L) >60 mL/min/[1.73_m2] Final     Comment:      GFR Calc  Starting 12/18/2018, serum creatinine based estimated GFR (eGFR) will be   calculated using the Chronic Kidney Disease Epidemiology Collaboration   (CKD-EPI) equation.         Lab Results   Component Value Date    MICROL 11 08/09/2022    MICROL 35 10/09/2020     No results found for: MICROALBUMIN  No results found for: CPEPT, GADAB, ISCAB    Vitamin B12   Date Value Ref Range Status   08/09/2022 2,687 (H) 232 - 1,245 pg/mL Final   10/17/2016 2,075 (H) 193 -  986 pg/mL Final     Comment:     Interp: [id number] = Normal   05/12/2012 742 >210 pg/mL Final     Comment:     Interp: [id number] = Normal     Most recent eye exam date: : Not Found       Assessment/Plan:   1.  Type 2 diabetes- Noah glucose continues to improved and is now at goal.   Blood glucose control  Continue Tresiba 34 units, lispro AA BC 4 units for each 15 g of carbohydrates, Trulicity 4.5, metformin 1000 mg twice daily    2. Risk factors:   Retinopathy: Yes.  She also has macular edema and follows with ophthalmologist regularly.   Nephropathy:  BP well controlled. On irbesartan .  Wants to discuss with cardiologist. Asked her to reach out to cardiologist for medication adjustments.  Microalbuminuria has resolved.  She does have CKD.  GFR is 55.   Neuropathy: Some tingling, numbness.  Tolerable.    Feet: has small with visual observation on the right foot on top of her foot, seems to be driven by edema, reviewed to restart wrapping, continue low-salt diet, elevate, and do some walking.  Lipids:  On high dose statin rosuvastatin 20 mg daily    3. F/U with me Roxanne Masterson in 3 months and myself in 6m      40 minutes spent on the date of the encounter doing chart review, review of test results, interpretation of tests, patient visit and documentation     This note was generated using computer recognized voice recognition. This might result in some expected imperfection.    Olga Lidia Hoover MD  Endocrinology and Diabetes  Telephone contact:  [facility] [place] [phone]  [facility] [phone]

## 2023-07-13 ENCOUNTER — LAB (OUTPATIENT)
Dept: LAB | Facility: CLINIC | Age: 73
End: 2023-07-13
Payer: COMMERCIAL

## 2023-07-13 DIAGNOSIS — E11.40 TYPE 2 DIABETES MELLITUS WITH DIABETIC NEUROPATHY, WITH LONG-TERM CURRENT USE OF INSULIN (H): ICD-10-CM

## 2023-07-13 DIAGNOSIS — Z79.4 TYPE 2 DIABETES MELLITUS WITH DIABETIC NEUROPATHY, WITH LONG-TERM CURRENT USE OF INSULIN (H): ICD-10-CM

## 2023-07-13 LAB
BUN SERPL-MCNC: 28.6 MG/DL (ref 8–23)
CHOLEST SERPL-MCNC: 131 MG/DL
CREAT SERPL-MCNC: 1.52 MG/DL (ref 0.51–0.95)
CREAT UR-MCNC: 15.6 MG/DL
FASTING STATUS PATIENT QL REPORTED: YES
GFR SERPL CREATININE-BSD FRML MDRD: 36 ML/MIN/1.73M2
GLUCOSE SERPL-MCNC: 90 MG/DL (ref 70–99)
HDLC SERPL-MCNC: 46 MG/DL
LDLC SERPL CALC-MCNC: 65 MG/DL
MICROALBUMIN UR-MCNC: <12 MG/L
MICROALBUMIN/CREAT UR: NORMAL MG/G{CREAT}
NONHDLC SERPL-MCNC: 85 MG/DL
POTASSIUM SERPL-SCNC: 4.3 MMOL/L (ref 3.4–5.3)
TRIGL SERPL-MCNC: 100 MG/DL
TSH SERPL DL<=0.005 MIU/L-ACNC: 2.88 UIU/ML (ref 0.3–4.2)

## 2023-07-13 PROCEDURE — 82570 ASSAY OF URINE CREATININE: CPT

## 2023-07-13 PROCEDURE — 84520 ASSAY OF UREA NITROGEN: CPT

## 2023-07-13 PROCEDURE — 82043 UR ALBUMIN QUANTITATIVE: CPT

## 2023-07-13 PROCEDURE — 36415 COLL VENOUS BLD VENIPUNCTURE: CPT

## 2023-07-13 PROCEDURE — 84132 ASSAY OF SERUM POTASSIUM: CPT

## 2023-07-13 PROCEDURE — 82947 ASSAY GLUCOSE BLOOD QUANT: CPT

## 2023-07-13 PROCEDURE — 80061 LIPID PANEL: CPT

## 2023-07-13 PROCEDURE — 84443 ASSAY THYROID STIM HORMONE: CPT

## 2023-07-13 PROCEDURE — 82565 ASSAY OF CREATININE: CPT

## 2023-07-18 ENCOUNTER — OFFICE VISIT (OUTPATIENT)
Dept: ENDOCRINOLOGY | Facility: CLINIC | Age: 73
End: 2023-07-18
Payer: COMMERCIAL

## 2023-07-18 VITALS
HEART RATE: 72 BPM | DIASTOLIC BLOOD PRESSURE: 66 MMHG | BODY MASS INDEX: 37.03 KG/M2 | WEIGHT: 196 LBS | SYSTOLIC BLOOD PRESSURE: 125 MMHG

## 2023-07-18 DIAGNOSIS — Z79.4 TYPE 2 DIABETES MELLITUS WITH DIABETIC NEUROPATHY, WITH LONG-TERM CURRENT USE OF INSULIN (H): Primary | ICD-10-CM

## 2023-07-18 DIAGNOSIS — E11.40 TYPE 2 DIABETES MELLITUS WITH DIABETIC NEUROPATHY, WITH LONG-TERM CURRENT USE OF INSULIN (H): Primary | ICD-10-CM

## 2023-07-18 LAB — HBA1C MFR BLD: 7 % (ref 4.3–?)

## 2023-07-18 PROCEDURE — 99215 OFFICE O/P EST HI 40 MIN: CPT | Performed by: INTERNAL MEDICINE

## 2023-07-18 PROCEDURE — 83036 HEMOGLOBIN GLYCOSYLATED A1C: CPT | Performed by: INTERNAL MEDICINE

## 2023-07-18 NOTE — PATIENT INSTRUCTIONS
Foot: elevated, change to wrapping, walk every day, choose times when less hot, continue low salt diet    Continue Metformin, Tresiba, Trulicity, Levothyroxine,Lyumjev

## 2023-07-18 NOTE — LETTER
7/18/2023       RE: Ade Wilkins  [address] [facility] [address]     Dear Colleague,    Thank you for referring your patient, Ade Wilkins, to the [facility]INOLOG[facility] [place] at [facility]. Please see a copy of my visit note below.    Patient is showing 4/5 MNCM met. BP out range   Kaia Bass, VF  Outcome for 07/12/23 11:54 AM :Sent patient [employer] message asking them to upload their BG data   Kaia Bass  Outcome for 07/12/23 2:44 PM :Patient is sharing data via clinic device website and patient has been instructed to update data on website. Find login information by using [employer]  Patient will upload to [employer] on 7/14.  Kaia Bass          Endocrinology and Diabetes Clinic       Follow up T2DM        Interval history  Seen by Delia Masterson and myself alternating q 3 m          Switched from humalog to lyumjev insulin.  That notes that postprandial blood sugars are much improved on new mealtime insulin.  Takes 4 units per 15 grams of carbs, about 14 units with breakfast. Covers breakfast lunch and dinner.    Currently is up at night and sleeps during the day starting at 6:30am.     Blood pressure well controlled, followed by Cardiology and PCP.    If you have concerns, please send me a [employer] message M-Th, call the clinic at [phone], or call [phone] after hours/weekends and ask to speak with the endocrinologist on call.        HPI:   Rachael is a elderly female with history morbid obesity S/P Gastric bypass surgery, hypothyroidism, CKD, CAD, s/p stent placement and valve replacement for aortic stenosis 2022. here for follow up of type 2 diabetes.  Her diabetes mellitus is complicated by diabetic neuropathy, mild nonproliferative retinopathy and nephropathy.  She has had type 2 Diabetes since her early 20s.  Her lymphedema has improved.  She went to a lymphedema clinic 3 times a week for 3 weeks.  Her legs go to  sleep at night.  Her sleep has been better- now going to bed around midnight and waking up at 9am.  Frozen shoulder is better.  Glucose has improved significantly.  Now using a laurie 2, which allows her to pay more attention to her glucose.      Current diabetes medications  Tresiba 32 units at night.   LisproAabcHumalog- depends on what she is eating 4 units/15g carb.  She is working on taking insulin before she eats.    Dulaglutide 4.5 mg weekly on Sundays. $1000 for a 3 mo supply copay.   Metformin 1000 mg daily.     Previous treatments:   Jardiance 10 mg daily stopped in 2022- was expensive.      Breakfast- breakfast bar or oatmeal or cream of wheat, rarely toast.   Lunch- sandwich- bagel with ham or chicken.   Evening- Riced cauliflower with green beans or cheese.  Fish or chicken or sometimes pork.    Snack before bed if glucose is lower.     She otherwise is generally feeling well and has no other concerns.     Past Medical History:   Diagnosis Date    Benign essential hypertension     Chronic kidney disease, stage 3b (H)     Diabetic retinopathy of both eyes (H)     Obesity (BMI 30-39.9)     Osteopenia     S/P gastric bypass     S/P TAVR (transcatheter aortic valve replacement) 06/07/2022    Type 2 diabetes mellitus (H)        Past Surgical History:   Procedure Laterality Date    APPENDECTOMY      CATARACT IOL, RT/LT Bilateral     CV CORONARY ANGIOGRAM N/A 05/04/2022    Procedure: Coronary Angiogram;  Surgeon: Hector Lewis MD;  Location:  HEART CARDIAC CATH LAB    CV LEFT HEART CATH N/A 05/04/2022    Procedure: Left Heart Catheterization;  Surgeon: Hector Lewis MD;  Location: [facility] CARDIAC CATH LAB    CV PCI N/A 05/04/2022    Procedure: Percutaneous Coronary Intervention;  Surgeon: Hector Lewis MD;  Location: [facility] CARDIAC CATH LAB    CV TRANSCATHETER AORTIC VALVE REPLACEMENT-FEMORAL APPROACH N/A 06/07/2022    Procedure: Transcatheter Aortic Valve Replacement-Femoral  Approach;  Surgeon: Hector Lewis MD;  Location:  HEART CARDIAC CATH LAB    JURGEN EN Y BOWEL  2004    TONSILLECTOMY & ADENOIDECTOMY         Family History   Problem Relation Age of Onset    Diabetes Type 2  Mother     Glaucoma Mother     Coronary Artery Disease Mother     Abdominal Aortic Aneurysm Father     Myocardial Infarction Father     Breast Cancer Sister     Abdominal Aortic Aneurysm Brother     Bladder Cancer Brother     Diabetes Type 2  Brother     Liver Cancer Brother     Myocardial Infarction Brother     Alzheimer Disease Paternal Grandmother     Cerebrovascular Disease Paternal Grandfather     Ovarian Cancer No family hx of     Colon Cancer No family hx of        Social History     Socioeconomic History    Marital status:      Spouse name: Not on file    Number of children: 0    Years of education: Not on file    Highest education level: Not on file   Occupational History     Employer: [employer],[address] [place] [address]   Social Needs    Financial resource strain: Not on file    Food insecurity:     Worry: Not on file     Inability: Not on file    Transportation needs:     Medical: Not on file     Non-medical: Not on file   Tobacco Use    Smoking status: Never Smoker    Smokeless tobacco: Never Used   Substance and Sexual Activity    Alcohol use: No    Drug use: No    Sexual activity: Never     Partners: Male   Lifestyle    Physical activity:     Days per week: Not on file     Minutes per session: Not on file    Stress: Not on file   Relationships    Social connections:     Talks on phone: Not on file     Gets together: Not on file     Attends Scientology service: Not on file     Active member of club or organization: Not on file     Attends meetings of clubs or organizations: Not on file     Relationship status: Not on file    Intimate partner violence:     Fear of current or ex partner: Not on file     Emotionally abused: Not on file     Physically abused: Not on file      Forced sexual activity: Not on file   Other Topics Concern     Service Not Asked    Blood Transfusions Not Asked    Caffeine Concern Yes     Comment: 20 oz daily    Occupational Exposure Not Asked    Hobby Hazards Not Asked    Sleep Concern Not Asked    Stress Concern Not Asked    Weight Concern Not Asked    Special Diet Not Asked    Back Care Not Asked    Exercise No    Bike Helmet Not Asked    Seat Belt Yes    Self-Exams Yes    Parent/sibling w/ CABG, MI or angioplasty before 65F 55M? Not Asked   Social History Narrative    Living arrangements - the patient lives alone.     Social Hx: Lives with her sister and her sister's boyfriend.  . 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     Current Outpatient Medications   Medication    acetaminophen (TYLENOL) 500 MG tablet    aspirin (ASA) 81 MG chewable tablet    augmented betamethasone dipropionate (DIPROLENE-AF) 0.05 % external ointment    Calcium Carb-Cholecalciferol (CALCIUM 600+D) 600-20 MG-MCG TABS    cetirizine (ZYRTEC) 10 MG tablet    Continuous Blood Gluc  (FREESTYLE BALWINDER 2 READER) HENNA    Continuous Blood Gluc Sensor (FREESTYLE BALWINDER 2 SENSOR) MISC    cyanocolbalamin (VITAMIN B-12) 1000 MCG tablet    Dulaglutide (TRULICITY) 4.5 MG/0.5ML SOPN    fluticasone (FLONASE) 50 MCG/ACT nasal spray    furosemide (LASIX) 40 MG tablet    insulin degludec (TRESIBA) 200 UNIT/ML pen    Insulin Lispro-aabc, 1 U Dial, (LYUMJEV KWIKPEN) 100 UNIT/ML SOPN    insulin pen needle (BD FCO U/F) 32G X 4 MM miscellaneous    irbesartan (AVAPRO) 150 MG tablet    levothyroxine (SYNTHROID/LEVOTHROID) 50 MCG tablet    Menthol, Topical Analgesic, 7 % LIQD    metFORMIN (GLUCOPHAGE) 1000 MG tablet    metoprolol succinate ER (TOPROL XL) 25 MG 24 hr tablet    potassium chloride ER (KLOR-CON M) 10 MEQ CR tablet    rosuvastatin (CRESTOR) 20 MG tablet    ticagrelor (BRILINTA) 90 MG tablet    topiramate (TOPAMAX) 25 MG tablet    triamcinolone (KENALOG) 0.1 %  external ointment    VITRON-C  MG TABS tablet    zinc 50 MG TABS     No current facility-administered medications for this visit.          Allergies   Allergen Reactions    Chlorhexidine Rash    Clonidine Headache    Doxazosin Mesylate Rash    Fexofenadine Hydrochloride Headache    Gemfibrozil Rash    Lisinopril Angioedema    Norvasc [Amlodipine] Other (See Comments)     hair loss    Pioglitazone Hydrochloride Swelling     ankle edema     Physical Exam  /66 (BP Location: Right arm, Patient Position: Sitting, Cuff Size: Adult Large)   Pulse 72   Wt 88.9 kg (196 lb)   LMP  (LMP Unknown)   BMI 37.03 kg/m    Wt Readings from Last 4 Encounters:   07/18/23 88.9 kg (196 lb)   06/15/23 89.8 kg (198 lb)   05/08/23 87.8 kg (193 lb 9.6 oz)   02/02/23 90.3 kg (199 lb)     GENERAL:  Alert and oriented X3, NAD, well dressed, answering questions appropriately, appears stated age.  EXTREMITIES: 1+ edema, up her shins, has small erosions on top of right foot, 2+ bilateral pitting edema    RESULTS  A1c today 7.0%    Lab Results   Component Value Date     07/07/2022    CHLORIDE 107 07/07/2022    CO2 23 07/07/2022    GLC 90 07/13/2023    CR 1.52 (H) 07/13/2023    CR 1.08 (H) 08/09/2022    CR 1.69 (H) 07/07/2022    CR 1.48 (H) 06/10/2022    CR 1.48 (H) 06/09/2022    RASHEEDA 9.5 07/07/2022    MAG 2.6 (H) 06/09/2022    ALBUMIN 3.3 (L) 06/08/2022    ALKPHOS 89 06/08/2022    LDL 65 07/13/2023    HDL 46 (L) 07/13/2023    TRIG 100 07/13/2023    TSH 2.88 07/13/2023    TSH 1.83 08/09/2022    TSH 2.37 08/27/2021     Lab Results   Component Value Date    MICROL <12.0 07/13/2023    MICROL 11 08/09/2022    MICROL 6 08/27/2021    MICROL 35 10/09/2020    MICROL 10 01/07/2020     Lab Results   Component Value Date    A1C 7.9 (H) 08/09/2022    A1C 8.0 (H) 06/08/2022    A1C 7.9 (H) 05/04/2022    A1C 7.6 (H) 11/24/2021    A1C 8.0 (H) 08/27/2021       Lab Results   Component Value Date    HGB 11.6 (L) 07/07/2022       Lab Results    Component Value Date    A1C 7.9 (H) 08/09/2022    A1C 8.0 (H) 06/08/2022    A1C 7.9 (H) 05/04/2022    A1C 7.6 (H) 11/24/2021    A1C 8.0 (H) 08/27/2021    A1C 8.1 (H) 01/08/2021    A1C 8.1 (H) 10/09/2020    A1C 8.7 (A) 08/07/2020    A1C 8.4 (H) 03/27/2020    A1C 8.7 (H) 04/23/2019    HEMOGLOBINA1 7.6 05/08/2023    HEMOGLOBINA1 8.6 01/16/2023    HEMOGLOBINA1 8.1 (A) 01/07/2020    HEMOGLOBINA1 8.7 (A) 04/22/2019    HEMOGLOBINA1 8.1 (A) 12/10/2018       TSH   Date Value Ref Range Status   07/13/2023 2.88 0.30 - 4.20 uIU/mL Final   08/09/2022 1.83 0.40 - 4.00 mU/L Final   08/27/2021 2.37 0.40 - 4.00 mU/L Final   03/27/2020 3.47 0.40 - 4.00 mU/L Final   10/14/2019 2.54 0.40 - 4.00 mU/L Final   04/23/2019 4.06 (H) 0.40 - 4.00 mU/L Final   05/24/2018 2.54 0.40 - 4.00 mU/L Final   03/12/2018 4.78 (H) 0.40 - 4.00 mU/L Final     T4 Free   Date Value Ref Range Status   04/23/2019 1.10 0.76 - 1.46 ng/dL Final   03/12/2018 1.17 0.76 - 1.46 ng/dL Final   10/17/2016 1.17 0.76 - 1.46 ng/dL Final   05/05/2010 1.05 0.70 - 1.85 ng/dL Final   09/13/2007 1.00 0.70 - 1.85 ng/dL Final       ALT   Date Value Ref Range Status   06/08/2022 63 (H) 0 - 50 U/L Final   06/01/2022 160 (H) 0 - 50 U/L Final   04/13/2021 38 0 - 50 U/L Final   03/27/2020 28 0 - 50 U/L Final   ]  Recent Labs   Lab Test 08/09/22  0844 06/08/22  1056 06/17/16  0750 05/22/15  0848   CHOL 104 67   < > 175   HDL 46* 28*   < > 32*   LDL 42 17   < > 76   TRIG 81 109   < > 334*   CHOLHDLRATIO  --   --   --  5.5*    < > = values in this interval not displayed.           GFR Estimate   Date Value Ref Range Status   07/13/2023 36 (L) >60 mL/min/1.73m2 Final   08/09/2022 55 (L) >60 mL/min/1.73m2 Final     Comment:     Effective December 21, 2021 eGFRcr in adults is calculated using the 2021 CKD-EPI creatinine equation which includes age and gender (Nicholas et al., NEJM, DOI: 10.1056/QBETnp8741888)   07/07/2022 32 (L) >60 mL/min/1.73m2 Final     Comment:     Effective December 21,  2021 eGFRcr in adults is calculated using the 2021 CKD-EPI creatinine equation which includes age and gender (Nicholas et al., NE, DOI: 10.1056/ARSJtg7889307)   04/13/2021 38 (L) >60 mL/min/[1.73_m2] Final     Comment:     Non  GFR Calc  Starting 12/18/2018, serum creatinine based estimated GFR (eGFR) will be   calculated using the Chronic Kidney Disease Epidemiology Collaboration   (CKD-EPI) equation.     03/24/2021 38 (L) >60 mL/min/[1.73_m2] Final     Comment:     Non  GFR Calc  Starting 12/18/2018, serum creatinine based estimated GFR (eGFR) will be   calculated using the Chronic Kidney Disease Epidemiology Collaboration   (CKD-EPI) equation.     11/13/2020 45 (L) >60 mL/min/[1.73_m2] Final     Comment:     Non  GFR Calc  Starting 12/18/2018, serum creatinine based estimated GFR (eGFR) will be   calculated using the Chronic Kidney Disease Epidemiology Collaboration   (CKD-EPI) equation.       GFR, ESTIMATED POCT   Date Value Ref Range Status   05/17/2022 32 (L) >60 mL/min/1.73m2 Final     GFR Estimate If Black   Date Value Ref Range Status   04/13/2021 45 (L) >60 mL/min/[1.73_m2] Final     Comment:      GFR Calc  Starting 12/18/2018, serum creatinine based estimated GFR (eGFR) will be   calculated using the Chronic Kidney Disease Epidemiology Collaboration   (CKD-EPI) equation.     03/24/2021 44 (L) >60 mL/min/[1.73_m2] Final     Comment:      GFR Calc  Starting 12/18/2018, serum creatinine based estimated GFR (eGFR) will be   calculated using the Chronic Kidney Disease Epidemiology Collaboration   (CKD-EPI) equation.     11/13/2020 52 (L) >60 mL/min/[1.73_m2] Final     Comment:      GFR Calc  Starting 12/18/2018, serum creatinine based estimated GFR (eGFR) will be   calculated using the Chronic Kidney Disease Epidemiology Collaboration   (CKD-EPI) equation.         Lab Results   Component Value Date    MICROL 11  08/09/2022    MICROL 35 10/09/2020     No results found for: MICROALBUMIN  No results found for: CPEPT, GADAB, ISCAB    Vitamin B12   Date Value Ref Range Status   08/09/2022 2,687 (H) 232 - 1,245 pg/mL Final   10/17/2016 2,075 (H) 193 - 986 pg/mL Final     Comment:     Interp: [id number] = Normal   05/12/2012 742 >210 pg/mL Final     Comment:     Interp: [id number] = Normal     Most recent eye exam date: : Not Found       Assessment/Plan:   1.  Type 2 diabetes- Noah glucose continues to improved and is now at goal.   Blood glucose control  Continue Tresiba 34 units, lispro AA BC 4 units for each 15 g of carbohydrates, Trulicity 4.5, metformin 1000 mg twice daily    2. Risk factors:   Retinopathy: Yes.  She also has macular edema and follows with ophthalmologist regularly.   Nephropathy:  BP well controlled. On irbesartan .  Wants to discuss with cardiologist. Asked her to reach out to cardiologist for medication adjustments.  Microalbuminuria has resolved.  She does have CKD.  GFR is 55.   Neuropathy: Some tingling, numbness.  Tolerable.    Feet: has small with visual observation on the right foot on top of her foot, seems to be driven by edema, reviewed to restart wrapping, continue low-salt diet, elevate, and do some walking.  Lipids:  On high dose statin rosuvastatin 20 mg daily    3. F/U with me Roxanne Masterson in 3 months and myself in 6m      40 minutes spent on the date of the encounter doing chart review, review of test results, interpretation of tests, patient visit and documentation     This note was generated using computer recognized voice recognition. This might result in some expected imperfection.    Olga Lidia Hoover MD  Endocrinology and Diabetes  Telephone contact:  [facility] [place] [phone]  [facility] [phone]

## 2023-07-26 ENCOUNTER — PATIENT OUTREACH (OUTPATIENT)
Dept: CARE COORDINATION | Facility: CLINIC | Age: 73
End: 2023-07-26

## 2023-07-26 ASSESSMENT — ACTIVITIES OF DAILY LIVING (ADL): DEPENDENT_IADLS:: TRANSPORTATION;COOKING;SHOPPING;MEAL PREPARATION

## 2023-07-26 NOTE — PROGRESS NOTES
Clinic Care Coordination Contact  [facility]/Voicemail    Referral Source: Care Team  Clinical Data: Care Coordinator Outreach  Outreach attempted x 1.  Left message on patient's voicemail with call back information and requested return call.    Plan: Care Coordinator will try to reach patient again in 1 month.     Leanne Humphreys RN, BSN, PHN  Primary Care / Care Coordinator   [facility]  [email or web address]   [phone]

## 2023-08-11 ENCOUNTER — MYC REFILL (OUTPATIENT)
Dept: PHARMACY | Facility: CLINIC | Age: 73
End: 2023-08-11
Payer: COMMERCIAL

## 2023-08-11 DIAGNOSIS — I10 ESSENTIAL HYPERTENSION: ICD-10-CM

## 2023-08-12 RX ORDER — POTASSIUM CHLORIDE 750 MG/1
10 TABLET, EXTENDED RELEASE ORAL DAILY
Qty: 90 TABLET | Refills: 3 | Status: SHIPPED | OUTPATIENT
Start: 2023-08-12 | End: 2024-10-04

## 2023-08-16 ENCOUNTER — OFFICE VISIT (OUTPATIENT)
Dept: CARDIOLOGY | Facility: CLINIC | Age: 73
End: 2023-08-16
Attending: PHYSICIAN ASSISTANT
Payer: COMMERCIAL

## 2023-08-16 ENCOUNTER — LAB (OUTPATIENT)
Dept: LAB | Facility: CLINIC | Age: 73
End: 2023-08-16
Attending: PHYSICIAN ASSISTANT
Payer: COMMERCIAL

## 2023-08-16 VITALS
OXYGEN SATURATION: 95 % | HEART RATE: 68 BPM | SYSTOLIC BLOOD PRESSURE: 132 MMHG | BODY MASS INDEX: 37.69 KG/M2 | HEIGHT: 61 IN | WEIGHT: 199.6 LBS | DIASTOLIC BLOOD PRESSURE: 62 MMHG

## 2023-08-16 DIAGNOSIS — Z95.2 S/P TAVR (TRANSCATHETER AORTIC VALVE REPLACEMENT): ICD-10-CM

## 2023-08-16 DIAGNOSIS — E66.9 OBESITY (BMI 30-39.9): ICD-10-CM

## 2023-08-16 DIAGNOSIS — I51.7 LVH (LEFT VENTRICULAR HYPERTROPHY): ICD-10-CM

## 2023-08-16 DIAGNOSIS — I25.10 CORONARY ARTERY DISEASE INVOLVING NATIVE CORONARY ARTERY OF NATIVE HEART WITHOUT ANGINA PECTORIS: ICD-10-CM

## 2023-08-16 DIAGNOSIS — I10 BENIGN ESSENTIAL HYPERTENSION: ICD-10-CM

## 2023-08-16 DIAGNOSIS — N18.32 STAGE 3B CHRONIC KIDNEY DISEASE (H): ICD-10-CM

## 2023-08-16 DIAGNOSIS — Z95.2 S/P TAVR (TRANSCATHETER AORTIC VALVE REPLACEMENT): Primary | ICD-10-CM

## 2023-08-16 DIAGNOSIS — E78.00 PURE HYPERCHOLESTEROLEMIA: ICD-10-CM

## 2023-08-16 LAB
ANION GAP SERPL CALCULATED.3IONS-SCNC: 13 MMOL/L (ref 7–15)
BUN SERPL-MCNC: 31.1 MG/DL (ref 8–23)
CALCIUM SERPL-MCNC: 9.2 MG/DL (ref 8.8–10.2)
CHLORIDE SERPL-SCNC: 108 MMOL/L (ref 98–107)
CREAT SERPL-MCNC: 1.48 MG/DL (ref 0.51–0.95)
DEPRECATED HCO3 PLAS-SCNC: 23 MMOL/L (ref 22–29)
ERYTHROCYTE [DISTWIDTH] IN BLOOD BY AUTOMATED COUNT: 14.2 % (ref 10–15)
GFR SERPL CREATININE-BSD FRML MDRD: 37 ML/MIN/1.73M2
GLUCOSE SERPL-MCNC: 140 MG/DL (ref 70–99)
HCT VFR BLD AUTO: 34.9 % (ref 35–47)
HGB BLD-MCNC: 11 G/DL (ref 11.7–15.7)
MCH RBC QN AUTO: 28.1 PG (ref 26.5–33)
MCHC RBC AUTO-ENTMCNC: 31.5 G/DL (ref 31.5–36.5)
MCV RBC AUTO: 89 FL (ref 78–100)
PLATELET # BLD AUTO: 277 10E3/UL (ref 150–450)
POTASSIUM SERPL-SCNC: 4.5 MMOL/L (ref 3.4–5.3)
RBC # BLD AUTO: 3.92 10E6/UL (ref 3.8–5.2)
SODIUM SERPL-SCNC: 144 MMOL/L (ref 136–145)
WBC # BLD AUTO: 8.6 10E3/UL (ref 4–11)

## 2023-08-16 PROCEDURE — 85027 COMPLETE CBC AUTOMATED: CPT | Performed by: INTERNAL MEDICINE

## 2023-08-16 PROCEDURE — 36415 COLL VENOUS BLD VENIPUNCTURE: CPT | Performed by: INTERNAL MEDICINE

## 2023-08-16 PROCEDURE — 93000 ELECTROCARDIOGRAM COMPLETE: CPT | Performed by: NURSE PRACTITIONER

## 2023-08-16 PROCEDURE — 80048 BASIC METABOLIC PNL TOTAL CA: CPT | Performed by: INTERNAL MEDICINE

## 2023-08-16 PROCEDURE — 99214 OFFICE O/P EST MOD 30 MIN: CPT | Performed by: NURSE PRACTITIONER

## 2023-08-16 NOTE — PROGRESS NOTES
Cardiology Clinic Progress Note  Ade Wilkins MRN# [id number]   YOB: 1950 Age: 72 year old     Primary cardiologist: Dr. Lewis     Reason for visit: s/p TAVR     History of presenting illness:    Ade Wilknis is a pleasant 72 year old patient with past medical history significant for hypertension, chronic diastolic heart failure, coronary artery disease s/p PCI to RCA, peripheral edema, and severe aortic stenosis s/p TAVR with a 26 mm Medtronic Evolut Pro + valve, who presents today for her annual follow-up.    She was last seen in December of 2022 at which time her transthoracic echocardiogram showed slightly elevated mean gradient of 15 mmHg in the setting of hyperdynamic LV function.  Her calcium channel blocker was switched to beta-blocker.    Today the patient reports doing well from a cardiovascular standpoint.  She has occasional exertional dyspnea.  She has no chest pain, syncope or near syncope, or palpitations.  She has no PND or orthopnea.  Her peripheral edema is stable.  She is compliant with all her medications.      Her blood pressure is well controlled.  Her weight is stable.  EKG today shows normal sinus rhythm.     Repeat echocardiogram on 6/7/2023 showed LVEF of 60 to 65%, well-seated bioprosthetic aortic valve with a mean gradient of 10 mmHg.  I reviewed her labs drawn prior to this visit that demonstrate creatinine of 1.48, GFR 37, and hemoglobin of 11.          Assessment and Plan:     ASSESSMENT:    S/p TAVR with 26 mm Medtronic Evolut Pro + valve on 6/7/2022.  Prior echocardiogram showed hyperdynamic LV function, small LV cavity with a peak gradient of 15-17 mmHg. She has since been transitioned to Toprol-XL.  Her gradient has now normalized, as noted above. She remains DAPT with ASA and Brilinta.   CAD s/p PCI to RCA on 5/4/22. She is on DAPT, BB, ARB, and high intensity statin. No angina.   Chronic diastolic heart failure, NYHA class I. Appears euvolemic on exam, on lasix  40 mg daily.   Hypertension.  Well-controlled on irbesartan 150 mg daily and Toprol-XL 25 mg BID.  Hyperlipidemia with goal LDL < 70. At goal on rosuvastatin 20 mg daily.  Type 2 diabetes, uncontrolled. A1C 7.9, being managed by PCP.   CKD, stage III.  Stable, baseline creatinine appears to be 1.3-1.5.  Peripheral edema.  Has been seen by lymphedema, wears compression stockings daily. On lasix 40 mg daily.        PLAN:     Patient has completed 1 full year of DAPT since her PCI, okay to stop Brilinta.  Continue ASA 81 mg daily indefinitely.  Continue irbesartan 150 mg daily and Toprol-XL 25 mg twice daily.  We discussed the importance of lifestyle modifications for cardiovascular risk factor optimization, including daily aerobic exercise and Mediterranean diet.  She will need lifelong antibiotic prophylaxis prior to any dental procedure.  Follow-up in 1 year with repeat echocardiogram, sooner if needed.       Yahaira Qureshi DNP, APRN, CNP  Page: [phone] (8a-5p M-F)    Orders this Visit:  Orders Placed This Encounter   Procedures    Follow-Up with Cardiology [facility]    EKG 12-lead complete w/read - Clinics (performed today)    Echocardiogram Complete     No orders of the defined types were placed in this encounter.    There are no discontinued medications.    Today's clinic visit entailed:  Review of the result(s) of each unique test - echo, labs, EKG  Ordering of each unique test  Prescription drug management  35 minutes spent by me on the date of the encounter doing chart review, interpretation of tests, patient visit, and documentation   Provider  Link to [facility] Help Grid     The level of medical decision making during this visit was of moderate complexity.           Review of Systems:     Review of Systems:  Skin:  not assessed     Eyes:  not assessed    ENT:  not assessed    Respiratory:  Positive for dyspnea on exertion  Cardiovascular:  fatigue;lightheadedness Positive for;edema  Gastroenterology: not assessed  "   Genitourinary:  not assessed    Musculoskeletal:  not assessed    Neurologic:  not assessed    Psychiatric:  not assessed    Heme/Lymph/Imm:  not assessed    Endocrine:  not assessed              Physical Exam:   Vitals: /62   Pulse 68   Ht 1.549 m (5' 1\")   Wt 90.5 kg (199 lb 9.6 oz)   LMP  (LMP Unknown)   SpO2 95%   BMI 37.71 kg/m    Constitutional:  cooperative        Skin:  warm and dry to the touch        Head:  normocephalic        Eyes:  pupils equal and round        ENT:  not assessed this visit        Neck:  JVP normal        Chest:  normal breath sounds, clear to auscultation, normal A-P diameter, normal symmetry, normal respiratory excursion, no use of accessory muscles        Cardiac: regular rhythm;normal S1 and S2;no murmurs, gallops or rubs detected                  Abdomen:  abdomen soft obese      Vascular: pulses full and equal                                      Extremities and Back:      compression stockings on    Neurological:  no gross motor deficits;affect appropriate             Medications:     Current Outpatient Medications   Medication Sig Dispense Refill    acetaminophen (TYLENOL) 500 MG tablet Take 500-1,000 mg by mouth every 6 hours as needed for mild pain      aspirin (ASA) 81 MG chewable tablet Take 1 tablet (81 mg) by mouth daily Starting tomorrow. 30 tablet 3    augmented betamethasone dipropionate (DIPROLENE-AF) 0.05 % external ointment Apply topically to affected area twice daily for 3-4 weeks then use as needed 45 g 0    Calcium Carb-Cholecalciferol (CALCIUM 600+D) 600-20 MG-MCG TABS Take 1 tablet by mouth 2 times daily      cetirizine (ZYRTEC) 10 MG tablet Take 1 tablet (10 mg) by mouth daily      Continuous Blood Gluc  (FREESTYLE BALWINDER 2 READER) HENNA Use to read blood sugars as per 's instructions. 1 each 0    Continuous Blood Gluc Sensor (FREESTYLE BALWINDER 2 SENSOR) MISC Change every 14 days. 2 each 5    cyanocolbalamin (VITAMIN B-12) 1000 " MCG tablet Take 1,000 mcg by mouth three times a week Taking one tablet 3 times per week      Dulaglutide (TRULICITY) 4.5 MG/0.5ML SOPN Inject 4.5 mg Subcutaneous once a week 6 mL 3    fluticasone (FLONASE) 50 MCG/ACT nasal spray Spray 2 sprays into both nostrils daily as needed for allergies      furosemide (LASIX) 40 MG tablet Take 1 tablet (40 mg) by mouth daily 90 tablet 1    insulin degludec (TRESIBA) 200 UNIT/ML pen Inject 34 Units Subcutaneous daily 20 mL 3    Insulin Lispro-aabc, 1 U Dial, (LYUMJEV KWIKPEN) 100 UNIT/ML SOPN Inject 60 Units Subcutaneous daily Use as directed up to 60 units daily. 60 mL 3    insulin pen needle (BD FCO U/F) 32G X 4 MM miscellaneous Use 4 pen needles daily or as directed. 400 each 0    irbesartan (AVAPRO) 150 MG tablet Take 1 tablet (150 mg) by mouth daily 90 tablet 3    levothyroxine (SYNTHROID/LEVOTHROID) 50 MCG tablet Take 1 tablet (50 mcg) by mouth daily 90 tablet 3    Menthol, Topical Analgesic, 7 % LIQD Apply 1 Application. topically 3 times daily as needed      metFORMIN (GLUCOPHAGE) 1000 MG tablet Take 1 tablet (1,000 mg) by mouth daily (with breakfast) 90 tablet 3    metoprolol succinate ER (TOPROL XL) 25 MG 24 hr tablet Take 1 tablet (25 mg) by mouth 2 times daily 180 tablet 3    potassium chloride ER (KLOR-CON M) 10 MEQ CR tablet Take 1 tablet (10 mEq) by mouth daily 90 tablet 3    rosuvastatin (CRESTOR) 20 MG tablet Take 1 tablet (20 mg) by mouth every other day      ticagrelor (BRILINTA) 90 MG tablet Take 1 tablet (90 mg) by mouth 2 times daily Start this evening. 180 tablet 3    topiramate (TOPAMAX) 25 MG tablet Take 3 tablets (75 mg) by mouth At Bedtime 270 tablet 1    triamcinolone (KENALOG) 0.1 % external ointment Apply topically once a week To left foot rash 30 g 0    VITRON-C  MG TABS tablet TAKE TWO TABLETS BY MOUTH TWICE DAILY  360 tablet 1    zinc 50 MG TABS Take 1 tablet by mouth daily         Family History   Problem Relation Age of Onset     Diabetes Type 2  Mother     Glaucoma Mother     Coronary Artery Disease Mother     Abdominal Aortic Aneurysm Father     Myocardial Infarction Father     Breast Cancer Sister     Abdominal Aortic Aneurysm Brother     Bladder Cancer Brother     Diabetes Type 2  Brother     Liver Cancer Brother     Myocardial Infarction Brother     Alzheimer Disease Paternal Grandmother     Cerebrovascular Disease Paternal Grandfather     Ovarian Cancer No family hx of     Colon Cancer No family hx of        Social History     Socioeconomic History    Marital status:      Spouse name: Not on file    Number of children: 0    Years of education: Not on file    Highest education level: Not on file   Occupational History    Occupation: Retired -    Tobacco Use    Smoking status: Never    Smokeless tobacco: Never   Vaping Use    Vaping Use: Never used   Substance and Sexual Activity    Alcohol use: No    Drug use: No    Sexual activity: Not Currently   Other Topics Concern     Service Not Asked    Blood Transfusions Not Asked    Caffeine Concern Yes     Comment: 20 oz daily    Occupational Exposure Not Asked    Hobby Hazards Not Asked    Sleep Concern Not Asked    Stress Concern Not Asked    Weight Concern Not Asked    Special Diet Not Asked    Back Care Not Asked    Exercise No    Bike Helmet Not Asked    Seat Belt Yes    Self-Exams Yes    Parent/sibling w/ CABG, MI or angioplasty before 65F 55M? Not Asked   Social History Narrative    . Single.    No kids.    No formal exercise.      Social Determinants of Health     Financial Resource Strain: Not on file   Food Insecurity: Not on file   Transportation Needs: Not on file   Physical Activity: Not on file   Stress: Not on file   Social Connections: Not on file   Intimate Partner Violence: Not on file   Housing Stability: Not on file            Past Medical History:     Past Medical History:   Diagnosis Date    Benign essential hypertension      Chronic kidney disease, stage 3b (H)     Diabetic retinopathy of both eyes (H)     Obesity (BMI 30-39.9)     Osteopenia     S/P gastric bypass     S/P TAVR (transcatheter aortic valve replacement) 06/07/2022    Type 2 diabetes mellitus (H)               Past Surgical History:     Past Surgical History:   Procedure Laterality Date    APPENDECTOMY      CATARACT IOL, RT/LT Bilateral     CV CORONARY ANGIOGRAM N/A 05/04/2022    Procedure: Coronary Angiogram;  Surgeon: Hector Lewis MD;  Location:  [facility]    CV LEFT HEART CATH N/A 05/04/2022    Procedure: Left Heart Catheterization;  Surgeon: Hector Lewis MD;  Location:  [facility]    CV PCI N/A 05/04/2022    Procedure: Percutaneous Coronary Intervention;  Surgeon: Hector Lewis MD;  Location:  [facility]    CV TRANSCATHETER AORTIC VALVE REPLACEMENT-FEMORAL APPROACH N/A 06/07/2022    Procedure: Transcatheter Aortic Valve Replacement-Femoral Approach;  Surgeon: Hector Lewis MD;  Location: [facility] CARDIAC CATH LAB    JURGEN EN Y BOWEL  2004    TONSILLECTOMY & ADENOIDECTOMY                Allergies:   Chlorhexidine, Clonidine, Doxazosin mesylate, Fexofenadine hydrochloride, Gemfibrozil, Lisinopril, Norvasc [amlodipine], and Pioglitazone hydrochloride       Data:   All laboratory data reviewed:    Recent Labs   Lab Test 07/13/23  1038 08/09/22  0844 06/08/22  1056 06/01/22  1223 05/04/22  0828 08/27/21  0815 03/28/17  1014 02/10/17  0921   LDL 65 42 17  --    < > 49   < >  --    HDL 46* 46* 28*  --    < > 36*   < >  --    NHDL 85 58 39  --    < > 79   < >  --    CHOL 131 104 67  --    < > 115   < >  --    TRIG 100 81 109  --    < > 148   < >  --    TSH 2.88 1.83  --   --   --  2.37   < > 2.55   NTBNP  --   --   --  346  --   --   --   --    IRON  --   --   --   --   --   --   --  75   FEB  --   --   --   --   --   --   --  444*   IRONSAT  --   --   --   --   --   --   --  17   ADELIA  --  106  --    --   --   --   --   --     < > = values in this interval not displayed.       Lab Results   Component Value Date    WBC 8.6 08/16/2023    WBC 8.3 01/08/2021    RBC 3.92 08/16/2023    RBC 4.12 01/08/2021    HGB 11.0 (L) 08/16/2023    HGB 11.6 (L) 01/08/2021    HCT 34.9 (L) 08/16/2023    HCT 38.2 01/08/2021    MCV 89 08/16/2023    MCV 93 01/08/2021    MCH 28.1 08/16/2023    MCH 28.2 01/08/2021    MCHC 31.5 08/16/2023    MCHC 30.4 (L) 01/08/2021    RDW 14.2 08/16/2023    RDW 13.5 01/08/2021     08/16/2023     01/08/2021       Lab Results   Component Value Date     08/16/2023     04/13/2021    POTASSIUM 4.5 08/16/2023    POTASSIUM 4.1 08/09/2022    POTASSIUM 4.2 04/13/2021    CHLORIDE 108 (H) 08/16/2023    CHLORIDE 107 07/07/2022    CHLORIDE 110 (H) 04/13/2021    CO2 23 08/16/2023    CO2 23 07/07/2022    CO2 28 04/13/2021    ANIONGAP 13 08/16/2023    ANIONGAP 9 07/07/2022    ANIONGAP 3 04/13/2021     (H) 08/16/2023     (H) 08/09/2022     (H) 04/13/2021    BUN 31.1 (H) 08/16/2023    BUN 20 08/09/2022    BUN 29 04/13/2021    CR 1.48 (H) 08/16/2023    CR 1.38 (H) 04/13/2021    GFRESTIMATED 37 (L) 08/16/2023    GFRESTIMATED 32 (L) 05/17/2022    GFRESTIMATED 38 (L) 04/13/2021    GFRESTBLACK 45 (L) 04/13/2021    RASHEEDA 9.2 08/16/2023    RASHEEDA 9.2 04/13/2021      Lab Results   Component Value Date    AST 42 06/08/2022    AST 20 04/13/2021    ALT 63 (H) 06/08/2022    ALT 38 04/13/2021       Lab Results   Component Value Date    A1C 7.9 (H) 08/09/2022    A1C 8.1 (H) 01/08/2021       Lab Results   Component Value Date    INR 1.09 06/01/2022    INR 0.97 05/04/2022         [facility]  [facility]  4  3  5  6  6  5  5  4  5  4  5

## 2023-08-16 NOTE — LETTER
8/16/2023    Judith Aviles MD  [address] [facility] [address]    RE: Ade Wilkins       Dear Colleague,     I had the pleasure of seeing Ade Wilkins in the [facility].  Cardiology Clinic Progress Note  Ade Wilkins MRN# [id number]   YOB: 1950 Age: 72 year old     Primary cardiologist: Dr. Lewis     Reason for visit: s/p TAVR     History of presenting illness:    Ade Wilkins is a pleasant 72 year old patient with past medical history significant for hypertension, chronic diastolic heart failure, coronary artery disease s/p PCI to RCA, peripheral edema, and severe aortic stenosis s/p TAVR with a 26 mm Medtronic Evolut Pro + valve, who presents today for her annual follow-up.    She was last seen in December of 2022 at which time her transthoracic echocardiogram showed slightly elevated mean gradient of 15 mmHg in the setting of hyperdynamic LV function.  Her calcium channel blocker was switched to beta-blocker.    Today the patient reports doing well from a cardiovascular standpoint.  She has occasional exertional dyspnea.  She has no chest pain, syncope or near syncope, or palpitations.  She has no PND or orthopnea.  Her peripheral edema is stable.  She is compliant with all her medications.      Her blood pressure is well controlled.  Her weight is stable.  EKG today shows normal sinus rhythm.     Repeat echocardiogram on 6/7/2023 showed LVEF of 60 to 65%, well-seated bioprosthetic aortic valve with a mean gradient of 10 mmHg.  I reviewed her labs drawn prior to this visit that demonstrate creatinine of 1.48, GFR 37, and hemoglobin of 11.          Assessment and Plan:     ASSESSMENT:    S/p TAVR with 26 mm Medtronic Evolut Pro + valve on 6/7/2022.  Prior echocardiogram showed hyperdynamic LV function, small LV cavity with a peak gradient of 15-17 mmHg. She has since been transitioned to Toprol-XL.  Her gradient has now normalized, as noted above. She remains DAPT  with ASA and Brilinta.   CAD s/p PCI to RCA on 5/4/22. She is on DAPT, BB, ARB, and high intensity statin. No angina.   Chronic diastolic heart failure, NYHA class I. Appears euvolemic on exam, on lasix 40 mg daily.   Hypertension.  Well-controlled on irbesartan 150 mg daily and Toprol-XL 25 mg BID.  Hyperlipidemia with goal LDL < 70. At goal on rosuvastatin 20 mg daily.  Type 2 diabetes, uncontrolled. A1C 7.9, being managed by PCP.   CKD, stage III.  Stable, baseline creatinine appears to be 1.3-1.5.  Peripheral edema.  Has been seen by lymphedema, wears compression stockings daily. On lasix 40 mg daily.        PLAN:     Patient has completed 1 full year of DAPT since her PCI, okay to stop Brilinta.  Continue ASA 81 mg daily indefinitely.  Continue irbesartan 150 mg daily and Toprol-XL 25 mg twice daily.  We discussed the importance of lifestyle modifications for cardiovascular risk factor optimization, including daily aerobic exercise and Mediterranean diet.  She will need lifelong antibiotic prophylaxis prior to any dental procedure.  Follow-up in 1 year with repeat echocardiogram, sooner if needed.       Yahaira Qureshi, KERI, APRN, CNP  Page: [phone] (8a-5p M-F)    Orders this Visit:  Orders Placed This Encounter   Procedures    Follow-Up with Cardiology [facility]    EKG 12-lead complete w/read - Clinics (performed today)    Echocardiogram Complete     No orders of the defined types were placed in this encounter.    There are no discontinued medications.    Today's clinic visit entailed:  Review of the result(s) of each unique test - echo, labs, EKG  Ordering of each unique test  Prescription drug management  35 minutes spent by me on the date of the encounter doing chart review, interpretation of tests, patient visit, and documentation   Provider  Link to [facility] Help Grid     The level of medical decision making during this visit was of moderate complexity.           Review of Systems:     Review of Systems:  Skin:  " not assessed     Eyes:  not assessed    ENT:  not assessed    Respiratory:  Positive for dyspnea on exertion  Cardiovascular:  fatigue;lightheadedness Positive for;edema  Gastroenterology: not assessed    Genitourinary:  not assessed    Musculoskeletal:  not assessed    Neurologic:  not assessed    Psychiatric:  not assessed    Heme/Lymph/Imm:  not assessed    Endocrine:  not assessed              Physical Exam:   Vitals: /62   Pulse 68   Ht 1.549 m (5' 1\")   Wt 90.5 kg (199 lb 9.6 oz)   LMP  (LMP Unknown)   SpO2 95%   BMI 37.71 kg/m    Constitutional:  cooperative        Skin:  warm and dry to the touch        Head:  normocephalic        Eyes:  pupils equal and round        ENT:  not assessed this visit        Neck:  JVP normal        Chest:  normal breath sounds, clear to auscultation, normal A-P diameter, normal symmetry, normal respiratory excursion, no use of accessory muscles        Cardiac: regular rhythm;normal S1 and S2;no murmurs, gallops or rubs detected                  Abdomen:  abdomen soft obese      Vascular: pulses full and equal                                      Extremities and Back:      compression stockings on    Neurological:  no gross motor deficits;affect appropriate             Medications:     Current Outpatient Medications   Medication Sig Dispense Refill    acetaminophen (TYLENOL) 500 MG tablet Take 500-1,000 mg by mouth every 6 hours as needed for mild pain      aspirin (ASA) 81 MG chewable tablet Take 1 tablet (81 mg) by mouth daily Starting tomorrow. 30 tablet 3    augmented betamethasone dipropionate (DIPROLENE-AF) 0.05 % external ointment Apply topically to affected area twice daily for 3-4 weeks then use as needed 45 g 0    Calcium Carb-Cholecalciferol (CALCIUM 600+D) 600-20 MG-MCG TABS Take 1 tablet by mouth 2 times daily      cetirizine (ZYRTEC) 10 MG tablet Take 1 tablet (10 mg) by mouth daily      Continuous Blood Gluc  (FREESTYLE BALWINDER 2 READER) HENNA Use " to read blood sugars as per 's instructions. 1 each 0    Continuous Blood Gluc Sensor (FREESTYLE BALWINDER 2 SENSOR) MISC Change every 14 days. 2 each 5    cyanocolbalamin (VITAMIN B-12) 1000 MCG tablet Take 1,000 mcg by mouth three times a week Taking one tablet 3 times per week      Dulaglutide (TRULICITY) 4.5 MG/0.5ML SOPN Inject 4.5 mg Subcutaneous once a week 6 mL 3    fluticasone (FLONASE) 50 MCG/ACT nasal spray Spray 2 sprays into both nostrils daily as needed for allergies      furosemide (LASIX) 40 MG tablet Take 1 tablet (40 mg) by mouth daily 90 tablet 1    insulin degludec (TRESIBA) 200 UNIT/ML pen Inject 34 Units Subcutaneous daily 20 mL 3    Insulin Lispro-aabc, 1 U Dial, (LYUMJEV KWIKPEN) 100 UNIT/ML SOPN Inject 60 Units Subcutaneous daily Use as directed up to 60 units daily. 60 mL 3    insulin pen needle (BD FCO U/F) 32G X 4 MM miscellaneous Use 4 pen needles daily or as directed. 400 each 0    irbesartan (AVAPRO) 150 MG tablet Take 1 tablet (150 mg) by mouth daily 90 tablet 3    levothyroxine (SYNTHROID/LEVOTHROID) 50 MCG tablet Take 1 tablet (50 mcg) by mouth daily 90 tablet 3    Menthol, Topical Analgesic, 7 % LIQD Apply 1 Application. topically 3 times daily as needed      metFORMIN (GLUCOPHAGE) 1000 MG tablet Take 1 tablet (1,000 mg) by mouth daily (with breakfast) 90 tablet 3    metoprolol succinate ER (TOPROL XL) 25 MG 24 hr tablet Take 1 tablet (25 mg) by mouth 2 times daily 180 tablet 3    potassium chloride ER (KLOR-CON M) 10 MEQ CR tablet Take 1 tablet (10 mEq) by mouth daily 90 tablet 3    rosuvastatin (CRESTOR) 20 MG tablet Take 1 tablet (20 mg) by mouth every other day      ticagrelor (BRILINTA) 90 MG tablet Take 1 tablet (90 mg) by mouth 2 times daily Start this evening. 180 tablet 3    topiramate (TOPAMAX) 25 MG tablet Take 3 tablets (75 mg) by mouth At Bedtime 270 tablet 1    triamcinolone (KENALOG) 0.1 % external ointment Apply topically once a week To left foot rash 30 g  0    VITRON-C  MG TABS tablet TAKE TWO TABLETS BY MOUTH TWICE DAILY  360 tablet 1    zinc 50 MG TABS Take 1 tablet by mouth daily         Family History   Problem Relation Age of Onset    Diabetes Type 2  Mother     Glaucoma Mother     Coronary Artery Disease Mother     Abdominal Aortic Aneurysm Father     Myocardial Infarction Father     Breast Cancer Sister     Abdominal Aortic Aneurysm Brother     Bladder Cancer Brother     Diabetes Type 2  Brother     Liver Cancer Brother     Myocardial Infarction Brother     Alzheimer Disease Paternal Grandmother     Cerebrovascular Disease Paternal Grandfather     Ovarian Cancer No family hx of     Colon Cancer No family hx of        Social History     Socioeconomic History    Marital status:      Spouse name: Not on file    Number of children: 0    Years of education: Not on file    Highest education level: Not on file   Occupational History    Occupation: Retired -    Tobacco Use    Smoking status: Never    Smokeless tobacco: Never   Vaping Use    Vaping Use: Never used   Substance and Sexual Activity    Alcohol use: No    Drug use: No    Sexual activity: Not Currently   Other Topics Concern     Service Not Asked    Blood Transfusions Not Asked    Caffeine Concern Yes     Comment: 20 oz daily    Occupational Exposure Not Asked    Hobby Hazards Not Asked    Sleep Concern Not Asked    Stress Concern Not Asked    Weight Concern Not Asked    Special Diet Not Asked    Back Care Not Asked    Exercise No    Bike Helmet Not Asked    Seat Belt Yes    Self-Exams Yes    Parent/sibling w/ CABG, MI or angioplasty before 65F 55M? Not Asked   Social History Narrative    . Single.    No kids.    No formal exercise.      Social Determinants of Health     Financial Resource Strain: Not on file   Food Insecurity: Not on file   Transportation Needs: Not on file   Physical Activity: Not on file   Stress: Not on file   Social Connections: Not on  file   Intimate Partner Violence: Not on file   Housing Stability: Not on file            Past Medical History:     Past Medical History:   Diagnosis Date    Benign essential hypertension     Chronic kidney disease, stage 3b (H)     Diabetic retinopathy of both eyes (H)     Obesity (BMI 30-39.9)     Osteopenia     S/P gastric bypass     S/P TAVR (transcatheter aortic valve replacement) 06/07/2022    Type 2 diabetes mellitus (H)               Past Surgical History:     Past Surgical History:   Procedure Laterality Date    APPENDECTOMY      CATARACT IOL, RT/LT Bilateral     CV CORONARY ANGIOGRAM N/A 05/04/2022    Procedure: Coronary Angiogram;  Surgeon: Hector Lewis MD;  Location:  HEART CARDIAC CATH LAB    CV LEFT HEART CATH N/A 05/04/2022    Procedure: Left Heart Catheterization;  Surgeon: Hector Lewis MD;  Location:  HEART CARDIAC CATH LAB    CV PCI N/A 05/04/2022    Procedure: Percutaneous Coronary Intervention;  Surgeon: Hector Lewis MD;  Location:  HEART CARDIAC CATH LAB    CV TRANSCATHETER AORTIC VALVE REPLACEMENT-FEMORAL APPROACH N/A 06/07/2022    Procedure: Transcatheter Aortic Valve Replacement-Femoral Approach;  Surgeon: Hector Lewis MD;  Location:  HEART CARDIAC CATH LAB    JURGEN EN Y BOWEL  2004    TONSILLECTOMY & ADENOIDECTOMY                Allergies:   Chlorhexidine, Clonidine, Doxazosin mesylate, Fexofenadine hydrochloride, Gemfibrozil, Lisinopril, Norvasc [amlodipine], and Pioglitazone hydrochloride       Data:   All laboratory data reviewed:    Recent Labs   Lab Test 07/13/23  1038 08/09/22  0844 06/08/22  1056 06/01/22  1223 05/04/22  0828 08/27/21  0815 03/28/17  1014 02/10/17  0921   LDL 65 42 17  --    < > 49   < >  --    HDL 46* 46* 28*  --    < > 36*   < >  --    NHDL 85 58 39  --    < > 79   < >  --    CHOL 131 104 67  --    < > 115   < >  --    TRIG 100 81 109  --    < > 148   < >  --    TSH 2.88 1.83  --   --   --  2.37   < > 2.55   NTBNP  --    --   --  346  --   --   --   --    IRON  --   --   --   --   --   --   --  75   FEB  --   --   --   --   --   --   --  444*   IRONSAT  --   --   --   --   --   --   --  17   ADELIA  --  106  --   --   --   --   --   --     < > = values in this interval not displayed.       Lab Results   Component Value Date    WBC 8.6 08/16/2023    WBC 8.3 01/08/2021    RBC 3.92 08/16/2023    RBC 4.12 01/08/2021    HGB 11.0 (L) 08/16/2023    HGB 11.6 (L) 01/08/2021    HCT 34.9 (L) 08/16/2023    HCT 38.2 01/08/2021    MCV 89 08/16/2023    MCV 93 01/08/2021    MCH 28.1 08/16/2023    MCH 28.2 01/08/2021    MCHC 31.5 08/16/2023    MCHC 30.4 (L) 01/08/2021    RDW 14.2 08/16/2023    RDW 13.5 01/08/2021     08/16/2023     01/08/2021       Lab Results   Component Value Date     08/16/2023     04/13/2021    POTASSIUM 4.5 08/16/2023    POTASSIUM 4.1 08/09/2022    POTASSIUM 4.2 04/13/2021    CHLORIDE 108 (H) 08/16/2023    CHLORIDE 107 07/07/2022    CHLORIDE 110 (H) 04/13/2021    CO2 23 08/16/2023    CO2 23 07/07/2022    CO2 28 04/13/2021    ANIONGAP 13 08/16/2023    ANIONGAP 9 07/07/2022    ANIONGAP 3 04/13/2021     (H) 08/16/2023     (H) 08/09/2022     (H) 04/13/2021    BUN 31.1 (H) 08/16/2023    BUN 20 08/09/2022    BUN 29 04/13/2021    CR 1.48 (H) 08/16/2023    CR 1.38 (H) 04/13/2021    GFRESTIMATED 37 (L) 08/16/2023    GFRESTIMATED 32 (L) 05/17/2022    GFRESTIMATED 38 (L) 04/13/2021    GFRESTBLACK 45 (L) 04/13/2021    RASHEEDA 9.2 08/16/2023    RASHEEDA 9.2 04/13/2021      Lab Results   Component Value Date    AST 42 06/08/2022    AST 20 04/13/2021    ALT 63 (H) 06/08/2022    ALT 38 04/13/2021       Lab Results   Component Value Date    A1C 7.9 (H) 08/09/2022    A1C 8.1 (H) 01/08/2021       Lab Results   Component Value Date    INR 1.09 06/01/2022    INR 0.97 05/04/2022         KCCQ-12  4  4  3  5  6  6  5  5  4  5  4  5        Thank you for allowing me to participate in the care of your  patient.      Sincerely,     Yahaira uQreshi, [facility]  cc:   Latasha Calderon PA-C  [address] [address]

## 2023-08-16 NOTE — PATIENT INSTRUCTIONS
Today's Plan:     - Stop your Brilinta.   - Continue aspirin 81 mg daily.   - Take your blood pressure about 2 hours after medications, goal less than 140/80.   - Follow-up in 1 year with repeat echocardiogram, sooner if needed.     If you have questions or concerns please call my nurse team at [phone].       Scheduling phone number: [phone]    Reminder: Please bring in all current medications, over the counter supplements and vitamin bottles to your next appointment.-    It was a pleasure seeing you today!     Yahaira Qureshi, ROSARIO  8/16/2023    -

## 2023-08-23 ENCOUNTER — TRANSFERRED RECORDS (OUTPATIENT)
Dept: HEALTH INFORMATION MANAGEMENT | Facility: CLINIC | Age: 73
End: 2023-08-23
Payer: COMMERCIAL

## 2023-08-29 ENCOUNTER — PATIENT OUTREACH (OUTPATIENT)
Dept: CARE COORDINATION | Facility: CLINIC | Age: 73
End: 2023-08-29
Payer: COMMERCIAL

## 2023-08-29 ASSESSMENT — ACTIVITIES OF DAILY LIVING (ADL): DEPENDENT_IADLS:: TRANSPORTATION;COOKING;SHOPPING;MEAL PREPARATION

## 2023-08-29 NOTE — PROGRESS NOTES
Clinic Care Coordination Contact  [facility]/Voicemail    Referral Source: Care Team  Clinical Data: Care Coordinator Outreach  Outreach attempted x 2.  Left message on patient's voicemail with call back information and requested return call.    Plan: Care Coordinator will try to reach patient again in 1 month.     Leanne Humphreys RN, BSN, PHN  Primary Care / Care Coordinator   [facility]  [email or web address]   [phone]

## 2023-09-24 DIAGNOSIS — E11.40 TYPE 2 DIABETES MELLITUS WITH DIABETIC NEUROPATHY, UNSPECIFIED WHETHER LONG TERM INSULIN USE (H): ICD-10-CM

## 2023-09-25 NOTE — TELEPHONE ENCOUNTER
Sensor    2 each 5 3/4/2023       7/18/2023  [facility] Olga Lidia Sousa MD  Endocrinology, Diabetes, and Metabolism

## 2023-09-26 ENCOUNTER — PATIENT OUTREACH (OUTPATIENT)
Dept: CARE COORDINATION | Facility: CLINIC | Age: 73
End: 2023-09-26
Payer: COMMERCIAL

## 2023-09-26 ASSESSMENT — ACTIVITIES OF DAILY LIVING (ADL): DEPENDENT_IADLS:: TRANSPORTATION;COOKING;SHOPPING;MEAL PREPARATION

## 2023-09-26 NOTE — LETTER
[facility]  Patient Centered Plan of Care  About Me:        Patient Name:  Ade Wilkins    YOB: 1950  Age:         73 year old   Romulo MRN:    [id number] Telephone Information:  Home Phone [phone]   Mobile [phone]       Address:  [address] [facility] [address] Email address:  [email or web address]      Emergency Contact(s)    Name Relationship Lgl Grd Work Phone Home Phone Mobile Phone   1. NAUN WILKINS Sister  [phone]   2. MAXIMILIAN BONILLA Friend   [phone]           Primary language:  English     needed? No   [employer]:  [phone] op. 1  Other communication barriers:None    Preferred Method of Communication:  Dorie  Current living arrangement: I live in a private home with family (Sister and sister's boyfriend)    Mobility Status/ Medical Equipment: Independent w/Device    Health Maintenance  Health Maintenance Reviewed: Due/Overdue   Health Maintenance Due   Topic Date Due    COVID-19 Vaccine (6 - Pfizer series) 01/16/2023    INFLUENZA VACCINE (1) 09/01/2023     My Access Plan  Medical Emergency 911   Primary Clinic Line [facility] [phone]   24 Hour Appointment Line [phone] or  [phone] (toll-free)   24 Hour Nurse Line [phone] (toll-free)   Preferred Urgent Care [facility], [phone]     [facility]  [phone]     Preferred Pharmacy [id number] [place], [place] - [address] Behavioral Health Crisis Line The National Suicide Prevention Lifeline at 1-657.530.9656 or Text/Call 158     My Care Team Members  Patient Care Team         Relationship Specialty Notifications Start End    Judith Aviles MD PCP - General Internal Medicine  3/28/22     Phone: [phone] Fax: [phone] [address] [place] [address]     Melvi Naik MD MD INTERNAL MEDICINE - ENDOCRINOLOGY, DIABETES & METABOLISM  3/2/17     Phone: [phone] Fax: [phone]         [facility] [address] [place] [address]    Roxanne Masterson PA-C Physician Assistant Endocrinology, Diabetes, and Metabolism  9/7/21     Phone: [phone] Fax: [phone]         [address] [facility] [address] [facility] [address]    Judith Aviles MD Assigned PCP   10/31/21     Phone: [phone] Fax: [phone]         [address] [place] [address]    Leanne Humphreys, RN Lead Care Coordinator  Admissions 6/14/22     Latasha Calderon PA-C Assigned Heart and Vascular Provider   7/16/22     Phone: [phone] Fax: [phone] [address] [facility] [address]    Carter Celis MD MD Dermatology  8/15/22     Phone: [phone] Fax: [phone]         [address] [place] [address]    Olamide Rothman, RN Diabetes Educator Diabetes Education  8/16/22     Phone: [phone] Fax: [phone]         [address] [place] [address]    Joanne Gamble [facility] Assigned MTM Pharmacist   9/28/22     Phone: [phone] Pager: [phone]         [address] [facility] [address] [facility] [address]    Carter Celis MD Assigned Surgical Provider   1/14/23     Phone: [phone] Fax: [phone]         [address] [facility] [address]    Enma Stock RPH Pharmacist Pharmacist  7/31/23     Phone: [phone] Fax: [phone]         [address] [place] [address]    Roxanne Masterson PA-C Assigned Endocrinology Provider   8/26/23     Phone: [phone] Fax: [phone]         [address] [facility] [address] [facility] [address]              My Care Plans  Self Management and Treatment Plan  Care Plan  Care Plan: Advance Care Plan       Problem: Patient does not have a valid Health Care Directive       Goal: Complete Health Care Directive       Start Date: 6/14/2022 Expected End Date:  12/27/2023    This Visit's Progress: 40% Recent Progress: 40%    Note:     Goal Statement: I will complete a Health Care Directive and have this scanned into my Medical Record.  Barriers: Patient doesn't have a Health Care Directive  Strengths: Strong advocate for self  Patient expressed understanding of goal: yes  Action steps to achieve this goal:  Care Coordination will mail me a Health Care Directive and any requested resources (goals worksheets, education sheets, class flyer).   I will complete the Health Care Directive. My signature must be either notarized or witnessed by two adults who are not named as health care agents in the document. Additionally only one of the witnesses can be employed by my health care system. If I need a notary I can check with my bank, my place of Scientologist, [employer] stores, or look online at [email or web address] .  I will provide a copy of my Health Care Directive to my care team and/or email directly to [email or web address].   I can visit [email or web address] website, email [email or web address] or call [facility] at [phone] (M-Friday 6a to 5p) for more information including free classes on completing a Health Care Directive.  I will contact my care team with any barriers, questions or assistance needed with this goal. Care coordinator will remain available as needed.                                Action Plans on File:     Advance Care Plans/Directives Type:   -- (Full Code)      My Medical and Care Information  Problem List   Patient Active Problem List   Diagnosis    Type 2 diabetes mellitus (H)    Benign essential hypertension    Diabetic retinopathy of both eyes (H)    Pure hypercholesterolemia    Osteopenia    Chronic kidney disease, stage 3b (H)    Obesity (BMI 30-39.9)    S/P TAVR (transcatheter aortic valve replacement)    S/P gastric bypass    Morbid obesity (H)      Current Medications and Allergies:    Allergies   Allergen  Reactions    Chlorhexidine Rash    Clonidine Headache    Doxazosin Mesylate Rash    Fexofenadine Hydrochloride Headache    Gemfibrozil Rash    Lisinopril Angioedema    Norvasc [Amlodipine] Other (See Comments)     hair loss    Pioglitazone Hydrochloride Swelling     ankle edema     Current Outpatient Medications   Medication    acetaminophen (TYLENOL) 500 MG tablet    aspirin (ASA) 81 MG chewable tablet    augmented betamethasone dipropionate (DIPROLENE-AF) 0.05 % external ointment    Calcium Carb-Cholecalciferol (CALCIUM 600+D) 600-20 MG-MCG TABS    cetirizine (ZYRTEC) 10 MG tablet    Continuous Blood Gluc  (FREESTYLE BALWINDER 2 READER) HENNA    Continuous Blood Gluc Sensor (FREESTYLE BALWINDER 2 SENSOR) MISC    cyanocolbalamin (VITAMIN B-12) 1000 MCG tablet    Dulaglutide (TRULICITY) 4.5 MG/0.5ML SOPN    fluticasone (FLONASE) 50 MCG/ACT nasal spray    furosemide (LASIX) 40 MG tablet    insulin degludec (TRESIBA) 200 UNIT/ML pen    Insulin Lispro-aabc, 1 U Dial, (LYUMJEV KWIKPEN) 100 UNIT/ML SOPN    insulin pen needle (BD FCO U/F) 32G X 4 MM miscellaneous    irbesartan (AVAPRO) 150 MG tablet    levothyroxine (SYNTHROID/LEVOTHROID) 50 MCG tablet    Menthol, Topical Analgesic, 7 % LIQD    metFORMIN (GLUCOPHAGE) 1000 MG tablet    metoprolol succinate ER (TOPROL XL) 25 MG 24 hr tablet    potassium chloride ER (KLOR-CON M) 10 MEQ CR tablet    rosuvastatin (CRESTOR) 20 MG tablet    ticagrelor (BRILINTA) 90 MG tablet    topiramate (TOPAMAX) 25 MG tablet    triamcinolone (KENALOG) 0.1 % external ointment    VITRON-C  MG TABS tablet    zinc 50 MG TABS     No current facility-administered medications for this visit.     Care Coordination Start Date: 6/14/2022   Frequency of Care Coordination: monthly     Form Last Updated: 09/26/2023

## 2023-09-26 NOTE — PROGRESS NOTES
Clinic Care Coordination Contact  [facility]/Voicemail    Referral Source: Care Team  Clinical Data: Care Coordinator Outreach  Outreach attempted x 3.  Left message on patient's voicemail with call back information and requested return call.    Plan: Care Coordinator will try to reach patient again in 1 month. And if unable to contact patient, may disenroll from Care Coordination.     Leanne Humphreys RN, BSN, PHN  Primary Care / Care Coordinator   [facility]  E-[email or web address]   [phone]

## 2023-09-29 ENCOUNTER — TRANSFERRED RECORDS (OUTPATIENT)
Dept: HEALTH INFORMATION MANAGEMENT | Facility: CLINIC | Age: 73
End: 2023-09-29
Payer: COMMERCIAL

## 2023-09-29 LAB — RETINOPATHY: POSITIVE

## 2023-10-16 DIAGNOSIS — I35.0 AORTIC VALVE STENOSIS, ETIOLOGY OF CARDIAC VALVE DISEASE UNSPECIFIED: ICD-10-CM

## 2023-10-17 RX ORDER — FUROSEMIDE 40 MG
40 TABLET ORAL DAILY
Qty: 90 TABLET | Refills: 0 | Status: SHIPPED | OUTPATIENT
Start: 2023-10-17 | End: 2024-01-05

## 2023-10-19 DIAGNOSIS — I35.0 AORTIC STENOSIS, SEVERE: ICD-10-CM

## 2023-10-19 RX ORDER — METOPROLOL SUCCINATE 25 MG/1
25 TABLET, EXTENDED RELEASE ORAL 2 TIMES DAILY
Qty: 180 TABLET | Refills: 3 | Status: SHIPPED | OUTPATIENT
Start: 2023-10-19

## 2023-10-30 ENCOUNTER — PATIENT OUTREACH (OUTPATIENT)
Dept: CARE COORDINATION | Facility: CLINIC | Age: 73
End: 2023-10-30
Payer: COMMERCIAL

## 2023-10-30 ASSESSMENT — ACTIVITIES OF DAILY LIVING (ADL): DEPENDENT_IADLS:: TRANSPORTATION;COOKING;SHOPPING;MEAL PREPARATION

## 2023-10-30 NOTE — PROGRESS NOTES
Clinic Care Coordination Contact  [facility]/Voicemail    Clinical Data: Care Coordinator Outreach    Outreach Documentation Number of Outreach Attempt   10/30/2023   1:57 PM 4       Left message on patient's voicemail with call back information and requested return call.    Plan: Care Coordinator will send disenrollment letter with care coordinator contact information via [employer]. Care Coordinator will do no further outreaches at this time.     Leanne Humphreys RN, BSN, PHN  Primary Care / Care Coordinator   [facility]  [email or web address]   [phone]

## 2023-10-30 NOTE — LETTER
[facility] COORDIN[facility]  [address] [facility] [address]    October 30, 2023    Ade Wilkins  [address] [facility] [address]      Dear Ade,    I have been unsuccessful in reaching you since our last contact. At this time the Care Coordination team will make no further attempts to reach you, however this does not change your ability to continue receiving care from your providers at your primary care clinic. If you need additional support from a care coordinator in the future please contact [facility] at [phone].    All of us at [facility] are invested in your health and are here to assist you in meeting your goals.     Sincerely,     Leanne Humphreys RN, BSN, PHN  Primary Care / Care Coordinator   [facility]  [email or web address]   [phone]

## 2023-11-04 ENCOUNTER — HEALTH MAINTENANCE LETTER (OUTPATIENT)
Age: 73
End: 2023-11-04

## 2023-11-24 DIAGNOSIS — I87.2 VENOUS STASIS DERMATITIS OF BOTH LOWER EXTREMITIES: ICD-10-CM

## 2023-11-24 DIAGNOSIS — L30.9 DERMATITIS: ICD-10-CM

## 2023-11-27 RX ORDER — TRIAMCINOLONE ACETONIDE 1 MG/G
OINTMENT TOPICAL WEEKLY
Qty: 30 G | Refills: 0 | Status: SHIPPED | OUTPATIENT
Start: 2023-11-27 | End: 2024-04-16

## 2023-11-27 RX ORDER — BETAMETHASONE DIPROPIONATE 0.5 MG/G
OINTMENT, AUGMENTED TOPICAL
Qty: 45 G | Refills: 0 | Status: SHIPPED | OUTPATIENT
Start: 2023-11-27 | End: 2024-04-16

## 2023-12-19 DIAGNOSIS — I35.0 AORTIC VALVE STENOSIS, ETIOLOGY OF CARDIAC VALVE DISEASE UNSPECIFIED: ICD-10-CM

## 2023-12-20 RX ORDER — FUROSEMIDE 40 MG
40 TABLET ORAL DAILY
Qty: 90 TABLET | Refills: 0 | OUTPATIENT
Start: 2023-12-20

## 2023-12-20 NOTE — TELEPHONE ENCOUNTER
It appears the patient was given 90 tablets on 10/17/2023 thus the patient should not be out of meds

## 2023-12-22 DIAGNOSIS — I10 BENIGN ESSENTIAL HYPERTENSION: ICD-10-CM

## 2023-12-22 RX ORDER — IRBESARTAN 150 MG/1
150 TABLET ORAL DAILY
Qty: 90 TABLET | Refills: 2 | Status: SHIPPED | OUTPATIENT
Start: 2023-12-22 | End: 2024-09-03

## 2024-01-05 DIAGNOSIS — I35.0 AORTIC VALVE STENOSIS, ETIOLOGY OF CARDIAC VALVE DISEASE UNSPECIFIED: ICD-10-CM

## 2024-01-05 DIAGNOSIS — E11.40 TYPE 2 DIABETES MELLITUS WITH DIABETIC NEUROPATHY, UNSPECIFIED WHETHER LONG TERM INSULIN USE (H): ICD-10-CM

## 2024-01-05 RX ORDER — FUROSEMIDE 40 MG
40 TABLET ORAL DAILY
Qty: 90 TABLET | Refills: 0 | Status: SHIPPED | OUTPATIENT
Start: 2024-01-05 | End: 2024-03-27

## 2024-01-30 NOTE — PROGRESS NOTES
Virtual Visit Details    Type of service:  Video Visit   Video Start Time: 9:39 AM  Video End Time:9:55 AM    Originating Location (pt. Location): Home    Distant Location (provider location):  Off-site  Platform used for Video Visit: Kendra    Outcome for 01/30/24 8:32 AM: [employer] message sent  Nila Rowell MA  Outcome for 02/02/24 1:59 PM: Data obtained via [employer] website  Nila Rowell MA    Patient is showing 5/5 [place] met.   Nila Rowell MA                    End[facility]inology [facility]       Follow up T2DM      Assessment/Plan:   Elderly woman with type 2 diabetes this chronic renal sufficiency 3 CKD stage III, retinopathy, obesity    1.  Type 2 diabetes- Kaia's glucose remains in the good range for his A1c and time in range at goal without hypoglycemia   continues to improved and is now at goal.     Blood glucose control  Switch Tresiba to long-acting insulin that is covered by insurance, will try Lantus or generic for Lantus at 32 units daily,  lispro generic 4 units for each 15 g of carbohydrates, Trulicity 4.5, metformin 1000 mg once daily, reviewed that she might have to decrease the long-acting insulin by 5 units if she truly becomes more active when she is cleaning out the house    2. Risk factors:   Retinopathy: Yes.  She also has macular edema and follows with ophthalmologist regularly.   Nephropathy:  BP well controlled. On irbesartan .  She does have CKD stage IIi  Feet: No concerns, no symptoms  Lipids:  On high dose statin rosuvastatin 20 mg daily    3. F/U with me Roxanne Masterson in 6 months and myself in 12m  This note was generated using computer recognized voice recognition. This might result in some expected imperfection.    Olga Lidia Hoover MD  Endocrinology and Diabetes  Telephone contact:  [facility] [place] [phone]  [facility] [phone]              Interval history  LV with me 6 m ago  Patient notes that she has been  snacking more which causes blood glucose spikes  Has not had any hypoglycemia  Patient concerned about need to change Tresiba as it is not longer covered by her insurance  Generic Humalog is well covered, she pays $72 every 3 months  Trulicity has been more expensive, it is covered by insurance however patient struggles with higher cost during donut hole and when she is in the catastrophic range, she in best case scenario would pay $172 every 3 months as a co-pay    She denies any numbness and tingling in her feet  The patient is seen by retinal specialist every couple of months    Blood pressure well controlled, followed by Cardiology and PCP.      Current diabetes medications  Tresiba 32 units at night.   LisproAabcHumalog- depends on what she is eating 4 units/15g carb.    Dulaglutide 4.5 mg weekly  Metformin 1000 mg daily.     Previous treatments:   Jardiance 10 mg daily stopped in 2022- was expensive.      Breakfast- breakfast bar or oatmeal or cream of wheat, rarely toast.   Lunch- sandwich- bagel with ham or chicken.   Evening- Riced cauliflower with green beans or cheese.  Fish or chicken or sometimes pork.    Snack before bed if glucose is lower.     She otherwise is generally feeling well and has no other concerns.     Past Medical History:   Diagnosis Date    Benign essential hypertension     Chronic kidney disease, stage 3b (H)     Diabetic retinopathy of both eyes (H)     Obesity (BMI 30-39.9)     Osteopenia     S/P gastric bypass     S/P TAVR (transcatheter aortic valve replacement) 06/07/2022    Type 2 diabetes mellitus (H)        Past Surgical History:   Procedure Laterality Date    APPENDECTOMY      CATARACT IOL, RT/LT Bilateral     CV CORONARY ANGIOGRAM N/A 05/04/2022    Procedure: Coronary Angiogram;  Surgeon: Hector Lewis MD;  Location: [facility] CARDIAC CATH LAB    CV LEFT HEART CATH N/A 05/04/2022    Procedure: Left Heart Catheterization;  Surgeon: Hector Lewis MD;  Location:   HEART CARDIAC CATH LAB    CV PCI N/A 05/04/2022    Procedure: Percutaneous Coronary Intervention;  Surgeon: Hector Lewis MD;  Location:  HEART CARDIAC CATH LAB    CV TRANSCATHETER AORTIC VALVE REPLACEMENT-FEMORAL APPROACH N/A 06/07/2022    Procedure: Transcatheter Aortic Valve Replacement-Femoral Approach;  Surgeon: Hector Lewis MD;  Location:  HEART CARDIAC CATH LAB    JURGEN EN Y BOWEL  2004    TONSILLECTOMY & ADENOIDECTOMY         Family History   Problem Relation Age of Onset    Diabetes Type 2  Mother     Glaucoma Mother     Coronary Artery Disease Mother     Abdominal Aortic Aneurysm Father     Myocardial Infarction Father     Breast Cancer Sister     Abdominal Aortic Aneurysm Brother     Bladder Cancer Brother     Diabetes Type 2  Brother     Liver Cancer Brother     Myocardial Infarction Brother     Alzheimer Disease Paternal Grandmother     Cerebrovascular Disease Paternal Grandfather     Ovarian Cancer No family hx of     Colon Cancer No family hx of        Social History     Socioeconomic History    Marital status:      Spouse name: Not on file    Number of children: 0    Years of education: Not on file    Highest education level: Not on file   Occupational History     Employer: [employer],[address] [place] [address]   Social Needs    Financial resource strain: Not on file    Food insecurity:     Worry: Not on file     Inability: Not on file    Transportation needs:     Medical: Not on file     Non-medical: Not on file   Tobacco Use    Smoking status: Never Smoker    Smokeless tobacco: Never Used   Substance and Sexual Activity    Alcohol use: No    Drug use: No    Sexual activity: Never     Partners: Male   Lifestyle    Physical activity:     Days per week: Not on file     Minutes per session: Not on file    Stress: Not on file   Relationships    Social connections:     Talks on phone: Not on file     Gets together: Not on file     Attends Christian service: Not on  file     Active member of club or organization: Not on file     Attends meetings of clubs or organizations: Not on file     Relationship status: Not on file    Intimate partner violence:     Fear of current or ex partner: Not on file     Emotionally abused: Not on file     Physically abused: Not on file     Forced sexual activity: Not on file   Other Topics Concern     Service Not Asked    Blood Transfusions Not Asked    Caffeine Concern Yes     Comment: 20 oz daily    Occupational Exposure Not Asked    Hobby Hazards Not Asked    Sleep Concern Not Asked    Stress Concern Not Asked    Weight Concern Not Asked    Special Diet Not Asked    Back Care Not Asked    Exercise No    Bike Helmet Not Asked    Seat Belt Yes    Self-Exams Yes    Parent/sibling w/ CABG, MI or angioplasty before 65F 55M? Not Asked   Social History Narrative    Living arrangements - the patient lives alone.     Social Hx: Lives with her sister and her sister's boyfriend.  . 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     Current Outpatient Medications   Medication    acetaminophen (TYLENOL) 500 MG tablet    aspirin (ASA) 81 MG chewable tablet    augmented betamethasone dipropionate (DIPROLENE-AF) 0.05 % external ointment    Calcium Carb-Cholecalciferol (CALCIUM 600+D) 600-20 MG-MCG TABS    cetirizine (ZYRTEC) 10 MG tablet    Continuous Blood Gluc  (FREESTYLE BALWINDER 2 READER) HENNA    Continuous Blood Gluc Sensor (FREESTYLE BALWINDER 2 SENSOR) MISC    cyanocolbalamin (VITAMIN B-12) 1000 MCG tablet    Dulaglutide (TRULICITY) 4.5 MG/0.5ML SOPN    fluticasone (FLONASE) 50 MCG/ACT nasal spray    furosemide (LASIX) 40 MG tablet    insulin degludec (TRESIBA) 200 UNIT/ML pen    Insulin Lispro-aabc, 1 U Dial, (LYUMJEV KWIKPEN) 100 UNIT/ML SOPN    insulin pen needle (BD FCO U/F) 32G X 4 MM miscellaneous    irbesartan (AVAPRO) 150 MG tablet    levothyroxine (SYNTHROID/LEVOTHROID) 50 MCG tablet    Menthol, Topical Analgesic, 7 %  LIQD    metFORMIN (GLUCOPHAGE) 1000 MG tablet    metoprolol succinate ER (TOPROL XL) 25 MG 24 hr tablet    potassium chloride ER (KLOR-CON M) 10 MEQ CR tablet    rosuvastatin (CRESTOR) 20 MG tablet    ticagrelor (BRILINTA) 90 MG tablet    topiramate (TOPAMAX) 25 MG tablet    triamcinolone (KENALOG) 0.1 % external ointment    VITRON-C  MG TABS tablet    zinc 50 MG TABS     No current facility-administered medications for this visit.          Allergies   Allergen Reactions    Chlorhexidine Rash    Clonidine Headache    Doxazosin Mesylate Rash    Fexofenadine Hydrochloride Headache    Gemfibrozil Rash    Lisinopril Angioedema    Norvasc [Amlodipine] Other (See Comments)     hair loss    Pioglitazone Hydrochloride Swelling     ankle edema     Physical Exam  LMP  (LMP Unknown)   Wt Readings from Last 4 Encounters:   08/16/23 90.5 kg (199 lb 9.6 oz)   07/18/23 88.9 kg (196 lb)   06/15/23 89.8 kg (198 lb)   05/08/23 87.8 kg (193 lb 9.6 oz)     Reported vitals:  There were no vitals taken for this visit.   healthy, alert and no distress  PSYCH: Alert and oriented times 3; coherent speech, normal   rate and volume, able to articulate logical thoughts, able   to abstract reason, no tangential thoughts, no hallucinations   or delusions  Her affect is normal and pleasant  RESP: No cough, no audible wheezing, able to talk in full sentences  Remainder of exam unable to be completed due to telephone visits         Lab Results   Component Value Date     02/01/2024    CHLORIDE 107 02/01/2024    CO2 25 02/01/2024     (H) 02/01/2024    CR 1.37 (H) 02/01/2024    CR 1.48 (H) 08/16/2023    CR 1.52 (H) 07/13/2023    CR 1.08 (H) 08/09/2022    CR 1.69 (H) 07/07/2022    RASHEEDA 9.8 02/01/2024    MAG 2.6 (H) 06/09/2022    ALBUMIN 3.3 (L) 06/08/2022    ALKPHOS 89 06/08/2022    LDL 88 02/01/2024    HDL 45 (L) 02/01/2024    TRIG 148 02/01/2024    TSH 1.83 02/01/2024    TSH 2.88 07/13/2023    TSH 1.83 08/09/2022     Lab Results    Component Value Date    MICROL 201.0 02/01/2024    MICROL <12.0 07/13/2023    MICROL 11 08/09/2022    MICROL 6 08/27/2021    MICROL 35 10/09/2020     Lab Results   Component Value Date    A1C 7.0 (H) 02/01/2024    A1C 7.9 (H) 08/09/2022    A1C 8.0 (H) 06/08/2022    A1C 7.9 (H) 05/04/2022    A1C 7.6 (H) 11/24/2021       Lab Results   Component Value Date    HGB 12.5 02/01/2024       Lab Results   Component Value Date    A1C 7.0 (H) 02/01/2024    A1C 7.9 (H) 08/09/2022    A1C 8.0 (H) 06/08/2022    A1C 7.9 (H) 05/04/2022    A1C 7.6 (H) 11/24/2021    A1C 8.1 (H) 01/08/2021    A1C 8.1 (H) 10/09/2020    A1C 8.7 (A) 08/07/2020    A1C 8.4 (H) 03/27/2020    A1C 8.7 (H) 04/23/2019    HEMOGLOBINA1 7.0 07/18/2023    HEMOGLOBINA1 7.6 05/08/2023    HEMOGLOBINA1 8.6 01/16/2023    HEMOGLOBINA1 8.1 (A) 01/07/2020    HEMOGLOBINA1 8.7 (A) 04/22/2019       TSH   Date Value Ref Range Status   02/01/2024 1.83 0.30 - 4.20 uIU/mL Final   07/13/2023 2.88 0.30 - 4.20 uIU/mL Final   08/09/2022 1.83 0.40 - 4.00 mU/L Final   08/27/2021 2.37 0.40 - 4.00 mU/L Final   03/27/2020 3.47 0.40 - 4.00 mU/L Final   10/14/2019 2.54 0.40 - 4.00 mU/L Final   04/23/2019 4.06 (H) 0.40 - 4.00 mU/L Final   05/24/2018 2.54 0.40 - 4.00 mU/L Final   03/12/2018 4.78 (H) 0.40 - 4.00 mU/L Final     T4 Free   Date Value Ref Range Status   04/23/2019 1.10 0.76 - 1.46 ng/dL Final   03/12/2018 1.17 0.76 - 1.46 ng/dL Final   10/17/2016 1.17 0.76 - 1.46 ng/dL Final   05/05/2010 1.05 0.70 - 1.85 ng/dL Final   09/13/2007 1.00 0.70 - 1.85 ng/dL Final       ALT   Date Value Ref Range Status   06/08/2022 63 (H) 0 - 50 U/L Final   06/01/2022 160 (H) 0 - 50 U/L Final   04/13/2021 38 0 - 50 U/L Final   03/27/2020 28 0 - 50 U/L Final   ]  Recent Labs   Lab Test 08/09/22  0844 06/08/22  1056 06/17/16  0750 05/22/15  0848   CHOL 104 67   < > 175   HDL 46* 28*   < > 32*   LDL 42 17   < > 76   TRIG 81 109   < > 334*   CHOLHDLRATIO  --   --   --  5.5*    < > = values in this interval  not displayed.           GFR Estimate   Date Value Ref Range Status   02/01/2024 41 (L) >60 mL/min/1.73m2 Final   08/16/2023 37 (L) >60 mL/min/1.73m2 Final   07/13/2023 36 (L) >60 mL/min/1.73m2 Final   04/13/2021 38 (L) >60 mL/min/[1.73_m2] Final     Comment:     Non  GFR Calc  Starting 12/18/2018, serum creatinine based estimated GFR (eGFR) will be   calculated using the Chronic Kidney Disease Epidemiology Collaboration   (CKD-EPI) equation.     03/24/2021 38 (L) >60 mL/min/[1.73_m2] Final     Comment:     Non  GFR Calc  Starting 12/18/2018, serum creatinine based estimated GFR (eGFR) will be   calculated using the Chronic Kidney Disease Epidemiology Collaboration   (CKD-EPI) equation.     11/13/2020 45 (L) >60 mL/min/[1.73_m2] Final     Comment:     Non  GFR Calc  Starting 12/18/2018, serum creatinine based estimated GFR (eGFR) will be   calculated using the Chronic Kidney Disease Epidemiology Collaboration   (CKD-EPI) equation.       GFR, ESTIMATED POCT   Date Value Ref Range Status   05/17/2022 32 (L) >60 mL/min/1.73m2 Final     GFR Estimate If Black   Date Value Ref Range Status   04/13/2021 45 (L) >60 mL/min/[1.73_m2] Final     Comment:      GFR Calc  Starting 12/18/2018, serum creatinine based estimated GFR (eGFR) will be   calculated using the Chronic Kidney Disease Epidemiology Collaboration   (CKD-EPI) equation.     03/24/2021 44 (L) >60 mL/min/[1.73_m2] Final     Comment:      GFR Calc  Starting 12/18/2018, serum creatinine based estimated GFR (eGFR) will be   calculated using the Chronic Kidney Disease Epidemiology Collaboration   (CKD-EPI) equation.     11/13/2020 52 (L) >60 mL/min/[1.73_m2] Final     Comment:      GFR Calc  Starting 12/18/2018, serum creatinine based estimated GFR (eGFR) will be   calculated using the Chronic Kidney Disease Epidemiology Collaboration   (CKD-EPI) equation.         Lab Results  "  Component Value Date    MICROL 11 08/09/2022    MICROL 35 10/09/2020     No results found for: MICROALBUMIN  No results found for: \"CPEPT\", \"GADAB\", \"ISCAB\"    Vitamin B12   Date Value Ref Range Status   08/09/2022 2,687 (H) 232 - 1,245 pg/mL Final   10/17/2016 2,075 (H) 193 - 986 pg/mL Final     Comment:     Interp: [id number] = Normal   05/12/2012 742 >210 pg/mL Final     Comment:     Interp: [phone] = Normal     Most recent eye exam date: : Not Found         "

## 2024-02-01 ENCOUNTER — LAB (OUTPATIENT)
Dept: LAB | Facility: CLINIC | Age: 74
End: 2024-02-01
Payer: COMMERCIAL

## 2024-02-01 DIAGNOSIS — E11.40 TYPE 2 DIABETES MELLITUS WITH DIABETIC NEUROPATHY, WITH LONG-TERM CURRENT USE OF INSULIN (H): ICD-10-CM

## 2024-02-01 DIAGNOSIS — Z79.4 TYPE 2 DIABETES MELLITUS WITH DIABETIC NEUROPATHY, WITH LONG-TERM CURRENT USE OF INSULIN (H): ICD-10-CM

## 2024-02-01 LAB
ANION GAP SERPL CALCULATED.3IONS-SCNC: 11 MMOL/L (ref 7–15)
BUN SERPL-MCNC: 26.5 MG/DL (ref 8–23)
CALCIUM SERPL-MCNC: 9.8 MG/DL (ref 8.8–10.2)
CHLORIDE SERPL-SCNC: 107 MMOL/L (ref 98–107)
CHOLEST SERPL-MCNC: 163 MG/DL
CREAT SERPL-MCNC: 1.37 MG/DL (ref 0.51–0.95)
CREAT UR-MCNC: 82.2 MG/DL
DEPRECATED HCO3 PLAS-SCNC: 25 MMOL/L (ref 22–29)
EGFRCR SERPLBLD CKD-EPI 2021: 41 ML/MIN/1.73M2
ERYTHROCYTE [DISTWIDTH] IN BLOOD BY AUTOMATED COUNT: 13.6 % (ref 10–15)
FASTING STATUS PATIENT QL REPORTED: YES
GLUCOSE SERPL-MCNC: 133 MG/DL (ref 70–99)
HBA1C MFR BLD: 7 % (ref 0–5.6)
HCT VFR BLD AUTO: 39.7 % (ref 35–47)
HDLC SERPL-MCNC: 45 MG/DL
HGB BLD-MCNC: 12.5 G/DL (ref 11.7–15.7)
LDLC SERPL CALC-MCNC: 88 MG/DL
MCH RBC QN AUTO: 27.8 PG (ref 26.5–33)
MCHC RBC AUTO-ENTMCNC: 31.5 G/DL (ref 31.5–36.5)
MCV RBC AUTO: 88 FL (ref 78–100)
MICROALBUMIN UR-MCNC: 201 MG/L
MICROALBUMIN/CREAT UR: 244.53 MG/G CR (ref 0–25)
NONHDLC SERPL-MCNC: 118 MG/DL
PLATELET # BLD AUTO: 273 10E3/UL (ref 150–450)
POTASSIUM SERPL-SCNC: 4.1 MMOL/L (ref 3.4–5.3)
RBC # BLD AUTO: 4.49 10E6/UL (ref 3.8–5.2)
SODIUM SERPL-SCNC: 143 MMOL/L (ref 135–145)
TRIGL SERPL-MCNC: 148 MG/DL
TSH SERPL DL<=0.005 MIU/L-ACNC: 1.83 UIU/ML (ref 0.3–4.2)
WBC # BLD AUTO: 8.2 10E3/UL (ref 4–11)

## 2024-02-01 PROCEDURE — 82570 ASSAY OF URINE CREATININE: CPT

## 2024-02-01 PROCEDURE — 80061 LIPID PANEL: CPT

## 2024-02-01 PROCEDURE — 84443 ASSAY THYROID STIM HORMONE: CPT

## 2024-02-01 PROCEDURE — 80048 BASIC METABOLIC PNL TOTAL CA: CPT

## 2024-02-01 PROCEDURE — 85027 COMPLETE CBC AUTOMATED: CPT

## 2024-02-01 PROCEDURE — 36415 COLL VENOUS BLD VENIPUNCTURE: CPT

## 2024-02-01 PROCEDURE — 82043 UR ALBUMIN QUANTITATIVE: CPT

## 2024-02-01 PROCEDURE — 83036 HEMOGLOBIN GLYCOSYLATED A1C: CPT

## 2024-02-06 ENCOUNTER — VIRTUAL VISIT (OUTPATIENT)
Dept: ENDOCRINOLOGY | Facility: CLINIC | Age: 74
End: 2024-02-06
Payer: COMMERCIAL

## 2024-02-06 DIAGNOSIS — E78.00 PURE HYPERCHOLESTEROLEMIA: ICD-10-CM

## 2024-02-06 DIAGNOSIS — Z79.4 TYPE 2 DIABETES MELLITUS WITH DIABETIC NEUROPATHY, WITH LONG-TERM CURRENT USE OF INSULIN (H): Primary | ICD-10-CM

## 2024-02-06 DIAGNOSIS — E11.40 TYPE 2 DIABETES MELLITUS WITH DIABETIC NEUROPATHY, WITH LONG-TERM CURRENT USE OF INSULIN (H): Primary | ICD-10-CM

## 2024-02-06 DIAGNOSIS — E11.65 TYPE 2 DIABETES MELLITUS WITH HYPERGLYCEMIA, WITHOUT LONG-TERM CURRENT USE OF INSULIN (H): ICD-10-CM

## 2024-02-06 PROCEDURE — 99214 OFFICE O/P EST MOD 30 MIN: CPT | Mod: 95 | Performed by: INTERNAL MEDICINE

## 2024-02-06 NOTE — NURSING NOTE
Is the patient currently in the state of [place]? YES    Visit mode:VIDEO    If the visit is dropped, the patient can be reconnected by: VIDEO VISIT: Text to cell phone:   Telephone Information:   Mobile [phone]    and VIDEO VISIT: Send to e-mail at: [email or web address]    Will anyone else be joining the visit? NO  (If patient encounters technical issues they should call [phone]    How would you like to obtain your AVS? MyChart    Are changes needed to the allergy or medication list? No    Reason for visit: ALIN AWAN

## 2024-02-06 NOTE — LETTER
2/6/2024       RE: Ade Wilkins  [address] [facility] [address]     Dear Colleague,    Thank you for referring your patient, Ade Wilkins, to the [facility] ENDOCRINOLOG[facility] [place] at [facility]. Please see a copy of my visit note below.    Virtual Visit Details    Type of service:  Video Visit   Video Start Time: 9:39 AM  Video End Time:9:55 AM    Originating Location (pt. Location): Home    Distant Location (provider location):  Off-site  Platform used for Video Visit: Kendra    Outcome for 01/30/24 8:32 AM: TokBox message sent  Nila Rowell MA  Outcome for 02/02/24 1:59 PM: Data obtained via [employer] website  Nila Rowell MA    Patient is showing 5/5 MNCM met.   Nila Rowell MA                    End[facility]inology and [facility]       Follow up T2DM      Assessment/Plan:   Elderly woman with type 2 diabetes this chronic renal sufficiency 3 CKD stage III, retinopathy, obesity    1.  Type 2 diabetes- Kaia's glucose remains in the good range for his A1c and time in range at goal without hypoglycemia   continues to improved and is now at goal.     Blood glucose control  Switch Tresiba to long-acting insulin that is covered by insurance, will try Lantus or generic for Lantus at 32 units daily,  lispro generic 4 units for each 15 g of carbohydrates, Trulicity 4.5, metformin 1000 mg once daily, reviewed that she might have to decrease the long-acting insulin by 5 units if she truly becomes more active when she is cleaning out the house    2. Risk factors:   Retinopathy: Yes.  She also has macular edema and follows with ophthalmologist regularly.   Nephropathy:  BP well controlled. On irbesartan .  She does have CKD stage IIi  Feet: No concerns, no symptoms  Lipids:  On high dose statin rosuvastatin 20 mg daily    3. F/U with me Roxanne Masterson in 6 months and myself in 12m  This note was generated using computer recognized  voice recognition. This might result in some expected imperfection.    Olga Lidia Hoover MD  Endocrinology and Diabetes  Telephone contact:  [facility] [place] [phone]  [facility] [place] [phone]              Interval history  LV with me 6 m ago  Patient notes that she has been snacking more which causes blood glucose spikes  Has not had any hypoglycemia  Patient concerned about need to change Tresiba as it is not longer covered by her insurance  Generic Humalog is well covered, she pays $72 every 3 months  Trulicity has been more expensive, it is covered by insurance however patient struggles with higher cost during donut hole and when she is in the catastrophic range, she in best case scenario would pay $172 every 3 months as a co-pay    She denies any numbness and tingling in her feet  The patient is seen by retinal specialist every couple of months    Blood pressure well controlled, followed by Cardiology and PCP.      Current diabetes medications  Tresiba 32 units at night.   LisproAabcHumalog- depends on what she is eating 4 units/15g carb.    Dulaglutide 4.5 mg weekly  Metformin 1000 mg daily.     Previous treatments:   Jardiance 10 mg daily stopped in 2022- was expensive.      Breakfast- breakfast bar or oatmeal or cream of wheat, rarely toast.   Lunch- sandwich- bagel with ham or chicken.   Evening- Riced cauliflower with green beans or cheese.  Fish or chicken or sometimes pork.    Snack before bed if glucose is lower.     She otherwise is generally feeling well and has no other concerns.     Past Medical History:   Diagnosis Date    Benign essential hypertension     Chronic kidney disease, stage 3b (H)     Diabetic retinopathy of both eyes (H)     Obesity (BMI 30-39.9)     Osteopenia     S/P gastric bypass     S/P TAVR (transcatheter aortic valve replacement) 06/07/2022    Type 2 diabetes mellitus (H)        Past Surgical History:   Procedure Laterality  Date    APPENDECTOMY      CATARACT IOL, RT/LT Bilateral     CV CORONARY ANGIOGRAM N/A 05/04/2022    Procedure: Coronary Angiogram;  Surgeon: Hcetor Lewis MD;  Location:  HEART CARDIAC CATH LAB    CV LEFT HEART CATH N/A 05/04/2022    Procedure: Left Heart Catheterization;  Surgeon: Hector Lewis MD;  Location:  HEART CARDIAC CATH LAB    CV PCI N/A 05/04/2022    Procedure: Percutaneous Coronary Intervention;  Surgeon: Hector Lewis MD;  Location:  HEART CARDIAC CATH LAB    CV TRANSCATHETER AORTIC VALVE REPLACEMENT-FEMORAL APPROACH N/A 06/07/2022    Procedure: Transcatheter Aortic Valve Replacement-Femoral Approach;  Surgeon: Hector Lewis MD;  Location:  HEART CARDIAC CATH LAB    JURGEN EN Y BOWEL  2004    TONSILLECTOMY & ADENOIDECTOMY         Family History   Problem Relation Age of Onset    Diabetes Type 2  Mother     Glaucoma Mother     Coronary Artery Disease Mother     Abdominal Aortic Aneurysm Father     Myocardial Infarction Father     Breast Cancer Sister     Abdominal Aortic Aneurysm Brother     Bladder Cancer Brother     Diabetes Type 2  Brother     Liver Cancer Brother     Myocardial Infarction Brother     Alzheimer Disease Paternal Grandmother     Cerebrovascular Disease Paternal Grandfather     Ovarian Cancer No family hx of     Colon Cancer No family hx of        Social History     Socioeconomic History    Marital status:      Spouse name: Not on file    Number of children: 0    Years of education: Not on file    Highest education level: Not on file   Occupational History     Employer: [employer],[address] [place] [address]   Social Needs    Financial resource strain: Not on file    Food insecurity:     Worry: Not on file     Inability: Not on file    Transportation needs:     Medical: Not on file     Non-medical: Not on file   Tobacco Use    Smoking status: Never Smoker    Smokeless tobacco: Never Used   Substance and Sexual Activity    Alcohol  use: No    Drug use: No    Sexual activity: Never     Partners: Male   Lifestyle    Physical activity:     Days per week: Not on file     Minutes per session: Not on file    Stress: Not on file   Relationships    Social connections:     Talks on phone: Not on file     Gets together: Not on file     Attends Catholic service: Not on file     Active member of club or organization: Not on file     Attends meetings of clubs or organizations: Not on file     Relationship status: Not on file    Intimate partner violence:     Fear of current or ex partner: Not on file     Emotionally abused: Not on file     Physically abused: Not on file     Forced sexual activity: Not on file   Other Topics Concern     Service Not Asked    Blood Transfusions Not Asked    Caffeine Concern Yes     Comment: 20 oz daily    Occupational Exposure Not Asked    Hobby Hazards Not Asked    Sleep Concern Not Asked    Stress Concern Not Asked    Weight Concern Not Asked    Special Diet Not Asked    Back Care Not Asked    Exercise No    Bike Helmet Not Asked    Seat Belt Yes    Self-Exams Yes    Parent/sibling w/ CABG, MI or angioplasty before 65F 55M? Not Asked   Social History Narrative    Living arrangements - the patient lives alone.     Social Hx: Lives with her sister and her sister's boyfriend.  . 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     Current Outpatient Medications   Medication    acetaminophen (TYLENOL) 500 MG tablet    aspirin (ASA) 81 MG chewable tablet    augmented betamethasone dipropionate (DIPROLENE-AF) 0.05 % external ointment    Calcium Carb-Cholecalciferol (CALCIUM 600+D) 600-20 MG-MCG TABS    cetirizine (ZYRTEC) 10 MG tablet    Continuous Blood Gluc  (FREESTYLE BALWINDER 2 READER) HENNA    Continuous Blood Gluc Sensor (FREESTYLE BALWINDER 2 SENSOR) MISC    cyanocolbalamin (VITAMIN B-12) 1000 MCG tablet    Dulaglutide (TRULICITY) 4.5 MG/0.5ML SOPN    fluticasone (FLONASE) 50 MCG/ACT nasal spray     furosemide (LASIX) 40 MG tablet    insulin degludec (TRESIBA) 200 UNIT/ML pen    Insulin Lispro-aabc, 1 U Dial, (LYUMJEV KWIKPEN) 100 UNIT/ML SOPN    insulin pen needle (BD FCO U/F) 32G X 4 MM miscellaneous    irbesartan (AVAPRO) 150 MG tablet    levothyroxine (SYNTHROID/LEVOTHROID) 50 MCG tablet    Menthol, Topical Analgesic, 7 % LIQD    metFORMIN (GLUCOPHAGE) 1000 MG tablet    metoprolol succinate ER (TOPROL XL) 25 MG 24 hr tablet    potassium chloride ER (KLOR-CON M) 10 MEQ CR tablet    rosuvastatin (CRESTOR) 20 MG tablet    ticagrelor (BRILINTA) 90 MG tablet    topiramate (TOPAMAX) 25 MG tablet    triamcinolone (KENALOG) 0.1 % external ointment    VITRON-C  MG TABS tablet    zinc 50 MG TABS     No current facility-administered medications for this visit.          Allergies   Allergen Reactions    Chlorhexidine Rash    Clonidine Headache    Doxazosin Mesylate Rash    Fexofenadine Hydrochloride Headache    Gemfibrozil Rash    Lisinopril Angioedema    Norvasc [Amlodipine] Other (See Comments)     hair loss    Pioglitazone Hydrochloride Swelling     ankle edema     Physical Exam  LMP  (LMP Unknown)   Wt Readings from Last 4 Encounters:   08/16/23 90.5 kg (199 lb 9.6 oz)   07/18/23 88.9 kg (196 lb)   06/15/23 89.8 kg (198 lb)   05/08/23 87.8 kg (193 lb 9.6 oz)     Reported vitals:  There were no vitals taken for this visit.   healthy, alert and no distress  PSYCH: Alert and oriented times 3; coherent speech, normal   rate and volume, able to articulate logical thoughts, able   to abstract reason, no tangential thoughts, no hallucinations   or delusions  Her affect is normal and pleasant  RESP: No cough, no audible wheezing, able to talk in full sentences  Remainder of exam unable to be completed due to telephone visits         Lab Results   Component Value Date     02/01/2024    CHLORIDE 107 02/01/2024    CO2 25 02/01/2024     (H) 02/01/2024    CR 1.37 (H) 02/01/2024    CR 1.48 (H) 08/16/2023     CR 1.52 (H) 07/13/2023    CR 1.08 (H) 08/09/2022    CR 1.69 (H) 07/07/2022    RASHEEDA 9.8 02/01/2024    MAG 2.6 (H) 06/09/2022    ALBUMIN 3.3 (L) 06/08/2022    ALKPHOS 89 06/08/2022    LDL 88 02/01/2024    HDL 45 (L) 02/01/2024    TRIG 148 02/01/2024    TSH 1.83 02/01/2024    TSH 2.88 07/13/2023    TSH 1.83 08/09/2022     Lab Results   Component Value Date    MICROL 201.0 02/01/2024    MICROL <12.0 07/13/2023    MICROL 11 08/09/2022    MICROL 6 08/27/2021    MICROL 35 10/09/2020     Lab Results   Component Value Date    A1C 7.0 (H) 02/01/2024    A1C 7.9 (H) 08/09/2022    A1C 8.0 (H) 06/08/2022    A1C 7.9 (H) 05/04/2022    A1C 7.6 (H) 11/24/2021       Lab Results   Component Value Date    HGB 12.5 02/01/2024       Lab Results   Component Value Date    A1C 7.0 (H) 02/01/2024    A1C 7.9 (H) 08/09/2022    A1C 8.0 (H) 06/08/2022    A1C 7.9 (H) 05/04/2022    A1C 7.6 (H) 11/24/2021    A1C 8.1 (H) 01/08/2021    A1C 8.1 (H) 10/09/2020    A1C 8.7 (A) 08/07/2020    A1C 8.4 (H) 03/27/2020    A1C 8.7 (H) 04/23/2019    HEMOGLOBINA1 7.0 07/18/2023    HEMOGLOBINA1 7.6 05/08/2023    HEMOGLOBINA1 8.6 01/16/2023    HEMOGLOBINA1 8.1 (A) 01/07/2020    HEMOGLOBINA1 8.7 (A) 04/22/2019       TSH   Date Value Ref Range Status   02/01/2024 1.83 0.30 - 4.20 uIU/mL Final   07/13/2023 2.88 0.30 - 4.20 uIU/mL Final   08/09/2022 1.83 0.40 - 4.00 mU/L Final   08/27/2021 2.37 0.40 - 4.00 mU/L Final   03/27/2020 3.47 0.40 - 4.00 mU/L Final   10/14/2019 2.54 0.40 - 4.00 mU/L Final   04/23/2019 4.06 (H) 0.40 - 4.00 mU/L Final   05/24/2018 2.54 0.40 - 4.00 mU/L Final   03/12/2018 4.78 (H) 0.40 - 4.00 mU/L Final     T4 Free   Date Value Ref Range Status   04/23/2019 1.10 0.76 - 1.46 ng/dL Final   03/12/2018 1.17 0.76 - 1.46 ng/dL Final   10/17/2016 1.17 0.76 - 1.46 ng/dL Final   05/05/2010 1.05 0.70 - 1.85 ng/dL Final   09/13/2007 1.00 0.70 - 1.85 ng/dL Final       ALT   Date Value Ref Range Status   06/08/2022 63 (H) 0 - 50 U/L Final   06/01/2022 160 (H) 0  - 50 U/L Final   04/13/2021 38 0 - 50 U/L Final   03/27/2020 28 0 - 50 U/L Final   ]  Recent Labs   Lab Test 08/09/22  0844 06/08/22  1056 06/17/16  0750 05/22/15  0848   CHOL 104 67   < > 175   HDL 46* 28*   < > 32*   LDL 42 17   < > 76   TRIG 81 109   < > 334*   CHOLHDLRATIO  --   --   --  5.5*    < > = values in this interval not displayed.           GFR Estimate   Date Value Ref Range Status   02/01/2024 41 (L) >60 mL/min/1.73m2 Final   08/16/2023 37 (L) >60 mL/min/1.73m2 Final   07/13/2023 36 (L) >60 mL/min/1.73m2 Final   04/13/2021 38 (L) >60 mL/min/[1.73_m2] Final     Comment:     Non  GFR Calc  Starting 12/18/2018, serum creatinine based estimated GFR (eGFR) will be   calculated using the Chronic Kidney Disease Epidemiology Collaboration   (CKD-EPI) equation.     03/24/2021 38 (L) >60 mL/min/[1.73_m2] Final     Comment:     Non  GFR Calc  Starting 12/18/2018, serum creatinine based estimated GFR (eGFR) will be   calculated using the Chronic Kidney Disease Epidemiology Collaboration   (CKD-EPI) equation.     11/13/2020 45 (L) >60 mL/min/[1.73_m2] Final     Comment:     Non  GFR Calc  Starting 12/18/2018, serum creatinine based estimated GFR (eGFR) will be   calculated using the Chronic Kidney Disease Epidemiology Collaboration   (CKD-EPI) equation.       GFR, ESTIMATED POCT   Date Value Ref Range Status   05/17/2022 32 (L) >60 mL/min/1.73m2 Final     GFR Estimate If Black   Date Value Ref Range Status   04/13/2021 45 (L) >60 mL/min/[1.73_m2] Final     Comment:      GFR Calc  Starting 12/18/2018, serum creatinine based estimated GFR (eGFR) will be   calculated using the Chronic Kidney Disease Epidemiology Collaboration   (CKD-EPI) equation.     03/24/2021 44 (L) >60 mL/min/[1.73_m2] Final     Comment:      GFR Calc  Starting 12/18/2018, serum creatinine based estimated GFR (eGFR) will be   calculated using the Chronic Kidney  "Disease Epidemiology Collaboration   (CKD-EPI) equation.     11/13/2020 52 (L) >60 mL/min/[1.73_m2] Final     Comment:      GFR Calc  Starting 12/18/2018, serum creatinine based estimated GFR (eGFR) will be   calculated using the Chronic Kidney Disease Epidemiology Collaboration   (CKD-EPI) equation.         Lab Results   Component Value Date    MICROL 11 08/09/2022    MICROL 35 10/09/2020     No results found for: MICROALBUMIN  No results found for: \"CPEPT\", \"GADAB\", \"ISCAB\"    Vitamin B12   Date Value Ref Range Status   08/09/2022 2,687 (H) 232 - 1,245 pg/mL Final   10/17/2016 2,075 (H) 193 - 986 pg/mL Final     Comment:     Interp: [id number] = Normal   05/12/2012 742 >210 pg/mL Final     Comment:     Interp: [id number] = Normal     Most recent eye exam date: : Not Found       Olga Lidia Hoover MD    "

## 2024-02-07 RX ORDER — ROSUVASTATIN CALCIUM 20 MG/1
20 TABLET, COATED ORAL DAILY
Qty: 90 TABLET | Refills: 0 | Status: SHIPPED | OUTPATIENT
Start: 2024-02-07 | End: 2024-09-03

## 2024-03-08 ENCOUNTER — TRANSFERRED RECORDS (OUTPATIENT)
Dept: HEALTH INFORMATION MANAGEMENT | Facility: CLINIC | Age: 74
End: 2024-03-08
Payer: COMMERCIAL

## 2024-03-08 LAB — RETINOPATHY: POSITIVE

## 2024-03-20 NOTE — PROGRESS NOTES
Medication Therapy Management (MTM) Encounter    ASSESSMENT:                            Medication Adherence/Access: No issues identified    Type 2 Diabetes: Patient is meeting A1c goal of < 8%. Continuous blood glucose monitor readings above average goal of < 150.   Discussed patient is on metformin IR and only taking once a day, so may not be covering the full day appropriately. Will send a message to endocrinology who is managing.     Hypertension/Aortic stenosis/CAD: Patient is meeting blood pressure goal of < 140/90mmHg.      Hyperlipidemia: stable      Hypothyroidism: Stable. Last TSH is within normal limits.      Migraines: stable      Allergic Rhinitis: stable      Pain: stable      Supplements/Anemia: Stable.    PLAN:                            I will send a message to your endocrinologist about your metformin being switched to XR or IR twice daily so you are having the full day covered by the medication.    Follow-up: Return in about 1 year (around 3/21/2025) for Medication Therapy Management.    SUBJECTIVE/OBJECTIVE:                          Ade Wilkins is a 73 year old female called for a follow-up visit.       Reason for visit: med reivew.    Allergies/ADRs: Reviewed in chart  Past Medical History: Reviewed in chart  Tobacco: She reports that she has never smoked. She has never used smokeless tobacco.  Alcohol: not currently using  Caffeine: Diet Dr. Pepper 1-2 per day  Activity: trying to work on walking around house    Medication Adherence/Access: Patient uses pill box(es), every week sets up.   Per patient, misses medication every couple weeks, just in the evening. Does have a reminder set up on iPad.  Medication barriers: none.  The patient fills medications at [facility] in [place]    Diabetes   Type 2 Diabetes:    Metformin 1000 mg daily (adjusted down for kidney function)  Trulicity 4.5 mg weekly - Saturday  Tresiba 34 units daily - has two pens left and then is switching to Lantus due to formulary  changes  Lyumjev per SSI - 3 units per 1 carb serving (typically under 60 units per day)  Aspirin 81 mg daily  Patient is not experiencing side effects.   Previous Medications: Jardiance (upset stomach & headaches).   Blood sugar monitoring: Continuous Glucose Monitor. Wyatt 2.  7 day average:164 mg/dL  14 day average: 168 mg/dL  Reports diet hasn't been great lately.  Symptoms of low blood sugar? none  Symptoms of high blood sugar? none     Eye exam is up to date  Foot exam is up to date  Urine Albumin:   Lab Results   Component Value Date    UMALCR 244.53 (H) 02/01/2024      Lab Results   Component Value Date    A1C 7.0 (H) 02/01/2024    A1C 7.9 (H) 08/09/2022    A1C 8.0 (H) 06/08/2022       Hypertension   Hypertension/Aortic stenosis/CAD:   irbesartan 150 mg daily  Metoprolol succinate 25 mg twice daily   furosemide 40 mg daily  Potassium 10 mEq once daily  Aspirin 81 mg daily  Patient reports no concerns with bruising or bleeding.   Side effects: some lightheadedness with changes in position, uses caution when standing (has a walker). Notes Cardio is aware of this.   Patient does self-monitor blood pressure with cuff.   Home BP monitoring: Reports 105/53, 131/55, 125/55. Typically better at home than in the hospital  Can feel when BP is high, will get headaches. No concerns with low BP.  Follows with cardiology.     BP Readings from Last 3 Encounters:   08/16/23 132/62   07/18/23 125/66   06/15/23 (!) 146/58     Pulse Readings from Last 3 Encounters:   08/16/23 68   07/18/23 72   06/15/23 58       Hyperlipidemia   rosuvastatin 20 mg every other day  Per patient advised to reduce to every other day dose due to low LDL-C (June 2022). Patient reports no significant myalgias or other side effects.   The ASCVD Risk score (Sam DK, et al., 2019) failed to calculate for the following reasons:    The patient has a prior MI or stroke diagnosis       Recent Labs   Lab Test 02/01/24  0916 07/13/23  1038   CHOL 163 131    HDL 45* 46*   LDL 88 65   TRIG 148 100       Hypothyroidism   Levothyroxine 50 mcg daily  Patient is having the following symptoms: none.       TSH   Date Value Ref Range Status   02/01/2024 1.83 0.30 - 4.20 uIU/mL Final   08/09/2022 1.83 0.40 - 4.00 mU/L Final   03/27/2020 3.47 0.40 - 4.00 mU/L Final       Supplements   Vitron-C  mg two tablets twice daily  Vitamin B12 1000 mcg three times weekly  Vitamin D3 1000 units twice daily  Calc 600 mg-Vitamin D3 20 mcg twice daily  Zinc 50 mg daily.   Magnesium daily  No reported issues at this time.         Migraines:   topiramate 75 mg nightly  Patient is not experiencing side effects. No concerns with migraines at this time, resolved since starting topiramate.     Allergic Rhinitis:   cetirizine 10 mg once daily  fluticasone nasal spray 2 spray(s) once daily as needed - needed on and off since we didn't get snow this winter  Patient feels that current therapy is somewhat effective. Some dry nose with Flonase, only uses when really needs.      Pain:   Acetaminophen 500-1000mg daily as needed  Menthol 7% topical gel as needed (Zim's Max Freeze)  Reports she is starting to develop arthritis in joints and this is where most of her pain comes from.  Feels Acetaminophen is somewhat effective, notes Aleve worked better but appropriately avoiding due to kidney & cardiovascular concerns. Topical gel effective for knee pain. Patient is not experiencing side effects.          Today's Vitals: LMP  (LMP Unknown)   ----------------      I spent 30 minutes with this patient today. All changes were made via collaborative practice agreement with Judith Aviles MD. A copy of the visit note was provided to the patient's provider(s).    A summary of these recommendations was sent via [employer].    Enma Stock PharmD  Medication Therapy Management Pharmacist  Voicemail: [phone]      Telemedicine Visit Details  Type of service:  Telephone visit  Start Time:  10:31  AM  End Time:  11:01 AM     Medication Therapy Recommendations  Type 2 diabetes mellitus (H)    Current Medication: metFORMIN (GLUCOPHAGE) 1000 MG tablet   Rationale: Frequency inappropriate - Dosage too low - Effectiveness   Recommendation: Increase Frequency   Status: Contact Provider - Awaiting Response

## 2024-03-21 ENCOUNTER — VIRTUAL VISIT (OUTPATIENT)
Dept: PHARMACY | Facility: CLINIC | Age: 74
End: 2024-03-21
Payer: COMMERCIAL

## 2024-03-21 ENCOUNTER — TELEPHONE (OUTPATIENT)
Dept: ENDOCRINOLOGY | Facility: CLINIC | Age: 74
End: 2024-03-21
Payer: COMMERCIAL

## 2024-03-21 DIAGNOSIS — M25.561 CHRONIC PAIN OF BOTH KNEES: ICD-10-CM

## 2024-03-21 DIAGNOSIS — M25.562 CHRONIC PAIN OF BOTH KNEES: ICD-10-CM

## 2024-03-21 DIAGNOSIS — G89.29 CHRONIC PAIN OF BOTH KNEES: ICD-10-CM

## 2024-03-21 DIAGNOSIS — Z79.4 TYPE 2 DIABETES MELLITUS WITH DIABETIC NEUROPATHY, WITH LONG-TERM CURRENT USE OF INSULIN (H): Primary | ICD-10-CM

## 2024-03-21 DIAGNOSIS — E03.9 HYPOTHYROIDISM, UNSPECIFIED TYPE: ICD-10-CM

## 2024-03-21 DIAGNOSIS — I10 ESSENTIAL HYPERTENSION: ICD-10-CM

## 2024-03-21 DIAGNOSIS — E11.40 TYPE 2 DIABETES MELLITUS WITH DIABETIC NEUROPATHY, WITH LONG-TERM CURRENT USE OF INSULIN (H): Primary | ICD-10-CM

## 2024-03-21 DIAGNOSIS — I35.0 AORTIC STENOSIS, SEVERE: ICD-10-CM

## 2024-03-21 DIAGNOSIS — G43.009 NONINTRACTABLE MIGRAINE, UNSPECIFIED MIGRAINE TYPE: ICD-10-CM

## 2024-03-21 DIAGNOSIS — Z78.9 TAKES DIETARY SUPPLEMENTS: ICD-10-CM

## 2024-03-21 DIAGNOSIS — E11.65 TYPE 2 DIABETES MELLITUS WITH HYPERGLYCEMIA, WITHOUT LONG-TERM CURRENT USE OF INSULIN (H): Primary | ICD-10-CM

## 2024-03-21 DIAGNOSIS — Z98.890 STATUS POST CORONARY ANGIOGRAM: ICD-10-CM

## 2024-03-21 DIAGNOSIS — J30.1 NON-SEASONAL ALLERGIC RHINITIS DUE TO POLLEN: ICD-10-CM

## 2024-03-21 DIAGNOSIS — E78.00 PURE HYPERCHOLESTEROLEMIA: ICD-10-CM

## 2024-03-21 DIAGNOSIS — D50.9 IRON DEFICIENCY ANEMIA, UNSPECIFIED IRON DEFICIENCY ANEMIA TYPE: ICD-10-CM

## 2024-03-21 PROCEDURE — 99605 MTMS BY PHARM NP 15 MIN: CPT | Mod: 93 | Performed by: PHARMACIST

## 2024-03-21 PROCEDURE — 99607 MTMS BY PHARM ADDL 15 MIN: CPT | Mod: 93 | Performed by: PHARMACIST

## 2024-03-21 RX ORDER — METFORMIN HCL 500 MG
500 TABLET, EXTENDED RELEASE 24 HR ORAL
Qty: 180 TABLET | Refills: 4 | Status: SHIPPED | OUTPATIENT
Start: 2024-03-21 | End: 2024-07-22

## 2024-03-21 RX ORDER — SILICONE ADHESIVE 1.5" X 3"
1-2 SHEET (EA) TOPICAL DAILY
COMMUNITY

## 2024-03-21 RX ORDER — MULTIVIT,TX WITH IRON,MINERALS
500 TABLET, EXTENDED RELEASE ORAL DAILY
COMMUNITY

## 2024-03-21 NOTE — PATIENT INSTRUCTIONS
"Recommendations from today's MTM visit:                                                    MTM (medication therapy management) is a service provided by a clinical pharmacist designed to help you get the most of out of your medicines.   Today we reviewed what your medicines are for, how to know if they are working, that your medicines are safe and how to make your medicine regimen as easy as possible.      I will send a message to your endocrinologist about your metformin being switched to XR or IR twice daily so you are having the full day covered by the medication.    Follow-up: Return in about 1 year (around 3/21/2025) for Medication Therapy Management.    It was great speaking with you today.  I value your experience and would be very thankful for your time in providing feedback in our clinic survey. In the next few days, you may receive an email or text message from [employer] with a link to a survey related to your  clinical pharmacist.\"     To schedule another MTM appointment, please call the clinic directly or you may call the MTM scheduling line at [phone] or toll-free at [phone].     My Clinical Pharmacist's contact information:                                                      Please feel free to contact me with any questions or concerns you have.      Enma Stock, PharmD  Medication Therapy Management Pharmacist  Voicemail: [phone]   "

## 2024-03-21 NOTE — LETTER
_  Medication List        Prepared on: 03/21/2024     Bring your Medication List when you go to the doctor, hospital, or   emergency room. And, share it with your family or caregivers.     Note any changes to how you take your medications.  Cross out medications when you no longer use them.    Medication How I take it Why I use it Prescriber   acetaminophen (TYLENOL) 500 MG tablet Take 500-1,000 mg by mouth every 6 hours as needed for mild pain  Pain Patient Reported   aspirin (ASA) 81 MG chewable tablet Take 1 tablet (81 mg) by mouth daily Starting tomorrow. Coronary artery disease involving native coronary artery of native heart with angina pectoris (H) Cammy Worrell MD   augmented betamethasone dipropionate (DIPROLENE-AF) 0.05 % external ointment Apply topically to affected area twice daily for 3-4 weeks then use as needed Venous stasis dermatitis of both lower extremities Judith Aviles MD   Calcium Carb-Cholecalciferol (CALCIUM 600+D) 600-20 MG-MCG TABS Take 1 tablet by mouth 2 times daily  Bone Health Patient Reported   cetirizine (ZYRTEC) 10 MG tablet Take 1 tablet (10 mg) by mouth daily Allergic Rhinitis, Cause Unspecified Dimitry Howard MD   cholecalciferol (VITAMIN D3) 25 mcg (1000 units) capsule Take 2 capsules by mouth daily  Bone Health Patient Reported   Continuous Blood Gluc  (FREESTYLE BALWINDER 2 READER) HENNA Use to read blood sugars as per 's instructions. Type 2 diabetes mellitus with diabetic neuropathy, unspecified whether long term insulin use (H) Roxanne Masterson PA-C   Continuous Blood Gluc Sensor (FREESTYLE BALWINDER 2 SENSOR) MISC Change every 14 days. Type 2 diabetes mellitus with diabetic neuropathy, unspecified whether long term insulin use (H) Roxanne Masterson PA-C   cyanocolbalamin (VITAMIN B-12) 1000 MCG tablet Take 1,000 mcg by mouth three times a week Taking one tablet 3 times per week  Anemia Dimitry Howard MD   Dulaglutide (TRULICITY) 4.5 MG/0.5ML SOPN Inject  4.5 mg Subcutaneous once a week Poorly controlled type 2 diabetes mellitus with neuropathy (H) Roxanne Masterson PA-C   fluticasone (FLONASE) 50 MCG/ACT nasal spray Spray 2 sprays into both nostrils daily as needed for allergies  Allergies Judith Aviles MD   furosemide (LASIX) 40 MG tablet Take 1 tablet (40 mg) by mouth daily Aortic valve stenosis, etiology of cardiac valve disease unspecified Judith Aviles MD   insulin glargine (LANTUS PEN) 100 UNIT/ML pen Inject 37 Units Subcutaneous at bedtime Type 2 diabetes mellitus with diabetic neuropathy, with long-term current use of insulin (H) Olga Lidia Hoover MD   Insulin Lispro-aabc, 1 U Dial, (LYUMJEV KWIKPEN) 100 UNIT/ML SOPN Inject 60 Units Subcutaneous daily Use as directed up to 60 units daily. Well controlled type 2 diabetes mellitus with nephropathy (H) Roxanne Masterson PA-C   insulin pen needle (BD FCO U/F) 32G X 4 MM miscellaneous Use 4 pen needles daily or as directed. Type 2 diabetes mellitus with diabetic neuropathy, with long-term current use of insulin (H) Dimitry Howard MD   irbesartan (AVAPRO) 150 MG tablet Take 1 tablet (150 mg) by mouth daily Benign Essential Hypertension Yahaira Qureshi CNP   levothyroxine (SYNTHROID/LEVOTHROID) 50 MCG tablet Take 1 tablet (50 mcg) by mouth daily Hypothyroidism, unspecified type Roxanne Masterson PA-C   magnesium gluconate (MAGONATE) 250 MG tablet Take 500 mg by mouth daily  General Health  Patient Reported   Menthol, Topical Analgesic, 7 % LIQD Apply 1 Application. topically 3 times daily as needed  Pain Patient Reported   metFORMIN (GLUCOPHAGE) 1000 MG tablet Take 1 tablet (1,000 mg) by mouth daily (with breakfast) Intractable chronic migraine without aura and without status migrainosus Roxanne Masterson PA-C   metoprolol succinate ER (TOPROL XL) 25 MG 24 hr tablet Take 1 tablet (25 mg) by mouth 2 times daily Aortic stenosis, severe Yahaira Qureshi CNP   potassium chloride ER (KLOR-CON M) 10 MEQ CR tablet  Take 1 tablet (10 mEq) by mouth daily Essential Hypertension Judith Aviles MD   rosuvastatin (CRESTOR) 20 MG tablet Take 1 tablet (20 mg) by mouth daily Pure Hypercholesterolemia; Type 2 diabetes mellitus with hyperglycemia, without long-term current use of insulin (H) Judith Aviles MD   sodium chloride (BRANDON 128) 5 % ophthalmic solution Place 1-2 drops into both eyes daily  Corneal Edema Judith Aviles MD   topiramate (TOPAMAX) 25 MG tablet Take 3 tablets (75 mg) by mouth At Bedtime Intractable chronic migraine without aura and without status migrainosus Roxanne Masterson PA-C   triamcinolone (KENALOG) 0.1 % external ointment Apply topically once a week To left foot rash Dermatitis Judith Aviles MD   VITRON-C  MG TABS tablet TAKE TWO TABLETS BY MOUTH TWICE DAILY  Iron deficiency anemia, unspecified iron deficiency anemia type Dimitry Dallin Howard MD   zinc 50 MG TABS Take 1 tablet by mouth daily  General Health Patient Reported         Add new medications, over-the-counter drugs, herbals, vitamins, or  minerals in the blank rows below.    Medication How I take it Why I use it Prescriber                                      Allergies:      chlorhexidine; clonidine; doxazosin mesylate; fexofenadine hydrochloride; gemfibrozil; lisinopril; norvasc [amlodipine]; pioglitazone hydrochloride        Side effects I have had:               Other Information:              My notes and questions:

## 2024-03-21 NOTE — LETTER
March 21, 2024  Ade BERTHA Claudette  [address] [address]    Dear Ms. Wilkins, [facility]     Thank you for talking with me on Mar 21, 2024 about your health and medications. As a follow-up to our conversation, I have included two documents:      Your Recommended To-Do List has steps you should take to get the best results from your medications.  Your Medication List will help you keep track of your medications and how to take them.    If you want to talk about these documents, please call Enma Stock RPH at phone: [phone], Monday-Friday 8-4:30pm.    I look forward to working with you and your doctors to make sure your medications work well for you.    Sincerely,  Enma Stock RPH  [facility] Pharmacist, [facility]

## 2024-03-21 NOTE — LETTER
"Recommended To-Do List      Prepared on: 03/21/2024       You can get the best results from your medications by completing the items on this \"To-Do List.\"      Bring your To-Do List when you go to your doctor. And, share it with your family or caregivers.    My To-Do List:  What we talked about: What I should do:   Your medication dosage being too low    I will send a message to your endocrinologist about your metformin being switched to XR or IR twice daily so you are having the full day covered by the medication.          What we talked about: What I should do:                     "

## 2024-03-24 ENCOUNTER — MYC REFILL (OUTPATIENT)
Dept: ENDOCRINOLOGY | Facility: CLINIC | Age: 74
End: 2024-03-24
Payer: COMMERCIAL

## 2024-03-24 DIAGNOSIS — E11.40 POORLY CONTROLLED TYPE 2 DIABETES MELLITUS WITH NEUROPATHY (H): ICD-10-CM

## 2024-03-24 DIAGNOSIS — E11.21 WELL CONTROLLED TYPE 2 DIABETES MELLITUS WITH NEPHROPATHY (H): ICD-10-CM

## 2024-03-24 DIAGNOSIS — E11.65 POORLY CONTROLLED TYPE 2 DIABETES MELLITUS WITH NEUROPATHY (H): ICD-10-CM

## 2024-03-24 RX ORDER — INSULIN LISPRO-AABC 100 [IU]/ML
60 INJECTION, SOLUTION SUBCUTANEOUS DAILY
Qty: 60 ML | Refills: 3 | Status: SHIPPED | OUTPATIENT
Start: 2024-03-24

## 2024-03-24 RX ORDER — DULAGLUTIDE 4.5 MG/.5ML
4.5 INJECTION, SOLUTION SUBCUTANEOUS WEEKLY
Qty: 6 ML | Refills: 3 | Status: SHIPPED | OUTPATIENT
Start: 2024-03-24 | End: 2024-06-11

## 2024-03-26 DIAGNOSIS — I35.0 AORTIC VALVE STENOSIS, ETIOLOGY OF CARDIAC VALVE DISEASE UNSPECIFIED: ICD-10-CM

## 2024-03-27 RX ORDER — FUROSEMIDE 40 MG
40 TABLET ORAL DAILY
Qty: 90 TABLET | Refills: 0 | Status: SHIPPED | OUTPATIENT
Start: 2024-03-27 | End: 2024-06-06

## 2024-04-15 DIAGNOSIS — L30.9 DERMATITIS: ICD-10-CM

## 2024-04-15 DIAGNOSIS — I87.2 VENOUS STASIS DERMATITIS OF BOTH LOWER EXTREMITIES: ICD-10-CM

## 2024-04-15 DIAGNOSIS — G43.719 INTRACTABLE CHRONIC MIGRAINE WITHOUT AURA AND WITHOUT STATUS MIGRAINOSUS: ICD-10-CM

## 2024-04-16 RX ORDER — TRIAMCINOLONE ACETONIDE 1 MG/G
OINTMENT TOPICAL WEEKLY
Qty: 30 G | Refills: 0 | Status: SHIPPED | OUTPATIENT
Start: 2024-04-16

## 2024-04-16 RX ORDER — BETAMETHASONE DIPROPIONATE 0.5 MG/G
OINTMENT, AUGMENTED TOPICAL
Qty: 45 G | Refills: 0 | Status: SHIPPED | OUTPATIENT
Start: 2024-04-16

## 2024-04-24 RX ORDER — TOPIRAMATE 25 MG/1
75 TABLET, FILM COATED ORAL AT BEDTIME
Qty: 270 TABLET | Refills: 0 | Status: SHIPPED | OUTPATIENT
Start: 2024-04-24 | End: 2024-07-23

## 2024-04-24 NOTE — TELEPHONE ENCOUNTER
Topiramate Oral Tablet 25 MG   Take 3 tablets (75 mg) by mouth At Bedtime      Last Written Prescription Date:  5/8/23  Last Fill Quantity: 270,   # refills: 1  Last Office Visit : 2/6/24  Future Office visit:  None  Intractable chronic migraine without aura and without status migrainosus [G43.719]        Routing refill request to provider for review/approval because:  Drug not on the [facility], [facility] or [facility] refill protocol     for associated diagnosis:   Intractable chronic migraine without aura and without status migrainosus [G43.719]

## 2024-05-16 ENCOUNTER — MYC MEDICAL ADVICE (OUTPATIENT)
Dept: ENDOCRINOLOGY | Facility: CLINIC | Age: 74
End: 2024-05-16
Payer: COMMERCIAL

## 2024-05-16 DIAGNOSIS — E11.21 WELL CONTROLLED TYPE 2 DIABETES MELLITUS WITH NEPHROPATHY (H): ICD-10-CM

## 2024-05-16 DIAGNOSIS — G43.719 INTRACTABLE CHRONIC MIGRAINE WITHOUT AURA AND WITHOUT STATUS MIGRAINOSUS: Primary | ICD-10-CM

## 2024-05-16 NOTE — TELEPHONE ENCOUNTER
Kameron 32 units    Olga Lidia Hoover MD  Staff Physician  Division of Endocrinology  [facility]  Pager #[phone]

## 2024-05-21 DIAGNOSIS — E11.65 POORLY CONTROLLED TYPE 2 DIABETES MELLITUS WITH NEUROPATHY (H): Primary | ICD-10-CM

## 2024-05-21 DIAGNOSIS — E11.40 POORLY CONTROLLED TYPE 2 DIABETES MELLITUS WITH NEUROPATHY (H): Primary | ICD-10-CM

## 2024-05-22 RX ORDER — INSULIN GLARGINE 300 U/ML
36 INJECTION, SOLUTION SUBCUTANEOUS AT BEDTIME
Qty: 35 ML | Refills: 4 | Status: SHIPPED | OUTPATIENT
Start: 2024-05-22

## 2024-05-22 NOTE — TELEPHONE ENCOUNTER
Switch Efrain to Carmel Hoover MD  Staff Physician  Division of Endocrinology  [facility]  Pager #[phone]

## 2024-06-01 ENCOUNTER — HEALTH MAINTENANCE LETTER (OUTPATIENT)
Age: 74
End: 2024-06-01

## 2024-06-05 DIAGNOSIS — I35.0 AORTIC VALVE STENOSIS, ETIOLOGY OF CARDIAC VALVE DISEASE UNSPECIFIED: ICD-10-CM

## 2024-06-06 RX ORDER — FUROSEMIDE 40 MG
40 TABLET ORAL DAILY
Qty: 90 TABLET | Refills: 0 | Status: SHIPPED | OUTPATIENT
Start: 2024-06-06 | End: 2024-08-19

## 2024-06-06 SDOH — HEALTH STABILITY: PHYSICAL HEALTH: ON AVERAGE, HOW MANY DAYS PER WEEK DO YOU ENGAGE IN MODERATE TO STRENUOUS EXERCISE (LIKE A BRISK WALK)?: 0 DAYS

## 2024-06-06 SDOH — HEALTH STABILITY: PHYSICAL HEALTH: ON AVERAGE, HOW MANY MINUTES DO YOU ENGAGE IN EXERCISE AT THIS LEVEL?: 10 MIN

## 2024-06-06 ASSESSMENT — SOCIAL DETERMINANTS OF HEALTH (SDOH): HOW OFTEN DO YOU GET TOGETHER WITH FRIENDS OR RELATIVES?: ONCE A WEEK

## 2024-06-10 PROBLEM — E66.01 MORBID OBESITY (H): Status: RESOLVED | Noted: 2023-06-15 | Resolved: 2024-06-10

## 2024-06-11 ENCOUNTER — OFFICE VISIT (OUTPATIENT)
Dept: INTERNAL MEDICINE | Facility: CLINIC | Age: 74
End: 2024-06-11
Payer: COMMERCIAL

## 2024-06-11 VITALS
SYSTOLIC BLOOD PRESSURE: 130 MMHG | TEMPERATURE: 97.6 F | OXYGEN SATURATION: 97 % | HEIGHT: 61 IN | BODY MASS INDEX: 39.5 KG/M2 | HEART RATE: 69 BPM | DIASTOLIC BLOOD PRESSURE: 72 MMHG | WEIGHT: 209.2 LBS

## 2024-06-11 DIAGNOSIS — N18.32 TYPE 2 DIABETES MELLITUS WITH STAGE 3B CHRONIC KIDNEY DISEASE, WITH LONG-TERM CURRENT USE OF INSULIN (H): ICD-10-CM

## 2024-06-11 DIAGNOSIS — Z23 HIGH PRIORITY FOR 2019-NCOV VACCINE: ICD-10-CM

## 2024-06-11 DIAGNOSIS — J31.0 CHRONIC RHINITIS: ICD-10-CM

## 2024-06-11 DIAGNOSIS — E66.01 MORBID OBESITY (H): ICD-10-CM

## 2024-06-11 DIAGNOSIS — E78.00 PURE HYPERCHOLESTEROLEMIA: ICD-10-CM

## 2024-06-11 DIAGNOSIS — I10 BENIGN ESSENTIAL HYPERTENSION: ICD-10-CM

## 2024-06-11 DIAGNOSIS — K13.0 ANGULAR CHEILITIS: ICD-10-CM

## 2024-06-11 DIAGNOSIS — E11.22 TYPE 2 DIABETES MELLITUS WITH STAGE 3B CHRONIC KIDNEY DISEASE, WITH LONG-TERM CURRENT USE OF INSULIN (H): ICD-10-CM

## 2024-06-11 DIAGNOSIS — Z79.4 TYPE 2 DIABETES MELLITUS WITH STAGE 3B CHRONIC KIDNEY DISEASE, WITH LONG-TERM CURRENT USE OF INSULIN (H): ICD-10-CM

## 2024-06-11 DIAGNOSIS — R68.2 DRY MOUTH: ICD-10-CM

## 2024-06-11 DIAGNOSIS — Z00.00 MEDICARE ANNUAL WELLNESS VISIT, SUBSEQUENT: Primary | ICD-10-CM

## 2024-06-11 PROCEDURE — 90480 ADMN SARSCOV2 VAC 1/ONLY CMP: CPT | Performed by: INTERNAL MEDICINE

## 2024-06-11 PROCEDURE — G0439 PPPS, SUBSEQ VISIT: HCPCS | Performed by: INTERNAL MEDICINE

## 2024-06-11 PROCEDURE — 99214 OFFICE O/P EST MOD 30 MIN: CPT | Mod: 25 | Performed by: INTERNAL MEDICINE

## 2024-06-11 PROCEDURE — 91320 SARSCV2 VAC 30MCG TRS-SUC IM: CPT | Performed by: INTERNAL MEDICINE

## 2024-06-11 RX ORDER — IPRATROPIUM BROMIDE 42 UG/1
2 SPRAY, METERED NASAL 4 TIMES DAILY
Qty: 15 ML | Refills: 11 | Status: SHIPPED | OUTPATIENT
Start: 2024-06-11

## 2024-06-11 RX ORDER — CLOTRIMAZOLE 1 %
CREAM (GRAM) TOPICAL 2 TIMES DAILY
Qty: 15 G | Refills: 1 | Status: SHIPPED | OUTPATIENT
Start: 2024-06-11

## 2024-06-11 NOTE — PROGRESS NOTES
ASSESSMENT/PLAN                                                       (Z00.00) Medicare annual wellness visit, subsequent  (primary encounter diagnosis)  Comment: PMH, PSH, FH, SH, medications, allergies, immunizations, and preventative health measures reviewed and updated as appropriate.  Plan: see below for plans.      (E11.22,  N18.32,  Z79.4) Type 2 diabetes mellitus with stage 3b chronic kidney disease, with long-term current use of insulin (H)  Comment: well-controlled on current regimen; followed by endocrinology.    (E78.00) Pure hypercholesterolemia  Comment: well-controlled on current regimen.      (I10) Benign essential hypertension  Comment: well-controlled on current regimen.      (Z23) High priority for 2019-nCoV vaccine  Comment: COVID-19 booster given today.    (E66.01) Morbid obesity (H)  Comment: obesity +  comorbidities (DM2, HTN, and HLD); known issue that I take into account for their medical decisions, no current exacerbations or new concerns.      (R68.2) Dry mouth  Comment: Lasix and Zyrtec likely contributing.  Plan: TRIAL of humidifier at the head of her bed.     (K13.0) Angular cheilitis  Comment: likely fungal from drooling at night.   Plan: TRIAL of clotrimazole twice daily as needed; barrier cream at night before bed; if symptoms worsen, change, or do not improve, patient to contact MD.      (J31.0) Chronic rhinitis  Comment: refractory to Flonase.  Plan: REPLACE Flonase with ipratropium 4x/day as needed; if symptoms worsen, change, or do not improve, patient to contact MD.      Appropriate preventive services were discussed with this patient, including applicable screening as appropriate for cardiovascular disease, diabetes, osteopenia/osteoporosis, and glaucoma.  As appropriate for age/gender, discussed screening for colorectal cancer, prostate cancer, breast cancer, and cervical cancer. Checklist reviewing preventive services available has been given to the patient.    Reviewed  patients plan of care. The Basic Care Plan (routine screening as documented in Health Maintenance) for Ade Wilkins meets the Care Plan requirement. This Care Plan has been established and reviewed with the Patient.    Judith Aviles MD   [address] [address]  T: [phone], F: [phone]    SUBJECTIVE                                                      Ade Wilkins is a very pleasant 73 year old female who presents for her subsequent AWV:    Current providers (other than myself): Kiko (endocrinology), Magui (ophthalmology)    PMH, PSH, , SH, medications, allergies, immunizations, preventative health, and health risk assessment reviewed and updated as appropriate.    Past Medical History:   Diagnosis Date    Benign essential hypertension     Chronic kidney disease, stage 3b (H)     Diabetic retinopathy of both eyes (H)     Obesity (BMI 30-39.9)     Osteopenia     Pure hypercholesterolemia     S/P gastric bypass     S/P TAVR (transcatheter aortic valve replacement) 06/07/2022    Type 2 diabetes mellitus (H)      Past Surgical History:   Procedure Laterality Date    APPENDECTOMY      CATARACT IOL, RT/LT Bilateral     CV CORONARY ANGIOGRAM N/A 05/04/2022    Procedure: Coronary Angiogram;  Surgeon: Hector Lewis MD;  Location:  [facility]    CV LEFT HEART CATH N/A 05/04/2022    Procedure: Left Heart Catheterization;  Surgeon: Hector Lewis MD;  Location:  [facility]    CV PCI N/A 05/04/2022    Procedure: Percutaneous Coronary Intervention;  Surgeon: Hector Lewis MD;  Location:  [facility]ATH [facility]    CV TRANSCATHETER AORTIC VALVE REPLACEMENT-FEMORAL APPROACH N/A 06/07/2022    Procedure: Transcatheter Aortic Valve Replacement-Femoral Approach;  Surgeon: Hector Lewis MD;  Location:  HEART [facility]ARDI[facility] CATH LAB    JURGEN EN Y BOWEL  2004    TONSILLECTOMY & ADENOIDECTOMY       Family History   Problem  Relation Age of Onset    Diabetes Type 2  Mother     Glaucoma Mother     Coronary Artery Disease Mother     Abdominal Aortic Aneurysm Father     Myocardial Infarction Father     Breast Cancer Sister     Abdominal Aortic Aneurysm Brother     Bladder Cancer Brother         recurrent    Diabetes Type 2  Brother     Liver Cancer Brother     Myocardial Infarction Brother     Alzheimer Disease Paternal Grandmother     Cerebrovascular Disease Paternal Grandfather     Ovarian Cancer No family hx of     Colon Cancer No family hx of      Social History     Occupational History    Occupation: Retired -    Tobacco Use    Smoking status: Never    Smokeless tobacco: Never   Vaping Use    Vaping status: Never Used   Substance and Sexual Activity    Alcohol use: No    Drug use: No    Sexual activity: Not Currently   Social History Narrative    . Single.    No kids.    No formal exercise.      Allergies   Allergen Reactions    Chlorhexidine Rash    Clonidine Headache    Doxazosin Mesylate Rash    Fexofenadine Hydrochloride Headache    Gemfibrozil Rash    Lisinopril Angioedema    Norvasc [Amlodipine] Other (See Comments)     hair loss    Pioglitazone Hydrochloride Swelling     ankle edema     Current Outpatient Medications   Medication Sig    acetaminophen (TYLENOL) 500 MG tablet Take 500-1,000 mg by mouth every 6 hours as needed for mild pain    aspirin (ASA) 81 MG chewable tablet Take 1 tablet (81 mg) by mouth daily Starting tomorrow.    augmented betamethasone dipropionate (DIPROLENE-AF) 0.05 % external ointment Apply topically to affected area twice daily for 3-4 weeks then use as needed    Calcium Carb-Cholecalciferol (CALCIUM 600+D) 600-20 MG-MCG TABS Take 1 tablet by mouth 2 times daily    cetirizine (ZYRTEC) 10 MG tablet Take 1 tablet (10 mg) by mouth daily    cholecalciferol (VITAMIN D3) 25 mcg (1000 units) capsule Take 2 capsules by mouth daily    clotrimazole (LOTRIMIN) 1 % external cream Apply  topically 2 times daily    Continuous Blood Gluc  (FREESTYLE BALWINDER 2 READER) HENNA Use to read blood sugars as per 's instructions.    Continuous Blood Gluc Sensor (FREESTYLE BALWINDER 2 SENSOR) MISC Change every 14 days.    cyanocolbalamin (VITAMIN B-12) 1000 MCG tablet Take 1,000 mcg by mouth three times a week Taking one tablet 3 times per week    furosemide (LASIX) 40 MG tablet Take 1 tablet (40 mg) by mouth daily    insulin glargine U-300 (TOUJEO SOLOSTAR) 300 UNIT/ML (1 units dial) pen Inject 36 Units Subcutaneous at bedtime    insulin glargine U-300 (TOUJEO) 300 UNIT/ML (1 units dial) pen Inject Subcutaneous at bedtime    Insulin Lispro-aabc, 1 U Dial, (LYUMJEV KWIKPEN) 100 UNIT/ML SOPN Inject 60 Units Subcutaneous daily Use as directed up to 60 units daily.    insulin pen needle (BD FCO U/F) 32G X 4 MM miscellaneous Use 4 pen needles daily or as directed.    ipratropium (ATROVENT) 0.06 % nasal spray Spray 2 sprays into both nostrils 4 times daily    irbesartan (AVAPRO) 150 MG tablet Take 1 tablet (150 mg) by mouth daily    levothyroxine (SYNTHROID/LEVOTHROID) 50 MCG tablet Take 1 tablet (50 mcg) by mouth daily    magnesium gluconate (MAGONATE) 250 MG tablet Take 500 mg by mouth daily    Menthol, Topical Analgesic, 7 % LIQD Apply 1 Application. topically 3 times daily as needed    metFORMIN (GLUCOPHAGE XR) 500 MG 24 hr tablet Take 1 tablet (500 mg) by mouth daily (with dinner)    metoprolol succinate ER (TOPROL XL) 25 MG 24 hr tablet Take 1 tablet (25 mg) by mouth 2 times daily    potassium chloride ER (KLOR-CON M) 10 MEQ CR tablet Take 1 tablet (10 mEq) by mouth daily    rosuvastatin (CRESTOR) 20 MG tablet Take 1 tablet (20 mg) by mouth daily    sodium chloride (BRANDON 128) 5 % ophthalmic solution Place 1-2 drops into both eyes daily    topiramate (TOPAMAX) 25 MG tablet Take 3 tablets (75 mg) by mouth At Bedtime    triamcinolone (KENALOG) 0.1 % external ointment Apply topically once a week To  left foot rash    VITRON-C  MG TABS tablet TAKE TWO TABLETS BY MOUTH TWICE DAILY     zinc 50 MG TABS Take 1 tablet by mouth daily     Immunization History   Administered Date(s) Administered    COVID-19 12+ (2023-24) (MODERNA) 10/11/2023    COVID-19 Bivalent 18+ (Moderna) 09/16/2022    COVID-19 MONOVALENT 12+ (Pfizer) 01/30/2021, 02/20/2021, 09/27/2021    Flu, Unspecified 09/17/2019, 09/26/2020    Influenza (High Dose) 3 valent vaccine 10/09/2015, 09/22/2016, 09/21/2017    Influenza (IIV3) PF 12/03/2001, 11/25/2002, 10/22/2004, 11/03/2005, 11/07/2006, 10/25/2007, 10/18/2008, 09/21/2009, 10/01/2011, 11/23/2012, 08/06/2013, 11/14/2014    Influenza Vaccine 65+ (Fluzone HD) 09/13/2021    Pneumo Conj 13-V (2010&after) 06/20/2016    Pneumococcal 23 valent 12/03/2001, 09/21/2009, 05/21/2012, 08/25/2017    TD,PF 7+ (Tenivac) 01/15/1986, 09/12/2005    TDAP Vaccine (Adacel) 10/09/2015    Zoster recombinant adjuvanted (SHINGRIX) 03/16/2021, 06/26/2021    Zoster vaccine, live 09/24/2012     PREVENTATIVE HEALTH                                                      BMI: obese  Blood pressure: well-controlled on current regimen   Breast CA screening: DUE - patient declines  Colon CA screening: up to date   Lung CA screening: n/a   Dexa: up to date   Screening cholesterol: n/a - already being treated for this condition  Screening diabetes: n/a - already being treated for this condition  Alcohol misuse screening: alcohol use reviewed - no intervention indicated at this time  Immunizations: reviewed;  COVID-19 booster DUE    HEALTH RISK ASSESSMENT                                                      In general, how would you rate your overall physical health? good  Outside of work, how many days during the week do you exercise? None  Outside of work, approximately how many minutes a day do you exercise? n/a     If you drink alcohol do you typically have >3 drinks per day or >7 drinks per week? No     Assistance with daily  "activities: No    Safety concerns: YES - poor lighting    Fall risk assessment: completed today (see ambulatory assessments)    Hearing concerns: YES - hard to follow conversation in a noisy restaurant or crowded room and trouble understanding softer whispered speech    In the past 6 months, have you been bothered by leaking of urine: No    Do you have a current opioid prescription? No  Do you use any other controlled substances or medications that are not prescribed by a provider? None    PHQ-2/PHQ-9 assessment: completed today (see ambulatory assessments)    Additional concerns today: YES - dry mouth, rash on the corners of her lips, and chronic runny nose refractory to Flonase    Have you ever done Advance Care Planning? (For example, a Health Directive, POLST, or a discussion with a medical provider or your loved ones about your wishes): Yes    OBJECTIVE                                                      /72   Pulse 69   Temp 97.6  F (36.4  C) (Temporal)   Ht 1.549 m (5' 1\")   Wt 94.9 kg (209 lb 3.2 oz)   LMP  (LMP Unknown)   SpO2 97%   BMI 39.53 kg/m    Constitutional: well-appearing  Head, Ears, and Eyes: normocephalic; normal external auditory canal and pinna; tympanic membranes visualized and normal; normal lids and conjunctivae  Neck: supple, symmetric, no thyromegaly or lymphadenopathy  Respiratory: normal respiratory effort; clear to auscultation bilaterally  Cardiovascular: regular rate and rhythm; no edema  Gastrointestinal: soft, non-tender, and non-distended; no organomegaly or masses  Musculoskeletal: normal gait and station  Psych: normal judgment and insight; normal mood and affect; recent and remote memory intact    Cognitive impairment noted: No  ---  (Note was completed, in part, with [employer] voice-recognition software. Documentation was reviewed, but some grammatical, spelling, and word errors may remain.)  "

## 2024-06-19 ENCOUNTER — MYC MEDICAL ADVICE (OUTPATIENT)
Dept: ENDOCRINOLOGY | Facility: CLINIC | Age: 74
End: 2024-06-19
Payer: COMMERCIAL

## 2024-06-19 DIAGNOSIS — E11.21 WELL CONTROLLED TYPE 2 DIABETES MELLITUS WITH NEPHROPATHY (H): Primary | ICD-10-CM

## 2024-06-21 ENCOUNTER — PATIENT OUTREACH (OUTPATIENT)
Dept: CARE COORDINATION | Facility: CLINIC | Age: 74
End: 2024-06-21
Payer: COMMERCIAL

## 2024-07-19 ENCOUNTER — PATIENT OUTREACH (OUTPATIENT)
Dept: CARE COORDINATION | Facility: CLINIC | Age: 74
End: 2024-07-19
Payer: COMMERCIAL

## 2024-07-22 ENCOUNTER — MYC MEDICAL ADVICE (OUTPATIENT)
Dept: EDUCATION SERVICES | Facility: CLINIC | Age: 74
End: 2024-07-22

## 2024-07-22 ENCOUNTER — VIRTUAL VISIT (OUTPATIENT)
Dept: EDUCATION SERVICES | Facility: CLINIC | Age: 74
End: 2024-07-22
Attending: PHYSICIAN ASSISTANT
Payer: COMMERCIAL

## 2024-07-22 DIAGNOSIS — Z79.4 TYPE 2 DIABETES MELLITUS WITH DIABETIC NEUROPATHY, WITH LONG-TERM CURRENT USE OF INSULIN (H): ICD-10-CM

## 2024-07-22 DIAGNOSIS — E11.40 TYPE 2 DIABETES MELLITUS WITH DIABETIC NEUROPATHY, WITH LONG-TERM CURRENT USE OF INSULIN (H): ICD-10-CM

## 2024-07-22 DIAGNOSIS — E11.21 WELL CONTROLLED TYPE 2 DIABETES MELLITUS WITH NEPHROPATHY (H): ICD-10-CM

## 2024-07-22 PROCEDURE — G0108 DIAB MANAGE TRN  PER INDIV: HCPCS | Mod: 95 | Performed by: DIETITIAN, REGISTERED

## 2024-07-22 RX ORDER — METFORMIN HCL 500 MG
500 TABLET, EXTENDED RELEASE 24 HR ORAL 2 TIMES DAILY WITH MEALS
Qty: 180 TABLET | Refills: 4 | Status: SHIPPED | OUTPATIENT
Start: 2024-07-22

## 2024-07-22 NOTE — PROGRESS NOTES
Diabetes Self-Management Education & Support    Presents for: Individual review    Type of service:  Video Visit    If the video visit is dropped, the video visit invitation should be resent by: Text to cell phone: [phone]    Originating Location (pt. Location): Home  Distant Location (provider location): Offsite  Mode of Communication:  Video Conference via [employer] Start Time:  2:00pm  Video End Time (time video stopped): 3:02pm    How would patient like to obtain AVS? STEERads      ASSESSMENT:  Ade uses her reader to scan blood glucose and not currently sharing [employer] data with our clinic. Was going to send her an invite to share but says she uses a different email for her [employer] account. She needs to go downstairs to review it and upload meter so requested she send by [employer]ERads and will send her an invite to share data.     Uncertain of blood glucose control for highs/lows so had Ade talk through her problem areas:    1) Says with change to Toujeo, feels like she is dropping from 200's at bedtime to 80-90's at night. Suggested trying 10% reduction (4 units), lowering from 36 to 32 units at dinner.    2) Feels having hypoglycemia after dinner but from not finishing her meal. Provided option to either take part of Lyumjev insulin right away (cover 30 gm right away, add extra after eating) or could try a lower carbohydrate ratio (3.5 units per carbohydrate choice (15 gm)) and she opted for the latter. Suggested going down to 3.0 units per carbohydrate choice.    3) No change to carbohydrate ratio for Lyumjev at breakfast or lunch since no concerns about these times (continue 4 units per carbohydrate choice).     4) Some high blood glucose at night but knows eating more than she should at times. Encouraged her to try to limit snacks to 15 gm carbohydrate. If still causing high blood glucose before bed, can consider covering snack at night with rapid-acting insulin if needed.     5)  Question on Metformin dose. Found [employer] message from Dr. Hoover on day Metformin last changed and reads:    I just wanted to let you know that I called in metformin XL as 500 mg to take this twice a day instead of the 1000 mg once radha     Updated her medication to reflect 500 mg metformin BID.    6) Will try next higher dose of Ozempic (1.0 mg weekly dose) to try to continue to lower blood glucose and insulin needs. Pt verbalized understanding of concepts discussed and recommendations provided.       Patient's most recent   Lab Results   Component Value Date    A1C 7.0 02/01/2024    A1C 8.1 01/08/2021    HEMOGLOBINA1 7.0 07/18/2023     is meeting goal of <8.0    Diabetes knowledge and skills assessment:   Patient is knowledgeable in diabetes management concepts related to: Healthy Eating, Being Active, Monitoring, Taking Medication, Problem Solving, Reducing Risks, and Healthy Coping    Continue education with the following diabetes management concepts: Taking Medication and Problem Solving    Based on learning assessment above, most appropriate setting for further diabetes education would be: Group class or Individual setting.      PLAN    -Increase Ozempic to 1.0 mg weekly dose once get refill for it.  -Take 500 mg Metformin-xr with breakfast and 500 mg with dinner  -Toujeo changes: 0-0-0-36 --> 0-0-0-32 units. Ok to change back if blood glucose go up.  -Lyumjev carbohydrate ratio change: 4 units per carbohydrate choice (15 gm) at breakfast and lunch, 3.5 units per carbohydrate choice (15 gm) at dinner    Topics to cover at upcoming visits: Taking Medication and Problem Solving    Follow-up: 8/23/24    See Care Plan for co-developed, patient-state behavior change goals.  AVS provided for patient today.    Education Materials Provided:  No new materials provided today      SUBJECTIVE/OBJECTIVE:  Presents for: Individual review  Accompanied by: Self  Diabetes education in the past 24mo: No  Focus of Visit: Taking  "Medication, CGM  Diabetes type: Type 2  Date of diagnosis: 1978. Started insulin when she is in her 40's.  Disease course: Getting harder to manage  How confident are you filling out medical forms by yourself:: Quite a bit  Diabetes management related comments/concerns: I ve had several changes to my insulin regimen in the last several months. I am having trouble coordinating the combined dosage for the long & fast acting insulins. Says Trulicity worked really well for her but was taken off formulary. Says she was put on Lyumjev and had bad habit of not taking it before she ate. Says she changed the meds so can take it and finds it lowers BG right away. Needs help with calibrating - finds she is taking too much so will crash right after taking it. Says even with 2 units, will go down faster than she thinks she would.    Current medications:  Toujeo is 36 units with evening meal.   Lyumjev: taking 4 units per carbohydrate choice    Not feel like Ozempic is helping much- feels like she is bloating more. Says the first pen was $76 and other one was $246.     Difficulty affording diabetes medication?: No  Difficulty affording diabetes testing supplies?: No  Other concerns:: Glasses  Cultural Influences/Ethnic Background:  Not  or       Diabetes Symptoms & Complications:  Diabetes Related Symptoms: Fatigue, Slow healing wounds  Weight trend: Fluctuating  Symptom course: Worsening  Disease course: Getting harder to manage  Complications assessed today?: Yes  CVA: Yes (Very small stroke while in hospital after getting new heart valve. She continues to see cardiologist for it.)  Nephropathy: Yes  Retinopathy: Yes    Patient Problem List and Family Medical History reviewed for relevant medical history, current medical status, and diabetes risk factors.    Vitals:  LMP  (LMP Unknown)   Estimated body mass index is 39.53 kg/m  as calculated from the following:    Height as of 6/11/24: 1.549 m (5' 1\").    Weight " "as of 6/11/24: 94.9 kg (209 lb 3.2 oz).   Last 3 BP:   BP Readings from Last 3 Encounters:   06/11/24 130/72   08/16/23 132/62   07/18/23 125/66       History   Smoking Status    Never   Smokeless Tobacco    Never       Labs:  Lab Results   Component Value Date    A1C 7.0 02/01/2024    A1C 8.1 01/08/2021    HEMOGLOBINA1 7.0 07/18/2023     Lab Results   Component Value Date     02/01/2024     08/09/2022     04/13/2021     Lab Results   Component Value Date    LDL 88 02/01/2024    LDL 59 03/27/2020     HDL Cholesterol   Date Value Ref Range Status   03/27/2020 34 (L) >49 mg/dL Final     Direct Measure HDL   Date Value Ref Range Status   02/01/2024 45 (L) >=50 mg/dL Final   ]  GFR Estimate   Date Value Ref Range Status   02/01/2024 41 (L) >60 mL/min/1.73m2 Final   04/13/2021 38 (L) >60 mL/min/[1.73_m2] Final     Comment:     Non  GFR Calc  Starting 12/18/2018, serum creatinine based estimated GFR (eGFR) will be   calculated using the Chronic Kidney Disease Epidemiology Collaboration   (CKD-EPI) equation.       GFR, ESTIMATED POCT   Date Value Ref Range Status   05/17/2022 32 (L) >60 mL/min/1.73m2 Final     GFR Estimate If Black   Date Value Ref Range Status   04/13/2021 45 (L) >60 mL/min/[1.73_m2] Final     Comment:      GFR Calc  Starting 12/18/2018, serum creatinine based estimated GFR (eGFR) will be   calculated using the Chronic Kidney Disease Epidemiology Collaboration   (CKD-EPI) equation.       Lab Results   Component Value Date    CR 1.37 02/01/2024    CR 1.38 04/13/2021     No results found for: \"MICROALBUMIN\"    Healthy Eating:  Healthy Eating Assessed Today: Yes  Cultural/Sabianism diet restrictions?: No  Meal planning/habits: Carb counting  Who cooks/prepares meals for you?: Self  Who purchases food in  your home?: Self, Sister  How many times a week on average do you eat food made away from home (restaurant/take-out)?: 1  Meals include: Breakfast, Lunch, " Dinner, Evening Snack  Breakfast: oatmeal or cream of wheat OR scrambled eggs with toast  Lunch: Bagel with roast beef/turkey/chicken with tomato  Dinner: Rouse's pie from [employer] and adds extra veggies -taking 12 units of insulin OR [employer] meals OR mashed potatoes with meat (not eat much beef- aversion 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  Snacks: rare. If hungry will eat veggie straws. Tries to avoid cookies/sweets. Likes date-filled cookies. Sometimes fruit but does more veggies than fruit.  Other: HS: veggies straws or nut and dried fruit pack - eaitng more than usual. Will sleep 1am-12pm.  Beverages: Water, Tea, Diet soda    Being Active:  Being Active Assessed Today: No  Exercise:: Yes  Days per week of moderate to strenuous exercise (like a brisk walk): 3  Barrier to exercise: Physical limitation (Weather)    Monitoring:  Monitoring Assessed Today: Yes  Did patient bring glucose meter to appointment? : Yes  Blood Glucose Meter: CGM ([place] 2)  Times checking blood sugar at home (number): 5+  Times checking blood sugar at home (per): Day    Blood glucose: Not available to view but per patient:    Says blood glucose in the morning is 80- low 90's. Will eat a snack before bed but does not cover with insulin. Will drop from 200's at night to 80-90's recently with change to Toujeo.    Says blood glucose may be 234 mg/dL right after dinner and 169 mg/dL after 2 hours. Says low blood glucose only occur after dinner. Feels lately not finishing her meal may be why her blood glucose is dropping.    Taking Medications:  Diabetes Medication(s)       Biguanides       metFORMIN (GLUCOPHAGE XR) 500 MG 24 hr tablet Take 1 tablet (500 mg) by mouth daily (with dinner)       Insulin       insulin glargine U-300 (TOUJEO SOLOSTAR) 300 UNIT/ML (1 units dial) pen Inject 36 Units Subcutaneous at bedtime     insulin glargine U-300 (TOUJEO) 300 UNIT/ML (1 units dial)  pen Inject Subcutaneous at bedtime     Insulin Lispro-aabc, 1 U Dial, (LYUMJEV KWIKPEN) 100 UNIT/ML SOPN Inject 60 Units Subcutaneous daily Use as directed up to 60 units daily.       Incretin Mimetic Agents       semaglutide (OZEMPIC) 2 MG/3ML pen Inject 0.5 mg Subcutaneous every 7 days              Taking Medication Assessed Today: Yes  Current Treatments: Diet, Oral Medication (taken by mouth), Insulin Injections  Dose schedule: Pre-breakfast, Pre-lunch, Pre-dinner  Given by: Patient  Problems taking diabetes medications regularly?: No  Diabetes medication side effects?: No    Problem Solving:  Problem Solving Assessed Today: Yes  Is the patient at risk for hypoglycemia?: Yes  Hypoglycemia Treatment: Other food (Fruit or whole grain pc toast. Stopped eating the candy bar.)              Reducing Risks:  Reducing Risks Assessed Today: No  Has dilated eye exam at least once a year?: Yes  Sees dentist every 6 months?: Yes  Feet checked by healthcare provider in the last year?: No    Healthy Coping:  Healthy Coping Assessed Today: Yes  Emotional response to diabetes: Ready to learn, Acceptance  Informal Support system:: Family, Friends  Patient Activation Measure Survey Score:      8/5/2010    10:00 AM   BONY Score (Last Two)   BONY Raw Score 40   Activation Score 60   BONY Level 3         Care Plan and Education Provided:  Healthy Eating: Balanced meals, Carbohydrate Counting, Heart healthy diet, and Portion control,   Monitoring: Blood glucose versus Continuous Glucose Monitoring, Individual glucose targets, and Log and interpret results,   Taking Medication: Action of prescribed medication(s) and When to take medication(s), and   Problem Solving: High glucose - causes, signs/symptoms, treatment and prevention, Low glucose - causes, signs/symptoms, treatment and prevention, Rule of 15 and carrying a carbohydrate source at all times in case of low glucose, and When to call a health care provider    Leia Arellano RDN,  [facility]     Time Spent: 62 minutes  Encounter Type: Individual    Any diabetes medication dose changes were made via the CDE Protocol per the patient's referring provider. A copy of this encounter was shared with the provider.

## 2024-07-22 NOTE — PATIENT INSTRUCTIONS
Send the email that you have used to set up your [employer] account to me so I can send you an email to invite you to share the blood glucose data with us.    2. Since it sounds like your blood glucose is dropping a lot overnight, lower Toujeo to 32 units (4 unit reduction).   -If for some reason your blood glucose goes back up at night or something changes, ok to go back up to 36 units.    3. Lyumjev change: Lower your carbohydrate ratio with dinner to 3.5 units per carbohydrate choice.  -If you still have low blood glucose 2 hours after eating, ok to lower to 3 units per carbohydrate choice.  -No change to carbohydrate ratio at breakfast or lunch- please continue 4 unite per carbohydrate choice if working well for you.    4. If seeing high blood glucose from snacks, try to limit to 15 gm carbohydrates (1 carbohydrate choice).    5. Ozempic changes: ordered the 1.0 mg pen for you. If you get the refill right away, ok to take 2, 0.5 mg doses of weekly Ozempic with the final pen at the same time (will provide 1.0 mg medication). If they will not fill right away and you have to wait for your current prescription to be out, continue 0.5 mg weekly until you  the 1 mg pen.    6. Updated your Metformin to reflect taking 500 mg with breakfast and 500 mg with dinner. Saw from the note from Dr. Hoover in March that it was supposed to be 500 mg twice daily instead of 1000 mg once a day.    FOLLOW UP APPOINTMENT: Video visit follow up on Friday, August 23rd at 1pm.    Leia Arellano RDN, LD, [facility]   [phone]

## 2024-07-22 NOTE — LETTER
7/22/2024         RE: Ade Wilkins  [address]  [facility] [address]        Dear Colleague,    Thank you for referring your patient, Ade Wilkins, to the [facility]. Please see a copy of my visit note below.    Diabetes Self-Management Education & Support    Presents for: Individual review    Type of service:  Video Visit    If the video visit is dropped, the video visit invitation should be resent by: Text to cell phone: [phone]    Originating Location (pt. Location): Home  Distant Location (provider location): Offsite  Mode of Communication:  Video Conference via [employer] Start Time:  2:00pm  Video End Time (time video stopped): 3:02pm    How would patient like to obtain AVS? [employer]      ASSESSMENT:  Ade uses her reader to scan blood glucose and not currently sharing Dibbz data with our clinic. Was going to send her an invite to share but says she uses a different email for her Dibbz account. She needs to go downstairs to review it and upload meter so requested she send by [employer] and will send her an invite to share data.     Uncertain of blood glucose control for highs/lows so had Ade talk through her problem areas:    1) Says with change to Toujeo, feels like she is dropping from 200's at bedtime to 80-90's at night. Suggested trying 10% reduction (4 units), lowering from 36 to 32 units at dinner.    2) Feels having hypoglycemia after dinner but from not finishing her meal. Provided option to either take part of Lyumjev insulin right away (cover 30 gm right away, add extra after eating) or could try a lower carbohydrate ratio (3.5 units per carbohydrate choice (15 gm)) and she opted for the latter. Suggested going down to 3.0 units per carbohydrate choice.    3) No change to carbohydrate ratio for Lyumjev at breakfast or lunch since no concerns about these times (continue 4 units per carbohydrate choice).     4) Some high blood  glucose at night but knows eating more than she should at times. Encouraged her to try to limit snacks to 15 gm carbohydrate. If still causing high blood glucose before bed, can consider covering snack at night with rapid-acting insulin if needed.     5) Question on Metformin dose. Found [employer] message from Dr. Hoover on day Metformin last changed and reads:    I just wanted to let you know that I called in metformin XL as 500 mg to take this twice a day instead of the 1000 mg once dialy     Updated her medication to reflect 500 mg metformin BID.    6) Will try next higher dose of Ozempic (1.0 mg weekly dose) to try to continue to lower blood glucose and insulin needs. Pt verbalized understanding of concepts discussed and recommendations provided.       Patient's most recent   Lab Results   Component Value Date    A1C 7.0 02/01/2024    A1C 8.1 01/08/2021    HEMOGLOBINA1 7.0 07/18/2023     is meeting goal of <8.0    Diabetes knowledge and skills assessment:   Patient is knowledgeable in diabetes management concepts related to: Healthy Eating, Being Active, Monitoring, Taking Medication, Problem Solving, Reducing Risks, and Healthy Coping    Continue education with the following diabetes management concepts: Taking Medication and Problem Solving    Based on learning assessment above, most appropriate setting for further diabetes education would be: Group class or Individual setting.      PLAN    -Increase Ozempic to 1.0 mg weekly dose once get refill for it.  -Take 500 mg Metformin-xr with breakfast and 500 mg with dinner  -Toujeo changes: 0-0-0-36 --> 0-0-0-32 units. Ok to change back if blood glucose go up.  -Lyumjev carbohydrate ratio change: 4 units per carbohydrate choice (15 gm) at breakfast and lunch, 3.5 units per carbohydrate choice (15 gm) at dinner    Topics to cover at upcoming visits: Taking Medication and Problem Solving    Follow-up: 8/23/24    See Care Plan for co-developed, patient-state behavior  change goals.  AVS provided for patient today.    Education Materials Provided:  No new materials provided today      SUBJECTIVE/OBJECTIVE:  Presents for: Individual review  Accompanied by: Self  Diabetes education in the past 24mo: No  Focus of Visit: Taking Medication, CGM  Diabetes type: Type 2  Date of diagnosis: 1978. Started insulin when she is in her 40's.  Disease course: Getting harder to manage  How confident are you filling out medical forms by yourself:: Quite a bit  Diabetes management related comments/concerns: I ve had several changes to my insulin regimen in the last several months. I am having trouble coordinating the combined dosage for the long & fast acting insulins. Says Trulicity worked really well for her but was taken off formulary. Says she was put on Lyumjev and had bad habit of not taking it before she ate. Says she changed the meds so can take it and finds it lowers BG right away. Needs help with calibrating - finds she is taking too much so will crash right after taking it. Says even with 2 units, will go down faster than she thinks she would.    Current medications:  Toujeo is 36 units with evening meal.   Lyumjev: taking 4 units per carbohydrate choice    Not feel like Ozempic is helping much- feels like she is bloating more. Says the first pen was $76 and other one was $246.     Difficulty affording diabetes medication?: No  Difficulty affording diabetes testing supplies?: No  Other concerns:: Glasses  Cultural Influences/Ethnic Background:  Not  or       Diabetes Symptoms & Complications:  Diabetes Related Symptoms: Fatigue, Slow healing wounds  Weight trend: Fluctuating  Symptom course: Worsening  Disease course: Getting harder to manage  Complications assessed today?: Yes  CVA: Yes (Very small stroke while in hospital after getting new heart valve. She continues to see cardiologist for it.)  Nephropathy: Yes  Retinopathy: Yes    Patient Problem List and Family Medical  "History reviewed for relevant medical history, current medical status, and diabetes risk factors.    Vitals:  LMP  (LMP Unknown)   Estimated body mass index is 39.53 kg/m  as calculated from the following:    Height as of 6/11/24: 1.549 m (5' 1\").    Weight as of 6/11/24: 94.9 kg (209 lb 3.2 oz).   Last 3 BP:   BP Readings from Last 3 Encounters:   06/11/24 130/72   08/16/23 132/62   07/18/23 125/66       History   Smoking Status     Never   Smokeless Tobacco     Never       Labs:  Lab Results   Component Value Date    A1C 7.0 02/01/2024    A1C 8.1 01/08/2021    HEMOGLOBINA1 7.0 07/18/2023     Lab Results   Component Value Date     02/01/2024     08/09/2022     04/13/2021     Lab Results   Component Value Date    LDL 88 02/01/2024    LDL 59 03/27/2020     HDL Cholesterol   Date Value Ref Range Status   03/27/2020 34 (L) >49 mg/dL Final     Direct Measure HDL   Date Value Ref Range Status   02/01/2024 45 (L) >=50 mg/dL Final   ]  GFR Estimate   Date Value Ref Range Status   02/01/2024 41 (L) >60 mL/min/1.73m2 Final   04/13/2021 38 (L) >60 mL/min/[1.73_m2] Final     Comment:     Non  GFR Calc  Starting 12/18/2018, serum creatinine based estimated GFR (eGFR) will be   calculated using the Chronic Kidney Disease Epidemiology Collaboration   (CKD-EPI) equation.       GFR, ESTIMATED POCT   Date Value Ref Range Status   05/17/2022 32 (L) >60 mL/min/1.73m2 Final     GFR Estimate If Black   Date Value Ref Range Status   04/13/2021 45 (L) >60 mL/min/[1.73_m2] Final     Comment:      GFR Calc  Starting 12/18/2018, serum creatinine based estimated GFR (eGFR) will be   calculated using the Chronic Kidney Disease Epidemiology Collaboration   (CKD-EPI) equation.       Lab Results   Component Value Date    CR 1.37 02/01/2024    CR 1.38 04/13/2021     No results found for: \"MICROALBUMIN\"    Healthy Eating:  Healthy Eating Assessed Today: Yes  Cultural/Amish diet restrictions?: " No  Meal planning/habits: Carb counting  Who cooks/prepares meals for you?: Self  Who purchases food in  your home?: Self, Sister  How many times a week on average do you eat food made away from home (restaurant/take-out)?: 1  Meals include: Breakfast, Lunch, Dinner, Evening Snack  Breakfast: oatmeal or cream of wheat OR scrambled eggs with toast  Lunch: Bagel with roast beef/turkey/chicken with tomato  Dinner: Rouse's pie from [employer] and adds extra veggies -taking 12 units of insulin OR [employer] meals OR mashed potatoes with meat (not eat much beef- aversion 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  Snacks: rare. If hungry will eat veggie straws. Tries to avoid cookies/sweets. Likes date-filled cookies. Sometimes fruit but does more veggies than fruit.  Other: HS: veggies straws or nut and dried fruit pack - eaitng more than usual. Will sleep 1am-12pm.  Beverages: Water, Tea, Diet soda    Being Active:  Being Active Assessed Today: No  Exercise:: Yes  Days per week of moderate to strenuous exercise (like a brisk walk): 3  Barrier to exercise: Physical limitation (Weather)    Monitoring:  Monitoring Assessed Today: Yes  Did patient bring glucose meter to appointment? : Yes  Blood Glucose Meter: CGM ([place]att 2)  Times checking blood sugar at home (number): 5+  Times checking blood sugar at home (per): Day    Blood glucose: Not available to view but per patient:    Says blood glucose in the morning is 80- low 90's. Will eat a snack before bed but does not cover with insulin. Will drop from 200's at night to 80-90's recently with change to Toujeo.    Says blood glucose may be 234 mg/dL right after dinner and 169 mg/dL after 2 hours. Says low blood glucose only occur after dinner. Feels lately not finishing her meal may be why her blood glucose is dropping.    Taking Medications:  Diabetes Medication(s)       Biguanides       metFORMIN (GLUCOPHAGE XR) 500 MG  24 hr tablet Take 1 tablet (500 mg) by mouth daily (with dinner)       Insulin       insulin glargine U-300 (TOUJEO SOLOSTAR) 300 UNIT/ML (1 units dial) pen Inject 36 Units Subcutaneous at bedtime     insulin glargine U-300 (TOUJEO) 300 UNIT/ML (1 units dial) pen Inject Subcutaneous at bedtime     Insulin Lispro-aabc, 1 U Dial, (LYUMJEGEOFFREY KWIKPEN) 100 UNIT/ML SOPN Inject 60 Units Subcutaneous daily Use as directed up to 60 units daily.       Incretin Mimetic Agents       semaglutide (OZEMPIC) 2 MG/3ML pen Inject 0.5 mg Subcutaneous every 7 days              Taking Medication Assessed Today: Yes  Current Treatments: Diet, Oral Medication (taken by mouth), Insulin Injections  Dose schedule: Pre-breakfast, Pre-lunch, Pre-dinner  Given by: Patient  Problems taking diabetes medications regularly?: No  Diabetes medication side effects?: No    Problem Solving:  Problem Solving Assessed Today: Yes  Is the patient at risk for hypoglycemia?: Yes  Hypoglycemia Treatment: Other food (Fruit or whole grain pc toast. Stopped eating the candy bar.)              Reducing Risks:  Reducing Risks Assessed Today: No  Has dilated eye exam at least once a year?: Yes  Sees dentist every 6 months?: Yes  Feet checked by healthcare provider in the last year?: No    Healthy Coping:  Healthy Coping Assessed Today: Yes  Emotional response to diabetes: Ready to learn, Acceptance  Informal Support system:: Family, Friends  Patient Activation Measure Survey Score:      8/5/2010    10:00 AM   BONY Score (Last Two)   BONY Raw Score 40   Activation Score 60   BONY Level 3         Care Plan and Education Provided:  Healthy Eating: Balanced meals, Carbohydrate Counting, Heart healthy diet, and Portion control,   Monitoring: Blood glucose versus Continuous Glucose Monitoring, Individual glucose targets, and Log and interpret results,   Taking Medication: Action of prescribed medication(s) and When to take medication(s), and   Problem Solving: High glucose -  causes, signs/symptoms, treatment and prevention, Low glucose - causes, signs/symptoms, treatment and prevention, Rule of 15 and carrying a carbohydrate source at all times in case of low glucose, and When to call a health care provider    Leia Arellano RDN, MARICRUZ, [facility]     Time Spent: 62 minutes  Encounter Type: Individual    Any diabetes medication dose changes were made via the CDE Protocol per the patient's referring provider. A copy of this encounter was shared with the provider.

## 2024-07-23 DIAGNOSIS — G43.719 INTRACTABLE CHRONIC MIGRAINE WITHOUT AURA AND WITHOUT STATUS MIGRAINOSUS: ICD-10-CM

## 2024-07-23 NOTE — TELEPHONE ENCOUNTER
insulin aspart (NOVOLOG FLEXPEN) 100 UNIT/ML injection    Last Written Prescription Date:  4/20/18  Last Fill Quantity: 45ml,   # refills: 1  Last Office Visit : 12/10/18  Future Office visit:  3/18/19      iageouting refill request to provider for review/approval because:  insulin to endo triage   ----- Message from Daniela Schultz sent at 7/23/2024 11:29 AM CDT -----  He has taken his last Monjaro shot, reports blood sugars for July have been in the 120's. Last week   Saturday 106  Sunday 142  Monday 134  Tuesday 131      Will his dose increase?  [place]

## 2024-07-25 RX ORDER — TOPIRAMATE 25 MG/1
75 TABLET, FILM COATED ORAL AT BEDTIME
Qty: 270 TABLET | Refills: 0 | Status: SHIPPED | OUTPATIENT
Start: 2024-07-25

## 2024-07-25 NOTE — TELEPHONE ENCOUNTER
Last Clinic Visit: 2/6/2024  [facility] [place]        Last seen pharmD 3/21/24..  topiramate 75 mg nightly  Patient is not experiencing side effects. No concerns with migraines at this time, resolved since starting topiramate.

## 2024-07-29 RX ORDER — TOPIRAMATE 25 MG/1
75 TABLET, FILM COATED ORAL AT BEDTIME
Qty: 270 TABLET | Refills: 0 | OUTPATIENT
Start: 2024-07-29

## 2024-07-29 NOTE — TELEPHONE ENCOUNTER
Dup  270 tablet 0 7/25/2024   [facility] - Progress Note  Patient: Jayson Fitzpatrick 52 y o  female MRN: [id number]  Unit/Bed#: [facility] 931-01 Encounter: [id number]  Primary Care Provider: Dontae Hightower MD  Date Of Visit: 08/06/20        Assessment & Plan:    * Rectal abscess  Assessment & Plan  Per repeat CT imaging, "Mildly enlarged complex presacral collection that may represent combination of abscess and tumor   Adjacent or contiguous left gluteal abscess is mildly increased "  Gluteal aspirate culture s/p I&D on 7/31 growing Streptococcus anginosus along with Bacteroides/Prevotella species - on IV Unasyn per Infectious Disease  History of rectal cancer on chemotherapy noted  Monitor fever curve - monitor vitals and maintain hemodynamics  PRN pain control  Long and in-depth discussions with Interventional Radiology, Infectious Disease, colorectal surgery, and palliative care on 8/5 -> plan for one more attempt at IR guided aspirate drainage noted - recurrent/intermittent percutaneous draining or a permanent drain placement are not a long-term solution and will increased risk of further complications in the setting of underlying rectal cancer, including decreased quality of life, possible fistula formation, possible recurrent super infections, and need for frequent drain tube replacements - patient is aware has decided against diverting colostomy for now as previously offered by colorectal surgery -> awaiting repeat attempt at IR guided drainage later today    Metastatic rectal cancer  Assessment & Plan  With liver/lung metastasis -> s/p chemotherapy per outpatient oncology  Appreciate palliative care regarding pain control  Appreciate colorectal surgery input -> palliative/diverting colostomy has been offered but patient currently refusing  Supportive care otherwise    Breast cancer  Assessment & Plan  Follows with outpatient oncology regarding chemotherapy  Per pathology in June 2020, ER/GA (+) and HER-2 (-)    Cancer associated pain  Assessment & Plan  Appreciate palliative care input - continue analgesic regimen    Leukocytosis  Assessment & Plan  Likely reactive acute medical issue(s)  Monitor WBC count    Anemia of chronic disease  Assessment & Plan  Monitor H/H    Hyponatremia  Assessment & Plan  Serum sodium normalized yesterday    Hypokalemia  Assessment & Plan  Monitor/replete serum potassium  Serum magnesium normal        DVT Prophylaxis:  Lovenox      Patient Centered Rounds:  I have performed bedside rounds and discussed plan of care with nursing today  Discussions with Specialists or Other Care Team Provider:  see above assessments if applicable    Education and Discussions with Family / Patient: Patient at bedside, who will self-update   Time Spent for Care:  32 minutes  More than 50% of total time spent on counseling and coordination of care as described above  Current Length of Stay: 6 day(s)    Current Patient Status: Inpatient   Certification Statement:  Patient will continue to require additional hospital stay due to assessments as noted above  Code Status: Level 1 - Full Code        Subjective:     Pain is better controlled today  She also notes less generalized swelling after discontinuation of IV fluids yesterday  She is awaiting a repeat attempt by IR regarding rectal abscess aspiration  Still remains against colostomy  Objective:     Vitals:   Temp (24hrs), Av 9 °F (37 2 °C), Min:98 3 °F (36 8 °C), Max:99 2 °F (37 3 °C)    Temp:  [98 3 °F (36 8 °C)-99 2 °F (37 3 °C)] 98 3 °F (36 8 °C)  HR:  [81] 81  Resp:  [16-19] 16  BP: ()/(60-66) 101/66  SpO2:  [97 %-99 %] 97 %  Body mass index is 23 59 kg/m²  Input and Output Summary (last 24 hours):        Intake/Output Summary (Last 24 hours) at 2020 1334  Last data filed at 2020 1215  Gross per 24 hour   Intake 1040 ml   Output 2560 ml   Net -1520 ml       Physical Exam:     GENERAL:  Well-developed/nourished - improved distress from pain today  HEAD:  Normocephalic - atraumatic  EYES: PERRL - EOMI   MOUTH:  Mucosa moist  NECK:  Supple - full range of motion  CARDIAC:  Rate controlled - S1/S2 positive  PULMONARY:  Fairly clear to auscultation - nonlabored respirations  ABDOMEN:  Soft - nontender/nondistended - active bowel sounds  MUSCULOSKELETAL:  Motor strength/range of motion intact  NEUROLOGIC:  Alert/oriented at baseline  SKIN:  Chronic wrinkles/blemishes - tattoos noted  PSYCHIATRIC:  Mood/affect more pleasant today      Additional Data:     Labs & Recent Cultures:    Results from last 7 days   Lab Units 08/06/20  0452   WBC Thousand/uL 10 15   HEMOGLOBIN g/dL 7 5*   HEMATOCRIT % 25 6*   PLATELETS Thousands/uL 415*   NEUTROS PCT % 63   LYMPHS PCT % 20   MONOS PCT % 9   EOS PCT % 5     Results from last 7 days   Lab Units 08/06/20  0452  07/30/20  2135   POTASSIUM mmol/L 3 9   < > 3 4*   CHLORIDE mmol/L 104   < > 99*   CO2 mmol/L 25   < > 26   BUN mg/dL 5   < > 13   CREATININE mg/dL 0 49*   < > 0 84   CALCIUM mg/dL 8 5   < > 8 6   ALK PHOS U/L  --   --  254*   ALT U/L  --   --  22   AST U/L  --   --  44    < > = values in this interval not displayed  Results from last 7 days   Lab Units 07/31/20  0656   INR  1 07             Results from last 7 days   Lab Units 07/30/20  2134   LACTIC ACID mmol/L 1 2         Results from last 7 days   Lab Units 07/31/20  1541 07/30/20  2156 07/30/20  2135   BLOOD CULTURE   --  No Growth After 5 Days  No Growth After 5 Days     GRAM STAIN RESULT  3+ Polys  No bacteria seen  --   --    BODY FLUID CULTURE, STERILE  4+ Growth of Streptococcus anginosus*  --   --          Last 24 Hours Medication List:   acetaminophen, 650 mg, Oral, Q6H PRN, Yocasta Lee DO  aluminum-magnesium hydroxide-simethicone, 30 mL, Oral, Q6H PRN, Aubrey Nguyen MD  ampicillin-sulbactam, 3 g, Intravenous, Q6H, Michele Hess MD, Last Rate: Stopped (08/06/20 1215)  cholecalciferol, 2,000 Units, Oral, Daily, Jamil Fuller MD  enoxaparin, 40 mg, Subcutaneous, Daily, Jamil Fuller MD  HYDROmorphone, 1 mg, Intravenous, Q3H PRN, Yocasta Lee DO  loratadine, 10 mg, Oral, Daily, Jamil Fuller MD  metoprolol succinate, 25 mg, Oral, Daily, Jamil Fuller MD  ondansetron, 4 mg, Intravenous, Q6H PRN, Jamil Fuller MD  oxyCODONE, 20 mg, Oral, Q12H [address], Yocasta Lee DO  oxyCODONE-acetaminophen, 1 tablet, Oral, Q4H PRN, Shavonne Cedillo DO    Or  oxyCODONE, 10 mg, Oral, Q4H PRN, Shavonne Cedillo DO  polyethylene glycol, 17 g, Oral, Daily, Yocasta Lee DO  senna, 1 tablet, Oral, BID, Yocasta Lee DO  sertraline, 25 mg, Oral, Daily, Jamil Fuller MD                 ** Please Note: This note is constructed using a voice recognition dictation system  An occasional wrong word/phrase or sound-a-like substitution may have been picked up by dictation device due to the inherent limitations of voice recognition software  Read the chart carefully and recognize, using reasonable context, where substitutions may have occurred  **

## 2024-08-05 ENCOUNTER — ANCILLARY PROCEDURE (OUTPATIENT)
Dept: MAMMOGRAPHY | Facility: CLINIC | Age: 74
End: 2024-08-05
Attending: INTERNAL MEDICINE
Payer: COMMERCIAL

## 2024-08-05 DIAGNOSIS — Z12.31 VISIT FOR SCREENING MAMMOGRAM: ICD-10-CM

## 2024-08-05 PROCEDURE — 77067 SCR MAMMO BI INCL CAD: CPT | Mod: TC | Performed by: RADIOLOGY

## 2024-08-05 PROCEDURE — 77063 BREAST TOMOSYNTHESIS BI: CPT | Mod: TC | Performed by: RADIOLOGY

## 2024-08-19 DIAGNOSIS — I35.0 AORTIC VALVE STENOSIS, ETIOLOGY OF CARDIAC VALVE DISEASE UNSPECIFIED: ICD-10-CM

## 2024-08-19 DIAGNOSIS — E11.40 TYPE 2 DIABETES MELLITUS WITH DIABETIC NEUROPATHY, UNSPECIFIED WHETHER LONG TERM INSULIN USE (H): ICD-10-CM

## 2024-08-19 RX ORDER — FUROSEMIDE 40 MG
40 TABLET ORAL DAILY
Qty: 90 TABLET | Refills: 0 | Status: SHIPPED | OUTPATIENT
Start: 2024-08-19

## 2024-08-20 ENCOUNTER — HOSPITAL ENCOUNTER (OUTPATIENT)
Dept: CARDIOLOGY | Facility: CLINIC | Age: 74
Discharge: HOME | End: 2024-08-20
Attending: NURSE PRACTITIONER | Admitting: NURSE PRACTITIONER
Payer: COMMERCIAL

## 2024-08-20 DIAGNOSIS — Z95.2 S/P TAVR (TRANSCATHETER AORTIC VALVE REPLACEMENT): ICD-10-CM

## 2024-08-20 LAB — LVEF ECHO: NORMAL

## 2024-08-20 PROCEDURE — 93306 TTE W/DOPPLER COMPLETE: CPT | Mod: 26 | Performed by: INTERNAL MEDICINE

## 2024-08-20 PROCEDURE — 93306 TTE W/DOPPLER COMPLETE: CPT

## 2024-08-21 NOTE — TELEPHONE ENCOUNTER
LVD:  2/6/2024  [facility] Lidia Sousa MD  Endocrinology, Diabetes, and Metabolism     Refilled per protocol.

## 2024-08-23 ENCOUNTER — VIRTUAL VISIT (OUTPATIENT)
Dept: EDUCATION SERVICES | Facility: CLINIC | Age: 74
End: 2024-08-23
Payer: COMMERCIAL

## 2024-08-23 DIAGNOSIS — E11.40 TYPE 2 DIABETES MELLITUS WITH DIABETIC NEUROPATHY, WITH LONG-TERM CURRENT USE OF INSULIN (H): Primary | ICD-10-CM

## 2024-08-23 DIAGNOSIS — Z79.4 TYPE 2 DIABETES MELLITUS WITH DIABETIC NEUROPATHY, WITH LONG-TERM CURRENT USE OF INSULIN (H): Primary | ICD-10-CM

## 2024-08-23 PROCEDURE — G0108 DIAB MANAGE TRN  PER INDIV: HCPCS | Mod: 95 | Performed by: DIETITIAN, REGISTERED

## 2024-08-23 NOTE — PROGRESS NOTES
"Diabetes Self-Management Education & Support    Presents for: CGM Review    Type of service:  Video Visit    If the video visit is dropped, the video visit invitation should be resent by: Text to cell phone: [phone]    Originating Location (pt. Location): Home  Distant Location (provider location): Offsite  Mode of Communication:  Video Conference via [employer] Start Time:  1:01pm  Video End Time (time video stopped): 1:41pm    How would patient like to obtain AVS? Dorie      REPORTS:          Pt verbalized understanding of concepts discussed and recommendations provided today.       Continue education with the following diabetes management concepts: Taking Medication and Problem Solving    ASSESSMENT:  Per CGM data from the past 2 weeks, looks like more than 1/2 of her post-prandial blood glucose are above target after 2 hours from eating and taking her Lyumjev insulin so suspect a stronger carbohydrate ratio is needed. Ade is currently taking 4 units per 15 gm carbohydrate at each meal so recommended increasing to 5 units per 15 gm carbohydrate to see if this works better. She is agreeable to this plan. Ade is seeing more than 50% time in range range but trying to lower \"very high\" blood glucose to <5% if can do this without causing more hypoglycemia (currently she is at 11%).    After last visit, she did not need to adjust her carbohydrate ratio at dinner or lower Toujeo, however, if hypoglycemia starts to occur with more insulin at meals, suggested lowering Toujeo to 32 units (4 unit or 10% reduction). No major changes to eating but is hoping to increase activity. Reviewed how she can add some movement if blood glucose higher than normal coming into a meal. She is now taking 1 mg weekly Ozempic so removed the 0.5 mg dose. At follow up, can consider figuring a correction scale if blood glucose control improved but has some elevated pre-meal blood glucose. Pt verbalized understanding of " concepts discussed and recommendations provided.         Glucose Patterns & Trends:  Time in range of  mg/dL, 62% of the time. and Time below range of 70 mg/dL, 0% of the time.    PLAN    -Increase Lyumjev from 4 units for every 15 gm carbohydrates to 5 units fore every 15 gm carbohydrate.   -Continue taking 36 units of Toujeo but lower to 32 units if more Lyumjev at meals causes hypoglycemia.   -Consider adding correction at follow up visit    Topics to cover at upcoming visits: Taking Medication and Problem Solving    Follow-up: 10/18/24. Sees Dr. Roxanne Masterson on 9/16.    See Care Plan for co-developed, patient-state behavior change goals.  AVS provided for patient today.    Education Materials Provided:  No new materials provided today      SUBJECTIVE/OBJECTIVE:  Presents for: CGM Review  Accompanied by: Self  Diabetes education in the past 24mo: Yes  Focus of Visit: Taking Medication, CGM  Type of CGM visit: Personal CGM Follow-up  Diabetes type: Type 2  Date of diagnosis: 1978. Started insulin when she is in her 40's.  Disease course: Getting harder to manage  How confident are you filling out medical forms by yourself:: Quite a bit  Diabetes management related comments/concerns: Says she took a tumble a couple of weeks ago. Says she was ok but took awhile to get back up- had hurt her knee.    Says blood glucose bouncing around is bothering her. Will take her insulin, eat a little bit, pause, and then eats some more.     Medications: taking 4 units of Lymjev for every 15 gm carbohydrates. Did not need to lower carbohydrate ratio at dinner to try only 3.5 unit(s) per 15 gm carbohydrate.  Taking 36 units. Did not need to lower Toujeo. Not seeing as much hypoglycemia at night past couple of weeks.  Ozempic - up to 1mg/day. Requested the 0.5 gm weekly dose be removed so her medication list is up-to-date.    Difficulty affording diabetes medication?: No  Difficulty affording diabetes testing supplies?:  "No  Other concerns:: Glasses, Physical impairment (Uses a cane to help with walking)  Cultural Influences/Ethnic Background:  Not  or       Diabetes Symptoms & Complications:  Diabetes Related Symptoms: Fatigue, Slow healing wounds  Weight trend: Fluctuating  Symptom course: Worsening  Disease course: Getting harder to manage  Complications assessed today?: Yes  CVA: Yes (Very small stroke while in hospital after getting new heart valve. She continues to see cardiologist for it.)  Nephropathy: Yes  Retinopathy: Yes    Patient Problem List and Family Medical History reviewed for relevant medical history, current medical status, and diabetes risk factors.    Vitals:  LMP  (LMP Unknown)   Estimated body mass index is 39.53 kg/m  as calculated from the following:    Height as of 6/11/24: 1.549 m (5' 1\").    Weight as of 6/11/24: 94.9 kg (209 lb 3.2 oz).   Last 3 BP:   BP Readings from Last 3 Encounters:   06/11/24 130/72   08/16/23 132/62   07/18/23 125/66       History   Smoking Status    Never   Smokeless Tobacco    Never       Labs:  Lab Results   Component Value Date    A1C 7.0 02/01/2024    A1C 8.1 01/08/2021    HEMOGLOBINA1 7.0 07/18/2023     Lab Results   Component Value Date     02/01/2024     08/09/2022     04/13/2021     Lab Results   Component Value Date    LDL 88 02/01/2024    LDL 59 03/27/2020     HDL Cholesterol   Date Value Ref Range Status   03/27/2020 34 (L) >49 mg/dL Final     Direct Measure HDL   Date Value Ref Range Status   02/01/2024 45 (L) >=50 mg/dL Final   ]  GFR Estimate   Date Value Ref Range Status   02/01/2024 41 (L) >60 mL/min/1.73m2 Final   04/13/2021 38 (L) >60 mL/min/[1.73_m2] Final     Comment:     Non  GFR Calc  Starting 12/18/2018, serum creatinine based estimated GFR (eGFR) will be   calculated using the Chronic Kidney Disease Epidemiology Collaboration   (CKD-EPI) equation.       GFR, ESTIMATED POCT   Date Value Ref Range Status " "  05/17/2022 32 (L) >60 mL/min/1.73m2 Final     GFR Estimate If Black   Date Value Ref Range Status   04/13/2021 45 (L) >60 mL/min/[1.73_m2] Final     Comment:      GFR Calc  Starting 12/18/2018, serum creatinine based estimated GFR (eGFR) will be   calculated using the Chronic Kidney Disease Epidemiology Collaboration   (CKD-EPI) equation.       Lab Results   Component Value Date    CR 1.37 02/01/2024    CR 1.38 04/13/2021     No results found for: \"MICROALBUMIN\"    Healthy Eating:  Healthy Eating Assessed Today: Yes  Cultural/Confucianist diet restrictions?: No  Meal planning/habits: Carb counting  Who cooks/prepares meals for you?: Self  Who purchases food in  your home?: Self, Sister  How many times a week on average do you eat food made away from home (restaurant/take-out)?: 1  Meals include: Breakfast, Lunch, Dinner, Evening Snack  Breakfast: oatmeal or cream of wheat OR scrambled eggs with toast  Lunch: Bagel with roast beef/turkey/chicken with tomato  Dinner: Rouse's pie from [employer] and adds extra veggies -taking 12 units of insulin OR [employer] meals OR mashed potatoes with meat (not eat much beef- aversion 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  Snacks: rare. If hungry will eat veggie straws. Tries to avoid cookies/sweets. Likes date-filled cookies. Sometimes fruit but does more veggies than fruit.  Other: HS: veggies straws or nut and dried fruit pack - eaitng more than usual. Will sleep 1am-12pm.  Beverages: Water, Tea, Diet soda    Being Active:  Being Active Assessed Today: Yes  Exercise:: Currently not exercising  Days per week of moderate to strenuous exercise (like a brisk walk): 3  Barrier to exercise: Physical limitation (Weather)    Monitoring:  Monitoring Assessed Today: Yes  Did patient bring glucose meter to appointment? : Yes  Blood Glucose Meter: CGM (Wyatt 2)  Times checking blood sugar at home (number): 5+  Times " checking blood sugar at home (per): Day      Taking Medications:   Biguanides       metFORMIN (GLUCOPHAGE XR) 500 MG 24 hr tablet Take 1 tablet (500 mg) by mouth 2 times daily (with meals)       Insulin       insulin glargine U-300 (TOUJEO SOLOSTAR) 300 UNIT/ML (1 units dial) pen Inject 36 Units Subcutaneous at bedtime     insulin glargine U-300 (TOUJEO) 300 UNIT/ML (1 units dial) pen Inject Subcutaneous at bedtime     Insulin Lispro-aabc, 1 U Dial, (LYUMJEV KWIKPEN) 100 UNIT/ML SOPN Inject 60 Units Subcutaneous daily Use as directed up to 60 units daily.       Incretin Mimetic Agents       semaglutide (OZEMPIC) 2 MG/3ML pen Inject 0.5 mg Subcutaneous every 7 days     Semaglutide, 1 MG/DOSE, (OZEMPIC) 4 MG/3ML pen Inject 1 mg subcutaneously every 7 days              Taking Medication Assessed Today: Yes  Current Treatments: Diet, Oral Medication (taken by mouth), Insulin Injections, Non-insulin Injectables  Dose schedule: Pre-breakfast, Pre-lunch, Pre-dinner  Given by: Patient  Problems taking diabetes medications regularly?: No  Diabetes medication side effects?: No    Problem Solving:  Problem Solving Assessed Today: Yes  Is the patient at risk for hypoglycemia?: Yes  Hypoglycemia Treatment: Other food (Fruit or whole grain pc toast. Stopped eating the candy bar.)              Reducing Risks:  Reducing Risks Assessed Today: No  Has dilated eye exam at least once a year?: Yes  Sees dentist every 6 months?: Yes  Feet checked by healthcare provider in the last year?: No    Healthy Coping:  Healthy Coping Assessed Today: Yes  Emotional response to diabetes: Ready to learn, Acceptance  Informal Support system:: Family, Friends  Stage of change: ACTION (Actively working towards change)  Patient Activation Measure Survey Score:      8/5/2010    10:00 AM   BONY Score (Last Two)   BONY Raw Score 40   Activation Score 60   BONY Level 3         Care Plan and Education Provided:  Healthy Eating: Balanced meals and Carbohydrate  Counting, Being Active: Precautions to take with exercise and Relationship of activity to glucose, Monitoring: Individual glucose targets, Taking Medication: Action of prescribed medication(s) and When to take medication(s), and Problem Solving: High glucose - causes, signs/symptoms, treatment and prevention and Low glucose - causes, signs/symptoms, treatment and prevention    Leia Arellano RDN, MARICRUZ, [facility]     Time Spent: 40 minutes  Encounter Type: Individual    Any diabetes medication dose changes were made via the CDE Protocol per the patient's referring provider. A copy of this encounter was shared with the provider.

## 2024-08-23 NOTE — PATIENT INSTRUCTIONS
Looks like blood glucose struggle to get back below 180 mg/dL 2 hours after eating so recommend more Lyumjev with meals: instead of 4 units per 15 gm carbohydrate, try 5 units per 15 gm carbohydrate. This should provide about 1-4 more units of insulin at a meal, depending on the carbohydrate content.    2. If this change starts to cause any low blood glucose between meals or overnight, lower Toujeo to 32 units (4 unit reduction). If still having hypoglycemia after these changes, please reach out by MyChart.    3. Just FYI: Did remove the 0.5 mg Ozempic dose since you are taking the 1mg weekly dose.    FOLLOW UP APPOINTMENT: Video visit follow up on Friday, October 18th at 1pm.    Feel free to reach out if you have any questions on blood glucose. As long as you upload the data, can review on [employer] and get back to you if help is needed on insulin adjustments.    Leia Arellano,  SHERRI, LD, [facility]   [phone]

## 2024-08-23 NOTE — LETTER
"    8/23/2024         RE: Ade Wilkins  [address]  [place] [facility] [address]        Dear Colleague,    Thank you for referring your patient, Ade Wilkins, to the [facility]. Please see a copy of my visit note below.    Diabetes Self-Management Education & Support    Presents for: CGM Review    Type of service:  Video Visit    If the video visit is dropped, the video visit invitation should be resent by: Text to cell phone: [phone]    Originating Location (pt. Location): Home  Distant Location (provider location): Offsite  Mode of Communication:  Video Conference via [employer]    Video Start Time:  1:01pm  Video End Time (time video stopped): 1:41pm    How would patient like to obtain AVS? Ariannehart      REPORTS:          Pt verbalized understanding of concepts discussed and recommendations provided today.       Continue education with the following diabetes management concepts: Taking Medication and Problem Solving    ASSESSMENT:  Per CGM data from the past 2 weeks, looks like more than 1/2 of her post-prandial blood glucose are above target after 2 hours from eating and taking her Lyumjev insulin so suspect a stronger carbohydrate ratio is needed. Ade is currently taking 4 units per 15 gm carbohydrate at each meal so recommended increasing to 5 units per 15 gm carbohydrate to see if this works better. She is agreeable to this plan. Ade is seeing more than 50% time in range range but trying to lower \"very high\" blood glucose to <5% if can do this without causing more hypoglycemia (currently she is at 11%).    After last visit, she did not need to adjust her carbohydrate ratio at dinner or lower Toujeo, however, if hypoglycemia starts to occur with more insulin at meals, suggested lowering Toujeo to 32 units (4 unit or 10% reduction). No major changes to eating but is hoping to increase activity. Reviewed how she can add some movement if blood glucose higher than " normal coming into a meal. She is now taking 1 mg weekly Ozempic so removed the 0.5 mg dose. At follow up, can consider figuring a correction scale if blood glucose control improved but has some elevated pre-meal blood glucose. Pt verbalized understanding of concepts discussed and recommendations provided.         Glucose Patterns & Trends:  Time in range of  mg/dL, 62% of the time. and Time below range of 70 mg/dL, 0% of the time.    PLAN    -Increase Lyumjev from 4 units for every 15 gm carbohydrates to 5 units fore every 15 gm carbohydrate.   -Continue taking 36 units of Toujeo but lower to 32 units if more Lyumjev at meals causes hypoglycemia.   -Consider adding correction at follow up visit    Topics to cover at upcoming visits: Taking Medication and Problem Solving    Follow-up: 10/18/24. Sees Dr. Roxanne Masterson on 9/16.    See Care Plan for co-developed, patient-state behavior change goals.  AVS provided for patient today.    Education Materials Provided:  No new materials provided today      SUBJECTIVE/OBJECTIVE:  Presents for: CGM Review  Accompanied by: Self  Diabetes education in the past 24mo: Yes  Focus of Visit: Taking Medication, CGM  Type of CGM visit: Personal CGM Follow-up  Diabetes type: Type 2  Date of diagnosis: 1978. Started insulin when she is in her 40's.  Disease course: Getting harder to manage  How confident are you filling out medical forms by yourself:: Quite a bit  Diabetes management related comments/concerns: Says she took a tumble a couple of weeks ago. Says she was ok but took awhile to get back up- had hurt her knee.    Says blood glucose bouncing around is bothering her. Will take her insulin, eat a little bit, pause, and then eats some more.     Medications: taking 4 units of Lymjev for every 15 gm carbohydrates. Did not need to lower carbohydrate ratio at dinner to try only 3.5 unit(s) per 15 gm carbohydrate.  Taking 36 units. Did not need to lower Toujeo. Not seeing as much  "hypoglycemia at night past couple of weeks.  Ozempic - up to 1mg/day. Requested the 0.5 gm weekly dose be removed so her medication list is up-to-date.    Difficulty affording diabetes medication?: No  Difficulty affording diabetes testing supplies?: No  Other concerns:: Glasses, Physical impairment (Uses a cane to help with walking)  Cultural Influences/Ethnic Background:  Not  or       Diabetes Symptoms & Complications:  Diabetes Related Symptoms: Fatigue, Slow healing wounds  Weight trend: Fluctuating  Symptom course: Worsening  Disease course: Getting harder to manage  Complications assessed today?: Yes  CVA: Yes (Very small stroke while in hospital after getting new heart valve. She continues to see cardiologist for it.)  Nephropathy: Yes  Retinopathy: Yes    Patient Problem List and Family Medical History reviewed for relevant medical history, current medical status, and diabetes risk factors.    Vitals:  LMP  (LMP Unknown)   Estimated body mass index is 39.53 kg/m  as calculated from the following:    Height as of 6/11/24: 1.549 m (5' 1\").    Weight as of 6/11/24: 94.9 kg (209 lb 3.2 oz).   Last 3 BP:   BP Readings from Last 3 Encounters:   06/11/24 130/72   08/16/23 132/62   07/18/23 125/66       History   Smoking Status     Never   Smokeless Tobacco     Never       Labs:  Lab Results   Component Value Date    A1C 7.0 02/01/2024    A1C 8.1 01/08/2021    HEMOGLOBINA1 7.0 07/18/2023     Lab Results   Component Value Date     02/01/2024     08/09/2022     04/13/2021     Lab Results   Component Value Date    LDL 88 02/01/2024    LDL 59 03/27/2020     HDL Cholesterol   Date Value Ref Range Status   03/27/2020 34 (L) >49 mg/dL Final     Direct Measure HDL   Date Value Ref Range Status   02/01/2024 45 (L) >=50 mg/dL Final   ]  GFR Estimate   Date Value Ref Range Status   02/01/2024 41 (L) >60 mL/min/1.73m2 Final   04/13/2021 38 (L) >60 mL/min/[1.73_m2] Final     Comment:     Non " " GFR Calc  Starting 12/18/2018, serum creatinine based estimated GFR (eGFR) will be   calculated using the Chronic Kidney Disease Epidemiology Collaboration   (CKD-EPI) equation.       GFR, ESTIMATED POCT   Date Value Ref Range Status   05/17/2022 32 (L) >60 mL/min/1.73m2 Final     GFR Estimate If Black   Date Value Ref Range Status   04/13/2021 45 (L) >60 mL/min/[1.73_m2] Final     Comment:      GFR Calc  Starting 12/18/2018, serum creatinine based estimated GFR (eGFR) will be   calculated using the Chronic Kidney Disease Epidemiology Collaboration   (CKD-EPI) equation.       Lab Results   Component Value Date    CR 1.37 02/01/2024    CR 1.38 04/13/2021     No results found for: \"MICROALBUMIN\"    Healthy Eating:  Healthy Eating Assessed Today: Yes  Cultural/Restoration diet restrictions?: No  Meal planning/habits: Carb counting  Who cooks/prepares meals for you?: Self  Who purchases food in  your home?: Self, Sister  How many times a week on average do you eat food made away from home (restaurant/take-out)?: 1  Meals include: Breakfast, Lunch, Dinner, Evening Snack  Breakfast: oatmeal or cream of wheat OR scrambled eggs with toast  Lunch: Bagel with roast beef/turkey/chicken with tomato  Dinner: Rouse's pie from [employer] and adds extra veggies -taking 12 units of insulin OR Marie Calendar meals OR mashed potatoes with meat (not eat much beef- aversion 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  Snacks: rare. If hungry will eat veggie straws. Tries to avoid cookies/sweets. Likes date-filled cookies. Sometimes fruit but does more veggies than fruit.  Other: HS: veggies straws or nut and dried fruit pack - eaitng more than usual. Will sleep 1am-12pm.  Beverages: Water, Tea, Diet soda    Being Active:  Being Active Assessed Today: Yes  Exercise:: Currently not exercising  Days per week of moderate to strenuous exercise (like a brisk " walk): 3  Barrier to exercise: Physical limitation (Weather)    Monitoring:  Monitoring Assessed Today: Yes  Did patient bring glucose meter to appointment? : Yes  Blood Glucose Meter: CGM (Wyatt 2)  Times checking blood sugar at home (number): 5+  Times checking blood sugar at home (per): Day      Taking Medications:   Biguanides       metFORMIN (GLUCOPHAGE XR) 500 MG 24 hr tablet Take 1 tablet (500 mg) by mouth 2 times daily (with meals)       Insulin       insulin glargine U-300 (TOUJEO SOLOSTAR) 300 UNIT/ML (1 units dial) pen Inject 36 Units Subcutaneous at bedtime     insulin glargine U-300 (TOUJEO) 300 UNIT/ML (1 units dial) pen Inject Subcutaneous at bedtime     Insulin Lispro-aabc, 1 U Dial, (LYUMJEV KWIKPEN) 100 UNIT/ML SOPN Inject 60 Units Subcutaneous daily Use as directed up to 60 units daily.       Incretin Mimetic Agents       semaglutide (OZEMPIC) 2 MG/3ML pen Inject 0.5 mg Subcutaneous every 7 days     Semaglutide, 1 MG/DOSE, (OZEMPIC) 4 MG/3ML pen Inject 1 mg subcutaneously every 7 days              Taking Medication Assessed Today: Yes  Current Treatments: Diet, Oral Medication (taken by mouth), Insulin Injections, Non-insulin Injectables  Dose schedule: Pre-breakfast, Pre-lunch, Pre-dinner  Given by: Patient  Problems taking diabetes medications regularly?: No  Diabetes medication side effects?: No    Problem Solving:  Problem Solving Assessed Today: Yes  Is the patient at risk for hypoglycemia?: Yes  Hypoglycemia Treatment: Other food (Fruit or whole grain pc toast. Stopped eating the candy bar.)              Reducing Risks:  Reducing Risks Assessed Today: No  Has dilated eye exam at least once a year?: Yes  Sees dentist every 6 months?: Yes  Feet checked by healthcare provider in the last year?: No    Healthy Coping:  Healthy Coping Assessed Today: Yes  Emotional response to diabetes: Ready to learn, Acceptance  Informal Support system:: Family, Friends  Stage of change: ACTION (Actively working  towards change)  Patient Activation Measure Survey Score:      8/5/2010    10:00 AM   BONY Score (Last Two)   BONY Raw Score 40   Activation Score 60   BONY Level 3         Care Plan and Education Provided:  Healthy Eating: Balanced meals and Carbohydrate Counting, Being Active: Precautions to take with exercise and Relationship of activity to glucose, Monitoring: Individual glucose targets, Taking Medication: Action of prescribed medication(s) and When to take medication(s), and Problem Solving: High glucose - causes, signs/symptoms, treatment and prevention and Low glucose - causes, signs/symptoms, treatment and prevention    Leia Arellano RDN, MARICRUZ, RAOUL     Time Spent: 40 minutes  Encounter Type: Individual    Any diabetes medication dose changes were made via the CDE Protocol per the patient's referring provider. A copy of this encounter was shared with the provider.

## 2024-08-28 ENCOUNTER — TRANSFERRED RECORDS (OUTPATIENT)
Dept: HEALTH INFORMATION MANAGEMENT | Facility: CLINIC | Age: 74
End: 2024-08-28
Payer: COMMERCIAL

## 2024-09-01 DIAGNOSIS — E78.00 PURE HYPERCHOLESTEROLEMIA: ICD-10-CM

## 2024-09-01 DIAGNOSIS — E11.65 TYPE 2 DIABETES MELLITUS WITH HYPERGLYCEMIA, WITHOUT LONG-TERM CURRENT USE OF INSULIN (H): ICD-10-CM

## 2024-09-03 DIAGNOSIS — I10 BENIGN ESSENTIAL HYPERTENSION: ICD-10-CM

## 2024-09-03 RX ORDER — ROSUVASTATIN CALCIUM 20 MG/1
20 TABLET, COATED ORAL DAILY
Qty: 90 TABLET | Refills: 2 | Status: SHIPPED | OUTPATIENT
Start: 2024-09-03

## 2024-09-03 RX ORDER — IRBESARTAN 150 MG/1
150 TABLET ORAL DAILY
Qty: 90 TABLET | Refills: 0 | Status: SHIPPED | OUTPATIENT
Start: 2024-09-03

## 2024-09-13 NOTE — PROGRESS NOTES
Patient is showing 5/5 MNCM met.     HPI:   Rachael is a 74 year old female with history morbid obesity S/P Gastric bypass surgery, hypothyroidism, CKD, CAD, s/p stent placement and valve replacement for aortic stenosis 2022. here for follow up of type 2 diabetes.  Her diabetes mellitus is complicated by diabetic neuropathy, mild nonproliferative retinopathy and nephropathy.  She also follows with , last visit 2/2024. She has had type 2 Diabetes since her early 20s. Glucose is well controlled. Using a laurie 2, which allows her to pay more attention to her glucose.      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 Working a few days a week.  Goes to bed around 3am and up by noon. Now she is working, up at 5am.     Switched from trulicity to ozempic early August. Lost 7# this month.  Very happy with the switch.     BP high today in clinic- 152/69.  At home, 125/53, 106.52, 156/69.  Right arm runs higher than left.     Rachael is currently taking the following for her diabetes:   Tresiba 32 units at night.   Lyumjev- depends on what she is eating 5 units/15g carb.  She is working on taking insulin before she eats.    Ozempic 1 mg weekly.    Metformin 1000 mg daily.     Previous treatments:   Jardiance 10 mg daily stopped in 2022- was expensive.  Trulicity 4.5 mg (switched to ozempic in 7/2024)    Breakfast- breakfast bar or oatmeal or cream of wheat, rarely toast.   Lunch- sandwich- bagel with ham or chicken.   Evening- Riced cauliflower with green beans or cheese.  Fish or chicken or sometimes pork.    Snack before bed if glucose is lower.     Patient wears a laurie 2 sensor with an overall average of 173 mg/dL (CV 34%, TIR 62%, hypo 0%, severe hypo 0%) over the past 2 weeks. Recent glucose is as follows:       Blood Glucose Monitoring :                     She otherwise is generally feeling well and has no other concerns.       Past Medical History:   Diagnosis Date    Benign essential hypertension      Chronic kidney disease, stage 3b (H)     Diabetic retinopathy of both eyes (H)     Obesity (BMI 30-39.9)     Osteopenia     Pure hypercholesterolemia     S/P gastric bypass     S/P TAVR (transcatheter aortic valve replacement) 06/07/2022    Type 2 diabetes mellitus (H)        Past Surgical History:   Procedure Laterality Date    APPENDECTOMY      CATARACT IOL, RT/LT Bilateral     CV CORONARY ANGIOGRAM N/A 05/04/2022    Procedure: Coronary Angiogram;  Surgeon: Hector Lewis MD;  Location:  [facility]    CV LEFT HEART CATH N/A 05/04/2022    Procedure: Left Heart Catheterization;  Surgeon: Hector Lewis MD;  Location:  [facility]    CV PCI N/A 05/04/2022    Procedure: Percutaneous Coronary Intervention;  Surgeon: Hector Lewis MD;  Location: [facility] CARDIAC CATH LAB    CV TRANSCATHETER AORTIC VALVE REPLACEMENT-FEMORAL APPROACH N/A 06/07/2022    Procedure: Transcatheter Aortic Valve Replacement-Femoral Approach;  Surgeon: Hector Lewis MD;  Location: [facility] CARDIAC CATH LAB    JURGEN EN Y BOWEL  2004    TONSILLECTOMY & ADENOIDECTOMY         Family History   Problem Relation Age of Onset    Diabetes Type 2  Mother     Glaucoma Mother     Coronary Artery Disease Mother     Abdominal Aortic Aneurysm Father     Myocardial Infarction Father     Breast Cancer Sister     Abdominal Aortic Aneurysm Brother     Bladder Cancer Brother         recurrent    Diabetes Type 2  Brother     Liver Cancer Brother     Myocardial Infarction Brother     Alzheimer Disease Paternal Grandmother     Cerebrovascular Disease Paternal Grandfather     Ovarian Cancer No family hx of     Colon Cancer No family hx of        Social History     Socioeconomic History    Marital status:      Spouse name: Not on file    Number of children: 0    Years of education: Not on file    Highest education level: Not on file   Occupational History     Employer: [employer],[address] [place] [address]  [address]   Social Needs    Financial resource strain: Not on file    Food insecurity:     Worry: Not on file     Inability: Not on file    Transportation needs:     Medical: Not on file     Non-medical: Not on file   Tobacco Use    Smoking status: Never Smoker    Smokeless tobacco: Never Used   Substance and Sexual Activity    Alcohol use: No    Drug use: No    Sexual activity: Never     Partners: Male   Lifestyle    Physical activity:     Days per week: Not on file     Minutes per session: Not on file    Stress: Not on file   Relationships    Social connections:     Talks on phone: Not on file     Gets together: Not on file     Attends Caodaism service: Not on file     Active member of club or organization: Not on file     Attends meetings of clubs or organizations: Not on file     Relationship status: Not on file    Intimate partner violence:     Fear of current or ex partner: Not on file     Emotionally abused: Not on file     Physically abused: Not on file     Forced sexual activity: Not on file   Other Topics Concern     Service Not Asked    Blood Transfusions Not Asked    Caffeine Concern Yes     Comment: 20 oz daily    Occupational Exposure Not Asked    Hobby Hazards Not Asked    Sleep Concern Not Asked    Stress Concern Not Asked    Weight Concern Not Asked    Special Diet Not Asked    Back Care Not Asked    Exercise No    Bike Helmet Not Asked    Seat Belt Yes    Self-Exams Yes    Parent/sibling w/ CABG, MI or angioplasty before 65F 55M? Not Asked   Social History Narrative    Living arrangements - the patient lives alone.   Social Hx: Lives with her sister and her sister's boyfriend.  . 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     Current Outpatient Medications   Medication Sig Dispense Refill    acetaminophen (TYLENOL) 500 MG tablet Take 500-1,000 mg by mouth every 6 hours as needed for mild pain      aspirin (ASA) 81 MG chewable tablet Take 1 tablet (81 mg) by mouth daily  Starting tomorrow. 30 tablet 3    augmented betamethasone dipropionate (DIPROLENE-AF) 0.05 % external ointment Apply topically to affected area twice daily for 3-4 weeks then use as needed 45 g 0    Calcium Carb-Cholecalciferol (CALCIUM 600+D) 600-20 MG-MCG TABS Take 1 tablet by mouth 2 times daily      cetirizine (ZYRTEC) 10 MG tablet Take 1 tablet (10 mg) by mouth daily      cholecalciferol (VITAMIN D3) 25 mcg (1000 units) capsule Take 2 capsules by mouth daily      clotrimazole (LOTRIMIN) 1 % external cream Apply topically 2 times daily 15 g 1    Continuous Blood Gluc  (FREESTYLE BALWINDER 2 READER) HENNA Use to read blood sugars as per 's instructions. 1 each 0    Continuous Glucose Sensor (FREESTYLE BALWINDER 2 SENSOR) MISC Change every 14 days. 2 each 5    cyanocolbalamin (VITAMIN B-12) 1000 MCG tablet Take 1,000 mcg by mouth three times a week Taking one tablet 3 times per week      furosemide (LASIX) 40 MG tablet Take 1 tablet (40 mg) by mouth daily 90 tablet 0    insulin glargine U-300 (TOUJEO SOLOSTAR) 300 UNIT/ML (1 units dial) pen Inject 36 Units Subcutaneous at bedtime 35 mL 4    insulin glargine U-300 (TOUJEO) 300 UNIT/ML (1 units dial) pen Inject Subcutaneous at bedtime      Insulin Lispro-aabc, 1 U Dial, (LYUMJEV KWIKPEN) 100 UNIT/ML SOPN Inject 60 Units Subcutaneous daily Use as directed up to 60 units daily. 60 mL 3    insulin pen needle (BD FCO U/F) 32G X 4 MM miscellaneous Use 4 pen needles daily or as directed. 400 each 0    ipratropium (ATROVENT) 0.06 % nasal spray Spray 2 sprays into both nostrils 4 times daily 15 mL 11    irbesartan (AVAPRO) 150 MG tablet Take 1 tablet (150 mg) by mouth daily. 90 tablet 0    levothyroxine (SYNTHROID/LEVOTHROID) 50 MCG tablet Take 1 tablet (50 mcg) by mouth daily 90 tablet 3    magnesium gluconate (MAGONATE) 250 MG tablet Take 500 mg by mouth daily      Menthol, Topical Analgesic, 7 % LIQD Apply 1 Application. topically 3 times daily as needed       metFORMIN (GLUCOPHAGE XR) 500 MG 24 hr tablet Take 1 tablet (500 mg) by mouth 2 times daily (with meals) 180 tablet 4    metoprolol succinate ER (TOPROL XL) 25 MG 24 hr tablet Take 1 tablet (25 mg) by mouth 2 times daily 180 tablet 3    potassium chloride ER (KLOR-CON M) 10 MEQ CR tablet Take 1 tablet (10 mEq) by mouth daily 90 tablet 3    rosuvastatin (CRESTOR) 20 MG tablet Take 1 tablet (20 mg) by mouth daily 90 tablet 2    Semaglutide, 1 MG/DOSE, (OZEMPIC) 4 MG/3ML pen Inject 1 mg subcutaneously every 7 days 3 mL 3    sodium chloride (BRANDON 128) 5 % ophthalmic solution Place 1-2 drops into both eyes daily      topiramate (TOPAMAX) 25 MG tablet Take 3 tablets (75 mg) by mouth at bedtime 270 tablet 0    triamcinolone (KENALOG) 0.1 % external ointment Apply topically once a week To left foot rash 30 g 0    VITRON-C  MG TABS tablet TAKE TWO TABLETS BY MOUTH TWICE DAILY  360 tablet 1    zinc 50 MG TABS Take 1 tablet by mouth daily       No current facility-administered medications for this visit.          Allergies   Allergen Reactions    Chlorhexidine Rash    Clonidine Headache    Doxazosin Mesylate Rash    Fexofenadine Hydrochloride Headache    Gemfibrozil Rash    Lisinopril Angioedema    Norvasc [Amlodipine] Other (See Comments)     hair loss    Pioglitazone Hydrochloride Swelling     ankle edema       Physical Exam  BP (!) 152/69   Pulse 65   Wt 91.6 kg (202 lb)   LMP  (LMP Unknown)   SpO2 97%   BMI 38.17 kg/m    GENERAL:  Alert and oriented X3, NAD, well dressed, answering questions appropriately, appears stated age.  HEENT: OP clear, no lymphadenopathy, no thyromegaly, non-tender, no exophthalmus, no proptosis, EOMI, no lid lag, no retraction  EXTREMITIES: significant edema in left foot, toes are extremely swollen and red.  No warmth or open sores.  Patient reports they have been like this since the 90s.  Right foot more mild.   NEUROLOGY: + monofilament, +vibratory sensation, + DTR upper and lower  extremity    RESULTS  Lab Results   Component Value Date    A1C 7.3 (H) 09/16/2024    A1C 7.0 (H) 02/01/2024    A1C 7.9 (H) 08/09/2022    A1C 8.0 (H) 06/08/2022    A1C 7.9 (H) 05/04/2022    A1C 7.6 (H) 11/24/2021    A1C 8.1 (H) 01/08/2021    A1C 8.1 (H) 10/09/2020    A1C 8.7 (A) 08/07/2020    A1C 8.4 (H) 03/27/2020    A1C 8.7 (H) 04/23/2019    HEMOGLOBINA1 7.0 07/18/2023    HEMOGLOBINA1 7.6 05/08/2023    HEMOGLOBINA1 8.6 01/16/2023    HEMOGLOBINA1 8.1 (A) 01/07/2020    HEMOGLOBINA1 8.7 (A) 04/22/2019       TSH   Date Value Ref Range Status   02/01/2024 1.83 0.30 - 4.20 uIU/mL Final   07/13/2023 2.88 0.30 - 4.20 uIU/mL Final   08/09/2022 1.83 0.40 - 4.00 mU/L Final   08/27/2021 2.37 0.40 - 4.00 mU/L Final   03/27/2020 3.47 0.40 - 4.00 mU/L Final   10/14/2019 2.54 0.40 - 4.00 mU/L Final   04/23/2019 4.06 (H) 0.40 - 4.00 mU/L Final   05/24/2018 2.54 0.40 - 4.00 mU/L Final   03/12/2018 4.78 (H) 0.40 - 4.00 mU/L Final     T4 Free   Date Value Ref Range Status   04/23/2019 1.10 0.76 - 1.46 ng/dL Final   03/12/2018 1.17 0.76 - 1.46 ng/dL Final   10/17/2016 1.17 0.76 - 1.46 ng/dL Final   05/05/2010 1.05 0.70 - 1.85 ng/dL Final   09/13/2007 1.00 0.70 - 1.85 ng/dL Final       ALT   Date Value Ref Range Status   06/08/2022 63 (H) 0 - 50 U/L Final   06/01/2022 160 (H) 0 - 50 U/L Final   04/13/2021 38 0 - 50 U/L Final   03/27/2020 28 0 - 50 U/L Final   ]    Recent Labs   Lab Test 02/01/24  0916 07/13/23  1038   CHOL 163 131   HDL 45* 46*   LDL 88 65   TRIG 148 100       Lab Results   Component Value Date     02/01/2024     04/13/2021      Lab Results   Component Value Date    POTASSIUM 4.1 02/01/2024    POTASSIUM 4.1 08/09/2022    POTASSIUM 4.2 04/13/2021     Lab Results   Component Value Date    CHLORIDE 107 02/01/2024    CHLORIDE 107 07/07/2022    CHLORIDE 110 04/13/2021     Lab Results   Component Value Date    RASHEEDA 9.8 02/01/2024    RASHEEDA 9.2 04/13/2021     Lab Results   Component Value Date    CO2 25 02/01/2024     CO2 23 07/07/2022    CO2 28 04/13/2021     Lab Results   Component Value Date    BUN 26.5 02/01/2024    BUN 20 08/09/2022    BUN 29 04/13/2021     Lab Results   Component Value Date    CR 1.37 02/01/2024    CR 1.38 04/13/2021       GFR Estimate   Date Value Ref Range Status   02/01/2024 41 (L) >60 mL/min/1.73m2 Final   08/16/2023 37 (L) >60 mL/min/1.73m2 Final   07/13/2023 36 (L) >60 mL/min/1.73m2 Final   04/13/2021 38 (L) >60 mL/min/[1.73_m2] Final     Comment:     Non  GFR Calc  Starting 12/18/2018, serum creatinine based estimated GFR (eGFR) will be   calculated using the Chronic Kidney Disease Epidemiology Collaboration   (CKD-EPI) equation.     03/24/2021 38 (L) >60 mL/min/[1.73_m2] Final     Comment:     Non  GFR Calc  Starting 12/18/2018, serum creatinine based estimated GFR (eGFR) will be   calculated using the Chronic Kidney Disease Epidemiology Collaboration   (CKD-EPI) equation.     11/13/2020 45 (L) >60 mL/min/[1.73_m2] Final     Comment:     Non  GFR Calc  Starting 12/18/2018, serum creatinine based estimated GFR (eGFR) will be   calculated using the Chronic Kidney Disease Epidemiology Collaboration   (CKD-EPI) equation.       GFR, ESTIMATED POCT   Date Value Ref Range Status   05/17/2022 32 (L) >60 mL/min/1.73m2 Final     GFR Estimate If Black   Date Value Ref Range Status   04/13/2021 45 (L) >60 mL/min/[1.73_m2] Final     Comment:      GFR Calc  Starting 12/18/2018, serum creatinine based estimated GFR (eGFR) will be   calculated using the Chronic Kidney Disease Epidemiology Collaboration   (CKD-EPI) equation.     03/24/2021 44 (L) >60 mL/min/[1.73_m2] Final     Comment:      GFR Calc  Starting 12/18/2018, serum creatinine based estimated GFR (eGFR) will be   calculated using the Chronic Kidney Disease Epidemiology Collaboration   (CKD-EPI) equation.     11/13/2020 52 (L) >60 mL/min/[1.73_m2] Final     Comment:       "American GFR Calc  Starting 12/18/2018, serum creatinine based estimated GFR (eGFR) will be   calculated using the Chronic Kidney Disease Epidemiology Collaboration   (CKD-EPI) equation.         Lab Results   Component Value Date    MICROL 201.0 02/01/2024    MICROL 11 08/09/2022    MICROL 35 10/09/2020     No results found for: \"MICROALBUMIN\"  No results found for: \"CPEPT\", \"GADAB\", \"ISCAB\"    Vitamin B12   Date Value Ref Range Status   08/09/2022 2,687 (H) 232 - 1,245 pg/mL Final   10/17/2016 2,075 (H) 193 - 986 pg/mL Final     Comment:     Interp: 247-911 = Normal   05/12/2012 742 >210 pg/mL Final     Comment:     Interp: 247-911 = Normal   ]    Most recent eye exam date: : Not Found     Computed FIB-4 Calculation unavailable. One or more values for this score either were not found within the given timeframe or did not fit some other criterion.  A value of FIB-4 below 1.30 is considered as low risk for advanced fibrosis; a value of FIB-4 over 2.67 is considered as high risk for advanced fibrosis; and FIB-4 values between 1.30 and 2.67 are considered as intermediate risk of advanced fibrosis.  Assessment/Plan:     1.  Type 2 diabetes- Glucose is fairly well controlled, but high post-meal.  Tolerating ozempic.  Will increase to 2 mg weekly.      Increase Ozempic to 2 mg weekly once you run out of the 1mg dose.      Let me know if you have low blood sugars.  We may need to reduce your long acting insulin.      Please let me know if you are having low blood sugars less than 70 or over 250 mg/dL.      If you have concerns, please send me a [facility] message M-Th, call the clinic at [phone], or call [phone] after hours/weekends and ask to speak with the endocrinologist on call.      2. Risk factors:     Retinopathy: Yes.  She also has macular edema and follows with ophthalmologist regularly every 10 weeks.   Nephropathy:  BP is high today.  Home BP is lower.  Will follow up with cardiologist later this month. " "She does have microalbuminuria. Would benefit from an SGLT-2 inhibitor, however it has been too expensive.  GFR is in low 40s.  Neuropathy: Some tingling, numbness.  Tolerable.    Feet: OK, no ulcers.   Lipids:  On rosuvastatin 20 mg EVERY OTHER DAY.  Was told by a provider cholesterol was \"too low\" so they recommended every other day.  LDL is above target.  Will discuss with cardiology, but I would recommend higher dose to target LDL <70.      3. Foot swelling- top of left foot- significant, also on right foot.  Reports this has been here since the 90's.  No warmth or signs of infection.  Concern for potential skin breakdown in the future.  Recommend discuss with PCP and cardiology (?needs more diuretics)? For now, work, on elevating feet, wearing compression socks, watch carefully for infection.  Has seen podiatry, dermatology and lymphedema clinic in the past.  Recommended a steroid cream. Will refer back to derm.     4.  F/U in 6 months as planned with Dr. Hoover, and see me in 12 months.      38 minutes spent on the date of the encounter doing chart review, review of test results, review and interpretation of glucose sensor data, patient visit and documentation, counseling/coordination of care, and discussion of follow up plan for worsening hyper and hypoglycemia.  The patient understood and is satisfied with today's visit.        Roxanne Masterson PA-C, MPAS   [facility]  Department of Medicine  Division of Endocrinology and Diabetes      "

## 2024-09-16 ENCOUNTER — OFFICE VISIT (OUTPATIENT)
Dept: ENDOCRINOLOGY | Facility: CLINIC | Age: 74
End: 2024-09-16
Payer: COMMERCIAL

## 2024-09-16 VITALS
BODY MASS INDEX: 38.17 KG/M2 | DIASTOLIC BLOOD PRESSURE: 69 MMHG | OXYGEN SATURATION: 97 % | WEIGHT: 202 LBS | SYSTOLIC BLOOD PRESSURE: 152 MMHG | HEART RATE: 65 BPM

## 2024-09-16 DIAGNOSIS — L53.9 ERYTHEMA OF FOOT: ICD-10-CM

## 2024-09-16 DIAGNOSIS — E11.21 WELL CONTROLLED TYPE 2 DIABETES MELLITUS WITH NEPHROPATHY (H): ICD-10-CM

## 2024-09-16 DIAGNOSIS — G43.719 INTRACTABLE CHRONIC MIGRAINE WITHOUT AURA AND WITHOUT STATUS MIGRAINOSUS: Primary | ICD-10-CM

## 2024-09-16 LAB — HBA1C MFR BLD: 7.3 %

## 2024-09-16 PROCEDURE — 83036 HEMOGLOBIN GLYCOSYLATED A1C: CPT | Performed by: PATHOLOGY

## 2024-09-16 PROCEDURE — 99214 OFFICE O/P EST MOD 30 MIN: CPT | Performed by: PHYSICIAN ASSISTANT

## 2024-09-16 ASSESSMENT — PAIN SCALES - GENERAL: PAINLEVEL: NO PAIN (0)

## 2024-09-16 NOTE — LETTER
9/16/2024       RE: Ade Wilkins  [address] [facility] [id number]     Dear Colleague,    Thank you for referring your patient, Ade Wilkins, to the [facility] ENDOCRINOLOGY CLINIC [place] at [facility]. Please see a copy of my visit note below.    Patient is showing 5/5 MNCM met.     HPI:   Rachael is a 74 year old female with history morbid obesity S/P Gastric bypass surgery, hypothyroidism, CKD, CAD, s/p stent placement and valve replacement for aortic stenosis 2022. here for follow up of type 2 diabetes.  Her diabetes mellitus is complicated by diabetic neuropathy, mild nonproliferative retinopathy and nephropathy.  She also follows with , last visit 2/2024. She has had type 2 Diabetes since her early 20s. Glucose is well controlled. Using a laurie 2, which allows her to pay more attention to her glucose.      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 Working a few days a week.  Goes to bed around 3am and up by noon. Now she is working, up at 5am.     Switched from trulicity to ozempic early August. Lost 7# this month.  Very happy with the switch.     BP high today in clinic- 152/69.  At home, 125/53, 106.52, 156/69.  Right arm runs higher than left.     Rachael is currently taking the following for her diabetes:   Tresiba 32 units at night.   Lyumjev- depends on what she is eating 5 units/15g carb.  She is working on taking insulin before she eats.    Ozempic 1 mg weekly.    Metformin 1000 mg daily.     Previous treatments:   Jardiance 10 mg daily stopped in 2022- was expensive.  Trulicity 4.5 mg (switched to ozempic in 7/2024)    Breakfast- breakfast bar or oatmeal or cream of wheat, rarely toast.   Lunch- sandwich- bagel with ham or chicken.   Evening- Riced cauliflower with green beans or cheese.  Fish or chicken or sometimes pork.    Snack before bed if glucose is lower.     Patient wears a laurie 2 sensor with an  overall average of 173 mg/dL (CV 34%, TIR 62%, hypo 0%, severe hypo 0%) over the past 2 weeks. Recent glucose is as follows:       Blood Glucose Monitoring :                     She otherwise is generally feeling well and has no other concerns.       Past Medical History:   Diagnosis Date     Benign essential hypertension      Chronic kidney disease, stage 3b (H)      Diabetic retinopathy of both eyes (H)      Obesity (BMI 30-39.9)      Osteopenia      Pure hypercholesterolemia      S/P gastric bypass      S/P TAVR (transcatheter aortic valve replacement) 06/07/2022     Type 2 diabetes mellitus (H)        Past Surgical History:   Procedure Laterality Date     APPENDECTOMY       CATARACT IOL, RT/LT Bilateral      CV CORONARY ANGIOGRAM N/A 05/04/2022    Procedure: Coronary Angiogram;  Surgeon: Hector Lewis MD;  Location:  HEART CARDIAC CATH LAB     CV LEFT HEART CATH N/A 05/04/2022    Procedure: Left Heart Catheterization;  Surgeon: Hector Lewis MD;  Location: [facility] CARDIAC CATH LAB     CV PCI N/A 05/04/2022    Procedure: Percutaneous Coronary Intervention;  Surgeon: Hector Lewis MD;  Location: [facility] CARDIAC CATH LAB     CV TRANSCATHETER AORTIC VALVE REPLACEMENT-FEMORAL APPROACH N/A 06/07/2022    Procedure: Transcatheter Aortic Valve Replacement-Femoral Approach;  Surgeon: Hector Lewis MD;  Location: [facility] CARDIAC CATH LAB     JURGEN EN Y BOWEL  2004     TONSILLECTOMY & ADENOIDECTOMY         Family History   Problem Relation Age of Onset     Diabetes Type 2  Mother      Glaucoma Mother      Coronary Artery Disease Mother      Abdominal Aortic Aneurysm Father      Myocardial Infarction Father      Breast Cancer Sister      Abdominal Aortic Aneurysm Brother      Bladder Cancer Brother         recurrent     Diabetes Type 2  Brother      Liver Cancer Brother      Myocardial Infarction Brother      Alzheimer Disease Paternal Grandmother      Cerebrovascular Disease Paternal  Grandfather      Ovarian Cancer No family hx of      Colon Cancer No family hx of        Social History     Socioeconomic History     Marital status:      Spouse name: Not on file     Number of children: 0     Years of education: Not on file     Highest education level: Not on file   Occupational History     Employer: [employer],[address] [place] [address]   Social Needs     Financial resource strain: Not on file     Food insecurity:     Worry: Not on file     Inability: Not on file     Transportation needs:     Medical: Not on file     Non-medical: Not on file   Tobacco Use     Smoking status: Never Smoker     Smokeless tobacco: Never Used   Substance and Sexual Activity     Alcohol use: No     Drug use: No     Sexual activity: Never     Partners: Male   Lifestyle     Physical activity:     Days per week: Not on file     Minutes per session: Not on file     Stress: Not on file   Relationships     Social connections:     Talks on phone: Not on file     Gets together: Not on file     Attends Shinto service: Not on file     Active member of club or organization: Not on file     Attends meetings of clubs or organizations: Not on file     Relationship status: Not on file     Intimate partner violence:     Fear of current or ex partner: Not on file     Emotionally abused: Not on file     Physically abused: Not on file     Forced sexual activity: Not on file   Other Topics Concern      Service Not Asked     Blood Transfusions Not Asked     Caffeine Concern Yes     Comment: 20 oz daily     Occupational Exposure Not Asked     Hobby Hazards Not Asked     Sleep Concern Not Asked     Stress Concern Not Asked     Weight Concern Not Asked     Special Diet Not Asked     Back Care Not Asked     Exercise No     Bike Helmet Not Asked     Seat Belt Yes     Self-Exams Yes     Parent/sibling w/ CABG, MI or angioplasty before 65F 55M? Not Asked   Social History Narrative    Living arrangements - the patient lives  alone.   Social Hx: Lives with her sister and her sister's boyfriend.  . 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     Current Outpatient Medications   Medication Sig Dispense Refill     acetaminophen (TYLENOL) 500 MG tablet Take 500-1,000 mg by mouth every 6 hours as needed for mild pain       aspirin (ASA) 81 MG chewable tablet Take 1 tablet (81 mg) by mouth daily Starting tomorrow. 30 tablet 3     augmented betamethasone dipropionate (DIPROLENE-AF) 0.05 % external ointment Apply topically to affected area twice daily for 3-4 weeks then use as needed 45 g 0     Calcium Carb-Cholecalciferol (CALCIUM 600+D) 600-20 MG-MCG TABS Take 1 tablet by mouth 2 times daily       cetirizine (ZYRTEC) 10 MG tablet Take 1 tablet (10 mg) by mouth daily       cholecalciferol (VITAMIN D3) 25 mcg (1000 units) capsule Take 2 capsules by mouth daily       clotrimazole (LOTRIMIN) 1 % external cream Apply topically 2 times daily 15 g 1     Continuous Blood Gluc  (FREESTYLE BALWINDER 2 READER) HENNA Use to read blood sugars as per 's instructions. 1 each 0     Continuous Glucose Sensor (FREESTYLE BALWINDER 2 SENSOR) MISC Change every 14 days. 2 each 5     cyanocolbalamin (VITAMIN B-12) 1000 MCG tablet Take 1,000 mcg by mouth three times a week Taking one tablet 3 times per week       furosemide (LASIX) 40 MG tablet Take 1 tablet (40 mg) by mouth daily 90 tablet 0     insulin glargine U-300 (TOUJEO SOLOSTAR) 300 UNIT/ML (1 units dial) pen Inject 36 Units Subcutaneous at bedtime 35 mL 4     insulin glargine U-300 (TOUJEO) 300 UNIT/ML (1 units dial) pen Inject Subcutaneous at bedtime       Insulin Lispro-aabc, 1 U Dial, (LYUMJEV NATALIOIKPEN) 100 UNIT/ML SOPN Inject 60 Units Subcutaneous daily Use as directed up to 60 units daily. 60 mL 3     insulin pen needle (BD FCO U/F) 32G X 4 MM miscellaneous Use 4 pen needles daily or as directed. 400 each 0     ipratropium (ATROVENT) 0.06 % nasal spray Spray 2 sprays into  both nostrils 4 times daily 15 mL 11     irbesartan (AVAPRO) 150 MG tablet Take 1 tablet (150 mg) by mouth daily. 90 tablet 0     levothyroxine (SYNTHROID/LEVOTHROID) 50 MCG tablet Take 1 tablet (50 mcg) by mouth daily 90 tablet 3     magnesium gluconate (MAGONATE) 250 MG tablet Take 500 mg by mouth daily       Menthol, Topical Analgesic, 7 % LIQD Apply 1 Application. topically 3 times daily as needed       metFORMIN (GLUCOPHAGE XR) 500 MG 24 hr tablet Take 1 tablet (500 mg) by mouth 2 times daily (with meals) 180 tablet 4     metoprolol succinate ER (TOPROL XL) 25 MG 24 hr tablet Take 1 tablet (25 mg) by mouth 2 times daily 180 tablet 3     potassium chloride ER (KLOR-CON M) 10 MEQ CR tablet Take 1 tablet (10 mEq) by mouth daily 90 tablet 3     rosuvastatin (CRESTOR) 20 MG tablet Take 1 tablet (20 mg) by mouth daily 90 tablet 2     Semaglutide, 1 MG/DOSE, (OZEMPIC) 4 MG/3ML pen Inject 1 mg subcutaneously every 7 days 3 mL 3     sodium chloride (BRANDON 128) 5 % ophthalmic solution Place 1-2 drops into both eyes daily       topiramate (TOPAMAX) 25 MG tablet Take 3 tablets (75 mg) by mouth at bedtime 270 tablet 0     triamcinolone (KENALOG) 0.1 % external ointment Apply topically once a week To left foot rash 30 g 0     VITRON-C  MG TABS tablet TAKE TWO TABLETS BY MOUTH TWICE DAILY  360 tablet 1     zinc 50 MG TABS Take 1 tablet by mouth daily       No current facility-administered medications for this visit.          Allergies   Allergen Reactions     Chlorhexidine Rash     Clonidine Headache     Doxazosin Mesylate Rash     Fexofenadine Hydrochloride Headache     Gemfibrozil Rash     Lisinopril Angioedema     Norvasc [Amlodipine] Other (See Comments)     hair loss     Pioglitazone Hydrochloride Swelling     ankle edema       Physical Exam  BP (!) 152/69   Pulse 65   Wt 91.6 kg (202 lb)   LMP  (LMP Unknown)   SpO2 97%   BMI 38.17 kg/m    GENERAL:  Alert and oriented X3, NAD, well dressed, answering questions  appropriately, appears stated age.  HEENT: OP clear, no lymphadenopathy, no thyromegaly, non-tender, no exophthalmus, no proptosis, EOMI, no lid lag, no retraction  EXTREMITIES: significant edema in left foot, toes are extremely swollen and red.  No warmth or open sores.  Patient reports they have been like this since the 90s.  Right foot more mild.   NEUROLOGY: + monofilament, +vibratory sensation, + DTR upper and lower extremity    RESULTS  Lab Results   Component Value Date    A1C 7.3 (H) 09/16/2024    A1C 7.0 (H) 02/01/2024    A1C 7.9 (H) 08/09/2022    A1C 8.0 (H) 06/08/2022    A1C 7.9 (H) 05/04/2022    A1C 7.6 (H) 11/24/2021    A1C 8.1 (H) 01/08/2021    A1C 8.1 (H) 10/09/2020    A1C 8.7 (A) 08/07/2020    A1C 8.4 (H) 03/27/2020    A1C 8.7 (H) 04/23/2019    HEMOGLOBINA1 7.0 07/18/2023    HEMOGLOBINA1 7.6 05/08/2023    HEMOGLOBINA1 8.6 01/16/2023    HEMOGLOBINA1 8.1 (A) 01/07/2020    HEMOGLOBINA1 8.7 (A) 04/22/2019       TSH   Date Value Ref Range Status   02/01/2024 1.83 0.30 - 4.20 uIU/mL Final   07/13/2023 2.88 0.30 - 4.20 uIU/mL Final   08/09/2022 1.83 0.40 - 4.00 mU/L Final   08/27/2021 2.37 0.40 - 4.00 mU/L Final   03/27/2020 3.47 0.40 - 4.00 mU/L Final   10/14/2019 2.54 0.40 - 4.00 mU/L Final   04/23/2019 4.06 (H) 0.40 - 4.00 mU/L Final   05/24/2018 2.54 0.40 - 4.00 mU/L Final   03/12/2018 4.78 (H) 0.40 - 4.00 mU/L Final     T4 Free   Date Value Ref Range Status   04/23/2019 1.10 0.76 - 1.46 ng/dL Final   03/12/2018 1.17 0.76 - 1.46 ng/dL Final   10/17/2016 1.17 0.76 - 1.46 ng/dL Final   05/05/2010 1.05 0.70 - 1.85 ng/dL Final   09/13/2007 1.00 0.70 - 1.85 ng/dL Final       ALT   Date Value Ref Range Status   06/08/2022 63 (H) 0 - 50 U/L Final   06/01/2022 160 (H) 0 - 50 U/L Final   04/13/2021 38 0 - 50 U/L Final   03/27/2020 28 0 - 50 U/L Final   ]    Recent Labs   Lab Test 02/01/24  0916 07/13/23  1038   CHOL 163 131   HDL 45* 46*   LDL 88 65   TRIG 148 100       Lab Results   Component Value Date    NA  143 02/01/2024     04/13/2021      Lab Results   Component Value Date    POTASSIUM 4.1 02/01/2024    POTASSIUM 4.1 08/09/2022    POTASSIUM 4.2 04/13/2021     Lab Results   Component Value Date    CHLORIDE 107 02/01/2024    CHLORIDE 107 07/07/2022    CHLORIDE 110 04/13/2021     Lab Results   Component Value Date    RASHEEDA 9.8 02/01/2024    RASHEEDA 9.2 04/13/2021     Lab Results   Component Value Date    CO2 25 02/01/2024    CO2 23 07/07/2022    CO2 28 04/13/2021     Lab Results   Component Value Date    BUN 26.5 02/01/2024    BUN 20 08/09/2022    BUN 29 04/13/2021     Lab Results   Component Value Date    CR 1.37 02/01/2024    CR 1.38 04/13/2021       GFR Estimate   Date Value Ref Range Status   02/01/2024 41 (L) >60 mL/min/1.73m2 Final   08/16/2023 37 (L) >60 mL/min/1.73m2 Final   07/13/2023 36 (L) >60 mL/min/1.73m2 Final   04/13/2021 38 (L) >60 mL/min/[1.73_m2] Final     Comment:     Non  GFR Calc  Starting 12/18/2018, serum creatinine based estimated GFR (eGFR) will be   calculated using the Chronic Kidney Disease Epidemiology Collaboration   (CKD-EPI) equation.     03/24/2021 38 (L) >60 mL/min/[1.73_m2] Final     Comment:     Non  GFR Calc  Starting 12/18/2018, serum creatinine based estimated GFR (eGFR) will be   calculated using the Chronic Kidney Disease Epidemiology Collaboration   (CKD-EPI) equation.     11/13/2020 45 (L) >60 mL/min/[1.73_m2] Final     Comment:     Non  GFR Calc  Starting 12/18/2018, serum creatinine based estimated GFR (eGFR) will be   calculated using the Chronic Kidney Disease Epidemiology Collaboration   (CKD-EPI) equation.       GFR, ESTIMATED POCT   Date Value Ref Range Status   05/17/2022 32 (L) >60 mL/min/1.73m2 Final     GFR Estimate If Black   Date Value Ref Range Status   04/13/2021 45 (L) >60 mL/min/[1.73_m2] Final     Comment:      GFR Calc  Starting 12/18/2018, serum creatinine based estimated GFR (eGFR) will be  "  calculated using the Chronic Kidney Disease Epidemiology Collaboration   (CKD-EPI) equation.     03/24/2021 44 (L) >60 mL/min/[1.73_m2] Final     Comment:      GFR Calc  Starting 12/18/2018, serum creatinine based estimated GFR (eGFR) will be   calculated using the Chronic Kidney Disease Epidemiology Collaboration   (CKD-EPI) equation.     11/13/2020 52 (L) >60 mL/min/[1.73_m2] Final     Comment:      GFR Calc  Starting 12/18/2018, serum creatinine based estimated GFR (eGFR) will be   calculated using the Chronic Kidney Disease Epidemiology Collaboration   (CKD-EPI) equation.         Lab Results   Component Value Date    MICROL 201.0 02/01/2024    MICROL 11 08/09/2022    MICROL 35 10/09/2020     No results found for: \"MICROALBUMIN\"  No results found for: \"CPEPT\", \"GADAB\", \"ISCAB\"    Vitamin B12   Date Value Ref Range Status   08/09/2022 2,687 (H) 232 - 1,245 pg/mL Final   10/17/2016 2,075 (H) 193 - 986 pg/mL Final     Comment:     Interp: 247-911 = Normal   05/12/2012 742 >210 pg/mL Final     Comment:     Interp: 247-911 = Normal   ]    Most recent eye exam date: : Not Found     Computed FIB-4 Calculation unavailable. One or more values for this score either were not found within the given timeframe or did not fit some other criterion.  A value of FIB-4 below 1.30 is considered as low risk for advanced fibrosis; a value of FIB-4 over 2.67 is considered as high risk for advanced fibrosis; and FIB-4 values between 1.30 and 2.67 are considered as intermediate risk of advanced fibrosis.  Assessment/Plan:     1.  Type 2 diabetes- Glucose is fairly well controlled, but high post-meal.  Tolerating ozempic.  Will increase to 2 mg weekly.      Increase Ozempic to 2 mg weekly once you run out of the 1mg dose.      Let me know if you have low blood sugars.  We may need to reduce your long acting insulin.      Please let me know if you are having low blood sugars less than 70 or over 250 mg/dL. " "     If you have concerns, please send me a [facility] message M-Th, call the clinic at [phone], or call [phone] after hours/weekends and ask to speak with the endocrinologist on call.      2. Risk factors:     Retinopathy: Yes.  She also has macular edema and follows with ophthalmologist regularly every 10 weeks.   Nephropathy:  BP is high today.  Home BP is lower.  Will follow up with cardiologist later this month. She does have microalbuminuria. Would benefit from an SGLT-2 inhibitor, however it has been too expensive.  GFR is in low 40s.  Neuropathy: Some tingling, numbness.  Tolerable.    Feet: OK, no ulcers.   Lipids:  On rosuvastatin 20 mg EVERY OTHER DAY.  Was told by a provider cholesterol was \"too low\" so they recommended every other day.  LDL is above target.  Will discuss with cardiology, but I would recommend higher dose to target LDL <70.      3. Foot swelling- top of left foot- significant, also on right foot.  Reports this has been here since the 90's.  No warmth or signs of infection.  Concern for potential skin breakdown in the future.  Recommend discuss with PCP and cardiology (?needs more diuretics)? For now, work, on elevating feet, wearing compression socks, watch carefully for infection.  Has seen podiatry, dermatology and lymphedema clinic in the past.  Recommended a steroid cream. Will refer back to derm.     4.  F/U in 6 months as planned with Dr. Hoover, and see me in 12 months.      38 minutes spent on the date of the encounter doing chart review, review of test results, review and interpretation of glucose sensor data, patient visit and documentation, counseling/coordination of care, and discussion of follow up plan for worsening hyper and hypoglycemia.  The patient understood and is satisfied with today's visit.        Roxanne Masterson PA-C, MPAS   [facility]  Department of Medicine  Division of Endocrinology and Diabetes        Again, thank you for allowing me to " participate in the care of your patient.      Sincerely,    Roxanne Masterson PA-C

## 2024-09-16 NOTE — NURSING NOTE
"Chief Complaint   Patient presents with    Diabetes     Vital signs:      BP: (!) 152/69 Pulse: 65     SpO2: 97 %       Weight: 91.6 kg (202 lb)  Estimated body mass index is 38.17 kg/m  as calculated from the following:    Height as of 6/11/24: 1.549 m (5' 1\").    Weight as of this encounter: 91.6 kg (202 lb).        "

## 2024-09-16 NOTE — PATIENT INSTRUCTIONS
Increase Ozempic to 2 mg weekly once you run out of the 1mg dose.      Let me know if you have low blood sugars.  We may need to reduce your long acting insulin.      Please let me know if you are having low blood sugars less than 70 or over 250 mg/dL.      If you have concerns, please send me a [employer] message M-Th, call the clinic at [phone], or call [phone] after hours/weekends and ask to speak with the endocrinologist on call.

## 2024-09-30 ENCOUNTER — OFFICE VISIT (OUTPATIENT)
Dept: CARDIOLOGY | Facility: CLINIC | Age: 74
End: 2024-09-30
Payer: COMMERCIAL

## 2024-09-30 VITALS
OXYGEN SATURATION: 99 % | DIASTOLIC BLOOD PRESSURE: 70 MMHG | HEART RATE: 70 BPM | BODY MASS INDEX: 38.61 KG/M2 | SYSTOLIC BLOOD PRESSURE: 158 MMHG | WEIGHT: 204.5 LBS | HEIGHT: 61 IN

## 2024-09-30 DIAGNOSIS — I10 BENIGN ESSENTIAL HYPERTENSION: Primary | ICD-10-CM

## 2024-09-30 DIAGNOSIS — Z95.2 S/P TAVR (TRANSCATHETER AORTIC VALVE REPLACEMENT): ICD-10-CM

## 2024-09-30 DIAGNOSIS — I35.0 AORTIC STENOSIS, SEVERE: ICD-10-CM

## 2024-09-30 DIAGNOSIS — I25.10 CORONARY ARTERY DISEASE INVOLVING NATIVE CORONARY ARTERY OF NATIVE HEART WITHOUT ANGINA PECTORIS: ICD-10-CM

## 2024-09-30 PROCEDURE — 99214 OFFICE O/P EST MOD 30 MIN: CPT | Performed by: NURSE PRACTITIONER

## 2024-09-30 NOTE — LETTER
9/30/2024    Judith Aviles MD  [address] [facility] [address]    RE: Ade Wilkins       Dear Colleague,     I had the pleasure of seeing Ade Wilkins in the [facility].  Cardiology Clinic Progress Note  Ade Wilkins MRN# [id number]   YOB: 1950 Age: 74 year old     Primary cardiologist: Dr. Lewis     Reason for visit: annual follow-up     History of presenting illness:    Ade Wilkins is a pleasant 74 year old patient with past medical history significant for hypertension, chronic diastolic heart failure, coronary artery disease s/p PCI to RCA (2022), chronic lymphedema, and severe aortic stenosis s/p TAVR with a 26 mm Medtronic Evolut valve (6/2022), who presents today for follow-up.    She was last seen in August 2023 at which time she had no cardiac complaints.  Echo showed well-functioning bioprosthetic arctic valve with normal gradient.      Today she reports doing well.  She denies chest pain, shortness of breath, syncope or near syncope, or palpitations.  No PND or orthopnea.  Her peripheral edema is stable.  She wears a compression stockings daily.    Her blood pressure is elevated in clinic today, though home blood pressure readings are consistently 120-140's/60-70's.     Her most recent echo 8/20/2024 well-seated bioprosthetic aortic valve with a mean gradient of 8 mmHg, no AI or PVL.  The LV systolic function is normal with an EF of 65 to 70%, there is mild to moderate LVH.    Labs were reviewed.  Most recent lipid panel shows LDL of 88.  BMP demonstrates creatinine of 1.37, GFR 41, normal electrolytes.  CBC is unremarkable.  A1c of 7.3.         Assessment and Plan:     ASSESSMENT:    S/p TAVR with 26 mm Medtronic Evolut on 6/7/2022.  Continues to do well, recent TTE with normal functioning valve.  CAD s/p PCI to RCA on 5/4/22.  Currently on aspirin, Toprol-XL, irbesartan, and statin therapy. No angina.   Chronic diastolic heart failure, NYHA class I.  Appears euvolemic on exam, on lasix 40 mg daily.   Hypertension.  Well-controlled on current regimen.  Hyperlipidemia with goal LDL < 70. Not quite at goal, though was only taking statin QOD, recently changed to daily.   Type 2 diabetes, uncontrolled. A1C 7.3, being managed by PCP. On ozempic.   CKD, stage III.  Stable, baseline creatinine appears to be 1.3-1.5.  Peripheral edema.  Wears compression stockings daily. On lasix 40 mg daily.      PLAN:     Overall the patient is doing well from a cardiac standpoint, I have made no changes to her regimen today.  She will continue her current medication regimen.  We discussed the importance of lifestyle modifications for cardiovascular risk factor optimization, including daily aerobic exercise and Mediterranean diet.  Lifelong antibiotic prophylaxis prior to any dental procedure.  Assuming she remains clinically stable, repeat echocardiogram in 2 years.  Follow-up with Dr. Lewis in 1 year, sooner if needed.        Yahaira Qureshi, DNP, APRN, CNP  Page: [phone] (8a-5p M-F)    Orders this Visit:  Orders Placed This Encounter   Procedures     Follow-Up with Cardiology     No orders of the defined types were placed in this encounter.    Medications Discontinued During This Encounter   Medication Reason     Semaglutide, 1 MG/DOSE, (OZEMPIC) 4 MG/3ML pen Therapy completed (No AVS)       Today's clinic visit entailed:  Review of the result(s) of each unique test - echo, labs, EKG  Ordering of each unique test  Prescription drug management  35 minutes spent by me on the date of the encounter doing chart review, interpretation of tests, patient visit, and documentation   Provider  Link to [facility] Help Grid     The level of medical decision making during this visit was of moderate complexity.           Review of Systems:     Review of Systems:  Skin:        Eyes:       ENT:       Respiratory:  Negative    Cardiovascular:    Positive  "for;edema;fatigue;lightheadedness;dizziness  Gastroenterology:      Genitourinary:       Musculoskeletal:       Neurologic:       Psychiatric:       Heme/Lymph/Imm:       Endocrine:                 Physical Exam:   Vitals: BP (!) 158/70   Pulse 70   Ht 1.549 m (5' 1\")   Wt 92.8 kg (204 lb 8 oz)   LMP  (LMP Unknown)   SpO2 99%   BMI 38.64 kg/m    Constitutional:  cooperative obese      Skin:  warm and dry to the touch        Head:  normocephalic        Eyes:  pupils equal and round        ENT:  not assessed this visit        Neck:  JVP normal        Chest:  normal breath sounds, clear to auscultation, normal A-P diameter, normal symmetry, normal respiratory excursion, no use of accessory muscles        Cardiac: regular rhythm;normal S1 and S2       systolic ejection murmur;grade 1          Abdomen:  abdomen soft obese      Vascular: pulses full and equal                                      Extremities and Back:      chronic lymphedema, compression stockings on    Neurological:  no gross motor deficits;affect appropriate             Medications:     Current Outpatient Medications   Medication Sig Dispense Refill     furosemide (LASIX) 40 MG tablet Take 1 tablet (40 mg) by mouth daily 90 tablet 0     insulin glargine U-300 (TOUJEO SOLOSTAR) 300 UNIT/ML (1 units dial) pen Inject 36 Units Subcutaneous at bedtime 35 mL 4     Insulin Lispro-aabc, 1 U Dial, (LYUMJEV KWIKPEN) 100 UNIT/ML SOPN Inject 60 Units Subcutaneous daily Use as directed up to 60 units daily. 60 mL 3     insulin pen needle (BD FCO U/F) 32G X 4 MM miscellaneous Use 4 pen needles daily or as directed. 400 each 0     ipratropium (ATROVENT) 0.06 % nasal spray Spray 2 sprays into both nostrils 4 times daily 15 mL 11     irbesartan (AVAPRO) 150 MG tablet Take 1 tablet (150 mg) by mouth daily. 90 tablet 0     levothyroxine (SYNTHROID/LEVOTHROID) 50 MCG tablet Take 1 tablet (50 mcg) by mouth daily 90 tablet 3     magnesium gluconate (MAGONATE) 250 MG " tablet Take 500 mg by mouth daily       Menthol, Topical Analgesic, 7 % LIQD Apply 1 Application. topically 3 times daily as needed       metFORMIN (GLUCOPHAGE XR) 500 MG 24 hr tablet Take 1 tablet (500 mg) by mouth 2 times daily (with meals) 180 tablet 4     metoprolol succinate ER (TOPROL XL) 25 MG 24 hr tablet Take 1 tablet (25 mg) by mouth 2 times daily 180 tablet 3     potassium chloride ER (KLOR-CON M) 10 MEQ CR tablet Take 1 tablet (10 mEq) by mouth daily 90 tablet 3     rosuvastatin (CRESTOR) 20 MG tablet Take 1 tablet (20 mg) by mouth daily 90 tablet 2     Semaglutide, 2 MG/DOSE, (OZEMPIC) 8 MG/3ML pen Inject 2 mg subcutaneously every 7 days. 9 mL 3     sodium chloride (BRANDON 128) 5 % ophthalmic solution Place 1-2 drops into both eyes daily       topiramate (TOPAMAX) 25 MG tablet Take 3 tablets (75 mg) by mouth at bedtime 270 tablet 0     triamcinolone (KENALOG) 0.1 % external ointment Apply topically once a week To left foot rash 30 g 0     VITRON-C  MG TABS tablet TAKE TWO TABLETS BY MOUTH TWICE DAILY  360 tablet 1     zinc 50 MG TABS Take 1 tablet by mouth daily       acetaminophen (TYLENOL) 500 MG tablet Take 500-1,000 mg by mouth every 6 hours as needed for mild pain       aspirin (ASA) 81 MG chewable tablet Take 1 tablet (81 mg) by mouth daily Starting tomorrow. 30 tablet 3     augmented betamethasone dipropionate (DIPROLENE-AF) 0.05 % external ointment Apply topically to affected area twice daily for 3-4 weeks then use as needed 45 g 0     Calcium Carb-Cholecalciferol (CALCIUM 600+D) 600-20 MG-MCG TABS Take 1 tablet by mouth 2 times daily       cetirizine (ZYRTEC) 10 MG tablet Take 1 tablet (10 mg) by mouth daily       cholecalciferol (VITAMIN D3) 25 mcg (1000 units) capsule Take 2 capsules by mouth daily       clotrimazole (LOTRIMIN) 1 % external cream Apply topically 2 times daily 15 g 1     Continuous Blood Gluc  (FREESTYLE BALWINDER 2 READER) HENNA Use to read blood sugars as per  's instructions. 1 each 0     Continuous Glucose Sensor (FREESTYLE BALWINDER 2 SENSOR) MISC Change every 14 days. 2 each 5     cyanocolbalamin (VITAMIN B-12) 1000 MCG tablet Take 1,000 mcg by mouth three times a week Taking one tablet 3 times per week (Patient not taking: Reported on 9/30/2024)         Family History   Problem Relation Age of Onset     Diabetes Type 2  Mother      Glaucoma Mother      Coronary Artery Disease Mother      Abdominal Aortic Aneurysm Father      Myocardial Infarction Father      Breast Cancer Sister      Abdominal Aortic Aneurysm Brother      Bladder Cancer Brother         recurrent     Diabetes Type 2  Brother      Liver Cancer Brother      Myocardial Infarction Brother      Alzheimer Disease Paternal Grandmother      Cerebrovascular Disease Paternal Grandfather      Ovarian Cancer No family hx of      Colon Cancer No family hx of        Social History     Socioeconomic History     Marital status:      Spouse name: Not on file     Number of children: 0     Years of education: Not on file     Highest education level: Not on file   Occupational History     Occupation: Retired -    Tobacco Use     Smoking status: Never     Smokeless tobacco: Never   Vaping Use     Vaping status: Never Used   Substance and Sexual Activity     Alcohol use: No     Drug use: No     Sexual activity: Not Currently   Other Topics Concern      Service Not Asked     Blood Transfusions Not Asked     Caffeine Concern Yes     Comment: 20 oz daily     Occupational Exposure Not Asked     Hobby Hazards Not Asked     Sleep Concern Not Asked     Stress Concern Not Asked     Weight Concern Not Asked     Special Diet Not Asked     Back Care Not Asked     Exercise No     Bike Helmet Not Asked     Seat Belt Yes     Self-Exams Yes     Parent/sibling w/ CABG, MI or angioplasty before 65F 55M? Not Asked   Social History Narrative    . Single.    No kids.    No formal exercise.       Social Determinants of Health     Financial Resource Strain: Low Risk  (6/6/2024)    Financial Resource Strain      Within the past 12 months, have you or your family members you live with been unable to get utilities (heat, electricity) when it was really needed?: No   Food Insecurity: Low Risk  (6/6/2024)    Food Insecurity      Within the past 12 months, did you worry that your food would run out before you got money to buy more?: No      Within the past 12 months, did the food you bought just not last and you didn t have money to get more?: No   Transportation Needs: Low Risk  (6/6/2024)    Transportation Needs      Within the past 12 months, has lack of transportation kept you from medical appointments, getting your medicines, non-medical meetings or appointments, work, or from getting things that you need?: No   Physical Activity: Inactive (6/6/2024)    Exercise Vital Sign      Days of Exercise per Week: 0 days      Minutes of Exercise per Session: 10 min   Stress: No Stress Concern Present (6/6/2024)    Citizen of [place] [employer] - Occupational Stress Questionnaire      Feeling of Stress : Only a little   Social Connections: Unknown (6/6/2024)    Social Connection and Isolation Panel [NHANES]      Frequency of Communication with Friends and Family: Not on file      Frequency of Social Gatherings with Friends and Family: Once a week      Attends Voodoo Services: Not on file      Active Member of Clubs or Organizations: Not on file      Attends Club or Organization Meetings: Not on file      Marital Status: Not on file   Interpersonal Safety: Not on file   Housing Stability: Low Risk  (6/6/2024)    Housing Stability      Do you have housing? : Yes      Are you worried about losing your housing?: No            Past Medical History:     Past Medical History:   Diagnosis Date     Benign essential hypertension      Chronic kidney disease, stage 3b (H)      Diabetic retinopathy of both eyes (H)       Obesity (BMI 30-39.9)      Osteopenia      Pure hypercholesterolemia      S/P gastric bypass      S/P TAVR (transcatheter aortic valve replacement) 06/07/2022     Type 2 diabetes mellitus (H)               Past Surgical History:     Past Surgical History:   Procedure Laterality Date     APPENDECTOMY       CATARACT IOL, RT/LT Bilateral      CV CORONARY ANGIOGRAM N/A 05/04/2022    Procedure: Coronary Angiogram;  Surgeon: Hector Lewis MD;  Location:  HEART [facility]ARDI[facility] CATH LAB     CV LEFT HEART CATH N/A 05/04/2022    Procedure: Left Heart Catheterization;  Surgeon: Hector Lewis MD;  Location:  [facility]     CV PCI N/A 05/04/2022    Procedure: Percutaneous Coronary Intervention;  Surgeon: Hector Lewis MD;  Location:  [facility] LAB     CV TRANSCATHETER AORTIC VALVE REPLACEMENT-FEMORAL APPROACH N/A 06/07/2022    Procedure: Transcatheter Aortic Valve Replacement-Femoral Approach;  Surgeon: Hector Lewis MD;  Location:  HEART CARDIAC CATH LAB     JURGEN EN Y BOWEL  2004     TONSILLECTOMY & ADENOIDECTOMY                Allergies:   Chlorhexidine, Clonidine, Doxazosin mesylate, Fexofenadine hydrochloride, Gemfibrozil, Lisinopril, Norvasc [amlodipine], and Pioglitazone hydrochloride       Data:   All laboratory data reviewed:    Recent Labs   Lab Test 02/01/24  0916 07/13/23  1038 08/09/22  0844 06/08/22  1056 06/01/22  1223 03/28/17  1014 02/10/17  0921   LDL 88 65 42   < >  --    < >  --    HDL 45* 46* 46*   < >  --    < >  --    NHDL 118 85 58   < >  --    < >  --    CHOL 163 131 104   < >  --    < >  --    TRIG 148 100 81   < >  --    < >  --    TSH 1.83 2.88 1.83  --   --    < > 2.55   NTBNP  --   --   --   --  346  --   --    IRON  --   --   --   --   --   --  75   FEB  --   --   --   --   --   --  444*   IRONSAT  --   --   --   --   --   --  17   ADELIA  --   --  106  --   --   --   --     < > = values in this interval not displayed.       Lab Results    Component Value Date    WBC 8.2 02/01/2024    WBC 8.3 01/08/2021    RBC 4.49 02/01/2024    RBC 4.12 01/08/2021    HGB 12.5 02/01/2024    HGB 11.6 (L) 01/08/2021    HCT 39.7 02/01/2024    HCT 38.2 01/08/2021    MCV 88 02/01/2024    MCV 93 01/08/2021    MCH 27.8 02/01/2024    MCH 28.2 01/08/2021    MCHC 31.5 02/01/2024    MCHC 30.4 (L) 01/08/2021    RDW 13.6 02/01/2024    RDW 13.5 01/08/2021     02/01/2024     01/08/2021       Lab Results   Component Value Date     02/01/2024     04/13/2021    POTASSIUM 4.1 02/01/2024    POTASSIUM 4.1 08/09/2022    POTASSIUM 4.2 04/13/2021    CHLORIDE 107 02/01/2024    CHLORIDE 107 07/07/2022    CHLORIDE 110 (H) 04/13/2021    CO2 25 02/01/2024    CO2 23 07/07/2022    CO2 28 04/13/2021    ANIONGAP 11 02/01/2024    ANIONGAP 9 07/07/2022    ANIONGAP 3 04/13/2021     (H) 02/01/2024     (H) 08/09/2022     (H) 04/13/2021    BUN 26.5 (H) 02/01/2024    BUN 20 08/09/2022    BUN 29 04/13/2021    CR 1.37 (H) 02/01/2024    CR 1.38 (H) 04/13/2021    GFRESTIMATED 41 (L) 02/01/2024    GFRESTIMATED 32 (L) 05/17/2022    GFRESTIMATED 38 (L) 04/13/2021    GFRESTBLACK 45 (L) 04/13/2021    RASHEEDA 9.8 02/01/2024    RASHEEDA 9.2 04/13/2021      Lab Results   Component Value Date    AST 42 06/08/2022    AST 20 04/13/2021    ALT 63 (H) 06/08/2022    ALT 38 04/13/2021       Lab Results   Component Value Date    A1C 7.3 (H) 09/16/2024    A1C 7.0 (H) 02/01/2024    A1C 8.1 (H) 01/08/2021       Lab Results   Component Value Date    INR 1.09 06/01/2022    INR 0.97 05/04/2022       Thank you for allowing me to participate in the care of your patient.      Sincerely,     Yahaira Qureshi [facility]  cc:   Judith Aviles MD  [address] [place] [id number]

## 2024-09-30 NOTE — PROGRESS NOTES
Cardiology Clinic Progress Note  Ade Wilkins MRN# [id number]   YOB: 1950 Age: 74 year old     Primary cardiologist: Dr. Lewis     Reason for visit: annual follow-up     History of presenting illness:    Ade Wilkins is a pleasant 74 year old patient with past medical history significant for hypertension, chronic diastolic heart failure, coronary artery disease s/p PCI to RCA (2022), chronic lymphedema, and severe aortic stenosis s/p TAVR with a 26 mm Medtronic Evolut valve (6/2022), who presents today for follow-up.    She was last seen in August 2023 at which time she had no cardiac complaints.  Echo showed well-functioning bioprosthetic arctic valve with normal gradient.      Today she reports doing well.  She denies chest pain, shortness of breath, syncope or near syncope, or palpitations.  No PND or orthopnea.  Her peripheral edema is stable.  She wears a compression stockings daily.    Her blood pressure is elevated in clinic today, though home blood pressure readings are consistently 120-140's/60-70's.     Her most recent echo 8/20/2024 well-seated bioprosthetic aortic valve with a mean gradient of 8 mmHg, no AI or PVL.  The LV systolic function is normal with an EF of 65 to 70%, there is mild to moderate LVH.    Labs were reviewed.  Most recent lipid panel shows LDL of 88.  BMP demonstrates creatinine of 1.37, GFR 41, normal electrolytes.  CBC is unremarkable.  A1c of 7.3.         Assessment and Plan:     ASSESSMENT:    S/p TAVR with 26 mm Medtronic Evolut on 6/7/2022.  Continues to do well, recent TTE with normal functioning valve.  CAD s/p PCI to RCA on 5/4/22.  Currently on aspirin, Toprol-XL, irbesartan, and statin therapy. No angina.   Chronic diastolic heart failure, NYHA class I. Appears euvolemic on exam, on lasix 40 mg daily.   Hypertension.  Well-controlled on current regimen.  Hyperlipidemia with goal LDL < 70. Not quite at goal, though was only taking statin QOD, recently  changed to daily.   Type 2 diabetes, uncontrolled. A1C 7.3, being managed by PCP. On ozempic.   CKD, stage III.  Stable, baseline creatinine appears to be 1.3-1.5.  Peripheral edema.  Wears compression stockings daily. On lasix 40 mg daily.      PLAN:     Overall the patient is doing well from a cardiac standpoint, I have made no changes to her regimen today.  She will continue her current medication regimen.  We discussed the importance of lifestyle modifications for cardiovascular risk factor optimization, including daily aerobic exercise and Mediterranean diet.  Lifelong antibiotic prophylaxis prior to any dental procedure.  Assuming she remains clinically stable, repeat echocardiogram in 2 years.  Follow-up with Dr. Lewis in 1 year, sooner if needed.        Yahaira Qureshi, KERI, APRN, CNP  Page: [phone] (8a-5p M-F)    Orders this Visit:  Orders Placed This Encounter   Procedures    Follow-Up with Cardiology     No orders of the defined types were placed in this encounter.    Medications Discontinued During This Encounter   Medication Reason    Semaglutide, 1 MG/DOSE, (OZEMPIC) 4 MG/3ML pen Therapy completed (No AVS)       Today's clinic visit entailed:  Review of the result(s) of each unique test - echo, labs, EKG  Ordering of each unique test  Prescription drug management  35 minutes spent by me on the date of the encounter doing chart review, interpretation of tests, patient visit, and documentation   Provider  Link to [facility] Help Grid     The level of medical decision making during this visit was of moderate complexity.           Review of Systems:     Review of Systems:  Skin:        Eyes:       ENT:       Respiratory:  Negative    Cardiovascular:    Positive for;edema;fatigue;lightheadedness;dizziness  Gastroenterology:      Genitourinary:       Musculoskeletal:       Neurologic:       Psychiatric:       Heme/Lymph/Imm:       Endocrine:                 Physical Exam:   Vitals: BP (!) 158/70   Pulse 70   " Ht 1.549 m (5' 1\")   Wt 92.8 kg (204 lb 8 oz)   LMP  (LMP Unknown)   SpO2 99%   BMI 38.64 kg/m    Constitutional:  cooperative obese      Skin:  warm and dry to the touch        Head:  normocephalic        Eyes:  pupils equal and round        ENT:  not assessed this visit        Neck:  JVP normal        Chest:  normal breath sounds, clear to auscultation, normal A-P diameter, normal symmetry, normal respiratory excursion, no use of accessory muscles        Cardiac: regular rhythm;normal S1 and S2       systolic ejection murmur;grade 1          Abdomen:  abdomen soft obese      Vascular: pulses full and equal                                      Extremities and Back:      chronic lymphedema, compression stockings on    Neurological:  no gross motor deficits;affect appropriate             Medications:     Current Outpatient Medications   Medication Sig Dispense Refill    furosemide (LASIX) 40 MG tablet Take 1 tablet (40 mg) by mouth daily 90 tablet 0    insulin glargine U-300 (TOUJEO SOLOSTAR) 300 UNIT/ML (1 units dial) pen Inject 36 Units Subcutaneous at bedtime 35 mL 4    Insulin Lispro-aabc, 1 U Dial, (LYUMJEV KWIKPEN) 100 UNIT/ML SOPN Inject 60 Units Subcutaneous daily Use as directed up to 60 units daily. 60 mL 3    insulin pen needle (BD FCO U/F) 32G X 4 MM miscellaneous Use 4 pen needles daily or as directed. 400 each 0    ipratropium (ATROVENT) 0.06 % nasal spray Spray 2 sprays into both nostrils 4 times daily 15 mL 11    irbesartan (AVAPRO) 150 MG tablet Take 1 tablet (150 mg) by mouth daily. 90 tablet 0    levothyroxine (SYNTHROID/LEVOTHROID) 50 MCG tablet Take 1 tablet (50 mcg) by mouth daily 90 tablet 3    magnesium gluconate (MAGONATE) 250 MG tablet Take 500 mg by mouth daily      Menthol, Topical Analgesic, 7 % LIQD Apply 1 Application. topically 3 times daily as needed      metFORMIN (GLUCOPHAGE XR) 500 MG 24 hr tablet Take 1 tablet (500 mg) by mouth 2 times daily (with meals) 180 tablet 4    " metoprolol succinate ER (TOPROL XL) 25 MG 24 hr tablet Take 1 tablet (25 mg) by mouth 2 times daily 180 tablet 3    potassium chloride ER (KLOR-CON M) 10 MEQ CR tablet Take 1 tablet (10 mEq) by mouth daily 90 tablet 3    rosuvastatin (CRESTOR) 20 MG tablet Take 1 tablet (20 mg) by mouth daily 90 tablet 2    Semaglutide, 2 MG/DOSE, (OZEMPIC) 8 MG/3ML pen Inject 2 mg subcutaneously every 7 days. 9 mL 3    sodium chloride (BRANDON 128) 5 % ophthalmic solution Place 1-2 drops into both eyes daily      topiramate (TOPAMAX) 25 MG tablet Take 3 tablets (75 mg) by mouth at bedtime 270 tablet 0    triamcinolone (KENALOG) 0.1 % external ointment Apply topically once a week To left foot rash 30 g 0    VITRON-C  MG TABS tablet TAKE TWO TABLETS BY MOUTH TWICE DAILY  360 tablet 1    zinc 50 MG TABS Take 1 tablet by mouth daily      acetaminophen (TYLENOL) 500 MG tablet Take 500-1,000 mg by mouth every 6 hours as needed for mild pain      aspirin (ASA) 81 MG chewable tablet Take 1 tablet (81 mg) by mouth daily Starting tomorrow. 30 tablet 3    augmented betamethasone dipropionate (DIPROLENE-AF) 0.05 % external ointment Apply topically to affected area twice daily for 3-4 weeks then use as needed 45 g 0    Calcium Carb-Cholecalciferol (CALCIUM 600+D) 600-20 MG-MCG TABS Take 1 tablet by mouth 2 times daily      cetirizine (ZYRTEC) 10 MG tablet Take 1 tablet (10 mg) by mouth daily      cholecalciferol (VITAMIN D3) 25 mcg (1000 units) capsule Take 2 capsules by mouth daily      clotrimazole (LOTRIMIN) 1 % external cream Apply topically 2 times daily 15 g 1    Continuous Blood Gluc  (FREESTYLE BALWINDER 2 READER) HENNA Use to read blood sugars as per 's instructions. 1 each 0    Continuous Glucose Sensor (FREESTYLE BALWINDER 2 SENSOR) MISC Change every 14 days. 2 each 5    cyanocolbalamin (VITAMIN B-12) 1000 MCG tablet Take 1,000 mcg by mouth three times a week Taking one tablet 3 times per week (Patient not taking:  Reported on 9/30/2024)         Family History   Problem Relation Age of Onset    Diabetes Type 2  Mother     Glaucoma Mother     Coronary Artery Disease Mother     Abdominal Aortic Aneurysm Father     Myocardial Infarction Father     Breast Cancer Sister     Abdominal Aortic Aneurysm Brother     Bladder Cancer Brother         recurrent    Diabetes Type 2  Brother     Liver Cancer Brother     Myocardial Infarction Brother     Alzheimer Disease Paternal Grandmother     Cerebrovascular Disease Paternal Grandfather     Ovarian Cancer No family hx of     Colon Cancer No family hx of        Social History     Socioeconomic History    Marital status:      Spouse name: Not on file    Number of children: 0    Years of education: Not on file    Highest education level: Not on file   Occupational History    Occupation: Retired -    Tobacco Use    Smoking status: Never    Smokeless tobacco: Never   Vaping Use    Vaping status: Never Used   Substance and Sexual Activity    Alcohol use: No    Drug use: No    Sexual activity: Not Currently   Other Topics Concern     Service Not Asked    Blood Transfusions Not Asked    Caffeine Concern Yes     Comment: 20 oz daily    Occupational Exposure Not Asked    Hobby Hazards Not Asked    Sleep Concern Not Asked    Stress Concern Not Asked    Weight Concern Not Asked    Special Diet Not Asked    Back Care Not Asked    Exercise No    Bike Helmet Not Asked    Seat Belt Yes    Self-Exams Yes    Parent/sibling w/ CABG, MI or angioplasty before 65F 55M? Not Asked   Social History Narrative    . Single.    No kids.    No formal exercise.      Social Determinants of Health     Financial Resource Strain: Low Risk  (6/6/2024)    Financial Resource Strain     Within the past 12 months, have you or your family members you live with been unable to get utilities (heat, electricity) when it was really needed?: No   Food Insecurity: Low Risk  (6/6/2024)    Food  Insecurity     Within the past 12 months, did you worry that your food would run out before you got money to buy more?: No     Within the past 12 months, did the food you bought just not last and you didn t have money to get more?: No   Transportation Needs: Low Risk  (6/6/2024)    Transportation Needs     Within the past 12 months, has lack of transportation kept you from medical appointments, getting your medicines, non-medical meetings or appointments, work, or from getting things that you need?: No   Physical Activity: Inactive (6/6/2024)    Exercise Vital Sign     Days of Exercise per Week: 0 days     Minutes of Exercise per Session: 10 min   Stress: No Stress Concern Present (6/6/2024)    [employer] - Occupational Stress Questionnaire     Feeling of Stress : Only a little   Social Connections: Unknown (6/6/2024)    Social Connection and Isolation Panel [NHANES]     Frequency of Communication with Friends and Family: Not on file     Frequency of Social Gatherings with Friends and Family: Once a week     Attends [employer] Services: Not on file     Active Member of Clubs or Organizations: Not on file     Attends Club or Organization Meetings: Not on file     Marital Status: Not on file   Interpersonal Safety: Not on file   Housing Stability: Low Risk  (6/6/2024)    Housing Stability     Do you have housing? : Yes     Are you worried about losing your housing?: No            Past Medical History:     Past Medical History:   Diagnosis Date    Benign essential hypertension     Chronic kidney disease, stage 3b (H)     Diabetic retinopathy of both eyes (H)     Obesity (BMI 30-39.9)     Osteopenia     Pure hypercholesterolemia     S/P gastric bypass     S/P TAVR (transcatheter aortic valve replacement) 06/07/2022    Type 2 diabetes mellitus (H)               Past Surgical History:     Past Surgical History:   Procedure Laterality Date    APPENDECTOMY      CATARACT IOL, RT/LT Bilateral      CV CORONARY ANGIOGRAM N/A 05/04/2022    Procedure: Coronary Angiogram;  Surgeon: Hector Lewis MD;  Location:  [facility]    CV LEFT HEART CATH N/A 05/04/2022    Procedure: Left Heart Catheterization;  Surgeon: Hector Lewis MD;  Location:  [facility]    CV PCI N/A 05/04/2022    Procedure: Percutaneous Coronary Intervention;  Surgeon: Hector Lewis MD;  Location:  [facility]    CV TRANSCATHETER AORTIC VALVE REPLACEMENT-FEMORAL APPROACH N/A 06/07/2022    Procedure: Transcatheter Aortic Valve Replacement-Femoral Approach;  Surgeon: Hector Lewis MD;  Location:  [facility] LAB    JURGEN EN Y BOWEL  2004    TONSILLECTOMY & ADENOIDECTOMY                Allergies:   Chlorhexidine, Clonidine, Doxazosin mesylate, Fexofenadine hydrochloride, Gemfibrozil, Lisinopril, Norvasc [amlodipine], and Pioglitazone hydrochloride       Data:   All laboratory data reviewed:    Recent Labs   Lab Test 02/01/24  0916 07/13/23  1038 08/09/22  0844 06/08/22  1056 06/01/22  1223 03/28/17  1014 02/10/17  0921   LDL 88 65 42   < >  --    < >  --    HDL 45* 46* 46*   < >  --    < >  --    NHDL 118 85 58   < >  --    < >  --    CHOL 163 131 104   < >  --    < >  --    TRIG 148 100 81   < >  --    < >  --    TSH 1.83 2.88 1.83  --   --    < > 2.55   NTBNP  --   --   --   --  346  --   --    IRON  --   --   --   --   --   --  75   FEB  --   --   --   --   --   --  444*   IRONSAT  --   --   --   --   --   --  17   ADELIA  --   --  106  --   --   --   --     < > = values in this interval not displayed.       Lab Results   Component Value Date    WBC 8.2 02/01/2024    WBC 8.3 01/08/2021    RBC 4.49 02/01/2024    RBC 4.12 01/08/2021    HGB 12.5 02/01/2024    HGB 11.6 (L) 01/08/2021    HCT 39.7 02/01/2024    HCT 38.2 01/08/2021    MCV 88 02/01/2024    MCV 93 01/08/2021    MCH 27.8 02/01/2024    MCH 28.2 01/08/2021    MCHC 31.5 02/01/2024    MCHC 30.4 (L) 01/08/2021    RDW  13.6 02/01/2024    RDW 13.5 01/08/2021     02/01/2024     01/08/2021       Lab Results   Component Value Date     02/01/2024     04/13/2021    POTASSIUM 4.1 02/01/2024    POTASSIUM 4.1 08/09/2022    POTASSIUM 4.2 04/13/2021    CHLORIDE 107 02/01/2024    CHLORIDE 107 07/07/2022    CHLORIDE 110 (H) 04/13/2021    CO2 25 02/01/2024    CO2 23 07/07/2022    CO2 28 04/13/2021    ANIONGAP 11 02/01/2024    ANIONGAP 9 07/07/2022    ANIONGAP 3 04/13/2021     (H) 02/01/2024     (H) 08/09/2022     (H) 04/13/2021    BUN 26.5 (H) 02/01/2024    BUN 20 08/09/2022    BUN 29 04/13/2021    CR 1.37 (H) 02/01/2024    CR 1.38 (H) 04/13/2021    GFRESTIMATED 41 (L) 02/01/2024    GFRESTIMATED 32 (L) 05/17/2022    GFRESTIMATED 38 (L) 04/13/2021    GFRESTBLACK 45 (L) 04/13/2021    RASHEEDA 9.8 02/01/2024    RASHEEDA 9.2 04/13/2021      Lab Results   Component Value Date    AST 42 06/08/2022    AST 20 04/13/2021    ALT 63 (H) 06/08/2022    ALT 38 04/13/2021       Lab Results   Component Value Date    A1C 7.3 (H) 09/16/2024    A1C 7.0 (H) 02/01/2024    A1C 8.1 (H) 01/08/2021       Lab Results   Component Value Date    INR 1.09 06/01/2022    INR 0.97 05/04/2022

## 2024-09-30 NOTE — PATIENT INSTRUCTIONS
Today's Plan:     - Continue taking your rosuvastatin 20 mg daily.   - Repeat cholesterol labs.   - Repeat echocardiogram in 2 years.   - Follow-up with Dr. Lewis in 1 year, sooner if needed.      If you have questions or concerns please call my nurse team:  Sierra ESTEVEZ ([phone]) or Brianne ESTEVEZ ([phone])    After Hours: [phone], option #2, ask for cardiologist on-call    Scheduling phone number: [phone]    Reminder: Please bring in all current medications, over the counter supplements and vitamin bottles to your next appointment.-    It was a pleasure seeing you today!     Yahaira Qureshi, ROSARIO  9/30/2024    -

## 2024-10-04 DIAGNOSIS — I10 ESSENTIAL HYPERTENSION: ICD-10-CM

## 2024-10-04 RX ORDER — POTASSIUM CHLORIDE 750 MG/1
10 TABLET, EXTENDED RELEASE ORAL DAILY
Qty: 90 TABLET | Refills: 0 | Status: SHIPPED | OUTPATIENT
Start: 2024-10-04

## 2024-10-10 DIAGNOSIS — E03.9 HYPOTHYROIDISM, UNSPECIFIED TYPE: ICD-10-CM

## 2024-10-15 RX ORDER — LEVOTHYROXINE SODIUM 50 UG/1
50 TABLET ORAL DAILY
Qty: 90 TABLET | Refills: 1 | Status: SHIPPED | OUTPATIENT
Start: 2024-10-15

## 2024-10-15 NOTE — TELEPHONE ENCOUNTER
LVD:  9/16/2024  [facility] Roxanne Gong PA-C  Endocrinology, Diabetes, and Metabolism     TSH   Date Value Ref Range Status   02/01/2024 1.83 0.30 - 4.20 uIU/mL Final   08/09/2022 1.83 0.40 - 4.00 mU/L Final   03/27/2020 3.47 0.40 - 4.00 mU/L Final       Refilled per protocol.

## 2024-10-18 ENCOUNTER — VIRTUAL VISIT (OUTPATIENT)
Dept: EDUCATION SERVICES | Facility: CLINIC | Age: 74
End: 2024-10-18
Payer: COMMERCIAL

## 2024-10-18 DIAGNOSIS — E11.65 POORLY CONTROLLED TYPE 2 DIABETES MELLITUS WITH NEUROPATHY (H): ICD-10-CM

## 2024-10-18 DIAGNOSIS — E11.40 POORLY CONTROLLED TYPE 2 DIABETES MELLITUS WITH NEUROPATHY (H): ICD-10-CM

## 2024-10-18 DIAGNOSIS — E11.40 TYPE 2 DIABETES MELLITUS WITH DIABETIC NEUROPATHY, WITH LONG-TERM CURRENT USE OF INSULIN (H): Primary | ICD-10-CM

## 2024-10-18 DIAGNOSIS — E11.21 WELL CONTROLLED TYPE 2 DIABETES MELLITUS WITH NEPHROPATHY (H): ICD-10-CM

## 2024-10-18 DIAGNOSIS — Z79.4 TYPE 2 DIABETES MELLITUS WITH DIABETIC NEUROPATHY, WITH LONG-TERM CURRENT USE OF INSULIN (H): Primary | ICD-10-CM

## 2024-10-18 PROCEDURE — G0108 DIAB MANAGE TRN  PER INDIV: HCPCS | Mod: 95 | Performed by: DIETITIAN, REGISTERED

## 2024-10-18 RX ORDER — INSULIN GLARGINE 300 U/ML
24 INJECTION, SOLUTION SUBCUTANEOUS AT BEDTIME
Qty: 9 ML | Refills: 4 | Status: SHIPPED | OUTPATIENT
Start: 2024-10-18

## 2024-10-18 NOTE — LETTER
10/18/2024         RE: Ade Wilkins  [address]  [facility] [address]        Dear Colleague,    Thank you for referring your patient, Ade Wilkins, to the [facility]. Please see a copy of my visit note below.    Diabetes Self-Management Education & Support    Presents for: CGM Review    Type of service:  Video Visit    If the video visit is dropped, the video visit invitation should be resent by: Text to cell phone: [phone]    Originating Location (pt. Location): Home  Distant Location (provider location): Offsite  Mode of Communication:  Video Conference via Cannae Start Time:  1:03pm  Video End Time (time video stopped): 2:05pm    How would patient like to obtain AVS? [email or web address]      REPORTS: (patient only has reports though 10/12. Forgot to upload again before today's visit):              Pt verbalized understanding of concepts discussed and recommendations provided today.       Continue education with the following diabetes management concepts: Taking Medication and Problem Solving    ASSESSMENT:  Elaynes time in range continues to improve and based on the past 14-days of available data (9/29-10/12), she is now up to 65% time in range. She is having GI side effects after Ozempic was increased to 2 mg weekly dose and is feeling nauseated after injecting the dose for a few days, but feels it is getting a little better and wants to continue to take it. She reduced her Toujeo at night due to hypoglycemia occurring overnight. She is down to 24-26 units (24 unit(s) on M, Tu, W, and 26 units the rest of the week when Ozempic's effectiveness is waning a bit. Unable to see blood glucose from the past week but she says she is still sometimes dropping low at night, so recommended lowering Toujeo another 2 units (22-24 units) and she is agreeable to this plan.    She also mentions that recently blood glucose have been higher in the morning and slower to  "lower and improving at night. Based on available blood glucose, looks like blood glucose is pretty good in the morning. If she feels it has been higher with less Toujeo, suggested adding 1 extra unit of Humalog to her carbohydrate ratio dose at breakfast (1:4) to see if helps lower blood glucose to target better. If this causes any hypoglycemia, recommended to stop adding it.    Lastly, since she is seeing improved post-prandial blood glucose suggested trying a correction to try to lower any elevated blood glucose to target faster. Explained how correction works and when to use it. Based on estimated TDD of insulin (patient report 25-30 units Lyumjev between breakfast and lunch and 14-18 units at dinner + 24 units Toujeo = 67 units) and using \"Rule of 1800\", estimate 1 unit of Lyumjev will lower blood glucose around 30 mg/dL (1800/67 = 26.9 so rounded up). Scale provided:    Correction scale: 1:30>150:  <151= no extra Lyumjev is needed  151-180 = add 1 unit  181-210 = add 2 units  211-240 = add 3 units  241-270 = add 4 units  271-300 = add 5 units  301+ = add 6 units    Ade feels comfortable with above changes and trying the correction scale. Denies any other question or concerns today. Pt verbalized understanding of concepts discussed and recommendations provided.         Glucose Patterns & Trends:  Time in range of  mg/dL, 65% of the time. and Time below range of 70 mg/dL, 0% of the time.    PLAN    -Reduce Toujeo to 22-24 units at bedtime.  -Continue carbohydrate ratio of 1:4 at meals. Add 1 extra unit at breakfast to try to lower blood glucose to target faster. Stop if causing hypoglycemia.  -Use correction scale when blood glucose >150 mg/dL before meals.    Topics to cover at upcoming visits: Healthy Eating, Taking Medication, and Problem Solving    Follow-up: 12/13/24    See Care Plan for co-developed, patient-state behavior change goals.  AVS provided for patient today.    Education Materials " Provided:  No new materials provided today      SUBJECTIVE/OBJECTIVE:  Presents for: CGM Review  Accompanied by: Self  Diabetes education in the past 24mo: Yes  Focus of Visit: Taking Medication, CGM  Type of CGM visit: Personal CGM Follow-up  Diabetes type: Type 2  Date of diagnosis: 1978. Started insulin when she is in her 40's.  Disease course: Getting harder to manage  How confident are you filling out medical forms by yourself:: Quite a bit  Diabetes management related comments/concerns: Says she is having stomach aches and headache with higher Ozempic dose. After first dose, was having an upset stomach and dry heaves.  Says she was seeing low BG at 3am after this Ozempic incresae, so she cut back on Toujeo. Says she is struggling to line up everything now. Says still dropping at night, but not as low.as she was before. 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    Reduced Toujeo to 24 units at night instead of 32 units. Lowered Toujeo the first week of October due to hypoglycemia. Was backing down 2 units at a time. On October 7th, lowered by another 2 units. Takes 24 units M, Tu, W, and back up to 26 units on Th, F, Saturday, and Sunday. Says she takes Ozempic on Mondays.     Lymjev: Will take 12 units of Lyumjev with bagel if has delgado (45 gm carbohydrates). Takes 4 units for the roll. Estimates taking 25-30 units of Lyumjev between breakfast and lunch and 14-20 units with dinner but rare to use 20 units and usually if eats something sweet.    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 Started with losing 3 lbs a week, then 2 lbs, then 1 lbs, and then weight loss stopped.    Difficulty affording diabetes medication?: No  Difficulty affording diabetes testing supplies?: No  Other concerns:: Glasses, Physical impairment (Uses a cane to help with walking)  Cultural Influences/Ethnic Background:  Not  or       Diabetes Symptoms &  "Complications:  Diabetes Related Symptoms: Fatigue, Slow healing wounds  Weight trend: Fluctuating  Symptom course: Worsening  Disease course: Getting harder to manage  Complications assessed today?: Yes  CVA: Yes (Very small stroke while in hospital after getting new heart valve. She continues to see cardiologist for it.)  Nephropathy: Yes  Retinopathy: Yes    Patient Problem List and Family Medical History reviewed for relevant medical history, current medical status, and diabetes risk factors.    Vitals:  LMP  (LMP Unknown)   Estimated body mass index is 38.64 kg/m  as calculated from the following:    Height as of 9/30/24: 1.549 m (5' 1\").    Weight as of 9/30/24: 92.8 kg (204 lb 8 oz).   Last 3 BP:   BP Readings from Last 3 Encounters:   09/30/24 (!) 158/70   09/16/24 (!) 152/69   06/11/24 130/72       History   Smoking Status     Never   Smokeless Tobacco     Never       Labs:  Lab Results   Component Value Date    A1C 7.3 09/16/2024    A1C 7.0 02/01/2024    A1C 8.1 01/08/2021    HEMOGLOBINA1 7.0 07/18/2023     Lab Results   Component Value Date     02/01/2024     08/09/2022     04/13/2021     Lab Results   Component Value Date    LDL 88 02/01/2024    LDL 59 03/27/2020     HDL Cholesterol   Date Value Ref Range Status   03/27/2020 34 (L) >49 mg/dL Final     Direct Measure HDL   Date Value Ref Range Status   02/01/2024 45 (L) >=50 mg/dL Final   ]  GFR Estimate   Date Value Ref Range Status   02/01/2024 41 (L) >60 mL/min/1.73m2 Final   04/13/2021 38 (L) >60 mL/min/[1.73_m2] Final     Comment:     Non  GFR Calc  Starting 12/18/2018, serum creatinine based estimated GFR (eGFR) will be   calculated using the Chronic Kidney Disease Epidemiology Collaboration   (CKD-EPI) equation.       GFR, ESTIMATED POCT   Date Value Ref Range Status   05/17/2022 32 (L) >60 mL/min/1.73m2 Final     GFR Estimate If Black   Date Value Ref Range Status   04/13/2021 45 (L) >60 mL/min/[1.73_m2] Final " "    Comment:      GFR Calc  Starting 12/18/2018, serum creatinine based estimated GFR (eGFR) will be   calculated using the Chronic Kidney Disease Epidemiology Collaboration   (CKD-EPI) equation.       Lab Results   Component Value Date    CR 1.37 02/01/2024    CR 1.38 04/13/2021     No results found for: \"MICROALBUMIN\"    Healthy Eating:  Healthy Eating Assessed Today: Yes  Cultural/Latter-day diet restrictions?: No  Meal planning/habits: Carb counting  Who cooks/prepares meals for you?: Self  Who purchases food in  your home?: Self, Sister  How many times a week on average do you eat food made away from home (restaurant/take-out)?: 1  Meals include: Breakfast, Lunch, Dinner, Evening Snack  Breakfast: 5:05am: 6-7am: Roll OR sandwich OR bagel with ham or turkey, lettuce, tomato, delgado OR English muffin  Lunch: 12PM: Bagel with roast beef/turkey/chicken with tomato  Dinner: Rouse's pie from [employer] and adds extra veggies -taking 12 units of insulin OR Marie Calendar meals OR mashed potatoes with meat (not eat much beef- aversion 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  Snacks: AM:9:30am: ; PM: apple  Other: HS: veggies straws or nut and dried fruit pack - eaitng more than usual. Will sleep 1am-12pm.  Beverages: Water, Tea, Diet soda    Being Active:  Being Active Assessed Today: Yes  Exercise:: Currently not exercising  Days per week of moderate to strenuous exercise (like a brisk walk): 3  Barrier to exercise: Physical limitation (Weather)    Monitoring:  Monitoring Assessed Today: Yes  Did patient bring glucose meter to appointment? : Yes  Blood Glucose Meter: CGM ([place] 2)  Times checking blood sugar at home (number): 5+  Times checking blood sugar at home (per): Day      Taking Medications:  Diabetes Medication(s)       Biguanides       metFORMIN (GLUCOPHAGE XR) 500 MG 24 hr tablet Take 1 tablet (500 mg) by mouth 2 times daily (with meals)    "    Insulin       insulin glargine U-300 (TOUJEO SOLOSTAR) 300 UNIT/ML (1 units dial) pen Inject 36 Units Subcutaneous at bedtime     Insulin Lispro-aabc, 1 U Dial, (LYUMWYATT KWIKPEN) 100 UNIT/ML SOPN Inject 60 Units Subcutaneous daily Use as directed up to 60 units daily.       Incretin Mimetic Agents       Semaglutide, 2 MG/DOSE, (OZEMPIC) 8 MG/3ML pen Inject 2 mg subcutaneously every 7 days.            Taking Medication Assessed Today: Yes  Current Treatments: Diet, Oral Medication (taken by mouth), Insulin Injections, Non-insulin Injectables  Dose schedule: Pre-breakfast, Pre-lunch, Pre-dinner, At bedtime  Given by: Patient  Problems taking diabetes medications regularly?: No  Diabetes medication side effects?: Yes (GI side effects with last dose increase)    Problem Solving:  Problem Solving Assessed Today: Yes  Is the patient at risk for hypoglycemia?: Yes  Hypoglycemia Frequency: Weekly  Hypoglycemia Treatment: Other food, Glucose (tablets or gel) (hard candies, breakfast bar, glucose tablets, glucose gel, 1 package sugar- containing gum)              Reducing Risks:  Reducing Risks Assessed Today: No  Has dilated eye exam at least once a year?: Yes  Sees dentist every 6 months?: Yes  Feet checked by healthcare provider in the last year?: No    Healthy Coping:  Healthy Coping Assessed Today: Yes  Emotional response to diabetes: Ready to learn, Acceptance  Informal Support system:: Family, Friends  Stage of change: ACTION (Actively working towards change)  Patient Activation Measure Survey Score:      8/5/2010    10:00 AM   BONY Score (Last Two)   BONY Raw Score 40   Activation Score 60   BONY Level 3         Care Plan and Education Provided:  Healthy Eating: Balanced meals and Carbohydrate Counting, Monitoring: Individual glucose targets, Log and interpret results, and time in range goals, Taking Medication: Action of prescribed medication(s), Side effects of prescribed medication(s), and When to take  medication(s), and Problem Solving: High glucose - causes, signs/symptoms, treatment and prevention, Low glucose - causes, signs/symptoms, treatment and prevention, Rule of 15 and carrying a carbohydrate source at all times in case of low glucose, and When to call a health care provider    Leia Arellano RDN, MARICRUZ, ELHAMES     Time Spent: 62 minutes  Encounter Type: Individual    Any diabetes medication dose changes were made via the CDE Protocol per the patient's referring provider. A copy of this encounter was shared with the provider.

## 2024-10-18 NOTE — PROGRESS NOTES
Diabetes Self-Management Education & Support    Presents for: CGM Review    Type of service:  Video Visit    If the video visit is dropped, the video visit invitation should be resent by: Text to cell phone: [phone]    Originating Location (pt. Location): Home  Distant Location (provider location): Offsite  Mode of Communication:  Video Conference via [email or web address] Start Time:  1:03pm  Video End Time (time video stopped): 2:05pm    How would patient like to obtain AVS? [email or web address]      REPORTS: (patient only has reports though 10/12. Forgot to upload again before today's visit):              Pt verbalized understanding of concepts discussed and recommendations provided today.       Continue education with the following diabetes management concepts: Taking Medication and Problem Solving    ASSESSMENT:  Elaynes time in range continues to improve and based on the past 14-days of available data (9/29-10/12), she is now up to 65% time in range. She is having GI side effects after Ozempic was increased to 2 mg weekly dose and is feeling nauseated after injecting the dose for a few days, but feels it is getting a little better and wants to continue to take it. She reduced her Toujeo at night due to hypoglycemia occurring overnight. She is down to 24-26 units (24 unit(s) on M, Tu, W, and 26 units the rest of the week when Ozempic's effectiveness is waning a bit. Unable to see blood glucose from the past week but she says she is still sometimes dropping low at night, so recommended lowering Toujeo another 2 units (22-24 units) and she is agreeable to this plan.    She also mentions that recently blood glucose have been higher in the morning and slower to lower and improving at night. Based on available blood glucose, looks like blood glucose is pretty good in the morning. If she feels it has been higher with less Toujeo, suggested adding 1 extra unit of Humalog to her carbohydrate ratio dose at breakfast (1:4) to  "see if helps lower blood glucose to target better. If this causes any hypoglycemia, recommended to stop adding it.    Lastly, since she is seeing improved post-prandial blood glucose suggested trying a correction to try to lower any elevated blood glucose to target faster. Explained how correction works and when to use it. Based on estimated TDD of insulin (patient report 25-30 units Lyumjev between breakfast and lunch and 14-18 units at dinner + 24 units Toujeo = 67 units) and using \"Rule of 1800\", estimate 1 unit of Lyumjev will lower blood glucose around 30 mg/dL (1800/67 = 26.9 so rounded up). Scale provided:    Correction scale: 1:30>150:  <151= no extra Lyumjev is needed  151-180 = add 1 unit  181-210 = add 2 units  211-240 = add 3 units  241-270 = add 4 units  271-300 = add 5 units  301+ = add 6 units    Ade feels comfortable with above changes and trying the correction scale. Denies any other question or concerns today. Pt verbalized understanding of concepts discussed and recommendations provided.         Glucose Patterns & Trends:  Time in range of  mg/dL, 65% of the time. and Time below range of 70 mg/dL, 0% of the time.    PLAN    -Reduce Toujeo to 22-24 units at bedtime.  -Continue carbohydrate ratio of 1:4 at meals. Add 1 extra unit at breakfast to try to lower blood glucose to target faster. Stop if causing hypoglycemia.  -Use correction scale when blood glucose >150 mg/dL before meals.    Topics to cover at upcoming visits: Healthy Eating, Taking Medication, and Problem Solving    Follow-up: 12/13/24    See Care Plan for co-developed, patient-state behavior change goals.  AVS provided for patient today.    Education Materials Provided:  No new materials provided today      SUBJECTIVE/OBJECTIVE:  Presents for: CGM Review  Accompanied by: Self  Diabetes education in the past 24mo: Yes  Focus of Visit: Taking Medication, CGM  Type of CGM visit: Personal CGM Follow-up  Diabetes type: Type " 2  Date of diagnosis: 1978. Started insulin when she is in her 40's.  Disease course: Getting harder to manage  How confident are you filling out medical forms by yourself:: Quite a bit  Diabetes management related comments/concerns: Says she is having stomach aches and headache with higher Ozempic dose. After first dose, was having an upset stomach and dry heaves.  Says she was seeing low BG at 3am after this Ozempic incresae, so she cut back on Toujeo. Says she is struggling to line up everything now. Says still dropping at night, but not as low.as she was before. 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    Reduced Toujeo to 24 units at night instead of 32 units. Lowered Toujeo the first week of October due to hypoglycemia. Was backing down 2 units at a time. On October 7th, lowered by another 2 units. Takes 24 units M, Tu, W, and back up to 26 units on Th, F, Saturday, and Sunday. Says she takes Ozempic on Mondays.     Lymjev: Will take 12 units of Lyumjev with bagel if has delgado (45 gm carbohydrates). Takes 4 units for the roll. Estimates taking 25-30 units of Lyumjev between breakfast and lunch and 14-20 units with dinner but rare to use 20 units and usually if eats something sweet.    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 Started with losing 3 lbs a week, then 2 lbs, then 1 lbs, and then weight loss stopped.    Difficulty affording diabetes medication?: No  Difficulty affording diabetes testing supplies?: No  Other concerns:: Glasses, Physical impairment (Uses a cane to help with walking)  Cultural Influences/Ethnic Background:  Not  or       Diabetes Symptoms & Complications:  Diabetes Related Symptoms: Fatigue, Slow healing wounds  Weight trend: Fluctuating  Symptom course: Worsening  Disease course: Getting harder to manage  Complications assessed today?: Yes  CVA: Yes (Very small stroke while in hospital after getting new heart  "valve. She continues to see cardiologist for it.)  Nephropathy: Yes  Retinopathy: Yes    Patient Problem List and Family Medical History reviewed for relevant medical history, current medical status, and diabetes risk factors.    Vitals:  LMP  (LMP Unknown)   Estimated body mass index is 38.64 kg/m  as calculated from the following:    Height as of 9/30/24: 1.549 m (5' 1\").    Weight as of 9/30/24: 92.8 kg (204 lb 8 oz).   Last 3 BP:   BP Readings from Last 3 Encounters:   09/30/24 (!) 158/70   09/16/24 (!) 152/69   06/11/24 130/72       History   Smoking Status    Never   Smokeless Tobacco    Never       Labs:  Lab Results   Component Value Date    A1C 7.3 09/16/2024    A1C 7.0 02/01/2024    A1C 8.1 01/08/2021    HEMOGLOBINA1 7.0 07/18/2023     Lab Results   Component Value Date     02/01/2024     08/09/2022     04/13/2021     Lab Results   Component Value Date    LDL 88 02/01/2024    LDL 59 03/27/2020     HDL Cholesterol   Date Value Ref Range Status   03/27/2020 34 (L) >49 mg/dL Final     Direct Measure HDL   Date Value Ref Range Status   02/01/2024 45 (L) >=50 mg/dL Final   ]  GFR Estimate   Date Value Ref Range Status   02/01/2024 41 (L) >60 mL/min/1.73m2 Final   04/13/2021 38 (L) >60 mL/min/[1.73_m2] Final     Comment:     Non  GFR Calc  Starting 12/18/2018, serum creatinine based estimated GFR (eGFR) will be   calculated using the Chronic Kidney Disease Epidemiology Collaboration   (CKD-EPI) equation.       GFR, ESTIMATED POCT   Date Value Ref Range Status   05/17/2022 32 (L) >60 mL/min/1.73m2 Final     GFR Estimate If Black   Date Value Ref Range Status   04/13/2021 45 (L) >60 mL/min/[1.73_m2] Final     Comment:      GFR Calc  Starting 12/18/2018, serum creatinine based estimated GFR (eGFR) will be   calculated using the Chronic Kidney Disease Epidemiology Collaboration   (CKD-EPI) equation.       Lab Results   Component Value Date    CR 1.37 02/01/2024    " "CR 1.38 04/13/2021     No results found for: \"MICROALBUMIN\"    Healthy Eating:  Healthy Eating Assessed Today: Yes  Cultural/Lutheran diet restrictions?: No  Meal planning/habits: Carb counting  Who cooks/prepares meals for you?: Self  Who purchases food in  your home?: Self, Sister  How many times a week on average do you eat food made away from home (restaurant/take-out)?: 1  Meals include: Breakfast, Lunch, Dinner, Evening Snack  Breakfast: 5:05am: 6-7am: Roll OR sandwich OR bagel with ham or turkey, lettuce, tomato, delgado OR English muffin  Lunch: 12PM: Bagel with roast beef/turkey/chicken with tomato  Dinner: Rouse's pie from [employer] and adds extra veggies -taking 12 units of insulin OR Marie Calendar meals OR mashed potatoes with meat (not eat much beef- aversion 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  Snacks: AM:9:30am: ; PM: apple  Other: HS: veggies straws or nut and dried fruit pack - eaitng more than usual. Will sleep 1am-12pm.  Beverages: Water, Tea, Diet soda    Being Active:  Being Active Assessed Today: Yes  Exercise:: Currently not exercising  Days per week of moderate to strenuous exercise (like a brisk walk): 3  Barrier to exercise: Physical limitation (Weather)    Monitoring:  Monitoring Assessed Today: Yes  Did patient bring glucose meter to appointment? : Yes  Blood Glucose Meter: CGM ([place] 2)  Times checking blood sugar at home (number): 5+  Times checking blood sugar at home (per): Day      Taking Medications:  Diabetes Medication(s)       Biguanides       metFORMIN (GLUCOPHAGE XR) 500 MG 24 hr tablet Take 1 tablet (500 mg) by mouth 2 times daily (with meals)       Insulin       insulin glargine U-300 (TOUJEO SOLOSTAR) 300 UNIT/ML (1 units dial) pen Inject 36 Units Subcutaneous at bedtime     Insulin Lispro-aabc, 1 U Dial, (LYUMJEV KWIKPEN) 100 UNIT/ML SOPN Inject 60 Units Subcutaneous daily Use as directed up to 60 units daily.       " Incretin Mimetic Agents       Semaglutide, 2 MG/DOSE, (OZEMPIC) 8 MG/3ML pen Inject 2 mg subcutaneously every 7 days.            Taking Medication Assessed Today: Yes  Current Treatments: Diet, Oral Medication (taken by mouth), Insulin Injections, Non-insulin Injectables  Dose schedule: Pre-breakfast, Pre-lunch, Pre-dinner, At bedtime  Given by: Patient  Problems taking diabetes medications regularly?: No  Diabetes medication side effects?: Yes (GI side effects with last dose increase)    Problem Solving:  Problem Solving Assessed Today: Yes  Is the patient at risk for hypoglycemia?: Yes  Hypoglycemia Frequency: Weekly  Hypoglycemia Treatment: Other food, Glucose (tablets or gel) (hard candies, breakfast bar, glucose tablets, glucose gel, 1 package sugar- containing gum)              Reducing Risks:  Reducing Risks Assessed Today: No  Has dilated eye exam at least once a year?: Yes  Sees dentist every 6 months?: Yes  Feet checked by healthcare provider in the last year?: No    Healthy Coping:  Healthy Coping Assessed Today: Yes  Emotional response to diabetes: Ready to learn, Acceptance  Informal Support system:: Family, Friends  Stage of change: ACTION (Actively working towards change)  Patient Activation Measure Survey Score:      8/5/2010    10:00 AM   BONY Score (Last Two)   BONY Raw Score 40   Activation Score 60   BONY Level 3         Care Plan and Education Provided:  Healthy Eating: Balanced meals and Carbohydrate Counting, Monitoring: Individual glucose targets, Log and interpret results, and time in range goals, Taking Medication: Action of prescribed medication(s), Side effects of prescribed medication(s), and When to take medication(s), and Problem Solving: High glucose - causes, signs/symptoms, treatment and prevention, Low glucose - causes, signs/symptoms, treatment and prevention, Rule of 15 and carrying a carbohydrate source at all times in case of low glucose, and When to call a health care  provider    Leia Arellano RDN, LD, [facility]     Time Spent: 62 minutes  Encounter Type: Individual    Any diabetes medication dose changes were made via the CDE Protocol per the patient's referring provider. A copy of this encounter was shared with the provider.

## 2024-10-18 NOTE — PATIENT INSTRUCTIONS
"Continue taking the 2 mg Ozempic weekly on Mondays. Reach out to Roxanne to reduce the dose if you are not tolerating the side effects any better in the next few weeks (usually improves over time).    Lower Toujeo to 22-24 units before bed to try to get rid of the low blood glucose overnight (22 units on Monday, Tuesday, Wednesday, and 24 units Thursday, Friday, Saturday, and Sunday).     Continue to follow the carbohydrate ratio for Lyumjev of 1 unit for every 4 gm carbohydrates but add breakfast, add 1 extra unit of Humalog to lower blood glucose better in the morning.  -If this causes any low blood glucose, skip the extra 1 unit again.    4. Correction scale: You add this extra amount of Lyumjev to the dose you are taking to cover food IF blood glucose is above 150 mg/dL. This \"correction\" is adding in extra insulin to lower blood glucose back below 150 mg/dL if you are running high.  It is estimated that 1 unit of Lyumjev will lower blood glucose about 30 mg/dL, so if blood glucose is:  150 or below = no extra Lyumjev is needed  151-180 = add 1 unit  181-210 = add 2 units  211-240 = add 3 units  241-270 = add 4 units  271-300 = add 5 units  301+ = add 6 units    FOLLOW UP APPOINTMENT: Video visit follow up on Friday, December 13th at 10am.    Leia Arellano RDN, LD, [facility]   [phone]  "

## 2024-10-23 DIAGNOSIS — G43.719 INTRACTABLE CHRONIC MIGRAINE WITHOUT AURA AND WITHOUT STATUS MIGRAINOSUS: ICD-10-CM

## 2024-10-23 DIAGNOSIS — I35.0 AORTIC STENOSIS, SEVERE: ICD-10-CM

## 2024-10-23 RX ORDER — METOPROLOL SUCCINATE 25 MG/1
25 TABLET, EXTENDED RELEASE ORAL 2 TIMES DAILY
Qty: 180 TABLET | Refills: 3 | Status: SHIPPED | OUTPATIENT
Start: 2024-10-23

## 2024-10-29 NOTE — TELEPHONE ENCOUNTER
Topiramate Oral Tablet 25 MG     Last Written Prescription Date:  7/25/24  Last Fill Quantity: 270,   # refills: 0  Last Office Visit : 9/16/24  Future Office visit:  3/19/25    Routing refill request to provider for review/approval because:  Roxanne Masterson not available  Medication failed protocol    Normal ALT or AST on file in past 26 months        Recent Labs   Lab Test 06/08/22  0454   ALT 63*       Recent Labs   Lab Test 06/08/22 0454   AST 42

## 2024-10-31 RX ORDER — TOPIRAMATE 25 MG/1
75 TABLET, FILM COATED ORAL AT BEDTIME
Qty: 270 TABLET | Refills: 3 | Status: SHIPPED | OUTPATIENT
Start: 2024-10-31

## 2024-11-13 DIAGNOSIS — I10 BENIGN ESSENTIAL HYPERTENSION: ICD-10-CM

## 2024-11-13 RX ORDER — IRBESARTAN 150 MG/1
150 TABLET ORAL DAILY
Qty: 90 TABLET | Refills: 3 | Status: SHIPPED | OUTPATIENT
Start: 2024-11-13

## 2024-11-14 DIAGNOSIS — I35.0 AORTIC VALVE STENOSIS, ETIOLOGY OF CARDIAC VALVE DISEASE UNSPECIFIED: ICD-10-CM

## 2024-11-14 RX ORDER — FUROSEMIDE 40 MG/1
40 TABLET ORAL DAILY
Qty: 90 TABLET | Refills: 0 | Status: SHIPPED | OUTPATIENT
Start: 2024-11-14

## 2024-12-04 ENCOUNTER — TRANSFERRED RECORDS (OUTPATIENT)
Dept: HEALTH INFORMATION MANAGEMENT | Facility: CLINIC | Age: 74
End: 2024-12-04
Payer: COMMERCIAL

## 2024-12-17 ENCOUNTER — LAB (OUTPATIENT)
Dept: LAB | Facility: CLINIC | Age: 74
End: 2024-12-17
Payer: COMMERCIAL

## 2024-12-17 DIAGNOSIS — E11.21 WELL CONTROLLED TYPE 2 DIABETES MELLITUS WITH NEPHROPATHY (H): ICD-10-CM

## 2024-12-17 DIAGNOSIS — N18.32 CHRONIC KIDNEY DISEASE, STAGE 3B (H): Primary | ICD-10-CM

## 2024-12-17 PROCEDURE — 80061 LIPID PANEL: CPT

## 2024-12-17 PROCEDURE — 36415 COLL VENOUS BLD VENIPUNCTURE: CPT

## 2024-12-17 PROCEDURE — 80048 BASIC METABOLIC PNL TOTAL CA: CPT

## 2024-12-18 ENCOUNTER — TELEPHONE (OUTPATIENT)
Dept: INTERNAL MEDICINE | Facility: CLINIC | Age: 74
End: 2024-12-18
Payer: COMMERCIAL

## 2024-12-18 LAB
ANION GAP SERPL CALCULATED.3IONS-SCNC: 14 MMOL/L (ref 7–15)
BUN SERPL-MCNC: 36.2 MG/DL (ref 8–23)
CALCIUM SERPL-MCNC: 9.9 MG/DL (ref 8.8–10.4)
CHLORIDE SERPL-SCNC: 101 MMOL/L (ref 98–107)
CHOLEST SERPL-MCNC: 123 MG/DL
CREAT SERPL-MCNC: 2.1 MG/DL (ref 0.51–0.95)
EGFRCR SERPLBLD CKD-EPI 2021: 24 ML/MIN/1.73M2
FASTING STATUS PATIENT QL REPORTED: YES
FASTING STATUS PATIENT QL REPORTED: YES
GLUCOSE SERPL-MCNC: 125 MG/DL (ref 70–99)
HCO3 SERPL-SCNC: 28 MMOL/L (ref 22–29)
HDLC SERPL-MCNC: 44 MG/DL
LDLC SERPL CALC-MCNC: 56 MG/DL
NONHDLC SERPL-MCNC: 79 MG/DL
POTASSIUM SERPL-SCNC: 3.8 MMOL/L (ref 3.4–5.3)
SODIUM SERPL-SCNC: 143 MMOL/L (ref 135–145)
TRIGL SERPL-MCNC: 117 MG/DL

## 2024-12-19 ENCOUNTER — TELEPHONE (OUTPATIENT)
Dept: INTERNAL MEDICINE | Facility: CLINIC | Age: 74
End: 2024-12-19
Payer: COMMERCIAL

## 2024-12-19 NOTE — TELEPHONE ENCOUNTER
Talked to patient re: recent labs. Recommend nephrology consultation.    Patient also mentioned some leg concerns. Recommended OV.    May use any virtual or virtual release spot for an in-person visit.     Patient may also be seen at noon (arrival time 11:40am) Mondays, Wednesdays, Thursdays, and Fridays OR at 1:00pm (arrival time 12:40pm) on Thursdays (during the huddle).    Please do not schedule in a same day, next day, or hospital follow-up slot.    Okay to double book lunch slot (but patient should still arrive by 11:40am).

## 2024-12-19 NOTE — TELEPHONE ENCOUNTER
Reason for Call:  Appointment Request    Patient requesting this type of appt:  Office Visit    Requested provider: Judith Aviles    Reason patient unable to be scheduled: Not within requested timeframe    When does patient want to be seen/preferred time: 3-7 days    Comments: She has arthritis in her knee and its causing circulation issues in her leg (right leg)    Could we send this information to you in [employer] or would you prefer to receive a phone call?:   No preference   Okay to leave a detailed message?: No at Home number on file [phone] (home)    Call taken on 12/19/2024 at 1:43 PM by Chey Johnson

## 2024-12-23 NOTE — TELEPHONE ENCOUNTER
Pt did not want to schedule this week.  Scheduled pt for 1/6/25 at 11:30am.     Estefani Arce on 12/23/2024 at 11:04 AM

## 2025-01-01 ENCOUNTER — MYC MEDICAL ADVICE (OUTPATIENT)
Dept: ENDOCRINOLOGY | Facility: CLINIC | Age: 75
End: 2025-01-01
Payer: COMMERCIAL

## 2025-01-01 DIAGNOSIS — E11.40 TYPE 2 DIABETES MELLITUS WITH DIABETIC NEUROPATHY, UNSPECIFIED WHETHER LONG TERM INSULIN USE (H): ICD-10-CM

## 2025-01-02 NOTE — TELEPHONE ENCOUNTER
Continuous Blood Gluc  (FREESTYLE BALWINDER 2 READER) HENNA   1 each 0 1/8/2024     Last Office Visit : 9-  Future Office visit:  3-

## 2025-01-06 ENCOUNTER — OFFICE VISIT (OUTPATIENT)
Dept: INTERNAL MEDICINE | Facility: CLINIC | Age: 75
End: 2025-01-06
Payer: COMMERCIAL

## 2025-01-06 VITALS
RESPIRATION RATE: 20 BRPM | DIASTOLIC BLOOD PRESSURE: 60 MMHG | WEIGHT: 193.4 LBS | HEART RATE: 79 BPM | OXYGEN SATURATION: 98 % | TEMPERATURE: 97.8 F | BODY MASS INDEX: 36.54 KG/M2 | SYSTOLIC BLOOD PRESSURE: 158 MMHG

## 2025-01-06 DIAGNOSIS — N18.4 TYPE 2 DIABETES MELLITUS WITH STAGE 4 CHRONIC KIDNEY DISEASE, WITH LONG-TERM CURRENT USE OF INSULIN (H): ICD-10-CM

## 2025-01-06 DIAGNOSIS — E11.22 TYPE 2 DIABETES MELLITUS WITH STAGE 4 CHRONIC KIDNEY DISEASE, WITH LONG-TERM CURRENT USE OF INSULIN (H): ICD-10-CM

## 2025-01-06 DIAGNOSIS — Z79.4 TYPE 2 DIABETES MELLITUS WITH BOTH EYES AFFECTED BY MILD NONPROLIFERATIVE RETINOPATHY AND MACULAR EDEMA, WITH LONG-TERM CURRENT USE OF INSULIN (H): ICD-10-CM

## 2025-01-06 DIAGNOSIS — I89.0 LYMPHEDEMA OF BOTH LOWER EXTREMITIES: ICD-10-CM

## 2025-01-06 DIAGNOSIS — N18.4 CKD (CHRONIC KIDNEY DISEASE) STAGE 4, GFR 15-29 ML/MIN (H): ICD-10-CM

## 2025-01-06 DIAGNOSIS — E11.3213 TYPE 2 DIABETES MELLITUS WITH BOTH EYES AFFECTED BY MILD NONPROLIFERATIVE RETINOPATHY AND MACULAR EDEMA, WITH LONG-TERM CURRENT USE OF INSULIN (H): ICD-10-CM

## 2025-01-06 DIAGNOSIS — E78.00 PURE HYPERCHOLESTEROLEMIA: ICD-10-CM

## 2025-01-06 DIAGNOSIS — I10 WHITE COAT SYNDROME WITH DIAGNOSIS OF HYPERTENSION: ICD-10-CM

## 2025-01-06 DIAGNOSIS — Z79.4 TYPE 2 DIABETES MELLITUS WITH STAGE 4 CHRONIC KIDNEY DISEASE, WITH LONG-TERM CURRENT USE OF INSULIN (H): ICD-10-CM

## 2025-01-06 DIAGNOSIS — M79.89 RIGHT LEG SWELLING: Primary | ICD-10-CM

## 2025-01-06 DIAGNOSIS — E66.01 MORBID OBESITY (H): ICD-10-CM

## 2025-01-06 DIAGNOSIS — I10 BENIGN ESSENTIAL HYPERTENSION: ICD-10-CM

## 2025-01-06 PROCEDURE — 99214 OFFICE O/P EST MOD 30 MIN: CPT | Performed by: INTERNAL MEDICINE

## 2025-01-06 NOTE — PROGRESS NOTES
ASSESSMENT/PLAN                                                      (M79.89) Right leg swelling  (primary encounter diagnosis)  Comment: Likely recurrent lymphedema, precipitated/exacerbated by recent right knee osteoarthritis flareup, but will rule out DVT prior to reinitiating lymphedema therapy.  Plan: US Lower Extremity Venous Duplex Right ordered - patient to schedule.     (I10) White coat syndrome with diagnosis of hypertension  (I10) Benign essential hypertension  Comment: blood pressure is elevated in office, but patient reports normal readings at home.  Plan: patient will continue to monitor blood pressures at home and contact MD if they are elevated.    (N18.4) CKD (chronic kidney disease) stage 4, GFR 15-29 ml/min (H)  Plan: Adult Nephrology  Referral placed - patient will be contacted to schedule.      (I89.0) Lymphedema of both lower extremities  Comment: R>>L.  Plan: Lymphedema Therapy  Referral placed - patient will be contacted to schedule.      (E11.22,  N18.4,  Z79.4) Type 2 diabetes mellitus with stage 4 chronic kidney disease, with long-term current use of insulin (H)  Comment: diabetes is well-controlled on current regimen; followed by endocrinology.    (E11.3213,  Z79.4) Type 2 diabetes mellitus with both eyes affected by mild nonproliferative retinopathy and macular edema, with long-term current use of insulin (H)  Comment: retinopathy stable; followed by ophthalmology.    (E78.00) Pure hypercholesterolemia  Comment: well-controlled on Crestor 20 mg daily.    (E66.01) Morbid obesity (H)  Comment: obesity +  comorbidities (HTN, DM2, and HLD); known issue that I take into account for their medical decisions, no current exacerbations or new concerns.    Judith Aviles MD   [address] [address]  T: [phone], F: [phone]    YVONNE Wilkins is a very pleasant 74  year old female who presents with right leg swelling:    Patient has a history of bilateral lower extremity lymphedema, previously well-controlled with lymphedema therapy lymphedema wraps.    Patient also has a history of right knee osteoarthritis.  Her orthopedic surgeon has recommended a right knee replacement, but she is not ready for this yet.    Patient reports flareup of her right knee osteoarthritis a couple months ago followed by right lower leg swelling. That swelling has not improved over time, while her right knee osteoarthritis flare has resolved.    No fevers or chills.  No shortness of breath or coughing.  No chest pain or palpitations.  No lightheadedness or presyncope/syncope.    PMH significant for CKD stage IV with nephrology consultation pending.    PMH also significant for type 2 diabetes complicated by retinopathy and nephropathy.    OBJECTIVE                                                      BP (!) 158/60   Pulse 79   Temp 97.8  F (36.6  C) (Temporal)   Resp 20   Wt 87.7 kg (193 lb 6.4 oz)   LMP  (LMP Unknown)   SpO2 98%   BMI 36.54 kg/m    Constitutional: well-appearing  Respiratory: normal respiratory effort; clear to auscultation bilaterally  Cardiovascular: regular rate and rhythm; moderate to severe nonpitting right lower extremity edema/lymphedema; mild to moderate nonpitting left lower extremity edema/lymphedema  Psych: normal judgment and insight; normal mood and affect; recent and remote memory intact    ---    (Note documentation was completed, in part, with [employer] voice-recognition software. Documentation was reviewed, but some grammatical, spelling, and word errors may remain.)

## 2025-01-07 ENCOUNTER — HOSPITAL ENCOUNTER (OUTPATIENT)
Dept: ULTRASOUND IMAGING | Facility: CLINIC | Age: 75
Discharge: HOME | End: 2025-01-07
Attending: INTERNAL MEDICINE
Payer: COMMERCIAL

## 2025-01-07 DIAGNOSIS — M79.89 RIGHT LEG SWELLING: ICD-10-CM

## 2025-01-07 PROCEDURE — 93971 EXTREMITY STUDY: CPT | Mod: RT

## 2025-01-16 ENCOUNTER — TRANSFERRED RECORDS (OUTPATIENT)
Dept: HEALTH INFORMATION MANAGEMENT | Facility: CLINIC | Age: 75
End: 2025-01-16
Payer: COMMERCIAL

## 2025-01-27 ENCOUNTER — OFFICE VISIT (OUTPATIENT)
Dept: DERMATOLOGY | Facility: CLINIC | Age: 75
End: 2025-01-27
Payer: COMMERCIAL

## 2025-01-27 DIAGNOSIS — M79.606 PAIN AND SWELLING OF LOWER EXTREMITY, UNSPECIFIED LATERALITY: ICD-10-CM

## 2025-01-27 DIAGNOSIS — I87.2 STASIS DERMATITIS OF BOTH LEGS: Primary | ICD-10-CM

## 2025-01-27 DIAGNOSIS — E11.21 WELL CONTROLLED TYPE 2 DIABETES MELLITUS WITH NEPHROPATHY (H): ICD-10-CM

## 2025-01-27 DIAGNOSIS — M79.89 PAIN AND SWELLING OF LOWER EXTREMITY, UNSPECIFIED LATERALITY: ICD-10-CM

## 2025-01-27 DIAGNOSIS — I50.89 OTHER HEART FAILURE (H): ICD-10-CM

## 2025-01-27 LAB
ALBUMIN MFR UR ELPH: 8.8 MG/DL
ALBUMIN UR-MCNC: NEGATIVE MG/DL
APPEARANCE UR: CLEAR
BILIRUB UR QL STRIP: NEGATIVE
COLOR UR AUTO: YELLOW
CREAT UR-MCNC: 21.5 MG/DL
GLUCOSE UR STRIP-MCNC: NEGATIVE MG/DL
HGB UR QL STRIP: NEGATIVE
KETONES UR STRIP-MCNC: NEGATIVE MG/DL
LEUKOCYTE ESTERASE UR QL STRIP: NEGATIVE
NITRATE UR QL: NEGATIVE
NT-PROBNP SERPL-MCNC: 695 PG/ML (ref 0–900)
PH UR STRIP: 6 [PH] (ref 5–7)
PROT/CREAT 24H UR: 0.41 MG/MG CR (ref 0–0.2)
RBC #/AREA URNS AUTO: NORMAL /HPF
SP GR UR STRIP: 1.01 (ref 1–1.03)
UROBILINOGEN UR STRIP-ACNC: 0.2 E.U./DL
WBC #/AREA URNS AUTO: NORMAL /HPF

## 2025-01-27 RX ORDER — CLOBETASOL PROPIONATE 0.5 MG/G
OINTMENT TOPICAL 2 TIMES DAILY
Qty: 60 G | Refills: 3 | Status: SHIPPED | OUTPATIENT
Start: 2025-01-27

## 2025-01-27 NOTE — LETTER
1/27/2025      Ade Wilkins  [address] [facility] [address]      Dear Colleague,    Thank you for referring your patient, Ade Wilkins, to the [facility]. Please see a copy of my visit note below.    [facility] Dermatology Note    Encounter Date: Jan 27, 2025    Dermatology Problem List:    ______________________________________    Impression/Plan:  Ade was seen today for derm problem.    Diagnoses and all orders for this visit:    Stasis dermatitis of both legs  -     clobetasol (TEMOVATE) 0.05 % external ointment; Apply topically 2 times daily. To rash on extremities using an amount sufficient to cover the area  - still some itching  - increase potency to clobetasol oint BID     Well controlled type 2 diabetes mellitus with nephropathy (H)  -     Adult Dermatology  Referral    Pain and swelling of lower extremity, unspecified laterality  -     Nt probnp inpatient; Future  -     Routine UA with microscopic; Future  -     Protein  random urine; Future  -     Protein albumin urine; Future  -     Nt probnp inpatient  -     Routine UA with microscopic  -     Protein  random urine  -     Urine Microscopic Exam  - worsening swelling  - has hx TAVR  - no obvious SOB  - has CKD and DM w/ mild proteinuria  - expect these to be not significanlyt abnormal  - follow up w/ lymphedema clinic     Other heart failure (H)  -     Nt probnp inpatient; Future  -     Nt probnp inpatient      Follow-up in 6-12 mo.       Staff Involved:  Staff Only    Carter Celis MD   of Dermatology  Department of Dermatology  [facility]      CC:   Chief Complaint   Patient presents with     Derm Problem     F/U Well controlled type 2 diabetes mellitus with nephropathy (H).       History of Present Illness:  Ms. Ade Wilkins is a 74 year old female who presents as a return patient.    Last seen 01/2023 rec  compression stockings. Using BMZ and TMC as needed. Rec sun protection    Labs:      Physical exam:  Vitals: LMP  (LMP Unknown)   GEN: well developed, well-nourished, in no acute distress, in a pleasant mood.     SKIN: Holder phototype 1  - Focused examination of the legs was performed.  - 3+ pitting edmea of feet  - 2+ pitting edema to mid shin  - erythematous scaly patches central shins   - No other lesions of concern on areas examined.     Past Medical History:   Past Medical History:   Diagnosis Date     Benign essential hypertension      Chronic kidney disease, stage 3b (H)      Diabetic retinopathy of both eyes (H)      Hypothyroidism      Obesity (BMI 30-39.9)      Osteopenia      Pure hypercholesterolemia      S/P gastric bypass      S/P TAVR (transcatheter aortic valve replacement) 06/07/2022     Type 2 diabetes mellitus (H)      White coat syndrome with diagnosis of hypertension      Past Surgical History:   Procedure Laterality Date     APPENDECTOMY       CATARACT IOL, RT/LT Bilateral      CV CORONARY ANGIOGRAM N/A 05/04/2022    Procedure: Coronary Angiogram;  Surgeon: Hector Lewis MD;  Location: [facility] CARDIAC CATH LAB     CV LEFT HEART CATH N/A 05/04/2022    Procedure: Left Heart Catheterization;  Surgeon: Hector Lewis MD;  Location: [facility] CARDIAC CATH LAB     CV PCI N/A 05/04/2022    Procedure: Percutaneous Coronary Intervention;  Surgeon: Hector Lewis MD;  Location: [facility] CARDIAC CATH LAB     CV TRANSCATHETER AORTIC VALVE REPLACEMENT-FEMORAL APPROACH N/A 06/07/2022    Procedure: Transcatheter Aortic Valve Replacement-Femoral Approach;  Surgeon: Hector Lewis MD;  Location: [facility] CARDIAC CATH LAB     JURGEN EN Y BOWEL  2004     TONSILLECTOMY & ADENOIDECTOMY         Social History:   reports that she has never smoked. She has never used smokeless tobacco. She reports that she does not drink alcohol and does not use drugs.    Family History:  Family  History   Problem Relation Age of Onset     Diabetes Type 2  Mother      Glaucoma Mother      Coronary Artery Disease Mother      Abdominal Aortic Aneurysm Father      Myocardial Infarction Father      Breast Cancer Sister      Abdominal Aortic Aneurysm Brother      Bladder Cancer Brother         recurrent     Diabetes Type 2  Brother      Liver Cancer Brother      Myocardial Infarction Brother      Alzheimer Disease Paternal Grandmother      Cerebrovascular Disease Paternal Grandfather      Ovarian Cancer No family hx of      Colon Cancer No family hx of        Medications:  Current Outpatient Medications   Medication Sig Dispense Refill     acetaminophen (TYLENOL) 500 MG tablet Take 500-1,000 mg by mouth every 6 hours as needed for mild pain       aspirin (ASA) 81 MG chewable tablet Take 1 tablet (81 mg) by mouth daily Starting tomorrow. 30 tablet 3     augmented betamethasone dipropionate (DIPROLENE-AF) 0.05 % external ointment Apply topically to affected area twice daily for 3-4 weeks then use as needed 45 g 0     Calcium Carb-Cholecalciferol (CALCIUM 600+D) 600-20 MG-MCG TABS Take 1 tablet by mouth 2 times daily       cetirizine (ZYRTEC) 10 MG tablet Take 1 tablet (10 mg) by mouth daily       cholecalciferol (VITAMIN D3) 25 mcg (1000 units) capsule Take 2 capsules by mouth daily       clobetasol (TEMOVATE) 0.05 % external ointment Apply topically 2 times daily. To rash on extremities using an amount sufficient to cover the area 60 g 3     clotrimazole (LOTRIMIN) 1 % external cream Apply topically 2 times daily 15 g 1     Continuous Glucose  (FREESTYLE BALWINDER 2 READER) HENNA Use to read blood sugars as per 's instructions. 1 each 0     Continuous Glucose Sensor (FREESTYLE BALWINDER 2 SENSOR) MISC Change every 14 days. 2 each 5     furosemide (LASIX) 40 MG tablet TAKE ONE TABLET BY MOUTH ONE TIME DAILY 90 tablet 0     insulin glargine U-300 (TOUJEO SOLOSTAR) 300 UNIT/ML (1 units dial) pen Inject 24  Units subcutaneously at bedtime. 9 mL 4     Insulin Lispro-aabc, 1 U Dial, (LYUMJEV KWIKPEN) 100 UNIT/ML SOPN Inject 60 Units Subcutaneous daily Use as directed up to 60 units daily. 60 mL 3     insulin pen needle (BD FCO U/F) 32G X 4 MM miscellaneous Use 4 pen needles daily or as directed. 400 each 0     ipratropium (ATROVENT) 0.06 % nasal spray Spray 2 sprays into both nostrils 4 times daily 15 mL 11     irbesartan (AVAPRO) 150 MG tablet Take 1 tablet (150 mg) by mouth daily. 90 tablet 3     levothyroxine (SYNTHROID/LEVOTHROID) 50 MCG tablet Take 1 tablet (50 mcg) by mouth daily 90 tablet 1     magnesium gluconate (MAGONATE) 250 MG tablet Take 500 mg by mouth daily       Menthol, Topical Analgesic, 7 % LIQD Apply 1 Application. topically 3 times daily as needed       metFORMIN (GLUCOPHAGE XR) 500 MG 24 hr tablet Take 1 tablet (500 mg) by mouth 2 times daily (with meals) 180 tablet 4     metoprolol succinate ER (TOPROL XL) 25 MG 24 hr tablet Take 1 tablet (25 mg) by mouth 2 times daily. 180 tablet 3     potassium chloride juan carlos ER (KLOR-CON M10) 10 MEQ CR tablet Take 1 tablet (10 mEq) by mouth daily 90 tablet 0     rosuvastatin (CRESTOR) 20 MG tablet Take 1 tablet (20 mg) by mouth daily 90 tablet 2     Semaglutide, 1 MG/DOSE, (OZEMPIC) 4 MG/3ML pen Inject 1 mg subcutaneously every 7 days. 3 mL 3     sodium chloride (BRANDON 128) 5 % ophthalmic solution Place 1-2 drops into both eyes daily       topiramate (TOPAMAX) 25 MG tablet Take 3 tablets (75 mg) by mouth at bedtime. 270 tablet 3     triamcinolone (KENALOG) 0.1 % external ointment Apply topically once a week To left foot rash 30 g 0     VITRON-C  MG TABS tablet TAKE TWO TABLETS BY MOUTH TWICE DAILY  360 tablet 1     zinc 50 MG TABS Take 1 tablet by mouth daily       Allergies   Allergen Reactions     Chlorhexidine Rash     Clonidine Headache     Doxazosin Mesylate Rash     Fexofenadine Hydrochloride Headache     Gemfibrozil Rash     Lisinopril Angioedema      Norvasc [Amlodipine] Other (See Comments)     hair loss     Pioglitazone Hydrochloride Swelling     ankle edema               Again, thank you for allowing me to participate in the care of your patient.        Sincerely,        Carter Celis MD    Electronically signed

## 2025-01-27 NOTE — PROGRESS NOTES
[facility] Dermatology Note    Encounter Date: Jan 27, 2025    Dermatology Problem List:    ______________________________________    Impression/Plan:  Ade was seen today for derm problem.    Diagnoses and all orders for this visit:    Stasis dermatitis of both legs  -     clobetasol (TEMOVATE) 0.05 % external ointment; Apply topically 2 times daily. To rash on extremities using an amount sufficient to cover the area  - still some itching  - increase potency to clobetasol oint BID     Well controlled type 2 diabetes mellitus with nephropathy (H)  -     Adult Dermatology  Referral    Pain and swelling of lower extremity, unspecified laterality  -     Nt probnp inpatient; Future  -     Routine UA with microscopic; Future  -     Protein  random urine; Future  -     Protein albumin urine; Future  -     Nt probnp inpatient  -     Routine UA with microscopic  -     Protein  random urine  -     Urine Microscopic Exam  - worsening swelling  - has hx TAVR  - no obvious SOB  - has CKD and DM w/ mild proteinuria  - expect these to be not significanlyt abnormal  - follow up w/ lymphedema clinic     Other heart failure (H)  -     Nt probnp inpatient; Future  -     Nt probnp inpatient      Follow-up in 6-12 mo.       Staff Involved:  Staff Only    Carter Celis MD   of Dermatology  Department of Dermatology  [facility]      CC:   Chief Complaint   Patient presents with    Derm Problem     F/U Well controlled type 2 diabetes mellitus with nephropathy (H).       History of Present Illness:  Ms. Ade Wilkins is a 74 year old female who presents as a return patient.    Last seen 01/2023 rec compression stockings. Using BMZ and TMC as needed. Rec sun protection    Labs:      Physical exam:  Vitals: LMP  (LMP Unknown)   GEN: well developed, well-nourished, in no acute distress, in a pleasant mood.     SKIN: Holder phototype 1  - Focused  examination of the legs was performed.  - 3+ pitting edmea of feet  - 2+ pitting edema to mid shin  - erythematous scaly patches central shins   - No other lesions of concern on areas examined.     Past Medical History:   Past Medical History:   Diagnosis Date    Benign essential hypertension     Chronic kidney disease, stage 3b (H)     Diabetic retinopathy of both eyes (H)     Hypothyroidism     Obesity (BMI 30-39.9)     Osteopenia     Pure hypercholesterolemia     S/P gastric bypass     S/P TAVR (transcatheter aortic valve replacement) 06/07/2022    Type 2 diabetes mellitus (H)     White coat syndrome with diagnosis of hypertension      Past Surgical History:   Procedure Laterality Date    APPENDECTOMY      CATARACT IOL, RT/LT Bilateral     CV CORONARY ANGIOGRAM N/A 05/04/2022    Procedure: Coronary Angiogram;  Surgeon: Hector Lewis MD;  Location:  [facility] CARDIAC CATH LAB    CV LEFT HEART CATH N/A 05/04/2022    Procedure: Left Heart Catheterization;  Surgeon: Hector Lewis MD;  Location: [facility] CARDIAC CATH LAB    CV PCI N/A 05/04/2022    Procedure: Percutaneous Coronary Intervention;  Surgeon: Hector Lewis MD;  Location: [facility] CARDIAC CATH LAB    CV TRANSCATHETER AORTIC VALVE REPLACEMENT-FEMORAL APPROACH N/A 06/07/2022    Procedure: Transcatheter Aortic Valve Replacement-Femoral Approach;  Surgeon: Hector Lewis MD;  Location: [facility] CARDIAC CATH LAB    JURGEN EN Y BOWEL  2004    TONSILLECTOMY & ADENOIDECTOMY         Social History:   reports that she has never smoked. She has never used smokeless tobacco. She reports that she does not drink alcohol and does not use drugs.    Family History:  Family History   Problem Relation Age of Onset    Diabetes Type 2  Mother     Glaucoma Mother     Coronary Artery Disease Mother     Abdominal Aortic Aneurysm Father     Myocardial Infarction Father     Breast Cancer Sister     Abdominal Aortic Aneurysm Brother     Bladder Cancer  Brother         recurrent    Diabetes Type 2  Brother     Liver Cancer Brother     Myocardial Infarction Brother     Alzheimer Disease Paternal Grandmother     Cerebrovascular Disease Paternal Grandfather     Ovarian Cancer No family hx of     Colon Cancer No family hx of        Medications:  Current Outpatient Medications   Medication Sig Dispense Refill    acetaminophen (TYLENOL) 500 MG tablet Take 500-1,000 mg by mouth every 6 hours as needed for mild pain      aspirin (ASA) 81 MG chewable tablet Take 1 tablet (81 mg) by mouth daily Starting tomorrow. 30 tablet 3    augmented betamethasone dipropionate (DIPROLENE-AF) 0.05 % external ointment Apply topically to affected area twice daily for 3-4 weeks then use as needed 45 g 0    Calcium Carb-Cholecalciferol (CALCIUM 600+D) 600-20 MG-MCG TABS Take 1 tablet by mouth 2 times daily      cetirizine (ZYRTEC) 10 MG tablet Take 1 tablet (10 mg) by mouth daily      cholecalciferol (VITAMIN D3) 25 mcg (1000 units) capsule Take 2 capsules by mouth daily      clobetasol (TEMOVATE) 0.05 % external ointment Apply topically 2 times daily. To rash on extremities using an amount sufficient to cover the area 60 g 3    clotrimazole (LOTRIMIN) 1 % external cream Apply topically 2 times daily 15 g 1    Continuous Glucose  (FREESTYLE BALWINDER 2 READER) HENNA Use to read blood sugars as per 's instructions. 1 each 0    Continuous Glucose Sensor (FREESTYLE BALWINDER 2 SENSOR) MISC Change every 14 days. 2 each 5    furosemide (LASIX) 40 MG tablet TAKE ONE TABLET BY MOUTH ONE TIME DAILY 90 tablet 0    insulin glargine U-300 (TOUJEO SOLOSTAR) 300 UNIT/ML (1 units dial) pen Inject 24 Units subcutaneously at bedtime. 9 mL 4    Insulin Lispro-aabc, 1 U Dial, (LYUMJEV KWIKPEN) 100 UNIT/ML SOPN Inject 60 Units Subcutaneous daily Use as directed up to 60 units daily. 60 mL 3    insulin pen needle (BD FCO U/F) 32G X 4 MM miscellaneous Use 4 pen needles daily or as directed. 400 each 0     ipratropium (ATROVENT) 0.06 % nasal spray Spray 2 sprays into both nostrils 4 times daily 15 mL 11    irbesartan (AVAPRO) 150 MG tablet Take 1 tablet (150 mg) by mouth daily. 90 tablet 3    levothyroxine (SYNTHROID/LEVOTHROID) 50 MCG tablet Take 1 tablet (50 mcg) by mouth daily 90 tablet 1    magnesium gluconate (MAGONATE) 250 MG tablet Take 500 mg by mouth daily      Menthol, Topical Analgesic, 7 % LIQD Apply 1 Application. topically 3 times daily as needed      metFORMIN (GLUCOPHAGE XR) 500 MG 24 hr tablet Take 1 tablet (500 mg) by mouth 2 times daily (with meals) 180 tablet 4    metoprolol succinate ER (TOPROL XL) 25 MG 24 hr tablet Take 1 tablet (25 mg) by mouth 2 times daily. 180 tablet 3    potassium chloride juan carlos ER (KLOR-CON M10) 10 MEQ CR tablet Take 1 tablet (10 mEq) by mouth daily 90 tablet 0    rosuvastatin (CRESTOR) 20 MG tablet Take 1 tablet (20 mg) by mouth daily 90 tablet 2    Semaglutide, 1 MG/DOSE, (OZEMPIC) 4 MG/3ML pen Inject 1 mg subcutaneously every 7 days. 3 mL 3    sodium chloride (BRANDON 128) 5 % ophthalmic solution Place 1-2 drops into both eyes daily      topiramate (TOPAMAX) 25 MG tablet Take 3 tablets (75 mg) by mouth at bedtime. 270 tablet 3    triamcinolone (KENALOG) 0.1 % external ointment Apply topically once a week To left foot rash 30 g 0    VITRON-C  MG TABS tablet TAKE TWO TABLETS BY MOUTH TWICE DAILY  360 tablet 1    zinc 50 MG TABS Take 1 tablet by mouth daily       Allergies   Allergen Reactions    Chlorhexidine Rash    Clonidine Headache    Doxazosin Mesylate Rash    Fexofenadine Hydrochloride Headache    Gemfibrozil Rash    Lisinopril Angioedema    Norvasc [Amlodipine] Other (See Comments)     hair loss    Pioglitazone Hydrochloride Swelling     ankle edema

## 2025-01-28 DIAGNOSIS — L30.9 DERMATITIS: ICD-10-CM

## 2025-01-28 DIAGNOSIS — I87.2 VENOUS STASIS DERMATITIS OF BOTH LOWER EXTREMITIES: ICD-10-CM

## 2025-01-28 RX ORDER — BETAMETHASONE DIPROPIONATE 0.5 MG/G
OINTMENT, AUGMENTED TOPICAL
Qty: 45 G | Refills: 0 | Status: SHIPPED | OUTPATIENT
Start: 2025-01-28

## 2025-01-28 RX ORDER — TRIAMCINOLONE ACETONIDE 1 MG/G
OINTMENT TOPICAL WEEKLY
Qty: 30 G | Refills: 0 | Status: SHIPPED | OUTPATIENT
Start: 2025-01-28

## 2025-01-29 NOTE — TELEPHONE ENCOUNTER
RECORDS RECEIVED FROM: internal   DATE RECEIVED: 4/25/25   NOTES STATUS DETAILS   OFFICE NOTE from referring provider Internal   Judith Aviles MD      OFFICE NOTE from other specialist  Internal 12/13/24 [address]    MEDICATION LIST Internal    IMAGING  (NEED IMAGES AND REPORTS)     LABS     CBC Internal 2/1/24   BMP Internal 12/17/24   UA Internal 1/27/25   BIOPSY

## 2025-02-27 ENCOUNTER — MYC MEDICAL ADVICE (OUTPATIENT)
Dept: ENDOCRINOLOGY | Facility: CLINIC | Age: 75
End: 2025-02-27
Payer: COMMERCIAL

## 2025-02-27 DIAGNOSIS — E11.40 TYPE 2 DIABETES MELLITUS WITH DIABETIC NEUROPATHY, UNSPECIFIED WHETHER LONG TERM INSULIN USE (H): Primary | ICD-10-CM

## 2025-03-05 DIAGNOSIS — E11.40 TYPE 2 DIABETES MELLITUS WITH DIABETIC NEUROPATHY, UNSPECIFIED WHETHER LONG TERM INSULIN USE (H): ICD-10-CM

## 2025-03-10 NOTE — TELEPHONE ENCOUNTER
Last Written Prescription:  Continuous Glucose Sensor (FREESTYLE BALWINDER 2 SENSOR) [facility] 2 each 0 2/7/2025 -- No   Sig: Change every 14 days.       Last Visit Date: 9-16-24  Future Visit Date: 3-19-25    Refill decision: Medication refilled per endocrine protocol    Request from pharmacy:  Requested Prescriptions   Pending Prescriptions Disp Refills    Continuous Glucose Sensor (FREESTYLE BALWINDER 2 SENSOR) MISC [Pharmacy Med Name: FreeStyle Balwinder 2 Sensor Miscellaneous] 2 each 0     Sig: Change every 14 days.       There is no refill protocol information for this order

## 2025-03-11 ENCOUNTER — LAB (OUTPATIENT)
Dept: LAB | Facility: CLINIC | Age: 75
End: 2025-03-11
Payer: COMMERCIAL

## 2025-03-11 DIAGNOSIS — N18.32 CHRONIC KIDNEY DISEASE, STAGE 3B (H): ICD-10-CM

## 2025-03-11 DIAGNOSIS — E11.40 TYPE 2 DIABETES MELLITUS WITH DIABETIC NEUROPATHY, UNSPECIFIED WHETHER LONG TERM INSULIN USE (H): ICD-10-CM

## 2025-03-11 LAB
BUN SERPL-MCNC: 36.4 MG/DL (ref 8–23)
CHOLEST SERPL-MCNC: 130 MG/DL
CREAT SERPL-MCNC: 1.97 MG/DL (ref 0.51–0.95)
CREAT UR-MCNC: 17 MG/DL
EGFRCR SERPLBLD CKD-EPI 2021: 26 ML/MIN/1.73M2
ERYTHROCYTE [DISTWIDTH] IN BLOOD BY AUTOMATED COUNT: 13.8 % (ref 10–15)
EST. AVERAGE GLUCOSE BLD GHB EST-MCNC: 180 MG/DL
FASTING STATUS PATIENT QL REPORTED: YES
FASTING STATUS PATIENT QL REPORTED: YES
GLUCOSE SERPL-MCNC: 157 MG/DL (ref 70–99)
HBA1C MFR BLD: 7.9 % (ref 0–5.6)
HCT VFR BLD AUTO: 36.2 % (ref 35–47)
HDLC SERPL-MCNC: 52 MG/DL
HGB BLD-MCNC: 11.7 G/DL (ref 11.7–15.7)
LDLC SERPL CALC-MCNC: 60 MG/DL
MCH RBC QN AUTO: 28.3 PG (ref 26.5–33)
MCHC RBC AUTO-ENTMCNC: 32.3 G/DL (ref 31.5–36.5)
MCV RBC AUTO: 87 FL (ref 78–100)
MICROALBUMIN UR-MCNC: 23.4 MG/L
MICROALBUMIN/CREAT UR: 137.65 MG/G CR (ref 0–25)
NONHDLC SERPL-MCNC: 78 MG/DL
PLATELET # BLD AUTO: 279 10E3/UL (ref 150–450)
POTASSIUM SERPL-SCNC: 4.7 MMOL/L (ref 3.4–5.3)
RBC # BLD AUTO: 4.14 10E6/UL (ref 3.8–5.2)
TRIGL SERPL-MCNC: 92 MG/DL
TSH SERPL DL<=0.005 MIU/L-ACNC: 2.83 UIU/ML (ref 0.3–4.2)
WBC # BLD AUTO: 8.7 10E3/UL (ref 4–11)

## 2025-03-11 PROCEDURE — 84520 ASSAY OF UREA NITROGEN: CPT

## 2025-03-11 PROCEDURE — 84132 ASSAY OF SERUM POTASSIUM: CPT

## 2025-03-11 PROCEDURE — 82043 UR ALBUMIN QUANTITATIVE: CPT

## 2025-03-11 PROCEDURE — 84443 ASSAY THYROID STIM HORMONE: CPT

## 2025-03-11 PROCEDURE — 80061 LIPID PANEL: CPT

## 2025-03-11 PROCEDURE — 82947 ASSAY GLUCOSE BLOOD QUANT: CPT

## 2025-03-11 PROCEDURE — 36415 COLL VENOUS BLD VENIPUNCTURE: CPT

## 2025-03-11 PROCEDURE — 82565 ASSAY OF CREATININE: CPT

## 2025-03-11 PROCEDURE — 83036 HEMOGLOBIN GLYCOSYLATED A1C: CPT

## 2025-03-11 PROCEDURE — 85027 COMPLETE CBC AUTOMATED: CPT

## 2025-03-11 PROCEDURE — 82570 ASSAY OF URINE CREATININE: CPT

## 2025-03-11 NOTE — PROGRESS NOTES
Virtual Visit Details    Type of service:  Video Visit   Video Start Time: 1:00 PM  Video End Time:1:20 PM    Originating Location (pt. Location): Home    Distant Location (provider location):  Off-site  Platform used for Video Visit: Kendra    Outcome for 03/11/25 3:04 PM: Data uploaded on Tomorrow  Nila Rowell MA  Outcome for 03/17/25 8:59 AM: Per patient, will upload device before appointment  Tano Hopkins MA  Outcome for 03/19/25 6:32 AM: Data obtained via Tomorrow website  Tano Hopkins MA                          [facility]       Follow up T2DM      Assessment/Plan:   74 y old woman with type 2 diabetes with chronic renal sufficiency 3 CKD stage III, retinopathy, obesity    1.  Type 2 diabetes- Kaia's glucose remains in a good range re A1c and time in range at goal without hypoglycemia     Blood glucose control   Tresiba to long-acting insulin.  that is covered by insurance, will try Lantus or generic for Lantus at 32 units daily,  lispro generic 4 units for each 15 g of carbohydrates, Semaglutide 1 mg weekly, metformin 1000 mg once daily, reviewed that she might have to decrease the long-acting insulin by 5 units if she truly becomes more active when she is cleaning out the house    2. Complication   Retinopathy: Yes.  She also has macular edema and follows with ophthalmologist regularly.   Nephropathy:  BP well controlled. On irbesartan .  She does have CKD stage IV  Feet: No concerns, no symptoms  Lipids:  On high dose statin rosuvastatin 20 mg daily    3.  Hypertension patient is on furosemide 40 mg once daily, metoprolol 25 mg twice daily and irbesartan 150 mg daily;    4.  Dyslipidemia on rosuvastatin 20 mg daily LDL cholesterol below 70 March 2025    5.  CKD patient recently with CKD 4, is on irbesartan, statin, intolerance to SGLT2 inhibitor continue with weight loss; and blood pressure and blood glucose control    6.  Hypothyroidism on levothyroxine 50 mcg 1 tablet daily-TSH normal  and stable March 2025    7 obesity she has been on Ozempic 1 mg weekly,     F/U with me Roxanne Masterson in 6 months and myself in 12m    40 minutes spent on the date of the encounter doing chart review, review of test results, interpretation of tests, patient visit and documentation     This note was generated using computer recognized voice recognition. This might result in some expected imperfection.    Olga Lidia Hoover MD  Endocrinology and Diabetes  Telephone contact:  [facility] [place] [phone]  [facility] [phone]                Interval history  LV with Roxanne Masterson 6 months ago  Had   [place] glucose sensor download   Last 2 weeks time in range 59% low and very low 0% very high 11% GMI 7.6%  Glucose patterns better overall appears to be with mild to moderate postprandial hyperglycemia    Blood pressure well controlled, followed by Cardiology and PCP.    Current diabetes medications  Tresiba 32 units at night.   LisproAabcHumalog- depends on what she is eating 4 units/15g carb or about 15 units with each meal    Semaglutide 1 mg weekly  Metformin 1000 mg daily.     She is tolerating the medication well.    She checks blood pressure once or twice at home.  Typically well-controlled  She has appointment with nephrology in regards to CKD 4 in the next couple of weeks    Previous treatments:   Jardiance 10 mg daily stopped in 2022- was expensive.  Actos peripheral edema    Breakfast- breakfast bar or oatmeal or cream of wheat, rarely toast.   Lunch- sandwich- bagel with ham or chicken.   Evening- Riced cauliflower with green beans or cheese.  Fish or chicken or sometimes pork.    Snack before bed if glucose is lower.     She otherwise is generally feeling well and has no other concerns.     Past Medical History:   Diagnosis Date    Benign essential hypertension     Chronic kidney disease, stage 3b (H)     Diabetic retinopathy of both eyes (H)     Hypothyroidism      Obesity (BMI 30-39.9)     Osteopenia     Pure hypercholesterolemia     S/P gastric bypass     S/P TAVR (transcatheter aortic valve replacement) 06/07/2022    Type 2 diabetes mellitus (H)     White coat syndrome with diagnosis of hypertension        Past Surgical History:   Procedure Laterality Date    APPENDECTOMY      CATARACT IOL, RT/LT Bilateral     CV CORONARY ANGIOGRAM N/A 05/04/2022    Procedure: Coronary Angiogram;  Surgeon: Hector Lewis MD;  Location:  [facility]    CV LEFT HEART CATH N/A 05/04/2022    Procedure: Left Heart Catheterization;  Surgeon: Hector Lewis MD;  Location:  [facility]    CV PCI N/A 05/04/2022    Procedure: Percutaneous Coronary Intervention;  Surgeon: Hector Lewis MD;  Location:  [facility]    CV TRANSCATHETER AORTIC VALVE REPLACEMENT-FEMORAL APPROACH N/A 06/07/2022    Procedure: Transcatheter Aortic Valve Replacement-Femoral Approach;  Surgeon: Hector Lewis MD;  Location:  [facility]    JURGEN EN Y BOWEL  2004    TONSILLECTOMY & ADENOIDECTOMY         Family History   Problem Relation Age of Onset    Diabetes Type 2  Mother     Glaucoma Mother     Coronary Artery Disease Mother     Abdominal Aortic Aneurysm Father     Myocardial Infarction Father     Breast Cancer Sister     Abdominal Aortic Aneurysm Brother     Bladder Cancer Brother         recurrent    Diabetes Type 2  Brother     Liver Cancer Brother     Myocardial Infarction Brother     Alzheimer Disease Paternal Grandmother     Cerebrovascular Disease Paternal Grandfather     Ovarian Cancer No family hx of     Colon Cancer No family hx of        Social History     Socioeconomic History    Marital status:      Spouse name: Not on file    Number of children: 0    Years of education: Not on file    Highest education level: Not on file   Occupational History     Employer: [employer],[address] [employer]   Social Needs     Financial resource strain: Not on file    Food insecurity:     Worry: Not on file     Inability: Not on file    Transportation needs:     Medical: Not on file     Non-medical: Not on file   Tobacco Use    Smoking status: Never Smoker    Smokeless tobacco: Never Used   Substance and Sexual Activity    Alcohol use: No    Drug use: No    Sexual activity: Never     Partners: Male   Lifestyle    Physical activity:     Days per week: Not on file     Minutes per session: Not on file    Stress: Not on file   Relationships    Social connections:     Talks on phone: Not on file     Gets together: Not on file     Attends [employer] service: Not on file     Active member of club or organization: Not on file     Attends meetings of clubs or organizations: Not on file     Relationship status: Not on file    Intimate partner violence:     Fear of current or ex partner: Not on file     Emotionally abused: Not on file     Physically abused: Not on file     Forced sexual activity: Not on file   Other Topics Concern     Service Not Asked    Blood Transfusions Not Asked    Caffeine Concern Yes     Comment: 20 oz daily    Occupational Exposure Not Asked    Hobby Hazards Not Asked    Sleep Concern Not Asked    Stress Concern Not Asked    Weight Concern Not Asked    Special Diet Not Asked    Back Care Not Asked    Exercise No    Bike Helmet Not Asked    Seat Belt Yes    Self-Exams Yes    Parent/sibling w/ CABG, MI or angioplasty before 65F 55M? Not Asked   Social History Narrative    Living arrangements - the patient lives alone.     Social Hx: Lives with her sister and her sister's boyfriend.  . 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     Current Outpatient Medications   Medication Sig Dispense Refill    acetaminophen (TYLENOL) 500 MG tablet Take 500-1,000 mg by mouth every 6 hours as needed for mild pain      aspirin (ASA) 81 MG chewable tablet Take 1 tablet (81 mg) by mouth daily Starting tomorrow. 30  tablet 3    augmented betamethasone dipropionate (DIPROLENE-AF) 0.05 % external ointment Apply topically to affected area twice daily for 3-4 weeks then use as needed 45 g 0    Calcium Carb-Cholecalciferol (CALCIUM 600+D) 600-20 MG-MCG TABS Take 1 tablet by mouth 2 times daily      cetirizine (ZYRTEC) 10 MG tablet Take 1 tablet (10 mg) by mouth daily      cholecalciferol (VITAMIN D3) 25 mcg (1000 units) capsule Take 2 capsules by mouth daily      clobetasol (TEMOVATE) 0.05 % external ointment Apply topically 2 times daily. To rash on extremities using an amount sufficient to cover the area 60 g 3    clotrimazole (LOTRIMIN) 1 % external cream Apply topically 2 times daily 15 g 1    Continuous Glucose  (FREESTYLE BALWINDER 2 READER) HENNA Use to read blood sugars as per 's instructions. 1 each 0    Continuous Glucose Sensor (FREESTYLE BALWINDER 2 SENSOR) MISC Change every 14 days. 2 each 2    furosemide (LASIX) 40 MG tablet TAKE ONE TABLET BY MOUTH ONE TIME DAILY 90 tablet 0    insulin glargine U-300 (TOUJEO SOLOSTAR) 300 UNIT/ML (1 units dial) pen Inject 24 Units subcutaneously at bedtime. 9 mL 4    Insulin Lispro-aabc, 1 U Dial, (LYUMJEV KWIKPEN) 100 UNIT/ML SOPN Inject 60 Units Subcutaneous daily Use as directed up to 60 units daily. 60 mL 3    insulin pen needle (BD FCO U/F) 32G X 4 MM miscellaneous Use 4 pen needles daily or as directed. 400 each 0    ipratropium (ATROVENT) 0.06 % nasal spray Spray 2 sprays into both nostrils 4 times daily 15 mL 11    irbesartan (AVAPRO) 150 MG tablet Take 1 tablet (150 mg) by mouth daily. 90 tablet 3    levothyroxine (SYNTHROID/LEVOTHROID) 50 MCG tablet Take 1 tablet (50 mcg) by mouth daily 90 tablet 1    magnesium gluconate (MAGONATE) 250 MG tablet Take 500 mg by mouth daily      Menthol, Topical Analgesic, 7 % LIQD Apply 1 Application. topically 3 times daily as needed      metFORMIN (GLUCOPHAGE XR) 500 MG 24 hr tablet Take 1 tablet (500 mg) by mouth 2 times daily  (with meals) 180 tablet 4    metoprolol succinate ER (TOPROL XL) 25 MG 24 hr tablet Take 1 tablet (25 mg) by mouth 2 times daily. 180 tablet 3    potassium chloride juan carlos ER (KLOR-CON M10) 10 MEQ CR tablet Take 1 tablet (10 mEq) by mouth daily 90 tablet 2    rosuvastatin (CRESTOR) 20 MG tablet Take 1 tablet (20 mg) by mouth daily 90 tablet 2    Semaglutide, 1 MG/DOSE, (OZEMPIC) 4 MG/3ML pen Inject 1 mg subcutaneously every 7 days. 3 mL 3    sodium chloride (BRANDON 128) 5 % ophthalmic solution Place 1-2 drops into both eyes daily      topiramate (TOPAMAX) 25 MG tablet Take 3 tablets (75 mg) by mouth at bedtime. 270 tablet 3    triamcinolone (KENALOG) 0.1 % external ointment Apply topically once a week To left foot rash 30 g 0    VITRON-C  MG TABS tablet TAKE TWO TABLETS BY MOUTH TWICE DAILY  360 tablet 1    zinc 50 MG TABS Take 1 tablet by mouth daily       No current facility-administered medications for this visit.          Allergies   Allergen Reactions    Chlorhexidine Rash    Clonidine Headache    Doxazosin Mesylate Rash    Fexofenadine Hydrochloride Headache    Gemfibrozil Rash    Lisinopril Angioedema    Norvasc [Amlodipine] Other (See Comments)     hair loss    Pioglitazone Hydrochloride Swelling     ankle edema     Physical Exam  LMP  (LMP Unknown)   Wt Readings from Last 4 Encounters:   01/06/25 87.7 kg (193 lb 6.4 oz)   09/30/24 92.8 kg (204 lb 8 oz)   09/16/24 91.6 kg (202 lb)   06/11/24 94.9 kg (209 lb 3.2 oz)     Reported vitals:  There were no vitals taken for this visit.   healthy, alert and no distress  PSYCH: Alert and oriented times 3; coherent speech, normal   rate and volume, able to articulate logical thoughts, able   to abstract reason, no tangential thoughts, no hallucinations   or delusions  Her affect is normal and pleasant  RESP: No cough, no audible wheezing, able to talk in full sentences  Remainder of exam unable to be completed due to telephone visits     Lab Results   Component  Value Date     12/17/2024    CHLORIDE 101 12/17/2024    CO2 28 12/17/2024     (H) 03/11/2025    CR 1.97 (H) 03/11/2025    CR 2.10 (H) 12/17/2024    CR 1.37 (H) 02/01/2024    CR 1.48 (H) 08/16/2023    CR 1.52 (H) 07/13/2023    RASHEEDA 9.9 12/17/2024    MAG 2.6 (H) 06/09/2022    ALBUMIN 3.3 (L) 06/08/2022    ALKPHOS 89 06/08/2022    LDL 60 03/11/2025    HDL 52 03/11/2025    TRIG 92 03/11/2025    TSH 2.83 03/11/2025    TSH 1.83 02/01/2024    TSH 2.88 07/13/2023     Lab Results   Component Value Date    MICROL 23.4 03/11/2025    MICROL 201.0 02/01/2024    MICROL <12.0 07/13/2023    MICROL 11 08/09/2022    MICROL 6 08/27/2021     Lab Results   Component Value Date    A1C 7.9 (H) 03/11/2025    A1C 7.3 (H) 09/16/2024    A1C 7.0 (H) 02/01/2024    A1C 7.9 (H) 08/09/2022    A1C 8.0 (H) 06/08/2022       Lab Results   Component Value Date    HGB 11.7 03/11/2025

## 2025-03-12 ENCOUNTER — TELEPHONE (OUTPATIENT)
Dept: NEPHROLOGY | Facility: CLINIC | Age: 75
End: 2025-03-12
Payer: COMMERCIAL

## 2025-03-12 DIAGNOSIS — I10 ESSENTIAL HYPERTENSION: ICD-10-CM

## 2025-03-13 RX ORDER — POTASSIUM CHLORIDE 750 MG/1
10 TABLET, EXTENDED RELEASE ORAL DAILY
Qty: 90 TABLET | Refills: 2 | Status: SHIPPED | OUTPATIENT
Start: 2025-03-13

## 2025-03-17 ENCOUNTER — TELEPHONE (OUTPATIENT)
Dept: ENDOCRINOLOGY | Facility: CLINIC | Age: 75
End: 2025-03-17
Payer: COMMERCIAL

## 2025-03-19 ENCOUNTER — VIRTUAL VISIT (OUTPATIENT)
Dept: ENDOCRINOLOGY | Facility: CLINIC | Age: 75
End: 2025-03-19
Payer: COMMERCIAL

## 2025-03-19 DIAGNOSIS — E11.40 TYPE 2 DIABETES MELLITUS WITH DIABETIC NEUROPATHY, UNSPECIFIED WHETHER LONG TERM INSULIN USE (H): Primary | ICD-10-CM

## 2025-03-19 PROCEDURE — 98007 SYNCH AUDIO-VIDEO EST HI 40: CPT | Performed by: INTERNAL MEDICINE

## 2025-03-19 NOTE — NURSING NOTE
Is the patient currently in the state of [place]? YES    Location: home    Visit mode:VIDEO    If the visit is dropped, the patient can be reconnected by: VIDEO VISIT: Text to cell phone:   Telephone Information:   Mobile [phone]    and VIDEO VISIT: Send to e-mail at: [email or web address]    Will anyone else be joining the visit? NO  (If patient encounters technical issues they should call [phone]    Are changes needed to the allergy or medication list? No    Are refills needed on medications prescribed by this physician? NO    Reason for visit: ALIN Hernandez, Virtual Visit Facilitator    QNR Status: NA

## 2025-03-19 NOTE — LETTER
3/19/2025       RE: Ade Wilkins  [address] [facility] [place] [address]     Dear Colleague,    Thank you for referring your patient, Ade Wilkins, to the [facility] ENDOCRINOLOG[facility] [place] at [facility]. Please see a copy of my visit note below.    Virtual Visit Details    Type of service:  Video Visit   Video Start Time: 1:00 PM  Video End Time:1:20 PM    Originating Location (pt. Location): Home    Distant Location (provider location):  Off-site  Platform used for Video Visit: Kendra    Outcome for 03/11/25 3:04 PM: Data uploaded on Tuizzi  Nila Rowell MA  Outcome for 03/17/25 8:59 AM: Per patient, will upload device before appointment  Tano Hopkins MA  Outcome for 03/19/25 6:32 AM: Data obtained via Tuizzi website  Tano Hopkins MA                          Endocrinology and Diabetes Clinic       Follow up T2DM      Assessment/Plan:   74 y old woman with type 2 diabetes with chronic renal sufficiency 3 CKD stage III, retinopathy, obesity    1.  Type 2 diabetes- Kaia's glucose remains in a good range re A1c and time in range at goal without hypoglycemia     Blood glucose control   Tresiba to long-acting insulin.  that is covered by insurance, will try Lantus or generic for Lantus at 32 units daily,  lispro generic 4 units for each 15 g of carbohydrates, Semaglutide 1 mg weekly, metformin 1000 mg once daily, reviewed that she might have to decrease the long-acting insulin by 5 units if she truly becomes more active when she is cleaning out the house    2. Complication   Retinopathy: Yes.  She also has macular edema and follows with ophthalmologist regularly.   Nephropathy:  BP well controlled. On irbesartan .  She does have CKD stage IV  Feet: No concerns, no symptoms  Lipids:  On high dose statin rosuvastatin 20 mg daily    3.  Hypertension patient is on furosemide 40 mg once daily, metoprolol 25 mg twice daily and irbesartan 150 mg  daily;    4.  Dyslipidemia on rosuvastatin 20 mg daily LDL cholesterol below 70 March 2025    5.  CKD patient recently with CKD 4, is on irbesartan, statin, intolerance to SGLT2 inhibitor continue with weight loss; and blood pressure and blood glucose control    6.  Hypothyroidism on levothyroxine 50 mcg 1 tablet daily-TSH normal and stable March 2025    7 obesity she has been on Ozempic 1 mg weekly,     F/U with me Roxanne Masterson in 6 months and myself in 12m    40 minutes spent on the date of the encounter doing chart review, review of test results, interpretation of tests, patient visit and documentation     This note was generated using computer recognized voice recognition. This might result in some expected imperfection.    Olga Lidia Hoover MD  Endocrinology and Diabetes  Telephone contact:  [facility] [place] [phone]  [facility] [phone]                Interval history  LV with Roxanne Masterson 6 months ago  Had   Wyatt glucose sensor download   Last 2 weeks time in range 59% low and very low 0% very high 11% GMI 7.6%  Glucose patterns better overall appears to be with mild to moderate postprandial hyperglycemia    Blood pressure well controlled, followed by Cardiology and PCP.    Current diabetes medications  Tresiba 32 units at night.   LisproAabcHumalog- depends on what she is eating 4 units/15g carb or about 15 units with each meal    Semaglutide 1 mg weekly  Metformin 1000 mg daily.     She is tolerating the medication well.    She checks blood pressure once or twice at home.  Typically well-controlled  She has appointment with nephrology in regards to CKD 4 in the next couple of weeks    Previous treatments:   Jardiance 10 mg daily stopped in 2022- was expensive.  Actos peripheral edema    Breakfast- breakfast bar or oatmeal or cream of wheat, rarely toast.   Lunch- sandwich- bagel with ham or chicken.   Evening- Riced cauliflower with green beans or  cheese.  Fish or chicken or sometimes pork.    Snack before bed if glucose is lower.     She otherwise is generally feeling well and has no other concerns.     Past Medical History:   Diagnosis Date     Benign essential hypertension      Chronic kidney disease, stage 3b (H)      Diabetic retinopathy of both eyes (H)      Hypothyroidism      Obesity (BMI 30-39.9)      Osteopenia      Pure hypercholesterolemia      S/P gastric bypass      S/P TAVR (transcatheter aortic valve replacement) 06/07/2022     Type 2 diabetes mellitus (H)      White coat syndrome with diagnosis of hypertension        Past Surgical History:   Procedure Laterality Date     APPENDECTOMY       CATARACT IOL, RT/LT Bilateral      CV CORONARY ANGIOGRAM N/A 05/04/2022    Procedure: Coronary Angiogram;  Surgeon: Hector Lewis MD;  Location:  HEART CARDIAC CATH LAB     CV LEFT HEART CATH N/A 05/04/2022    Procedure: Left Heart Catheterization;  Surgeon: Hector Lewis MD;  Location: [facility] CARDIAC CATH LAB     CV PCI N/A 05/04/2022    Procedure: Percutaneous Coronary Intervention;  Surgeon: Hector Lewis MD;  Location:  HEART CARDIAC CATH LAB     CV TRANSCATHETER AORTIC VALVE REPLACEMENT-FEMORAL APPROACH N/A 06/07/2022    Procedure: Transcatheter Aortic Valve Replacement-Femoral Approach;  Surgeon: Hector Lewis MD;  Location:  HEART CARDIAC CATH LAB     JURGEN EN Y BOWEL  2004     TONSILLECTOMY & ADENOIDECTOMY         Family History   Problem Relation Age of Onset     Diabetes Type 2  Mother      Glaucoma Mother      Coronary Artery Disease Mother      Abdominal Aortic Aneurysm Father      Myocardial Infarction Father      Breast Cancer Sister      Abdominal Aortic Aneurysm Brother      Bladder Cancer Brother         recurrent     Diabetes Type 2  Brother      Liver Cancer Brother      Myocardial Infarction Brother      Alzheimer Disease Paternal Grandmother      Cerebrovascular Disease Paternal Grandfather       Ovarian Cancer No family hx of      Colon Cancer No family hx of        Social History     Socioeconomic History     Marital status:      Spouse name: Not on file     Number of children: 0     Years of education: Not on file     Highest education level: Not on file   Occupational History     Employer: [employer],[address] [place] [address]   Social Needs     Financial resource strain: Not on file     Food insecurity:     Worry: Not on file     Inability: Not on file     Transportation needs:     Medical: Not on file     Non-medical: Not on file   Tobacco Use     Smoking status: Never Smoker     Smokeless tobacco: Never Used   Substance and Sexual Activity     Alcohol use: No     Drug use: No     Sexual activity: Never     Partners: Male   Lifestyle     Physical activity:     Days per week: Not on file     Minutes per session: Not on file     Stress: Not on file   Relationships     Social connections:     Talks on phone: Not on file     Gets together: Not on file     Attends Mosque service: Not on file     Active member of club or organization: Not on file     Attends meetings of clubs or organizations: Not on file     Relationship status: Not on file     Intimate partner violence:     Fear of current or ex partner: Not on file     Emotionally abused: Not on file     Physically abused: Not on file     Forced sexual activity: Not on file   Other Topics Concern      Service Not Asked     Blood Transfusions Not Asked     Caffeine Concern Yes     Comment: 20 oz daily     Occupational Exposure Not Asked     Hobby Hazards Not Asked     Sleep Concern Not Asked     Stress Concern Not Asked     Weight Concern Not Asked     Special Diet Not Asked     Back Care Not Asked     Exercise No     Bike Helmet Not Asked     Seat Belt Yes     Self-Exams Yes     Parent/sibling w/ CABG, MI or angioplasty before 65F 55M? Not Asked   Social History Narrative    Living arrangements - the patient lives alone.      Social Hx: Lives with her sister and her sister's boyfriend.  . 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     Current Outpatient Medications   Medication Sig Dispense Refill     acetaminophen (TYLENOL) 500 MG tablet Take 500-1,000 mg by mouth every 6 hours as needed for mild pain       aspirin (ASA) 81 MG chewable tablet Take 1 tablet (81 mg) by mouth daily Starting tomorrow. 30 tablet 3     augmented betamethasone dipropionate (DIPROLENE-AF) 0.05 % external ointment Apply topically to affected area twice daily for 3-4 weeks then use as needed 45 g 0     Calcium Carb-Cholecalciferol (CALCIUM 600+D) 600-20 MG-MCG TABS Take 1 tablet by mouth 2 times daily       cetirizine (ZYRTEC) 10 MG tablet Take 1 tablet (10 mg) by mouth daily       cholecalciferol (VITAMIN D3) 25 mcg (1000 units) capsule Take 2 capsules by mouth daily       clobetasol (TEMOVATE) 0.05 % external ointment Apply topically 2 times daily. To rash on extremities using an amount sufficient to cover the area 60 g 3     clotrimazole (LOTRIMIN) 1 % external cream Apply topically 2 times daily 15 g 1     Continuous Glucose  (FREESTYLE BALWINDER 2 READER) HENNA Use to read blood sugars as per 's instructions. 1 each 0     Continuous Glucose Sensor (FREESTYLE BALWINDER 2 SENSOR) MISC Change every 14 days. 2 each 2     furosemide (LASIX) 40 MG tablet TAKE ONE TABLET BY MOUTH ONE TIME DAILY 90 tablet 0     insulin glargine U-300 (TOUJEO SOLOSTAR) 300 UNIT/ML (1 units dial) pen Inject 24 Units subcutaneously at bedtime. 9 mL 4     Insulin Lispro-aabc, 1 U Dial, (LYUMJEV KWIKPEN) 100 UNIT/ML SOPN Inject 60 Units Subcutaneous daily Use as directed up to 60 units daily. 60 mL 3     insulin pen needle (BD FCO U/F) 32G X 4 MM miscellaneous Use 4 pen needles daily or as directed. 400 each 0     ipratropium (ATROVENT) 0.06 % nasal spray Spray 2 sprays into both nostrils 4 times daily 15 mL 11     irbesartan (AVAPRO) 150 MG tablet  Take 1 tablet (150 mg) by mouth daily. 90 tablet 3     levothyroxine (SYNTHROID/LEVOTHROID) 50 MCG tablet Take 1 tablet (50 mcg) by mouth daily 90 tablet 1     magnesium gluconate (MAGONATE) 250 MG tablet Take 500 mg by mouth daily       Menthol, Topical Analgesic, 7 % LIQD Apply 1 Application. topically 3 times daily as needed       metFORMIN (GLUCOPHAGE XR) 500 MG 24 hr tablet Take 1 tablet (500 mg) by mouth 2 times daily (with meals) 180 tablet 4     metoprolol succinate ER (TOPROL XL) 25 MG 24 hr tablet Take 1 tablet (25 mg) by mouth 2 times daily. 180 tablet 3     potassium chloride juan carlos ER (KLOR-CON M10) 10 MEQ CR tablet Take 1 tablet (10 mEq) by mouth daily 90 tablet 2     rosuvastatin (CRESTOR) 20 MG tablet Take 1 tablet (20 mg) by mouth daily 90 tablet 2     Semaglutide, 1 MG/DOSE, (OZEMPIC) 4 MG/3ML pen Inject 1 mg subcutaneously every 7 days. 3 mL 3     sodium chloride (BRANDON 128) 5 % ophthalmic solution Place 1-2 drops into both eyes daily       topiramate (TOPAMAX) 25 MG tablet Take 3 tablets (75 mg) by mouth at bedtime. 270 tablet 3     triamcinolone (KENALOG) 0.1 % external ointment Apply topically once a week To left foot rash 30 g 0     VITRON-C  MG TABS tablet TAKE TWO TABLETS BY MOUTH TWICE DAILY  360 tablet 1     zinc 50 MG TABS Take 1 tablet by mouth daily       No current facility-administered medications for this visit.          Allergies   Allergen Reactions     Chlorhexidine Rash     Clonidine Headache     Doxazosin Mesylate Rash     Fexofenadine Hydrochloride Headache     Gemfibrozil Rash     Lisinopril Angioedema     Norvasc [Amlodipine] Other (See Comments)     hair loss     Pioglitazone Hydrochloride Swelling     ankle edema     Physical Exam  LMP  (LMP Unknown)   Wt Readings from Last 4 Encounters:   01/06/25 87.7 kg (193 lb 6.4 oz)   09/30/24 92.8 kg (204 lb 8 oz)   09/16/24 91.6 kg (202 lb)   06/11/24 94.9 kg (209 lb 3.2 oz)     Reported vitals:  There were no vitals taken for  this visit.   healthy, alert and no distress  PSYCH: Alert and oriented times 3; coherent speech, normal   rate and volume, able to articulate logical thoughts, able   to abstract reason, no tangential thoughts, no hallucinations   or delusions  Her affect is normal and pleasant  RESP: No cough, no audible wheezing, able to talk in full sentences  Remainder of exam unable to be completed due to telephone visits     Lab Results   Component Value Date     12/17/2024    CHLORIDE 101 12/17/2024    CO2 28 12/17/2024     (H) 03/11/2025    CR 1.97 (H) 03/11/2025    CR 2.10 (H) 12/17/2024    CR 1.37 (H) 02/01/2024    CR 1.48 (H) 08/16/2023    CR 1.52 (H) 07/13/2023    RASHEEDA 9.9 12/17/2024    MAG 2.6 (H) 06/09/2022    ALBUMIN 3.3 (L) 06/08/2022    ALKPHOS 89 06/08/2022    LDL 60 03/11/2025    HDL 52 03/11/2025    TRIG 92 03/11/2025    TSH 2.83 03/11/2025    TSH 1.83 02/01/2024    TSH 2.88 07/13/2023     Lab Results   Component Value Date    MICROL 23.4 03/11/2025    MICROL 201.0 02/01/2024    MICROL <12.0 07/13/2023    MICROL 11 08/09/2022    MICROL 6 08/27/2021     Lab Results   Component Value Date    A1C 7.9 (H) 03/11/2025    A1C 7.3 (H) 09/16/2024    A1C 7.0 (H) 02/01/2024    A1C 7.9 (H) 08/09/2022    A1C 8.0 (H) 06/08/2022       Lab Results   Component Value Date    HGB 11.7 03/11/2025             Again, thank you for allowing me to participate in the care of your patient.      Sincerely,    Olga Lidia Hoover MD

## 2025-03-19 NOTE — PATIENT INSTRUCTIONS
Increase Ozempic 2 mg weekly  Decrease Toujeo to 16-20 units daily  Continue Humalog 1 unit for 4 grams

## 2025-03-24 DIAGNOSIS — E11.40 POORLY CONTROLLED TYPE 2 DIABETES MELLITUS WITH NEUROPATHY (H): ICD-10-CM

## 2025-03-24 DIAGNOSIS — E11.65 POORLY CONTROLLED TYPE 2 DIABETES MELLITUS WITH NEUROPATHY (H): ICD-10-CM

## 2025-03-24 DIAGNOSIS — E11.40 TYPE 2 DIABETES MELLITUS WITH DIABETIC NEUROPATHY, UNSPECIFIED WHETHER LONG TERM INSULIN USE (H): Primary | ICD-10-CM

## 2025-03-25 DIAGNOSIS — E11.40 POORLY CONTROLLED TYPE 2 DIABETES MELLITUS WITH NEUROPATHY (H): ICD-10-CM

## 2025-03-25 DIAGNOSIS — E11.65 POORLY CONTROLLED TYPE 2 DIABETES MELLITUS WITH NEUROPATHY (H): ICD-10-CM

## 2025-03-25 RX ORDER — INSULIN GLARGINE 300 U/ML
20 INJECTION, SOLUTION SUBCUTANEOUS DAILY
Qty: 6 ML | Refills: 0 | Status: SHIPPED | OUTPATIENT
Start: 2025-03-25 | End: 2025-06-23

## 2025-03-25 RX ORDER — INSULIN GLARGINE 300 U/ML
INJECTION, SOLUTION SUBCUTANEOUS
Qty: 9 ML | Refills: 4 | OUTPATIENT
Start: 2025-03-25

## 2025-03-25 NOTE — PROGRESS NOTES
Will update prescription for patient instruction at visit with Dr. Hoover. Increase Ozempic 2 mg weekly  Decrease Toujeo to 16-20 units daily

## 2025-04-01 ENCOUNTER — NURSE TRIAGE (OUTPATIENT)
Dept: INTERNAL MEDICINE | Facility: CLINIC | Age: 75
End: 2025-04-01
Payer: COMMERCIAL

## 2025-04-07 ENCOUNTER — VIRTUAL VISIT (OUTPATIENT)
Dept: EDUCATION SERVICES | Facility: CLINIC | Age: 75
End: 2025-04-07
Payer: COMMERCIAL

## 2025-04-07 DIAGNOSIS — E11.65 POORLY CONTROLLED TYPE 2 DIABETES MELLITUS WITH NEUROPATHY (H): ICD-10-CM

## 2025-04-07 DIAGNOSIS — E11.40 POORLY CONTROLLED TYPE 2 DIABETES MELLITUS WITH NEUROPATHY (H): ICD-10-CM

## 2025-04-07 PROCEDURE — G0108 DIAB MANAGE TRN  PER INDIV: HCPCS | Mod: 95 | Performed by: DIETITIAN, REGISTERED

## 2025-04-07 RX ORDER — INSULIN GLARGINE 300 U/ML
18 INJECTION, SOLUTION SUBCUTANEOUS DAILY
Qty: 6 ML | Refills: 0 | Status: SHIPPED | OUTPATIENT
Start: 2025-04-07

## 2025-04-07 NOTE — PROGRESS NOTES
Diabetes Self-Management Education & Support    Presents for: CGM Review    Type of service:  Video Visit    If the video visit is dropped, the video visit invitation should be resent by: Text to cell phone: [phone]    Originating Location (pt. Location): Home  Distant Location (provider location): Offsite  Mode of Communication:  Video Conference via Chujian    Video Start Time:  1:03pm  Video End Time (time video stopped): 1:51pm    How would patient like to obtain AVS? MyChart      Assessment  Ade did not have her most recent blood glucose results uploaded- last data available was reviewed at her Endocrinology appointment on 3/18/25. At that time, had planned to increase Ozempic to 2 mg weekly dose and lower Toujeo to 20 units. Ade just picked up her 2 mg Ozempic but already lowered Toujeo to 20 units. Says she had a low blood glucose overnight and thinks less Toujeo is needed as she at dinner at 9:30pm, took her rapid-acting insulin (Lymjev) and was fine at 2am (blood glucose 140 mg/dL). By 4 am, she was down to 62 mg/dL. Recommended lowering Toujeo to 18 units (10% reduction) and if happens again, OK to lower to 16 units.     Ade continues to follow carbohydrate ratio of 1:4 and feels this is working well. No other changes to medications recommended at this time. Encouraged her to continue to try to eat during day and include protein with meals. She also has some irritation with a new adhesive she bought and has some red bumps develop. Did discuss the Dexcom but there may be a one time cost for a new reader if the Wyatt 2 [place] was ordered in the past 5 years. Encouraged her to reach out if wants to try the Dexcom to see if better tolerated with skin irritation. Pt verbalized understanding of concepts discussed and recommendations provided.       See CGM Reports in Monitoring section below.      Glucose Patterns & Trends:  Time in range of  mg/dL, 59% of the time. Patient not  meeting ADA Time in Range Targets. and Time below range of 70 mg/dL, 0% of the time.    Care Plan and Education Provided:  Healthy Eating: Balanced meals, Monitoring: Individual glucose targets and Log and interpret results, and Taking Medication: Action of prescribed medication(s) and When to take medication(s)    Patient verbalized understanding of diabetes self-management education concepts discussed, opportunities for ongoing education and support, and recommendations provided today.    Plan    -Lower Toujeo: 0-0-0-20 --> 0-0-0-18 unit(s)     Topics to cover at upcoming visits: Taking Medication and Problem Solving    Follow-up: PRN.     See Care Plan for co-developed, patient-state behavior change goals.    Education Materials Provided:  No new materials provided today      Subjective/Objective  Ade is an 74 year old year old, presenting for the following diabetes education related to: Presents for: CGM Review  Accompanied by: Self  Diabetes education in the past 24mo: Yes  Focus of Visit: Taking Medication, CGM  Type of CGM visit: Personal CGM Follow-up  Diabetes type: Type 2  Date of diagnosis: 1978. Started insulin when she is in her 40's.  Disease course: Stable  How confident are you filling out medical forms by yourself:: Extremely  Diabetes management related comments/concerns: Says she has a rash on her foot. Says she took off the compression sock and it pulled off the skin last week Tuesday. Says has some soreness but thinks it look like it is doing ok. Says swelling in her toes is improved. Had a new Apple IPad.      Says having problems with taste of food - everything that tasted good last month no longer tastes good. This occurred prior to Ozempic increase- just received on Friday (4/4). Says her side effects with 1 mg Ozempic has improved. Says she will tolerate food for 6 months and then need to change it up.     With Ozempic, has to check clock to remind her to eat. Says she is not as hungry.  "  She had a lot blood glucose last night- thinks Toujeo may need to be reduced. Says she ate dinner and took rapid-acting insulin at 9:15pm. Her blood glucose before bed: 140 mg/dL at 2:30am and dropped to 62 mg/dL at 4am.     Says her Lymjev is 1 unit for every 4 gm carbohydrates. Says she needs to take it before meals since she sees higher blood glucose. Unsure how much she will eat for the meal.     Difficulty affording diabetes medication?: No  Difficulty affording diabetes testing supplies?: No  Other concerns:: Glasses, Physical impairment (Uses a cane to help with walking)  Cultural Influences/Ethnic Background:  Not  or       Diabetes Symptoms & Complications:  Diabetes Related Symptoms: Polyuria (increased urination)  Weight trend: Fluctuating  Symptom course: Stable  Disease course: Stable  Complications assessed today?: Yes  CVA: Yes (Very small stroke while in hospital after getting new heart valve. She continues to see cardiologist for it.)  Nephropathy: Yes  Retinopathy: Yes    Patient Problem List and Family Medical History reviewed for relevant medical history, current medical status, and diabetes risk factors.    Vitals:  LMP  (LMP Unknown)   Estimated body mass index is 36.54 kg/m  as calculated from the following:    Height as of 9/30/24: 1.549 m (5' 1\").    Weight as of 1/6/25: 87.7 kg (193 lb 6.4 oz).   Last 3 BP:   BP Readings from Last 3 Encounters:   01/06/25 (!) 158/60   09/30/24 (!) 158/70   09/16/24 (!) 152/69       History   Smoking Status    Never   Smokeless Tobacco    Never       Labs:  Lab Results   Component Value Date    A1C 7.9 03/11/2025    A1C 8.1 01/08/2021    HEMOGLOBINA1 7.0 07/18/2023     Lab Results   Component Value Date     03/11/2025     08/09/2022     04/13/2021     Lab Results   Component Value Date    LDL 60 03/11/2025    LDL 59 03/27/2020     HDL Cholesterol   Date Value Ref Range Status   03/27/2020 34 (L) >49 mg/dL Final     Direct " "Measure HDL   Date Value Ref Range Status   03/11/2025 52 >=50 mg/dL Final   ]  GFR Estimate   Date Value Ref Range Status   03/11/2025 26 (L) >60 mL/min/1.73m2 Final     Comment:     eGFR calculated using 2021 CKD-EPI equation.   04/13/2021 38 (L) >60 mL/min/[1.73_m2] Final     Comment:     Non  GFR Calc  Starting 12/18/2018, serum creatinine based estimated GFR (eGFR) will be   calculated using the Chronic Kidney Disease Epidemiology Collaboration   (CKD-EPI) equation.       GFR, ESTIMATED POCT   Date Value Ref Range Status   05/17/2022 32 (L) >60 mL/min/1.73m2 Final     GFR Estimate If Black   Date Value Ref Range Status   04/13/2021 45 (L) >60 mL/min/[1.73_m2] Final     Comment:      GFR Calc  Starting 12/18/2018, serum creatinine based estimated GFR (eGFR) will be   calculated using the Chronic Kidney Disease Epidemiology Collaboration   (CKD-EPI) equation.       Lab Results   Component Value Date    CR 1.97 03/11/2025    CR 1.38 04/13/2021     No results found for: \"MICROALBUMIN\"    Healthy Eating:  Healthy Eating Assessed Today: Yes  Cultural/Sabianism diet restrictions?: No  Meal planning/habits: Carb counting  Who cooks/prepares meals for you?: Self  Who purchases food in  your home?: Self, Sister  How many times a week on average do you eat food made away from home (restaurant/take-out)?: 1  Meals include: Breakfast, Lunch, Dinner, Evening Snack  Breakfast: 9am: oatmeal  Lunch: 12PM: ham on saltine crackers and cheese OR sandwich on pumpernickle bread OR soup with beans  Dinner: 9-9:30pm: stir donovan with fresh pepper and chicken breast OR chicken and fresh potatoes  Snacks: AM:9:30am: PM: apple  Other: HS: veggies straws or nut and dried fruit pack - eaitng more than usual. Will sleep 2-3am.  Beverages: Water, Tea, Diet soda      Monitoring:  Monitoring Assessed Today: Yes  Did patient bring glucose meter to appointment? : No  Blood Glucose Meter: CGM (Wyatt 2)  Times checking " blood sugar at home (number): 5+  Times checking blood sugar at home (per): Day    Patient forgot to upload most recent Wyatt data (uses [place]).   Wyatt 2 Reports from 3/5-3/18/25 (2 weeks):            Taking Medications:  Diabetes Medication(s)       Biguanides       metFORMIN (GLUCOPHAGE XR) 500 MG 24 hr tablet Take 1 tablet (500 mg) by mouth 2 times daily (with meals)       Insulin       insulin glargine U-300 (TOUJEO SOLOSTAR) 300 UNIT/ML (1 units dial) pen Inject 20 Units subcutaneously daily. Take 16-20 unit(s) daily     Insulin Lispro-aabc, 1 U Dial, (LYUMJEV KWIKPEN) 100 UNIT/ML SOPN Inject 60 Units Subcutaneous daily Use as directed up to 60 units daily.       Incretin Mimetic Agents       Semaglutide, 2 MG/DOSE, (OZEMPIC) 8 MG/3ML pen Inject 2 mg subcutaneously every 7 days.          Taking Medication Assessed Today: Yes  Current Treatments: Diet, Oral Medication (taken by mouth), Insulin Injections, Non-insulin Injectables  Dose schedule: Pre-breakfast, Pre-lunch, Pre-dinner, At bedtime  Given by: Patient  Problems taking diabetes medications regularly?: No  Diabetes medication side effects?: No    Problem Solving:  Problem Solving Assessed Today: Yes  Is the patient at risk for hypoglycemia?: Yes  Hypoglycemia Frequency: Weekly  Hypoglycemia Treatment: Other food, Glucose (tablets or gel) (hard candies, breakfast bar, glucose tablets, glucose gel, 1 package sugar- containing gum)          Leia Arellano RD  Time Spent: 48 minutes  Encounter Type: Individual    Any diabetes medication dose changes were made via the CDCES Standing Orders under the patient's referring provider.

## 2025-04-07 NOTE — PATIENT INSTRUCTIONS
"Lower Toujeo to 18 units.  -If you have another low blood glucose overnight, especially if you start the 2 mg dose, then lower to 16 units.    2. If you have any issues tolerating the 2 mg Ozempic dose, can always go up more slowly. Can \"click\" half ways to the 2 mg dose (which should be 1 mg) and can go up another quarter way (~1.25 mg) and do that for 1-2 weeks before advancing again.     3. Let us know if you want to try the Dexcom in the future (10 day wear). The only one time cost we may see pop up with changing over is if you received the Wyatt 2 reader within the past 5 years. Medicare based plans only cover 1 reader (or meter) every 5 years.  -I am happy to reach out to see if Dexcom is still doing a free upgrade for the     4. Forgot to mention and I forgot if you tried it but SkinTac works well for a lot of people and may make the adhesives more tolerable. You can usually buy a package of 50 online for $15-20 ([facility] OR [facility])    FOLLOW UP: Please call or schedule a follow up visit if help is neede with blood glucose. I will place a follow up order for 3 months out, which will allow you to scheduled a visit through [employer] if desired.    Leia Arellano RDN, LD, [facility]   [phone]    "

## 2025-04-07 NOTE — LETTER
4/7/2025         RE: Ade Wilkins  [address]  [facility] [address]        Dear Colleague,    Thank you for referring your patient, Ade Wilkins, to the [facility]. Please see a copy of my visit note below.    Diabetes Self-Management Education & Support    Presents for: CGM Review    Type of service:  Video Visit    If the video visit is dropped, the video visit invitation should be resent by: Text to cell phone: [phone]    Originating Location (pt. Location): Home  Distant Location (provider location): Offsite  Mode of Communication:  Video Conference via [employer]    Video Start Time:  1:03pm  Video End Time (time video stopped): 1:51pm    How would patient like to obtain AVS? MyChart      Assessment  Ade did not have her most recent blood glucose results uploaded- last data available was reviewed at her Endocrinology appointment on 3/18/25. At that time, had planned to increase Ozempic to 2 mg weekly dose and lower Toujeo to 20 units. Ade just picked up her 2 mg Ozempic but already lowered Toujeo to 20 units. Says she had a low blood glucose overnight and thinks less Toujeo is needed as she at dinner at 9:30pm, took her rapid-acting insulin (Lymjev) and was fine at 2am (blood glucose 140 mg/dL). By 4 am, she was down to 62 mg/dL. Recommended lowering Toujeo to 18 units (10% reduction) and if happens again, OK to lower to 16 units.     Ade continues to follow carbohydrate ratio of 1:4 and feels this is working well. No other changes to medications recommended at this time. Encouraged her to continue to try to eat during day and include protein with meals. She also has some irritation with a new adhesive she bought and has some red bumps develop. Did discuss the Dexcom but there may be a one time cost for a new reader if the Wyatt 2 [place] was ordered in the past 5 years. Encouraged her to reach out if wants to try the Dexcom to see if better  tolerated with skin irritation. Pt verbalized understanding of concepts discussed and recommendations provided.       See CGM Reports in Monitoring section below.      Glucose Patterns & Trends:  Time in range of  mg/dL, 59% of the time. Patient not meeting ADA Time in Range Targets. and Time below range of 70 mg/dL, 0% of the time.    Care Plan and Education Provided:  Healthy Eating: Balanced meals, Monitoring: Individual glucose targets and Log and interpret results, and Taking Medication: Action of prescribed medication(s) and When to take medication(s)    Patient verbalized understanding of diabetes self-management education concepts discussed, opportunities for ongoing education and support, and recommendations provided today.    Plan    -Lower Toujeo: 0-0-0-20 --> 0-0-0-18 unit(s)     Topics to cover at upcoming visits: Taking Medication and Problem Solving    Follow-up: PRN.     See Care Plan for co-developed, patient-state behavior change goals.    Education Materials Provided:  No new materials provided today      Subjective/Objective  Ade is an 74 year old year old, presenting for the following diabetes education related to: Presents for: CGM Review  Accompanied by: Self  Diabetes education in the past 24mo: Yes  Focus of Visit: Taking Medication, CGM  Type of CGM visit: Personal CGM Follow-up  Diabetes type: Type 2  Date of diagnosis: 1978. Started insulin when she is in her 40's.  Disease course: Stable  How confident are you filling out medical forms by yourself:: Extremely  Diabetes management related comments/concerns: Says she has a rash on her foot. Says she took off the compression sock and it pulled off the skin last week Tuesday. Says has some soreness but thinks it look like it is doing ok. Says swelling in her toes is improved. Had a new Apple IPad.      Says having problems with taste of food - everything that tasted good last month no longer tastes good. This occurred prior to  "Ozempic increase- just received on Friday (4/4). Says her side effects with 1 mg Ozempic has improved. Says she will tolerate food for 6 months and then need to change it up.     With Ozempic, has to check clock to remind her to eat. Says she is not as hungry.   She had a lot blood glucose last night- thinks Toujeo may need to be reduced. Says she ate dinner and took rapid-acting insulin at 9:15pm. Her blood glucose before bed: 140 mg/dL at 2:30am and dropped to 62 mg/dL at 4am.     Says her Lymjev is 1 unit for every 4 gm carbohydrates. Says she needs to take it before meals since she sees higher blood glucose. Unsure how much she will eat for the meal.     Difficulty affording diabetes medication?: No  Difficulty affording diabetes testing supplies?: No  Other concerns:: Glasses, Physical impairment (Uses a cane to help with walking)  Cultural Influences/Ethnic Background:  Not  or       Diabetes Symptoms & Complications:  Diabetes Related Symptoms: Polyuria (increased urination)  Weight trend: Fluctuating  Symptom course: Stable  Disease course: Stable  Complications assessed today?: Yes  CVA: Yes (Very small stroke while in hospital after getting new heart valve. She continues to see cardiologist for it.)  Nephropathy: Yes  Retinopathy: Yes    Patient Problem List and Family Medical History reviewed for relevant medical history, current medical status, and diabetes risk factors.    Vitals:  LMP  (LMP Unknown)   Estimated body mass index is 36.54 kg/m  as calculated from the following:    Height as of 9/30/24: 1.549 m (5' 1\").    Weight as of 1/6/25: 87.7 kg (193 lb 6.4 oz).   Last 3 BP:   BP Readings from Last 3 Encounters:   01/06/25 (!) 158/60   09/30/24 (!) 158/70   09/16/24 (!) 152/69       History   Smoking Status     Never   Smokeless Tobacco     Never       Labs:  Lab Results   Component Value Date    A1C 7.9 03/11/2025    A1C 8.1 01/08/2021    HEMOGLOBINA1 7.0 07/18/2023     Lab Results " "  Component Value Date     03/11/2025     08/09/2022     04/13/2021     Lab Results   Component Value Date    LDL 60 03/11/2025    LDL 59 03/27/2020     HDL Cholesterol   Date Value Ref Range Status   03/27/2020 34 (L) >49 mg/dL Final     Direct Measure HDL   Date Value Ref Range Status   03/11/2025 52 >=50 mg/dL Final   ]  GFR Estimate   Date Value Ref Range Status   03/11/2025 26 (L) >60 mL/min/1.73m2 Final     Comment:     eGFR calculated using 2021 CKD-EPI equation.   04/13/2021 38 (L) >60 mL/min/[1.73_m2] Final     Comment:     Non  GFR Calc  Starting 12/18/2018, serum creatinine based estimated GFR (eGFR) will be   calculated using the Chronic Kidney Disease Epidemiology Collaboration   (CKD-EPI) equation.       GFR, ESTIMATED POCT   Date Value Ref Range Status   05/17/2022 32 (L) >60 mL/min/1.73m2 Final     GFR Estimate If Black   Date Value Ref Range Status   04/13/2021 45 (L) >60 mL/min/[1.73_m2] Final     Comment:      GFR Calc  Starting 12/18/2018, serum creatinine based estimated GFR (eGFR) will be   calculated using the Chronic Kidney Disease Epidemiology Collaboration   (CKD-EPI) equation.       Lab Results   Component Value Date    CR 1.97 03/11/2025    CR 1.38 04/13/2021     No results found for: \"MICROALBUMIN\"    Healthy Eating:  Healthy Eating Assessed Today: Yes  Cultural/Adventism diet restrictions?: No  Meal planning/habits: Carb counting  Who cooks/prepares meals for you?: Self  Who purchases food in  your home?: Self, Sister  How many times a week on average do you eat food made away from home (restaurant/take-out)?: 1  Meals include: Breakfast, Lunch, Dinner, Evening Snack  Breakfast: 9am: oatmeal  Lunch: 12PM: ham on saltine crackers and cheese OR sandwich on pumpernickle bread OR soup with beans  Dinner: 9-9:30pm: stir donovan with fresh pepper and chicken breast OR chicken and fresh potatoes  Snacks: AM:9:30am: PM: apple  Other: HS: veggies " straws or nut and dried fruit pack - eaitng more than usual. Will sleep 2-3am.  Beverages: Water, Tea, Diet soda      Monitoring:  Monitoring Assessed Today: Yes  Did patient bring glucose meter to appointment? : No  Blood Glucose Meter: CGM (Wyatt 2)  Times checking blood sugar at home (number): 5+  Times checking blood sugar at home (per): Day    Patient forgot to upload most recent Wyatt data (uses [facility]).   Wyatt 2 Reports from 3/5-3/18/25 (2 weeks):            Taking Medications:  Diabetes Medication(s)       Biguanides       metFORMIN (GLUCOPHAGE XR) 500 MG 24 hr tablet Take 1 tablet (500 mg) by mouth 2 times daily (with meals)       Insulin       insulin glargine U-300 (TOUJEO SOLOSTAR) 300 UNIT/ML (1 units dial) pen Inject 20 Units subcutaneously daily. Take 16-20 unit(s) daily     Insulin Lispro-aabc, 1 U Dial, (LYUMJEV KWIKPEN) 100 UNIT/ML SOPN Inject 60 Units Subcutaneous daily Use as directed up to 60 units daily.       Incretin Mimetic Agents       Semaglutide, 2 MG/DOSE, (OZEMPIC) 8 MG/3ML pen Inject 2 mg subcutaneously every 7 days.          Taking Medication Assessed Today: Yes  Current Treatments: Diet, Oral Medication (taken by mouth), Insulin Injections, Non-insulin Injectables  Dose schedule: Pre-breakfast, Pre-lunch, Pre-dinner, At bedtime  Given by: Patient  Problems taking diabetes medications regularly?: No  Diabetes medication side effects?: No    Problem Solving:  Problem Solving Assessed Today: Yes  Is the patient at risk for hypoglycemia?: Yes  Hypoglycemia Frequency: Weekly  Hypoglycemia Treatment: Other food, Glucose (tablets or gel) (hard candies, breakfast bar, glucose tablets, glucose gel, 1 package sugar- containing gum)          Leia Arellano RD  Time Spent: 48 minutes  Encounter Type: Individual    Any diabetes medication dose changes were made via the CDCES Standing Orders under the patient's referring provider.

## 2025-04-08 NOTE — TELEPHONE ENCOUNTER
S/w pt who states her left foot is not any worse from last weeks message.  She states she has been doing wound care and applying antibiotic cream.  Denies pain.    I scheduled her with Dr. Celis on Wednesday April 9th at 1 pm at .      Izabela BRASHER RN  [facility]  [phone]

## 2025-04-09 ENCOUNTER — OFFICE VISIT (OUTPATIENT)
Dept: DERMATOLOGY | Facility: CLINIC | Age: 75
End: 2025-04-09
Payer: COMMERCIAL

## 2025-04-09 DIAGNOSIS — L03.818 CELLULITIS OF OTHER SPECIFIED SITE: Primary | ICD-10-CM

## 2025-04-09 DIAGNOSIS — I35.0 AORTIC VALVE STENOSIS, ETIOLOGY OF CARDIAC VALVE DISEASE UNSPECIFIED: ICD-10-CM

## 2025-04-09 DIAGNOSIS — R60.0 LOWER EXTREMITY EDEMA: ICD-10-CM

## 2025-04-09 PROCEDURE — 1125F AMNT PAIN NOTED PAIN PRSNT: CPT | Performed by: STUDENT IN AN ORGANIZED HEALTH CARE EDUCATION/TRAINING PROGRAM

## 2025-04-09 PROCEDURE — 99214 OFFICE O/P EST MOD 30 MIN: CPT | Performed by: STUDENT IN AN ORGANIZED HEALTH CARE EDUCATION/TRAINING PROGRAM

## 2025-04-09 RX ORDER — FUROSEMIDE 40 MG/1
40 TABLET ORAL
Qty: 180 TABLET | Refills: 0 | Status: SHIPPED | OUTPATIENT
Start: 2025-04-09

## 2025-04-09 RX ORDER — DOXYCYCLINE HYCLATE 100 MG
100 TABLET ORAL 2 TIMES DAILY
Qty: 14 TABLET | Refills: 0 | Status: SHIPPED | OUTPATIENT
Start: 2025-04-09

## 2025-04-09 ASSESSMENT — PAIN SCALES - GENERAL: PAINLEVEL_OUTOF10: MILD PAIN (2)

## 2025-04-09 NOTE — LETTER
4/9/2025      Ade Wilkins  [address] [facility] [address]      Dear Colleague,    Thank you for referring your patient, Ade Wilkins, to the [facility]. Please see a copy of my visit note below.    [facility] Dermatology Note    Encounter Date: Apr 9, 2025    Dermatology Problem List:      Major PMHx  #CKD stage IV  - On irbesartan, statin  #Hypertension  - Furosemide 40 mg metoprolol 25 mg twice daily and irbesartan  #DM  #CVD s/p stent  #s/p TAVR  ______________________________________    Impression/Plan:  Ade was seen today for derm problem.    Diagnoses and all orders for this visit:    Cellulitis of other specified site  -     doxycycline hyclate (VIBRA-TABS) 100 MG tablet; Take 1 tablet (100 mg) by mouth 2 times daily.  -Secondary to open wound that likely formed as an edema bullae since she has pretty significant 3+ pitting edema on bilateral lower extremities worse on the left than the right    Aortic valve stenosis, etiology of cardiac valve disease unspecified  -     furosemide (LASIX) 40 MG tablet; Take 1 tablet (40 mg) by mouth 2 times daily.  -On Lasix once daily tolerating it well takes a daily's potassium supplement  - Increase dose to twice daily to see if this helps her lower extremity edema given that she is not complaining of symptoms of heart failure, her echo in August was within normal limits she has not had any recent episodes of chest pain  - If she does not have any proteinuria in her urine  - It may possibly just be sodium retention from chronic kidney disease    Lower extremity edema  -     Basic metabolic panel; Future  -     Echocardiogram Complete; Future  -  skin tear 1 week ago, likely in the context of her severe edema  - increased redness over last few days  - Increase Lasix to 40 mg twice daily-check BMP in 1 week  - She denies symptoms of orthopnea but does sleep with a wedge, she states when she was  "having symptoms of chest pain and ultimately resulting in a stent placement she felt like she was \"drowning\" when she laid flat.  She continues to sleep with a wedge at of anticipatory anxiety regarding sleeping flat, however she is able to walk around her block without shortness of breath or significant exercise limitation-she had a normal echo in August that showed grade 1 diastolic dysfunction  - I think it is reasonable to repeat her echo given her history and worsening of lower extremity edema  - She had a normal NT proBNP when I checked it in January and she did not have any proteinuria-is possible that this is fluid accumulation related to her chronic kidney disease and also uses an adjustment of her diuretic dose which we are doing today  -she has follow-up with nephrology at the end of the month and is going to schedule follow-up with her heart doctor in the near future      [facility]       Staff Involved:  Staff Only    Carter Celis MD   of Dermatology  Department of Dermatology  [facility]      CC:   Chief Complaint   Patient presents with     Derm Problem     Follow-up on ulcer on left foot, lesion on top of toes, had rash and the skin peeled off when she took her compression stockings off, patient using otc gel, redness is spreading down patient's foot       History of Present Illness:  Ms. Ade Wilkins is a 74 year old female who presents as a return patient.    Ade was last seen by me on January 27 for stasis dermatitis of both legs we recommended increasing the potency of her topical steroid to clobetasol.  Additionally her angioplasty she felt like she was drowning when she laid on her back and for that reason she continues to sleep with a wedge out of anticipatory anxiety regarding sleeping flat and she states that she is able to walk around her block without issue worsening swelling of her bilateral lower " extremities she has a history of TAVR, she denied shortness of breath, she does have chronic kidney disease and type 2 diabetes with mild proteinuria.  We checked urinalysis urine protein as well as a NT proBNP her NT proBNP was within normal limits her urinalysis was without proteinuria.  Echocardiogram in August 2024 showed mild to moderate concentric LV hypertrophy with grade 1 early diastolic dysfunction.  A normal right ventricle, and a normal mitral valve gradient as well as a normal aortic valve gradient.    Patient called in on April 1 to say that she experienced a skin tear when taking off the bandage of her foot.    Labs:      Physical exam:  Vitals: LMP  (LMP Unknown)   GEN: well developed, well-nourished, in no acute distress, in a pleasant mood.     SKIN: Holder phototype 1  - Focused examination of the LE was performed.  - 3+ pitting edema L > R  - No other lesions of concern on areas examined.     Past Medical History:   Past Medical History:   Diagnosis Date     Benign essential hypertension      Chronic kidney disease, stage 3b (H)      Diabetic retinopathy of both eyes (H)      Hypothyroidism      Obesity (BMI 30-39.9)      Osteopenia      Pure hypercholesterolemia      S/P gastric bypass      S/P TAVR (transcatheter aortic valve replacement) 06/07/2022     Type 2 diabetes mellitus (H)      White coat syndrome with diagnosis of hypertension      Past Surgical History:   Procedure Laterality Date     APPENDECTOMY       CATARACT IOL, RT/LT Bilateral      CV CORONARY ANGIOGRAM N/A 05/04/2022    Procedure: Coronary Angiogram;  Surgeon: Hector Lewis MD;  Location: [facility] CARDIAC CATH LAB     CV LEFT HEART CATH N/A 05/04/2022    Procedure: Left Heart Catheterization;  Surgeon: Hector Lewis MD;  Location: [facility] CARDIAC CATH LAB     CV PCI N/A 05/04/2022    Procedure: Percutaneous Coronary Intervention;  Surgeon: Hector Lewis MD;  Location: [facility] CARDIAC CATH LAB      CV TRANSCATHETER AORTIC VALVE REPLACEMENT-FEMORAL APPROACH N/A 06/07/2022    Procedure: Transcatheter Aortic Valve Replacement-Femoral Approach;  Surgeon: Hector Lewis MD;  Location:  HEART CARDIAC CATH LAB     JURGEN EN Y BOWEL  2004     TONSILLECTOMY & ADENOIDECTOMY         Social History:   reports that she has never smoked. She has never used smokeless tobacco. She reports that she does not drink alcohol and does not use drugs.    Family History:  Family History   Problem Relation Age of Onset     Diabetes Type 2  Mother      Glaucoma Mother      Coronary Artery Disease Mother      Abdominal Aortic Aneurysm Father      Myocardial Infarction Father      Breast Cancer Sister      Abdominal Aortic Aneurysm Brother      Bladder Cancer Brother         recurrent     Diabetes Type 2  Brother      Liver Cancer Brother      Myocardial Infarction Brother      Alzheimer Disease Paternal Grandmother      Cerebrovascular Disease Paternal Grandfather      Ovarian Cancer No family hx of      Colon Cancer No family hx of        Medications:  Current Outpatient Medications   Medication Sig Dispense Refill     acetaminophen (TYLENOL) 500 MG tablet Take 500-1,000 mg by mouth every 6 hours as needed for mild pain       aspirin (ASA) 81 MG chewable tablet Take 1 tablet (81 mg) by mouth daily Starting tomorrow. 30 tablet 3     augmented betamethasone dipropionate (DIPROLENE-AF) 0.05 % external ointment Apply topically to affected area twice daily for 3-4 weeks then use as needed 45 g 0     Calcium Carb-Cholecalciferol (CALCIUM 600+D) 600-20 MG-MCG TABS Take 1 tablet by mouth 2 times daily       cetirizine (ZYRTEC) 10 MG tablet Take 1 tablet (10 mg) by mouth daily       cholecalciferol (VITAMIN D3) 25 mcg (1000 units) capsule Take 2 capsules by mouth daily       clobetasol (TEMOVATE) 0.05 % external ointment Apply topically 2 times daily. To rash on extremities using an amount sufficient to cover the area 60 g 3      clotrimazole (LOTRIMIN) 1 % external cream Apply topically 2 times daily 15 g 1     Continuous Glucose  (FREESTYLE BALWINDER 2 READER) HENNA Use to read blood sugars as per 's instructions. 1 each 0     Continuous Glucose Sensor (FREESTYLE BALWINDER 2 SENSOR) MISC Change every 14 days. 2 each 2     doxycycline hyclate (VIBRA-TABS) 100 MG tablet Take 1 tablet (100 mg) by mouth 2 times daily. 14 tablet 0     furosemide (LASIX) 40 MG tablet Take 1 tablet (40 mg) by mouth 2 times daily. 180 tablet 0     insulin glargine U-300 (TOUJEO SOLOSTAR) 300 UNIT/ML (1 units dial) pen Inject 18 Units subcutaneously daily. Take 16-20 unit(s) daily 6 mL 0     Insulin Lispro-aabc, 1 U Dial, (LYUMJEV KWIKPEN) 100 UNIT/ML SOPN Inject 60 Units Subcutaneous daily Use as directed up to 60 units daily. 60 mL 3     insulin pen needle (BD FCO U/F) 32G X 4 MM miscellaneous Use 4 pen needles daily or as directed. 400 each 0     ipratropium (ATROVENT) 0.06 % nasal spray Spray 2 sprays into both nostrils 4 times daily 15 mL 11     irbesartan (AVAPRO) 150 MG tablet Take 1 tablet (150 mg) by mouth daily. 90 tablet 3     levothyroxine (SYNTHROID/LEVOTHROID) 50 MCG tablet Take 1 tablet (50 mcg) by mouth daily 90 tablet 1     magnesium gluconate (MAGONATE) 250 MG tablet Take 500 mg by mouth daily       Menthol, Topical Analgesic, 7 % LIQD Apply 1 Application. topically 3 times daily as needed       metFORMIN (GLUCOPHAGE XR) 500 MG 24 hr tablet Take 1 tablet (500 mg) by mouth 2 times daily (with meals) 180 tablet 4     metoprolol succinate ER (TOPROL XL) 25 MG 24 hr tablet Take 1 tablet (25 mg) by mouth 2 times daily. 180 tablet 3     potassium chloride juan carlos ER (KLOR-CON M10) 10 MEQ CR tablet Take 1 tablet (10 mEq) by mouth daily 90 tablet 2     rosuvastatin (CRESTOR) 20 MG tablet Take 1 tablet (20 mg) by mouth daily 90 tablet 2     Semaglutide, 2 MG/DOSE, (OZEMPIC) 8 MG/3ML pen Inject 2 mg subcutaneously every 7 days. 9 mL 3     sodium  chloride (BRANDON 128) 5 % ophthalmic solution Place 1-2 drops into both eyes daily       topiramate (TOPAMAX) 25 MG tablet Take 3 tablets (75 mg) by mouth at bedtime. 270 tablet 3     triamcinolone (KENALOG) 0.1 % external ointment Apply topically once a week To left foot rash 30 g 0     VITRON-C  MG TABS tablet TAKE TWO TABLETS BY MOUTH TWICE DAILY  360 tablet 1     zinc 50 MG TABS Take 1 tablet by mouth daily       Allergies   Allergen Reactions     Chlorhexidine Rash     Clonidine Headache     Doxazosin Mesylate Rash     Fexofenadine Hydrochloride Headache     Gemfibrozil Rash     Lisinopril Angioedema     Norvasc [Amlodipine] Other (See Comments)     hair loss     Pioglitazone Hydrochloride Swelling     ankle edema               Again, thank you for allowing me to participate in the care of your patient.        Sincerely,        Carter Celis MD    Electronically signed

## 2025-04-09 NOTE — PROGRESS NOTES
"[facility] Dermatology Note    Encounter Date: Apr 9, 2025    Dermatology Problem List:      Major PMHx  #CKD stage IV  - On irbesartan, statin  #Hypertension  - Furosemide 40 mg metoprolol 25 mg twice daily and irbesartan  #DM  #CVD s/p stent  #s/p TAVR  ______________________________________    Impression/Plan:  Ade was seen today for derm problem.    Diagnoses and all orders for this visit:    Cellulitis of other specified site  -     doxycycline hyclate (VIBRA-TABS) 100 MG tablet; Take 1 tablet (100 mg) by mouth 2 times daily.  -Secondary to open wound that likely formed as an edema bullae since she has pretty significant 3+ pitting edema on bilateral lower extremities worse on the left than the right    Aortic valve stenosis, etiology of cardiac valve disease unspecified  -     furosemide (LASIX) 40 MG tablet; Take 1 tablet (40 mg) by mouth 2 times daily.  -On Lasix once daily tolerating it well takes a daily's potassium supplement  - Increase dose to twice daily to see if this helps her lower extremity edema given that she is not complaining of symptoms of heart failure, her echo in August was within normal limits she has not had any recent episodes of chest pain  - If she does not have any proteinuria in her urine  - It may possibly just be sodium retention from chronic kidney disease    Lower extremity edema  -     Basic metabolic panel; Future  -     Echocardiogram Complete; Future  -  skin tear 1 week ago, likely in the context of her severe edema  - increased redness over last few days  - Increase Lasix to 40 mg twice daily-check BMP in 1 week  - She denies symptoms of orthopnea but does sleep with a wedge, she states when she was having symptoms of chest pain and ultimately resulting in a stent placement she felt like she was \"drowning\" when she laid flat.  She continues to sleep with a wedge at of anticipatory anxiety regarding sleeping flat, however she is able to walk around " her block without shortness of breath or significant exercise limitation-she had a normal echo in August that showed grade 1 diastolic dysfunction  - I think it is reasonable to repeat her echo given her history and worsening of lower extremity edema  - She had a normal NT proBNP when I checked it in January and she did not have any proteinuria-is possible that this is fluid accumulation related to her chronic kidney disease and also uses an adjustment of her diuretic dose which we are doing today  -she has follow-up with nephrology at the end of the month and is going to schedule follow-up with her heart doctor in the near future      [facility]       Staff Involved:  Staff Only    Carter Celis MD   of Dermatology  Department of Dermatology  [facility]      CC:   Chief Complaint   Patient presents with    Derm Problem     Follow-up on ulcer on left foot, lesion on top of toes, had rash and the skin peeled off when she took her compression stockings off, patient using otc gel, redness is spreading down patient's foot       History of Present Illness:  Ms. Ade Wilkins is a 74 year old female who presents as a return patient.    Ade was last seen by me on January 27 for stasis dermatitis of both legs we recommended increasing the potency of her topical steroid to clobetasol.  Additionally her angioplasty she felt like she was drowning when she laid on her back and for that reason she continues to sleep with a wedge out of anticipatory anxiety regarding sleeping flat and she states that she is able to walk around her block without issue worsening swelling of her bilateral lower extremities she has a history of TAVR, she denied shortness of breath, she does have chronic kidney disease and type 2 diabetes with mild proteinuria.  We checked urinalysis urine protein as well as a NT proBNP her NT proBNP was within normal limits her urinalysis  was without proteinuria.  Echocardiogram in August 2024 showed mild to moderate concentric LV hypertrophy with grade 1 early diastolic dysfunction.  A normal right ventricle, and a normal mitral valve gradient as well as a normal aortic valve gradient.    Patient called in on April 1 to say that she experienced a skin tear when taking off the bandage of her foot.    Labs:      Physical exam:  Vitals: LMP  (LMP Unknown)   GEN: well developed, well-nourished, in no acute distress, in a pleasant mood.     SKIN: Holder phototype 1  - Focused examination of the LE was performed.  - 3+ pitting edema L > R  - No other lesions of concern on areas examined.     Past Medical History:   Past Medical History:   Diagnosis Date    Benign essential hypertension     Chronic kidney disease, stage 3b (H)     Diabetic retinopathy of both eyes (H)     Hypothyroidism     Obesity (BMI 30-39.9)     Osteopenia     Pure hypercholesterolemia     S/P gastric bypass     S/P TAVR (transcatheter aortic valve replacement) 06/07/2022    Type 2 diabetes mellitus (H)     White coat syndrome with diagnosis of hypertension      Past Surgical History:   Procedure Laterality Date    APPENDECTOMY      CATARACT IOL, RT/LT Bilateral     CV CORONARY ANGIOGRAM N/A 05/04/2022    Procedure: Coronary Angiogram;  Surgeon: Hector Lewis MD;  Location:  HEART CARDIAC CATH LAB    CV LEFT HEART CATH N/A 05/04/2022    Procedure: Left Heart Catheterization;  Surgeon: Hector Lewis MD;  Location: [facility] CARDIAC CATH LAB    CV PCI N/A 05/04/2022    Procedure: Percutaneous Coronary Intervention;  Surgeon: Hector Lewis MD;  Location: [facility] CARDIAC CATH LAB    CV TRANSCATHETER AORTIC VALVE REPLACEMENT-FEMORAL APPROACH N/A 06/07/2022    Procedure: Transcatheter Aortic Valve Replacement-Femoral Approach;  Surgeon: Hector Lewis MD;  Location:  HEART CARDIAC CATH LAB    JURGEN EN Y BOWEL  2004    TONSILLECTOMY & ADENOIDECTOMY          Social History:   reports that she has never smoked. She has never used smokeless tobacco. She reports that she does not drink alcohol and does not use drugs.    Family History:  Family History   Problem Relation Age of Onset    Diabetes Type 2  Mother     Glaucoma Mother     Coronary Artery Disease Mother     Abdominal Aortic Aneurysm Father     Myocardial Infarction Father     Breast Cancer Sister     Abdominal Aortic Aneurysm Brother     Bladder Cancer Brother         recurrent    Diabetes Type 2  Brother     Liver Cancer Brother     Myocardial Infarction Brother     Alzheimer Disease Paternal Grandmother     Cerebrovascular Disease Paternal Grandfather     Ovarian Cancer No family hx of     Colon Cancer No family hx of        Medications:  Current Outpatient Medications   Medication Sig Dispense Refill    acetaminophen (TYLENOL) 500 MG tablet Take 500-1,000 mg by mouth every 6 hours as needed for mild pain      aspirin (ASA) 81 MG chewable tablet Take 1 tablet (81 mg) by mouth daily Starting tomorrow. 30 tablet 3    augmented betamethasone dipropionate (DIPROLENE-AF) 0.05 % external ointment Apply topically to affected area twice daily for 3-4 weeks then use as needed 45 g 0    Calcium Carb-Cholecalciferol (CALCIUM 600+D) 600-20 MG-MCG TABS Take 1 tablet by mouth 2 times daily      cetirizine (ZYRTEC) 10 MG tablet Take 1 tablet (10 mg) by mouth daily      cholecalciferol (VITAMIN D3) 25 mcg (1000 units) capsule Take 2 capsules by mouth daily      clobetasol (TEMOVATE) 0.05 % external ointment Apply topically 2 times daily. To rash on extremities using an amount sufficient to cover the area 60 g 3    clotrimazole (LOTRIMIN) 1 % external cream Apply topically 2 times daily 15 g 1    Continuous Glucose  (FREESTYLE BALWINDER 2 READER) HENNA Use to read blood sugars as per 's instructions. 1 each 0    Continuous Glucose Sensor (FREESTYLE BALWINDER 2 SENSOR) MISC Change every 14 days. 2 each 2     doxycycline hyclate (VIBRA-TABS) 100 MG tablet Take 1 tablet (100 mg) by mouth 2 times daily. 14 tablet 0    furosemide (LASIX) 40 MG tablet Take 1 tablet (40 mg) by mouth 2 times daily. 180 tablet 0    insulin glargine U-300 (TOUJEO SOLOSTAR) 300 UNIT/ML (1 units dial) pen Inject 18 Units subcutaneously daily. Take 16-20 unit(s) daily 6 mL 0    Insulin Lispro-aabc, 1 U Dial, (LYUMJEV KWIKPEN) 100 UNIT/ML SOPN Inject 60 Units Subcutaneous daily Use as directed up to 60 units daily. 60 mL 3    insulin pen needle (BD FCO U/F) 32G X 4 MM miscellaneous Use 4 pen needles daily or as directed. 400 each 0    ipratropium (ATROVENT) 0.06 % nasal spray Spray 2 sprays into both nostrils 4 times daily 15 mL 11    irbesartan (AVAPRO) 150 MG tablet Take 1 tablet (150 mg) by mouth daily. 90 tablet 3    levothyroxine (SYNTHROID/LEVOTHROID) 50 MCG tablet Take 1 tablet (50 mcg) by mouth daily 90 tablet 1    magnesium gluconate (MAGONATE) 250 MG tablet Take 500 mg by mouth daily      Menthol, Topical Analgesic, 7 % LIQD Apply 1 Application. topically 3 times daily as needed      metFORMIN (GLUCOPHAGE XR) 500 MG 24 hr tablet Take 1 tablet (500 mg) by mouth 2 times daily (with meals) 180 tablet 4    metoprolol succinate ER (TOPROL XL) 25 MG 24 hr tablet Take 1 tablet (25 mg) by mouth 2 times daily. 180 tablet 3    potassium chloride juan carlos ER (KLOR-CON M10) 10 MEQ CR tablet Take 1 tablet (10 mEq) by mouth daily 90 tablet 2    rosuvastatin (CRESTOR) 20 MG tablet Take 1 tablet (20 mg) by mouth daily 90 tablet 2    Semaglutide, 2 MG/DOSE, (OZEMPIC) 8 MG/3ML pen Inject 2 mg subcutaneously every 7 days. 9 mL 3    sodium chloride (BRANDON 128) 5 % ophthalmic solution Place 1-2 drops into both eyes daily      topiramate (TOPAMAX) 25 MG tablet Take 3 tablets (75 mg) by mouth at bedtime. 270 tablet 3    triamcinolone (KENALOG) 0.1 % external ointment Apply topically once a week To left foot rash 30 g 0    VITRON-C  MG TABS tablet TAKE TWO  TABLETS BY MOUTH TWICE DAILY  360 tablet 1    zinc 50 MG TABS Take 1 tablet by mouth daily       Allergies   Allergen Reactions    Chlorhexidine Rash    Clonidine Headache    Doxazosin Mesylate Rash    Fexofenadine Hydrochloride Headache    Gemfibrozil Rash    Lisinopril Angioedema    Norvasc [Amlodipine] Other (See Comments)     hair loss    Pioglitazone Hydrochloride Swelling     ankle edema

## 2025-04-15 ENCOUNTER — MYC REFILL (OUTPATIENT)
Dept: DERMATOLOGY | Facility: CLINIC | Age: 75
End: 2025-04-15

## 2025-04-15 DIAGNOSIS — L03.818 CELLULITIS OF OTHER SPECIFIED SITE: ICD-10-CM

## 2025-04-16 ENCOUNTER — TRANSFERRED RECORDS (OUTPATIENT)
Dept: HEALTH INFORMATION MANAGEMENT | Facility: CLINIC | Age: 75
End: 2025-04-16

## 2025-04-16 ENCOUNTER — LAB (OUTPATIENT)
Dept: LAB | Facility: CLINIC | Age: 75
End: 2025-04-16
Payer: COMMERCIAL

## 2025-04-16 DIAGNOSIS — R60.0 LOWER EXTREMITY EDEMA: ICD-10-CM

## 2025-04-16 LAB
ANION GAP SERPL CALCULATED.3IONS-SCNC: 13 MMOL/L (ref 7–15)
BUN SERPL-MCNC: 51.8 MG/DL (ref 8–23)
CALCIUM SERPL-MCNC: 9.8 MG/DL (ref 8.8–10.4)
CHLORIDE SERPL-SCNC: 99 MMOL/L (ref 98–107)
CREAT SERPL-MCNC: 2.09 MG/DL (ref 0.51–0.95)
EGFRCR SERPLBLD CKD-EPI 2021: 24 ML/MIN/1.73M2
GLUCOSE SERPL-MCNC: 167 MG/DL (ref 70–99)
HCO3 SERPL-SCNC: 27 MMOL/L (ref 22–29)
POTASSIUM SERPL-SCNC: 3.9 MMOL/L (ref 3.4–5.3)
SODIUM SERPL-SCNC: 139 MMOL/L (ref 135–145)

## 2025-04-16 PROCEDURE — 80048 BASIC METABOLIC PNL TOTAL CA: CPT

## 2025-04-16 PROCEDURE — 36415 COLL VENOUS BLD VENIPUNCTURE: CPT

## 2025-04-17 RX ORDER — DOXYCYCLINE HYCLATE 100 MG
100 TABLET ORAL 2 TIMES DAILY
Qty: 14 TABLET | Refills: 0 | Status: SHIPPED | OUTPATIENT
Start: 2025-04-17

## 2025-04-22 DIAGNOSIS — N18.32 CHRONIC KIDNEY DISEASE, STAGE 3B (H): Primary | ICD-10-CM

## 2025-04-25 ENCOUNTER — PRE VISIT (OUTPATIENT)
Dept: NEPHROLOGY | Facility: CLINIC | Age: 75
End: 2025-04-25

## 2025-04-25 PROCEDURE — 82610 CYSTATIN C: CPT | Performed by: INTERNAL MEDICINE

## 2025-04-25 PROCEDURE — 99000 SPECIMEN HANDLING OFFICE-LAB: CPT | Performed by: PATHOLOGY

## 2025-04-25 PROCEDURE — 82570 ASSAY OF URINE CREATININE: CPT | Performed by: INTERNAL MEDICINE

## 2025-04-25 PROCEDURE — 82306 VITAMIN D 25 HYDROXY: CPT | Performed by: INTERNAL MEDICINE

## 2025-04-30 DIAGNOSIS — E11.21 WELL CONTROLLED TYPE 2 DIABETES MELLITUS WITH NEPHROPATHY (H): ICD-10-CM

## 2025-05-01 RX ORDER — INSULIN LISPRO-AABC 100 [IU]/ML
60 INJECTION, SOLUTION SUBCUTANEOUS DAILY
Qty: 60 ML | Refills: 0 | Status: SHIPPED | OUTPATIENT
Start: 2025-05-01

## 2025-05-01 NOTE — TELEPHONE ENCOUNTER
Last Written Prescription:     Insulin Lispro-[facility], 1 U Dial, (LYUMJEV KWIKPEN) 100 UNIT/ML SOPN 60 mL 3 3/24/2024 -- No   Sig - Route: Inject 60 Units Subcutaneous daily Use as directed up to 60 units daily. - Subcutaneous     ----------------------  Last Visit Date: 3/19/25  Future Visit Date: 3/20/26  ----------------------         Refill decision: Medication unable to be refilled by RN due to: Medication not included in refill protocol policy Insulin and insulin pump supplies - refilled per Endocrine clinic.          Request from pharmacy:  Requested Prescriptions   Pending Prescriptions Disp Refills    LYUMJEV KWIKPEN 100 UNIT/ML SOPN [Pharmacy Med Name: Lyemelyjev KwikPen Subcutaneous Solution Pen-injector 100 UNIT/ML] 60 mL 0     Sig: Inject 60 Units Subcutaneous daily       There is no refill protocol information for this order

## 2025-05-13 ENCOUNTER — TELEPHONE (OUTPATIENT)
Dept: PHARMACY | Facility: OTHER | Age: 75
End: 2025-05-13
Payer: COMMERCIAL

## 2025-05-13 NOTE — TELEPHONE ENCOUNTER
[facility] Recruitment: [facility]  insurance     Referral outreach attempt #1 on May 13, 2025      Outcome: patient opted out    [facility]  [facility]  [phone]

## 2025-05-25 DIAGNOSIS — E11.40 TYPE 2 DIABETES MELLITUS WITH DIABETIC NEUROPATHY, UNSPECIFIED WHETHER LONG TERM INSULIN USE (H): ICD-10-CM

## 2025-05-27 ENCOUNTER — TRANSFERRED RECORDS (OUTPATIENT)
Dept: HEALTH INFORMATION MANAGEMENT | Facility: CLINIC | Age: 75
End: 2025-05-27
Payer: COMMERCIAL

## 2025-05-28 ENCOUNTER — TELEPHONE (OUTPATIENT)
Dept: ENDOCRINOLOGY | Facility: CLINIC | Age: 75
End: 2025-05-28
Payer: COMMERCIAL

## 2025-05-28 DIAGNOSIS — E11.40 TYPE 2 DIABETES MELLITUS WITH DIABETIC NEUROPATHY, WITH LONG-TERM CURRENT USE OF INSULIN (H): Primary | ICD-10-CM

## 2025-05-28 DIAGNOSIS — Z79.4 TYPE 2 DIABETES MELLITUS WITH DIABETIC NEUROPATHY, WITH LONG-TERM CURRENT USE OF INSULIN (H): Primary | ICD-10-CM

## 2025-05-28 NOTE — TELEPHONE ENCOUNTER
Last Written Prescription:  Continuous Glucose Sensor (FREESTYLE BALWINDER 2 SENSOR) [facility] 2 each 2 3/10/2025     ----------------------  Last Visit Date: 3/19/25  Future Visit Date: 9/22/25  ----------------------  Refill decision: Medication refilled per  Medication Refill in Ambulatory Care  policy.    Request from pharmacy:  Requested Prescriptions   Pending Prescriptions Disp Refills    Continuous Glucose Sensor (FREESTYLE BALWINDER 2 SENSOR) MISC [Pharmacy Med Name: FreeStyle Balwinder 2 Sensor Miscellaneous] 2 each 0     Sig: Change every 14 days.       There is no refill protocol information for this order

## 2025-05-28 NOTE — TELEPHONE ENCOUNTER
Freestyle Wyatt 2 & 3 will be discontinued this fall. We are asking providers to transition patients to Freestyle Wyatt 2 Plus or Freestyle Wyatt 3 Plus Sensors.   Please send a new order to replace the 2, thank you!    Kailey Goddard [facility]  Endo Clinic Liaison  [facility]  [email or web address]   [email or web address]  Phone: [phone]  Fax: [phone]

## 2025-05-29 RX ORDER — BLOOD-GLUCOSE SENSOR
EACH MISCELLANEOUS
Qty: 6 EACH | Refills: 2 | Status: SHIPPED | OUTPATIENT
Start: 2025-05-29

## 2025-05-29 NOTE — TELEPHONE ENCOUNTER
Wyatt 2+ Sensors ordered per request. Removed regular sensors to avoid confusion.    Leia Arellano RDN, MARICRUZ, [facility]

## 2025-06-12 ENCOUNTER — MYC MEDICAL ADVICE (OUTPATIENT)
Dept: NEPHROLOGY | Facility: CLINIC | Age: 75
End: 2025-06-12
Payer: COMMERCIAL

## 2025-06-12 ENCOUNTER — PATIENT OUTREACH (OUTPATIENT)
Dept: NEPHROLOGY | Facility: CLINIC | Age: 75
End: 2025-06-12
Payer: COMMERCIAL

## 2025-06-12 SDOH — HEALTH STABILITY: PHYSICAL HEALTH: ON AVERAGE, HOW MANY DAYS PER WEEK DO YOU ENGAGE IN MODERATE TO STRENUOUS EXERCISE (LIKE A BRISK WALK)?: 0 DAYS

## 2025-06-12 SDOH — HEALTH STABILITY: PHYSICAL HEALTH: ON AVERAGE, HOW MANY MINUTES DO YOU ENGAGE IN EXERCISE AT THIS LEVEL?: 0 MIN

## 2025-06-12 ASSESSMENT — SOCIAL DETERMINANTS OF HEALTH (SDOH): HOW OFTEN DO YOU GET TOGETHER WITH FRIENDS OR RELATIVES?: ONCE A WEEK

## 2025-06-12 NOTE — PROGRESS NOTES
Nephrology Note: Patient Outreach Encounter    REASON FOR CALL:     REASON FOR CALL: Clinic Care Coordination - Initial                                  SITUATION/BACKROUND:   Patient is being treated for CKD Stage 4.    Referred to [facility] late today    Patient Journey Status:  Active:   Neph Tracking Flowsheet Last Filled Values       [facility] CKD Basics Referral 06/12/25    [facility] Modality Education Referral 06/12/25    Patient's Referral Dates Auto Populate Patient's Referral Dates    Journey Referral 4/25/25            ASSESSMENT:         Neph Assessments:  No assessment indicated    PLAN:     Education:  Method:  general discussion/verbal explanation  Discussed: KS education  These interventions were used: Goal: Attend KS  Education material provided and patient was given an opportunity to ask questions.      Follow Up:   Patient to follow up as scheduled at next appointment         Vielka Sherwood RN

## 2025-06-16 PROBLEM — E11.3213 TYPE 2 DIABETES MELLITUS WITH BOTH EYES AFFECTED BY MILD NONPROLIFERATIVE RETINOPATHY AND MACULAR EDEMA, WITH LONG-TERM CURRENT USE OF INSULIN (H): Status: RESOLVED | Noted: 2025-01-06 | Resolved: 2025-06-16

## 2025-06-16 PROBLEM — N18.32 CHRONIC KIDNEY DISEASE, STAGE 3B (H): Status: RESOLVED | Noted: 2022-03-27 | Resolved: 2025-06-16

## 2025-06-16 PROBLEM — E66.01 MORBID OBESITY (H): Status: RESOLVED | Noted: 2024-06-11 | Resolved: 2025-06-16

## 2025-06-16 PROBLEM — Z79.4 TYPE 2 DIABETES MELLITUS WITH BOTH EYES AFFECTED BY MILD NONPROLIFERATIVE RETINOPATHY AND MACULAR EDEMA, WITH LONG-TERM CURRENT USE OF INSULIN (H): Status: RESOLVED | Noted: 2025-01-06 | Resolved: 2025-06-16

## 2025-06-17 ENCOUNTER — OFFICE VISIT (OUTPATIENT)
Dept: INTERNAL MEDICINE | Facility: CLINIC | Age: 75
End: 2025-06-17
Payer: COMMERCIAL

## 2025-06-17 VITALS
HEART RATE: 68 BPM | OXYGEN SATURATION: 99 % | TEMPERATURE: 97.6 F | SYSTOLIC BLOOD PRESSURE: 161 MMHG | BODY MASS INDEX: 32.79 KG/M2 | HEIGHT: 62 IN | DIASTOLIC BLOOD PRESSURE: 69 MMHG | WEIGHT: 178.2 LBS

## 2025-06-17 DIAGNOSIS — Z85.72 HISTORY OF FOLLICULAR LYMPHOMA: ICD-10-CM

## 2025-06-17 DIAGNOSIS — N18.4 TYPE 2 DIABETES MELLITUS WITH STAGE 4 CHRONIC KIDNEY DISEASE, WITH LONG-TERM CURRENT USE OF INSULIN (H): ICD-10-CM

## 2025-06-17 DIAGNOSIS — N18.4 CKD (CHRONIC KIDNEY DISEASE) STAGE 4, GFR 15-29 ML/MIN (H): ICD-10-CM

## 2025-06-17 DIAGNOSIS — E78.00 PURE HYPERCHOLESTEROLEMIA: ICD-10-CM

## 2025-06-17 DIAGNOSIS — I50.32 CHRONIC DIASTOLIC HEART FAILURE (H): ICD-10-CM

## 2025-06-17 DIAGNOSIS — Z98.84 S/P GASTRIC BYPASS: ICD-10-CM

## 2025-06-17 DIAGNOSIS — M85.80 OSTEOPENIA, UNSPECIFIED LOCATION: ICD-10-CM

## 2025-06-17 DIAGNOSIS — E03.4 HYPOTHYROIDISM DUE TO ACQUIRED ATROPHY OF THYROID: ICD-10-CM

## 2025-06-17 DIAGNOSIS — Z23 HIGH PRIORITY FOR 2019-NCOV VACCINE: ICD-10-CM

## 2025-06-17 DIAGNOSIS — J31.0 CHRONIC RHINITIS: ICD-10-CM

## 2025-06-17 DIAGNOSIS — I25.10 CORONARY ARTERY DISEASE INVOLVING NATIVE CORONARY ARTERY OF NATIVE HEART WITHOUT ANGINA PECTORIS: ICD-10-CM

## 2025-06-17 DIAGNOSIS — E11.22 TYPE 2 DIABETES MELLITUS WITH STAGE 4 CHRONIC KIDNEY DISEASE, WITH LONG-TERM CURRENT USE OF INSULIN (H): ICD-10-CM

## 2025-06-17 DIAGNOSIS — E11.3293 MILD NONPROLIFERATIVE DIABETIC RETINOPATHY OF BOTH EYES WITHOUT MACULAR EDEMA ASSOCIATED WITH TYPE 2 DIABETES MELLITUS (H): ICD-10-CM

## 2025-06-17 DIAGNOSIS — I10 BENIGN ESSENTIAL HYPERTENSION: ICD-10-CM

## 2025-06-17 DIAGNOSIS — Z79.4 TYPE 2 DIABETES MELLITUS WITH STAGE 4 CHRONIC KIDNEY DISEASE, WITH LONG-TERM CURRENT USE OF INSULIN (H): ICD-10-CM

## 2025-06-17 DIAGNOSIS — I89.0 LYMPHEDEMA OF BOTH LOWER EXTREMITIES: ICD-10-CM

## 2025-06-17 DIAGNOSIS — Z95.2 S/P TAVR (TRANSCATHETER AORTIC VALVE REPLACEMENT): ICD-10-CM

## 2025-06-17 DIAGNOSIS — I10 WHITE COAT SYNDROME WITH DIAGNOSIS OF HYPERTENSION: ICD-10-CM

## 2025-06-17 DIAGNOSIS — Z00.00 MEDICARE ANNUAL WELLNESS VISIT, SUBSEQUENT: Primary | ICD-10-CM

## 2025-06-17 PROCEDURE — 90480 ADMN SARSCOV2 VAC 1/ONLY CMP: CPT | Performed by: INTERNAL MEDICINE

## 2025-06-17 PROCEDURE — 3078F DIAST BP <80 MM HG: CPT | Performed by: INTERNAL MEDICINE

## 2025-06-17 PROCEDURE — 3077F SYST BP >= 140 MM HG: CPT | Performed by: INTERNAL MEDICINE

## 2025-06-17 PROCEDURE — 91320 SARSCV2 VAC 30MCG TRS-SUC IM: CPT | Performed by: INTERNAL MEDICINE

## 2025-06-17 PROCEDURE — 99214 OFFICE O/P EST MOD 30 MIN: CPT | Mod: 25 | Performed by: INTERNAL MEDICINE

## 2025-06-17 PROCEDURE — G0439 PPPS, SUBSEQ VISIT: HCPCS | Performed by: INTERNAL MEDICINE

## 2025-06-17 RX ORDER — IPRATROPIUM BROMIDE 42 UG/1
2 SPRAY, METERED NASAL 4 TIMES DAILY
Qty: 15 ML | Refills: 11 | Status: SHIPPED | OUTPATIENT
Start: 2025-06-17

## 2025-06-17 NOTE — PATIENT INSTRUCTIONS
[facility] [phone] - CALL (follow up due in September)

## 2025-06-17 NOTE — PROGRESS NOTES
ASSESSMENT/PLAN                                                       (Z00.00) Medicare annual wellness visit, subsequent  (primary encounter diagnosis)  Comment: PMH, PSH, FH, SH, medications, allergies, immunizations, and preventative health measures reviewed and updated as appropriate.  Plan: see below for plans.      (E11.22,  N18.4,  Z79.4) Type 2 diabetes mellitus with stage 4 chronic kidney disease, with long-term current use of insulin (H)  Comment: stable; follows with endocrinology.     (E11.3293) Mild nonproliferative diabetic retinopathy of both eyes without macular edema associated with type 2 diabetes mellitus (H)  Comment: stable; follows with ophthalmology.     (I10) Benign essential hypertension  (I10) White coat syndrome with diagnosis of hypertension  Comment: normal blood pressure at home; elevated in office.  Plan: continue present management (irbesartan, Toprol XL).    (E78.00) Pure hypercholesterolemia  Comment: well-controlled on Crestor.    (M85.80) Osteopenia, unspecified location  Plan: repeat DEXA 2027.    (Z95.2) S/P TAVR (transcatheter aortic valve replacement)  Comment: 2022; severe aortic stenosis s/p TAVR with a 26 mm Medtronic Evolut valve; followed by cardiology.     (Z98.84) S/P gastric bypass  Comment: 2004; Noe-en-Y.    (E03.4) Hypothyroidism due to acquired atrophy of thyroid  Comment: well-controlled on levothyroxine.     (N18.4) CKD (chronic kidney disease) stage 4, GFR 15-29 ml/min (H)  Comment: stable; follows with nephrology.     (Z23) High priority for 2019-nCoV vaccine  Comment: COVID-19 booster given today.     (I25.10) Coronary artery disease involving native coronary artery of native heart without angina pectoris  Comment: s/p PCI to RCA in 2022; on aspirin, BB, and statin therapy; followed by cardiology.     (I89.0) Lymphedema of both lower extremities  Comment: stable; wears compression stockings.     (I50.32) Chronic diastolic heart failure (H)  Comment:  currently compensated on Lasix; followed by cardiology.     (J31.0) Chronic rhinitis  Comment: well-controlled with ipratropium nasal spray.  Plan: continue present management; refills provided.     (Z85.72) History of follicular lymphoma  Comment: 2008; s/p excisional biopsy; no neoadjuvant or adjuvant Rx; stage III, grade 1-2; was under surveillance by oncology until 2025;should get CBCs at least once annually.     Appropriate preventive services were discussed with this patient, including applicable screening as appropriate for cardiovascular disease, diabetes, osteopenia/osteoporosis, and glaucoma.  As appropriate for age/gender, discussed screening for colorectal cancer, prostate cancer, breast cancer, and cervical cancer. Checklist reviewing preventive services available has been given to the patient.    Reviewed patients plan of care. The Basic Care Plan (routine screening as documented in Health Maintenance) for Ade Wilkins meets the Care Plan requirement. This Care Plan has been established and reviewed with the Patient.    Judith Aviles MD   [address] [address]  T: [phone], F: [phone]    SUBJECTIVE                                                      Ade Wilkins is a very pleasant 74 year old female who presents for her subsequent AWV:    Current providers (other than myself): Zelalem (endocrinology), Jessica (nephrology), Kiera (cardiology), outside ophthalmologist    PMH, PSH, FH, SH, medications, allergies, immunizations, preventative health, and health risk assessment reviewed and updated as appropriate.    Past Medical History:   Diagnosis Date    Benign essential hypertension     CAD (coronary artery disease)     s/p PCI to RCA in 2022    Chronic diastolic heart failure (H)     Chronic rhinitis     CKD (chronic kidney disease) stage 4, GFR 15-29 ml/min (H)     Diabetic retinopathy of both eyes (H)     History of follicular lymphoma 2008     s/p excisional biopsy; no neoadjuvant or adjuvant Rx; stage III, grade 1-2; was under surveillance by oncology until 2025; now gets CBCs at least once annually    Hypothyroidism     Lymphedema of both lower extremities     Obesity (BMI 30-39.9)     Osteopenia     Pure hypercholesterolemia     S/P gastric bypass     S/P TAVR (transcatheter aortic valve replacement) 06/07/2022    severe aortic stenosis s/p TAVR with a 26 mm Medtronic Evolut valve    Type 2 diabetes mellitus (H)     White coat syndrome with diagnosis of hypertension         Past Surgical History:   Procedure Laterality Date    APPENDECTOMY      CATARACT IOL, RT/LT Bilateral     CV CORONARY ANGIOGRAM N/A 05/04/2022    Procedure: Coronary Angiogram;  Surgeon: Hector Lewis MD;  Location:  [facility]    CV LEFT HEART CATH N/A 05/04/2022    Procedure: Left Heart Catheterization;  Surgeon: Hector Lewis MD;  Location:  [facility]    CV PCI N/A 05/04/2022    Procedure: Percutaneous Coronary Intervention;  Surgeon: Hector Lewis MD;  Location:  [facility]    CV TRANSCATHETER AORTIC VALVE REPLACEMENT-FEMORAL APPROACH N/A 06/07/2022    Procedure: Transcatheter Aortic Valve Replacement-Femoral Approach;  Surgeon: Hector Lewis MD;  Location:  [facility]    JURGEN EN Y BOWEL  2004    TONSILLECTOMY & ADENOIDECTOMY       Family History   Problem Relation Age of Onset    Diabetes Type 2  Mother     Glaucoma Mother     Coronary Artery Disease Mother     Abdominal Aortic Aneurysm Father     Myocardial Infarction Father     Breast Cancer Sister     Abdominal Aortic Aneurysm Brother     Bladder Cancer Brother         recurrent    Diabetes Type 2  Brother     Liver Cancer Brother     Myocardial Infarction Brother     Alzheimer Disease Paternal Grandmother     Cerebrovascular Disease Paternal Grandfather     Ovarian Cancer No family hx of     Colon Cancer No family hx of       Social History     Occupational History    Occupation: Retired -    Tobacco Use    Smoking status: Never    Smokeless tobacco: Never   Vaping Use    Vaping status: Never Used   Substance and Sexual Activity    Alcohol use: No    Drug use: No    Sexual activity: Not Currently   Social History Narrative    . Single.    No children.    No formal exercise.      Allergies   Allergen Reactions    Chlorhexidine Rash    Clonidine Headache    Doxazosin Mesylate Rash    Fexofenadine Hydrochloride Headache    Gemfibrozil Rash    Lisinopril Angioedema    Norvasc [Amlodipine] Other (See Comments)     hair loss    Pioglitazone Hydrochloride Swelling     ankle edema     Current Outpatient Medications   Medication Sig    aspirin (ASA) 81 MG chewable tablet Take 1 tablet (81 mg) by mouth daily Starting tomorrow.    augmented betamethasone dipropionate (DIPROLENE-AF) 0.05 % external ointment Apply topically to affected area twice daily for 3-4 weeks then use as needed    Calcium Carb-Cholecalciferol (CALCIUM 600+D) 600-20 MG-MCG TABS Take 1 tablet by mouth 2 times daily    cetirizine (ZYRTEC) 10 MG tablet Take 1 tablet (10 mg) by mouth daily    cholecalciferol (VITAMIN D3) 25 mcg (1000 units) capsule Take 2 capsules by mouth daily    clobetasol (TEMOVATE) 0.05 % external ointment Apply topically 2 times daily. To rash on extremities using an amount sufficient to cover the area    Continuous Glucose  (FREESTYLE BALWINDER 2 READER) HENNA Use to read blood sugars as per 's instructions.    Continuous Glucose Sensor (FREESTYLE BALWINDER 2 PLUS SENSOR) MISC Change every 15 days.    doxycycline hyclate (VIBRA-TABS) 100 MG tablet Take 1 tablet (100 mg) by mouth 2 times daily.    furosemide (LASIX) 40 MG tablet Take 1 tablet (40 mg) by mouth 2 times daily.    insulin glargine U-300 (TOUJEO SOLOSTAR) 300 UNIT/ML (1 units dial) pen Inject 18 Units subcutaneously daily. Take 16-20 unit(s) daily     insulin pen needle (BD FCO U/F) 32G X 4 MM miscellaneous Use 4 pen needles daily or as directed.    irbesartan (AVAPRO) 150 MG tablet Take 1 tablet (150 mg) by mouth daily.    levothyroxine (SYNTHROID/LEVOTHROID) 50 MCG tablet Take 1 tablet (50 mcg) by mouth daily    LYUMJEV KWIKPEN 100 UNIT/ML SOPN Inject 60 Units Subcutaneous daily    Menthol, Topical Analgesic, 7 % LIQD Apply 1 Application. topically 3 times daily as needed    metFORMIN (GLUCOPHAGE XR) 500 MG 24 hr tablet Take 1 tablet (500 mg) by mouth 2 times daily (with meals)    metoprolol succinate ER (TOPROL XL) 25 MG 24 hr tablet Take 1 tablet (25 mg) by mouth 2 times daily.    potassium chloride juan carlos ER (KLOR-CON M10) 10 MEQ CR tablet Take 1 tablet (10 mEq) by mouth daily    rosuvastatin (CRESTOR) 20 MG tablet Take 1 tablet (20 mg) by mouth daily    Semaglutide, 2 MG/DOSE, (OZEMPIC) 8 MG/3ML pen Inject 2 mg subcutaneously every 7 days.    sodium chloride (BRANDON 128) 5 % ophthalmic solution Place 1-2 drops into both eyes daily    topiramate (TOPAMAX) 25 MG tablet Take 3 tablets (75 mg) by mouth at bedtime.    triamcinolone (KENALOG) 0.1 % external ointment Apply topically once a week To left foot rash     Immunization History   Administered Date(s) Administered    COVID-19 12+ (MODERNA) 10/11/2023, 10/01/2024    COVID-19 12+ (Pfizer) 06/11/2024    COVID-19 Bivalent 18+ (Moderna) 09/16/2022    COVID-19 MONOVALENT 12+ (Pfizer) 01/30/2021, 02/20/2021, 09/27/2021    Flu, Unspecified 09/17/2019, 09/26/2020    Influenza (High Dose) Trivalent,PF (Fluzone) 10/09/2015, 09/22/2016, 09/21/2017    Influenza (IIV3) PF 12/03/2001, 11/25/2002, 10/22/2004, 11/03/2005, 11/07/2006, 10/25/2007, 10/18/2008, 09/21/2009, 10/01/2011, 11/23/2012, 08/06/2013, 11/14/2014    Influenza Vaccine 65+ (Fluzone HD) 09/13/2021    Pneumo Conj 13-V (2010&after) 06/20/2016    Pneumococcal 23 valent 12/03/2001, 09/21/2009, 05/21/2012, 08/25/2017    TD,PF 7+ (Tenivac) 01/15/1986, 09/12/2005     TDAP Vaccine (Adacel) 10/09/2015    Zoster recombinant adjuvanted (Shingrix) 03/16/2021, 06/26/2021    Zoster vaccine, live 09/24/2012     PREVENTATIVE HEALTH                                                      BMI: obese  Blood pressure: elevated on current regimen  Breast CA screening: up to date   Colon CA screening: up to date   Lung CA screening: n/a   Dexa: up to date   Screening cholesterol: n/a - already being treated for this condition  Screening diabetes: n/a - already being treated for this condition  Alcohol misuse screening: alcohol use reviewed - no intervention indicated at this time  Immunizations: reviewed; COVID-19 booster DUE and RSV vaccination DUE - not covered by Medicare (may obtain in pharmacy if desired)    HEALTH RISK ASSESSMENT                                                      In general, how would you rate your overall physical health? good  Outside of work, how many days during the week do you exercise? None  Outside of work, approximately how many minutes a day do you exercise? n/a     If you drink alcohol do you typically have >3 drinks per day or >7 drinks per week? No     Assistance with daily activities: No    Safety concerns: No    Fall risk assessment: completed today (see ambulatory assessments)    Hearing concerns: YES - it's hard to follow conversation in a noisy restaurant or crowded room and trouble understanding softer whispered speech    In the past 6 months, have you been bothered by leaking of urine: No    Do you have a current opioid prescription? No  Do you use any other controlled substances or medications that are not prescribed by a provider? None    PHQ-2/PHQ-9 assessment: completed today (see ambulatory assessments)    Additional concerns today: No    Have you ever done Advance Care Planning? (For example, a Health Directive, POLST, or a discussion with a medical provider or your loved ones about your wishes): Yes    OBJECTIVE                                     "                  BP (!) 161/69   Pulse 68   Temp 97.6  F (36.4  C) (Temporal)   Ht 1.575 m (5' 2\")   Wt 80.8 kg (178 lb 3.2 oz)   LMP  (LMP Unknown)   SpO2 99%   BMI 32.59 kg/m    Constitutional: well-appearing  Head, Ears, and Eyes: normocephalic; normal external auditory canal and pinna; tympanic membranes visualized and normal; normal lids and conjunctivae  Neck: supple, symmetric, no thyromegaly or lymphadenopathy  Respiratory: normal respiratory effort; clear to auscultation bilaterally  Cardiovascular: regular rate and rhythm; bilateral moderate lymphedema  Psych: normal judgment and insight; normal mood and affect; recent and remote memory intact    Cognitive impairment noted: No  ---  (Note was completed, in part, with [employer] voice-recognition software. Documentation was reviewed, but some grammatical, spelling, and word errors may remain.)  "

## 2025-06-26 ENCOUNTER — MYC REFILL (OUTPATIENT)
Dept: DERMATOLOGY | Facility: CLINIC | Age: 75
End: 2025-06-26
Payer: COMMERCIAL

## 2025-06-26 DIAGNOSIS — I35.0 AORTIC VALVE STENOSIS, ETIOLOGY OF CARDIAC VALVE DISEASE UNSPECIFIED: ICD-10-CM

## 2025-06-26 RX ORDER — FUROSEMIDE 40 MG/1
40 TABLET ORAL
Qty: 180 TABLET | Refills: 0 | Status: CANCELLED | OUTPATIENT
Start: 2025-06-26

## 2025-07-01 ENCOUNTER — MYC MEDICAL ADVICE (OUTPATIENT)
Dept: INTERNAL MEDICINE | Facility: CLINIC | Age: 75
End: 2025-07-01
Payer: COMMERCIAL

## 2025-07-01 DIAGNOSIS — I35.0 AORTIC VALVE STENOSIS, ETIOLOGY OF CARDIAC VALVE DISEASE UNSPECIFIED: ICD-10-CM

## 2025-07-01 RX ORDER — FUROSEMIDE 40 MG/1
40 TABLET ORAL
Qty: 180 TABLET | Refills: 0 | Status: SHIPPED | OUTPATIENT
Start: 2025-07-01

## 2025-07-01 NOTE — TELEPHONE ENCOUNTER
Please see     Patient reports furosemide dose increased to 40 mg BID.    Per 4/9 Dermatology notes:  Lower extremity edema  -     Basic metabolic panel; Future  -     Echocardiogram Complete; Future  -  skin tear 1 week ago, likely in the context of her severe edema  - increased redness over last few days  - Increase Lasix to 40 mg twice daily-check BMP in 1 week

## 2025-07-10 ENCOUNTER — TELEPHONE (OUTPATIENT)
Dept: ENDOCRINOLOGY | Facility: CLINIC | Age: 75
End: 2025-07-10
Payer: COMMERCIAL

## 2025-07-10 NOTE — TELEPHONE ENCOUNTER
Left Voicemail (1st Attempt) and Sent Adbongohart (1st Attempt) for the patient to call back and schedule the following:    Appointment type: RETURN DIABETES  Provider: Roxanne Masterson PA-C  Return date: change 09/22 from in person to virtual or r/s  Specialty phone number: [phone]  Additional appointment(s) needed: N/A   Additonal Notes: LVM, MyCx1. Suspect caller checked the wrong format box when scheduling pt, as the call log implies a video visit was scheduled.    Roxanne Masterson PA-C  [id number]  Hello!  I noticed I have a couple of in person patients on my virtual day.  Can you please reach out to them to let them know that I will not be in person that day (make sure they are ok with virtual)?    Vandana Becker on 7/10/2025 at 2:15 PM

## 2025-07-19 ENCOUNTER — LAB (OUTPATIENT)
Dept: LAB | Facility: CLINIC | Age: 75
End: 2025-07-19
Payer: COMMERCIAL

## 2025-07-19 DIAGNOSIS — Z98.84 S/P GASTRIC BYPASS: ICD-10-CM

## 2025-07-19 DIAGNOSIS — Z95.2 S/P TAVR (TRANSCATHETER AORTIC VALVE REPLACEMENT): ICD-10-CM

## 2025-07-19 DIAGNOSIS — N18.4 CKD (CHRONIC KIDNEY DISEASE) STAGE 4, GFR 15-29 ML/MIN (H): ICD-10-CM

## 2025-07-19 DIAGNOSIS — N18.32 TYPE 2 DIABETES MELLITUS WITH STAGE 3B CHRONIC KIDNEY DISEASE, WITH LONG-TERM CURRENT USE OF INSULIN (H): ICD-10-CM

## 2025-07-19 DIAGNOSIS — E11.22 TYPE 2 DIABETES MELLITUS WITH STAGE 3B CHRONIC KIDNEY DISEASE, WITH LONG-TERM CURRENT USE OF INSULIN (H): ICD-10-CM

## 2025-07-19 DIAGNOSIS — Z79.4 TYPE 2 DIABETES MELLITUS WITH STAGE 3B CHRONIC KIDNEY DISEASE, WITH LONG-TERM CURRENT USE OF INSULIN (H): ICD-10-CM

## 2025-07-19 LAB
ALBUMIN SERPL BCG-MCNC: 4.3 G/DL (ref 3.5–5.2)
ANION GAP SERPL CALCULATED.3IONS-SCNC: 16 MMOL/L (ref 7–15)
BUN SERPL-MCNC: 51.3 MG/DL (ref 8–23)
CALCIUM SERPL-MCNC: 9.9 MG/DL (ref 8.8–10.4)
CHLORIDE SERPL-SCNC: 100 MMOL/L (ref 98–107)
CREAT SERPL-MCNC: 2.95 MG/DL (ref 0.51–0.95)
CREAT UR-MCNC: 68.4 MG/DL
EGFRCR SERPLBLD CKD-EPI 2021: 16 ML/MIN/1.73M2
FERRITIN SERPL-MCNC: 224 NG/ML (ref 11–328)
GLUCOSE SERPL-MCNC: 176 MG/DL (ref 70–99)
HCO3 SERPL-SCNC: 23 MMOL/L (ref 22–29)
HGB BLD-MCNC: 11.5 G/DL (ref 11.7–15.7)
IRON BINDING CAPACITY (ROCHE): 251 UG/DL (ref 240–430)
IRON SATN MFR SERPL: 34 % (ref 15–46)
IRON SERPL-MCNC: 85 UG/DL (ref 37–145)
MCV RBC AUTO: 86 FL (ref 78–100)
MICROALBUMIN UR-MCNC: 61.4 MG/L
MICROALBUMIN/CREAT UR: 89.77 MG/G CR (ref 0–25)
PHOSPHATE SERPL-MCNC: 3.5 MG/DL (ref 2.5–4.5)
POTASSIUM SERPL-SCNC: 4 MMOL/L (ref 3.4–5.3)
PTH-INTACT SERPL-MCNC: 68 PG/ML (ref 15–65)
SODIUM SERPL-SCNC: 139 MMOL/L (ref 135–145)
VIT D+METAB SERPL-MCNC: 86 NG/ML (ref 20–50)

## 2025-07-19 PROCEDURE — 80069 RENAL FUNCTION PANEL: CPT

## 2025-07-19 PROCEDURE — 82043 UR ALBUMIN QUANTITATIVE: CPT

## 2025-07-19 PROCEDURE — 83550 IRON BINDING TEST: CPT

## 2025-07-19 PROCEDURE — 83970 ASSAY OF PARATHORMONE: CPT

## 2025-07-19 PROCEDURE — 83540 ASSAY OF IRON: CPT

## 2025-07-19 PROCEDURE — 82570 ASSAY OF URINE CREATININE: CPT

## 2025-07-19 PROCEDURE — 83945 ASSAY OF OXALATE: CPT | Mod: 90

## 2025-07-19 PROCEDURE — 82306 VITAMIN D 25 HYDROXY: CPT

## 2025-07-19 PROCEDURE — 82728 ASSAY OF FERRITIN: CPT

## 2025-07-19 PROCEDURE — 36415 COLL VENOUS BLD VENIPUNCTURE: CPT

## 2025-07-19 PROCEDURE — 99000 SPECIMEN HANDLING OFFICE-LAB: CPT

## 2025-07-19 PROCEDURE — 85018 HEMOGLOBIN: CPT

## 2025-07-23 ENCOUNTER — VIRTUAL VISIT (OUTPATIENT)
Dept: NEPHROLOGY | Facility: CLINIC | Age: 75
End: 2025-07-23
Payer: COMMERCIAL

## 2025-07-23 DIAGNOSIS — R80.9 ALBUMINURIA: ICD-10-CM

## 2025-07-23 DIAGNOSIS — N18.4 CKD (CHRONIC KIDNEY DISEASE) STAGE 4, GFR 15-29 ML/MIN (H): Primary | ICD-10-CM

## 2025-07-23 DIAGNOSIS — I10 HYPERTENSION, ESSENTIAL: ICD-10-CM

## 2025-07-23 DIAGNOSIS — R60.9 EDEMA, UNSPECIFIED TYPE: ICD-10-CM

## 2025-07-23 LAB — OXALATE SERPL-SCNC: 3 UMOL/L

## 2025-07-23 PROCEDURE — 1125F AMNT PAIN NOTED PAIN PRSNT: CPT | Mod: 95 | Performed by: PHYSICIAN ASSISTANT

## 2025-07-23 PROCEDURE — 98007 SYNCH AUDIO-VIDEO EST HI 40: CPT | Performed by: PHYSICIAN ASSISTANT

## 2025-07-23 NOTE — PATIENT INSTRUCTIONS
Check blood pressure and weight daily, keep log.   Labs within the next week, will call with results/recommendations.   Drink to thirst, follow low sodium diet - no more than 2 grams per day  Avoid ibuprofen/aleve.   Will discuss possible medication changes with your team.   Schedule tour of dialysis unit - nurse coordinator will help with this  Schedule kidney ultrasound - number listed below  Follow up in 2 weeks.   Call sooner with any questions or concerns.     To schedule imaging please call [phone]    Please call HER[facility] with any questions or concerns [phone]

## 2025-07-23 NOTE — NURSING NOTE
Current patient location: [address] [facility] [facility] [address]    Is the patient currently in the state of [place]? YES    Visit mode: VIDEO    If the visit is dropped, the patient can be reconnected by:VIDEO VISIT: Text to cell phone:   Telephone Information:   Mobile [phone]       Will anyone else be joining the visit? NO  (If patient encounters technical issues they should call [phone]    Are changes needed to the allergy or medication list? No    Are refills needed on medications prescribed by this physician? NO    Rooming Documentation:  Not applicable    Reason for visit: RECHECK    Tawnya AWAN

## 2025-07-23 NOTE — PROGRESS NOTES
Virtual Visit Details    Type of service:  Video Visit   Video Start Time: 10:59 am  Video End Time: 11:20 am    Originating Location (pt. Location): Home    Distant Location (provider location):  On-site  Platform used for Video Visit: Kendra        Nephrology Progress Note  7/23/2025    Assessment and Plan:  74 year old female with history of hypertension, chronic diastolic heart failure, coronary artery disease s/p PCI to RCA (2022), chronic lymphedema, obesity s/p gastric bypass, and severe aortic stenosis s/p TAVR, diabetes for 20+ years, who presents for evaluation. She has CKD with SCr up to 1.5 range in last few years, and in the last few months. She is on ozempic and has lost a lot of weight and not cooking or eating    # CKD stage 4, Scr in recent months is 2.3 with eGFR 21ml/min, up from 1.5 range over last few years and 2 on last check. She was started on semaglutide and is eating hardly anything and has lost a lot of weight and likely muscle mass.  Her baseline was under 1 in early 2000s. She has diabetic kidney disease and hypertension, and has progressed significantly.  We discussed delaying progression, not sure if she will be a transplant candidate but could refer her for evaluation and likely would need living donor.   She is on ARB, was on jardiance but had stomach issues thus it was stopped (not sure if other med was responsible though).    She has heart failure and CKD and her GFR is now 21 thus may be late to start   7/19/25 Scr 2.95, eGFR 16  4/25/25 cystatin C 3.4, GFR 13, same day Scr 2.3, eGFR 21 - discrepancy noted  - check US - not completed   - monitor proteinuria at least every 6 months  - referred to CKD journey in 4/2025  - completed kidney smart class 6/2025, unsure which modality  - she will tour dialysis unit  - check cystatin c      # Hypertension: goal BP is 120-130/70-80, she is in 110s- 120 range at home, and has been told she has white coat hypertension.    BP last clinic  visit 162/69,  at home   She is on irbesartan 150mg, metoprolol ER 25 mg daily, and furosemide 40mg BID    # Anemia   Hgb 11.5,  Iron sat 34% ferritin 224  - no indication for epo or iron    # Electrolytes:   Potassium; level: 4 Normal  Bicarbonate; level: 23 Normal  Sodium 139  - no acute concerns    # Mineral Bone Disorder:   Vitamin D; level is: 86 High  PTH 68  Calcium; level is: 9.9 Normal  Phosphorus; level is:3.5  Normal   - stop vit D  - recheck vit D and PTH in 3-6 months    #DM 2  Taking semaglutide, metformin,      Assessment and plan was discussed with patient and she voiced her understanding and agreement.    Reason for Visit:  Ade Wilkins is a 74 year old female with CKD from diabetes and hypertension, who presents for evaluation.    HPI:  She is a very pleasant female with history of hypertension, chronic diastolic heart failure, coronary artery disease s/p PCI to RCA (2022), chronic lymphedema, and severe aortic stenosis s/p TAVR, obesity s/p gastric bypass, and CKD with recent Scr of 2.3 eGFR 21ml/min who presents for evaluation. She has had diabetes for over 20 years and hypertension for many years. She had CAD with stent a few years ago and has done well. She uses a cane and walker for longer distances but gets around OK and lives with her sister and sister's partner.  She feels well day to day. She denies diabetic retinopathy and follows with ophtho.  She has lymphedema which worsened and thus her furosemide was increased by dermatology.  She was on jardiance in the past but had upset stomach and thus it was stopped. She is not sure if it was jardiance or something else she was on.     We reviewed her kidney function, creatinine monitorng and discussed that given her cystatin GFR was 13 on last check. She completed kidney smart class but is unsure which modality she would choose. We discussed touring a dialysis unit and she is agreeable to this.     We discussed consideration of SGLT2 inh in  the future if GFR stable  I will check US kidneys and cystatin.    She had recent decrease in urination and increased LE edema. Furosemide was increased to 40 mg BID. Today she notes her urination has increased some again and her swelling has improved. Her BP's have been mostly 120/s systolic. She does complain of some back pain and is concerned she may have a UTI. She denies dysuria and hematuria. Has no fevers/chills. She notes some nausea related to ozempic and continues to lose weight. She has no v/d. She is hoping       Renal History:   Kidney Disease and Medical Hx:  h/o HTN: Yes   and h/o DM: Yes     ROS:   A comprehensive review of systems was obtained and negative, except as noted in the HPI or PMH.    Active Medical Problems:  Patient Active Problem List   Diagnosis    Type 2 diabetes mellitus (H)    Benign essential hypertension    Diabetic retinopathy of both eyes (H)    Pure hypercholesterolemia    Osteopenia    Obesity (BMI 30-39.9)    S/P TAVR (transcatheter aortic valve replacement)    S/P gastric bypass    Hypothyroidism    White coat syndrome with diagnosis of hypertension    CKD (chronic kidney disease) stage 4, GFR 15-29 ml/min (H)    CAD (coronary artery disease)    Lymphedema of both lower extremities    Chronic diastolic heart failure (H)    Chronic rhinitis    History of follicular lymphoma     PMH:   Medical record was reviewed and PMH was discussed with patient and noted below.  Past Medical History:   Diagnosis Date    Benign essential hypertension     CAD (coronary artery disease)     s/p PCI to RCA in 2022    Chronic diastolic heart failure (H)     Chronic rhinitis     CKD (chronic kidney disease) stage 4, GFR 15-29 ml/min (H)     Diabetic retinopathy of both eyes (H)     History of follicular lymphoma 2008    s/p excisional biopsy; no neoadjuvant or adjuvant Rx; stage III, grade 1-2; was under surveillance by oncology until 2025; should get CBCs at least once annually    Hypothyroidism      Lymphedema of both lower extremities     Obesity (BMI 30-39.9)     Osteopenia     Pure hypercholesterolemia     S/P gastric bypass     S/P TAVR (transcatheter aortic valve replacement) 06/07/2022    severe aortic stenosis s/p TAVR with a 26 mm Medtronic Evolut valve    Type 2 diabetes mellitus (H)     White coat syndrome with diagnosis of hypertension      PSH:   Past Surgical History:   Procedure Laterality Date    APPENDECTOMY      CATARACT IOL, RT/LT Bilateral     CV CORONARY ANGIOGRAM N/A 05/04/2022    Procedure: Coronary Angiogram;  Surgeon: Hector Lewis MD;  Location:  HEART CARDIAC CATH LAB    CV LEFT HEART CATH N/A 05/04/2022    Procedure: Left Heart Catheterization;  Surgeon: Hector Lewis MD;  Location: [facility] CARDIAC CATH LAB    CV PCI N/A 05/04/2022    Procedure: Percutaneous Coronary Intervention;  Surgeon: Hector Lewis MD;  Location:  HEART CARDIAC CATH LAB    CV TRANSCATHETER AORTIC VALVE REPLACEMENT-FEMORAL APPROACH N/A 06/07/2022    Procedure: Transcatheter Aortic Valve Replacement-Femoral Approach;  Surgeon: Hector Lewis MD;  Location:  HEART CARDIAC CATH LAB    JURGEN EN Y BOWEL  2004    TONSILLECTOMY & ADENOIDECTOMY         Family Hx:   Family History   Problem Relation Age of Onset    Diabetes Type 2  Mother     Glaucoma Mother     Coronary Artery Disease Mother     Abdominal Aortic Aneurysm Father     Myocardial Infarction Father     Breast Cancer Sister     Abdominal Aortic Aneurysm Brother     Bladder Cancer Brother         recurrent    Diabetes Type 2  Brother     Liver Cancer Brother     Myocardial Infarction Brother     Alzheimer Disease Paternal Grandmother     Cerebrovascular Disease Paternal Grandfather     Ovarian Cancer No family hx of     Colon Cancer No family hx of      Personal Hx:   Social History     Tobacco Use    Smoking status: Never    Smokeless tobacco: Never   Substance Use Topics    Alcohol use: No        Allergies:  Allergies   Allergen Reactions    Chlorhexidine Rash    Clonidine Headache    Doxazosin Mesylate Rash    Fexofenadine Hydrochloride Headache    Gemfibrozil Rash    Lisinopril Angioedema    Norvasc [Amlodipine] Other (See Comments)     hair loss    Pioglitazone Hydrochloride Swelling     ankle edema       Medications:  Current Outpatient Medications   Medication Sig Dispense Refill    aspirin (ASA) 81 MG chewable tablet Take 1 tablet (81 mg) by mouth daily Starting tomorrow. 30 tablet 3    augmented betamethasone dipropionate (DIPROLENE-AF) 0.05 % external ointment Apply topically to affected area twice daily for 3-4 weeks then use as needed 45 g 0    Calcium Carb-Cholecalciferol (CALCIUM 600+D) 600-20 MG-MCG TABS Take 1 tablet by mouth 2 times daily      cetirizine (ZYRTEC) 10 MG tablet Take 1 tablet (10 mg) by mouth daily      cholecalciferol (VITAMIN D3) 25 mcg (1000 units) capsule Take 2 capsules by mouth daily      clobetasol (TEMOVATE) 0.05 % external ointment Apply topically 2 times daily. To rash on extremities using an amount sufficient to cover the area 60 g 3    Continuous Glucose  (FREESTYLE BALWINDER 2 READER) HENNA Use to read blood sugars as per 's instructions. 1 each 0    Continuous Glucose Sensor (FREESTYLE BALWINDER 2 PLUS SENSOR) MISC Change every 15 days. 6 each 2    doxycycline hyclate (VIBRA-TABS) 100 MG tablet Take 1 tablet (100 mg) by mouth 2 times daily. 14 tablet 0    furosemide (LASIX) 40 MG tablet Take 1 tablet (40 mg) by mouth 2 times daily. 180 tablet 0    insulin glargine U-300 (TOUJEO SOLOSTAR) 300 UNIT/ML (1 units dial) pen Inject 18 Units subcutaneously daily. Take 16-20 unit(s) daily 6 mL 0    insulin pen needle (BD FCO U/F) 32G X 4 MM miscellaneous Use 4 pen needles daily or as directed. 400 each 0    ipratropium (ATROVENT) 0.06 % nasal spray Spray 2 sprays into both nostrils 4 times daily. 15 mL 11    irbesartan (AVAPRO) 150 MG tablet Take 1 tablet  (150 mg) by mouth daily. 90 tablet 3    levothyroxine (SYNTHROID/LEVOTHROID) 50 MCG tablet Take 1 tablet (50 mcg) by mouth daily 90 tablet 1    LYUMJEV KWIKPEN 100 UNIT/ML SOPN Inject 60 Units Subcutaneous daily 60 mL 0    Menthol, Topical Analgesic, 7 % LIQD Apply 1 Application. topically 3 times daily as needed      metFORMIN (GLUCOPHAGE XR) 500 MG 24 hr tablet Take 1 tablet (500 mg) by mouth 2 times daily (with meals) 180 tablet 4    metoprolol succinate ER (TOPROL XL) 25 MG 24 hr tablet Take 1 tablet (25 mg) by mouth 2 times daily. 180 tablet 3    potassium chloride juan carlos ER (KLOR-CON M10) 10 MEQ CR tablet Take 1 tablet (10 mEq) by mouth daily 90 tablet 2    rosuvastatin (CRESTOR) 20 MG tablet Take 1 tablet (20 mg) by mouth daily. 90 tablet 2    Semaglutide, 2 MG/DOSE, (OZEMPIC) 8 MG/3ML pen Inject 2 mg subcutaneously every 7 days. 9 mL 3    sodium chloride (BRANDON 128) 5 % ophthalmic solution Place 1-2 drops into both eyes daily      topiramate (TOPAMAX) 25 MG tablet Take 3 tablets (75 mg) by mouth at bedtime. 270 tablet 3    triamcinolone (KENALOG) 0.1 % external ointment Apply topically once a week To left foot rash 30 g 0     No current facility-administered medications for this visit.      Vitals:  LMP  (LMP Unknown)     Exam:  GENERAL APPEARANCE: alert and no distress      LABS:   CMP  Recent Labs   Lab Test 07/19/25  1408 04/25/25  0904 04/16/25  1147 03/11/25  0958 12/17/24  1416 08/27/21  0815 04/13/21  1043 03/24/21  1450 11/13/20  1203 10/09/20  1101    142 139  --  143   < > 141 139 143  --    POTASSIUM 4.0 3.5 3.9 4.7 3.8   < > 4.2 5.0 4.0 4.1   CHLORIDE 100 102 99  --  101   < > 110* 108 111*  --    CO2 23 29 27  --  28   < > 28 25 13*  --    ANIONGAP 16* 11 13  --  14   < > 3 6 19*  --    * 212* 167* 157* 125*   < > 125* 141* 169*  --    BUN 51.3* 55.5* 51.8* 36.4* 36.2*   < > 29 28 25  --    CR 2.95* 2.34* 2.09* 1.97* 2.10*   < > 1.38* 1.40* 1.21* 1.13*   GFRESTIMATED 16* 21* 24* 26*  24*   < > 38* 38* 45* 49*   GFRESTBLACK  --   --   --   --   --   --  45* 44* 52* 57*   RASHEEDA 9.9 10.1 9.8  --  9.9   < > 9.2 9.9 9.8 9.5    < > = values in this interval not displayed.     Recent Labs   Lab Test 06/08/22  0454 06/01/22  1223 04/21/22  0818 04/13/21  1043   BILITOTAL 0.4 0.7 0.3 0.5   ALKPHOS 89 111 95 103   ALT 63* 160* 94* 38   AST 42 87* 49* 20     CBC  Recent Labs   Lab Test 07/19/25  1408 04/25/25  0904 03/11/25  0958 02/01/24  0916 08/16/23  0940   HGB 11.5* 11.7 11.7 12.5 11.0*   WBC  --  8.7 8.7 8.2 8.6   RBC  --  4.17 4.14 4.49 3.92   HCT  --  36.6 36.2 39.7 34.9*   MCV 86 88 87 88 89   MCH  --  28.1 28.3 27.8 28.1   MCHC  --  32.0 32.3 31.5 31.5   RDW  --  13.4 13.8 13.6 14.2   PLT  --  246 279 273 277     URINE STUDIES  Recent Labs   Lab Test 04/25/25  0912 01/27/25  1128   COLOR Light Yellow Yellow   APPEARANCE Clear Clear   URINEGLC Negative Negative   URINEBILI Negative Negative   URINEKETONE Negative Negative   SG 1.012 1.010   UBLD Negative Negative   URINEPH 5.0 6.0   PROTEIN 10* Negative   UROBILINOGEN  --  0.2   NITRITE Negative Negative   LEUKEST Trace* Negative   RBCU 1 0-2   WBCU 3 0-5     No lab results found.  PTH  Recent Labs   Lab Test 07/19/25  1408 08/27/21  0815   PTHI 68* 56     IRON STUDIES  Recent Labs   Lab Test 07/19/25  1408 08/09/22  0844   IRON 85  --      --    IRONSAT 34  --    ADELIA 224 106         Maliha Sanchez PA-C    Visit length 21 minutes. An additional 20 minutes were spent on date of service in chart review, documentation, and other activities as noted.

## 2025-07-24 ENCOUNTER — LAB (OUTPATIENT)
Dept: LAB | Facility: CLINIC | Age: 75
End: 2025-07-24
Payer: COMMERCIAL

## 2025-07-24 DIAGNOSIS — N18.4 CKD (CHRONIC KIDNEY DISEASE) STAGE 4, GFR 15-29 ML/MIN (H): ICD-10-CM

## 2025-07-24 LAB
ALBUMIN SERPL BCG-MCNC: 4.3 G/DL (ref 3.5–5.2)
ALBUMIN UR-MCNC: 30 MG/DL
ANION GAP SERPL CALCULATED.3IONS-SCNC: 16 MMOL/L (ref 7–15)
APPEARANCE UR: CLEAR
BILIRUB UR QL STRIP: NEGATIVE
BUN SERPL-MCNC: 60.8 MG/DL (ref 8–23)
CALCIUM SERPL-MCNC: 10.2 MG/DL (ref 8.8–10.4)
CHLORIDE SERPL-SCNC: 102 MMOL/L (ref 98–107)
COLOR UR AUTO: YELLOW
CREAT SERPL-MCNC: 3.62 MG/DL (ref 0.51–0.95)
CYSTATIN C (ROCHE): 3.8 MG/L (ref 0.6–1)
EGFRCR SERPLBLD CKD-EPI 2021: 13 ML/MIN/1.73M2
GFR/BSA.PRED SERPLBLD CYS-BASED-ARV: 12 ML/MIN/1.73M2
GLUCOSE SERPL-MCNC: 97 MG/DL (ref 70–99)
GLUCOSE UR STRIP-MCNC: NEGATIVE MG/DL
HCO3 SERPL-SCNC: 25 MMOL/L (ref 22–29)
HGB UR QL STRIP: ABNORMAL
KETONES UR STRIP-MCNC: NEGATIVE MG/DL
LEUKOCYTE ESTERASE UR QL STRIP: ABNORMAL
NITRATE UR QL: NEGATIVE
PH UR STRIP: 5.5 [PH] (ref 5–7)
PHOSPHATE SERPL-MCNC: 5.1 MG/DL (ref 2.5–4.5)
POTASSIUM SERPL-SCNC: 3.7 MMOL/L (ref 3.4–5.3)
RBC #/AREA URNS AUTO: ABNORMAL /HPF
SODIUM SERPL-SCNC: 143 MMOL/L (ref 135–145)
SP GR UR STRIP: 1.01 (ref 1–1.03)
SQUAMOUS #/AREA URNS AUTO: ABNORMAL /LPF
UROBILINOGEN UR STRIP-ACNC: 0.2 E.U./DL
WBC #/AREA URNS AUTO: ABNORMAL /HPF

## 2025-07-29 ENCOUNTER — TELEPHONE (OUTPATIENT)
Dept: NEPHROLOGY | Facility: CLINIC | Age: 75
End: 2025-07-29
Payer: COMMERCIAL

## 2025-07-29 ENCOUNTER — ANCILLARY PROCEDURE (OUTPATIENT)
Dept: ULTRASOUND IMAGING | Facility: CLINIC | Age: 75
End: 2025-07-29
Attending: PHYSICIAN ASSISTANT
Payer: COMMERCIAL

## 2025-07-29 DIAGNOSIS — N18.4 CKD (CHRONIC KIDNEY DISEASE) STAGE 4, GFR 15-29 ML/MIN (H): ICD-10-CM

## 2025-07-29 PROCEDURE — 76770 US EXAM ABDO BACK WALL COMP: CPT

## 2025-07-29 NOTE — TELEPHONE ENCOUNTER
----- Message from MALIHA ROWLEY sent at 7/25/2025  4:31 PM CDT -----  Thank you both.     Vielka,   Please let her know she should hear from Dr. Hoover about stopping metformin (if she hasn't already). She is already on Metoprolol. Please have her stop irbesartan and start hydralazine 25 mg TID - will need prescription for this. Check in with her in a couple of weeks to see how her BP's are.     Thanks  Maliha  ----- Message -----  From: Yahaira Qureshi CNP  Sent: 7/24/2025  12:10 PM CDT  To: Maliha Sanchez PA-C; Olga Lidia Hoover MD    Okay to stop irbesartan if need be due to renal dysfunction.  Hydralazine or BB is likely her only other option if she needs more for blood pressure.      Thanks!   Yahaira Qureshi  ----- Message -----  From: Maliha Sanchez PA-C  Sent: 7/23/2025  12:21 PM CDT  To: Yahaira Qureshi CNP; Olga Lidia Hoover MD    [facility],     I saw Ade in nephrology today. Her eGFR was down to 16 which means her cystatin C is likely lower. She is nearing the need for dialysis. She is still taking metformin and irbesartan.     My questions are: can she stop metformin? Can she stop irbesartan? Or at least decrease dose of irbesartan? and if so, what would you recommend in it's place from a cardiology standpoint? She had a reaction to amlodipine.     Appreciate your input!    Thanks  Maliha

## 2025-07-29 NOTE — TELEPHONE ENCOUNTER
Maliha Sanchez, Vielka Jimenez, RN  Please help her schedule tour of a dialysis unit. She is undecided which modality after taking kidney smart twice so I think this will be helpful.    Thanks  Maliha

## 2025-07-30 ENCOUNTER — OFFICE VISIT (OUTPATIENT)
Dept: DERMATOLOGY | Facility: CLINIC | Age: 75
End: 2025-07-30
Payer: COMMERCIAL

## 2025-07-30 DIAGNOSIS — R60.0 BILATERAL LOWER EXTREMITY EDEMA: Primary | ICD-10-CM

## 2025-07-30 DIAGNOSIS — I87.2 STASIS DERMATITIS: ICD-10-CM

## 2025-07-30 PROCEDURE — 99214 OFFICE O/P EST MOD 30 MIN: CPT | Performed by: STUDENT IN AN ORGANIZED HEALTH CARE EDUCATION/TRAINING PROGRAM

## 2025-07-30 NOTE — PROGRESS NOTES
[facility] Dermatology Note    Encounter Date: Jul 30, 2025    Dermatology Problem List:  #CKD stage IV  - On irbesartan, statin  #Hypertension  - Furosemide 40 mg metoprolol 25 mg twice daily and irbesartan  #DM  #CVD s/p stent  #s/p TAVR    Major PMHx  -     Social Hx:  ______________________________________    Impression/Plan:  Ade was seen today for derm problem.    Diagnoses and all orders for this visit:    Bilateral lower extremity edema  Stasis dermatitis  -At the last visit had significant 3+ pitting edema  - She has obesity, stage IV chronic kidney disease, grade 1 diastolic dysfunction on 2024 echo  - At the last visit we increased her furosemide to 40 mg twice daily and she noticed a big reduction of the lower extremity edema as a result her wounds have healed  - Recently she had some increased physical activity and had swelling after that it is receding we recommended elevation of her feet  - continue lasix 40mg BID        Follow-up in 6 mo.       Staff Involved:  Staff Only    Carter Celis MD   of Dermatology  Department of Dermatology  [facility]      CC:   Chief Complaint   Patient presents with    Derm Problem     Follow-up on cellulitis, patient feels it is much better overall       History of Present Illness:  Ms. Ade Wilkins is a 74 year old female who presents as a return patient.    Ade was last seen by me in April for lower extremity edema and cellulitis.  At that time a week prior to the visit she had experienced a skin tear with increasing redness.  We discussed symptoms of possible fluid overload including orthopnea which she denied however she did state that she sleeps with a wedge her exercise capacity was at baseline.  She had a normal echo in August that showed grade 1 diastolic dysfunction.  We discussed it was reasonable to repeat her echo given the worsening of her lower extremity edema and her  history of coronary artery disease as well as valvular disease.  We started doxycycline for presumed superimposed cellulitis over the open wound and also increased her Lasix dosing from once daily to twice daily recommended that she continue her potassium supplement.    She saw nephrology on July 23.  At that time she notes that her blood pressure had been stable and her swelling had improved.  They checked a size statin C which showed a GFR of 12 and that correlated with her BMP.  Today    Labs:      Physical exam:  Vitals: LMP  (LMP Unknown)   GEN: well developed, well-nourished, in no acute distress, in a pleasant mood.     SKIN: Holder phototype 1  - Focused examination of the 2-3+ pitting edema of dorsal foot only was performed.  - No other lesions of concern on areas examined.     Past Medical History:   Past Medical History:   Diagnosis Date    Benign essential hypertension     CAD (coronary artery disease)     s/p PCI to RCA in 2022    Chronic diastolic heart failure (H)     Chronic rhinitis     CKD (chronic kidney disease) stage 4, GFR 15-29 ml/min (H)     Diabetic retinopathy of both eyes (H)     History of follicular lymphoma 2008    s/p excisional biopsy; no neoadjuvant or adjuvant Rx; stage III, grade 1-2; was under surveillance by oncology until 2025; should get CBCs at least once annually    Hypothyroidism     Lymphedema of both lower extremities     Obesity (BMI 30-39.9)     Osteopenia     Pure hypercholesterolemia     S/P gastric bypass     S/P TAVR (transcatheter aortic valve replacement) 06/07/2022    severe aortic stenosis s/p TAVR with a 26 mm Medtronic Evolut valve    Type 2 diabetes mellitus (H)     White coat syndrome with diagnosis of hypertension      Past Surgical History:   Procedure Laterality Date    APPENDECTOMY      CATARACT IOL, RT/LT Bilateral     CV CORONARY ANGIOGRAM N/A 05/04/2022    Procedure: Coronary Angiogram;  Surgeon: Hector Lewis MD;  Location: [facility]  CARDIAC CATH LAB    CV LEFT HEART CATH N/A 05/04/2022    Procedure: Left Heart Catheterization;  Surgeon: Hector Lewis MD;  Location: [facility] CARDIAC CATH LAB    CV PCI N/A 05/04/2022    Procedure: Percutaneous Coronary Intervention;  Surgeon: Hector Lewis MD;  Location: [facility] CARDIAC CATH LAB    CV TRANSCATHETER AORTIC VALVE REPLACEMENT-FEMORAL APPROACH N/A 06/07/2022    Procedure: Transcatheter Aortic Valve Replacement-Femoral Approach;  Surgeon: Hector Lewis MD;  Location: [facility] CARDIAC CATH LAB    JURGEN EN Y BOWEL  2004    TONSILLECTOMY & ADENOIDECTOMY         Social History:   reports that she has never smoked. She has never used smokeless tobacco. She reports that she does not drink alcohol and does not use drugs.    Family History:  Family History   Problem Relation Age of Onset    Diabetes Type 2  Mother     Glaucoma Mother     Coronary Artery Disease Mother     Abdominal Aortic Aneurysm Father     Myocardial Infarction Father     Breast Cancer Sister     Abdominal Aortic Aneurysm Brother     Bladder Cancer Brother         recurrent    Diabetes Type 2  Brother     Liver Cancer Brother     Myocardial Infarction Brother     Alzheimer Disease Paternal Grandmother     Cerebrovascular Disease Paternal Grandfather     Ovarian Cancer No family hx of     Colon Cancer No family hx of        Medications:  Current Outpatient Medications   Medication Sig Dispense Refill    aspirin (ASA) 81 MG chewable tablet Take 1 tablet (81 mg) by mouth daily Starting tomorrow. 30 tablet 3    augmented betamethasone dipropionate (DIPROLENE-AF) 0.05 % external ointment Apply topically to affected area twice daily for 3-4 weeks then use as needed 45 g 0    Calcium Carb-Cholecalciferol (CALCIUM 600+D) 600-20 MG-MCG TABS Take 1 tablet by mouth 2 times daily      cetirizine (ZYRTEC) 10 MG tablet Take 1 tablet (10 mg) by mouth daily      cholecalciferol (VITAMIN D3) 25 mcg (1000 units) capsule Take 2  capsules by mouth daily      clobetasol (TEMOVATE) 0.05 % external ointment Apply topically 2 times daily. To rash on extremities using an amount sufficient to cover the area 60 g 3    Continuous Glucose  (FREESTYLE BALWINDER 2 READER) HENNA Use to read blood sugars as per 's instructions. 1 each 0    Continuous Glucose Sensor (FREESTYLE BALWINDER 2 PLUS SENSOR) MISC Change every 15 days. 6 each 2    doxycycline hyclate (VIBRA-TABS) 100 MG tablet Take 1 tablet (100 mg) by mouth 2 times daily. 14 tablet 0    furosemide (LASIX) 40 MG tablet Take 1 tablet (40 mg) by mouth 2 times daily. 180 tablet 0    insulin glargine U-300 (TOUJEO SOLOSTAR) 300 UNIT/ML (1 units dial) pen Inject 18 Units subcutaneously daily. Take 16-20 unit(s) daily 6 mL 0    insulin pen needle (BD FCO U/F) 32G X 4 MM miscellaneous Use 4 pen needles daily or as directed. 400 each 0    ipratropium (ATROVENT) 0.06 % nasal spray Spray 2 sprays into both nostrils 4 times daily. 15 mL 11    levothyroxine (SYNTHROID/LEVOTHROID) 50 MCG tablet Take 1 tablet (50 mcg) by mouth daily 90 tablet 1    LYUMJEV KWIKPEN 100 UNIT/ML SOPN Inject 60 Units Subcutaneous daily 60 mL 0    Menthol, Topical Analgesic, 7 % LIQD Apply 1 Application. topically 3 times daily as needed      metFORMIN (GLUCOPHAGE XR) 500 MG 24 hr tablet Take 1 tablet (500 mg) by mouth 2 times daily (with meals) (Patient not taking: Reported on 7/30/2025) 180 tablet 4    metoprolol succinate ER (TOPROL XL) 25 MG 24 hr tablet Take 1 tablet (25 mg) by mouth 2 times daily. 180 tablet 3    potassium chloride juan carlos ER (KLOR-CON M10) 10 MEQ CR tablet Take 1 tablet (10 mEq) by mouth daily 90 tablet 2    rosuvastatin (CRESTOR) 20 MG tablet Take 1 tablet (20 mg) by mouth daily. 90 tablet 2    Semaglutide, 2 MG/DOSE, (OZEMPIC) 8 MG/3ML pen Inject 2 mg subcutaneously every 7 days. 9 mL 3    sodium chloride (BRANDON 128) 5 % ophthalmic solution Place 1-2 drops into both eyes daily      topiramate  (TOPAMAX) 25 MG tablet Take 3 tablets (75 mg) by mouth at bedtime. 270 tablet 3    triamcinolone (KENALOG) 0.1 % external ointment Apply topically once a week To left foot rash 30 g 0     Allergies   Allergen Reactions    Chlorhexidine Rash    Clonidine Headache    Doxazosin Mesylate Rash    Fexofenadine Hydrochloride Headache    Gemfibrozil Rash    Lisinopril Angioedema    Norvasc [Amlodipine] Other (See Comments)     hair loss    Pioglitazone Hydrochloride Swelling     ankle edema

## 2025-07-30 NOTE — LETTER
7/30/2025      Ade Wilkins  [address] [facility] [id number]      Dear Colleague,    Thank you for referring your patient, Ade Wilkins, to the [facility]. Please see a copy of my visit note below.    Formerly [facility] Dermatology Note    Encounter Date: Jul 30, 2025    Dermatology Problem List:  #CKD stage IV  - On irbesartan, statin  #Hypertension  - Furosemide 40 mg metoprolol 25 mg twice daily and irbesartan  #DM  #CVD s/p stent  #s/p TAVR    Major PMHx  -     Social Hx:  ______________________________________    Impression/Plan:  Ade was seen today for derm problem.    Diagnoses and all orders for this visit:    Bilateral lower extremity edema  Stasis dermatitis  -At the last visit had significant 3+ pitting edema  - She has obesity, stage IV chronic kidney disease, grade 1 diastolic dysfunction on 2024 echo  - At the last visit we increased her furosemide to 40 mg twice daily and she noticed a big reduction of the lower extremity edema as a result her wounds have healed  - Recently she had some increased physical activity and had swelling after that it is receding we recommended elevation of her feet  - continue lasix 40mg BID        Follow-up in 6 mo.       Staff Involved:  Staff Only    Carter Celis MD   of Dermatology  Department of Dermatology  [facility]      CC:   Chief Complaint   Patient presents with     Derm Problem     Follow-up on cellulitis, patient feels it is much better overall       History of Present Illness:  Ms. Ade Wilkins is a 74 year old female who presents as a return patient.    Ade was last seen by me in April for lower extremity edema and cellulitis.  At that time a week prior to the visit she had experienced a skin tear with increasing redness.  We discussed symptoms of possible fluid overload including orthopnea which she denied however she did state that  she sleeps with a wedge her exercise capacity was at baseline.  She had a normal echo in August that showed grade 1 diastolic dysfunction.  We discussed it was reasonable to repeat her echo given the worsening of her lower extremity edema and her history of coronary artery disease as well as valvular disease.  We started doxycycline for presumed superimposed cellulitis over the open wound and also increased her Lasix dosing from once daily to twice daily recommended that she continue her potassium supplement.    She saw nephrology on July 23.  At that time she notes that her blood pressure had been stable and her swelling had improved.  They checked a size statin C which showed a GFR of 12 and that correlated with her BMP.  Today    Labs:      Physical exam:  Vitals: LMP  (LMP Unknown)   GEN: well developed, well-nourished, in no acute distress, in a pleasant mood.     SKIN: Holder phototype 1  - Focused examination of the 2-3+ pitting edema of dorsal foot only was performed.  - No other lesions of concern on areas examined.     Past Medical History:   Past Medical History:   Diagnosis Date     Benign essential hypertension      CAD (coronary artery disease)     s/p PCI to RCA in 2022     Chronic diastolic heart failure (H)      Chronic rhinitis      CKD (chronic kidney disease) stage 4, GFR 15-29 ml/min (H)      Diabetic retinopathy of both eyes (H)      History of follicular lymphoma 2008    s/p excisional biopsy; no neoadjuvant or adjuvant Rx; stage III, grade 1-2; was under surveillance by oncology until 2025; should get CBCs at least once annually     Hypothyroidism      Lymphedema of both lower extremities      Obesity (BMI 30-39.9)      Osteopenia      Pure hypercholesterolemia      S/P gastric bypass      S/P TAVR (transcatheter aortic valve replacement) 06/07/2022    severe aortic stenosis s/p TAVR with a 26 mm Medtronic Evolut valve     Type 2 diabetes mellitus (H)      White coat syndrome with  diagnosis of hypertension      Past Surgical History:   Procedure Laterality Date     APPENDECTOMY       CATARACT IOL, RT/LT Bilateral      CV CORONARY ANGIOGRAM N/A 05/04/2022    Procedure: Coronary Angiogram;  Surgeon: Hector Lewis MD;  Location:  HEART CARDIAC CATH LAB     CV LEFT HEART CATH N/A 05/04/2022    Procedure: Left Heart Catheterization;  Surgeon: Hector Lewis MD;  Location:  HEART CARDIAC CATH LAB     CV PCI N/A 05/04/2022    Procedure: Percutaneous Coronary Intervention;  Surgeon: Hector Lewis MD;  Location:  HEART CARDIAC CATH LAB     CV TRANSCATHETER AORTIC VALVE REPLACEMENT-FEMORAL APPROACH N/A 06/07/2022    Procedure: Transcatheter Aortic Valve Replacement-Femoral Approach;  Surgeon: Hector Lewis MD;  [facility]     JURGEN EN Y BOWEL  2004     TONSILLECTOMY & ADENOIDECTOMY         Social History:   reports that she has never smoked. She has never used smokeless tobacco. She reports that she does not drink alcohol and does not use drugs.    Family History:  Family History   Problem Relation Age of Onset     Diabetes Type 2  Mother      Glaucoma Mother      Coronary Artery Disease Mother      Abdominal Aortic Aneurysm Father      Myocardial Infarction Father      Breast Cancer Sister      Abdominal Aortic Aneurysm Brother      Bladder Cancer Brother         recurrent     Diabetes Type 2  Brother      Liver Cancer Brother      Myocardial Infarction Brother      Alzheimer Disease Paternal Grandmother      Cerebrovascular Disease Paternal Grandfather      Ovarian Cancer No family hx of      Colon Cancer No family hx of        Medications:  Current Outpatient Medications   Medication Sig Dispense Refill     aspirin (ASA) 81 MG chewable tablet Take 1 tablet (81 mg) by mouth daily Starting tomorrow. 30 tablet 3     augmented betamethasone dipropionate (DIPROLENE-AF) 0.05 % external ointment Apply topically to affected area twice daily  for 3-4 weeks then use as needed 45 g 0     Calcium Carb-Cholecalciferol (CALCIUM 600+D) 600-20 MG-MCG TABS Take 1 tablet by mouth 2 times daily       cetirizine (ZYRTEC) 10 MG tablet Take 1 tablet (10 mg) by mouth daily       cholecalciferol (VITAMIN D3) 25 mcg (1000 units) capsule Take 2 capsules by mouth daily       clobetasol (TEMOVATE) 0.05 % external ointment Apply topically 2 times daily. To rash on extremities using an amount sufficient to cover the area 60 g 3     Continuous Glucose  (FREESTYLE BALWINDER 2 READER) HENNA Use to read blood sugars as per 's instructions. 1 each 0     Continuous Glucose Sensor (FREESTYLE BALWINDER 2 PLUS SENSOR) MISC Change every 15 days. 6 each 2     doxycycline hyclate (VIBRA-TABS) 100 MG tablet Take 1 tablet (100 mg) by mouth 2 times daily. 14 tablet 0     furosemide (LASIX) 40 MG tablet Take 1 tablet (40 mg) by mouth 2 times daily. 180 tablet 0     insulin glargine U-300 (TOUJEO SOLOSTAR) 300 UNIT/ML (1 units dial) pen Inject 18 Units subcutaneously daily. Take 16-20 unit(s) daily 6 mL 0     insulin pen needle (BD FCO U/F) 32G X 4 MM miscellaneous Use 4 pen needles daily or as directed. 400 each 0     ipratropium (ATROVENT) 0.06 % nasal spray Spray 2 sprays into both nostrils 4 times daily. 15 mL 11     levothyroxine (SYNTHROID/LEVOTHROID) 50 MCG tablet Take 1 tablet (50 mcg) by mouth daily 90 tablet 1     LYUMJEV KWIKPEN 100 UNIT/ML SOPN Inject 60 Units Subcutaneous daily 60 mL 0     Menthol, Topical Analgesic, 7 % LIQD Apply 1 Application. topically 3 times daily as needed       metFORMIN (GLUCOPHAGE XR) 500 MG 24 hr tablet Take 1 tablet (500 mg) by mouth 2 times daily (with meals) (Patient not taking: Reported on 7/30/2025) 180 tablet 4     metoprolol succinate ER (TOPROL XL) 25 MG 24 hr tablet Take 1 tablet (25 mg) by mouth 2 times daily. 180 tablet 3     potassium chloride juan carlos ER (KLOR-CON M10) 10 MEQ CR tablet Take 1 tablet (10 mEq) by mouth daily 90  tablet 2     rosuvastatin (CRESTOR) 20 MG tablet Take 1 tablet (20 mg) by mouth daily. 90 tablet 2     Semaglutide, 2 MG/DOSE, (OZEMPIC) 8 MG/3ML pen Inject 2 mg subcutaneously every 7 days. 9 mL 3     sodium chloride (BRANDON 128) 5 % ophthalmic solution Place 1-2 drops into both eyes daily       topiramate (TOPAMAX) 25 MG tablet Take 3 tablets (75 mg) by mouth at bedtime. 270 tablet 3     triamcinolone (KENALOG) 0.1 % external ointment Apply topically once a week To left foot rash 30 g 0     Allergies   Allergen Reactions     Chlorhexidine Rash     Clonidine Headache     Doxazosin Mesylate Rash     Fexofenadine Hydrochloride Headache     Gemfibrozil Rash     Lisinopril Angioedema     Norvasc [Amlodipine] Other (See Comments)     hair loss     Pioglitazone Hydrochloride Swelling     ankle edema               Again, thank you for allowing me to participate in the care of your patient.        Sincerely,        Carter Celis MD    Electronically signed

## 2025-07-31 DIAGNOSIS — Z79.4 TYPE 2 DIABETES MELLITUS WITH DIABETIC NEUROPATHY, WITH LONG-TERM CURRENT USE OF INSULIN (H): ICD-10-CM

## 2025-07-31 DIAGNOSIS — E11.40 TYPE 2 DIABETES MELLITUS WITH DIABETIC NEUROPATHY, WITH LONG-TERM CURRENT USE OF INSULIN (H): ICD-10-CM

## 2025-08-02 RX ORDER — METFORMIN HYDROCHLORIDE 500 MG/1
500 TABLET, EXTENDED RELEASE ORAL 2 TIMES DAILY WITH MEALS
Qty: 180 TABLET | Refills: 0 | Status: SHIPPED | OUTPATIENT
Start: 2025-08-02

## 2025-08-14 ENCOUNTER — TELEPHONE (OUTPATIENT)
Dept: CARDIOLOGY | Facility: CLINIC | Age: 75
End: 2025-08-14
Payer: COMMERCIAL

## 2025-08-23 DIAGNOSIS — E11.21 WELL CONTROLLED TYPE 2 DIABETES MELLITUS WITH NEPHROPATHY (H): ICD-10-CM

## 2025-08-25 ENCOUNTER — VIRTUAL VISIT (OUTPATIENT)
Dept: EDUCATION SERVICES | Facility: CLINIC | Age: 75
End: 2025-08-25
Payer: COMMERCIAL

## 2025-08-25 DIAGNOSIS — E11.65 POORLY CONTROLLED TYPE 2 DIABETES MELLITUS WITH NEUROPATHY (H): ICD-10-CM

## 2025-08-25 DIAGNOSIS — E11.40 POORLY CONTROLLED TYPE 2 DIABETES MELLITUS WITH NEUROPATHY (H): ICD-10-CM

## 2025-08-25 PROCEDURE — G0108 DIAB MANAGE TRN  PER INDIV: HCPCS | Mod: 95 | Performed by: DIETITIAN, REGISTERED

## 2025-08-25 RX ORDER — INSULIN GLARGINE 300 U/ML
12 INJECTION, SOLUTION SUBCUTANEOUS DAILY
Qty: 6 ML | Refills: 0 | Status: SHIPPED | OUTPATIENT
Start: 2025-08-25

## 2025-08-26 DIAGNOSIS — E11.21 WELL CONTROLLED TYPE 2 DIABETES MELLITUS WITH NEPHROPATHY (H): ICD-10-CM

## 2025-08-26 RX ORDER — INSULIN LISPRO-AABC 100 [IU]/ML
8-12 INJECTION, SOLUTION SUBCUTANEOUS 3 TIMES DAILY
Qty: 45 ML | Refills: 3 | Status: SHIPPED | OUTPATIENT
Start: 2025-08-26

## 2025-08-26 RX ORDER — INSULIN LISPRO-AABC 100 [IU]/ML
60 INJECTION, SOLUTION SUBCUTANEOUS DAILY
Qty: 60 ML | Refills: 0 | OUTPATIENT
Start: 2025-08-26

## (undated) DEVICE — KIT HAND CONTROL ACIST 016795

## (undated) DEVICE — RAD CLOSURE ANGIOSEAL 8FR  610131

## (undated) DEVICE — CATH BALLOON NC EMERGE 3.00X15MM H7493926715300

## (undated) DEVICE — CATH ANGIO SUPERTORQUE AL1 6FRX100CM 532-645

## (undated) DEVICE — GUIDEWIRE VASC 300CM .014IN CHC PT I H7491215501J2

## (undated) DEVICE — CATH ANGIO INFINITI PIGTAIL 145 6 SH 6FRX110CM  534-652S

## (undated) DEVICE — SYR ANGIOGRAPHY MULTIUSE KIT ACIST 014612

## (undated) DEVICE — CATH ANGIO INFINITI 3DRC 4FRX100CM 538476

## (undated) DEVICE — GUIDEWIRE HI-TORQUE VERSACORE MOD J 1

## (undated) DEVICE — MANIFOLD KIT ANGIO AUTOMATED 014613

## (undated) DEVICE — KIT HAND CONTROL ANGIOTOUCH ACIST 65CM AT-P65

## (undated) DEVICE — CATH GUIDING 6FR AL .75 LA6AL75

## (undated) DEVICE — WIRE GUIDE 0.035"X260CM SAFE-T-J EXCHANGE G00517

## (undated) DEVICE — NDL PERC ENTRY THINWALL 18GA 7.0" G00166

## (undated) DEVICE — KIT LG BORE TOUHY ACCESS PLUS MAP152

## (undated) DEVICE — DEFIB PRO-PADZ LVP LQD GEL ADULT 8900-2105-01

## (undated) DEVICE — VALVE HEMOSTASIS .096" COPILOT MECH 1003331

## (undated) DEVICE — CATH DIAG 4FR AR 1 MOD 538441

## (undated) DEVICE — INTRODUCER SHEATH GREEN 6.5FRX11CM .038IN PSI-6F-11-038ACT

## (undated) DEVICE — CATH EP PACEL 5FRX110CM 1MM RIGHT HEART CURVE 401763

## (undated) DEVICE — DELIVERY SYSTEM 23/26/29MM EVOLUT PRO+

## (undated) DEVICE — CATH ANGIO INFINITI JR4 4FRX100CM 538421

## (undated) DEVICE — CATH GUIDING 100CM 6FR .070IN VSTBR

## (undated) DEVICE — CATH BALLOON NC EMERGE 4.50X12MM H7493926712450

## (undated) DEVICE — WIRE GUIDE 0.035"X150CM EMERALD STR 502542

## (undated) DEVICE — INTRO TERUMO 6FRX25CM W/MARKER RSB603

## (undated) DEVICE — TOTE ANGIO CORP PC15AT SAN32CC83O

## (undated) DEVICE — GW SURG OMNIWIRE STRAIGHT TIP L185CM PRESSURE GU 89185

## (undated) DEVICE — INTRO SHEATH 4FRX10CM PINNACLE RSS402

## (undated) DEVICE — SYSTEM PANNUS RETENTION 4 PAD 2 STRAP CZ-PRS-04

## (undated) DEVICE — GUIDEWIRE VASC 0.014INX180CM RUNTHROUGH 25-1011

## (undated) DEVICE — LOADING SYSTEM 23/26/29MM EVOLUT PRO+

## (undated) DEVICE — INTRODUCER CATH VASC 5FRX10CM  MPIS-501-NT-U-SST

## (undated) DEVICE — INTRO TERUMO 7FRX25CM W/MARKER RSB703

## (undated) DEVICE — WIRE GUIDE LUNDERQUIST 0.035"X260CM DBL CVD

## (undated) DEVICE — RAD INFLATOR BASIC COMPAK  IN4130

## (undated) DEVICE — GUIDEWIRE VASC SAFARI2 0.035X275CM H74939406XS1

## (undated) DEVICE — INTRO GLIDESHEATH SLENDER 6FR 10X45CM 60-1060

## (undated) DEVICE — CATH DIAG 4FR JL 4.5 538417

## (undated) DEVICE — SHEATH INTRODUCER DRYSEAL FLEX W/HYDRO CT 18FRX33CM DSF1833

## (undated) RX ORDER — NITROGLYCERIN 5 MG/ML
VIAL (ML) INTRAVENOUS
Status: DISPENSED
Start: 2022-06-07

## (undated) RX ORDER — LIDOCAINE HYDROCHLORIDE 10 MG/ML
INJECTION, SOLUTION EPIDURAL; INFILTRATION; INTRACAUDAL; PERINEURAL
Status: DISPENSED
Start: 2022-05-04

## (undated) RX ORDER — LIDOCAINE HYDROCHLORIDE 10 MG/ML
INJECTION, SOLUTION EPIDURAL; INFILTRATION; INTRACAUDAL; PERINEURAL
Status: DISPENSED
Start: 2022-06-07

## (undated) RX ORDER — HEPARIN SODIUM 1000 [USP'U]/ML
INJECTION, SOLUTION INTRAVENOUS; SUBCUTANEOUS
Status: DISPENSED
Start: 2022-06-07

## (undated) RX ORDER — VERAPAMIL HYDROCHLORIDE 2.5 MG/ML
INJECTION, SOLUTION INTRAVENOUS
Status: DISPENSED
Start: 2022-06-07

## (undated) RX ORDER — FENTANYL CITRATE 50 UG/ML
INJECTION, SOLUTION INTRAMUSCULAR; INTRAVENOUS
Status: DISPENSED
Start: 2022-05-04

## (undated) RX ORDER — HEPARIN SODIUM 200 [USP'U]/100ML
INJECTION, SOLUTION INTRAVENOUS
Status: DISPENSED
Start: 2022-06-07

## (undated) RX ORDER — HEPARIN SODIUM 200 [USP'U]/100ML
INJECTION, SOLUTION INTRAVENOUS
Status: DISPENSED
Start: 2022-05-04

## (undated) RX ORDER — NICOTINE POLACRILEX 4 MG
LOZENGE BUCCAL
Status: DISPENSED
Start: 2022-05-04

## (undated) RX ORDER — FENTANYL CITRATE 50 UG/ML
INJECTION, SOLUTION INTRAMUSCULAR; INTRAVENOUS
Status: DISPENSED
Start: 2019-05-07

## (undated) RX ORDER — HEPARIN SODIUM 1000 [USP'U]/ML
INJECTION, SOLUTION INTRAVENOUS; SUBCUTANEOUS
Status: DISPENSED
Start: 2022-05-04